# Patient Record
Sex: FEMALE | Race: WHITE | NOT HISPANIC OR LATINO | Employment: FULL TIME | URBAN - METROPOLITAN AREA
[De-identification: names, ages, dates, MRNs, and addresses within clinical notes are randomized per-mention and may not be internally consistent; named-entity substitution may affect disease eponyms.]

---

## 2023-03-21 DIAGNOSIS — F98.8 ATTENTION DEFICIT DISORDER, UNSPECIFIED HYPERACTIVITY PRESENCE: ICD-10-CM

## 2023-03-21 DIAGNOSIS — F98.8 ATTENTION DEFICIT DISORDER, UNSPECIFIED HYPERACTIVITY PRESENCE: Primary | ICD-10-CM

## 2023-03-21 DIAGNOSIS — E55.9 VITAMIN D DEFICIENCY: ICD-10-CM

## 2023-03-21 RX ORDER — IBUPROFEN 800 MG/1
1 TABLET ORAL 2 TIMES DAILY
COMMUNITY
Start: 2017-01-12 | End: 2023-03-21 | Stop reason: SDUPTHER

## 2023-03-21 RX ORDER — OMEPRAZOLE 40 MG/1
40 CAPSULE, DELAYED RELEASE ORAL DAILY
COMMUNITY
Start: 2015-12-29 | End: 2023-03-21 | Stop reason: SDUPTHER

## 2023-03-21 RX ORDER — DEXTROAMPHETAMINE SACCHARATE, AMPHETAMINE ASPARTATE MONOHYDRATE, DEXTROAMPHETAMINE SULFATE AND AMPHETAMINE SULFATE 5; 5; 5; 5 MG/1; MG/1; MG/1; MG/1
20 CAPSULE, EXTENDED RELEASE ORAL 2 TIMES DAILY
Qty: 30 CAPSULE | Refills: 0 | Status: SHIPPED | OUTPATIENT
Start: 2023-03-21 | End: 2023-05-01 | Stop reason: SDUPTHER

## 2023-03-21 RX ORDER — DEXTROAMPHETAMINE SACCHARATE, AMPHETAMINE ASPARTATE MONOHYDRATE, DEXTROAMPHETAMINE SULFATE AND AMPHETAMINE SULFATE 5; 5; 5; 5 MG/1; MG/1; MG/1; MG/1
20 CAPSULE, EXTENDED RELEASE ORAL 2 TIMES DAILY
Qty: 90 CAPSULE | Refills: 0 | Status: SHIPPED | OUTPATIENT
Start: 2023-03-21 | End: 2023-03-23

## 2023-03-21 RX ORDER — PREDNISONE 10 MG/1
10 TABLET ORAL DAILY
Qty: 90 TABLET | Refills: 0 | Status: SHIPPED | OUTPATIENT
Start: 2023-03-21 | End: 2023-07-17 | Stop reason: SDUPTHER

## 2023-03-21 RX ORDER — OMEPRAZOLE 40 MG/1
40 CAPSULE, DELAYED RELEASE ORAL DAILY
Qty: 90 CAPSULE | Refills: 1 | Status: SHIPPED | OUTPATIENT
Start: 2023-03-21 | End: 2023-10-02 | Stop reason: SDUPTHER

## 2023-03-21 RX ORDER — DEXTROAMPHETAMINE SACCHARATE, AMPHETAMINE ASPARTATE MONOHYDRATE, DEXTROAMPHETAMINE SULFATE AND AMPHETAMINE SULFATE 5; 5; 5; 5 MG/1; MG/1; MG/1; MG/1
20 CAPSULE, EXTENDED RELEASE ORAL 2 TIMES DAILY
Qty: 90 CAPSULE | Refills: 0 | Status: CANCELLED | OUTPATIENT
Start: 2023-03-21

## 2023-03-21 RX ORDER — DEXTROAMPHETAMINE SACCHARATE, AMPHETAMINE ASPARTATE MONOHYDRATE, DEXTROAMPHETAMINE SULFATE AND AMPHETAMINE SULFATE 5; 5; 5; 5 MG/1; MG/1; MG/1; MG/1
20 CAPSULE, EXTENDED RELEASE ORAL 2 TIMES DAILY
COMMUNITY
End: 2023-03-21 | Stop reason: SDUPTHER

## 2023-03-21 RX ORDER — IBUPROFEN 800 MG/1
800 TABLET ORAL 2 TIMES DAILY
Qty: 180 TABLET | Refills: 1 | Status: SHIPPED | OUTPATIENT
Start: 2023-03-21 | End: 2023-10-02 | Stop reason: SDUPTHER

## 2023-03-21 RX ORDER — PREDNISONE 10 MG/1
1 TABLET ORAL DAILY
COMMUNITY
Start: 2022-01-13 | End: 2023-03-21 | Stop reason: SDUPTHER

## 2023-03-21 RX ORDER — ACETAMINOPHEN 500 MG
1 TABLET ORAL DAILY
COMMUNITY
Start: 2022-07-22 | End: 2023-03-21 | Stop reason: SDUPTHER

## 2023-03-21 RX ORDER — ACETAMINOPHEN 500 MG
5000 TABLET ORAL DAILY
Qty: 90 TABLET | Refills: 1 | Status: SHIPPED | OUTPATIENT
Start: 2023-03-21 | End: 2023-10-09 | Stop reason: SDUPTHER

## 2023-03-23 RX ORDER — DEXTROAMPHETAMINE SACCHARATE, AMPHETAMINE ASPARTATE MONOHYDRATE, DEXTROAMPHETAMINE SULFATE AND AMPHETAMINE SULFATE 5; 5; 5; 5 MG/1; MG/1; MG/1; MG/1
20 CAPSULE, EXTENDED RELEASE ORAL 2 TIMES DAILY
Qty: 60 CAPSULE | Refills: 0 | Status: SHIPPED | OUTPATIENT
Start: 2023-03-23 | End: 2024-03-11 | Stop reason: SDUPTHER

## 2023-04-04 DIAGNOSIS — F98.8 ATTENTION DEFICIT DISORDER, UNSPECIFIED HYPERACTIVITY PRESENCE: ICD-10-CM

## 2023-04-04 RX ORDER — ONDANSETRON 8 MG/1
TABLET, ORALLY DISINTEGRATING ORAL EVERY 8 HOURS
COMMUNITY
Start: 2018-06-02 | End: 2023-05-01 | Stop reason: SDUPTHER

## 2023-04-04 RX ORDER — LORATADINE 10 MG/1
1 TABLET ORAL DAILY
COMMUNITY

## 2023-04-04 RX ORDER — DEXTROAMPHETAMINE SACCHARATE, AMPHETAMINE ASPARTATE MONOHYDRATE, DEXTROAMPHETAMINE SULFATE AND AMPHETAMINE SULFATE 5; 5; 5; 5 MG/1; MG/1; MG/1; MG/1
20 CAPSULE, EXTENDED RELEASE ORAL 2 TIMES DAILY
Qty: 60 CAPSULE | Refills: 0 | Status: CANCELLED | OUTPATIENT
Start: 2023-04-04

## 2023-04-04 RX ORDER — METHYLPREDNISOLONE 4 MG/1
TABLET ORAL
COMMUNITY
Start: 2016-08-16 | End: 2023-07-27 | Stop reason: ALTCHOICE

## 2023-04-04 RX ORDER — ONDANSETRON 4 MG/1
TABLET, ORALLY DISINTEGRATING ORAL
COMMUNITY
Start: 2019-04-01 | End: 2023-05-01 | Stop reason: SDUPTHER

## 2023-04-04 RX ORDER — OXYCODONE HYDROCHLORIDE 5 MG/1
TABLET ORAL EVERY 6 HOURS
COMMUNITY
Start: 2016-07-08 | End: 2023-07-27 | Stop reason: ALTCHOICE

## 2023-04-04 RX ORDER — NITROFURANTOIN 25; 75 MG/1; MG/1
CAPSULE ORAL
COMMUNITY
Start: 2023-02-22 | End: 2023-07-27 | Stop reason: ALTCHOICE

## 2023-04-04 NOTE — TELEPHONE ENCOUNTER
Patient called and since last week she had been trying to find a pharmacy that has Adderall in stock she find this new pharmacy and they will not except the sheet of paper that is the rx they needed it escribed it is attached

## 2023-05-01 ENCOUNTER — TELEPHONE (OUTPATIENT)
Dept: PRIMARY CARE | Facility: CLINIC | Age: 53
End: 2023-05-01

## 2023-05-01 DIAGNOSIS — F98.8 ATTENTION DEFICIT DISORDER, UNSPECIFIED HYPERACTIVITY PRESENCE: ICD-10-CM

## 2023-05-01 RX ORDER — LEVOTHYROXINE SODIUM 100 UG/1
1 TABLET ORAL DAILY
COMMUNITY
Start: 2013-11-18 | End: 2024-01-31 | Stop reason: SDUPTHER

## 2023-05-01 RX ORDER — CLONAZEPAM 0.5 MG/1
TABLET ORAL
COMMUNITY
End: 2023-07-27 | Stop reason: ALTCHOICE

## 2023-05-01 RX ORDER — DEXTROAMPHETAMINE SACCHARATE, AMPHETAMINE ASPARTATE MONOHYDRATE, DEXTROAMPHETAMINE SULFATE AND AMPHETAMINE SULFATE 5; 5; 5; 5 MG/1; MG/1; MG/1; MG/1
20 CAPSULE, EXTENDED RELEASE ORAL 2 TIMES DAILY
Qty: 60 CAPSULE | Refills: 0 | Status: SHIPPED | OUTPATIENT
Start: 2023-05-01 | End: 2023-05-30 | Stop reason: SDUPTHER

## 2023-05-01 RX ORDER — CLINDAMYCIN HYDROCHLORIDE 300 MG/1
CAPSULE ORAL
COMMUNITY
Start: 2018-06-11 | End: 2023-07-27 | Stop reason: ALTCHOICE

## 2023-05-01 RX ORDER — LEVOTHYROXINE SODIUM 88 UG/1
CAPSULE ORAL
COMMUNITY
End: 2023-07-27

## 2023-05-01 RX ORDER — EPINEPHRINE 0.3 MG/.3ML
0.3 INJECTION SUBCUTANEOUS
COMMUNITY
Start: 2020-09-11 | End: 2023-07-27 | Stop reason: SDUPTHER

## 2023-05-01 RX ORDER — DICLOFENAC SODIUM, CAPSAICIN 1.5-0.025%
KIT TOPICAL
COMMUNITY
Start: 2016-08-16 | End: 2023-07-27 | Stop reason: ALTCHOICE

## 2023-05-01 RX ORDER — DOCUSATE SODIUM 100 MG/1
CAPSULE, LIQUID FILLED ORAL 2 TIMES DAILY
COMMUNITY
Start: 2016-07-08 | End: 2023-07-27 | Stop reason: ALTCHOICE

## 2023-05-30 DIAGNOSIS — F98.8 ATTENTION DEFICIT DISORDER, UNSPECIFIED HYPERACTIVITY PRESENCE: ICD-10-CM

## 2023-05-30 RX ORDER — DEXTROAMPHETAMINE SACCHARATE, AMPHETAMINE ASPARTATE MONOHYDRATE, DEXTROAMPHETAMINE SULFATE AND AMPHETAMINE SULFATE 5; 5; 5; 5 MG/1; MG/1; MG/1; MG/1
20 CAPSULE, EXTENDED RELEASE ORAL 2 TIMES DAILY
Qty: 60 CAPSULE | Refills: 0 | Status: SHIPPED | OUTPATIENT
Start: 2023-05-30 | End: 2023-06-26 | Stop reason: SDUPTHER

## 2023-06-26 DIAGNOSIS — F98.8 ATTENTION DEFICIT DISORDER, UNSPECIFIED HYPERACTIVITY PRESENCE: ICD-10-CM

## 2023-06-26 RX ORDER — DEXTROAMPHETAMINE SACCHARATE, AMPHETAMINE ASPARTATE MONOHYDRATE, DEXTROAMPHETAMINE SULFATE AND AMPHETAMINE SULFATE 5; 5; 5; 5 MG/1; MG/1; MG/1; MG/1
20 CAPSULE, EXTENDED RELEASE ORAL 2 TIMES DAILY
Qty: 60 CAPSULE | Refills: 0 | Status: SHIPPED | OUTPATIENT
Start: 2023-06-26 | End: 2023-07-21 | Stop reason: SDUPTHER

## 2023-06-26 NOTE — TELEPHONE ENCOUNTER
Patient called requesting Adderall Refill to [facility] on file   UDS isnt due until 1/12/2024

## 2023-06-27 ENCOUNTER — LAB (OUTPATIENT)
Dept: LAB | Facility: LAB | Age: 53
End: 2023-06-27
Payer: COMMERCIAL

## 2023-06-27 ENCOUNTER — TELEPHONE (OUTPATIENT)
Dept: PRIMARY CARE | Facility: CLINIC | Age: 53
End: 2023-06-27
Payer: COMMERCIAL

## 2023-06-27 DIAGNOSIS — N23 KIDNEY PAIN: ICD-10-CM

## 2023-06-27 DIAGNOSIS — N39.0 URINARY TRACT INFECTION WITHOUT HEMATURIA, SITE UNSPECIFIED: ICD-10-CM

## 2023-06-27 DIAGNOSIS — N39.0 URINARY TRACT INFECTION WITHOUT HEMATURIA, SITE UNSPECIFIED: Primary | ICD-10-CM

## 2023-06-27 LAB
APPEARANCE, URINE: CLEAR
BILIRUBIN, URINE: NEGATIVE
BLOOD, URINE: NEGATIVE
COLOR, URINE: YELLOW
GLUCOSE, URINE: NEGATIVE MG/DL
KETONES, URINE: NEGATIVE MG/DL
LEUKOCYTE ESTERASE, URINE: ABNORMAL
NITRITE, URINE: NEGATIVE
PH, URINE: 6 (ref 5–8)
PROTEIN, URINE: NEGATIVE MG/DL
RBC, URINE: 1 /HPF (ref 0–5)
SPECIFIC GRAVITY, URINE: 1.01 (ref 1–1.03)
SQUAMOUS EPITHELIAL CELLS, URINE: 1 /HPF
UROBILINOGEN, URINE: <2 MG/DL (ref 0–1.9)
WBC, URINE: 5 /HPF (ref 0–5)

## 2023-06-27 PROCEDURE — 87086 URINE CULTURE/COLONY COUNT: CPT

## 2023-06-27 PROCEDURE — 81001 URINALYSIS AUTO W/SCOPE: CPT

## 2023-06-27 NOTE — TELEPHONE ENCOUNTER
Patient called requesting a Urinalysis ASAP because of pain in kidneys states she has to bend over just to walk. She's currently not on antibiotics but would like one prescribed. I put order in and signed for Urinalysis already F/up appointment is scheduled for 7/5/23 at [facility]. Patient also stated she doesn't have any pain when urinating, or frequency hasn't increased

## 2023-06-28 ENCOUNTER — TELEPHONE (OUTPATIENT)
Dept: PRIMARY CARE | Facility: CLINIC | Age: 53
End: 2023-06-28
Payer: COMMERCIAL

## 2023-06-28 LAB — URINE CULTURE: NORMAL

## 2023-06-28 NOTE — TELEPHONE ENCOUNTER
Called in Prescription for Bactrim DS 1 PO bid 8 , as confirmed by provider, VINNY to patient letting her know it was called in to [facility] on file

## 2023-06-28 NOTE — TELEPHONE ENCOUNTER
Patient received an urinalysis yesterday and her results came back she stated her Leukocytes were 2+ and would like antibiotic sent to [facility] on file. She states she in a lot of pain and rated it as 8/10 . Would like a CB once  medication is sent so she can pick it up

## 2023-07-05 ENCOUNTER — APPOINTMENT (OUTPATIENT)
Dept: PRIMARY CARE | Facility: CLINIC | Age: 53
End: 2023-07-05
Payer: COMMERCIAL

## 2023-07-17 DIAGNOSIS — E55.9 VITAMIN D DEFICIENCY: ICD-10-CM

## 2023-07-17 RX ORDER — PREDNISONE 10 MG/1
10 TABLET ORAL DAILY
Qty: 90 TABLET | Refills: 0 | Status: SHIPPED | OUTPATIENT
Start: 2023-07-17 | End: 2023-10-09 | Stop reason: SDUPTHER

## 2023-07-21 DIAGNOSIS — F98.8 ATTENTION DEFICIT DISORDER, UNSPECIFIED HYPERACTIVITY PRESENCE: ICD-10-CM

## 2023-07-21 RX ORDER — DEXTROAMPHETAMINE SACCHARATE, AMPHETAMINE ASPARTATE MONOHYDRATE, DEXTROAMPHETAMINE SULFATE AND AMPHETAMINE SULFATE 5; 5; 5; 5 MG/1; MG/1; MG/1; MG/1
20 CAPSULE, EXTENDED RELEASE ORAL 2 TIMES DAILY
Qty: 60 CAPSULE | Refills: 0 | Status: SHIPPED | OUTPATIENT
Start: 2023-07-21 | End: 2023-08-21 | Stop reason: SDUPTHER

## 2023-07-24 RX ORDER — CYCLOBENZAPRINE HCL 10 MG
10 TABLET ORAL EVERY 8 HOURS PRN
COMMUNITY
Start: 2017-05-10 | End: 2023-07-27 | Stop reason: ALTCHOICE

## 2023-07-24 RX ORDER — HYDROCODONE BITARTRATE AND ACETAMINOPHEN 5; 325 MG/1; MG/1
1 TABLET ORAL EVERY 6 HOURS PRN
COMMUNITY
Start: 2017-08-31 | End: 2023-07-27 | Stop reason: ALTCHOICE

## 2023-07-24 RX ORDER — NAPROXEN SODIUM 220 MG/1
81 TABLET, FILM COATED ORAL
COMMUNITY

## 2023-07-25 PROBLEM — J01.40 ACUTE PANSINUSITIS: Status: ACTIVE | Noted: 2023-07-25

## 2023-07-25 PROBLEM — I82.90 THROMBUS: Status: ACTIVE | Noted: 2023-07-25

## 2023-07-25 PROBLEM — Z98.890 POST-OPERATIVE STATE: Status: ACTIVE | Noted: 2023-07-25

## 2023-07-25 PROBLEM — N39.3 STRESS INCONTINENCE: Status: ACTIVE | Noted: 2023-07-25

## 2023-07-25 PROBLEM — N95.1 HOT FLASHES, MENOPAUSAL: Status: ACTIVE | Noted: 2023-07-25

## 2023-07-25 PROBLEM — F41.9 ANXIETY DISORDER: Status: ACTIVE | Noted: 2023-07-25

## 2023-07-25 PROBLEM — B37.9 YEAST INFECTION: Status: ACTIVE | Noted: 2023-07-25

## 2023-07-25 PROBLEM — M62.838 NECK MUSCLE SPASM: Status: ACTIVE | Noted: 2023-07-25

## 2023-07-25 PROBLEM — R63.5 ABNORMAL WEIGHT GAIN: Status: ACTIVE | Noted: 2023-07-25

## 2023-07-25 PROBLEM — J32.9 SINUS INFECTION: Status: ACTIVE | Noted: 2023-07-25

## 2023-07-25 PROBLEM — R60.0 BILATERAL LEG EDEMA: Status: ACTIVE | Noted: 2023-07-25

## 2023-07-25 PROBLEM — N95.0 POSTMENOPAUSAL BLEEDING: Status: ACTIVE | Noted: 2023-07-25

## 2023-07-25 PROBLEM — E89.0 HYPOTHYROIDISM, POSTSURGICAL: Status: ACTIVE | Noted: 2023-07-25

## 2023-07-25 PROBLEM — E78.00 ELEVATED LDL CHOLESTEROL LEVEL: Status: ACTIVE | Noted: 2023-07-25

## 2023-07-25 PROBLEM — N81.9 VAGINAL VAULT PROLAPSE: Status: ACTIVE | Noted: 2023-07-25

## 2023-07-25 PROBLEM — G44.52 NEW DAILY PERSISTENT HEADACHE: Status: ACTIVE | Noted: 2023-07-25

## 2023-07-25 PROBLEM — E55.9 VITAMIN D DEFICIENCY: Status: ACTIVE | Noted: 2023-07-25

## 2023-07-25 PROBLEM — R41.3 MEMORY PROBLEM: Status: ACTIVE | Noted: 2023-07-25

## 2023-07-25 PROBLEM — R60.0 LOWER EXTREMITY EDEMA: Status: ACTIVE | Noted: 2023-07-25

## 2023-07-25 PROBLEM — L73.9 FOLLICULITIS: Status: ACTIVE | Noted: 2023-07-25

## 2023-07-25 PROBLEM — R21 RASH: Status: ACTIVE | Noted: 2023-07-25

## 2023-07-25 PROBLEM — N39.0 UTI (URINARY TRACT INFECTION): Status: ACTIVE | Noted: 2023-07-25

## 2023-07-25 PROBLEM — M54.50 LOW BACK PAIN: Status: ACTIVE | Noted: 2023-07-25

## 2023-07-25 PROBLEM — F98.8 ADD (ATTENTION DEFICIT DISORDER): Status: ACTIVE | Noted: 2023-07-25

## 2023-07-25 PROBLEM — R45.86 MOOD SWINGS: Status: ACTIVE | Noted: 2023-07-25

## 2023-07-25 PROBLEM — R03.0 BLOOD PRESSURE ELEVATED WITHOUT HISTORY OF HTN: Status: ACTIVE | Noted: 2023-07-25

## 2023-07-25 PROBLEM — R35.0 FREQUENCY OF URINATION: Status: ACTIVE | Noted: 2023-07-25

## 2023-07-25 PROBLEM — R51.9 HEADACHE: Status: ACTIVE | Noted: 2023-07-25

## 2023-07-25 PROBLEM — B36.9 FUNGAL DERMATITIS: Status: ACTIVE | Noted: 2023-07-25
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
## 2023-07-25 PROBLEM — J06.9 ACUTE URI: Status: ACTIVE | Noted: 2023-07-25

## 2023-07-25 PROBLEM — I83.899 VARICOSE VEINS OF LEG WITH COMPLICATIONS: Status: ACTIVE | Noted: 2023-07-25

## 2023-07-25 PROBLEM — R25.2 BILATERAL LEG CRAMPS: Status: ACTIVE | Noted: 2023-07-25

## 2023-07-26 ENCOUNTER — APPOINTMENT (OUTPATIENT)
Dept: PRIMARY CARE | Facility: CLINIC | Age: 53
End: 2023-07-26
Payer: COMMERCIAL

## 2023-07-27 ENCOUNTER — OFFICE VISIT (OUTPATIENT)
Dept: PRIMARY CARE | Facility: CLINIC | Age: 53
End: 2023-07-27
Payer: COMMERCIAL

## 2023-07-27 VITALS
RESPIRATION RATE: 17 BRPM | TEMPERATURE: 97.5 F | BODY MASS INDEX: 50.02 KG/M2 | SYSTOLIC BLOOD PRESSURE: 136 MMHG | HEIGHT: 64 IN | DIASTOLIC BLOOD PRESSURE: 74 MMHG | HEART RATE: 80 BPM | OXYGEN SATURATION: 98 % | WEIGHT: 293 LBS

## 2023-07-27 DIAGNOSIS — Z00.00 WELL ADULT EXAM: Primary | ICD-10-CM

## 2023-07-27 PROCEDURE — 1036F TOBACCO NON-USER: CPT | Performed by: INTERNAL MEDICINE

## 2023-07-27 PROCEDURE — 99213 OFFICE O/P EST LOW 20 MIN: CPT | Performed by: INTERNAL MEDICINE

## 2023-07-27 RX ORDER — EPINEPHRINE 0.3 MG/.3ML
0.3 INJECTION SUBCUTANEOUS AS NEEDED
Qty: 30 ML | Refills: 2 | Status: SHIPPED | OUTPATIENT
Start: 2023-07-27

## 2023-07-27 ASSESSMENT — ANXIETY QUESTIONNAIRES
7. FEELING AFRAID AS IF SOMETHING AWFUL MIGHT HAPPEN: NOT AT ALL
4. TROUBLE RELAXING: SEVERAL DAYS
6. BECOMING EASILY ANNOYED OR IRRITABLE: MORE THAN HALF THE DAYS
IF YOU CHECKED OFF ANY PROBLEMS ON THIS QUESTIONNAIRE, HOW DIFFICULT HAVE THESE PROBLEMS MADE IT FOR YOU TO DO YOUR WORK, TAKE CARE OF THINGS AT HOME, OR GET ALONG WITH OTHER PEOPLE: SOMEWHAT DIFFICULT
5. BEING SO RESTLESS THAT IT IS HARD TO SIT STILL: SEVERAL DAYS
GAD7 TOTAL SCORE: 11
3. WORRYING TOO MUCH ABOUT DIFFERENT THINGS: NEARLY EVERY DAY
1. FEELING NERVOUS, ANXIOUS, OR ON EDGE: MORE THAN HALF THE DAYS
2. NOT BEING ABLE TO STOP OR CONTROL WORRYING: MORE THAN HALF THE DAYS

## 2023-07-27 ASSESSMENT — PATIENT HEALTH QUESTIONNAIRE - PHQ9
9. THOUGHTS THAT YOU WOULD BE BETTER OFF DEAD, OR OF HURTING YOURSELF: NOT AT ALL
4. FEELING TIRED OR HAVING LITTLE ENERGY: MORE THAN HALF THE DAYS
SUM OF ALL RESPONSES TO PHQ QUESTIONS 1-9: 6
8. MOVING OR SPEAKING SO SLOWLY THAT OTHER PEOPLE COULD HAVE NOTICED. OR THE OPPOSITE, BEING SO FIGETY OR RESTLESS THAT YOU HAVE BEEN MOVING AROUND A LOT MORE THAN USUAL: SEVERAL DAYS
SUM OF ALL RESPONSES TO PHQ9 QUESTIONS 1 AND 2: 3
5. POOR APPETITE OR OVEREATING: NOT AT ALL
3. TROUBLE FALLING OR STAYING ASLEEP OR SLEEPING TOO MUCH: NOT AT ALL
1. LITTLE INTEREST OR PLEASURE IN DOING THINGS: SEVERAL DAYS
10. IF YOU CHECKED OFF ANY PROBLEMS, HOW DIFFICULT HAVE THESE PROBLEMS MADE IT FOR YOU TO DO YOUR WORK, TAKE CARE OF THINGS AT HOME, OR GET ALONG WITH OTHER PEOPLE: NOT DIFFICULT AT ALL
6. FEELING BAD ABOUT YOURSELF - OR THAT YOU ARE A FAILURE OR HAVE LET YOURSELF OR YOUR FAMILY DOWN: NOT AT ALL
2. FEELING DOWN, DEPRESSED OR HOPELESS: MORE THAN HALF THE DAYS
7. TROUBLE CONCENTRATING ON THINGS, SUCH AS READING THE NEWSPAPER OR WATCHING TELEVISION: NOT AT ALL

## 2023-07-27 ASSESSMENT — PAIN SCALES - GENERAL: PAINLEVEL: 0-NO PAIN

## 2023-07-27 NOTE — PROGRESS NOTES
"Subjective   Patient ID: Frances Mcdowell is a 52 y.o. female who presents for Med Refill.  In for follow up re Anxiety    Med Refill        Review of Systems   All other systems reviewed and are negative.      Objective   Physical Exam  /74 (BP Location: Left arm)   Pulse 80   Temp 36.4 °C (97.5 °F)   Resp 17   Ht 1.613 m (5' 3.5\")   Wt 142 kg (314 lb)   SpO2 98%   BMI 54.75 kg/m²         Assessment/Plan   Problem List Items Addressed This Visit       Well adult exam - Primary    Relevant Medications    EPINEPHrine 0.3 mg/0.3 mL injection syringe    Other Relevant Orders    Cologuard® colon cancer screening    BI mammo bilateral screening tomosynthesis        ASSESSMENT AND PLAN: Patient on examination is in fair health except for medical conditions discussed above, obtained screening blood tests to screen for high cholesterol, diabetes, thyroid, Ekg if above 50 years old or earlier if with cardiac history. Patient should be taking enough calcium in a balanced diet  Weight control especially if BMI is above ideal range. For Female Patients Only:  Mammogram yearly and PAP test with gynecology unless s/p Total hysterectomy  Bone density test at age 60 and above and every two years after that. Preventive Medicine: colon cancer screening by age 45 if no family history as well PSA @ 50 , balanced diet, and exercise as discussed. Seat belt use for injury prevention, living will. Substance use and /or tobacco use counseled when applicable. Alcohol use discussed, use designated . Immunizations TD age 50 and every 10 years. Pneumovax and shingles vaccine counseled. Yearly flu vaccine unless contraindicated. More than 50% of office visit time spent counseling the patient, questions were answered. If problems arise, patient is to call or come back in. It is understood that the responsibility of healthcare is shared by the patient by following a healthy lifestyle and following the plan above as discussed. " Advised complete physical examination every year. Pending additional results, may need to come for a return office visit for follow-up.

## 2023-07-27 NOTE — PROGRESS NOTES
Patient is here for a medication refill of her Epipen, states she has no concerns to discuss with provider

## 2023-08-21 DIAGNOSIS — F98.8 ATTENTION DEFICIT DISORDER, UNSPECIFIED HYPERACTIVITY PRESENCE: ICD-10-CM

## 2023-08-21 RX ORDER — DEXTROAMPHETAMINE SACCHARATE, AMPHETAMINE ASPARTATE MONOHYDRATE, DEXTROAMPHETAMINE SULFATE AND AMPHETAMINE SULFATE 5; 5; 5; 5 MG/1; MG/1; MG/1; MG/1
20 CAPSULE, EXTENDED RELEASE ORAL 2 TIMES DAILY
Qty: 60 CAPSULE | Refills: 0 | Status: SHIPPED | OUTPATIENT
Start: 2023-08-21 | End: 2023-09-19 | Stop reason: SDUPTHER

## 2023-08-21 NOTE — TELEPHONE ENCOUNTER
Patient called in for Rx refill on Adderall sent to [facility] on file   UDS Due - 1/12/2024

## 2023-09-19 DIAGNOSIS — F98.8 ATTENTION DEFICIT DISORDER, UNSPECIFIED HYPERACTIVITY PRESENCE: ICD-10-CM

## 2023-09-19 RX ORDER — DEXTROAMPHETAMINE SACCHARATE, AMPHETAMINE ASPARTATE MONOHYDRATE, DEXTROAMPHETAMINE SULFATE AND AMPHETAMINE SULFATE 5; 5; 5; 5 MG/1; MG/1; MG/1; MG/1
20 CAPSULE, EXTENDED RELEASE ORAL 2 TIMES DAILY
Qty: 60 CAPSULE | Refills: 0 | Status: SHIPPED | OUTPATIENT
Start: 2023-09-19 | End: 2023-10-17 | Stop reason: SDUPTHER

## 2023-10-02 DIAGNOSIS — E55.9 VITAMIN D DEFICIENCY: ICD-10-CM

## 2023-10-02 RX ORDER — OMEPRAZOLE 40 MG/1
40 CAPSULE, DELAYED RELEASE ORAL DAILY
Qty: 90 CAPSULE | Refills: 1 | Status: SHIPPED | OUTPATIENT
Start: 2023-10-02 | End: 2024-01-02

## 2023-10-03 RX ORDER — IBUPROFEN 800 MG/1
800 TABLET ORAL 2 TIMES DAILY
Qty: 180 TABLET | Refills: 1 | Status: SHIPPED | OUTPATIENT
Start: 2023-10-03 | End: 2024-03-18

## 2023-10-09 DIAGNOSIS — E55.9 VITAMIN D DEFICIENCY: ICD-10-CM

## 2023-10-09 RX ORDER — ACETAMINOPHEN 500 MG
5000 TABLET ORAL DAILY
Qty: 90 TABLET | Refills: 1 | Status: SHIPPED | OUTPATIENT
Start: 2023-10-09 | End: 2024-01-12 | Stop reason: SDUPTHER

## 2023-10-09 RX ORDER — PREDNISONE 10 MG/1
10 TABLET ORAL DAILY
Qty: 90 TABLET | Refills: 0 | Status: SHIPPED | OUTPATIENT
Start: 2023-10-09 | End: 2024-01-12 | Stop reason: SDUPTHER

## 2023-10-17 DIAGNOSIS — F98.8 ATTENTION DEFICIT DISORDER, UNSPECIFIED HYPERACTIVITY PRESENCE: ICD-10-CM

## 2023-10-17 RX ORDER — DEXTROAMPHETAMINE SACCHARATE, AMPHETAMINE ASPARTATE MONOHYDRATE, DEXTROAMPHETAMINE SULFATE AND AMPHETAMINE SULFATE 5; 5; 5; 5 MG/1; MG/1; MG/1; MG/1
20 CAPSULE, EXTENDED RELEASE ORAL 2 TIMES DAILY
Qty: 60 CAPSULE | Refills: 0 | Status: SHIPPED | OUTPATIENT
Start: 2023-10-17 | End: 2023-11-14 | Stop reason: SDUPTHER

## 2023-10-19 ENCOUNTER — TELEPHONE (OUTPATIENT)
Dept: PRIMARY CARE | Facility: CLINIC | Age: 53
End: 2023-10-19
Payer: COMMERCIAL

## 2023-10-19 DIAGNOSIS — R05.1 ACUTE COUGH: Primary | ICD-10-CM

## 2023-10-19 RX ORDER — AZITHROMYCIN 250 MG/1
250 TABLET, FILM COATED ORAL DAILY
COMMUNITY
End: 2023-10-19 | Stop reason: SDUPTHER

## 2023-10-19 RX ORDER — AZITHROMYCIN 250 MG/1
TABLET, FILM COATED ORAL
Qty: 6 TABLET | Refills: 0 | Status: SHIPPED | OUTPATIENT
Start: 2023-10-19

## 2023-11-14 DIAGNOSIS — F98.8 ATTENTION DEFICIT DISORDER, UNSPECIFIED HYPERACTIVITY PRESENCE: ICD-10-CM

## 2023-11-14 DIAGNOSIS — Z00.00 LABORATORY TESTS ORDERED AS PART OF A COMPLETE PHYSICAL EXAM (CPE): ICD-10-CM

## 2023-11-16 RX ORDER — DEXTROAMPHETAMINE SACCHARATE, AMPHETAMINE ASPARTATE MONOHYDRATE, DEXTROAMPHETAMINE SULFATE AND AMPHETAMINE SULFATE 5; 5; 5; 5 MG/1; MG/1; MG/1; MG/1
20 CAPSULE, EXTENDED RELEASE ORAL 2 TIMES DAILY
Qty: 60 CAPSULE | Refills: 0 | Status: SHIPPED | OUTPATIENT
Start: 2023-11-16 | End: 2023-12-14 | Stop reason: SDUPTHER

## 2023-11-28 ENCOUNTER — LAB (OUTPATIENT)
Dept: LAB | Facility: LAB | Age: 53
End: 2023-11-28
Payer: COMMERCIAL

## 2023-11-28 DIAGNOSIS — Z00.00 LABORATORY TESTS ORDERED AS PART OF A COMPLETE PHYSICAL EXAM (CPE): ICD-10-CM

## 2023-11-28 LAB
25(OH)D3 SERPL-MCNC: 79 NG/ML (ref 30–100)
ALT SERPL W P-5'-P-CCNC: 15 U/L (ref 7–45)
ANION GAP SERPL CALC-SCNC: 15 MMOL/L (ref 10–20)
APPEARANCE UR: ABNORMAL
BILIRUB UR STRIP.AUTO-MCNC: NEGATIVE MG/DL
BUN SERPL-MCNC: 27 MG/DL (ref 6–23)
CALCIUM SERPL-MCNC: 9.3 MG/DL (ref 8.6–10.6)
CHLORIDE SERPL-SCNC: 104 MMOL/L (ref 98–107)
CHOLEST SERPL-MCNC: 194 MG/DL (ref 0–199)
CHOLESTEROL/HDL RATIO: 3.2
CO2 SERPL-SCNC: 29 MMOL/L (ref 21–32)
COLOR UR: ABNORMAL
CREAT SERPL-MCNC: 0.76 MG/DL (ref 0.5–1.05)
ERYTHROCYTE [DISTWIDTH] IN BLOOD BY AUTOMATED COUNT: 12.7 % (ref 11.5–14.5)
EST. AVERAGE GLUCOSE BLD GHB EST-MCNC: 105 MG/DL
GFR SERPL CREATININE-BSD FRML MDRD: >90 ML/MIN/1.73M*2
GLUCOSE SERPL-MCNC: 87 MG/DL (ref 74–99)
GLUCOSE UR STRIP.AUTO-MCNC: NEGATIVE MG/DL
HBA1C MFR BLD: 5.3 %
HCT VFR BLD AUTO: 39.3 % (ref 36–46)
HDLC SERPL-MCNC: 60.6 MG/DL
HGB BLD-MCNC: 12.6 G/DL (ref 12–16)
KETONES UR STRIP.AUTO-MCNC: NEGATIVE MG/DL
LDLC SERPL CALC-MCNC: 116 MG/DL
LEUKOCYTE ESTERASE UR QL STRIP.AUTO: ABNORMAL
MCH RBC QN AUTO: 32.4 PG (ref 26–34)
MCHC RBC AUTO-ENTMCNC: 32.1 G/DL (ref 32–36)
MCV RBC AUTO: 101 FL (ref 80–100)
MUCOUS THREADS #/AREA URNS AUTO: ABNORMAL /LPF
NITRITE UR QL STRIP.AUTO: NEGATIVE
NON HDL CHOLESTEROL: 133 MG/DL (ref 0–149)
NRBC BLD-RTO: 0 /100 WBCS (ref 0–0)
PH UR STRIP.AUTO: 5 [PH]
PLATELET # BLD AUTO: 379 X10*3/UL (ref 150–450)
POTASSIUM SERPL-SCNC: 4.6 MMOL/L (ref 3.5–5.3)
PROT UR STRIP.AUTO-MCNC: ABNORMAL MG/DL
RBC # BLD AUTO: 3.89 X10*6/UL (ref 4–5.2)
RBC # UR STRIP.AUTO: ABNORMAL /UL
RBC #/AREA URNS AUTO: ABNORMAL /HPF
SODIUM SERPL-SCNC: 143 MMOL/L (ref 136–145)
SP GR UR STRIP.AUTO: 1.02
SQUAMOUS #/AREA URNS AUTO: ABNORMAL /HPF
TRIGL SERPL-MCNC: 88 MG/DL (ref 0–149)
TSH SERPL-ACNC: 2.21 MIU/L (ref 0.44–3.98)
UROBILINOGEN UR STRIP.AUTO-MCNC: 2 MG/DL
VIT B12 SERPL-MCNC: 323 PG/ML (ref 211–911)
VLDL: 18 MG/DL (ref 0–40)
WBC # BLD AUTO: 8.6 X10*3/UL (ref 4.4–11.3)
WBC #/AREA URNS AUTO: >50 /HPF

## 2023-11-28 PROCEDURE — 82607 VITAMIN B-12: CPT

## 2023-11-28 PROCEDURE — 83036 HEMOGLOBIN GLYCOSYLATED A1C: CPT

## 2023-11-28 PROCEDURE — 82306 VITAMIN D 25 HYDROXY: CPT

## 2023-11-28 PROCEDURE — 80048 BASIC METABOLIC PNL TOTAL CA: CPT

## 2023-11-28 PROCEDURE — 80061 LIPID PANEL: CPT

## 2023-11-28 PROCEDURE — 81001 URINALYSIS AUTO W/SCOPE: CPT

## 2023-11-28 PROCEDURE — 84460 ALANINE AMINO (ALT) (SGPT): CPT

## 2023-11-28 PROCEDURE — 36415 COLL VENOUS BLD VENIPUNCTURE: CPT

## 2023-11-28 PROCEDURE — 84443 ASSAY THYROID STIM HORMONE: CPT

## 2023-11-28 PROCEDURE — 85027 COMPLETE CBC AUTOMATED: CPT

## 2023-11-29 DIAGNOSIS — N39.0 URINARY TRACT INFECTION WITHOUT HEMATURIA, SITE UNSPECIFIED: Primary | ICD-10-CM

## 2023-11-29 NOTE — TELEPHONE ENCOUNTER
Pt saw lab results, which confirmed her symptoms of UTI.  Would like to start Macrobid today.  Rx to [facility] please.

## 2023-11-30 ENCOUNTER — OFFICE VISIT (OUTPATIENT)
Dept: PRIMARY CARE | Facility: CLINIC | Age: 53
End: 2023-11-30
Payer: COMMERCIAL

## 2023-11-30 VITALS
HEIGHT: 64 IN | SYSTOLIC BLOOD PRESSURE: 144 MMHG | TEMPERATURE: 97.5 F | DIASTOLIC BLOOD PRESSURE: 88 MMHG | OXYGEN SATURATION: 94 % | HEART RATE: 77 BPM | BODY MASS INDEX: 50.02 KG/M2 | WEIGHT: 293 LBS | RESPIRATION RATE: 17 BRPM

## 2023-11-30 DIAGNOSIS — N39.0 URINARY TRACT INFECTION WITHOUT HEMATURIA, SITE UNSPECIFIED: Primary | ICD-10-CM

## 2023-11-30 DIAGNOSIS — E78.2 MIXED HYPERLIPIDEMIA: ICD-10-CM

## 2023-11-30 PROCEDURE — 99213 OFFICE O/P EST LOW 20 MIN: CPT | Performed by: INTERNAL MEDICINE

## 2023-11-30 PROCEDURE — 1036F TOBACCO NON-USER: CPT | Performed by: INTERNAL MEDICINE

## 2023-11-30 RX ORDER — NITROFURANTOIN 25; 75 MG/1; MG/1
100 CAPSULE ORAL 2 TIMES DAILY
Qty: 14 CAPSULE | Refills: 0 | Status: SHIPPED | OUTPATIENT
Start: 2023-11-30 | End: 2023-11-30 | Stop reason: ENTERED-IN-ERROR

## 2023-11-30 RX ORDER — ROSUVASTATIN CALCIUM 20 MG/1
20 TABLET, COATED ORAL DAILY
Qty: 30 TABLET | Refills: 5 | Status: SHIPPED | OUTPATIENT
Start: 2023-11-30 | End: 2024-05-28

## 2023-11-30 ASSESSMENT — PAIN SCALES - GENERAL: PAINLEVEL: 6

## 2023-11-30 NOTE — PROGRESS NOTES
Patient is here for annual exam ,   Has body pain , and chills possible sinus infection , 6/10  Also possible UTI requested Macrobid

## 2023-12-12 RX ORDER — NITROFURANTOIN 25; 75 MG/1; MG/1
100 CAPSULE ORAL 2 TIMES DAILY
Qty: 28 CAPSULE | Refills: 0 | Status: SHIPPED | OUTPATIENT
Start: 2023-12-12 | End: 2023-12-26

## 2023-12-14 DIAGNOSIS — F98.8 ATTENTION DEFICIT DISORDER, UNSPECIFIED HYPERACTIVITY PRESENCE: ICD-10-CM

## 2023-12-15 RX ORDER — DEXTROAMPHETAMINE SACCHARATE, AMPHETAMINE ASPARTATE MONOHYDRATE, DEXTROAMPHETAMINE SULFATE AND AMPHETAMINE SULFATE 5; 5; 5; 5 MG/1; MG/1; MG/1; MG/1
20 CAPSULE, EXTENDED RELEASE ORAL 2 TIMES DAILY
Qty: 60 CAPSULE | Refills: 0 | Status: SHIPPED | OUTPATIENT
Start: 2023-12-15 | End: 2024-01-11 | Stop reason: SDUPTHER

## 2023-12-30 DIAGNOSIS — E55.9 VITAMIN D DEFICIENCY: ICD-10-CM

## 2024-01-02 RX ORDER — OMEPRAZOLE 40 MG/1
40 CAPSULE, DELAYED RELEASE ORAL DAILY
Qty: 90 CAPSULE | Refills: 3 | Status: SHIPPED | OUTPATIENT
Start: 2024-01-02

## 2024-01-04 ENCOUNTER — TELEPHONE (OUTPATIENT)
Dept: PRIMARY CARE | Facility: CLINIC | Age: 54
End: 2024-01-04
Payer: COMMERCIAL

## 2024-01-04 DIAGNOSIS — B37.9 YEAST INFECTION: Primary | ICD-10-CM

## 2024-01-04 NOTE — TELEPHONE ENCOUNTER
Req Diflucan for yeast infection:  itching, odor, cottage cheese dischg, for 3 days.   [facility] in [place]

## 2024-01-05 LAB — NONINV COLON CA DNA+OCC BLD SCRN STL QL: POSITIVE

## 2024-01-05 RX ORDER — FLUCONAZOLE 150 MG/1
150 TABLET ORAL ONCE
Qty: 1 TABLET | Refills: 0 | Status: SHIPPED | OUTPATIENT
Start: 2024-01-05 | End: 2024-01-05

## 2024-01-08 DIAGNOSIS — Z12.11 COLON CANCER SCREENING: Primary | ICD-10-CM

## 2024-01-08 DIAGNOSIS — R19.5 POSITIVE COLORECTAL CANCER SCREENING USING COLOGUARD TEST: ICD-10-CM

## 2024-01-08 RX ORDER — POLYETHYLENE GLYCOL 3350, SODIUM SULFATE ANHYDROUS, SODIUM BICARBONATE, SODIUM CHLORIDE, POTASSIUM CHLORIDE 236; 22.74; 6.74; 5.86; 2.97 G/4L; G/4L; G/4L; G/4L; G/4L
4000 POWDER, FOR SOLUTION ORAL ONCE
Qty: 4000 ML | Refills: 0 | Status: SHIPPED | OUTPATIENT
Start: 2024-01-08 | End: 2024-01-08

## 2024-01-09 DIAGNOSIS — Z12.11 SPECIAL SCREENING FOR MALIGNANT NEOPLASMS, COLON: ICD-10-CM

## 2024-01-09 RX ORDER — SOD SULF/POT CHLORIDE/MAG SULF 1.479 G
TABLET ORAL
Qty: 24 TABLET | Refills: 0 | Status: SHIPPED | OUTPATIENT
Start: 2024-01-09

## 2024-01-11 ENCOUNTER — TELEPHONE (OUTPATIENT)
Dept: PRIMARY CARE | Facility: CLINIC | Age: 54
End: 2024-01-11
Payer: COMMERCIAL

## 2024-01-11 DIAGNOSIS — F98.8 ATTENTION DEFICIT DISORDER, UNSPECIFIED HYPERACTIVITY PRESENCE: ICD-10-CM

## 2024-01-11 RX ORDER — DEXTROAMPHETAMINE SACCHARATE, AMPHETAMINE ASPARTATE MONOHYDRATE, DEXTROAMPHETAMINE SULFATE AND AMPHETAMINE SULFATE 5; 5; 5; 5 MG/1; MG/1; MG/1; MG/1
20 CAPSULE, EXTENDED RELEASE ORAL 2 TIMES DAILY
Qty: 60 CAPSULE | Refills: 0 | Status: SHIPPED | OUTPATIENT
Start: 2024-01-11 | End: 2024-02-13 | Stop reason: SDUPTHER

## 2024-01-12 DIAGNOSIS — E55.9 VITAMIN D DEFICIENCY: ICD-10-CM

## 2024-01-12 RX ORDER — ACETAMINOPHEN 500 MG
5000 TABLET ORAL DAILY
Qty: 90 TABLET | Refills: 1 | Status: SHIPPED | OUTPATIENT
Start: 2024-01-12

## 2024-01-12 RX ORDER — PREDNISONE 10 MG/1
10 TABLET ORAL DAILY
Qty: 90 TABLET | Refills: 0 | Status: SHIPPED | OUTPATIENT
Start: 2024-01-12 | End: 2024-04-11 | Stop reason: SDUPTHER

## 2024-01-31 DIAGNOSIS — E03.3 POSTINFECTIOUS HYPOTHYROIDISM: Primary | ICD-10-CM

## 2024-01-31 RX ORDER — LEVOTHYROXINE SODIUM 100 UG/1
100 TABLET ORAL DAILY
Qty: 90 TABLET | Refills: 1 | Status: SHIPPED | OUTPATIENT
Start: 2024-01-31

## 2024-02-06 ENCOUNTER — APPOINTMENT (OUTPATIENT)
Dept: GASTROENTEROLOGY | Facility: EXTERNAL LOCATION | Age: 54
End: 2024-02-06
Payer: COMMERCIAL

## 2024-02-13 DIAGNOSIS — F98.8 ATTENTION DEFICIT DISORDER, UNSPECIFIED HYPERACTIVITY PRESENCE: ICD-10-CM

## 2024-02-14 RX ORDER — DEXTROAMPHETAMINE SACCHARATE, AMPHETAMINE ASPARTATE MONOHYDRATE, DEXTROAMPHETAMINE SULFATE AND AMPHETAMINE SULFATE 5; 5; 5; 5 MG/1; MG/1; MG/1; MG/1
20 CAPSULE, EXTENDED RELEASE ORAL 2 TIMES DAILY
Qty: 60 CAPSULE | Refills: 0 | Status: SHIPPED | OUTPATIENT
Start: 2024-02-14 | End: 2024-04-10 | Stop reason: SDUPTHER

## 2024-02-22 ENCOUNTER — OFFICE VISIT (OUTPATIENT)
Dept: PRIMARY CARE | Facility: CLINIC | Age: 54
End: 2024-02-22
Payer: COMMERCIAL

## 2024-02-22 VITALS
RESPIRATION RATE: 16 BRPM | BODY MASS INDEX: 53.9 KG/M2 | HEIGHT: 64 IN | SYSTOLIC BLOOD PRESSURE: 123 MMHG | DIASTOLIC BLOOD PRESSURE: 84 MMHG | TEMPERATURE: 97.8 F

## 2024-02-22 DIAGNOSIS — F15.20 STIMULANT DEPENDENCE (MULTI): ICD-10-CM

## 2024-02-22 LAB
AMPHETAMINES UR QL SCN: ABNORMAL
BARBITURATES UR QL SCN: ABNORMAL
BZE UR QL SCN: ABNORMAL
CANNABINOIDS UR QL SCN: ABNORMAL
CREAT UR-MCNC: 156.1 MG/DL (ref 20–320)
PCP UR QL SCN: ABNORMAL

## 2024-02-22 PROCEDURE — 1036F TOBACCO NON-USER: CPT | Performed by: INTERNAL MEDICINE

## 2024-02-22 PROCEDURE — 80354 DRUG SCREENING FENTANYL: CPT

## 2024-02-22 PROCEDURE — 80324 DRUG SCREEN AMPHETAMINES 1/2: CPT

## 2024-02-22 PROCEDURE — 99213 OFFICE O/P EST LOW 20 MIN: CPT | Performed by: INTERNAL MEDICINE

## 2024-02-22 PROCEDURE — 82570 ASSAY OF URINE CREATININE: CPT

## 2024-02-22 PROCEDURE — 80368 SEDATIVE HYPNOTICS: CPT

## 2024-02-22 PROCEDURE — 80365 DRUG SCREENING OXYCODONE: CPT

## 2024-02-22 PROCEDURE — 80361 OPIATES 1 OR MORE: CPT

## 2024-02-22 PROCEDURE — 80346 BENZODIAZEPINES1-12: CPT

## 2024-02-22 PROCEDURE — 80358 DRUG SCREENING METHADONE: CPT

## 2024-02-22 PROCEDURE — 80307 DRUG TEST PRSMV CHEM ANLYZR: CPT

## 2024-02-22 PROCEDURE — 80373 DRUG SCREENING TRAMADOL: CPT

## 2024-02-22 NOTE — PROGRESS NOTES
"Subjective   Patient ID: Frances Mcdowell is a 53 y.o. female who presents for Follow-up.  In for follow up for refills on her meds.        Review of Systems   All other systems reviewed and are negative.      Objective   Physical Exam  Constitutional:       Appearance: Normal appearance.   HENT:      Head: Normocephalic.   Eyes:      Extraocular Movements: Extraocular movements intact.      Conjunctiva/sclera: Conjunctivae normal.      Pupils: Pupils are equal, round, and reactive to light.   Cardiovascular:      Rate and Rhythm: Normal rate and regular rhythm.   Pulmonary:      Effort: Pulmonary effort is normal.      Breath sounds: Normal breath sounds.   Abdominal:      General: Abdomen is flat. Bowel sounds are normal.      Palpations: Abdomen is soft.   Musculoskeletal:         General: Normal range of motion.      Cervical back: Normal range of motion.   Skin:     General: Skin is warm and dry.   Neurological:      General: No focal deficit present.      Mental Status: She is alert and oriented to person, place, and time. Mental status is at baseline.   Psychiatric:         Mood and Affect: Mood normal.         Behavior: Behavior normal.       /84   Temp 36.6 °C (97.8 °F)   Resp 16   Ht 1.626 m (5' 4\")   BMI 53.90 kg/m²         Assessment/Plan   Problem List Items Addressed This Visit       Stimulant dependence (CMS/HCC)    Relevant Orders    Opiate/Opioid/Benzo Prescription Compliance    OOB Internal Tracking          "

## 2024-02-26 LAB
AMPHET UR-MCNC: >5000 NG/ML
MDA UR-MCNC: <200 NG/ML
MDEA UR-MCNC: <200 NG/ML
MDMA UR-MCNC: <200 NG/ML
METHAMPHET UR-MCNC: <200 NG/ML
PHENTERMINE UR CFM-MCNC: <200 NG/ML

## 2024-03-07 ENCOUNTER — HOSPITAL ENCOUNTER (OUTPATIENT)
Dept: GASTROENTEROLOGY | Facility: HOSPITAL | Age: 54
Setting detail: OUTPATIENT SURGERY
Discharge: HOME | End: 2024-03-07
Payer: COMMERCIAL

## 2024-03-07 ENCOUNTER — ANESTHESIA EVENT (OUTPATIENT)
Dept: GASTROENTEROLOGY | Facility: HOSPITAL | Age: 54
End: 2024-03-07
Payer: COMMERCIAL

## 2024-03-07 ENCOUNTER — ANESTHESIA (OUTPATIENT)
Dept: GASTROENTEROLOGY | Facility: HOSPITAL | Age: 54
End: 2024-03-07
Payer: COMMERCIAL

## 2024-03-07 VITALS
RESPIRATION RATE: 15 BRPM | HEART RATE: 78 BPM | BODY MASS INDEX: 51.91 KG/M2 | TEMPERATURE: 97.9 F | WEIGHT: 293 LBS | DIASTOLIC BLOOD PRESSURE: 83 MMHG | HEIGHT: 63 IN | OXYGEN SATURATION: 99 % | SYSTOLIC BLOOD PRESSURE: 153 MMHG

## 2024-03-07 DIAGNOSIS — R19.5 POSITIVE COLORECTAL CANCER SCREENING USING COLOGUARD TEST: Primary | ICD-10-CM

## 2024-03-07 PROCEDURE — 7100000010 HC PHASE TWO TIME - EACH INCREMENTAL 1 MINUTE

## 2024-03-07 PROCEDURE — 88305 TISSUE EXAM BY PATHOLOGIST: CPT | Performed by: PATHOLOGY

## 2024-03-07 PROCEDURE — 3700000002 HC GENERAL ANESTHESIA TIME - EACH INCREMENTAL 1 MINUTE

## 2024-03-07 PROCEDURE — 3700000001 HC GENERAL ANESTHESIA TIME - INITIAL BASE CHARGE

## 2024-03-07 PROCEDURE — 7100000009 HC PHASE TWO TIME - INITIAL BASE CHARGE

## 2024-03-07 PROCEDURE — 2500000004 HC RX 250 GENERAL PHARMACY W/ HCPCS (ALT 636 FOR OP/ED): Performed by: INTERNAL MEDICINE

## 2024-03-07 PROCEDURE — 88305 TISSUE EXAM BY PATHOLOGIST: CPT | Mod: TC,AHULAB | Performed by: INTERNAL MEDICINE

## 2024-03-07 PROCEDURE — 2500000005 HC RX 250 GENERAL PHARMACY W/O HCPCS: Performed by: ANESTHESIOLOGIST ASSISTANT

## 2024-03-07 PROCEDURE — 45385 COLONOSCOPY W/LESION REMOVAL: CPT | Performed by: INTERNAL MEDICINE

## 2024-03-07 PROCEDURE — A45385 PR COLONOSCOPY,REMV LESN,SNARE: Performed by: ANESTHESIOLOGIST ASSISTANT

## 2024-03-07 PROCEDURE — 2500000004 HC RX 250 GENERAL PHARMACY W/ HCPCS (ALT 636 FOR OP/ED): Performed by: ANESTHESIOLOGIST ASSISTANT

## 2024-03-07 PROCEDURE — 2500000004 HC RX 250 GENERAL PHARMACY W/ HCPCS (ALT 636 FOR OP/ED): Performed by: ANESTHESIOLOGY

## 2024-03-07 PROCEDURE — A45385 PR COLONOSCOPY,REMV LESN,SNARE: Performed by: ANESTHESIOLOGY

## 2024-03-07 RX ORDER — MIDAZOLAM HYDROCHLORIDE 1 MG/ML
INJECTION INTRAMUSCULAR; INTRAVENOUS AS NEEDED
Status: DISCONTINUED | OUTPATIENT
Start: 2024-03-07 | End: 2024-03-07

## 2024-03-07 RX ORDER — ONDANSETRON HYDROCHLORIDE 2 MG/ML
4 INJECTION, SOLUTION INTRAVENOUS ONCE
Status: COMPLETED | OUTPATIENT
Start: 2024-03-07 | End: 2024-03-07

## 2024-03-07 RX ORDER — PROPOFOL 10 MG/ML
INJECTION, EMULSION INTRAVENOUS CONTINUOUS PRN
Status: DISCONTINUED | OUTPATIENT
Start: 2024-03-07 | End: 2024-03-07

## 2024-03-07 RX ORDER — LIDOCAINE HYDROCHLORIDE 20 MG/ML
INJECTION, SOLUTION INFILTRATION; PERINEURAL AS NEEDED
Status: DISCONTINUED | OUTPATIENT
Start: 2024-03-07 | End: 2024-03-07

## 2024-03-07 RX ORDER — FENTANYL CITRATE 50 UG/ML
INJECTION, SOLUTION INTRAMUSCULAR; INTRAVENOUS AS NEEDED
Status: DISCONTINUED | OUTPATIENT
Start: 2024-03-07 | End: 2024-03-07

## 2024-03-07 RX ORDER — SODIUM CHLORIDE, SODIUM LACTATE, POTASSIUM CHLORIDE, CALCIUM CHLORIDE 600; 310; 30; 20 MG/100ML; MG/100ML; MG/100ML; MG/100ML
20 INJECTION, SOLUTION INTRAVENOUS CONTINUOUS
Status: DISCONTINUED | OUTPATIENT
Start: 2024-03-07 | End: 2024-03-08 | Stop reason: HOSPADM

## 2024-03-07 RX ADMIN — Medication 50 MG: at 14:13

## 2024-03-07 RX ADMIN — FENTANYL CITRATE 50 MCG: 50 INJECTION, SOLUTION INTRAMUSCULAR; INTRAVENOUS at 14:04

## 2024-03-07 RX ADMIN — PROPOFOL 250 MCG/KG/MIN: 10 INJECTION, EMULSION INTRAVENOUS at 13:57

## 2024-03-07 RX ADMIN — MIDAZOLAM HYDROCHLORIDE 4 MG: 1 INJECTION INTRAMUSCULAR; INTRAVENOUS at 13:51

## 2024-03-07 RX ADMIN — LIDOCAINE HYDROCHLORIDE 50 MG: 20 INJECTION, SOLUTION INFILTRATION; PERINEURAL at 13:57

## 2024-03-07 RX ADMIN — SODIUM CHLORIDE, POTASSIUM CHLORIDE, SODIUM LACTATE AND CALCIUM CHLORIDE: 600; 310; 30; 20 INJECTION, SOLUTION INTRAVENOUS at 13:51

## 2024-03-07 RX ADMIN — ONDANSETRON 4 MG: 2 INJECTION INTRAMUSCULAR; INTRAVENOUS at 15:05

## 2024-03-07 RX ADMIN — FENTANYL CITRATE 50 MCG: 50 INJECTION, SOLUTION INTRAMUSCULAR; INTRAVENOUS at 14:06

## 2024-03-07 ASSESSMENT — PAIN SCALES - GENERAL
PAINLEVEL_OUTOF10: 0 - NO PAIN
PAINLEVEL_OUTOF10: 3
PAINLEVEL_OUTOF10: 8
PAINLEVEL_OUTOF10: 0 - NO PAIN
PAINLEVEL_OUTOF10: 8

## 2024-03-07 ASSESSMENT — COLUMBIA-SUICIDE SEVERITY RATING SCALE - C-SSRS
2. HAVE YOU ACTUALLY HAD ANY THOUGHTS OF KILLING YOURSELF?: NO
1. IN THE PAST MONTH, HAVE YOU WISHED YOU WERE DEAD OR WISHED YOU COULD GO TO SLEEP AND NOT WAKE UP?: NO
6. HAVE YOU EVER DONE ANYTHING, STARTED TO DO ANYTHING, OR PREPARED TO DO ANYTHING TO END YOUR LIFE?: NO

## 2024-03-07 ASSESSMENT — PAIN - FUNCTIONAL ASSESSMENT
PAIN_FUNCTIONAL_ASSESSMENT: 0-10

## 2024-03-07 ASSESSMENT — PAIN DESCRIPTION - DESCRIPTORS: DESCRIPTORS: SORE

## 2024-03-07 NOTE — NURSING NOTE
1438- PHASE II - Patient to Endoscopy bay at this time in stable condition. Beside monitors applied to patient upon arrival. Report received from GI team at bedside     1445- Patient on RA at this time    1453- Dr. Moody at bedside speaking with patient at this time     1501- Called Dr. Ken reguarding patient's nausea and vomiting symptoms. Dr. Ken ordered 4 mg IV zofran to be given.     1505- Medicated patient at this time per orders for nausea and vomiting per Dr. Ken. Verified allergies prior to admin     1510- Patient tolerating sips of ginger ale at this time     1519- Patient states nausea has resolved     1520- Patient tolerating bites of jeremi crackers at this time. Denies nausea    1535- Criteria met for patient to discharge from Phase II at this time     1536- IV removed at this time with IV catheter tip intact upon removal    1537- Patient helped to get dressed at this time     1555- [facility] in [facility] condition and with all belongings via wheelchair.

## 2024-03-07 NOTE — DISCHARGE INSTRUCTIONS

## 2024-03-07 NOTE — ANESTHESIA POSTPROCEDURE EVALUATION
Patient: Frances Mcdowell    Procedure Summary       Date: 03/07/24 Room / Location: [facility]    Anesthesia Start: 1351 Anesthesia Stop: 1441    Procedure: COLONOSCOPY Diagnosis:       Positive colorectal cancer screening using Cologuard test      Positive colorectal cancer screening using Cologuard test    Scheduled Providers: Godfrey Moody MD; Evan Ken MD; JENS Pulido Responsible Provider: Evan Ken MD    Anesthesia Type: MAC ASA Status: 3            Anesthesia Type: MAC    Vitals Value Taken Time   /83 03/07/24 1523   Temp 36.6 °C (97.9 °F) 03/07/24 1438   Pulse 78 03/07/24 1523   Resp 15 03/07/24 1523   SpO2 99 % 03/07/24 1523       Anesthesia Post Evaluation    Patient location during evaluation: PACU  Patient participation: complete - patient participated  Level of consciousness: awake and alert  Pain management: adequate  Airway patency: patent  Cardiovascular status: acceptable and hemodynamically stable  Respiratory status: acceptable, spontaneous ventilation and nonlabored ventilation  Hydration status: acceptable  Postoperative Nausea and Vomiting: none        There were no known notable events for this encounter.

## 2024-03-07 NOTE — ANESTHESIA PREPROCEDURE EVALUATION
Patient: Frances Mcdowell    Procedure Information       Date/Time: 24 1300    Scheduled providers: Godfrey Moody MD; Evan Ken MD; JENS Pulido    Procedure: COLONOSCOPY    Location: [facility]            Relevant Problems   Cardiovascular   (+) Chronic migraine      Endocrine   (+) Hypothyroidism, postsurgical      /Renal   (+) UTI (urinary tract infection)      Neuro/Psych   (+) Anxiety disorder   (+) Chronic migraine   (+) New daily persistent headache      Infectious Disease   (+) Acute URI   (+) Fungal dermatitis   (+) UTI (urinary tract infection)   (+) Yeast infection       Clinical information reviewed:   Tobacco  Allergies  Meds   Med Hx  Surg Hx   Fam Hx  Soc Hx         Past Medical History:   Diagnosis Date    Acute embolism and thrombosis of other specified veins 2022    Superficial vein thrombosis    ADD (attention deficit disorder)     Anxiety     Benign neoplasm of unspecified ovary     Serous cystadenoma of ovary    Endometrial intraepithelial neoplasia (EIN)     Complex atypical endometrial hyperplasia    GERD (gastroesophageal reflux disease)     Hypothyroidism     Lymph edema     Personal history of other diseases of the circulatory system     History of varicose veins      Past Surgical History:   Procedure Laterality Date    BLADDER SURGERY       SECTION, LOW TRANSVERSE      FOOT SURGERY      HYSTERECTOMY      TONSILLECTOMY      TOTAL THYROIDECTOMY      VARICOSE VEIN SURGERY      Varicose Vein Ligation     Social History     Tobacco Use    Smoking status: Never    Smokeless tobacco: Never   Substance Use Topics    Alcohol use: Not Currently     Comment: seldom    Drug use: Never      Current Outpatient Medications   Medication Instructions    amphetamine-dextroamphetamine XR (Adderall XR) 20 mg 24 hr capsule 20 mg, oral, 2 times daily    amphetamine-dextroamphetamine XR (Adderall XR) 20 mg 24 hr capsule 20 mg, oral, 2 times daily    aspirin 81 mg,  "oral, Daily RT    azithromycin (Zithromax) 250 mg tablet Take two tablets the first day then 1 tablet by mouth daily until finished.    cholecalciferol (VITAMIN D-3) 5,000 Units, oral, Daily    EPINEPHrine (EPIPEN) 0.3 mg, injection, As needed, As Directed    ibuprofen 800 mg, oral, 2 times daily    levothyroxine (SYNTHROID, LEVOXYL) 100 mcg, oral, Daily    loratadine (Claritin) 10 mg tablet 1 tablet, oral, Daily    omeprazole (PRILOSEC) 40 mg, oral, Daily    predniSONE (DELTASONE) 10 mg, oral, Daily    rosuvastatin (CRESTOR) 20 mg, oral, Daily    sod sulf-pot chloride-mag sulf (Sutab) 1.479-0.188- 0.225 gram tablet Starting at 6pm open one bottle of pills and fill glass provided with water and drink according to prep sheet. Start the 2nd bottle with directions on prep sheet 5 hours before procedure time      Allergies   Allergen Reactions    Erythromycin Anaphylaxis    Erythromycin Base Anaphylaxis    Sulfa (Sulfonamide Antibiotics) Anaphylaxis    Sulfamethoxazole Anaphylaxis    Sulfamethoxazole-Trimethoprim Anaphylaxis    Tetanus Toxoid Other and Swelling    Adhesive Tape-Silicones Other     tegaderm causes bad skin breakdown    Tramadol Itching        Chemistry    Lab Results   Component Value Date/Time     11/28/2023 0638    K 4.6 11/28/2023 0638     11/28/2023 0638    CO2 29 11/28/2023 0638    BUN 27 (H) 11/28/2023 0638    CREATININE 0.76 11/28/2023 0638    Lab Results   Component Value Date/Time    CALCIUM 9.3 11/28/2023 0638    ALT 15 11/28/2023 0638          Lab Results   Component Value Date    HGBA1C 5.3 11/28/2023     Lab Results   Component Value Date/Time    WBC 8.6 11/28/2023 0638    HGB 12.6 11/28/2023 0638    HCT 39.3 11/28/2023 0638     11/28/2023 0638     No results found for: \"PROTIME\", \"PTT\", \"INR\"  No results found for: \"ABORH\"  No results found for this or any previous visit (from the past 4464 hour(s)).  No results found for this or any previous visit from the past 1095 " "days.       Visit Vitals  Pulse 76   Temp 36.7 °C (98.1 °F) (Skin)   Resp 18   Ht 1.6 m (5' 3\")   Wt 139 kg (305 lb 8.9 oz)   SpO2 99%   BMI 54.13 kg/m²   Smoking Status Never   BSA 2.49 m²     NPO/Void Status  Carbohydrate Drink Given Prior to Surgery? : N  Date of Last Liquid: 03/07/24  Time of Last Liquid: 0730  Date of Last Solid: 03/05/24  Time of Last Solid: 1600  Last Intake Type: Clear fluids         Physical Exam    Airway  Mallampati: III  TM distance: >3 FB  Neck ROM: full     Cardiovascular   Rhythm: regular  Rate: normal     Dental - normal exam     Pulmonary   Breath sounds clear to auscultation     Abdominal - normal exam              Anesthesia Plan    History of general anesthesia?: yes  History of complications of general anesthesia?: no    ASA 3     MAC     intravenous induction   Anesthetic plan and risks discussed with patient.    Plan discussed with CRNA and CAA.      "

## 2024-03-07 NOTE — H&P
GASTROENTEROLOGY PRE-PROCEDURE H&P    HPI:  Frances Mcdowell is a 53 y.o. female here for colonoscopy for positive Cologuard.    PAST MEDICAL HISTORY  Past Medical History:   Diagnosis Date    Acute embolism and thrombosis of other specified veins     Superficial vein thrombosis    Anxiety disorder, unspecified     Anxiety    Benign neoplasm of unspecified ovary     Serous cystadenoma of ovary    Endometrial intraepithelial neoplasia (EIN)     Complex atypical endometrial hyperplasia    Lymph edema     Personal history of other diseases of the circulatory system     History of varicose veins       PAST SURGICAL HISTORY  Past Surgical History:   Procedure Laterality Date    BLADDER SURGERY  2016    Bladder Surgery     SECTION, CLASSIC  2014     Section    FOOT SURGERY  2014    Foot Surgery    OTHER SURGICAL HISTORY  2015    Lap Total Hysterect Uterus < 250g With Removal Of Tubes/Ovary    TONSILLECTOMY  2014    Tonsillectomy    TOTAL THYROIDECTOMY  10/17/2014    Thyroid Surgery Total Thyroidectomy    VARICOSE VEIN SURGERY  10/17/2014    Varicose Vein Ligation       FAMILY HISTORY  No family history on file.    SOCIAL HISTORY  Social History     Tobacco Use    Smoking status: Never    Smokeless tobacco: Never   Substance Use Topics    Alcohol use: Yes     Comment: seldom       REVIEW OF SYSTEMS  A 10+ point review of systems was completed and was otherwise negative.    ALLERGIES  Allergies   Allergen Reactions    Erythromycin Anaphylaxis    Erythromycin Base Anaphylaxis    Sulfa (Sulfonamide Antibiotics) Anaphylaxis    Sulfamethoxazole Anaphylaxis    Sulfamethoxazole-Trimethoprim Anaphylaxis    Tetanus Toxoid Other and Swelling    Adhesive Tape-Silicones Other     tegaderm causes bad skin breakdown    Tramadol Itching       MEDICATIONS  Current Outpatient Medications   Medication Instructions    amphetamine-dextroamphetamine XR (Adderall XR) 20 mg 24 hr capsule 20 mg, oral, 2  times daily    amphetamine-dextroamphetamine XR (Adderall XR) 20 mg 24 hr capsule 20 mg, oral, 2 times daily    aspirin 81 mg, oral, Daily RT    azithromycin (Zithromax) 250 mg tablet Take two tablets the first day then 1 tablet by mouth daily until finished.    cholecalciferol (VITAMIN D-3) 5,000 Units, oral, Daily    EPINEPHrine (EPIPEN) 0.3 mg, injection, As needed, As Directed    ibuprofen 800 mg, oral, 2 times daily    levothyroxine (SYNTHROID, LEVOXYL) 100 mcg, oral, Daily    loratadine (Claritin) 10 mg tablet 1 tablet, oral, Daily    omeprazole (PRILOSEC) 40 mg, oral, Daily    predniSONE (DELTASONE) 10 mg, oral, Daily    rosuvastatin (CRESTOR) 20 mg, oral, Daily    sod sulf-pot chloride-mag sulf (Sutab) 1.479-0.188- 0.225 gram tablet Starting at 6pm open one bottle of pills and fill glass provided with water and drink according to prep sheet. Start the 2nd bottle with directions on prep sheet 5 hours before procedure time       VITALS  There were no vitals taken for this visit.     PHYSICAL EXAM  CONSTITUTIONAL: NAD  PULMONARY: CTAB  CARDIOVASCULAR: RRR  ABDOMEN: soft, NTND  NEUROLOGIC: AOx3    ASSESSMENT/PLAN    Proceed with colonoscopy as scheduled.    Signature: Godfrey Moody MD

## 2024-03-11 DIAGNOSIS — F98.8 ATTENTION DEFICIT DISORDER, UNSPECIFIED HYPERACTIVITY PRESENCE: ICD-10-CM

## 2024-03-12 RX ORDER — DEXTROAMPHETAMINE SACCHARATE, AMPHETAMINE ASPARTATE MONOHYDRATE, DEXTROAMPHETAMINE SULFATE AND AMPHETAMINE SULFATE 5; 5; 5; 5 MG/1; MG/1; MG/1; MG/1
20 CAPSULE, EXTENDED RELEASE ORAL 2 TIMES DAILY
Qty: 60 CAPSULE | Refills: 0 | Status: SHIPPED | OUTPATIENT
Start: 2024-03-12 | End: 2024-04-11 | Stop reason: SDUPTHER

## 2024-03-13 LAB
LABORATORY COMMENT REPORT: NORMAL
PATH REPORT.FINAL DX SPEC: NORMAL
PATH REPORT.GROSS SPEC: NORMAL
PATH REPORT.TOTAL CANCER: NORMAL

## 2024-03-16 DIAGNOSIS — E55.9 VITAMIN D DEFICIENCY: ICD-10-CM

## 2024-03-18 RX ORDER — IBUPROFEN 800 MG/1
TABLET ORAL
Qty: 180 TABLET | Refills: 1 | Status: SHIPPED | OUTPATIENT
Start: 2024-03-18

## 2024-04-10 DIAGNOSIS — F98.8 ATTENTION DEFICIT DISORDER, UNSPECIFIED HYPERACTIVITY PRESENCE: ICD-10-CM

## 2024-04-11 DIAGNOSIS — F98.8 ATTENTION DEFICIT DISORDER, UNSPECIFIED HYPERACTIVITY PRESENCE: ICD-10-CM

## 2024-04-11 DIAGNOSIS — E55.9 VITAMIN D DEFICIENCY: ICD-10-CM

## 2024-04-11 RX ORDER — DEXTROAMPHETAMINE SACCHARATE, AMPHETAMINE ASPARTATE MONOHYDRATE, DEXTROAMPHETAMINE SULFATE AND AMPHETAMINE SULFATE 5; 5; 5; 5 MG/1; MG/1; MG/1; MG/1
20 CAPSULE, EXTENDED RELEASE ORAL 2 TIMES DAILY
Qty: 60 CAPSULE | Refills: 0 | Status: SHIPPED | OUTPATIENT
Start: 2024-04-11 | End: 2024-05-06 | Stop reason: SDUPTHER

## 2024-04-11 RX ORDER — PREDNISONE 10 MG/1
10 TABLET ORAL DAILY
Qty: 90 TABLET | Refills: 0 | Status: SHIPPED | OUTPATIENT
Start: 2024-04-11

## 2024-04-11 RX ORDER — DEXTROAMPHETAMINE SACCHARATE, AMPHETAMINE ASPARTATE MONOHYDRATE, DEXTROAMPHETAMINE SULFATE AND AMPHETAMINE SULFATE 5; 5; 5; 5 MG/1; MG/1; MG/1; MG/1
20 CAPSULE, EXTENDED RELEASE ORAL 2 TIMES DAILY
Qty: 60 CAPSULE | Refills: 0 | Status: SHIPPED | OUTPATIENT
Start: 2024-04-11 | End: 2024-06-04 | Stop reason: SDUPTHER

## 2024-04-25 DIAGNOSIS — Z00.00 WELL ADULT EXAM: Primary | ICD-10-CM

## 2024-04-25 RX ORDER — CEPHALEXIN 500 MG/1
500 CAPSULE ORAL 2 TIMES DAILY
COMMUNITY
End: 2024-04-25 | Stop reason: SDUPTHER

## 2024-04-25 NOTE — TELEPHONE ENCOUNTER
Patient called in with possible kidney infection can not stand up, walk, very painful started about 48 hours ago, needing to be seen or something called in, Advise?

## 2024-04-26 RX ORDER — CEPHALEXIN 500 MG/1
500 CAPSULE ORAL 2 TIMES DAILY
Qty: 14 CAPSULE | Refills: 0 | OUTPATIENT
Start: 2024-04-26

## 2024-05-06 ENCOUNTER — OFFICE VISIT (OUTPATIENT)
Dept: PRIMARY CARE | Facility: CLINIC | Age: 54
End: 2024-05-06
Payer: COMMERCIAL

## 2024-05-06 VITALS
TEMPERATURE: 98.2 F | SYSTOLIC BLOOD PRESSURE: 118 MMHG | DIASTOLIC BLOOD PRESSURE: 72 MMHG | WEIGHT: 293 LBS | HEART RATE: 81 BPM | BODY MASS INDEX: 51.91 KG/M2 | HEIGHT: 63 IN | RESPIRATION RATE: 17 BRPM | OXYGEN SATURATION: 98 %

## 2024-05-06 DIAGNOSIS — F98.8 ATTENTION DEFICIT DISORDER, UNSPECIFIED HYPERACTIVITY PRESENCE: ICD-10-CM

## 2024-05-06 PROCEDURE — 99213 OFFICE O/P EST LOW 20 MIN: CPT | Performed by: INTERNAL MEDICINE

## 2024-05-06 RX ORDER — DEXTROAMPHETAMINE SACCHARATE, AMPHETAMINE ASPARTATE MONOHYDRATE, DEXTROAMPHETAMINE SULFATE AND AMPHETAMINE SULFATE 5; 5; 5; 5 MG/1; MG/1; MG/1; MG/1
20 CAPSULE, EXTENDED RELEASE ORAL 2 TIMES DAILY
Qty: 60 CAPSULE | Refills: 0 | Status: SHIPPED | OUTPATIENT
Start: 2024-05-06 | End: 2024-06-05 | Stop reason: SDUPTHER

## 2024-05-06 ASSESSMENT — PAIN SCALES - GENERAL: PAINLEVEL: 2

## 2024-05-06 NOTE — PROGRESS NOTES
Patient is here to get an employment form signed by JTERI , and a RF for Adderall   No unordinary pain Other than her nrml within legs

## 2024-05-06 NOTE — PROGRESS NOTES
"Subjective   Patient ID: Frances Mcdowell is a 53 y.o. female who presents for Forms/questionnaires and Med Refill.  HPI    Review of Systems    Objective   Physical Exam  /72   Pulse 81   Temp 36.8 °C (98.2 °F)   Resp 17   Ht 1.6 m (5' 3\")   Wt 137 kg (302 lb)   SpO2 98%   BMI 53.50 kg/m²         Assessment/Plan   Problem List Items Addressed This Visit       ADD (attention deficit disorder)    Relevant Medications    amphetamine-dextroamphetamine XR (Adderall XR) 20 mg 24 hr capsule          "

## 2024-06-04 DIAGNOSIS — F98.8 ATTENTION DEFICIT DISORDER, UNSPECIFIED HYPERACTIVITY PRESENCE: ICD-10-CM

## 2024-06-05 DIAGNOSIS — F98.8 ATTENTION DEFICIT DISORDER, UNSPECIFIED HYPERACTIVITY PRESENCE: ICD-10-CM

## 2024-06-05 RX ORDER — DEXTROAMPHETAMINE SACCHARATE, AMPHETAMINE ASPARTATE MONOHYDRATE, DEXTROAMPHETAMINE SULFATE AND AMPHETAMINE SULFATE 5; 5; 5; 5 MG/1; MG/1; MG/1; MG/1
20 CAPSULE, EXTENDED RELEASE ORAL 2 TIMES DAILY
Qty: 60 CAPSULE | Refills: 0 | Status: SHIPPED | OUTPATIENT
Start: 2024-06-05

## 2024-06-05 RX ORDER — DEXTROAMPHETAMINE SACCHARATE, AMPHETAMINE ASPARTATE MONOHYDRATE, DEXTROAMPHETAMINE SULFATE AND AMPHETAMINE SULFATE 5; 5; 5; 5 MG/1; MG/1; MG/1; MG/1
20 CAPSULE, EXTENDED RELEASE ORAL 2 TIMES DAILY
Qty: 60 CAPSULE | Refills: 0 | Status: SHIPPED | OUTPATIENT
Start: 2024-06-05 | End: 2024-07-05

## 2024-07-01 DIAGNOSIS — F98.8 ATTENTION DEFICIT DISORDER, UNSPECIFIED HYPERACTIVITY PRESENCE: ICD-10-CM

## 2024-07-02 DIAGNOSIS — E55.9 VITAMIN D DEFICIENCY: ICD-10-CM

## 2024-07-02 RX ORDER — PREDNISONE 10 MG/1
10 TABLET ORAL DAILY
Qty: 90 TABLET | Refills: 0 | Status: SHIPPED | OUTPATIENT
Start: 2024-07-02

## 2024-07-02 RX ORDER — DEXTROAMPHETAMINE SACCHARATE, AMPHETAMINE ASPARTATE MONOHYDRATE, DEXTROAMPHETAMINE SULFATE AND AMPHETAMINE SULFATE 5; 5; 5; 5 MG/1; MG/1; MG/1; MG/1
20 CAPSULE, EXTENDED RELEASE ORAL 2 TIMES DAILY
Qty: 60 CAPSULE | Refills: 0 | Status: SHIPPED | OUTPATIENT
Start: 2024-07-02 | End: 2024-08-01

## 2024-07-16 ENCOUNTER — APPOINTMENT (OUTPATIENT)
Dept: PRIMARY CARE | Facility: CLINIC | Age: 54
End: 2024-07-16
Payer: COMMERCIAL

## 2024-07-16 VITALS
TEMPERATURE: 98.4 F | OXYGEN SATURATION: 99 % | HEIGHT: 63 IN | BODY MASS INDEX: 53.5 KG/M2 | RESPIRATION RATE: 17 BRPM | DIASTOLIC BLOOD PRESSURE: 84 MMHG | SYSTOLIC BLOOD PRESSURE: 152 MMHG | HEART RATE: 82 BPM

## 2024-07-16 DIAGNOSIS — M79.605 PAIN IN BOTH LOWER EXTREMITIES: Primary | ICD-10-CM

## 2024-07-16 DIAGNOSIS — M79.604 PAIN IN BOTH LOWER EXTREMITIES: Primary | ICD-10-CM

## 2024-07-16 PROCEDURE — 99213 OFFICE O/P EST LOW 20 MIN: CPT | Performed by: INTERNAL MEDICINE

## 2024-07-16 PROCEDURE — 1036F TOBACCO NON-USER: CPT | Performed by: INTERNAL MEDICINE

## 2024-07-16 RX ORDER — OXYCODONE AND ACETAMINOPHEN 10; 325 MG/1; MG/1
1 TABLET ORAL EVERY 6 HOURS PRN
Qty: 30 TABLET | Refills: 0 | Status: SHIPPED | OUTPATIENT
Start: 2024-07-16 | End: 2024-07-24

## 2024-07-16 RX ORDER — OXYCODONE AND ACETAMINOPHEN 10; 325 MG/1; MG/1
1 TABLET ORAL EVERY 6 HOURS PRN
Qty: 30 TABLET | Refills: 0 | Status: SHIPPED | OUTPATIENT
Start: 2024-07-16 | End: 2024-07-16 | Stop reason: SDUPTHER

## 2024-07-16 ASSESSMENT — ENCOUNTER SYMPTOMS: LEG PAIN: 1

## 2024-07-16 ASSESSMENT — PAIN SCALES - GENERAL: PAINLEVEL: 6

## 2024-07-16 NOTE — PROGRESS NOTES
Subjective   Patient ID: Frances Mcdowell is a 53 y.o. female who presents for Leg Pain and Rash.    In for open ulcers, and needs something for pain.,    Leg Pain     Rash        Review of Systems   Skin:  Positive for rash.   All other systems reviewed and are negative.      Previous history  Past Medical History:   Diagnosis Date    Acute embolism and thrombosis of other specified veins 2022    Superficial vein thrombosis    ADD (attention deficit disorder)     Anxiety     Benign neoplasm of unspecified ovary     Serous cystadenoma of ovary    Endometrial intraepithelial neoplasia (EIN)     Complex atypical endometrial hyperplasia    GERD (gastroesophageal reflux disease)     Hypothyroidism     Lymph edema     Personal history of other diseases of the circulatory system     History of varicose veins     Past Surgical History:   Procedure Laterality Date    BLADDER SURGERY       SECTION, LOW TRANSVERSE      FOOT SURGERY      HYSTERECTOMY      TONSILLECTOMY      TOTAL THYROIDECTOMY      VARICOSE VEIN SURGERY      Varicose Vein Ligation     Social History     Tobacco Use    Smoking status: Never    Smokeless tobacco: Never   Substance Use Topics    Alcohol use: Not Currently     Comment: seldom    Drug use: Never     No family history on file.  Allergies   Allergen Reactions    Erythromycin Anaphylaxis    Erythromycin Base Anaphylaxis    Sulfa (Sulfonamide Antibiotics) Anaphylaxis    Sulfamethoxazole Anaphylaxis    Sulfamethoxazole-Trimethoprim Anaphylaxis    Tetanus Toxoid Other and Swelling    Adhesive Tape-Silicones Other     tegaderm causes bad skin breakdown    Tramadol Itching     Current Outpatient Medications   Medication Instructions    amphetamine-dextroamphetamine XR (Adderall XR) 20 mg 24 hr capsule 20 mg, oral, 2 times daily    amphetamine-dextroamphetamine XR (Adderall XR) 20 mg 24 hr capsule 20 mg, oral, 2 times daily    aspirin 81 mg, oral, Daily RT    azithromycin (Zithromax) 250 mg tablet  Take two tablets the first day then 1 tablet by mouth daily until finished.    cephalexin (KEFLEX) 500 mg, oral, 2 times daily    cholecalciferol (VITAMIN D-3) 5,000 Units, oral, Daily    EPINEPHrine (EPIPEN) 0.3 mg, injection, As needed, As Directed    ibuprofen 800 mg tablet TAKE 1 TABLET(800 MG) BY MOUTH TWICE DAILY    levothyroxine (SYNTHROID, LEVOXYL) 100 mcg, oral, Daily    loratadine (Claritin) 10 mg tablet 1 tablet, oral, Daily    omeprazole (PRILOSEC) 40 mg, oral, Daily    predniSONE (DELTASONE) 10 mg, oral, Daily    rosuvastatin (CRESTOR) 20 mg, oral, Daily    sod sulf-pot chloride-mag sulf (Sutab) 1.479-0.188- 0.225 gram tablet Starting at 6pm open one bottle of pills and fill glass provided with water and drink according to prep sheet. Start the 2nd bottle with directions on prep sheet 5 hours before procedure time       Objective       Physical Exam      Assessment/Plan   Frances Mcdowell is a 53 y.o. female who presents for the concerns below:    Problem List Items Addressed This Visit    None           Discussed with:   Return in :    Portions of this note were generated using digital voice recognition software, and may contain grammatical errors       Devaughn Moreno MD  07/16/24  10:47 AM

## 2024-07-16 NOTE — PROGRESS NOTES
Patient is here to discuss leg pain , she has 7 ulcers on back of leg , would like pain medication and under a lot of stress   Patient also had body rashes she has taken benadryl and hydrocortisone that hasn't helped . Also stop prednisone   Pain level 6/10  NKA   No Rx RF's needed

## 2024-07-28 DIAGNOSIS — E03.3 POSTINFECTIOUS HYPOTHYROIDISM: ICD-10-CM

## 2024-07-30 RX ORDER — LEVOTHYROXINE SODIUM 100 UG/1
TABLET ORAL
Qty: 90 TABLET | Refills: 1 | Status: SHIPPED | OUTPATIENT
Start: 2024-07-30

## 2024-08-01 DIAGNOSIS — E55.9 VITAMIN D DEFICIENCY: ICD-10-CM

## 2024-08-01 RX ORDER — ACETAMINOPHEN 500 MG
5000 TABLET ORAL DAILY
Qty: 90 TABLET | Refills: 1 | Status: SHIPPED | OUTPATIENT
Start: 2024-08-01

## 2024-08-01 RX ORDER — IBUPROFEN 800 MG/1
800 TABLET ORAL EVERY 6 HOURS PRN
Qty: 180 TABLET | Refills: 1 | Status: SHIPPED | OUTPATIENT
Start: 2024-08-01

## 2024-08-05 ENCOUNTER — HOSPITAL ENCOUNTER (INPATIENT)
Facility: HOSPITAL | Age: 54
LOS: 4 days | Discharge: HOME | End: 2024-08-09
Attending: STUDENT IN AN ORGANIZED HEALTH CARE EDUCATION/TRAINING PROGRAM | Admitting: INTERNAL MEDICINE
Payer: COMMERCIAL

## 2024-08-05 ENCOUNTER — APPOINTMENT (OUTPATIENT)
Dept: CARDIOLOGY | Facility: HOSPITAL | Age: 54
End: 2024-08-05
Payer: COMMERCIAL

## 2024-08-05 ENCOUNTER — APPOINTMENT (OUTPATIENT)
Dept: RADIOLOGY | Facility: HOSPITAL | Age: 54
End: 2024-08-05
Payer: COMMERCIAL

## 2024-08-05 DIAGNOSIS — A41.9 SEVERE SEPSIS (MULTI): ICD-10-CM

## 2024-08-05 DIAGNOSIS — L03.119 CELLULITIS OF LOWER EXTREMITY, UNSPECIFIED LATERALITY: Primary | ICD-10-CM

## 2024-08-05 DIAGNOSIS — F41.9 ANXIETY DISORDER, UNSPECIFIED TYPE: ICD-10-CM

## 2024-08-05 DIAGNOSIS — R06.02 SHORTNESS OF BREATH: ICD-10-CM

## 2024-08-05 DIAGNOSIS — F32.A DEPRESSION, UNSPECIFIED DEPRESSION TYPE: ICD-10-CM

## 2024-08-05 DIAGNOSIS — R06.00 DYSPNEA, UNSPECIFIED: ICD-10-CM

## 2024-08-05 DIAGNOSIS — R65.20 SEVERE SEPSIS (MULTI): ICD-10-CM

## 2024-08-05 DIAGNOSIS — I24.89 DEMAND ISCHEMIA (MULTI): ICD-10-CM

## 2024-08-05 PROBLEM — I89.0 LYMPHEDEMA: Status: ACTIVE | Noted: 2018-12-31

## 2024-08-05 PROBLEM — E66.01 MORBID OBESITY (MULTI): Status: ACTIVE | Noted: 2024-08-05

## 2024-08-05 PROBLEM — E83.42 HYPOMAGNESEMIA: Status: ACTIVE | Noted: 2024-08-05

## 2024-08-05 PROBLEM — Z86.79 HISTORY OF VARICOSE VEINS: Status: ACTIVE | Noted: 2024-08-05

## 2024-08-05 PROBLEM — E03.9 HYPOTHYROIDISM (ACQUIRED): Status: ACTIVE | Noted: 2023-07-25

## 2024-08-05 PROBLEM — R79.89 ELEVATED TROPONIN: Status: ACTIVE | Noted: 2024-08-05

## 2024-08-05 LAB
ALBUMIN SERPL BCP-MCNC: 3.3 G/DL (ref 3.4–5)
ALP SERPL-CCNC: 72 U/L (ref 33–110)
ALT SERPL W P-5'-P-CCNC: 17 U/L (ref 7–45)
ANION GAP SERPL CALC-SCNC: 11 MMOL/L (ref 10–20)
AORTIC VALVE MEAN GRADIENT: 8 MMHG
AORTIC VALVE PEAK VELOCITY: 1.86 M/S
APPEARANCE UR: CLEAR
AST SERPL W P-5'-P-CCNC: 17 U/L (ref 9–39)
AV PEAK GRADIENT: 13.8 MMHG
AVA (PEAK VEL): 2.35 CM2
AVA (VTI): 2.2 CM2
BASOPHILS # BLD AUTO: 0.09 X10*3/UL (ref 0–0.1)
BASOPHILS NFR BLD AUTO: 0.4 %
BILIRUB SERPL-MCNC: 0.6 MG/DL (ref 0–1.2)
BILIRUB UR STRIP.AUTO-MCNC: NEGATIVE MG/DL
BNP SERPL-MCNC: 258 PG/ML (ref 0–99)
BUN SERPL-MCNC: 36 MG/DL (ref 6–23)
CALCIUM SERPL-MCNC: 8.3 MG/DL (ref 8.6–10.3)
CAOX CRY #/AREA UR COMP ASSIST: ABNORMAL /HPF
CARDIAC TROPONIN I PNL SERPL HS: 103 NG/L (ref 0–13)
CARDIAC TROPONIN I PNL SERPL HS: 93 NG/L (ref 0–13)
CHLORIDE SERPL-SCNC: 104 MMOL/L (ref 98–107)
CO2 SERPL-SCNC: 25 MMOL/L (ref 21–32)
COLOR UR: ABNORMAL
CREAT SERPL-MCNC: 0.88 MG/DL (ref 0.5–1.05)
EGFRCR SERPLBLD CKD-EPI 2021: 78 ML/MIN/1.73M*2
EJECTION FRACTION APICAL 4 CHAMBER: 68.4
EJECTION FRACTION: 63 %
EOSINOPHIL # BLD AUTO: 0.09 X10*3/UL (ref 0–0.7)
EOSINOPHIL NFR BLD AUTO: 0.4 %
ERYTHROCYTE [DISTWIDTH] IN BLOOD BY AUTOMATED COUNT: 13.1 % (ref 11.5–14.5)
GLOBAL LONGITUDINAL STRAIN: 17.3 %
GLUCOSE SERPL-MCNC: 87 MG/DL (ref 74–99)
GLUCOSE UR STRIP.AUTO-MCNC: NORMAL MG/DL
HCT VFR BLD AUTO: 36.6 % (ref 36–46)
HGB BLD-MCNC: 12.1 G/DL (ref 12–16)
HOLD SPECIMEN: NORMAL
IMM GRANULOCYTES # BLD AUTO: 0.27 X10*3/UL (ref 0–0.7)
IMM GRANULOCYTES NFR BLD AUTO: 1.2 % (ref 0–0.9)
KETONES UR STRIP.AUTO-MCNC: NEGATIVE MG/DL
LACTATE SERPL-SCNC: 1.5 MMOL/L (ref 0.4–2)
LACTATE SERPL-SCNC: 2.1 MMOL/L (ref 0.4–2)
LEFT ATRIUM VOLUME AREA LENGTH INDEX BSA: 26.2 ML/M2
LEFT VENTRICLE INTERNAL DIMENSION DIASTOLE: 4.2 CM (ref 3.5–6)
LEFT VENTRICULAR OUTFLOW TRACT DIAMETER: 2 CM
LEUKOCYTE ESTERASE UR QL STRIP.AUTO: NEGATIVE
LIPASE SERPL-CCNC: 13 U/L (ref 9–82)
LV EJECTION FRACTION BIPLANE: 64 %
LYMPHOCYTES # BLD AUTO: 0.55 X10*3/UL (ref 1.2–4.8)
LYMPHOCYTES NFR BLD AUTO: 2.4 %
MAGNESIUM SERPL-MCNC: 1.48 MG/DL (ref 1.6–2.4)
MCH RBC QN AUTO: 32.2 PG (ref 26–34)
MCHC RBC AUTO-ENTMCNC: 33.1 G/DL (ref 32–36)
MCV RBC AUTO: 97 FL (ref 80–100)
MITRAL VALVE E/A RATIO: 0.76
MITRAL VALVE E/E' RATIO: 9.31
MONOCYTES # BLD AUTO: 1.18 X10*3/UL (ref 0.1–1)
MONOCYTES NFR BLD AUTO: 5.2 %
MUCOUS THREADS #/AREA URNS AUTO: ABNORMAL /LPF
NEUTROPHILS # BLD AUTO: 20.4 X10*3/UL (ref 1.2–7.7)
NEUTROPHILS NFR BLD AUTO: 90.4 %
NITRITE UR QL STRIP.AUTO: NEGATIVE
NRBC BLD-RTO: 0 /100 WBCS (ref 0–0)
PH UR STRIP.AUTO: 5.5 [PH]
PLATELET # BLD AUTO: 337 X10*3/UL (ref 150–450)
POTASSIUM SERPL-SCNC: 3.7 MMOL/L (ref 3.5–5.3)
PROT SERPL-MCNC: 6.4 G/DL (ref 6.4–8.2)
PROT UR STRIP.AUTO-MCNC: ABNORMAL MG/DL
RBC # BLD AUTO: 3.76 X10*6/UL (ref 4–5.2)
RBC # UR STRIP.AUTO: ABNORMAL /UL
RBC #/AREA URNS AUTO: >20 /HPF
RIGHT VENTRICLE FREE WALL PEAK S': 19.4 CM/S
RIGHT VENTRICLE PEAK SYSTOLIC PRESSURE: 37.3 MMHG
SARS-COV-2 RNA RESP QL NAA+PROBE: NOT DETECTED
SODIUM SERPL-SCNC: 136 MMOL/L (ref 136–145)
SP GR UR STRIP.AUTO: 1.03
SQUAMOUS #/AREA URNS AUTO: ABNORMAL /HPF
TRANS CELLS #/AREA UR COMP ASSIST: ABNORMAL /HPF
TRICUSPID ANNULAR PLANE SYSTOLIC EXCURSION: 2 CM
UROBILINOGEN UR STRIP.AUTO-MCNC: NORMAL MG/DL
WBC # BLD AUTO: 22.6 X10*3/UL (ref 4.4–11.3)
WBC #/AREA URNS AUTO: ABNORMAL /HPF

## 2024-08-05 PROCEDURE — 2500000005 HC RX 250 GENERAL PHARMACY W/O HCPCS: Performed by: STUDENT IN AN ORGANIZED HEALTH CARE EDUCATION/TRAINING PROGRAM

## 2024-08-05 PROCEDURE — 2500000004 HC RX 250 GENERAL PHARMACY W/ HCPCS (ALT 636 FOR OP/ED): Performed by: STUDENT IN AN ORGANIZED HEALTH CARE EDUCATION/TRAINING PROGRAM

## 2024-08-05 PROCEDURE — 84484 ASSAY OF TROPONIN QUANT: CPT | Performed by: STUDENT IN AN ORGANIZED HEALTH CARE EDUCATION/TRAINING PROGRAM

## 2024-08-05 PROCEDURE — 36415 COLL VENOUS BLD VENIPUNCTURE: CPT | Performed by: STUDENT IN AN ORGANIZED HEALTH CARE EDUCATION/TRAINING PROGRAM

## 2024-08-05 PROCEDURE — 99285 EMERGENCY DEPT VISIT HI MDM: CPT | Mod: 25

## 2024-08-05 PROCEDURE — 2500000004 HC RX 250 GENERAL PHARMACY W/ HCPCS (ALT 636 FOR OP/ED): Mod: JZ | Performed by: STUDENT IN AN ORGANIZED HEALTH CARE EDUCATION/TRAINING PROGRAM

## 2024-08-05 PROCEDURE — 96361 HYDRATE IV INFUSION ADD-ON: CPT

## 2024-08-05 PROCEDURE — 83690 ASSAY OF LIPASE: CPT | Performed by: STUDENT IN AN ORGANIZED HEALTH CARE EDUCATION/TRAINING PROGRAM

## 2024-08-05 PROCEDURE — 99222 1ST HOSP IP/OBS MODERATE 55: CPT | Performed by: INTERNAL MEDICINE

## 2024-08-05 PROCEDURE — 96367 TX/PROPH/DG ADDL SEQ IV INF: CPT

## 2024-08-05 PROCEDURE — 96366 THER/PROPH/DIAG IV INF ADDON: CPT

## 2024-08-05 PROCEDURE — 87635 SARS-COV-2 COVID-19 AMP PRB: CPT | Performed by: STUDENT IN AN ORGANIZED HEALTH CARE EDUCATION/TRAINING PROGRAM

## 2024-08-05 PROCEDURE — 93356 MYOCRD STRAIN IMG SPCKL TRCK: CPT | Performed by: INTERNAL MEDICINE

## 2024-08-05 PROCEDURE — 83605 ASSAY OF LACTIC ACID: CPT | Performed by: STUDENT IN AN ORGANIZED HEALTH CARE EDUCATION/TRAINING PROGRAM

## 2024-08-05 PROCEDURE — 80053 COMPREHEN METABOLIC PANEL: CPT | Performed by: STUDENT IN AN ORGANIZED HEALTH CARE EDUCATION/TRAINING PROGRAM

## 2024-08-05 PROCEDURE — 2060000001 HC INTERMEDIATE ICU ROOM DAILY

## 2024-08-05 PROCEDURE — 71046 X-RAY EXAM CHEST 2 VIEWS: CPT | Mod: FOREIGN READ | Performed by: RADIOLOGY

## 2024-08-05 PROCEDURE — 2500000001 HC RX 250 WO HCPCS SELF ADMINISTERED DRUGS (ALT 637 FOR MEDICARE OP): Performed by: STUDENT IN AN ORGANIZED HEALTH CARE EDUCATION/TRAINING PROGRAM

## 2024-08-05 PROCEDURE — 83735 ASSAY OF MAGNESIUM: CPT | Performed by: STUDENT IN AN ORGANIZED HEALTH CARE EDUCATION/TRAINING PROGRAM

## 2024-08-05 PROCEDURE — 85025 COMPLETE CBC W/AUTO DIFF WBC: CPT | Performed by: STUDENT IN AN ORGANIZED HEALTH CARE EDUCATION/TRAINING PROGRAM

## 2024-08-05 PROCEDURE — 99223 1ST HOSP IP/OBS HIGH 75: CPT | Performed by: NURSE PRACTITIONER

## 2024-08-05 PROCEDURE — 96365 THER/PROPH/DIAG IV INF INIT: CPT

## 2024-08-05 PROCEDURE — 71046 X-RAY EXAM CHEST 2 VIEWS: CPT

## 2024-08-05 PROCEDURE — 83880 ASSAY OF NATRIURETIC PEPTIDE: CPT | Performed by: STUDENT IN AN ORGANIZED HEALTH CARE EDUCATION/TRAINING PROGRAM

## 2024-08-05 PROCEDURE — 2500000004 HC RX 250 GENERAL PHARMACY W/ HCPCS (ALT 636 FOR OP/ED)

## 2024-08-05 PROCEDURE — 87040 BLOOD CULTURE FOR BACTERIA: CPT | Mod: PORLAB | Performed by: STUDENT IN AN ORGANIZED HEALTH CARE EDUCATION/TRAINING PROGRAM

## 2024-08-05 PROCEDURE — 2500000004 HC RX 250 GENERAL PHARMACY W/ HCPCS (ALT 636 FOR OP/ED): Performed by: NURSE PRACTITIONER

## 2024-08-05 PROCEDURE — 96375 TX/PRO/DX INJ NEW DRUG ADDON: CPT

## 2024-08-05 PROCEDURE — 93356 MYOCRD STRAIN IMG SPCKL TRCK: CPT

## 2024-08-05 PROCEDURE — 2500000001 HC RX 250 WO HCPCS SELF ADMINISTERED DRUGS (ALT 637 FOR MEDICARE OP): Performed by: NURSE PRACTITIONER

## 2024-08-05 PROCEDURE — 93306 TTE W/DOPPLER COMPLETE: CPT | Performed by: INTERNAL MEDICINE

## 2024-08-05 PROCEDURE — 81001 URINALYSIS AUTO W/SCOPE: CPT | Performed by: STUDENT IN AN ORGANIZED HEALTH CARE EDUCATION/TRAINING PROGRAM

## 2024-08-05 PROCEDURE — 93005 ELECTROCARDIOGRAM TRACING: CPT

## 2024-08-05 PROCEDURE — 93306 TTE W/DOPPLER COMPLETE: CPT

## 2024-08-05 RX ORDER — MAGNESIUM SULFATE HEPTAHYDRATE 40 MG/ML
2 INJECTION, SOLUTION INTRAVENOUS ONCE
Status: COMPLETED | OUTPATIENT
Start: 2024-08-05 | End: 2024-08-05

## 2024-08-05 RX ORDER — OXYCODONE AND ACETAMINOPHEN 5; 325 MG/1; MG/1
1 TABLET ORAL EVERY 4 HOURS PRN
Status: DISCONTINUED | OUTPATIENT
Start: 2024-08-05 | End: 2024-08-05

## 2024-08-05 RX ORDER — PANTOPRAZOLE SODIUM 40 MG/1
40 TABLET, DELAYED RELEASE ORAL
Status: DISCONTINUED | OUTPATIENT
Start: 2024-08-06 | End: 2024-08-09 | Stop reason: HOSPADM

## 2024-08-05 RX ORDER — ONDANSETRON 4 MG/1
4 TABLET, ORALLY DISINTEGRATING ORAL EVERY 8 HOURS PRN
Status: DISCONTINUED | OUTPATIENT
Start: 2024-08-05 | End: 2024-08-09 | Stop reason: HOSPADM

## 2024-08-05 RX ORDER — HYDROMORPHONE HYDROCHLORIDE 0.2 MG/ML
0.2 INJECTION INTRAMUSCULAR; INTRAVENOUS; SUBCUTANEOUS ONCE
Status: COMPLETED | OUTPATIENT
Start: 2024-08-05 | End: 2024-08-05

## 2024-08-05 RX ORDER — ONDANSETRON HYDROCHLORIDE 2 MG/ML
4 INJECTION, SOLUTION INTRAVENOUS EVERY 8 HOURS PRN
Status: DISCONTINUED | OUTPATIENT
Start: 2024-08-05 | End: 2024-08-09 | Stop reason: HOSPADM

## 2024-08-05 RX ORDER — ACETAMINOPHEN 160 MG/5ML
650 SUSPENSION ORAL EVERY 4 HOURS PRN
Status: DISCONTINUED | OUTPATIENT
Start: 2024-08-05 | End: 2024-08-09 | Stop reason: HOSPADM

## 2024-08-05 RX ORDER — DEXTROAMPHETAMINE SACCHARATE, AMPHETAMINE ASPARTATE, DEXTROAMPHETAMINE SULFATE AND AMPHETAMINE SULFATE 5; 5; 5; 5 MG/1; MG/1; MG/1; MG/1
20 TABLET ORAL
Status: DISCONTINUED | OUTPATIENT
Start: 2024-08-05 | End: 2024-08-09 | Stop reason: HOSPADM

## 2024-08-05 RX ORDER — NAPROXEN SODIUM 220 MG/1
324 TABLET, FILM COATED ORAL ONCE
Status: COMPLETED | OUTPATIENT
Start: 2024-08-05 | End: 2024-08-05

## 2024-08-05 RX ORDER — HYDROMORPHONE HYDROCHLORIDE 4 MG/1
4 TABLET ORAL EVERY 4 HOURS PRN
Status: DISCONTINUED | OUTPATIENT
Start: 2024-08-05 | End: 2024-08-05

## 2024-08-05 RX ORDER — VANCOMYCIN HYDROCHLORIDE 1 G/200ML
1000 INJECTION, SOLUTION INTRAVENOUS EVERY 12 HOURS
Status: DISCONTINUED | OUTPATIENT
Start: 2024-08-05 | End: 2024-08-05

## 2024-08-05 RX ORDER — NAPROXEN SODIUM 220 MG/1
81 TABLET, FILM COATED ORAL
Status: DISCONTINUED | OUTPATIENT
Start: 2024-08-05 | End: 2024-08-09 | Stop reason: HOSPADM

## 2024-08-05 RX ORDER — ACETAMINOPHEN 650 MG/1
650 SUPPOSITORY RECTAL EVERY 4 HOURS PRN
Status: DISCONTINUED | OUTPATIENT
Start: 2024-08-05 | End: 2024-08-09 | Stop reason: HOSPADM

## 2024-08-05 RX ORDER — SODIUM CHLORIDE, SODIUM LACTATE, POTASSIUM CHLORIDE, CALCIUM CHLORIDE 600; 310; 30; 20 MG/100ML; MG/100ML; MG/100ML; MG/100ML
75 INJECTION, SOLUTION INTRAVENOUS CONTINUOUS
Status: ACTIVE | OUTPATIENT
Start: 2024-08-05 | End: 2024-08-05

## 2024-08-05 RX ORDER — VANCOMYCIN HYDROCHLORIDE 1 G/20ML
INJECTION, POWDER, LYOPHILIZED, FOR SOLUTION INTRAVENOUS DAILY PRN
Status: DISCONTINUED | OUTPATIENT
Start: 2024-08-05 | End: 2024-08-05

## 2024-08-05 RX ORDER — ACETAMINOPHEN 325 MG/1
650 TABLET ORAL EVERY 4 HOURS PRN
Status: DISCONTINUED | OUTPATIENT
Start: 2024-08-05 | End: 2024-08-09 | Stop reason: HOSPADM

## 2024-08-05 RX ORDER — ENOXAPARIN SODIUM 100 MG/ML
40 INJECTION SUBCUTANEOUS EVERY 12 HOURS SCHEDULED
Status: DISCONTINUED | OUTPATIENT
Start: 2024-08-05 | End: 2024-08-09 | Stop reason: HOSPADM

## 2024-08-05 RX ORDER — OXYCODONE AND ACETAMINOPHEN 5; 325 MG/1; MG/1
2 TABLET ORAL EVERY 6 HOURS PRN
Status: DISCONTINUED | OUTPATIENT
Start: 2024-08-05 | End: 2024-08-09 | Stop reason: HOSPADM

## 2024-08-05 RX ORDER — ACETAMINOPHEN 500 MG
5000 TABLET ORAL DAILY
Status: DISCONTINUED | OUTPATIENT
Start: 2024-08-05 | End: 2024-08-09 | Stop reason: HOSPADM

## 2024-08-05 RX ORDER — POLYETHYLENE GLYCOL 3350 17 G/17G
17 POWDER, FOR SOLUTION ORAL DAILY
Status: DISCONTINUED | OUTPATIENT
Start: 2024-08-05 | End: 2024-08-09 | Stop reason: HOSPADM

## 2024-08-05 RX ORDER — CEFAZOLIN SODIUM 2 G/100ML
2 INJECTION, SOLUTION INTRAVENOUS EVERY 6 HOURS
Status: DISCONTINUED | OUTPATIENT
Start: 2024-08-05 | End: 2024-08-07

## 2024-08-05 RX ORDER — LEVOTHYROXINE SODIUM 100 UG/1
100 TABLET ORAL DAILY
Status: DISCONTINUED | OUTPATIENT
Start: 2024-08-05 | End: 2024-08-09 | Stop reason: HOSPADM

## 2024-08-05 RX ORDER — PREDNISONE 10 MG/1
10 TABLET ORAL DAILY
Status: DISCONTINUED | OUTPATIENT
Start: 2024-08-05 | End: 2024-08-09 | Stop reason: HOSPADM

## 2024-08-05 RX ORDER — ACETAMINOPHEN 325 MG/1
975 TABLET ORAL ONCE
Status: COMPLETED | OUTPATIENT
Start: 2024-08-05 | End: 2024-08-05

## 2024-08-05 SDOH — SOCIAL STABILITY: SOCIAL INSECURITY: WERE YOU ABLE TO COMPLETE ALL THE BEHAVIORAL HEALTH SCREENINGS?: YES

## 2024-08-05 SDOH — SOCIAL STABILITY: SOCIAL INSECURITY: HAVE YOU HAD THOUGHTS OF HARMING ANYONE ELSE?: NO

## 2024-08-05 ASSESSMENT — ENCOUNTER SYMPTOMS
SEIZURES: 0
HALLUCINATIONS: 0
NECK STIFFNESS: 0
COLOR CHANGE: 0
SHORTNESS OF BREATH: 0
ABDOMINAL DISTENTION: 0
FREQUENCY: 0
CONSTIPATION: 0
VOICE CHANGE: 0
WOUND: 1
DYSURIA: 0
DIFFICULTY URINATING: 0
LIGHT-HEADEDNESS: 0
ABDOMINAL PAIN: 0
WEAKNESS: 0
WHEEZING: 0
POLYPHAGIA: 0
CHEST TIGHTNESS: 0
SPEECH DIFFICULTY: 0
ARTHRALGIAS: 0
ADENOPATHY: 0
DIZZINESS: 0
APPETITE CHANGE: 0
PHOTOPHOBIA: 0
HEMATURIA: 0
MYALGIAS: 0
POLYDIPSIA: 0
NUMBNESS: 0
NERVOUS/ANXIOUS: 0
BLOOD IN STOOL: 0
HEADACHES: 1
EYE ITCHING: 0
SORE THROAT: 0
DYSPHORIC MOOD: 0
DIARRHEA: 0
UNEXPECTED WEIGHT CHANGE: 0
BACK PAIN: 0
VOMITING: 0
SINUS PAIN: 0
COUGH: 1
FLANK PAIN: 0
TROUBLE SWALLOWING: 0
NAUSEA: 1
NECK PAIN: 0
SLEEP DISTURBANCE: 0
APNEA: 0
FATIGUE: 0
FEVER: 1
EYE PAIN: 0
PALPITATIONS: 0
EYE DISCHARGE: 0
CHOKING: 0
BRUISES/BLEEDS EASILY: 0
CONFUSION: 0
CHILLS: 0
TREMORS: 0

## 2024-08-05 ASSESSMENT — COGNITIVE AND FUNCTIONAL STATUS - GENERAL
PATIENT BASELINE BEDBOUND: NO
MOBILITY SCORE: 24
DRESSING REGULAR LOWER BODY CLOTHING: A LITTLE
MOBILITY SCORE: 23
DAILY ACTIVITIY SCORE: 23
DAILY ACTIVITIY SCORE: 24
CLIMB 3 TO 5 STEPS WITH RAILING: A LITTLE

## 2024-08-05 ASSESSMENT — PAIN SCALES - GENERAL
PAINLEVEL_OUTOF10: 10 - WORST POSSIBLE PAIN
PAINLEVEL_OUTOF10: 6
PAINLEVEL_OUTOF10: 3
PAINLEVEL_OUTOF10: 0 - NO PAIN
PAINLEVEL_OUTOF10: 10 - WORST POSSIBLE PAIN
PAINLEVEL_OUTOF10: 8

## 2024-08-05 ASSESSMENT — LIFESTYLE VARIABLES
SKIP TO QUESTIONS 9-10: 1
EVER FELT BAD OR GUILTY ABOUT YOUR DRINKING: NO
SUBSTANCE_ABUSE_PAST_12_MONTHS: NO
HAVE YOU EVER FELT YOU SHOULD CUT DOWN ON YOUR DRINKING: NO
EVER HAD A DRINK FIRST THING IN THE MORNING TO STEADY YOUR NERVES TO GET RID OF A HANGOVER: NO
TOTAL SCORE: 0
AUDIT-C TOTAL SCORE: 0
HAVE PEOPLE ANNOYED YOU BY CRITICIZING YOUR DRINKING: NO
HOW OFTEN DO YOU HAVE A DRINK CONTAINING ALCOHOL: NEVER
HOW MANY STANDARD DRINKS CONTAINING ALCOHOL DO YOU HAVE ON A TYPICAL DAY: PATIENT DOES NOT DRINK
AUDIT-C TOTAL SCORE: 0
PRESCIPTION_ABUSE_PAST_12_MONTHS: NO
HOW OFTEN DO YOU HAVE 6 OR MORE DRINKS ON ONE OCCASION: NEVER

## 2024-08-05 ASSESSMENT — PAIN DESCRIPTION - ORIENTATION
ORIENTATION: LEFT;RIGHT
ORIENTATION: RIGHT;LEFT
ORIENTATION: RIGHT;POSTERIOR;LOWER

## 2024-08-05 ASSESSMENT — ACTIVITIES OF DAILY LIVING (ADL)
PATIENT'S MEMORY ADEQUATE TO SAFELY COMPLETE DAILY ACTIVITIES?: YES
ASSISTIVE_DEVICE: CONTACTS
HEARING - RIGHT EAR: FUNCTIONAL
WALKS IN HOME: INDEPENDENT
DRESSING YOURSELF: INDEPENDENT
JUDGMENT_ADEQUATE_SAFELY_COMPLETE_DAILY_ACTIVITIES: YES
HEARING - LEFT EAR: FUNCTIONAL
GROOMING: INDEPENDENT
BATHING: INDEPENDENT
FEEDING YOURSELF: INDEPENDENT
TOILETING: INDEPENDENT
LACK_OF_TRANSPORTATION: NO
ADEQUATE_TO_COMPLETE_ADL: YES

## 2024-08-05 ASSESSMENT — PAIN DESCRIPTION - PROGRESSION: CLINICAL_PROGRESSION: NOT CHANGED

## 2024-08-05 ASSESSMENT — PAIN - FUNCTIONAL ASSESSMENT
PAIN_FUNCTIONAL_ASSESSMENT: 0-10

## 2024-08-05 ASSESSMENT — PAIN SCALES - PAIN ASSESSMENT IN ADVANCED DEMENTIA (PAINAD): TOTALSCORE: MEDICATION (SEE MAR)

## 2024-08-05 ASSESSMENT — PATIENT HEALTH QUESTIONNAIRE - PHQ9
SUM OF ALL RESPONSES TO PHQ9 QUESTIONS 1 & 2: 0
2. FEELING DOWN, DEPRESSED OR HOPELESS: NOT AT ALL
1. LITTLE INTEREST OR PLEASURE IN DOING THINGS: NOT AT ALL

## 2024-08-05 ASSESSMENT — COLUMBIA-SUICIDE SEVERITY RATING SCALE - C-SSRS
1. IN THE PAST MONTH, HAVE YOU WISHED YOU WERE DEAD OR WISHED YOU COULD GO TO SLEEP AND NOT WAKE UP?: NO
6. HAVE YOU EVER DONE ANYTHING, STARTED TO DO ANYTHING, OR PREPARED TO DO ANYTHING TO END YOUR LIFE?: NO
2. HAVE YOU ACTUALLY HAD ANY THOUGHTS OF KILLING YOURSELF?: NO

## 2024-08-05 ASSESSMENT — PAIN DESCRIPTION - LOCATION
LOCATION: LEG
LOCATION: LEG

## 2024-08-05 NOTE — CONSULTS
Inpatient consult to Cardiology  Consult performed by: Marya Huff MD  Consult ordered by: UTE Wolfe-CNP  Reason for consult: Elevated troponin        History Of Present Illness:    Frances Mcdowell is a 53 y.o. female presenting with fever, chills, lower extremity burning pain and swelling.    PMHx s/f hypothyroidism, venous insufficiency, lymph edema presenting with nausea, fever increased drainage from lower extremities head ache leg swelling . Pt has chronic lymph edema progressively has worsened over last several months, lately increased drainage from ulcerations that are also painful, treated with doxycycline PTA.      She is found to have cellulitis of the lower extremity with severe sepsis.  We were consulted for abnormal troponin.  It is unclear why this was checked.  Patient does not have any history of shortness of breath or chest pain.  She states she walks 5 miles daily at work.  She has lost 55 pounds recently with diet and exercise.  She is a registered nurse by profession.     Last Recorded Vitals:  Vitals:    08/05/24 0722 08/05/24 0800 08/05/24 0900 08/05/24 1100   BP: 99/58 97/55 96/56 98/56   Pulse: 99 97 88 91   Resp: 17 19 19 18   Temp:       TempSrc:       SpO2: 96% 99% 99% 96%   Weight:       Height:           Last Labs:  CBC - 8/5/2024:  5:38 AM  22.6 12.1 337    36.6      CMP - 8/5/2024:  5:38 AM  8.3 6.4 17 --- 0.6   _ 3.3 17 72      PTT - No results in last year.  _   _ _     Troponin I, High Sensitivity   Date/Time Value Ref Range Status   08/05/2024 09:47 AM 93 (HH) 0 - 13 ng/L Final     Comment:     Previous result verified on 8/5/2024 0645 on specimen/case [id number] called with component [facility] for procedure Troponin I, High Sensitivity with value 103 ng/L.   08/05/2024 05:38  (HH) 0 - 13 ng/L Final     BNP   Date/Time Value Ref Range Status   08/05/2024 05:38  (H) 0 - 99 pg/mL Final     Hemoglobin A1C   Date/Time Value Ref Range Status   11/28/2023  "06:38 AM 5.3 see below % Final     LDL Calculated   Date/Time Value Ref Range Status   2023 06:38  (H) <=99 mg/dL Final     Comment:                                 Near   Borderline      AGE      Desirable  Optimal    High     High     Very High     0-19 Y     0 - 109     ---    110-129   >/= 130     ----    20-24 Y     0 - 119     ---    120-159   >/= 160     ----      >24 Y     0 -  99   100-129  130-159   160-189     >/=190       VLDL   Date/Time Value Ref Range Status   2023 06:38 AM 18 0 - 40 mg/dL Final   2022 07:10 AM 15 0 - 40 mg/dL Final   2019 10:05 AM 18 0 - 40 mg/dL Final      Last I/O:  No intake/output data recorded.    Past Cardiology Tests (Last 3 Years):  EKG:  No results found for this or any previous visit from the past 1095 days.    Echo:  No results found for this or any previous visit from the past 1095 days.    Ejection Fractions:  No results found for: \"EF\"  Cath:  No results found for this or any previous visit from the past 1095 days.    Stress Test:  No results found for this or any previous visit from the past 1095 days.    Cardiac Imaging:  No results found for this or any previous visit from the past 1095 days.      Past Medical History:  She has a past medical history of Acute embolism and thrombosis of other specified veins (2022), ADD (attention deficit disorder), Anxiety, Benign neoplasm of unspecified ovary, Endometrial intraepithelial neoplasia (EIN), GERD (gastroesophageal reflux disease), Hypothyroidism, Lymph edema, and Personal history of other diseases of the circulatory system.    Past Surgical History:  She has a past surgical history that includes Tonsillectomy; Foot surgery; Varicose vein surgery; Total thyroidectomy; Bladder surgery;  section, low transverse; and Hysterectomy.      Social History:  She reports that she has never smoked. She has never used smokeless tobacco. She reports that she does not currently use alcohol. She " reports that she does not use drugs.    Family History:  No family history on file.     Allergies:  Erythromycin, Erythromycin base, Sulfa (sulfonamide antibiotics), Sulfamethoxazole, Sulfamethoxazole-trimethoprim, Tetanus toxoid, Adhesive tape-silicones, and Tramadol    Inpatient Medications:  Scheduled medications   Medication Dose Route Frequency    amphetamine-dextroamphetamine  20 mg oral BID    aspirin  81 mg oral Daily    ceFAZolin  2 g intravenous q6h    cholecalciferol  5,000 Units oral Daily    enoxaparin  40 mg subcutaneous q12h ELENA    levothyroxine  100 mcg oral Daily    [START ON 8/6/2024] pantoprazole  40 mg oral Daily before breakfast    polyethylene glycol  17 g oral Daily    predniSONE  10 mg oral Daily     PRN medications   Medication    acetaminophen    Or    acetaminophen    Or    acetaminophen    HYDROmorphone    ondansetron ODT    Or    ondansetron    oxyCODONE-acetaminophen    vancomycin     Continuous Medications   Medication Dose Last Rate    lactated Ringer's  75 mL/hr 75 mL/hr (08/05/24 8507)     Outpatient Medications:  Current Outpatient Medications   Medication Instructions    amphetamine-dextroamphetamine XR (Adderall XR) 20 mg 24 hr capsule 20 mg, oral, 2 times daily    amphetamine-dextroamphetamine XR (Adderall XR) 20 mg 24 hr capsule 20 mg, oral, 2 times daily    aspirin 81 mg, oral, Daily RT    azithromycin (Zithromax) 250 mg tablet Take two tablets the first day then 1 tablet by mouth daily until finished.    cephalexin (KEFLEX) 500 mg, oral, 2 times daily    cholecalciferol (VITAMIN D-3) 5,000 Units, oral, Daily    EPINEPHrine (EPIPEN) 0.3 mg, injection, As needed, As Directed    ibuprofen 800 mg, oral, Every 6 hours PRN    levothyroxine (Synthroid, Levoxyl) 100 mcg tablet TAKE 1 TABLET(100 MCG) BY MOUTH EVERY DAY    omeprazole (PRILOSEC) 40 mg, oral, Daily    predniSONE (DELTASONE) 10 mg, oral, Daily    rosuvastatin (CRESTOR) 20 mg, oral, Daily    sod sulf-pot chloride-mag sulf  (Sutab) 1.479-0.188- 0.225 gram tablet Starting at 6pm open one bottle of pills and fill glass provided with water and drink according to prep sheet. Start the 2nd bottle with directions on prep sheet 5 hours before procedure time       Physical Exam:  Physical Exam  Vitals reviewed.   Constitutional:       Appearance: Normal appearance.   Neck:      Vascular: No carotid bruit or JVD.   Cardiovascular:      Rate and Rhythm: Normal rate and regular rhythm.      Pulses: Normal pulses.      Heart sounds: Normal heart sounds, S1 normal and S2 normal.   Pulmonary:      Effort: Pulmonary effort is normal. No respiratory distress.      Breath sounds: Wheezing present. No rales.   Abdominal:      General: Abdomen is flat.      Palpations: Abdomen is soft.   Musculoskeletal:      Right lower le+ Pitting Edema present.      Left lower le+ Pitting Edema present.   Skin:     General: Skin is warm.   Neurological:      Mental Status: She is alert and oriented to person, place, and time. Mental status is at baseline.   Psychiatric:         Mood and Affect: Mood normal.         Behavior: Behavior normal.           Assessment/Plan   1-elevated troponin-patient has no ischemic symptoms.  Unclear why this was checked.  This would be consistent with nonischemic myocardial injury.    2-ankle swelling and elevated BNP-she insists she has lymphedema denies any history of shortness of breath we will follow-up on echocardiogram.  May benefit from diuretics.       Code Status:  Full Code          Marya Huff MD

## 2024-08-05 NOTE — PROGRESS NOTES
Emergency Medicine Transition of Care Note.    I received Frances Mcdowell in signout from Dr. Cantrell.  Please see the previous ED provider note for all HPI, PE and MDM up to the time of signout. This is in addition to the primary record.    In brief Frances Mcdowell is an 53 y.o. female presenting for   Chief Complaint   Patient presents with   • Nausea   • Vomiting     Reports she has been feeling like this for a while, has had 2 negative covid tests   • Diarrhea   • Chills   • Headache   • Leg Swelling     Bilateral leg swelling, reports she has lymphedema. Reports ulcers on both legs.    • Fever     Reports fever at home, has been taking tylenol at home, last dose around 8:30 pm        ED Course as of 08/05/24 0731   Mon Aug 05, 2024   0549 EKG on my read shows sinus tachycardia at a rate of 103 bpm no ST elevation nonischemic EKG, normal intervals [CF]   0601 LEUKOCYTES (10*3/UL) IN BLOOD BY AUTOMATED COUNT, [place]ian(!): 22.6 [CF]   0628 IMPRESSION:  No acute cardiopulmonary disease.   [CF]      ED Course User Index  [CF] Gina Cantrell MD         Diagnoses as of 08/05/24 0731   Cellulitis of lower extremity, unspecified laterality   Demand ischemia (Multi)   Severe sepsis (Multi)       Medical Decision Making  Patient was signed out to me pending admission.  I did review the EKG that was sinus rhythm at 103.  No acute ischemia.  The patient was already given antibiotics and aspirin.  Patient was discussed with the hospitalist Abelino WILLIS for admission to the hospital.        Final diagnoses:   None           Procedure  Procedures    Redd Sanders MD

## 2024-08-05 NOTE — PROGRESS NOTES
Francse Mcdowell is a 53 y.o. female admitted for Cellulitis of lower extremity, unspecified laterality. Pharmacy reviewed the patient's [id number] medications and allergies for accuracy.    The list below reflects the PTA list prior to pharmacy medication history. A summary a changes to the PTA medication list has been listed below. Please review each medication in order reconciliation for additional clarification and justification.    Source of information:  patient    Medications added:    Medications modified:  Adderall XR 20MG 1 CAPS BID--------> 2 CAPS every day  IBUPROFEN 800MG  1 Q6PRN ---->1 QD    Medications to be removed:  ADDERALL XR 20MG (DUPLICATE)  LORATADINE  SUTABS  CEPHALEXIN 500MG     Medications of concern:  ROSUVASTATIN 20MG 1 every day (PT STOPPED TAKING)  Adderall XR 20 mg prescription says to take 1 cap twice daily, but patient reports taking 2 capsules once daily instead.      Prior to Admission Medications   Prescriptions Last Dose Informant Patient Reported? Taking?   EPINEPHrine 0.3 mg/0.3 mL injection syringe   No No   Sig: Inject 0.3 mL (0.3 mg) as directed if needed for anaphylaxis. As Directed   amphetamine-dextroamphetamine XR (Adderall XR) 20 mg 24 hr capsule   No No   Sig: Take 1 capsule (20 mg) by mouth 2 times a day.   amphetamine-dextroamphetamine XR (Adderall XR) 20 mg 24 hr capsule   No No   Sig: Take 1 capsule (20 mg) by mouth 2 times a day.   aspirin 81 mg chewable tablet   Yes No   Sig: Chew 1 tablet (81 mg) once daily.   azithromycin (Zithromax) 250 mg tablet   No No   Sig: Take two tablets the first day then 1 tablet by mouth daily until finished.   Patient not taking: Reported on 3/7/2024   cephalexin (Keflex) 500 mg capsule   No No   Sig: Take 1 capsule (500 mg) by mouth 2 times a day.   cholecalciferol (Vitamin D-3) 5,000 Units tablet   No No   Sig: Take 1 tablet (5,000 Units) by mouth once daily.   ibuprofen 800 mg tablet   No No   Sig: Take 1 tablet (800 mg) by  mouth every 6 hours if needed for moderate pain (4 - 6).   levothyroxine (Synthroid, Levoxyl) 100 mcg tablet   No No   Sig: TAKE 1 TABLET(100 MCG) BY MOUTH EVERY DAY   loratadine (Claritin) 10 mg tablet   Yes No   Sig: Take 1 tablet (10 mg) by mouth once daily.   omeprazole (PriLOSEC) 40 mg DR capsule   No No   Sig: TAKE 1 CAPSULE(40 MG) BY MOUTH EVERY DAY   predniSONE (Deltasone) 10 mg tablet   No No   Sig: TAKE 1 TABLET BY MOUTH EVERY DAY   rosuvastatin (Crestor) 20 mg tablet   No No   Sig: Take 1 tablet (20 mg) by mouth once daily.   sod sulf-pot chloride-mag sulf (Sutab) 1.479-0.188- 0.225 gram tablet   No No   Sig: Starting at 6pm open one bottle of pills and fill glass provided with water and drink according to prep sheet. Start the 2nd bottle with directions on prep sheet 5 hours before procedure time      Facility-Administered Medications: None       Marci Farfan

## 2024-08-05 NOTE — CONSULTS
INPATIENT INFECTIOUS CONSULT NOTE PROGRESS NOTE    Referring Physician: Montez Florence  Reason for Consult: cellulitis    Chief Complaint: cellulitis    HPI: This is a 53 y.o. Fwith past medical history of hx of vte, lymphedema.    The patient was admitted to the hospital on 2024 when they presented with fever and increased drainage from RLE ulcer and increasing redness in LLE with pain/tenderness.    On admission patient was febrile with low grade temp. Lactate 2.1 (now 1.5), ua bland. Wbc 22k. Cr nl. Sx a little better since admit.    PMHx:  Past Medical History:   Diagnosis Date    Acute embolism and thrombosis of other specified veins 2022    Superficial vein thrombosis    ADD (attention deficit disorder)     Anxiety     Benign neoplasm of unspecified ovary     Serous cystadenoma of ovary    Endometrial intraepithelial neoplasia (EIN)     Complex atypical endometrial hyperplasia    GERD (gastroesophageal reflux disease)     Hypothyroidism     Lymph edema     Personal history of other diseases of the circulatory system     History of varicose veins        SurgHx:  Past Surgical History:   Procedure Laterality Date    BLADDER SURGERY       SECTION, LOW TRANSVERSE      FOOT SURGERY      HYSTERECTOMY      TONSILLECTOMY      TOTAL THYROIDECTOMY      VARICOSE VEIN SURGERY      Varicose Vein Ligation        SocHx:  Social History     Socioeconomic History    Marital status:    Tobacco Use    Smoking status: Never    Smokeless tobacco: Never   Substance and Sexual Activity    Alcohol use: Not Currently     Comment: seldom    Drug use: Never    Sexual activity: Yes     Partners: Male     Birth control/protection: None     Social Determinants of Health     Financial Resource Strain: Low Risk  (2024)    Overall Financial Resource Strain (CARDIA)     Difficulty of Paying Living Expenses: Not very hard   Transportation Needs: No Transportation Needs (2024)    PRAPARE - Transportation     Lack of  Transportation (Medical): No     Lack of Transportation (Non-Medical): No   Housing Stability: Low Risk  (8/5/2024)    Housing Stability Vital Sign     Unable to Pay for Housing in the Last Year: No     Number of Times Moved in the Last Year: 0     Homeless in the Last Year: No        FamHx:  No family history on file.     Allergies:  Allergies   Allergen Reactions    Erythromycin Anaphylaxis    Erythromycin Base Anaphylaxis    Sulfa (Sulfonamide Antibiotics) Anaphylaxis    Sulfamethoxazole Anaphylaxis    Sulfamethoxazole-Trimethoprim Anaphylaxis    Tetanus Toxoid Other and Swelling    Adhesive Tape-Silicones Other     tegaderm causes bad skin breakdown    Tramadol Itching        Home Meds:  No current facility-administered medications on file prior to encounter.     Current Outpatient Medications on File Prior to Encounter   Medication Sig Dispense Refill    amphetamine-dextroamphetamine XR (Adderall XR) 20 mg 24 hr capsule Take 1 capsule (20 mg) by mouth 2 times a day. 60 capsule 0    amphetamine-dextroamphetamine XR (Adderall XR) 20 mg 24 hr capsule Take 1 capsule (20 mg) by mouth 2 times a day. 60 capsule 0    aspirin 81 mg chewable tablet Chew 1 tablet (81 mg) once daily.      azithromycin (Zithromax) 250 mg tablet Take two tablets the first day then 1 tablet by mouth daily until finished. (Patient not taking: Reported on 3/7/2024) 6 tablet 0    cephalexin (Keflex) 500 mg capsule Take 1 capsule (500 mg) by mouth 2 times a day. (Patient not taking: Reported on 8/5/2024) 14 capsule 0    cholecalciferol (Vitamin D-3) 5,000 Units tablet Take 1 tablet (5,000 Units) by mouth once daily. 90 tablet 1    EPINEPHrine 0.3 mg/0.3 mL injection syringe Inject 0.3 mL (0.3 mg) as directed if needed for anaphylaxis. As Directed 30 mL 2    ibuprofen 800 mg tablet Take 1 tablet (800 mg) by mouth every 6 hours if needed for moderate pain (4 - 6). 180 tablet 1    levothyroxine (Synthroid, Levoxyl) 100 mcg tablet TAKE 1 TABLET(100  MCG) BY MOUTH EVERY DAY 90 tablet 1    omeprazole (PriLOSEC) 40 mg DR capsule TAKE 1 CAPSULE(40 MG) BY MOUTH EVERY DAY 90 capsule 3    predniSONE (Deltasone) 10 mg tablet TAKE 1 TABLET BY MOUTH EVERY DAY 90 tablet 0    rosuvastatin (Crestor) 20 mg tablet Take 1 tablet (20 mg) by mouth once daily. (Patient not taking: Reported on 8/5/2024) 30 tablet 5    sod sulf-pot chloride-mag sulf (Sutab) 1.479-0.188- 0.225 gram tablet Starting at 6pm open one bottle of pills and fill glass provided with water and drink according to prep sheet. Start the 2nd bottle with directions on prep sheet 5 hours before procedure time (Patient not taking: Reported on 8/5/2024) 24 tablet 0    [DISCONTINUED] cholecalciferol (Vitamin D-3) 5,000 Units tablet Take 1 tablet (5,000 Units) by mouth once daily. 90 tablet 1    [DISCONTINUED] ibuprofen 800 mg tablet TAKE 1 TABLET(800 MG) BY MOUTH TWICE DAILY 180 tablet 1    [DISCONTINUED] levothyroxine (Synthroid, Levoxyl) 100 mcg tablet Take 1 tablet (100 mcg) by mouth once daily. 90 tablet 1    [DISCONTINUED] loratadine (Claritin) 10 mg tablet Take 1 tablet (10 mg) by mouth once daily.         Current Meds:    Current Facility-Administered Medications:     acetaminophen (Tylenol) tablet 650 mg, 650 mg, oral, q4h PRN **OR** acetaminophen (Tylenol) suspension 650 mg, 650 mg, nasogastric tube, q4h PRN **OR** acetaminophen (Tylenol) suppository 650 mg, 650 mg, rectal, q4h PRN, ENEDELIA Wolfe    amphetamine-dextroamphetamine (Adderall) tablet 20 mg, 20 mg, oral, BID, ENEDELIA Wolfe    aspirin chewable tablet 81 mg, 81 mg, oral, Daily, ENEDELIA Wolfe    cholecalciferol (Vitamin D-3) tablet 5,000 Units, 5,000 Units, oral, Daily, ENEDELIA Wolfe, 5,000 Units at 08/05/24 1153    enoxaparin (Lovenox) syringe 40 mg, 40 mg, subcutaneous, q12h ELENA, Abelino P Matchett, APRN-CNP    HYDROmorphone (Dilaudid) injection 0.4 mg, 0.4 mg, intravenous, Once PRN, Abelino BOLDEN  ENEDELIA Sesay    lactated Ringer's infusion, 75 mL/hr, intravenous, Continuous, ENEDELIA Wolfe, Last Rate: 75 mL/hr at 08/05/24 0949, 75 mL/hr at 08/05/24 0949    levothyroxine (Synthroid, Levoxyl) tablet 100 mcg, 100 mcg, oral, Daily, ENEDELIA Wolfe, 100 mcg at 08/05/24 1153    ondansetron ODT (Zofran-ODT) disintegrating tablet 4 mg, 4 mg, oral, q8h PRN **OR** ondansetron (Zofran) injection 4 mg, 4 mg, intravenous, q8h PRN, ENEDELIA Wolfe    oxyCODONE-acetaminophen (Percocet) 5-325 mg per tablet 2 tablet, 2 tablet, oral, q6h PRN, ENEDELIA Wolfe, 2 tablet at 08/05/24 1120    [START ON 8/6/2024] pantoprazole (ProtoNix) EC tablet 40 mg, 40 mg, oral, Daily before breakfast, ENEDELIA Wolfe    perflutren protein A microsphere (Optison) injection 0.5 mL, 0.5 mL, intravenous, Once in imaging, ENEDELIA Wolfe    piperacillin-tazobactam (Zosyn) 3.375 g in dextrose (iso) IV 50 mL, 3.375 g, intravenous, q6h, ENEDELIA Wolfe, Stopped at 08/05/24 1130    polyethylene glycol (Glycolax, Miralax) packet 17 g, 17 g, oral, Daily, ENEDELIA Wolfe    predniSONE (Deltasone) tablet 10 mg, 10 mg, oral, Daily, ENEDELIA Wolfe, 10 mg at 08/05/24 1100    vancomycin (Vancocin) 1,000 mg in dextrose 5%  mL, 1,000 mg, intravenous, q12h, ENEDELIA Wolfe    vancomycin (Vancocin) pharmacy to dose - pharmacy monitoring, , miscellaneous, Daily PRN, ENEDELIA Wolfe    Current Outpatient Medications:     amphetamine-dextroamphetamine XR (Adderall XR) 20 mg 24 hr capsule, Take 1 capsule (20 mg) by mouth 2 times a day., Disp: 60 capsule, Rfl: 0    amphetamine-dextroamphetamine XR (Adderall XR) 20 mg 24 hr capsule, Take 1 capsule (20 mg) by mouth 2 times a day., Disp: 60 capsule, Rfl: 0    aspirin 81 mg chewable tablet, Chew 1 tablet (81 mg) once daily., Disp: , Rfl:     azithromycin (Zithromax) 250 mg tablet,  "Take two tablets the first day then 1 tablet by mouth daily until finished. (Patient not taking: Reported on 3/7/2024), Disp: 6 tablet, Rfl: 0    cephalexin (Keflex) 500 mg capsule, Take 1 capsule (500 mg) by mouth 2 times a day. (Patient not taking: Reported on 8/5/2024), Disp: 14 capsule, Rfl: 0    cholecalciferol (Vitamin D-3) 5,000 Units tablet, Take 1 tablet (5,000 Units) by mouth once daily., Disp: 90 tablet, Rfl: 1    EPINEPHrine 0.3 mg/0.3 mL injection syringe, Inject 0.3 mL (0.3 mg) as directed if needed for anaphylaxis. As Directed, Disp: 30 mL, Rfl: 2    ibuprofen 800 mg tablet, Take 1 tablet (800 mg) by mouth every 6 hours if needed for moderate pain (4 - 6)., Disp: 180 tablet, Rfl: 1    levothyroxine (Synthroid, Levoxyl) 100 mcg tablet, TAKE 1 TABLET(100 MCG) BY MOUTH EVERY DAY, Disp: 90 tablet, Rfl: 1    omeprazole (PriLOSEC) 40 mg DR capsule, TAKE 1 CAPSULE(40 MG) BY MOUTH EVERY DAY, Disp: 90 capsule, Rfl: 3    predniSONE (Deltasone) 10 mg tablet, TAKE 1 TABLET BY MOUTH EVERY DAY, Disp: 90 tablet, Rfl: 0    rosuvastatin (Crestor) 20 mg tablet, Take 1 tablet (20 mg) by mouth once daily. (Patient not taking: Reported on 8/5/2024), Disp: 30 tablet, Rfl: 5    sod sulf-pot chloride-mag sulf (Sutab) 1.479-0.188- 0.225 gram tablet, Starting at 6pm open one bottle of pills and fill glass provided with water and drink according to prep sheet. Start the 2nd bottle with directions on prep sheet 5 hours before procedure time (Patient not taking: Reported on 8/5/2024), Disp: 24 tablet, Rfl: 0    O: Visit Vitals  BP 98/56   Pulse 91   Temp 37 °C (98.6 °F) (Temporal)   Resp 18     Heart Rate:  []   Temperature:  [37 °C (98.6 °F)]   Respirations:  [16-19]   BP: ()/(55-78)   Height:  [160 cm (5' 3\")]   Weight:  [127 kg (280 lb)]   Pulse Ox:  [95 %-99 %]   No acute distress, obese  LLE red and warm with some tenderness  RLE with calf ulcer, not much drainage from wound, some tenderness and surrounding " erythema  B/l le large and swollen    ASSESSMENT & PLAN:    # LLE cellulitis suspect strep  # RLE stasis ulcer with surrounding cellulitis suspect strep. Ulcer slow to heal  # SIRS without sepsis (POA) 2/2 above  # obesity & lymphedema impacting antibiotic dosing infection tx    - aso titer in am  - trend wbc  - f/u on blood cx from 8/5/24, in process with ngtd   - change abx to ancef 2g Q6H (high dose for obesity)    Terell Marie MD  [facility]  Office: [phone]  Fax: [phone]

## 2024-08-05 NOTE — PROGRESS NOTES
Vancomycin Dosing by Pharmacy- INITIAL    Frances Mcdowell is a 53 y.o. year old female who Pharmacy has been consulted for vancomycin dosing for cellulitis, skin and soft tissue. Based on the patient's indication and renal status this patient will be dosed based on a goal AUC of 400-600.     Renal function is currently stable.    Visit Vitals  BP 97/55   Pulse 97   Temp 37 °C (98.6 °F) (Temporal)   Resp 19        Lab Results   Component Value Date    CREATININE 0.88 2024    CREATININE 0.76 2023    CREATININE 0.72 2022    CREATININE 0.63 2019    CREATININE 0.70 2018    CREATININE 0.72 2018        Patient weight is as follows:   Vitals:    24 0415   Weight: 127 kg (280 lb)       Cultures:  No results found for the encounter in last 14 days.        No intake/output data recorded.  I/O during current shift:  No intake/output data recorded.    Temp (24hrs), Av °C (98.6 °F), Min:37 °C (98.6 °F), Max:37 °C (98.6 °F)         Assessment/Plan     Patient has already been given a loading dose of 2000 mg.  Will initiate vancomycin maintenance,  1000 mg every 12 hours.  This dosing regimen is predicted by [employer] to result in the following pharmacokinetic parameters:  Loading dose: N/A  Regimen: 1000 mg IV every 12 hours.  Start time: 19:21 on 2024  Exposure target: AUC24 (range)400-600 mg/L.hr   AUC24,ss: 567 mg/L.hr  Probability of AUC24 > 400: 75 %  Ctrough,ss: 17.1 mg/L  Probability of Ctrough,ss > 20: 42 %  Probability of nephrotoxicity (Lodise DARIELA ): 13 %  Follow-up level will be ordered on  at 0500 unless clinically indicated sooner.  Will continue to monitor renal function daily while on vancomycin and order serum creatinine at least every 48 hours if not already ordered.  Follow for continued vancomycin needs, clinical response, and signs/symptoms of toxicity.       Viri Bloom, PharmD

## 2024-08-05 NOTE — ED PROVIDER NOTES
HPI   Chief Complaint   Patient presents with    Nausea    Vomiting     Reports she has been feeling like this for a while, has had 2 negative covid tests    Diarrhea    Chills    Headache    Leg Swelling     Bilateral leg swelling, reports she has lymphedema. Reports ulcers on both legs.     Fever     Reports fever at home, has been taking tylenol at home, last dose around 8:30 pm       This is a 53-year-old female with past medical history of lymphedema who presents to the emergency department for multiple complaints.  She is complaining of coughing, full body aches, worsening pain in her legs, fevers and chills.  She says she started having symptoms about a week ago and then she started again at midnight.  She cannot sleep because this pain has been having nausea and vomiting and along with diarrhea.  No other issues or concerns of                          [place] Coma Scale Score: 15                  Patient History   Past Medical History:   Diagnosis Date    Acute embolism and thrombosis of other specified veins 2022    Superficial vein thrombosis    ADD (attention deficit disorder)     Anxiety     Benign neoplasm of unspecified ovary     Serous cystadenoma of ovary    Endometrial intraepithelial neoplasia (EIN)     Complex atypical endometrial hyperplasia    GERD (gastroesophageal reflux disease)     Hypothyroidism     Lymph edema     Personal history of other diseases of the circulatory system     History of varicose veins     Past Surgical History:   Procedure Laterality Date    BLADDER SURGERY       SECTION, LOW TRANSVERSE      FOOT SURGERY      HYSTERECTOMY      TONSILLECTOMY      TOTAL THYROIDECTOMY      VARICOSE VEIN SURGERY      Varicose Vein Ligation     No family history on file.  Social History     Tobacco Use    Smoking status: Never    Smokeless tobacco: Never   Substance Use Topics    Alcohol use: Not Currently     Comment: seldom    Drug use: Never       Physical Exam   ED Triage  Vitals [08/05/24 0415]   Temperature Heart Rate Respirations BP   37 °C (98.6 °F) (!) 111 16 111/78      Pulse Ox Temp Source Heart Rate Source Patient Position   97 % Temporal Monitor --      BP Location FiO2 (%)     -- --       Physical Exam  Constitutional:       General: She is not in acute distress.  HENT:      Head: Normocephalic and atraumatic.      Right Ear: Tympanic membrane normal.      Left Ear: Tympanic membrane normal.      Mouth/Throat:      Mouth: Mucous membranes are moist.   Eyes:      Extraocular Movements: Extraocular movements intact.      Conjunctiva/sclera: Conjunctivae normal.      Pupils: Pupils are equal, round, and reactive to light.   Cardiovascular:      Rate and Rhythm: Normal rate and regular rhythm.      Heart sounds: No murmur heard.  Pulmonary:      Effort: Pulmonary effort is normal. No respiratory distress.      Breath sounds: No stridor. Wheezing present. No rales.      Comments: Mild wheeze  Abdominal:      General: Bowel sounds are normal. There is no distension.      Tenderness: There is no abdominal tenderness. There is no guarding or rebound.   Musculoskeletal:         General: Swelling and tenderness present. No deformity. Normal range of motion.      Comments: Swelling and tenderness of bilateral lower legs.  Patient does have wounds on behind right leg with mild erythema.  As pictured below.   Skin:     General: Skin is warm and dry.      Coloration: Skin is not jaundiced.      Findings: No bruising or lesion.   Neurological:      General: No focal deficit present.      Mental Status: She is alert and oriented to person, place, and time. Mental status is at baseline.      Cranial Nerves: No cranial nerve deficit.      Motor: No weakness.   Psychiatric:         Mood and Affect: Mood normal.       Labs Reviewed - No data to display  No orders to display       ED Course & MDM   ED Course as of 08/09/24 0810   Mon Aug 05, 2024   0535 EKG on my read shows sinus tachycardia at  a rate of 103 bpm no ST elevation nonischemic EKG, normal intervals [CF]   0601 LEUKOCYTES (10*3/UL) IN BLOOD BY AUTOMATED COUNT, STACY(!): 22.6 [CF]   0628 IMPRESSION:  No acute cardiopulmonary disease.   [CF]      ED Course User Index  [CF] Gina Cantrell MD         Diagnoses as of 08/09/24 0810   Cellulitis of lower extremity, unspecified laterality   Demand ischemia (Multi)   Severe sepsis (Multi)       Medical Decision Making  This is a 53-year-old female who presents to the emergency department for generalized feeling unwell and pain in both of her legs.  She does have some redness and erythema with some wounds on the back of both of her legs.  Patient lungs are clear on my exam no tenderness to palpation of her abdomen.  Patient's vitals are tachycardic but otherwise stable.  EKG is nonischemic but troponins are elevated patient was given aspirin since she is not having chest pain and low suspicion for a STEMI.  Her urine does not appear infected.  She is have a leukocytosis of 22 she was given broad-spectrum antibiotics chest x-ray showed no signs of pneumonia.  Patient was ultimately signed out pending admission.        Procedure  Procedures     Gina Cantrell MD  08/09/24 0810

## 2024-08-05 NOTE — LETTER
August 8, 2024     Patient: Frances Mcdowell   YOB: 1970   Date of Visit: 8/5/2024       To Whom It May Concern:    Frances Mcdowell was in our care 8/5/24-8/9/24. She may return to work on 8/15/24. Please excuse Frances for her absence from work during this time.     If you have any questions or concerns, please don't hesitate to call.         Sincerely,         Inge Gunderson PA-C

## 2024-08-05 NOTE — H&P
7:00 AM :Pt care assumed from Dr. Ana Jacob , ED provider. Pt complaint(s), current treatment plan, progression and available diagnostic results have been discussed thoroughly. Rounding occurred: yes Intended Disposition: TBD Pending diagnostics reports and/or labs (please list): Crisis eval 
 
10:32 AM Consult: I discussed care with Estrada De La Cruz (Crisis). It was a standard discussion including patient history, chief complaint, available diagnostic results, and predicted treatment course. Will evaluate the pt. 
 
2:50 PM 
Pt is going to room 122, adult unit. Scribe Attestation Bismark Nguyen acting as a scribe for and in the presence of [employer], DO November 16, 2018 at 7:00 AM 
    
Provider Attestation:     
I personally performed the services described in the documentation, reviewed the documentation, as recorded by the scribe in my presence, and it accurately and completely records my words and actions.  November 16, 2018 at 7:00 AM - [employer], DO 
 
 History Obtained From: Patient    History Of Present Illness:  Frances Mcdowell is a 53 y.o. female with PMHx s/f hypothyroidism, venous insufficiency, lymph edema presenting with nausea, fever increased drainage from lower extremities head ache leg swelling . Pt has chronic lymph edema progressively has worsened over last several months, lately increased drainage from ulcerations that are also painful, treated with doxycycline PTA. Pt is wound nurse, has been managing them. She has seen specialist for venous insufficiency but no intervention planned. Pt denies chest pain, shortness of breath, admits to body aches, cough. On Presentation presentation to the emergency department vital signs showed temperature of 98.6 heart rate 111 respiratory rate 16 blood pressure 111/78 SpO2 97% on room air. Chemistry panel shows sodium 136 potassium 3.7 chloride 104 CO2 25 BUN 36 creatinine 0.88 glucose 87 magnesium 1.48 lactate 2.1  troponin 103. CBC shows WBC 22.6 hemoglobin 12.1 hematocrit 36.6 platelets 337. Chest x-ray shows lungs to be clear Ultrasound of lower extremities negative for DVT.In emergency department patient was given 324 mg of aspirin half a liter of lactated Ringer's solution started on IV vancomycin and Zosyn there has been some improvement in the erythema of her lower extremities.        ED Course:  ED Course as of 08/05/24 0808   Mon Aug 05, 2024   0549 EKG on my read shows sinus tachycardia at a rate of 103 bpm no ST elevation nonischemic EKG, normal intervals [CF]   0601 LEUKOCYTES (10*3/UL) IN BLOOD BY AUTOMATED COUNT, Portuguese(!): 22.6 [CF]   0628 IMPRESSION:  No acute cardiopulmonary disease.   [CF]      ED Course User Index  [CF] Gina Cantrell MD         Diagnoses as of 08/05/24 0808   Cellulitis of lower extremity, unspecified laterality   Demand ischemia (Multi)   Severe sepsis (Multi)     Relevant Results  Results for orders placed or performed during the hospital encounter of 08/05/24  (from the past 24 hour(s))   CBC and Auto Differential   Result Value Ref Range    WBC 22.6 (H) 4.4 - 11.3 x10*3/uL    nRBC 0.0 0.0 - 0.0 /100 WBCs    RBC 3.76 (L) 4.00 - 5.20 x10*6/uL    Hemoglobin 12.1 12.0 - 16.0 g/dL    Hematocrit 36.6 36.0 - 46.0 %    MCV 97 80 - 100 fL    MCH 32.2 26.0 - 34.0 pg    MCHC 33.1 32.0 - 36.0 g/dL    RDW 13.1 11.5 - 14.5 %    Platelets 337 150 - 450 x10*3/uL    Neutrophils % 90.4 40.0 - 80.0 %    Immature Granulocytes %, Automated 1.2 (H) 0.0 - 0.9 %    Lymphocytes % 2.4 13.0 - 44.0 %    Monocytes % 5.2 2.0 - 10.0 %    Eosinophils % 0.4 0.0 - 6.0 %    Basophils % 0.4 0.0 - 2.0 %    Neutrophils Absolute 20.40 (H) 1.20 - 7.70 x10*3/uL    Immature Granulocytes Absolute, Automated 0.27 0.00 - 0.70 x10*3/uL    Lymphocytes Absolute 0.55 (L) 1.20 - 4.80 x10*3/uL    Monocytes Absolute 1.18 (H) 0.10 - 1.00 x10*3/uL    Eosinophils Absolute 0.09 0.00 - 0.70 x10*3/uL    Basophils Absolute 0.09 0.00 - 0.10 x10*3/uL   Magnesium   Result Value Ref Range    Magnesium 1.48 (L) 1.60 - 2.40 mg/dL   Comprehensive metabolic panel   Result Value Ref Range    Glucose 87 74 - 99 mg/dL    Sodium 136 136 - 145 mmol/L    Potassium 3.7 3.5 - 5.3 mmol/L    Chloride 104 98 - 107 mmol/L    Bicarbonate 25 21 - 32 mmol/L    Anion Gap 11 10 - 20 mmol/L    Urea Nitrogen 36 (H) 6 - 23 mg/dL    Creatinine 0.88 0.50 - 1.05 mg/dL    eGFR 78 >60 mL/min/1.73m*2    Calcium 8.3 (L) 8.6 - 10.3 mg/dL    Albumin 3.3 (L) 3.4 - 5.0 g/dL    Alkaline Phosphatase 72 33 - 110 U/L    Total Protein 6.4 6.4 - 8.2 g/dL    AST 17 9 - 39 U/L    Bilirubin, Total 0.6 0.0 - 1.2 mg/dL    ALT 17 7 - 45 U/L   Lipase   Result Value Ref Range    Lipase 13 9 - 82 U/L   Lactate   Result Value Ref Range    Lactate 2.1 (H) 0.4 - 2.0 mmol/L   Troponin I, High Sensitivity   Result Value Ref Range    Troponin I, High Sensitivity 103 (HH) 0 - 13 ng/L   B-Type Natriuretic Peptide   Result Value Ref Range     (H) 0 - 99 pg/mL   Sars-CoV-2 PCR    Result Value Ref Range    Coronavirus 2019, PCR Not Detected Not Detected      XR chest 2 views    Result Date: 2024  STUDY: Chest Radiographs;  2024 6:21AM INDICATION: Cough. COMPARISON: None Available ACCESSION NUMBER(S): [id number] ORDERING CLINICIAN: AMIRA KEN TECHNIQUE:  Frontal and lateral chest. FINDINGS: CARDIOMEDIASTINAL SILHOUETTE: Cardiomediastinal silhouette is normal in size and configuration.  LUNGS: Lungs are clear.  ABDOMEN: No remarkable upper abdominal findings.  BONES: No acute osseous changes.    No acute cardiopulmonary disease. Signed by Melvin Rocha     Scheduled medications:  lactated Ringer's, 500 mL, intravenous, Once  vancomycin, 2,000 mg, intravenous, Once      Continuous medications:     PRN medications:  PRN medications: oxyCODONE-acetaminophen      Past Medical History  She has a past medical history of Acute embolism and thrombosis of other specified veins (2022), ADD (attention deficit disorder), Anxiety, Benign neoplasm of unspecified ovary, Endometrial intraepithelial neoplasia (EIN), GERD (gastroesophageal reflux disease), Hypothyroidism, Lymph edema, and Personal history of other diseases of the circulatory system.    Surgical History  She has a past surgical history that includes Tonsillectomy; Foot surgery; Varicose vein surgery; Total thyroidectomy; Bladder surgery;  section, low transverse; and Hysterectomy.     Social History  She reports that she has never smoked. She has never used smokeless tobacco. She reports that she does not currently use alcohol. She reports that she does not use drugs.    Family History  No family history on file.     Allergies  Erythromycin, Erythromycin base, Sulfa (sulfonamide antibiotics), Sulfamethoxazole, Sulfamethoxazole-trimethoprim, Tetanus toxoid, Adhesive tape-silicones, and Tramadol    Code Status  No Order     Review of Systems   Constitutional:  Positive for fever. Negative for appetite change, chills,  fatigue and unexpected weight change.   HENT:  Negative for congestion, ear discharge, ear pain, mouth sores, nosebleeds, sinus pain, sore throat, trouble swallowing and voice change.    Eyes:  Negative for photophobia, pain, discharge, itching and visual disturbance.   Respiratory:  Positive for cough. Negative for apnea, choking, chest tightness, shortness of breath and wheezing.    Cardiovascular:  Positive for leg swelling. Negative for chest pain and palpitations.   Gastrointestinal:  Positive for nausea. Negative for abdominal distention, abdominal pain, blood in stool, constipation, diarrhea and vomiting.   Endocrine: Negative for cold intolerance, heat intolerance, polydipsia, polyphagia and polyuria.   Genitourinary:  Negative for decreased urine volume, difficulty urinating, dysuria, flank pain, frequency, hematuria and urgency.   Musculoskeletal:  Negative for arthralgias, back pain, gait problem, myalgias, neck pain and neck stiffness.   Skin:  Positive for rash and wound. Negative for color change and pallor.   Allergic/Immunologic: Negative for food allergies and immunocompromised state.   Neurological:  Positive for headaches. Negative for dizziness, tremors, seizures, syncope, speech difficulty, weakness, light-headedness and numbness.   Hematological:  Negative for adenopathy. Does not bruise/bleed easily.   Psychiatric/Behavioral:  Negative for confusion, dysphoric mood, hallucinations, sleep disturbance and suicidal ideas. The patient is not nervous/anxious.        Last Recorded Vitals  BP 99/58   Pulse 99   Temp 37 °C (98.6 °F) (Temporal)   Resp 17   Wt 127 kg (280 lb)   SpO2 96%      Physical Exam  Vitals reviewed.   Constitutional:       General: She is not in acute distress.  HENT:      Head: Normocephalic and atraumatic.      Right Ear: External ear normal.      Left Ear: External ear normal.      Nose: Nose normal.      Mouth/Throat:      Mouth: Mucous membranes are moist.      Pharynx:  Oropharynx is clear.   Eyes:      General: No scleral icterus.     Extraocular Movements: Extraocular movements intact.      Conjunctiva/sclera: Conjunctivae normal.      Pupils: Pupils are equal, round, and reactive to light.   Neck:      Vascular: No carotid bruit.   Cardiovascular:      Rate and Rhythm: Normal rate and regular rhythm.      Pulses: Normal pulses.      Heart sounds: Normal heart sounds. No murmur heard.  Pulmonary:      Effort: Pulmonary effort is normal. No respiratory distress.      Breath sounds: Normal breath sounds. No wheezing, rhonchi or rales.   Chest:      Chest wall: No tenderness.   Abdominal:      General: Bowel sounds are normal. There is no distension.      Palpations: Abdomen is soft. There is no mass.      Tenderness: There is no abdominal tenderness. There is no rebound.   Musculoskeletal:         General: Tenderness present. No swelling or deformity. Normal range of motion.      Cervical back: Normal range of motion.      Right lower leg: Edema present.      Left lower leg: Edema present.   Skin:     General: Skin is warm and dry.      Capillary Refill: Capillary refill takes less than 2 seconds.      Findings: Erythema, lesion and rash present.   Neurological:      General: No focal deficit present.      Mental Status: She is alert and oriented to person, place, and time.   Psychiatric:         Mood and Affect: Mood normal.         Behavior: Behavior normal.         Assessment/Plan   Principal Problem:    Cellulitis of lower extremity, unspecified laterality  Cellulitis and infected ulcerations of the right lower extremity w severe sepsis and demand MI  Complicated by hx lymphedema, venous insufficiency  Had taken 1 wk of doxycycline PTA  Continue vancomycyn zosyn pending ID input cx results  Wound care consult  IV hydration, repeat lactate level pending    Elevated troponin, BNP probable demand MI from severe sepsis  Will trend troponin, check echocardiogram consult  cardiology  Pt denies chest pain    Hypothyroidism  Resume home dose through reconciliation    Hypomagnesemia  Replacement order per protocol    Morbid obesity BMP 49.6  Continue home meds        No new Assessment & Plan notes have been filed under this hospital service since the last note was generated.  Service: Internal Medicine          Abelino Sesay, APRN-CNP    Dragon dictation software was used to dictate this note and thus there may be minor errors in translation/transcription including garbled speech or misspellings. Please contact for clarification if needed.

## 2024-08-06 ENCOUNTER — APPOINTMENT (OUTPATIENT)
Dept: RADIOLOGY | Facility: HOSPITAL | Age: 54
End: 2024-08-06
Payer: COMMERCIAL

## 2024-08-06 LAB
ANION GAP SERPL CALC-SCNC: 8 MMOL/L (ref 10–20)
ASO AB SERPL-ACNC: 142 IU/ML (ref 0–250)
BUN SERPL-MCNC: 19 MG/DL (ref 6–23)
CALCIUM SERPL-MCNC: 7.9 MG/DL (ref 8.6–10.3)
CHLORIDE SERPL-SCNC: 105 MMOL/L (ref 98–107)
CO2 SERPL-SCNC: 27 MMOL/L (ref 21–32)
CREAT SERPL-MCNC: 0.77 MG/DL (ref 0.5–1.05)
CRP SERPL-MCNC: 23.21 MG/DL
EGFRCR SERPLBLD CKD-EPI 2021: >90 ML/MIN/1.73M*2
ERYTHROCYTE [DISTWIDTH] IN BLOOD BY AUTOMATED COUNT: 13.2 % (ref 11.5–14.5)
ERYTHROCYTE [SEDIMENTATION RATE] IN BLOOD BY WESTERGREN METHOD: 44 MM/H (ref 0–30)
GLUCOSE SERPL-MCNC: 105 MG/DL (ref 74–99)
HCT VFR BLD AUTO: 31.4 % (ref 36–46)
HGB BLD-MCNC: 10.7 G/DL (ref 12–16)
MAGNESIUM SERPL-MCNC: 1.81 MG/DL (ref 1.6–2.4)
MCH RBC QN AUTO: 33.1 PG (ref 26–34)
MCHC RBC AUTO-ENTMCNC: 34.1 G/DL (ref 32–36)
MCV RBC AUTO: 97 FL (ref 80–100)
NRBC BLD-RTO: 0 /100 WBCS (ref 0–0)
PLATELET # BLD AUTO: 275 X10*3/UL (ref 150–450)
POTASSIUM SERPL-SCNC: 3.5 MMOL/L (ref 3.5–5.3)
RBC # BLD AUTO: 3.23 X10*6/UL (ref 4–5.2)
SODIUM SERPL-SCNC: 136 MMOL/L (ref 136–145)
WBC # BLD AUTO: 16.4 X10*3/UL (ref 4.4–11.3)

## 2024-08-06 PROCEDURE — 80048 BASIC METABOLIC PNL TOTAL CA: CPT | Performed by: NURSE PRACTITIONER

## 2024-08-06 PROCEDURE — 73718 MRI LOWER EXTREMITY W/O DYE: CPT | Mod: LT

## 2024-08-06 PROCEDURE — 2500000004 HC RX 250 GENERAL PHARMACY W/ HCPCS (ALT 636 FOR OP/ED): Performed by: PHYSICIAN ASSISTANT

## 2024-08-06 PROCEDURE — 2060000001 HC INTERMEDIATE ICU ROOM DAILY

## 2024-08-06 PROCEDURE — 86060 ANTISTREPTOLYSIN O TITER: CPT | Mod: PORLAB | Performed by: STUDENT IN AN ORGANIZED HEALTH CARE EDUCATION/TRAINING PROGRAM

## 2024-08-06 PROCEDURE — 76937 US GUIDE VASCULAR ACCESS: CPT

## 2024-08-06 PROCEDURE — 73718 MRI LOWER EXTREMITY W/O DYE: CPT | Mod: LEFT SIDE | Performed by: STUDENT IN AN ORGANIZED HEALTH CARE EDUCATION/TRAINING PROGRAM

## 2024-08-06 PROCEDURE — 83735 ASSAY OF MAGNESIUM: CPT | Performed by: NURSE PRACTITIONER

## 2024-08-06 PROCEDURE — 85652 RBC SED RATE AUTOMATED: CPT | Performed by: PODIATRIST

## 2024-08-06 PROCEDURE — 2500000004 HC RX 250 GENERAL PHARMACY W/ HCPCS (ALT 636 FOR OP/ED): Performed by: NURSE PRACTITIONER

## 2024-08-06 PROCEDURE — 82374 ASSAY BLOOD CARBON DIOXIDE: CPT | Performed by: NURSE PRACTITIONER

## 2024-08-06 PROCEDURE — 2500000001 HC RX 250 WO HCPCS SELF ADMINISTERED DRUGS (ALT 637 FOR MEDICARE OP): Performed by: NURSE PRACTITIONER

## 2024-08-06 PROCEDURE — 2500000001 HC RX 250 WO HCPCS SELF ADMINISTERED DRUGS (ALT 637 FOR MEDICARE OP): Performed by: PHYSICIAN ASSISTANT

## 2024-08-06 PROCEDURE — 99233 SBSQ HOSP IP/OBS HIGH 50: CPT | Performed by: PHYSICIAN ASSISTANT

## 2024-08-06 PROCEDURE — 86140 C-REACTIVE PROTEIN: CPT | Performed by: PODIATRIST

## 2024-08-06 PROCEDURE — 87077 CULTURE AEROBIC IDENTIFY: CPT | Mod: PORLAB | Performed by: PODIATRIST

## 2024-08-06 PROCEDURE — 99232 SBSQ HOSP IP/OBS MODERATE 35: CPT | Performed by: PHYSICIAN ASSISTANT

## 2024-08-06 PROCEDURE — 85027 COMPLETE CBC AUTOMATED: CPT | Performed by: STUDENT IN AN ORGANIZED HEALTH CARE EDUCATION/TRAINING PROGRAM

## 2024-08-06 PROCEDURE — 2500000004 HC RX 250 GENERAL PHARMACY W/ HCPCS (ALT 636 FOR OP/ED): Mod: JZ | Performed by: STUDENT IN AN ORGANIZED HEALTH CARE EDUCATION/TRAINING PROGRAM

## 2024-08-06 PROCEDURE — 36415 COLL VENOUS BLD VENIPUNCTURE: CPT | Performed by: NURSE PRACTITIONER

## 2024-08-06 RX ORDER — LORAZEPAM 2 MG/ML
0.5 INJECTION INTRAMUSCULAR ONCE
Status: COMPLETED | OUTPATIENT
Start: 2024-08-06 | End: 2024-08-06

## 2024-08-06 RX ORDER — HYDROMORPHONE HYDROCHLORIDE 0.2 MG/ML
0.2 INJECTION INTRAMUSCULAR; INTRAVENOUS; SUBCUTANEOUS
Status: DISCONTINUED | OUTPATIENT
Start: 2024-08-06 | End: 2024-08-09 | Stop reason: HOSPADM

## 2024-08-06 RX ORDER — FLUCONAZOLE 100 MG/1
150 TABLET ORAL ONCE
Status: COMPLETED | OUTPATIENT
Start: 2024-08-06 | End: 2024-08-06

## 2024-08-06 ASSESSMENT — ENCOUNTER SYMPTOMS
CONSTIPATION: 0
HEADACHES: 0
DIAPHORESIS: 0
NAUSEA: 0
HEMATURIA: 0
DIARRHEA: 0
SHORTNESS OF BREATH: 1
FREQUENCY: 0
BLOOD IN STOOL: 0
FLANK PAIN: 0
ORTHOPNEA: 0
TROUBLE SWALLOWING: 0
WHEEZING: 0
LIGHT-HEADEDNESS: 0
PALPITATIONS: 0
FEVER: 0
BRUISES/BLEEDS EASILY: 0
SORE THROAT: 0
ABDOMINAL PAIN: 0
BACK PAIN: 0
JOINT SWELLING: 0
DIZZINESS: 0
FACIAL SWELLING: 0
EYE PAIN: 0
NUMBNESS: 0
DYSURIA: 0
WOUND: 1
COUGH: 0
FATIGUE: 0
APPETITE CHANGE: 0
CHEST TIGHTNESS: 0
VOMITING: 0
CHILLS: 0
WEAKNESS: 0
HALLUCINATIONS: 0
SHORTNESS OF BREATH: 0

## 2024-08-06 ASSESSMENT — PAIN - FUNCTIONAL ASSESSMENT
PAIN_FUNCTIONAL_ASSESSMENT: 0-10

## 2024-08-06 ASSESSMENT — PAIN DESCRIPTION - DESCRIPTORS
DESCRIPTORS: BURNING
DESCRIPTORS: BURNING

## 2024-08-06 ASSESSMENT — COGNITIVE AND FUNCTIONAL STATUS - GENERAL
MOBILITY SCORE: 24
DAILY ACTIVITIY SCORE: 24

## 2024-08-06 ASSESSMENT — PAIN SCALES - GENERAL
PAINLEVEL_OUTOF10: 5 - MODERATE PAIN
PAINLEVEL_OUTOF10: 8
PAINLEVEL_OUTOF10: 4
PAINLEVEL_OUTOF10: 8
PAINLEVEL_OUTOF10: 7
PAINLEVEL_OUTOF10: 0 - NO PAIN
PAINLEVEL_OUTOF10: 7
PAINLEVEL_OUTOF10: 6
PAINLEVEL_OUTOF10: 0 - NO PAIN
PAINLEVEL_OUTOF10: 0 - NO PAIN

## 2024-08-06 ASSESSMENT — PAIN DESCRIPTION - ORIENTATION
ORIENTATION: RIGHT;LOWER
ORIENTATION: RIGHT;LEFT;LOWER

## 2024-08-06 ASSESSMENT — PAIN DESCRIPTION - LOCATION
LOCATION: LEG
LOCATION: LEG
LOCATION: HEAD
LOCATION: LEG

## 2024-08-06 NOTE — PROGRESS NOTES
INPATIENT INFECTIOUS DISEASES PROGRESS NOTE    Reason for Follow-up: cellulitis    S: thinks left leg is improving, pain better but still there, no diarrhea    Interval Events:    Results from last 72 hours   Lab Units 08/06/24  0631   WBC AUTO x10*3/uL 16.4*     Results from last 72 hours   Lab Units 08/06/24  0631   CREATININE mg/dL 0.77       O: Visit Vitals  BP 95/52 (BP Location: Right arm, Patient Position: Lying)   Pulse 66   Temp 37.6 °C (99.7 °F) (Temporal)   Resp 18     Heart Rate:  [66-93]   Temp:  [35.8 °C (96.5 °F)-37.6 °C (99.7 °F)]   Resp:  [17-18]   BP: ()/(52-82)   Weight:  [135 kg (297 lb 6.4 oz)]   SpO2:  [93 %-100 %]   No acute distress, obese  LLE red and warm with some tenderness but less warmth, erythema still extending up into posterior thigh  RLE with calf ulcer, not much drainage from wound, some tenderness and surrounding erythema  B/l le large and swollen     ASSESSMENT & PLAN:     # LLE cellulitis suspect strep. Improving slowly with abx  # RLE stasis ulcer with surrounding cellulitis suspect strep. Ulcer slow to heal  # SIRS without sepsis (POA) 2/2 above  # obesity & lymphedema impacting antibiotic dosing infection tx    - MRI ordered by primary, will follow   - wnd cx ordered by primary, will follow  - f/u on aso titer  - trend wbc  - f/u on blood cx from 8/5/24, in process with ngtd   - continue ancef 2g Q6H (high dose for obesity)     Terell Marie MD  [facility]  Office: [phone]  Fax: [phone]

## 2024-08-06 NOTE — CONSULTS
Vascular Access Team  Consult     Visit Date: 8/6/2024      Patient Name: Frances Mcdowell         MRN: [id number]                Reason for Consult: Bedside staff unable to obtain IV access       Assessment: US guided IV placed in right arm, see LDA avatar for details          Karime Barrera RN  8/6/2024  2:57 PM

## 2024-08-06 NOTE — CARE PLAN
The patient's goals for the shift include      The clinical goals for the shift include Pt will remain fever free throughout shift.    Over the shift, the patient did not make progress toward the following goals. Barriers to progression include history of fever. Recommendations to address these barriers include frequent monitoring.

## 2024-08-06 NOTE — CARE PLAN
The patient's goals for the shift include  stay informed    The clinical goals for the shift include patient will remain hemodyamically stable throughout the shift    Over the shift, the patient did make progress toward the following goals. Barriers to progression include understanding. Recommendations to address these barriers include education.

## 2024-08-06 NOTE — NURSING NOTE
0700:  Report received from Juan BANDA    0800:  New orders:  +Podiatry consulted  +Wound culture of RLE  +MRI of right leg  +Creactive lab ad on -23.21  +sedimentation rate lab ad on -44    Changed diet to regular diet    1100:  Discussed plan of care today with patient who became very anxious about having MRI. I informed her she would not need to be all the way in and her head and chest would remain out and still patient is agreeable but uneasy. IMS notified.     New order:  PRN one time 0.5mg ativan ordered for prior to MRI.    1400:  IV went bad, will need US guided IV prior to MRI. Karime RN notified  Dr Rangel in room  Wound nurse also on the way to room    New IV placed 20 R AC, MRI switched to left leg and postponed for one hour     1430:   Patient saying due to antibiotics she may need diflucan and also something stronger for pain when doing wound dressings. IMS notified    New orders:  Diflucan 150mg once  0.2mg dilaudid PRN Q3hrs for breakthrough pain    1611:  Patient back from MRI  Wound dressing complete. Completed Left leg, patient refused right leg wound dressing

## 2024-08-06 NOTE — PROGRESS NOTES
PROGRESS NOTE    HPI:  Frances Mcdowell is a 53 y.o. female presenting with fever, chills, lower extremity burning pain and swelling.     PMHx s/f hypothyroidism, venous insufficiency, lymph edema presenting with nausea, fever increased drainage from lower extremities head ache leg swelling . Pt has chronic lymph edema progressively has worsened over last several months, lately increased drainage from ulcerations that are also painful, treated with doxycycline PTA.       She is found to have cellulitis of the lower extremity with severe sepsis.  We were consulted for abnormal troponin.  It is unclear why this was checked.  Patient does not have any history of shortness of breath or chest pain.  She states she walks 5 miles daily at work.  She has lost 55 pounds recently with diet and exercise.  She is a registered nurse by profession.    Subjective Data:  Patient has no cardiac complaints.  She does have some SOB with activity related to pain/swelling mostly in LLE.  She feels the swelling is much improved RLE but still noted LLE. No CP or palpitations and tele is SR/ST 's.  Echo shows normal LVSF.      Overnight Events:    none     Objective Data:  Last Recorded Vitals:  Vitals:    08/05/24 2107 08/05/24 2330 08/06/24 0333 08/06/24 0753   BP:  124/80 120/75 140/81   BP Location:  Right arm Right arm Left arm   Patient Position:  Lying Lying Lying   Pulse:  83 92 86   Resp:  17 18 17   Temp: 36.9 °C (98.5 °F) 36.4 °C (97.5 °F) 36.5 °C (97.7 °F) 36.2 °C (97.1 °F)   TempSrc:  Temporal Temporal Temporal   SpO2:  96% 98% 97%   Weight:   135 kg (297 lb 6.4 oz)    Height:           Last Labs:  CBC - 8/6/2024:  6:31 AM  16.4 10.7 275    31.4      CMP - 8/6/2024:  6:31 AM  7.9 6.4 17 --- 0.6   _ 3.3 17 72      PTT - No results in last year.  _   _ _     Last I/O:  I/O last 3 completed shifts:  In: 788.8 (5.8 mL/kg) [I.V.:688.8 (5.1 mL/kg); IV Piggyback:100]  Out: - (0 mL/kg)   Weight: 134.9 kg     Past Cardiology Tests  (Last 3 Years):  Echo: CONCLUSIONS:   1. The left ventricular systolic function is normal, with a visually estimated ejection fraction of 60-65%.   2. Moderately increased left ventricular septal thickness.   3. Moderately enlarged right ventricle.   4. RV Strain RVFWSL -25%, RV4CSL -21.3 %.   5. There is normal right ventricular global systolic function.   6. Mildly elevated RVSP.   7. Aortic valve stenosis is not present.   8. Left Ventricular Global Longitudinal Strain - 17.3 %.         Inpatient Medications:  Scheduled medications   Medication Dose Route Frequency    amphetamine-dextroamphetamine  20 mg oral BID    aspirin  81 mg oral Daily    ceFAZolin  2 g intravenous q6h    cholecalciferol  5,000 Units oral Daily    enoxaparin  40 mg subcutaneous q12h ELENA    levothyroxine  100 mcg oral Daily    pantoprazole  40 mg oral Daily before breakfast    polyethylene glycol  17 g oral Daily    predniSONE  10 mg oral Daily       Review of Systems   Constitutional: Negative for chills, fever and malaise/fatigue.   Cardiovascular:  Negative for chest pain, orthopnea and palpitations.   Respiratory:  Positive for shortness of breath. Negative for wheezing.    Skin:  Negative for itching and rash.        Wounds dressed LLE but still open wounds RLE   Gastrointestinal:  Negative for abdominal pain, diarrhea, nausea and vomiting.   Genitourinary:  Negative for dysuria.   Neurological:  Negative for weakness.        Physical Exam  Constitutional:       General: She is not in acute distress.     Appearance: She is obese.   HENT:      Mouth/Throat:      Mouth: Mucous membranes are moist.   Neck:      Comments: No obvious JVD  Cardiovascular:      Rate and Rhythm: Normal rate and regular rhythm.      Heart sounds: Normal heart sounds.   Pulmonary:      Effort: Pulmonary effort is normal.      Breath sounds: Normal breath sounds.   Abdominal:      General: Bowel sounds are normal.      Palpations: Abdomen is soft.      Tenderness:  There is no abdominal tenderness.   Musculoskeletal:      Right lower leg: Edema present.      Left lower leg: Edema present.      Comments: Dressing LLE but open wounds posterior RLE calf area   Skin:     General: Skin is warm and dry.   Neurological:      Mental Status: She is alert and oriented to person, place, and time.   Psychiatric:         Behavior: Behavior is cooperative.          ASSESSMENT/PLAN  1-elevated troponin-patient has no ischemic symptoms.  Unclear why this was checked.  This would be consistent with nonischemic myocardial injury.  8-6-24:  No CP or active cardiac symptoms.  Echo with normal LVSF.  No further cardiac testing at this time.     2-ankle swelling and elevated BNP-she insists she has lymphedema denies any history of shortness of breath we will follow-up on echocardiogram.  May benefit from diuretics.  8-6-24:  States RLE edema is better but still pain and edema mostly LLE.  Leg is wrapped and is on abx.  Diuretics could be considered if treatment of underlying infection/cellulitis in legs doesn't resolve the edema.  Diuretic therapy per IMS.    Recommendations--no cardiac symptoms and no further testing required at this time.  No rhythm issues.  Will be available to resee if needed otherwise cardiology will sign off.         Devaughn Cartwright PA-C  8/6/2024  9:36 AM

## 2024-08-06 NOTE — CONSULTS
Wound Care Consult     Visit Date: 8/6/2024      Patient Name: Frances Mcdowell         MRN: [id number]           YOB: 1970     Pertinent Labs:   Albumin   Date Value Ref Range Status   08/05/2024 3.3 (L) 3.4 - 5.0 g/dL Final     Wound Assessment:  Wound 08/05/24 Other (comment) Tibial Distal;Dorsal;Right (Active)   Wound Image   08/06/24 1430   Site Assessment Red;White;Yellow;Sloughing;Swelling;Edema 08/06/24 1430   Esme-Wound Assessment Red;Painful;Edematous 08/06/24 1430   Non-staged Wound Description Full thickness 08/06/24 1430   Shape irregular cluster 08/06/24 1430   Wound Length (cm) 10 cm 08/06/24 1430   Wound Width (cm) 7 cm 08/06/24 1430   Wound Surface Area (cm^2) 70 cm^2 08/06/24 1430   Margins Poorly defined 08/06/24 1430   Drainage Description None 08/06/24 1430   Drainage Amount None 08/06/24 1430   Dressing Non adherent;Dry dressing 08/06/24 1430   Dressing Changed New 08/06/24 1430   Dressing Status Removed 08/06/24 0641       Wound 08/05/24 Other (comment) Tibial Distal;Dorsal;Left (Active)   Wound Image   08/06/24 1431   Site Assessment Fragile;Maceration;Red;Pink 08/06/24 1431   Esme-Wound Assessment Painful;Pink;Red;Moist  08/06/24 1431   Non-staged Wound Description Full thickness 08/06/24 1431   Shape irregular 08/06/24 1431   Wound Length (cm) 7 cm 08/06/24 1431   Wound Width (cm) 8 cm 08/06/24 1431   Wound Surface Area (cm^2) 56 cm^2 08/06/24 1431   Margins Poorly defined 08/06/24 1431   Drainage Description Yellow 08/06/24 1431   Drainage Amount Small 08/06/24 1431   Dressing Non adherent;Dry dressing 08/06/24 1431   Dressing Changed New 08/06/24 1431   Dressing Status Clean;Dry 08/05/24 2236     Patient seen for BLE wounds (present on admission) complicated by PMH: hypothyroidism, venous insufficiency and lymphedema per chart. Exam conducted with Dr. Rangel at bedside. 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 Dr. Rangel at bedside and assessed wounds. Stasis  ulcerations noted to bilateral lower legs during exam with Dr. Rangel. Right leg appears improved from initial admission photo, not currently draining. Left leg with increased redness and drainage, wound culture obtained. Skin hygiene and dressing care provided with adaptic and foams per Dr. Rangel so patient can get US guided IV placed and go down for MRI. Bedside RN Dar to apply dressings per order after patient returns. See detailed assessment above from flowsheet. Recommendations below, reviewed with Dr. Rangel at bedside.     Wound location: Bilateral lower legs   Treatment protocol recommended:  Cleanse with vashe and pat dry.  Apply adaptic, aquacel, ABD and wrap with kerlix daily/prn.   Continue to off load, turning at least every 2 hours. Offload heels.    Therapeutic surface: Patient on [place] [employer] pressure relieving mattress and demonstrates ability to reposition self. Staff to continue to encourage patient to do so atleast every 2 hours and elevate legs when resting.     Nursing updated, continue pressure injury preventions, wound care to be completed by nursing per orders. and re-consult wound RN if needed.    See above recommendations for treatment. I would recommend follow up in [facility] upon discharge or patient will likely continue to require skilled assistance with skin hygiene and wound care upon discharge.    Please contact me with questions or changes in patient condition.  Jeanine Hwang RN  [facility]   [phone]

## 2024-08-06 NOTE — PROGRESS NOTES
08/06/24 1101   Discharge Planning   Living Arrangements Spouse/significant other   Support Systems Spouse/significant other   Assistance Needed wound care   Type of Residence Private residence   Home or Post Acute Services Community services   Expected Discharge Disposition Home   Does the patient need discharge transport arranged? No

## 2024-08-07 LAB
ANION GAP SERPL CALC-SCNC: 11 MMOL/L (ref 10–20)
BASOPHILS # BLD AUTO: 0.03 X10*3/UL (ref 0–0.1)
BASOPHILS NFR BLD AUTO: 0.2 %
BUN SERPL-MCNC: 19 MG/DL (ref 6–23)
CALCIUM SERPL-MCNC: 8.5 MG/DL (ref 8.6–10.3)
CHLORIDE SERPL-SCNC: 103 MMOL/L (ref 98–107)
CO2 SERPL-SCNC: 27 MMOL/L (ref 21–32)
CREAT SERPL-MCNC: 0.65 MG/DL (ref 0.5–1.05)
EGFRCR SERPLBLD CKD-EPI 2021: >90 ML/MIN/1.73M*2
EOSINOPHIL # BLD AUTO: 0.31 X10*3/UL (ref 0–0.7)
EOSINOPHIL NFR BLD AUTO: 2.3 %
ERYTHROCYTE [DISTWIDTH] IN BLOOD BY AUTOMATED COUNT: 13.3 % (ref 11.5–14.5)
GLUCOSE SERPL-MCNC: 99 MG/DL (ref 74–99)
HCT VFR BLD AUTO: 34.1 % (ref 36–46)
HGB BLD-MCNC: 11.2 G/DL (ref 12–16)
IMM GRANULOCYTES # BLD AUTO: 0.36 X10*3/UL (ref 0–0.7)
IMM GRANULOCYTES NFR BLD AUTO: 2.7 % (ref 0–0.9)
LYMPHOCYTES # BLD AUTO: 2.04 X10*3/UL (ref 1.2–4.8)
LYMPHOCYTES NFR BLD AUTO: 15 %
MAGNESIUM SERPL-MCNC: 1.88 MG/DL (ref 1.6–2.4)
MCH RBC QN AUTO: 32.5 PG (ref 26–34)
MCHC RBC AUTO-ENTMCNC: 32.8 G/DL (ref 32–36)
MCV RBC AUTO: 99 FL (ref 80–100)
MONOCYTES # BLD AUTO: 0.87 X10*3/UL (ref 0.1–1)
MONOCYTES NFR BLD AUTO: 6.4 %
NEUTROPHILS # BLD AUTO: 9.97 X10*3/UL (ref 1.2–7.7)
NEUTROPHILS NFR BLD AUTO: 73.4 %
NRBC BLD-RTO: 0 /100 WBCS (ref 0–0)
PLATELET # BLD AUTO: 317 X10*3/UL (ref 150–450)
POTASSIUM SERPL-SCNC: 3.7 MMOL/L (ref 3.5–5.3)
RBC # BLD AUTO: 3.45 X10*6/UL (ref 4–5.2)
SODIUM SERPL-SCNC: 137 MMOL/L (ref 136–145)
WBC # BLD AUTO: 13.6 X10*3/UL (ref 4.4–11.3)

## 2024-08-07 PROCEDURE — 2500000004 HC RX 250 GENERAL PHARMACY W/ HCPCS (ALT 636 FOR OP/ED): Performed by: PHYSICIAN ASSISTANT

## 2024-08-07 PROCEDURE — 36415 COLL VENOUS BLD VENIPUNCTURE: CPT | Performed by: PHYSICIAN ASSISTANT

## 2024-08-07 PROCEDURE — 2500000004 HC RX 250 GENERAL PHARMACY W/ HCPCS (ALT 636 FOR OP/ED): Mod: JZ | Performed by: STUDENT IN AN ORGANIZED HEALTH CARE EDUCATION/TRAINING PROGRAM

## 2024-08-07 PROCEDURE — 2500000001 HC RX 250 WO HCPCS SELF ADMINISTERED DRUGS (ALT 637 FOR MEDICARE OP): Performed by: PHYSICIAN ASSISTANT

## 2024-08-07 PROCEDURE — 85025 COMPLETE CBC W/AUTO DIFF WBC: CPT | Performed by: PHYSICIAN ASSISTANT

## 2024-08-07 PROCEDURE — 2060000001 HC INTERMEDIATE ICU ROOM DAILY

## 2024-08-07 PROCEDURE — 80048 BASIC METABOLIC PNL TOTAL CA: CPT | Performed by: PHYSICIAN ASSISTANT

## 2024-08-07 PROCEDURE — 2500000004 HC RX 250 GENERAL PHARMACY W/ HCPCS (ALT 636 FOR OP/ED): Performed by: NURSE PRACTITIONER

## 2024-08-07 PROCEDURE — 83735 ASSAY OF MAGNESIUM: CPT | Performed by: PHYSICIAN ASSISTANT

## 2024-08-07 PROCEDURE — 99233 SBSQ HOSP IP/OBS HIGH 50: CPT | Performed by: PHYSICIAN ASSISTANT

## 2024-08-07 PROCEDURE — 2500000001 HC RX 250 WO HCPCS SELF ADMINISTERED DRUGS (ALT 637 FOR MEDICARE OP): Performed by: NURSE PRACTITIONER

## 2024-08-07 RX ORDER — CEFAZOLIN SODIUM 2 G/100ML
2 INJECTION, SOLUTION INTRAVENOUS EVERY 6 HOURS
Status: COMPLETED | OUTPATIENT
Start: 2024-08-07 | End: 2024-08-09

## 2024-08-07 RX ORDER — CEFTRIAXONE 2 G/50ML
2 INJECTION, SOLUTION INTRAVENOUS ONCE
Status: COMPLETED | OUTPATIENT
Start: 2024-08-09 | End: 2024-08-09

## 2024-08-07 RX ORDER — CEPHALEXIN 500 MG/1
1000 CAPSULE ORAL EVERY 6 HOURS SCHEDULED
Status: DISCONTINUED | OUTPATIENT
Start: 2024-08-10 | End: 2024-08-09 | Stop reason: HOSPADM

## 2024-08-07 RX ORDER — CEFEPIME 1 G/50ML
2 INJECTION, SOLUTION INTRAVENOUS EVERY 8 HOURS
Status: DISCONTINUED | OUTPATIENT
Start: 2024-08-07 | End: 2024-08-07

## 2024-08-07 RX ORDER — GABAPENTIN 300 MG/1
300 CAPSULE ORAL NIGHTLY
Status: DISCONTINUED | OUTPATIENT
Start: 2024-08-07 | End: 2024-08-09 | Stop reason: HOSPADM

## 2024-08-07 RX ORDER — LORAZEPAM 0.5 MG/1
0.5 TABLET ORAL EVERY 6 HOURS PRN
Status: DISCONTINUED | OUTPATIENT
Start: 2024-08-07 | End: 2024-08-09 | Stop reason: HOSPADM

## 2024-08-07 ASSESSMENT — PAIN DESCRIPTION - LOCATION
LOCATION: LEG

## 2024-08-07 ASSESSMENT — COGNITIVE AND FUNCTIONAL STATUS - GENERAL
DAILY ACTIVITIY SCORE: 24
MOBILITY SCORE: 23
CLIMB 3 TO 5 STEPS WITH RAILING: A LITTLE

## 2024-08-07 ASSESSMENT — ENCOUNTER SYMPTOMS
DYSURIA: 0
CHILLS: 0
DIARRHEA: 0
BACK PAIN: 0
VOMITING: 0
NUMBNESS: 0
FATIGUE: 0
BLOOD IN STOOL: 0
FACIAL SWELLING: 0
FREQUENCY: 0
CHEST TIGHTNESS: 0
BRUISES/BLEEDS EASILY: 0
WOUND: 1
DIAPHORESIS: 0
CONSTIPATION: 0
LIGHT-HEADEDNESS: 0
SORE THROAT: 0
DIZZINESS: 0
ABDOMINAL PAIN: 0
HEADACHES: 0
COUGH: 0
EYE PAIN: 0
PALPITATIONS: 0
FEVER: 0
HEMATURIA: 0
NAUSEA: 0
TROUBLE SWALLOWING: 0
JOINT SWELLING: 0
SHORTNESS OF BREATH: 0
HALLUCINATIONS: 0
APPETITE CHANGE: 0
WEAKNESS: 0
WHEEZING: 0
FLANK PAIN: 0

## 2024-08-07 ASSESSMENT — PAIN - FUNCTIONAL ASSESSMENT
PAIN_FUNCTIONAL_ASSESSMENT: 0-10

## 2024-08-07 ASSESSMENT — PAIN SCALES - GENERAL
PAINLEVEL_OUTOF10: 5 - MODERATE PAIN
PAINLEVEL_OUTOF10: 8
PAINLEVEL_OUTOF10: 5 - MODERATE PAIN
PAINLEVEL_OUTOF10: 7
PAINLEVEL_OUTOF10: 7
PAINLEVEL_OUTOF10: 8
PAINLEVEL_OUTOF10: 8
PAINLEVEL_OUTOF10: 2

## 2024-08-07 ASSESSMENT — PAIN DESCRIPTION - DESCRIPTORS
DESCRIPTORS: BURNING
DESCRIPTORS: BURNING

## 2024-08-07 ASSESSMENT — PAIN DESCRIPTION - ORIENTATION
ORIENTATION: LEFT;RIGHT;LOWER
ORIENTATION: LEFT;RIGHT;LOWER
ORIENTATION: RIGHT;LEFT

## 2024-08-07 NOTE — CONSULTS
Reason For Consult  Cellulitis of left lower leg. Stasis Ulcers both legs.     History Of Present Illness  Frances Mcdowell is a 53 y.o. female presenting with severe cellulitis of the left lower leg on admission. She has stasis wounds on both legs, most notably the right. She has been tending to the wounds herself at home, without noticing much improvement. 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 She has not been consistently using compression and reports her legs drain and soak her pants during the day     Past Medical History  She has a past medical history of Acute embolism and thrombosis of other specified veins (2022), ADD (attention deficit disorder), Anxiety, Benign neoplasm of unspecified ovary, Endometrial intraepithelial neoplasia (EIN), GERD (gastroesophageal reflux disease), Hypothyroidism, Lymph edema, and Personal history of other diseases of the circulatory system.    Surgical History  She has a past surgical history that includes Tonsillectomy; Foot surgery; Varicose vein surgery; Total thyroidectomy; Bladder surgery;  section, low transverse; and Hysterectomy.     Social History  She reports that she has never smoked. She has never used smokeless tobacco. She reports that she does not currently use alcohol. She reports that she does not use drugs.    Family History  No family history on file.     Allergies  Erythromycin, Erythromycin base, Sulfa (sulfonamide antibiotics), Sulfamethoxazole, Sulfamethoxazole-trimethoprim, Tetanus toxoid, Adhesive tape-silicones, and Tramadol    Review of Systems  Awake. Alert and fully oriented.   Anxious. Otherwise denies fever, chills, nausea currently   Breathing room air.      Physical Exam  Both lower legs are edematous. Chronic induration.   Right posterior lower leg has cluster of stasis ulcers. Initially red and draining, now drying and no longer erythematous.   Left lower leg has cellulitis spreading from the posterior calf ulcers to the proximal inner  "thigh. Cluster of small openings with serous fluid drainage. Culture collected from this site.        Last Recorded Vitals  Blood pressure 142/81, pulse 92, temperature 36.7 °C (98.1 °F), temperature source Temporal, resp. rate 18, height 1.6 m (5' 3\"), weight 136 kg (299 lb 6.2 oz), SpO2 97%.    Relevant Results       Scheduled medications  amphetamine-dextroamphetamine, 20 mg, oral, BID  aspirin, 81 mg, oral, Daily  cefepime, 2 g, intravenous, q8h  cholecalciferol, 5,000 Units, oral, Daily  enoxaparin, 40 mg, subcutaneous, q12h ELENA  gabapentin, 300 mg, oral, Nightly  levothyroxine, 100 mcg, oral, Daily  pantoprazole, 40 mg, oral, Daily before breakfast  polyethylene glycol, 17 g, oral, Daily  predniSONE, 10 mg, oral, Daily      Continuous medications     PRN medications  PRN medications: acetaminophen **OR** acetaminophen **OR** acetaminophen, HYDROmorphone, LORazepam, ondansetron ODT **OR** ondansetron, oxyCODONE-acetaminophen  Results for orders placed or performed during the hospital encounter of 08/05/24 (from the past 24 hour(s))   CBC and Auto Differential   Result Value Ref Range    WBC 13.6 (H) 4.4 - 11.3 x10*3/uL    nRBC 0.0 0.0 - 0.0 /100 WBCs    RBC 3.45 (L) 4.00 - 5.20 x10*6/uL    Hemoglobin 11.2 (L) 12.0 - 16.0 g/dL    Hematocrit 34.1 (L) 36.0 - 46.0 %    MCV 99 80 - 100 fL    MCH 32.5 26.0 - 34.0 pg    MCHC 32.8 32.0 - 36.0 g/dL    RDW 13.3 11.5 - 14.5 %    Platelets 317 150 - 450 x10*3/uL    Neutrophils % 73.4 40.0 - 80.0 %    Immature Granulocytes %, Automated 2.7 (H) 0.0 - 0.9 %    Lymphocytes % 15.0 13.0 - 44.0 %    Monocytes % 6.4 2.0 - 10.0 %    Eosinophils % 2.3 0.0 - 6.0 %    Basophils % 0.2 0.0 - 2.0 %    Neutrophils Absolute 9.97 (H) 1.20 - 7.70 x10*3/uL    Immature Granulocytes Absolute, Automated 0.36 0.00 - 0.70 x10*3/uL    Lymphocytes Absolute 2.04 1.20 - 4.80 x10*3/uL    Monocytes Absolute 0.87 0.10 - 1.00 x10*3/uL    Eosinophils Absolute 0.31 0.00 - 0.70 x10*3/uL    Basophils Absolute " 0.03 0.00 - 0.10 x10*3/uL   Basic Metabolic Panel   Result Value Ref Range    Glucose 99 74 - 99 mg/dL    Sodium 137 136 - 145 mmol/L    Potassium 3.7 3.5 - 5.3 mmol/L    Chloride 103 98 - 107 mmol/L    Bicarbonate 27 21 - 32 mmol/L    Anion Gap 11 10 - 20 mmol/L    Urea Nitrogen 19 6 - 23 mg/dL    Creatinine 0.65 0.50 - 1.05 mg/dL    eGFR >90 >60 mL/min/1.73m*2    Calcium 8.5 (L) 8.6 - 10.3 mg/dL   Magnesium   Result Value Ref Range    Magnesium 1.88 1.60 - 2.40 mg/dL        Assessment/Plan     Cellulitis of left lower leg, originating from venous stasis ulcers  Poorly controlled venous edema both lower legs. Recommended ways to control edema at home  Culture collected. ID following for antibiotic management\  MRI ordered for left leg to rule out abscess given the severity of the cellulitis.     Marin Rangel DPM

## 2024-08-07 NOTE — CARE PLAN
Problem: Fall/Injury  Goal: Not fall by end of shift  Outcome: Progressing  Goal: Be free from injury by end of the shift  Outcome: Progressing  Goal: Verbalize understanding of personal risk factors for fall in the hospital  Outcome: Progressing     Problem: Pain - Adult  Goal: Verbalizes/displays adequate comfort level or baseline comfort level  Outcome: Progressing     Problem: Safety - Adult  Goal: Free from fall injury  Outcome: Progressing

## 2024-08-07 NOTE — PROGRESS NOTES
MRI pending, ID onb board, continue IV ATB while inpatient, will need Outpatient Wound Care center. Plan remains to discharge home when medically ready.     UPDATE: MRI was negative. Anticipate discharge home tomorrow on oral ATB.

## 2024-08-07 NOTE — CARE PLAN
The patient's goals for the shift include stay informed      The clinical goals for the shift include Patient will remain hemodynamically stable throughout the shift.    Over the shift, the patient did not make progress toward the following goals. Barriers to progression include understanding. Recommendations to address these barriers include education.
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
## 2024-08-07 NOTE — PROGRESS NOTES
Social work consult placed for positive medical risk screen. SW reviewed pt's chart and communicated with TCC. No SW needs foreseen at this time. SW signing off; available upon request.    JERRI Wilks, LSW ([id number])   Care Transitions

## 2024-08-07 NOTE — CARE PLAN
Problem: Fall/Injury  Goal: Not fall by end of shift  8/7/2024 0322 by Amber Bautista RN  Outcome: Progressing  8/7/2024 0321 by Amber Bautista RN  Outcome: Progressing  Goal: Be free from injury by end of the shift  8/7/2024 0322 by Amber Bautista RN  Outcome: Progressing  8/7/2024 0321 by Amber Bautista RN  Outcome: Progressing  Goal: Verbalize understanding of personal risk factors for fall in the hospital  8/7/2024 0322 by Amber Bautista RN  Outcome: Progressing  8/7/2024 0321 by Amber Bautista RN  Outcome: Progressing     Problem: Pain - Adult  Goal: Verbalizes/displays adequate comfort level or baseline comfort level  8/7/2024 0322 by Amber Bautista RN  Outcome: Progressing  8/7/2024 0321 by Amber Bautista RN  Outcome: Progressing     Problem: Safety - Adult  Goal: Free from fall injury  8/7/2024 0322 by Amber Bautista RN  Outcome: Progressing  8/7/2024 0321 by Amber Bautista RN  Outcome: Progressing
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
## 2024-08-07 NOTE — PROGRESS NOTES
INPATIENT INFECTIOUS DISEASES PROGRESS NOTE    Reason for Follow-up: cellulitis    S: leg better, , no fevers    Interval Events: wbc better,     Results from last 72 hours   Lab Units 08/07/24  0415   WBC AUTO x10*3/uL 13.6*     Results from last 72 hours   Lab Units 08/07/24  0415   CREATININE mg/dL 0.65       O: Visit Vitals  /76 (BP Location: Left arm, Patient Position: Lying)   Pulse 90   Temp 35.8 °C (96.5 °F) (Temporal)   Resp 18     Heart Rate:  [78-95]   Temp:  [35.2 °C (95.4 °F)-36.7 °C (98.1 °F)]   Resp:  [16-18]   BP: (119-142)/(74-89)   Weight:  [136 kg (299 lb 6.2 oz)]   SpO2:  [95 %-99 %]     ASSESSMENT & PLAN:  No acute distress, obese  LLE red and warm but much better today, erythema no longer extending up into posterior thigh, ruptured blister with shallow tender ulcer on calf  B/l le large and swollen     ASSESSMENT & PLAN:     # LLE cellulitis suspect strep (ASO titer nl). MRI 8/6/24 without abscess. Improving slowly with abx. Wnd cx with pdsa. Suspect colonizer.  # RLE stasis ulcer with surrounding cellulitis suspect strep. Ulcer slow to heal  # SIRS without sepsis (POA) 2/2 above  # obesity & lymphedema impacting antibiotic dosing infection tx     - wnd cx ordered by primary, will follow noted pdsa, I think this is surface colonizer and not cause of cellulitis  - change cefepime to ancef again 2g IV Q6H through fri am and give ctx 2g x1 that morning and follow on sat with cephalexin 1g PO q6H (high dose for obesity) for one more week  - trend wbc  - f/u on blood cx from 8/5/24, in process with ngtd   - personally monitoring for abx induced toxicity     Dr. Bermudez covering tomorrow     Terell Marie MD  [facility]  Office: [phone]  Fax: [phone]

## 2024-08-07 NOTE — PROGRESS NOTES
"Frances Mcdowell is a 53 y.o. female on day 2 of admission presenting with Cellulitis of lower extremity, unspecified laterality.    Subjective   Cellulitis of left leg, venous stasis both lower legs.        Objective     Physical Exam    Last Recorded Vitals  Blood pressure 136/84, pulse 81, temperature 36.2 °C (97.1 °F), temperature source Temporal, resp. rate 17, height 1.6 m (5' 3\"), weight 136 kg (299 lb 6.2 oz), SpO2 97%.  Intake/Output last 3 Shifts:  I/O last 3 completed shifts:  In: 1560.8 (11.5 mL/kg) [P.O.:222; I.V.:738.8 (5.4 mL/kg); IV Piggyback:600]  Out: - (0 mL/kg)   Weight: 135.8 kg     Relevant Results      Scheduled medications  amphetamine-dextroamphetamine, 20 mg, oral, BID  aspirin, 81 mg, oral, Daily  ceFAZolin, 2 g, intravenous, q6h  cholecalciferol, 5,000 Units, oral, Daily  enoxaparin, 40 mg, subcutaneous, q12h ELENA  levothyroxine, 100 mcg, oral, Daily  pantoprazole, 40 mg, oral, Daily before breakfast  polyethylene glycol, 17 g, oral, Daily  predniSONE, 10 mg, oral, Daily      Continuous medications     PRN medications  PRN medications: acetaminophen **OR** acetaminophen **OR** acetaminophen, HYDROmorphone, ondansetron ODT **OR** ondansetron, oxyCODONE-acetaminophen  Results for orders placed or performed during the hospital encounter of 08/05/24 (from the past 24 hour(s))   CBC and Auto Differential   Result Value Ref Range    WBC 13.6 (H) 4.4 - 11.3 x10*3/uL    nRBC 0.0 0.0 - 0.0 /100 WBCs    RBC 3.45 (L) 4.00 - 5.20 x10*6/uL    Hemoglobin 11.2 (L) 12.0 - 16.0 g/dL    Hematocrit 34.1 (L) 36.0 - 46.0 %    MCV 99 80 - 100 fL    MCH 32.5 26.0 - 34.0 pg    MCHC 32.8 32.0 - 36.0 g/dL    RDW 13.3 11.5 - 14.5 %    Platelets 317 150 - 450 x10*3/uL    Neutrophils % 73.4 40.0 - 80.0 %    Immature Granulocytes %, Automated 2.7 (H) 0.0 - 0.9 %    Lymphocytes % 15.0 13.0 - 44.0 %    Monocytes % 6.4 2.0 - 10.0 %    Eosinophils % 2.3 0.0 - 6.0 %    Basophils % 0.2 0.0 - 2.0 %    Neutrophils Absolute 9.97 " (H) 1.20 - 7.70 x10*3/uL    Immature Granulocytes Absolute, Automated 0.36 0.00 - 0.70 x10*3/uL    Lymphocytes Absolute 2.04 1.20 - 4.80 x10*3/uL    Monocytes Absolute 0.87 0.10 - 1.00 x10*3/uL    Eosinophils Absolute 0.31 0.00 - 0.70 x10*3/uL    Basophils Absolute 0.03 0.00 - 0.10 x10*3/uL   Basic Metabolic Panel   Result Value Ref Range    Glucose 99 74 - 99 mg/dL    Sodium 137 136 - 145 mmol/L    Potassium 3.7 3.5 - 5.3 mmol/L    Chloride 103 98 - 107 mmol/L    Bicarbonate 27 21 - 32 mmol/L    Anion Gap 11 10 - 20 mmol/L    Urea Nitrogen 19 6 - 23 mg/dL    Creatinine 0.65 0.50 - 1.05 mg/dL    eGFR >90 >60 mL/min/1.73m*2    Calcium 8.5 (L) 8.6 - 10.3 mg/dL   Magnesium   Result Value Ref Range    Magnesium 1.88 1.60 - 2.40 mg/dL          This patient currently has cardiac telemetry ordered; if you would like to modify or discontinue the telemetry order, click here to go to the orders activity to modify/discontinue the order.                 Assessment/Plan   Principal Problem:    Cellulitis of lower extremity, unspecified laterality  Active Problems:    Hypothyroidism (acquired)    Lymphedema    Elevated brain natriuretic peptide (BNP) level    Hypomagnesemia    Morbid obesity (Multi)     Cellulitis left leg, venous stasis  -I reviewed MRI. NO sign of abscess or osteomyelitis.   -No surgery planned.   -Continue dressings as ordered.   -Awaiting cultures. Improving with current antibiotic   -Will be okay for discharge home once appropriate antibiotic therapy is determined and cellulitis resolves.       I spent 25 minutes in the professional and overall care of this patient.      Marin Rangel DPM

## 2024-08-08 LAB
ANION GAP SERPL CALC-SCNC: 10 MMOL/L (ref 10–20)
BACTERIA SPEC CULT: ABNORMAL
BACTERIA SPEC CULT: ABNORMAL
BASOPHILS # BLD AUTO: 0.04 X10*3/UL (ref 0–0.1)
BASOPHILS NFR BLD AUTO: 0.4 %
BUN SERPL-MCNC: 19 MG/DL (ref 6–23)
CALCIUM SERPL-MCNC: 7.9 MG/DL (ref 8.6–10.3)
CHLORIDE SERPL-SCNC: 105 MMOL/L (ref 98–107)
CO2 SERPL-SCNC: 26 MMOL/L (ref 21–32)
CREAT SERPL-MCNC: 0.65 MG/DL (ref 0.5–1.05)
EGFRCR SERPLBLD CKD-EPI 2021: >90 ML/MIN/1.73M*2
EOSINOPHIL # BLD AUTO: 0.37 X10*3/UL (ref 0–0.7)
EOSINOPHIL NFR BLD AUTO: 3.6 %
ERYTHROCYTE [DISTWIDTH] IN BLOOD BY AUTOMATED COUNT: 13.2 % (ref 11.5–14.5)
GLUCOSE SERPL-MCNC: 108 MG/DL (ref 74–99)
GRAM STN SPEC: ABNORMAL
GRAM STN SPEC: ABNORMAL
HCT VFR BLD AUTO: 30.3 % (ref 36–46)
HGB BLD-MCNC: 10.1 G/DL (ref 12–16)
IMM GRANULOCYTES # BLD AUTO: 0.12 X10*3/UL (ref 0–0.7)
IMM GRANULOCYTES NFR BLD AUTO: 1.2 % (ref 0–0.9)
LYMPHOCYTES # BLD AUTO: 1.59 X10*3/UL (ref 1.2–4.8)
LYMPHOCYTES NFR BLD AUTO: 15.4 %
MAGNESIUM SERPL-MCNC: 1.73 MG/DL (ref 1.6–2.4)
MCH RBC QN AUTO: 32.7 PG (ref 26–34)
MCHC RBC AUTO-ENTMCNC: 33.3 G/DL (ref 32–36)
MCV RBC AUTO: 98 FL (ref 80–100)
MONOCYTES # BLD AUTO: 0.69 X10*3/UL (ref 0.1–1)
MONOCYTES NFR BLD AUTO: 6.7 %
NEUTROPHILS # BLD AUTO: 7.53 X10*3/UL (ref 1.2–7.7)
NEUTROPHILS NFR BLD AUTO: 72.7 %
NRBC BLD-RTO: 0 /100 WBCS (ref 0–0)
PLATELET # BLD AUTO: 311 X10*3/UL (ref 150–450)
POTASSIUM SERPL-SCNC: 3.6 MMOL/L (ref 3.5–5.3)
RBC # BLD AUTO: 3.09 X10*6/UL (ref 4–5.2)
SODIUM SERPL-SCNC: 137 MMOL/L (ref 136–145)
WBC # BLD AUTO: 10.3 X10*3/UL (ref 4.4–11.3)

## 2024-08-08 PROCEDURE — 2500000004 HC RX 250 GENERAL PHARMACY W/ HCPCS (ALT 636 FOR OP/ED): Performed by: PHYSICIAN ASSISTANT

## 2024-08-08 PROCEDURE — 2500000004 HC RX 250 GENERAL PHARMACY W/ HCPCS (ALT 636 FOR OP/ED): Performed by: NURSE PRACTITIONER

## 2024-08-08 PROCEDURE — 2060000001 HC INTERMEDIATE ICU ROOM DAILY

## 2024-08-08 PROCEDURE — 2500000004 HC RX 250 GENERAL PHARMACY W/ HCPCS (ALT 636 FOR OP/ED): Mod: JZ | Performed by: STUDENT IN AN ORGANIZED HEALTH CARE EDUCATION/TRAINING PROGRAM

## 2024-08-08 PROCEDURE — 2500000001 HC RX 250 WO HCPCS SELF ADMINISTERED DRUGS (ALT 637 FOR MEDICARE OP): Performed by: PHYSICIAN ASSISTANT

## 2024-08-08 PROCEDURE — 85025 COMPLETE CBC W/AUTO DIFF WBC: CPT | Performed by: PHYSICIAN ASSISTANT

## 2024-08-08 PROCEDURE — 36415 COLL VENOUS BLD VENIPUNCTURE: CPT | Performed by: PHYSICIAN ASSISTANT

## 2024-08-08 PROCEDURE — 2500000001 HC RX 250 WO HCPCS SELF ADMINISTERED DRUGS (ALT 637 FOR MEDICARE OP): Performed by: NURSE PRACTITIONER

## 2024-08-08 PROCEDURE — 83735 ASSAY OF MAGNESIUM: CPT | Performed by: PHYSICIAN ASSISTANT

## 2024-08-08 PROCEDURE — 76937 US GUIDE VASCULAR ACCESS: CPT

## 2024-08-08 PROCEDURE — 80048 BASIC METABOLIC PNL TOTAL CA: CPT | Performed by: PHYSICIAN ASSISTANT

## 2024-08-08 PROCEDURE — 99233 SBSQ HOSP IP/OBS HIGH 50: CPT | Performed by: PHYSICIAN ASSISTANT

## 2024-08-08 ASSESSMENT — COGNITIVE AND FUNCTIONAL STATUS - GENERAL
MOBILITY SCORE: 24
DAILY ACTIVITIY SCORE: 24

## 2024-08-08 ASSESSMENT — ENCOUNTER SYMPTOMS
ABDOMINAL PAIN: 0
BACK PAIN: 0
VOMITING: 0
CHILLS: 0
LIGHT-HEADEDNESS: 0
HEMATURIA: 0
DIZZINESS: 0
DYSURIA: 0
FLANK PAIN: 0
FEVER: 0
NUMBNESS: 0
HALLUCINATIONS: 0
NAUSEA: 0
WEAKNESS: 0
JOINT SWELLING: 0
FATIGUE: 0
CONSTIPATION: 0
APPETITE CHANGE: 0
DIAPHORESIS: 0
HEADACHES: 0
SHORTNESS OF BREATH: 0
EYE PAIN: 0
BRUISES/BLEEDS EASILY: 0
WHEEZING: 0
FACIAL SWELLING: 0
WOUND: 1
CHEST TIGHTNESS: 0
BLOOD IN STOOL: 0
COUGH: 0
TROUBLE SWALLOWING: 0
FREQUENCY: 0
SORE THROAT: 0
DIARRHEA: 0
PALPITATIONS: 0

## 2024-08-08 ASSESSMENT — PAIN - FUNCTIONAL ASSESSMENT
PAIN_FUNCTIONAL_ASSESSMENT: 0-10

## 2024-08-08 ASSESSMENT — PAIN DESCRIPTION - ORIENTATION
ORIENTATION: RIGHT;LEFT

## 2024-08-08 ASSESSMENT — PAIN DESCRIPTION - LOCATION
LOCATION: LEG

## 2024-08-08 ASSESSMENT — PAIN SCALES - GENERAL
PAINLEVEL_OUTOF10: 5 - MODERATE PAIN
PAINLEVEL_OUTOF10: 7
PAINLEVEL_OUTOF10: 2
PAINLEVEL_OUTOF10: 2
PAINLEVEL_OUTOF10: 7
PAINLEVEL_OUTOF10: 1
PAINLEVEL_OUTOF10: 7
PAINLEVEL_OUTOF10: 2
PAINLEVEL_OUTOF10: 8

## 2024-08-08 NOTE — NURSING NOTE
Action Plan for Staying Healthy at Home After Sepsis given to patient and content reviewed. Patient voice understanding. Asking question about the process at the [facility] after discharge. TCC assigned to case and will message Genet at the [facility] to contact the patient.

## 2024-08-08 NOTE — CARE PLAN
The clinical goals for the shift include Patient will remain hemodynamically stable throughout the shift.      Problem: Fall/Injury  Goal: Not fall by end of shift  Outcome: Progressing  Goal: Be free from injury by end of the shift  Outcome: Progressing  Goal: Verbalize understanding of personal risk factors for fall in the hospital  Outcome: Progressing  Goal: Verbalize understanding of risk factor reduction measures to prevent injury from fall in the home  Outcome: Progressing  Goal: Use assistive devices by end of the shift  Outcome: Progressing  Goal: Pace activities to prevent fatigue by end of the shift  Outcome: Progressing
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
## 2024-08-08 NOTE — CONSULTS
Vascular Access Team  Consult     Visit Date: 8/8/2024      Patient Name: Frances Mcdowell         MRN: [id number]                Reason for Consult: Bedside staff unable to obtain IV access       Assessment: US guided IV placed in left arm without difficulty, see LDA avatar for details         Karime Barrera RN  8/8/2024  11:54 AM

## 2024-08-08 NOTE — PROGRESS NOTES
INPATIENT INFECTIOUS DISEASES PROGRESS NOTE    Reason for Follow-up: cellulitis    S: leg better, , no fevers.     Interval Events: wbc better,     Results from last 72 hours   Lab Units 08/08/24  0357   WBC AUTO x10*3/uL 10.3     Results from last 72 hours   Lab Units 08/08/24  0357   CREATININE mg/dL 0.65       O: Visit Vitals  /83 (BP Location: Left arm, Patient Position: Lying)   Pulse 91   Temp 36.2 °C (97.2 °F) (Temporal)   Resp 17     Heart Rate:  [80-98]   Temp:  [35.8 °C (96.5 °F)-36.6 °C (97.8 °F)]   Resp:  [17-18]   BP: (121-146)/(76-89)   Weight:  [135 kg (297 lb 9.9 oz)]   SpO2:  [95 %-98 %]     ASSESSMENT & PLAN:  No acute distress, obese  LLE red and warm but much better today, erythema no longer extending up into posterior thigh, ruptured blister with shallow tender ulcer on calf  B/l le large and swollen     ASSESSMENT & PLAN:     # LLE cellulitis suspect strep (ASO titer nl). MRI 8/6/24 without abscess. Improving slowly with abx. Wnd cx with psAG. Suspect colonizer.  # RLE stasis ulcer with surrounding cellulitis suspect strep. Ulcer slow to heal  # SIRS without sepsis (POA) 2/2 above  # obesity & lymphedema impacting antibiotic dosing infection tx     - wnd cx ordered by primary, will follow noted pdsa, I think this is surface colonizer and not cause of cellulitis  - change cefepime to ancef again 2g IV Q6H through fri am and give ctx 2g x1 that morning and follow on sat with cephalexin 1g PO q6H (high dose for obesity) for one more week  - trend wbc  - f/u on blood cx from 8/5/24, in process with ngtd   - personally monitoring for abx induced toxicity     Dr. Marie covering tomorrow       Too Bermudez MD  [phone]  8/8/2024  2:43 PM

## 2024-08-08 NOTE — NURSING NOTE
0700:  Report received from Linda BANDA    0855:  Patient reporting 8 out of 10 bilat lower leg pain. PRN dilaudid given   Bilat leg wound dressing change completed  IV needs replaced again. Will get US guided IV for her meds/ ATBs     0900:  hospitalist in room     1145:  20g IV placed left FA  ATB started and re timed  Patient complaining of left lower leg pain requested the ace wrap be removed.   PRN percocet given     1300:  NM, , and patient advocate met with patient    1400:  Patient complaining of room being too hot. Thermostat wasn't adjusting, maintenance request put in. Fan given to patient.     1500:  Maintenance said issue is resolved    1700:  Patient asking if she will go home with wound supplies and what the dressing orders will be. I said everything will be explained in her discharge paperwork. Her first wound clinic appointment is Tuesday.

## 2024-08-09 ENCOUNTER — PHARMACY VISIT (OUTPATIENT)
Dept: PHARMACY | Facility: CLINIC | Age: 54
End: 2024-08-09
Payer: MEDICARE

## 2024-08-09 ENCOUNTER — DOCUMENTATION (OUTPATIENT)
Dept: HOME HEALTH SERVICES | Facility: HOME HEALTH | Age: 54
End: 2024-08-09
Payer: COMMERCIAL

## 2024-08-09 VITALS
SYSTOLIC BLOOD PRESSURE: 148 MMHG | OXYGEN SATURATION: 97 % | HEIGHT: 63 IN | HEART RATE: 89 BPM | BODY MASS INDEX: 51.91 KG/M2 | DIASTOLIC BLOOD PRESSURE: 91 MMHG | WEIGHT: 293 LBS | RESPIRATION RATE: 18 BRPM | TEMPERATURE: 97.4 F

## 2024-08-09 LAB
ANION GAP SERPL CALC-SCNC: 12 MMOL/L (ref 10–20)
ATRIAL RATE: 103 BPM
BACTERIA BLD CULT: NORMAL
BACTERIA BLD CULT: NORMAL
BASOPHILS # BLD AUTO: 0.04 X10*3/UL (ref 0–0.1)
BASOPHILS NFR BLD AUTO: 0.4 %
BUN SERPL-MCNC: 17 MG/DL (ref 6–23)
CALCIUM SERPL-MCNC: 7.9 MG/DL (ref 8.6–10.3)
CHLORIDE SERPL-SCNC: 104 MMOL/L (ref 98–107)
CO2 SERPL-SCNC: 25 MMOL/L (ref 21–32)
CREAT SERPL-MCNC: 0.63 MG/DL (ref 0.5–1.05)
EGFRCR SERPLBLD CKD-EPI 2021: >90 ML/MIN/1.73M*2
EOSINOPHIL # BLD AUTO: 0.34 X10*3/UL (ref 0–0.7)
EOSINOPHIL NFR BLD AUTO: 3.4 %
ERYTHROCYTE [DISTWIDTH] IN BLOOD BY AUTOMATED COUNT: 12.8 % (ref 11.5–14.5)
GLUCOSE SERPL-MCNC: 87 MG/DL (ref 74–99)
HCT VFR BLD AUTO: 32.6 % (ref 36–46)
HGB BLD-MCNC: 10.6 G/DL (ref 12–16)
IMM GRANULOCYTES # BLD AUTO: 0.11 X10*3/UL (ref 0–0.7)
IMM GRANULOCYTES NFR BLD AUTO: 1.1 % (ref 0–0.9)
LYMPHOCYTES # BLD AUTO: 1.69 X10*3/UL (ref 1.2–4.8)
LYMPHOCYTES NFR BLD AUTO: 16.7 %
MAGNESIUM SERPL-MCNC: 1.8 MG/DL (ref 1.6–2.4)
MCH RBC QN AUTO: 31.6 PG (ref 26–34)
MCHC RBC AUTO-ENTMCNC: 32.5 G/DL (ref 32–36)
MCV RBC AUTO: 97 FL (ref 80–100)
MONOCYTES # BLD AUTO: 0.75 X10*3/UL (ref 0.1–1)
MONOCYTES NFR BLD AUTO: 7.4 %
NEUTROPHILS # BLD AUTO: 7.16 X10*3/UL (ref 1.2–7.7)
NEUTROPHILS NFR BLD AUTO: 71 %
NRBC BLD-RTO: 0.2 /100 WBCS (ref 0–0)
P AXIS: 20 DEGREES
PLATELET # BLD AUTO: 349 X10*3/UL (ref 150–450)
POTASSIUM SERPL-SCNC: 3.6 MMOL/L (ref 3.5–5.3)
PR INTERVAL: 126 MS
Q ONSET: 252 MS
QRS COUNT: 16 BEATS
QRS DURATION: 88 MS
QT INTERVAL: 338 MS
QTC CALCULATION(BAZETT): 443 MS
QTC FREDERICIA: 405 MS
R AXIS: 80 DEGREES
RBC # BLD AUTO: 3.35 X10*6/UL (ref 4–5.2)
SODIUM SERPL-SCNC: 137 MMOL/L (ref 136–145)
T AXIS: 87 DEGREES
T OFFSET: 421 MS
VENTRICULAR RATE: 103 BPM
WBC # BLD AUTO: 10.1 X10*3/UL (ref 4.4–11.3)

## 2024-08-09 PROCEDURE — 83735 ASSAY OF MAGNESIUM: CPT | Performed by: PHYSICIAN ASSISTANT

## 2024-08-09 PROCEDURE — 2500000004 HC RX 250 GENERAL PHARMACY W/ HCPCS (ALT 636 FOR OP/ED): Performed by: STUDENT IN AN ORGANIZED HEALTH CARE EDUCATION/TRAINING PROGRAM

## 2024-08-09 PROCEDURE — 85025 COMPLETE CBC W/AUTO DIFF WBC: CPT | Performed by: PHYSICIAN ASSISTANT

## 2024-08-09 PROCEDURE — 2500000004 HC RX 250 GENERAL PHARMACY W/ HCPCS (ALT 636 FOR OP/ED): Performed by: NURSE PRACTITIONER

## 2024-08-09 PROCEDURE — 2500000001 HC RX 250 WO HCPCS SELF ADMINISTERED DRUGS (ALT 637 FOR MEDICARE OP): Performed by: NURSE PRACTITIONER

## 2024-08-09 PROCEDURE — 80048 BASIC METABOLIC PNL TOTAL CA: CPT | Performed by: PHYSICIAN ASSISTANT

## 2024-08-09 PROCEDURE — 99239 HOSP IP/OBS DSCHRG MGMT >30: CPT | Performed by: PHYSICIAN ASSISTANT

## 2024-08-09 PROCEDURE — 36415 COLL VENOUS BLD VENIPUNCTURE: CPT | Performed by: PHYSICIAN ASSISTANT

## 2024-08-09 PROCEDURE — RXMED WILLOW AMBULATORY MEDICATION CHARGE

## 2024-08-09 RX ORDER — GABAPENTIN 100 MG/1
100 CAPSULE ORAL EVERY MORNING
Qty: 14 CAPSULE | Refills: 0 | Status: SHIPPED | OUTPATIENT
Start: 2024-08-09

## 2024-08-09 RX ORDER — GABAPENTIN 300 MG/1
300 CAPSULE ORAL NIGHTLY
Qty: 14 CAPSULE | Refills: 0 | Status: SHIPPED | OUTPATIENT
Start: 2024-08-09

## 2024-08-09 RX ORDER — CEPHALEXIN 500 MG/1
1000 CAPSULE ORAL EVERY 6 HOURS SCHEDULED
Qty: 56 CAPSULE | Refills: 0 | Status: SHIPPED | OUTPATIENT
Start: 2024-08-10 | End: 2024-08-17

## 2024-08-09 RX ORDER — OXYCODONE AND ACETAMINOPHEN 5; 325 MG/1; MG/1
2 TABLET ORAL EVERY 6 HOURS PRN
Qty: 24 TABLET | Refills: 0 | Status: SHIPPED | OUTPATIENT
Start: 2024-08-09 | End: 2024-08-16

## 2024-08-09 RX ORDER — HYDROXYZINE HYDROCHLORIDE 25 MG/1
25 TABLET, FILM COATED ORAL EVERY 8 HOURS PRN
Qty: 90 TABLET | Refills: 0 | Status: SHIPPED | OUTPATIENT
Start: 2024-08-09 | End: 2024-09-08

## 2024-08-09 RX ORDER — L. ACIDOPHILUS/L.BULGARICUS 1MM CELL
1 TABLET ORAL
Qty: 42 TABLET | Refills: 0 | Status: SHIPPED | OUTPATIENT
Start: 2024-08-09 | End: 2024-08-23

## 2024-08-09 RX ORDER — FUROSEMIDE 20 MG/1
10 TABLET ORAL DAILY
Qty: 15 TABLET | Refills: 0 | Status: SHIPPED | OUTPATIENT
Start: 2024-08-09 | End: 2024-09-08

## 2024-08-09 ASSESSMENT — PAIN - FUNCTIONAL ASSESSMENT
PAIN_FUNCTIONAL_ASSESSMENT: 0-10

## 2024-08-09 ASSESSMENT — PAIN SCALES - GENERAL
PAINLEVEL_OUTOF10: 0 - NO PAIN
PAINLEVEL_OUTOF10: 8
PAINLEVEL_OUTOF10: 0 - NO PAIN

## 2024-08-09 ASSESSMENT — COGNITIVE AND FUNCTIONAL STATUS - GENERAL
DAILY ACTIVITIY SCORE: 24
MOBILITY SCORE: 24

## 2024-08-09 ASSESSMENT — PAIN DESCRIPTION - LOCATION: LOCATION: LEG

## 2024-08-09 NOTE — DISCHARGE SUMMARY
Admission Date: 8/5/2024  4:22 AM  Discharge Date: 08/09/24   Condition at discharge: Fair    Discharge Diagnosis  BLE cellulitis with failure of outpatient antibiotics and sepsis. Sepsis POA, endorgan dysfunction: Type II MI  Type II MI  Hypothyroidism  Venous insufficiency   Lymphedema  Hypomagnesemia  Severe obesity BMI 52.68  Anxiety and depression with severe home stressors    Test Results Pending At Discharge  Pending Labs       Order Current Status    Blood Culture Preliminary result    Blood Culture Preliminary result            Hospital Course   Frances Mcdowell is a 53 y.o. female with PMHx s/f hypothyroidism, venous insufficiency, lymph edema presenting with nausea, fever increased drainage from lower extremities head ache leg swelling . Pt has chronic lymph edema progressively has worsened over last several months, lately increased drainage from ulcerations that are also painful, treated with doxycycline PTA. Pt is wound nurse, has been managing them. She has seen specialist for venous insufficiency but no intervention planned. Pt denies chest pain, shortness of breath, admits to body aches, cough. lactate 2.1  troponin 103-93.  CBC shows WBC 22.6 hemoglobin 12.1 hematocrit 36.6 platelets 337. Chest x-ray shows lungs to be clear. Ultrasound of lower extremities negative for DVT. Lactate 2.1-->1.5. Echo: EF 60-65%, moderately increased left ventricular septal thickness, moderately enlarged right ventricle, mildly elevated RVSP. MRI LLE: Evidence of cellulitis, no evidence of organized fluid collection, deep compartment infiltration or acute osteomyelitis. Seen by podiatry, recommends no surgical intervention, continue local wound care and antibiotics. ESR 44, CRP 23.21. A code violet was called to her room as her  had arrived and was being verbally aggressive, per her report he had driven her car here and was intoxicated, she was worried he was going to drive it home. Police arrived and escorted  him away from the scene, patient visibly upset but did calm down some with discussion with myself, her bedside RN, and RN manager. Offered PRN antianxiety medications to which she is grateful.  Cellulitis and edema of bilateral lower extremities continue to improve with IV Ancef, patient is eager for discharge to home today.  She is tearful and asking for a referral to mental health.  Did verify she denies any suicidal or homicidal ideations.  She has wound care follow-up at 9 AM this coming Wednesday.  She feels weak still and is asking for referral to home care, will place for  home care at her request.  Will discharge with as needed hydroxyzine for anxiety as she is already going on gabapentin and Percocet so do not want to add a benzodiazepine to this.  Rx for lactobacillus at her request.  Will do as needed Lasix for swelling of the left lower extremity still with some edema.  She will need close PCP follow-up as well as mental health and wound care center.  She denies any other home-going needs and is grateful for the help she has received here at our hospital.    Consultations: Infectious Disease consulted- treatment options were discussed and plan of care agreed upon. and Podiatry consulted- treatment options were discussed and plan of care agreed upon.    Pertinent Physical Exam At Time of Discharge  Constitutional:       General: She is not in acute distress.     Appearance: Normal appearance. She is obese.   HENT:      Head: Normocephalic and atraumatic.      Right Ear: External ear normal.      Left Ear: External ear normal.      Nose: Nose normal.      Mouth/Throat:      Mouth: Mucous membranes are moist.      Pharynx: Oropharynx is clear.   Eyes:      Extraocular Movements: Extraocular movements intact.      Conjunctiva/sclera: Conjunctivae normal.      Pupils: Pupils are equal, round, and reactive to light.   Cardiovascular:      Rate and Rhythm: Normal rate and regular rhythm.      Pulses: Normal  pulses.      Heart sounds: Normal heart sounds.   Pulmonary:      Effort: Pulmonary effort is normal. No respiratory distress.      Breath sounds: Normal breath sounds. No wheezing, rhonchi or rales.   Abdominal:      General: Bowel sounds are normal.      Palpations: Abdomen is soft.      Tenderness: There is no abdominal tenderness. There is no right CVA tenderness, left CVA tenderness, guarding or rebound.   Musculoskeletal:         General: No swelling. Normal range of motion.      Cervical back: Normal range of motion and neck supple.      Right lower leg: Edema (Nearly resolved) present.      Left lower leg: Edema (Improving with treatment of cellulitis) present.   Skin:     General: Skin is warm and dry.      Capillary Refill: Capillary refill takes less than 2 seconds.      Findings: Erythema (Nearly resolved), lesion and rash present.      Comments: Multiple ulceration on posterior aspect of L calf/ankle  Blisters on L medial calf/ankle with erythema up above L knee that is warm to touch- erythema nearly resolved   Neurological:      General: No focal deficit present.      Mental Status: She is alert and oriented to person, place, and time. Mental status is at baseline.   Psychiatric:         Mood and Affect: Tearful        Behavior: Behavior normal.     Code Status  Full Code     Home Medications     Medication List      START taking these medications     furosemide 20 mg tablet; Commonly known as: Lasix; Take 0.5 tablets (10   mg) by mouth once daily. As needed for swelling   * gabapentin 300 mg capsule; Commonly known as: Neurontin; Take 1   capsule (300 mg) by mouth once daily at bedtime.   * gabapentin 100 mg capsule; Commonly known as: Neurontin; Take 1   capsule (100 mg) by mouth once daily in the morning.   hydrOXYzine HCL 25 mg tablet; Commonly known as: Atarax; Take 1 tablet   (25 mg) by mouth every 8 hours if needed for anxiety.   lactobacillus acidophilus tablet tablet; Take 1 tablet by mouth 3  times   daily (morning, midday, late afternoon) for 14 days.   oxyCODONE-acetaminophen 5-325 mg tablet; Commonly known as: Percocet;   Take 2 tablets by mouth every 6 hours if needed for moderate pain (4 - 6)   or severe pain (7 - 10) for up to 3 days.  * This list has 2 medication(s) that are the same as other medications   prescribed for you. Read the directions carefully, and ask your doctor or   other care provider to review them with you.     CHANGE how you take these medications     cephalexin 500 mg capsule; Commonly known as: Keflex; Take 2 capsules   (1,000 mg) by mouth every 6 hours for 28 doses. Do not fill before August   10, 2024.; Start taking on: August 10, 2024; What changed: how much to   take, when to take this     CONTINUE taking these medications     * amphetamine-dextroamphetamine XR 20 mg 24 hr capsule; Commonly known   as: Adderall XR; Take 1 capsule (20 mg) by mouth 2 times a day.   * amphetamine-dextroamphetamine XR 20 mg 24 hr capsule; Commonly known   as: Adderall XR; Take 1 capsule (20 mg) by mouth 2 times a day.   aspirin 81 mg chewable tablet   cholecalciferol 5,000 Units tablet; Commonly known as: Vitamin D-3; Take   1 tablet (5,000 Units) by mouth once daily.   EPINEPHrine 0.3 mg/0.3 mL injection syringe; Commonly known as: Epipen;   Inject 0.3 mL (0.3 mg) as directed if needed for anaphylaxis. As Directed   ibuprofen 800 mg tablet; Take 1 tablet (800 mg) by mouth every 6 hours   if needed for moderate pain (4 - 6).   levothyroxine 100 mcg tablet; Commonly known as: Synthroid, Levoxyl;   TAKE 1 TABLET(100 MCG) BY MOUTH EVERY DAY   omeprazole 40 mg DR capsule; Commonly known as: PriLOSEC; TAKE 1   CAPSULE(40 MG) BY MOUTH EVERY DAY   predniSONE 10 mg tablet; Commonly known as: Deltasone; TAKE 1 TABLET BY   MOUTH EVERY DAY   rosuvastatin 20 mg tablet; Commonly known as: Crestor; Take 1 tablet (20   mg) by mouth once daily.  * This list has 2 medication(s) that are the same as other  medications   prescribed for you. Read the directions carefully, and ask your doctor or   other care provider to review them with you.     STOP taking these medications     azithromycin 250 mg tablet; Commonly known as: Zithromax   Sutab 1.479-0.188- 0.225 gram tablet; Generic drug: sod sulf-pot   chloride-mag sulf       Outpatient Follow-Up  Future Appointments   Date Time Provider Department Center   8/13/2024  9:00 AM Cori Kilgore, APRN-CNP [id number]         At the time of discharge, patient's pain was controlled with oral analgesia, patient was urinating, having BMs, sleeping, and eating well. Follow up recommendations are in discharge paperwork. Discharge plan was discussed with the patient/family and all of the questions were answered. Medications were ordered to be delivered to bedside prior to discharge.     Discharge planning took greater than 35 minutes    Diagnoses at time of discharge:  BLE cellulitis with failure of outpatient antibiotics and sepsis. Sepsis POA, endorgan dysfunction: Type II MI  Type II MI  Hypothyroidism  Venous insufficiency   Lymphedema  Hypomagnesemia  Severe obesity BMI 52.68  Anxiety and depression with severe home stressors    Anticipated discharge destination: home with [facility]    Please see orders for more complete plan    Inge Gunderson PA-C

## 2024-08-09 NOTE — PROGRESS NOTES
SW following up with pt as pt expects to discharge today. SW inquired if pt still felt safe returning home, pt agreeable and did not identify any SW needs at this time. Pt appreciative of SW support. No other needs identified at this time, SW signing off,  pt and care team aware of SW availability while inpt.    TETO Wilks ([id number])   Care Transitions

## 2024-08-09 NOTE — DOCUMENTATION CLARIFICATION NOTE
"    PATIENT:               KYLE COOK  ACCT #:                  [id number]  MRN:                       [id number]  :                       1970  ADMIT DATE:       2024 4:22 AM  DISCH DATE:        2024 11:16 AM  RESPONDING PROVIDER #:        [id number]          PROVIDER RESPONSE TEXT:    Sepsis w/acute multisystem organ dysfunction of Type 2 MI, Hypotension    CDI QUERY TEXT:    Clarification    Instruction:    Based on your assessment of the patient and the clinical information, please provide the requested documentation by clicking on the appropriate radio button and enter any additional information if prompted.    Question: Based on your medical judgment, can you please clarify which of these conditions is the most clinically supported    When answering this query, please exercise your independent professional judgment. The fact that a question is being asked, does not imply that any particular answer is desired or expected.    The patient's clinical indicators include:  Clinical Information: There is conflicting documentation in the medical record which requires clarification.    -The diagnosis of \"Sepsis POA, endorgan dysfunction: Type II MI\" and \"BLE cellulitis with failure of outpatient antibiotics..\" was documented by IM on  1034 AM.    -The diagnosis of \"LLE cellulitis suspect strep.  Improving slowly with abx\", \"RLE stasis ulcer with surrounding cellulitis suspect strep\", and \"SIRS without sepsis..\" was documented by ID on  0630 PM.    -The diagnosis of \"elevated troponin-patient has no ischemic symptoms\" and \"This would be consistent with nonischemic myocardial injury\" was documented by Cardiology on  0936 AM.    Clinical Indicators:  VS admission :  T 35.8-37.0, HR , RR 16-19 RA w/pox , BP 97//84.  Lowest MAP 75.    Labs admission : WBC 22.6.  Lactate 2.1, 1.5.  UA negative.  COVID negative.  BC ntd.  .  Troponin 103, 93.    EKG  \"Sinus " "tachycardia\".    ECHO 8/5 \"There are no regional wall motion abnormalities.\"    Treatment: UA w/culture.  Blood cultures.  Lactate x2.  COVID screening.  BNP.  Troponin x2.  Telemetry.  ECHO.  CXR.  MRI right/left tibia, fibula.  Wound culture.  Wound care nurse consult.  Aspirin 81 mg daily.  Cefazolin IV Q6hrs from 8/5-8/8.  Cefepime IV Q8hrs on 8/7.  Zosyn IV Q6hrs on 8/5.  Vancomycin IV x1 on 8/5.  LR IVF bolus 500 mL on 8/5.  LR IVF continuous on 8/5.  VS w/MAP.  ID, Cardiology, Podiatry consults.    Risk Factors: Hx of Bilateral LE Venous Stasis Ulcers w/failed outpatient antibiotic treatment, Morbid Obesity.  Options provided:  -- Sepsis w/acute cardiac organ dysfunction of Type 2 MI  -- Sepsis w/acute multisystem organ dysfunction of Type 2 MI, Hypotension  -- Treated for Bilateral LE Cellulitis, Sepsis and Type 2 MI ruled out after study  -- Other - I will add my own diagnosis  -- Refer to Clinical Documentation Reviewer    Query created by: Ludivina Singer on 8/9/2024 6:26 AM      Electronically signed by:  KRISTA THOMPSON PA-C 8/9/2024 3:16 PM          "

## 2024-08-12 ENCOUNTER — TELEPHONE (OUTPATIENT)
Dept: PRIMARY CARE | Facility: CLINIC | Age: 54
End: 2024-08-12
Payer: COMMERCIAL

## 2024-08-12 NOTE — TELEPHONE ENCOUNTER
Needs ED FU.  Pt says she is avail T 8:30 or W after 10:30.  Advise and I will CB to sched.  Thanks

## 2024-08-13 ENCOUNTER — OFFICE VISIT (OUTPATIENT)
Dept: WOUND CARE | Facility: CLINIC | Age: 54
End: 2024-08-13
Payer: COMMERCIAL

## 2024-08-13 PROCEDURE — 11042 DBRDMT SUBQ TIS 1ST 20SQCM/<: CPT

## 2024-08-13 PROCEDURE — 99213 OFFICE O/P EST LOW 20 MIN: CPT

## 2024-08-13 PROCEDURE — 11045 DBRDMT SUBQ TISS EACH ADDL: CPT

## 2024-08-15 RX ORDER — DOXYCYCLINE HYCLATE 100 MG
TABLET ORAL
COMMUNITY
Start: 2024-07-16

## 2024-08-16 ENCOUNTER — OFFICE VISIT (OUTPATIENT)
Dept: PRIMARY CARE | Facility: CLINIC | Age: 54
End: 2024-08-16
Payer: COMMERCIAL

## 2024-08-16 VITALS
HEART RATE: 88 BPM | SYSTOLIC BLOOD PRESSURE: 152 MMHG | OXYGEN SATURATION: 99 % | HEIGHT: 63 IN | BODY MASS INDEX: 51.91 KG/M2 | WEIGHT: 293 LBS | DIASTOLIC BLOOD PRESSURE: 90 MMHG | RESPIRATION RATE: 18 BRPM | TEMPERATURE: 98.2 F

## 2024-08-16 DIAGNOSIS — L03.90 CELLULITIS, UNSPECIFIED CELLULITIS SITE: Primary | ICD-10-CM

## 2024-08-16 DIAGNOSIS — L03.119 CELLULITIS OF LOWER EXTREMITY, UNSPECIFIED LATERALITY: ICD-10-CM

## 2024-08-16 PROCEDURE — 99213 OFFICE O/P EST LOW 20 MIN: CPT | Performed by: INTERNAL MEDICINE

## 2024-08-16 PROCEDURE — 3008F BODY MASS INDEX DOCD: CPT | Performed by: INTERNAL MEDICINE

## 2024-08-16 PROCEDURE — 1036F TOBACCO NON-USER: CPT | Performed by: INTERNAL MEDICINE

## 2024-08-16 RX ORDER — GABAPENTIN 100 MG/1
100 CAPSULE ORAL EVERY MORNING
Qty: 30 CAPSULE | Refills: 0 | OUTPATIENT
Start: 2024-08-16

## 2024-08-16 RX ORDER — GABAPENTIN 100 MG/1
100 CAPSULE ORAL EVERY MORNING
Qty: 14 CAPSULE | Refills: 0 | Status: SHIPPED | OUTPATIENT
Start: 2024-08-16

## 2024-08-16 RX ORDER — GABAPENTIN 300 MG/1
300 CAPSULE ORAL NIGHTLY
Qty: 14 CAPSULE | Refills: 0 | Status: SHIPPED | OUTPATIENT
Start: 2024-08-16

## 2024-08-16 RX ORDER — GABAPENTIN 300 MG/1
300 CAPSULE ORAL NIGHTLY
Qty: 30 CAPSULE | Refills: 0 | OUTPATIENT
Start: 2024-08-16

## 2024-08-16 ASSESSMENT — PATIENT HEALTH QUESTIONNAIRE - PHQ9
SUM OF ALL RESPONSES TO PHQ QUESTIONS 1-9: 11
2. FEELING DOWN, DEPRESSED OR HOPELESS: MORE THAN HALF THE DAYS
6. FEELING BAD ABOUT YOURSELF - OR THAT YOU ARE A FAILURE OR HAVE LET YOURSELF OR YOUR FAMILY DOWN: SEVERAL DAYS
5. POOR APPETITE OR OVEREATING: NEARLY EVERY DAY
7. TROUBLE CONCENTRATING ON THINGS, SUCH AS READING THE NEWSPAPER OR WATCHING TELEVISION: NOT AT ALL
4. FEELING TIRED OR HAVING LITTLE ENERGY: MORE THAN HALF THE DAYS
8. MOVING OR SPEAKING SO SLOWLY THAT OTHER PEOPLE COULD HAVE NOTICED. OR THE OPPOSITE, BEING SO FIGETY OR RESTLESS THAT YOU HAVE BEEN MOVING AROUND A LOT MORE THAN USUAL: NOT AT ALL
SUM OF ALL RESPONSES TO PHQ9 QUESTIONS 1 AND 2: 3
9. THOUGHTS THAT YOU WOULD BE BETTER OFF DEAD, OR OF HURTING YOURSELF: NOT AT ALL
1. LITTLE INTEREST OR PLEASURE IN DOING THINGS: SEVERAL DAYS
10. IF YOU CHECKED OFF ANY PROBLEMS, HOW DIFFICULT HAVE THESE PROBLEMS MADE IT FOR YOU TO DO YOUR WORK, TAKE CARE OF THINGS AT HOME, OR GET ALONG WITH OTHER PEOPLE: SOMEWHAT DIFFICULT
3. TROUBLE FALLING OR STAYING ASLEEP OR SLEEPING TOO MUCH: MORE THAN HALF THE DAYS

## 2024-08-16 ASSESSMENT — ANXIETY QUESTIONNAIRES
4. TROUBLE RELAXING: MORE THAN HALF THE DAYS
2. NOT BEING ABLE TO STOP OR CONTROL WORRYING: NEARLY EVERY DAY
6. BECOMING EASILY ANNOYED OR IRRITABLE: MORE THAN HALF THE DAYS
1. FEELING NERVOUS, ANXIOUS, OR ON EDGE: MORE THAN HALF THE DAYS
GAD7 TOTAL SCORE: 12
3. WORRYING TOO MUCH ABOUT DIFFERENT THINGS: MORE THAN HALF THE DAYS
5. BEING SO RESTLESS THAT IT IS HARD TO SIT STILL: SEVERAL DAYS
7. FEELING AFRAID AS IF SOMETHING AWFUL MIGHT HAPPEN: NOT AT ALL

## 2024-08-16 NOTE — PROGRESS NOTES
Subjective   Patient ID: Frances Mcdowell is a 53 y.o. female who presents for Hospital Follow-up, Blood Infection, Cellulitis, and Leg Swelling.    In for follow up for recent admission for Cellulitits and Sepsis.        Review of Systems   All other systems reviewed and are negative.      Previous history  Past Medical History:   Diagnosis Date    Acute embolism and thrombosis of other specified veins 2022    Superficial vein thrombosis    ADD (attention deficit disorder)     Anxiety     Benign neoplasm of unspecified ovary     Serous cystadenoma of ovary    Endometrial intraepithelial neoplasia (EIN)     Complex atypical endometrial hyperplasia    GERD (gastroesophageal reflux disease)     Hypothyroidism     Lymph edema     Personal history of other diseases of the circulatory system     History of varicose veins     Past Surgical History:   Procedure Laterality Date    BLADDER SURGERY       SECTION, LOW TRANSVERSE      FOOT SURGERY      HYSTERECTOMY      TONSILLECTOMY      TOTAL THYROIDECTOMY      VARICOSE VEIN SURGERY      Varicose Vein Ligation     Social History     Tobacco Use    Smoking status: Never    Smokeless tobacco: Never   Substance Use Topics    Alcohol use: Not Currently     Comment: seldom    Drug use: Never     No family history on file.  Allergies   Allergen Reactions    Erythromycin Anaphylaxis    Erythromycin Base Anaphylaxis    Sulfa (Sulfonamide Antibiotics) Anaphylaxis    Sulfamethoxazole Anaphylaxis    Sulfamethoxazole-Trimethoprim Anaphylaxis    Tetanus Toxoid Other and Swelling    Adhesive Tape-Silicones Other     tegaderm causes bad skin breakdown    Tramadol Itching     Current Outpatient Medications   Medication Instructions    amphetamine-dextroamphetamine XR (Adderall XR) 20 mg 24 hr capsule 20 mg, oral, 2 times daily    amphetamine-dextroamphetamine XR (Adderall XR) 20 mg 24 hr capsule 20 mg, oral, 2 times daily    aspirin 81 mg, oral, Daily RT    cephalexin (KEFLEX) 1,000  mg, oral, Every 6 hours scheduled    cholecalciferol (VITAMIN D-3) 5,000 Units, oral, Daily    doxycycline (Vibra-Tabs) 100 mg tablet     EPINEPHrine (EPIPEN) 0.3 mg, injection, As needed, As Directed    furosemide (LASIX) 10 mg, oral, Daily, As needed for swelling    gabapentin (NEURONTIN) 300 mg, oral, Nightly    gabapentin (NEURONTIN) 100 mg, oral, Every morning    hydrOXYzine HCL (ATARAX) 25 mg, oral, Every 8 hours PRN    ibuprofen 800 mg, oral, Every 6 hours PRN    lactobacillus acidophilus tablet tablet 1 tablet, oral, 3 times daily (morning, midday, late afternoon)    levothyroxine (Synthroid, Levoxyl) 100 mcg tablet TAKE 1 TABLET(100 MCG) BY MOUTH EVERY DAY    omeprazole (PRILOSEC) 40 mg, oral, Daily    oxyCODONE-acetaminophen (Percocet) 5-325 mg tablet 2 tablets, oral, Every 6 hours PRN    predniSONE (DELTASONE) 10 mg, oral, Daily    rosuvastatin (CRESTOR) 20 mg, oral, Daily       Objective       Physical Exam  Constitutional:       Appearance: Normal appearance.   HENT:      Head: Normocephalic.   Eyes:      Extraocular Movements: Extraocular movements intact.      Conjunctiva/sclera: Conjunctivae normal.      Pupils: Pupils are equal, round, and reactive to light.   Cardiovascular:      Rate and Rhythm: Normal rate and regular rhythm.   Pulmonary:      Effort: Pulmonary effort is normal.      Breath sounds: Normal breath sounds.   Abdominal:      General: Abdomen is flat. Bowel sounds are normal.      Palpations: Abdomen is soft.   Musculoskeletal:         General: Normal range of motion.      Cervical back: Normal range of motion.   Skin:     General: Skin is warm and dry.   Neurological:      General: No focal deficit present.      Mental Status: She is alert and oriented to person, place, and time. Mental status is at baseline.   Psychiatric:         Mood and Affect: Mood normal.         Behavior: Behavior normal.           Assessment/Plan   Frances Mcdowell is a 53 y.o. female who presents for the  concerns below:    Problem List Items Addressed This Visit    None           Discussed with:   Return in :    Portions of this note were generated using digital voice recognition software, and may contain grammatical errors       Devaughn Moreno MD  08/16/24  10:00 AM

## 2024-08-19 ENCOUNTER — OFFICE VISIT (OUTPATIENT)
Dept: WOUND CARE | Facility: CLINIC | Age: 54
End: 2024-08-19
Payer: COMMERCIAL

## 2024-08-19 PROCEDURE — 11042 DBRDMT SUBQ TIS 1ST 20SQCM/<: CPT

## 2024-08-19 PROCEDURE — 11045 DBRDMT SUBQ TISS EACH ADDL: CPT

## 2024-08-20 DIAGNOSIS — L03.119 CELLULITIS OF LOWER EXTREMITY, UNSPECIFIED LATERALITY: Primary | ICD-10-CM

## 2024-08-20 DIAGNOSIS — L03.90 CELLULITIS, UNSPECIFIED CELLULITIS SITE: ICD-10-CM

## 2024-08-20 RX ORDER — FLUCONAZOLE 150 MG/1
150 TABLET ORAL ONCE
COMMUNITY
End: 2024-08-20 | Stop reason: SDUPTHER

## 2024-08-20 NOTE — PROGRESS NOTES
"Patient: Frances Mcdowell  : 1970  PCP: Devaughn Moreno MD  MRN: [id number]  Program: No programs to display       Frances Mcdowell is a 53 y.o. female presenting today for follow-up after being discharged from the hospital 11 days ago. The main problem requiring admission was severe sepsis.. The discharge summary and/or Transitional Care Management documentation was reviewed. Medication reconciliation was performed as indicated via the \"Terrence as Reviewed\" timestamp.     Frances Mcdowell was contacted by Transitional Care Management services two days after her discharge. This encounter and supporting documentation was reviewed.    Review of Systems    /90   Pulse 88   Temp 36.8 °C (98.2 °F)   Resp 18   Ht 1.6 m (5' 3\")   Wt 139 kg (306 lb)   SpO2 99%   BMI 54.21 kg/m²     Physical Exam    The complexity of medical decision making for this patient's transitional care is moderate.    Assessment/Plan   Problem List Items Addressed This Visit             ICD-10-CM    Cellulitis - Primary L03.90    Relevant Medications    gabapentin (Neurontin) 300 mg capsule    gabapentin (Neurontin) 100 mg capsule       "

## 2024-08-20 NOTE — ADDENDUM NOTE
Addended by: LATRICE PEREYRA on: 8/20/2024 11:11 AM     Modules accepted: Orders, Level of Service

## 2024-08-21 RX ORDER — GABAPENTIN 100 MG/1
100 CAPSULE ORAL EVERY MORNING
Qty: 30 CAPSULE | Refills: 0 | Status: SHIPPED | OUTPATIENT
Start: 2024-08-21

## 2024-08-21 RX ORDER — GABAPENTIN 300 MG/1
300 CAPSULE ORAL NIGHTLY
Qty: 30 CAPSULE | Refills: 0 | Status: SHIPPED | OUTPATIENT
Start: 2024-08-21

## 2024-08-21 RX ORDER — FLUCONAZOLE 150 MG/1
150 TABLET ORAL ONCE
Qty: 1 TABLET | Refills: 0 | Status: SHIPPED | OUTPATIENT
Start: 2024-08-21 | End: 2024-08-21

## 2024-08-27 ENCOUNTER — APPOINTMENT (OUTPATIENT)
Dept: WOUND CARE | Facility: CLINIC | Age: 54
End: 2024-08-27
Payer: COMMERCIAL

## 2024-08-29 ENCOUNTER — OFFICE VISIT (OUTPATIENT)
Dept: WOUND CARE | Facility: CLINIC | Age: 54
End: 2024-08-29
Payer: COMMERCIAL

## 2024-08-29 DIAGNOSIS — F98.8 ATTENTION DEFICIT DISORDER, UNSPECIFIED HYPERACTIVITY PRESENCE: ICD-10-CM

## 2024-08-29 PROCEDURE — 11045 DBRDMT SUBQ TISS EACH ADDL: CPT

## 2024-08-29 PROCEDURE — 11042 DBRDMT SUBQ TIS 1ST 20SQCM/<: CPT

## 2024-08-29 RX ORDER — DEXTROAMPHETAMINE SACCHARATE, AMPHETAMINE ASPARTATE MONOHYDRATE, DEXTROAMPHETAMINE SULFATE AND AMPHETAMINE SULFATE 5; 5; 5; 5 MG/1; MG/1; MG/1; MG/1
20 CAPSULE, EXTENDED RELEASE ORAL 2 TIMES DAILY
Qty: 60 CAPSULE | Refills: 0 | Status: SHIPPED | OUTPATIENT
Start: 2024-08-29

## 2024-09-05 ENCOUNTER — OFFICE VISIT (OUTPATIENT)
Dept: WOUND CARE | Facility: CLINIC | Age: 54
End: 2024-09-05
Payer: COMMERCIAL

## 2024-09-05 ENCOUNTER — TELEPHONE (OUTPATIENT)
Dept: PRIMARY CARE | Facility: CLINIC | Age: 54
End: 2024-09-05
Payer: COMMERCIAL

## 2024-09-05 PROCEDURE — 11045 DBRDMT SUBQ TISS EACH ADDL: CPT

## 2024-09-05 PROCEDURE — 11042 DBRDMT SUBQ TIS 1ST 20SQCM/<: CPT

## 2024-09-05 NOTE — TELEPHONE ENCOUNTER
Patient just left wound clinic they are scraping her legs, needs an increase of her gabapentin, current dose at 100 mg in AM, and 300 mg at night, asked for an increase and an afternoon dose to deal with pain, Advise?

## 2024-09-10 NOTE — TELEPHONE ENCOUNTER
Pt called again and explained she has tremendous pain from her neuropathy. She request an increase in dose and increase in amount of times taken daily to 3.     Ex: 300MG in AM, 300MG in afternoon and 300MG at night.     Pt will be out of gabapentin both doeses 100MG and 300MG by 9/11/24. Could HERMINIO authorize this?

## 2024-09-11 NOTE — TELEPHONE ENCOUNTER
Patient called in concerned with prescription being written wrong by HERMINIO. She is very frustrated because she is out of medication at the moment and the pharmacy is not filling her prescription. Pharmacy is closed until 2 pm    She requested callback by provider

## 2024-09-11 NOTE — TELEPHONE ENCOUNTER
PT said she needs Rx changed to show 3x/day.  Pharm won't fill because fill date is too soon.  Has been taking 3x/day and previous Rx written only for morning and evening.

## 2024-09-12 ENCOUNTER — OFFICE VISIT (OUTPATIENT)
Dept: WOUND CARE | Facility: CLINIC | Age: 54
End: 2024-09-12
Payer: COMMERCIAL

## 2024-09-12 ENCOUNTER — LAB REQUISITION (OUTPATIENT)
Dept: LAB | Facility: HOSPITAL | Age: 54
End: 2024-09-12
Payer: COMMERCIAL

## 2024-09-12 DIAGNOSIS — L97.922 NON-PRESSURE CHRONIC ULCER OF LEFT LOWER LEG WITH FAT LAYER EXPOSED (MULTI): Primary | ICD-10-CM

## 2024-09-12 DIAGNOSIS — L97.222 NON-PRESSURE CHRONIC ULCER OF LEFT CALF WITH FAT LAYER EXPOSED (MULTI): ICD-10-CM

## 2024-09-12 PROCEDURE — 11045 DBRDMT SUBQ TISS EACH ADDL: CPT

## 2024-09-12 PROCEDURE — 11042 DBRDMT SUBQ TIS 1ST 20SQCM/<: CPT

## 2024-09-12 PROCEDURE — 87077 CULTURE AEROBIC IDENTIFY: CPT | Mod: OUT,PORLAB | Performed by: NURSE PRACTITIONER

## 2024-09-15 LAB
BACTERIA SPEC CULT: ABNORMAL
BACTERIA SPEC CULT: ABNORMAL
GRAM STN SPEC: ABNORMAL
GRAM STN SPEC: ABNORMAL

## 2024-09-16 ENCOUNTER — OFFICE VISIT (OUTPATIENT)
Dept: WOUND CARE | Facility: CLINIC | Age: 54
End: 2024-09-16
Payer: COMMERCIAL

## 2024-09-16 PROCEDURE — 11042 DBRDMT SUBQ TIS 1ST 20SQCM/<: CPT

## 2024-09-16 PROCEDURE — 11045 DBRDMT SUBQ TISS EACH ADDL: CPT

## 2024-09-16 RX ORDER — CIPROFLOXACIN 500 MG/1
500 TABLET ORAL 2 TIMES DAILY
Qty: 20 TABLET | Refills: 0 | Status: SHIPPED | OUTPATIENT
Start: 2024-09-16 | End: 2024-09-26

## 2024-09-16 RX ORDER — CLINDAMYCIN HYDROCHLORIDE 150 MG/1
300 CAPSULE ORAL 2 TIMES DAILY
Qty: 40 CAPSULE | Refills: 0 | Status: SHIPPED | OUTPATIENT
Start: 2024-09-16 | End: 2024-09-26

## 2024-09-17 ENCOUNTER — APPOINTMENT (OUTPATIENT)
Dept: WOUND CARE | Facility: CLINIC | Age: 54
End: 2024-09-17
Payer: COMMERCIAL

## 2024-09-23 ENCOUNTER — APPOINTMENT (OUTPATIENT)
Dept: WOUND CARE | Facility: CLINIC | Age: 54
End: 2024-09-23
Payer: COMMERCIAL

## 2024-09-24 ENCOUNTER — TELEPHONE (OUTPATIENT)
Dept: PRIMARY CARE | Facility: CLINIC | Age: 54
End: 2024-09-24
Payer: COMMERCIAL

## 2024-09-24 NOTE — TELEPHONE ENCOUNTER
Patient called in stated that she needs something stronger her pain is unbearable, than her gabapentin, no longer working needing a new pain mediation, she is not sleeping at night, Advise?

## 2024-09-25 ENCOUNTER — OFFICE VISIT (OUTPATIENT)
Dept: WOUND CARE | Facility: CLINIC | Age: 54
End: 2024-09-25
Payer: COMMERCIAL

## 2024-09-25 PROCEDURE — 11042 DBRDMT SUBQ TIS 1ST 20SQCM/<: CPT

## 2024-09-25 PROCEDURE — 11045 DBRDMT SUBQ TISS EACH ADDL: CPT

## 2024-09-30 DIAGNOSIS — F15.20 STIMULANT DEPENDENCE (MULTI): ICD-10-CM

## 2024-09-30 DIAGNOSIS — F98.8 ATTENTION DEFICIT DISORDER, UNSPECIFIED HYPERACTIVITY PRESENCE: ICD-10-CM

## 2024-10-01 ENCOUNTER — OFFICE VISIT (OUTPATIENT)
Dept: WOUND CARE | Facility: CLINIC | Age: 54
End: 2024-10-01
Payer: COMMERCIAL

## 2024-10-01 PROCEDURE — 11045 DBRDMT SUBQ TISS EACH ADDL: CPT

## 2024-10-01 PROCEDURE — 11042 DBRDMT SUBQ TIS 1ST 20SQCM/<: CPT

## 2024-10-02 ENCOUNTER — OFFICE VISIT (OUTPATIENT)
Dept: PRIMARY CARE | Facility: CLINIC | Age: 54
End: 2024-10-02
Payer: COMMERCIAL

## 2024-10-02 VITALS
TEMPERATURE: 98.2 F | HEART RATE: 57 BPM | RESPIRATION RATE: 18 BRPM | HEIGHT: 63 IN | OXYGEN SATURATION: 98 % | SYSTOLIC BLOOD PRESSURE: 130 MMHG | BODY MASS INDEX: 54.21 KG/M2 | DIASTOLIC BLOOD PRESSURE: 80 MMHG

## 2024-10-02 DIAGNOSIS — F98.8 ATTENTION DEFICIT DISORDER, UNSPECIFIED TYPE: Primary | ICD-10-CM

## 2024-10-02 DIAGNOSIS — G89.18 ACUTE POST-OPERATIVE PAIN: ICD-10-CM

## 2024-10-02 DIAGNOSIS — L03.119 CELLULITIS OF LOWER EXTREMITY, UNSPECIFIED LATERALITY: ICD-10-CM

## 2024-10-02 PROCEDURE — 99214 OFFICE O/P EST MOD 30 MIN: CPT | Performed by: INTERNAL MEDICINE

## 2024-10-02 RX ORDER — OXYCODONE AND ACETAMINOPHEN 10; 325 MG/1; MG/1
1 TABLET ORAL EVERY 6 HOURS PRN
Qty: 28 TABLET | Refills: 0 | Status: SHIPPED | OUTPATIENT
Start: 2024-10-02 | End: 2024-10-09

## 2024-10-02 RX ORDER — DEXTROAMPHETAMINE SACCHARATE, AMPHETAMINE ASPARTATE MONOHYDRATE, DEXTROAMPHETAMINE SULFATE AND AMPHETAMINE SULFATE 5; 5; 5; 5 MG/1; MG/1; MG/1; MG/1
20 CAPSULE, EXTENDED RELEASE ORAL 2 TIMES DAILY
Qty: 60 CAPSULE | Refills: 0 | Status: SHIPPED | OUTPATIENT
Start: 2024-10-02

## 2024-10-02 RX ORDER — GABAPENTIN 300 MG/1
300 CAPSULE ORAL 3 TIMES DAILY
Qty: 90 CAPSULE | Refills: 3 | Status: SHIPPED | OUTPATIENT
Start: 2024-10-02

## 2024-10-02 RX ORDER — DEXTROAMPHETAMINE SACCHARATE, AMPHETAMINE ASPARTATE MONOHYDRATE, DEXTROAMPHETAMINE SULFATE AND AMPHETAMINE SULFATE 5; 5; 5; 5 MG/1; MG/1; MG/1; MG/1
20 CAPSULE, EXTENDED RELEASE ORAL 2 TIMES DAILY
Qty: 60 CAPSULE | Refills: 0 | Status: SHIPPED | OUTPATIENT
Start: 2024-10-02 | End: 2024-10-02 | Stop reason: SDUPTHER

## 2024-10-02 RX ORDER — DEXTROAMPHETAMINE SACCHARATE, AMPHETAMINE ASPARTATE MONOHYDRATE, DEXTROAMPHETAMINE SULFATE AND AMPHETAMINE SULFATE 5; 5; 5; 5 MG/1; MG/1; MG/1; MG/1
20 CAPSULE, EXTENDED RELEASE ORAL 2 TIMES DAILY
Qty: 60 CAPSULE | Refills: 0 | Status: CANCELLED | OUTPATIENT
Start: 2024-10-02

## 2024-10-02 ASSESSMENT — PAIN SCALES - GENERAL: PAINLEVEL: 10-WORST PAIN EVER

## 2024-10-02 ASSESSMENT — ENCOUNTER SYMPTOMS: LEG PAIN: 1

## 2024-10-02 NOTE — PROGRESS NOTES
Subjective   Patient ID: Frances Mcdowell is a 53 y.o. female who presents for Leg Pain.    In for follow up for post surgical     Leg Pain     Also needs refills on her adderall    Review of Systems  General: Denies fever, chills, night sweats,  Eyes: Negative for recent visual changes  Ears, Nose, Throat :  Negative for hearing changes, sinus discomfort  Dermatologic see hpi  Respiratory: Negative for wheezing, shortness of breath, cough  Cardiovascular: Negative for chest pain, palpitations, or leg swelling  Gastrointestinal: Negative for nausea/vomiting, abdominal pain, changes in bowel habits  Genitourinary Negative for Urinary Incontinence  urgency , frequency, discomfort   Musculoskeletal: see hpi  Neurological: Negative for headaches, dizziness    Previous history  Past Medical History:   Diagnosis Date    Acute embolism and thrombosis of other specified veins 2022    Superficial vein thrombosis    ADD (attention deficit disorder)     Anxiety     Benign neoplasm of unspecified ovary     Serous cystadenoma of ovary    Endometrial intraepithelial neoplasia (EIN)     Complex atypical endometrial hyperplasia    GERD (gastroesophageal reflux disease)     Hypothyroidism     Lymph edema     Personal history of other diseases of the circulatory system     History of varicose veins     Past Surgical History:   Procedure Laterality Date    BLADDER SURGERY       SECTION, LOW TRANSVERSE      FOOT SURGERY      HYSTERECTOMY      TONSILLECTOMY      TOTAL THYROIDECTOMY      VARICOSE VEIN SURGERY      Varicose Vein Ligation     Social History     Tobacco Use    Smoking status: Never    Smokeless tobacco: Never   Substance Use Topics    Alcohol use: Not Currently     Comment: seldom    Drug use: Never     No family history on file.  Allergies   Allergen Reactions    Erythromycin Anaphylaxis    Erythromycin Base Anaphylaxis    Sulfa (Sulfonamide Antibiotics) Anaphylaxis    Sulfamethoxazole Anaphylaxis     Sulfamethoxazole-Trimethoprim Anaphylaxis    Tetanus Toxoid Other and Swelling    Adhesive Tape-Silicones Other     tegaderm causes bad skin breakdown    Tramadol Itching     Current Outpatient Medications   Medication Instructions    amphetamine-dextroamphetamine XR (Adderall XR) 20 mg 24 hr capsule 20 mg, oral, 2 times daily    amphetamine-dextroamphetamine XR (Adderall XR) 20 mg 24 hr capsule 20 mg, oral, 2 times daily    aspirin 81 mg, oral, Daily RT    cholecalciferol (VITAMIN D-3) 5,000 Units, oral, Daily    EPINEPHrine (EPIPEN) 0.3 mg, injection, As needed, As Directed    furosemide (LASIX) 10 mg, oral, Daily, As needed for swelling    gabapentin (NEURONTIN) 300 mg, oral, 3 times daily    hydrOXYzine HCL (ATARAX) 25 mg, oral, Every 8 hours PRN    ibuprofen 800 mg, oral, Every 6 hours PRN    levothyroxine (Synthroid, Levoxyl) 100 mcg tablet TAKE 1 TABLET(100 MCG) BY MOUTH EVERY DAY    omeprazole (PRILOSEC) 40 mg, oral, Daily    oxyCODONE-acetaminophen (Percocet)  mg tablet 1 tablet, oral, Every 6 hours PRN    predniSONE (DELTASONE) 10 mg, oral, Daily    rosuvastatin (CRESTOR) 20 mg, oral, Daily       Objective       Physical Exam  Vital Signs: as recorded above  General: Well groomed, well nourished   Orientation:  Alert , oriented to time, place , and person   Mood and Affect:  Cooperative , no apparent distress normal affect  Skin: Good color, good turgor erythema   Eyes: Extra ocular muscle movements intact, anicteric sclerae  Neck: Supple, full range of movement  Chest: Normal breath sounds, normal chest wall exam, symmetric, good air entry, clear to auscultation  Heart: Regular rate and rhythm, without murmur, gallop, or rubs  BACK:  no CTLS spine tenderness, no flank tenderness  Extremities: full range of movement  bilateral UE and bilateral LE,  no lower extremity edema  Neurological: Alert, oriented, cranial nerves II-XII grossly intact except for visual acuity  Sensation:  Intact   Gait: normal  steady      Assessment/Plan   Frances Mcdowell is a 53 y.o. female who presents for the concerns below:    Problem List Items Addressed This Visit       ADD (attention deficit disorder) - Primary    Relevant Medications    amphetamine-dextroamphetamine XR (Adderall XR) 20 mg 24 hr capsule    Cellulitis    Relevant Medications    gabapentin (Neurontin) 300 mg capsule     Other Visit Diagnoses       Acute post-operative pain        Relevant Medications    oxyCODONE-acetaminophen (Percocet)  mg tablet        PAIN PLAN:   Patient has had no change in intake pattern, , with fair-good relief of pain and no adverse effects.   CELLULITIS PLAN: Elevate extremity when able, to decrease any edema  Antibiotic treatment initiated, allergies checked  Patient instructed to monitor margins of cellulitis marked if redness goes beyond marked margins despite antibiotics please proceed to the ER  If with improvement once antimicrobial regimen is finished and still with edema try compression hose but followup with me in 2-3 weeks pending improvement .  Keep skin clean and dry and avoid dryness of skin to avoid entry of new bacteria .  ATTENTION DEFICIT DISORDER PLAN: Effective at current dose . No side effects, , sleep, appetite, and weight stable. Medication will be continued used only for the prescribed medical condition If problems arise patient knows to discontinue meds and come back sooner.   OARRS report reviewed today  OARRS website checked and validated.  All prescriptions have been appropriately filled.  No suspicious activity was identified.   CONTROLLED SUBSTANCE AGREEMENT SIGNED         Discussed with:   Return in :    Portions of this note were generated using digital voice recognition software, and may contain grammatical errors       Devaughn Moreno MD  10/02/24  10:50 AM

## 2024-10-07 ENCOUNTER — OFFICE VISIT (OUTPATIENT)
Dept: WOUND CARE | Facility: CLINIC | Age: 54
End: 2024-10-07
Payer: COMMERCIAL

## 2024-10-07 PROCEDURE — 11045 DBRDMT SUBQ TISS EACH ADDL: CPT

## 2024-10-07 PROCEDURE — 11042 DBRDMT SUBQ TIS 1ST 20SQCM/<: CPT

## 2024-10-10 ENCOUNTER — APPOINTMENT (OUTPATIENT)
Dept: WOUND CARE | Facility: CLINIC | Age: 54
End: 2024-10-10
Payer: COMMERCIAL

## 2024-10-10 ENCOUNTER — HOSPITAL ENCOUNTER (INPATIENT)
Facility: HOSPITAL | Age: 54
LOS: 3 days | Discharge: HOME | End: 2024-10-13
Attending: STUDENT IN AN ORGANIZED HEALTH CARE EDUCATION/TRAINING PROGRAM | Admitting: INTERNAL MEDICINE
Payer: COMMERCIAL

## 2024-10-10 DIAGNOSIS — L03.115 CELLULITIS OF RIGHT LOWER EXTREMITY: Primary | ICD-10-CM

## 2024-10-10 DIAGNOSIS — E55.9 VITAMIN D DEFICIENCY: ICD-10-CM

## 2024-10-10 DIAGNOSIS — A41.9 SEPSIS WITHOUT ACUTE ORGAN DYSFUNCTION, DUE TO UNSPECIFIED ORGANISM (MULTI): ICD-10-CM

## 2024-10-10 LAB
ALBUMIN SERPL BCP-MCNC: 3.8 G/DL (ref 3.4–5)
ALP SERPL-CCNC: 90 U/L (ref 33–110)
ALT SERPL W P-5'-P-CCNC: 16 U/L (ref 7–45)
ANION GAP SERPL CALC-SCNC: 10 MMOL/L (ref 10–20)
AST SERPL W P-5'-P-CCNC: 18 U/L (ref 9–39)
BASOPHILS # BLD AUTO: 0.03 X10*3/UL (ref 0–0.1)
BASOPHILS NFR BLD AUTO: 0.2 %
BILIRUB SERPL-MCNC: 0.7 MG/DL (ref 0–1.2)
BUN SERPL-MCNC: 27 MG/DL (ref 6–23)
CALCIUM SERPL-MCNC: 9 MG/DL (ref 8.6–10.3)
CHLORIDE SERPL-SCNC: 103 MMOL/L (ref 98–107)
CO2 SERPL-SCNC: 26 MMOL/L (ref 21–32)
CREAT SERPL-MCNC: 0.93 MG/DL (ref 0.5–1.05)
EGFRCR SERPLBLD CKD-EPI 2021: 73 ML/MIN/1.73M*2
EOSINOPHIL # BLD AUTO: 0.03 X10*3/UL (ref 0–0.7)
EOSINOPHIL NFR BLD AUTO: 0.2 %
ERYTHROCYTE [DISTWIDTH] IN BLOOD BY AUTOMATED COUNT: 13.6 % (ref 11.5–14.5)
GLUCOSE SERPL-MCNC: 98 MG/DL (ref 74–99)
HCT VFR BLD AUTO: 36.7 % (ref 36–46)
HGB BLD-MCNC: 12.1 G/DL (ref 12–16)
IMM GRANULOCYTES # BLD AUTO: 0.05 X10*3/UL (ref 0–0.7)
IMM GRANULOCYTES NFR BLD AUTO: 0.3 % (ref 0–0.9)
LACTATE SERPL-SCNC: 1 MMOL/L (ref 0.4–2)
LYMPHOCYTES # BLD AUTO: 0.88 X10*3/UL (ref 1.2–4.8)
LYMPHOCYTES NFR BLD AUTO: 5.1 %
MCH RBC QN AUTO: 32 PG (ref 26–34)
MCHC RBC AUTO-ENTMCNC: 33 G/DL (ref 32–36)
MCV RBC AUTO: 97 FL (ref 80–100)
MONOCYTES # BLD AUTO: 0.75 X10*3/UL (ref 0.1–1)
MONOCYTES NFR BLD AUTO: 4.3 %
NEUTROPHILS # BLD AUTO: 15.62 X10*3/UL (ref 1.2–7.7)
NEUTROPHILS NFR BLD AUTO: 89.9 %
NRBC BLD-RTO: 0 /100 WBCS (ref 0–0)
PLATELET # BLD AUTO: 344 X10*3/UL (ref 150–450)
POTASSIUM SERPL-SCNC: 4 MMOL/L (ref 3.5–5.3)
PROT SERPL-MCNC: 7.4 G/DL (ref 6.4–8.2)
RBC # BLD AUTO: 3.78 X10*6/UL (ref 4–5.2)
SODIUM SERPL-SCNC: 135 MMOL/L (ref 136–145)
WBC # BLD AUTO: 17.4 X10*3/UL (ref 4.4–11.3)

## 2024-10-10 PROCEDURE — 80053 COMPREHEN METABOLIC PANEL: CPT | Performed by: PHYSICIAN ASSISTANT

## 2024-10-10 PROCEDURE — 99222 1ST HOSP IP/OBS MODERATE 55: CPT | Performed by: NURSE PRACTITIONER

## 2024-10-10 PROCEDURE — 36415 COLL VENOUS BLD VENIPUNCTURE: CPT | Performed by: PHYSICIAN ASSISTANT

## 2024-10-10 PROCEDURE — G0378 HOSPITAL OBSERVATION PER HR: HCPCS

## 2024-10-10 PROCEDURE — 2500000004 HC RX 250 GENERAL PHARMACY W/ HCPCS (ALT 636 FOR OP/ED): Performed by: STUDENT IN AN ORGANIZED HEALTH CARE EDUCATION/TRAINING PROGRAM

## 2024-10-10 PROCEDURE — 2500000004 HC RX 250 GENERAL PHARMACY W/ HCPCS (ALT 636 FOR OP/ED): Performed by: PHYSICIAN ASSISTANT

## 2024-10-10 PROCEDURE — 2500000001 HC RX 250 WO HCPCS SELF ADMINISTERED DRUGS (ALT 637 FOR MEDICARE OP): Performed by: NURSE PRACTITIONER

## 2024-10-10 PROCEDURE — 2500000004 HC RX 250 GENERAL PHARMACY W/ HCPCS (ALT 636 FOR OP/ED)

## 2024-10-10 PROCEDURE — 96366 THER/PROPH/DIAG IV INF ADDON: CPT

## 2024-10-10 PROCEDURE — 87040 BLOOD CULTURE FOR BACTERIA: CPT | Mod: PORLAB | Performed by: PHYSICIAN ASSISTANT

## 2024-10-10 PROCEDURE — 83605 ASSAY OF LACTIC ACID: CPT | Performed by: PHYSICIAN ASSISTANT

## 2024-10-10 PROCEDURE — 2500000005 HC RX 250 GENERAL PHARMACY W/O HCPCS: Performed by: PHYSICIAN ASSISTANT

## 2024-10-10 PROCEDURE — 99291 CRITICAL CARE FIRST HOUR: CPT | Performed by: PHYSICIAN ASSISTANT

## 2024-10-10 PROCEDURE — 2060000001 HC INTERMEDIATE ICU ROOM DAILY

## 2024-10-10 PROCEDURE — 96367 TX/PROPH/DG ADDL SEQ IV INF: CPT

## 2024-10-10 PROCEDURE — 2500000001 HC RX 250 WO HCPCS SELF ADMINISTERED DRUGS (ALT 637 FOR MEDICARE OP): Performed by: PHYSICIAN ASSISTANT

## 2024-10-10 PROCEDURE — 96365 THER/PROPH/DIAG IV INF INIT: CPT

## 2024-10-10 PROCEDURE — 2500000001 HC RX 250 WO HCPCS SELF ADMINISTERED DRUGS (ALT 637 FOR MEDICARE OP)

## 2024-10-10 PROCEDURE — 96375 TX/PRO/DX INJ NEW DRUG ADDON: CPT

## 2024-10-10 PROCEDURE — 85025 COMPLETE CBC W/AUTO DIFF WBC: CPT | Performed by: PHYSICIAN ASSISTANT

## 2024-10-10 RX ORDER — OXYCODONE AND ACETAMINOPHEN 5; 325 MG/1; MG/1
1 TABLET ORAL EVERY 6 HOURS PRN
Status: DISCONTINUED | OUTPATIENT
Start: 2024-10-10 | End: 2024-10-13 | Stop reason: HOSPADM

## 2024-10-10 RX ORDER — PREDNISONE 10 MG/1
10 TABLET ORAL DAILY
Status: DISCONTINUED | OUTPATIENT
Start: 2024-10-10 | End: 2024-10-13 | Stop reason: HOSPADM

## 2024-10-10 RX ORDER — VANCOMYCIN HYDROCHLORIDE 1 G/20ML
INJECTION, POWDER, LYOPHILIZED, FOR SOLUTION INTRAVENOUS DAILY PRN
Status: DISCONTINUED | OUTPATIENT
Start: 2024-10-10 | End: 2024-10-12

## 2024-10-10 RX ORDER — SENNOSIDES 8.6 MG/1
2 TABLET ORAL 2 TIMES DAILY
Status: DISCONTINUED | OUTPATIENT
Start: 2024-10-10 | End: 2024-10-13 | Stop reason: HOSPADM

## 2024-10-10 RX ORDER — NAPROXEN SODIUM 220 MG/1
81 TABLET, FILM COATED ORAL
Status: DISCONTINUED | OUTPATIENT
Start: 2024-10-10 | End: 2024-10-13 | Stop reason: HOSPADM

## 2024-10-10 RX ORDER — ACETAMINOPHEN 325 MG/1
650 TABLET ORAL EVERY 4 HOURS PRN
Status: DISCONTINUED | OUTPATIENT
Start: 2024-10-10 | End: 2024-10-13 | Stop reason: HOSPADM

## 2024-10-10 RX ORDER — HYDROMORPHONE HYDROCHLORIDE 0.2 MG/ML
0.2 INJECTION INTRAMUSCULAR; INTRAVENOUS; SUBCUTANEOUS
Status: DISCONTINUED | OUTPATIENT
Start: 2024-10-10 | End: 2024-10-13 | Stop reason: HOSPADM

## 2024-10-10 RX ORDER — HYDROMORPHONE HYDROCHLORIDE 1 MG/ML
1 INJECTION, SOLUTION INTRAMUSCULAR; INTRAVENOUS; SUBCUTANEOUS ONCE
Status: COMPLETED | OUTPATIENT
Start: 2024-10-10 | End: 2024-10-10

## 2024-10-10 RX ORDER — LEVOTHYROXINE SODIUM 100 UG/1
100 TABLET ORAL DAILY
Status: DISCONTINUED | OUTPATIENT
Start: 2024-10-10 | End: 2024-10-13 | Stop reason: HOSPADM

## 2024-10-10 RX ORDER — CLINDAMYCIN PHOSPHATE 600 MG/50ML
600 INJECTION, SOLUTION INTRAVENOUS ONCE
Status: COMPLETED | OUTPATIENT
Start: 2024-10-10 | End: 2024-10-10

## 2024-10-10 RX ORDER — GABAPENTIN 300 MG/1
300 CAPSULE ORAL ONCE
Status: COMPLETED | OUTPATIENT
Start: 2024-10-10 | End: 2024-10-10

## 2024-10-10 RX ORDER — PANTOPRAZOLE SODIUM 40 MG/1
40 TABLET, DELAYED RELEASE ORAL
Status: DISCONTINUED | OUTPATIENT
Start: 2024-10-11 | End: 2024-10-13 | Stop reason: HOSPADM

## 2024-10-10 RX ORDER — ENOXAPARIN SODIUM 100 MG/ML
60 INJECTION SUBCUTANEOUS EVERY 12 HOURS SCHEDULED
Status: DISCONTINUED | OUTPATIENT
Start: 2024-10-10 | End: 2024-10-13 | Stop reason: HOSPADM

## 2024-10-10 RX ORDER — FENTANYL CITRATE 50 UG/ML
50 INJECTION, SOLUTION INTRAMUSCULAR; INTRAVENOUS ONCE
Status: COMPLETED | OUTPATIENT
Start: 2024-10-10 | End: 2024-10-10

## 2024-10-10 RX ORDER — GABAPENTIN 300 MG/1
300 CAPSULE ORAL 3 TIMES DAILY
Status: DISCONTINUED | OUTPATIENT
Start: 2024-10-10 | End: 2024-10-13 | Stop reason: HOSPADM

## 2024-10-10 RX ORDER — OXYCODONE AND ACETAMINOPHEN 5; 325 MG/1; MG/1
2 TABLET ORAL EVERY 6 HOURS PRN
Status: DISCONTINUED | OUTPATIENT
Start: 2024-10-10 | End: 2024-10-13 | Stop reason: HOSPADM

## 2024-10-10 RX ADMIN — GABAPENTIN 300 MG: 300 CAPSULE ORAL at 21:04

## 2024-10-10 RX ADMIN — PIPERACILLIN SODIUM AND TAZOBACTAM SODIUM 4.5 G: 4; .5 INJECTION, SOLUTION INTRAVENOUS at 15:46

## 2024-10-10 RX ADMIN — VANCOMYCIN HYDROCHLORIDE 2 G: 10 INJECTION, POWDER, LYOPHILIZED, FOR SOLUTION INTRAVENOUS at 16:26

## 2024-10-10 RX ADMIN — SODIUM CHLORIDE 1000 ML: 9 INJECTION, SOLUTION INTRAVENOUS at 15:47

## 2024-10-10 RX ADMIN — FENTANYL CITRATE 50 MCG: 50 INJECTION INTRAMUSCULAR; INTRAVENOUS at 18:54

## 2024-10-10 RX ADMIN — HYDROMORPHONE HYDROCHLORIDE 0.2 MG: 0.2 INJECTION, SOLUTION INTRAMUSCULAR; INTRAVENOUS; SUBCUTANEOUS at 21:10

## 2024-10-10 RX ADMIN — OXYCODONE HYDROCHLORIDE AND ACETAMINOPHEN 2 TABLET: 5; 325 TABLET ORAL at 22:34

## 2024-10-10 RX ADMIN — GABAPENTIN 300 MG: 300 CAPSULE ORAL at 16:26

## 2024-10-10 RX ADMIN — CLINDAMYCIN PHOSPHATE 600 MG: 600 INJECTION, SOLUTION INTRAVENOUS at 18:57

## 2024-10-10 RX ADMIN — HYDROMORPHONE HYDROCHLORIDE 1 MG: 1 INJECTION, SOLUTION INTRAMUSCULAR; INTRAVENOUS; SUBCUTANEOUS at 16:58

## 2024-10-10 SDOH — SOCIAL STABILITY: SOCIAL INSECURITY: WERE YOU ABLE TO COMPLETE ALL THE BEHAVIORAL HEALTH SCREENINGS?: YES

## 2024-10-10 SDOH — SOCIAL STABILITY: SOCIAL INSECURITY: HAVE YOU HAD THOUGHTS OF HARMING ANYONE ELSE?: NO

## 2024-10-10 ASSESSMENT — PAIN SCALES - GENERAL
PAINLEVEL_OUTOF10: 10 - WORST POSSIBLE PAIN
PAINLEVEL_OUTOF10: 0 - NO PAIN

## 2024-10-10 ASSESSMENT — COGNITIVE AND FUNCTIONAL STATUS - GENERAL
DAILY ACTIVITIY SCORE: 24
PATIENT BASELINE BEDBOUND: NO
MOBILITY SCORE: 24

## 2024-10-10 ASSESSMENT — PAIN - FUNCTIONAL ASSESSMENT
PAIN_FUNCTIONAL_ASSESSMENT: 0-10

## 2024-10-10 ASSESSMENT — LIFESTYLE VARIABLES
PRESCIPTION_ABUSE_PAST_12_MONTHS: NO
HOW OFTEN DO YOU HAVE A DRINK CONTAINING ALCOHOL: NEVER
HOW OFTEN DO YOU HAVE 6 OR MORE DRINKS ON ONE OCCASION: NEVER
HOW MANY STANDARD DRINKS CONTAINING ALCOHOL DO YOU HAVE ON A TYPICAL DAY: PATIENT DOES NOT DRINK
AUDIT-C TOTAL SCORE: 0
SUBSTANCE_ABUSE_PAST_12_MONTHS: NO
SKIP TO QUESTIONS 9-10: 1
AUDIT-C TOTAL SCORE: 0

## 2024-10-10 ASSESSMENT — ACTIVITIES OF DAILY LIVING (ADL)
GROOMING: INDEPENDENT
LACK_OF_TRANSPORTATION: NO
WALKS IN HOME: INDEPENDENT
HEARING - RIGHT EAR: FUNCTIONAL
PATIENT'S MEMORY ADEQUATE TO SAFELY COMPLETE DAILY ACTIVITIES?: YES
TOILETING: INDEPENDENT
FEEDING YOURSELF: INDEPENDENT
DRESSING YOURSELF: INDEPENDENT
ASSISTIVE_DEVICE: CONTACTS
ADEQUATE_TO_COMPLETE_ADL: YES
JUDGMENT_ADEQUATE_SAFELY_COMPLETE_DAILY_ACTIVITIES: YES
HEARING - LEFT EAR: FUNCTIONAL
BATHING: INDEPENDENT

## 2024-10-10 ASSESSMENT — PAIN DESCRIPTION - ORIENTATION
ORIENTATION: LEFT;RIGHT
ORIENTATION: RIGHT

## 2024-10-10 ASSESSMENT — PATIENT HEALTH QUESTIONNAIRE - PHQ9
1. LITTLE INTEREST OR PLEASURE IN DOING THINGS: NOT AT ALL
2. FEELING DOWN, DEPRESSED OR HOPELESS: NOT AT ALL
SUM OF ALL RESPONSES TO PHQ9 QUESTIONS 1 & 2: 0

## 2024-10-10 ASSESSMENT — PAIN DESCRIPTION - DESCRIPTORS
DESCRIPTORS: BURNING
DESCRIPTORS: BURNING

## 2024-10-10 ASSESSMENT — PAIN DESCRIPTION - PAIN TYPE: TYPE: ACUTE PAIN

## 2024-10-10 ASSESSMENT — PAIN DESCRIPTION - LOCATION: LOCATION: LEG

## 2024-10-10 NOTE — H&P
[facility] MEDICINE HISTORY AND PHYSICAL    History Of Present Illness     Frances Mcdowell is a 53 y.o. female with PMHx of lymphedema (she follows at [facility] every Monday) who presented with right lower leg cellulitis, sudden onset. She last changed her dressing this morning. Throughout the day she has noted significant erythema and warmth spreading up the right leg, past the knee and up into the groin. She denies fevers but has had chills. ED workup was notable for WBC 17.4k. Lactate was 1.0. She had no fevers. She was tachycardic to 106 bpm. Blood cultures were drawn and she was given Zosyn and vancomycin. She did not allow the ED staff to remove the dressings. Remainder of ROS reviewed and negative except as indicated in HPI.     Objective     Past Medical History  She has a past medical history of Acute embolism and thrombosis of other specified veins (2022), ADD (attention deficit disorder), Anxiety, Endometrial intraepithelial neoplasia (EIN), GERD (gastroesophageal reflux disease), HLD (hyperlipidemia), Hypothyroidism, and Lymph edema.    Surgical History  She has a past surgical history that includes Tonsillectomy; Foot surgery; Varicose vein surgery; Total thyroidectomy; Bladder surgery;  section, low transverse; and Hysterectomy.    Social History     Tobacco Use    Smoking status: Never    Smokeless tobacco: Never   Substance Use Topics    Alcohol use: Not Currently     Comment: seldom    Drug use: Never       Family History  No family history on file.    Allergies  Erythromycin base, Sulfa (sulfonamide antibiotics), Tetanus toxoid, Tramadol, and Adhesive tape-silicones    Vitals:    10/10/24 1629   BP: 130/86   Pulse: (!) 106   Resp: 18   Temp:    SpO2: 98%       Vitals:    10/10/24 1516   Weight: 132 kg (290 lb)       Scheduled medications  aspirin, 81 mg, oral, Daily  clindamycin, 600 mg, intravenous, Once  enoxaparin, 60 mg, subcutaneous, q12h ELENA  gabapentin, 300  mg, oral, TID  levothyroxine, 100 mcg, oral, Daily  [START ON 10/11/2024] pantoprazole, 40 mg, oral, Daily before breakfast  piperacillin-tazobactam, 3.375 g, intravenous, q6h  predniSONE, 10 mg, oral, Daily  sennosides, 2 tablet, oral, BID  sodium chloride, 1,000 mL, intravenous, Once  vancomycin, 2 g, intravenous, Once      Continuous medications     PRN medications  PRN medications: acetaminophen, vancomycin    Results for orders placed or performed during the hospital encounter of 10/10/24 (from the past 24 hour(s))   CBC and Auto Differential   Result Value Ref Range    WBC 17.4 (H) 4.4 - 11.3 x10*3/uL    nRBC 0.0 0.0 - 0.0 /100 WBCs    RBC 3.78 (L) 4.00 - 5.20 x10*6/uL    Hemoglobin 12.1 12.0 - 16.0 g/dL    Hematocrit 36.7 36.0 - 46.0 %    MCV 97 80 - 100 fL    MCH 32.0 26.0 - 34.0 pg    MCHC 33.0 32.0 - 36.0 g/dL    RDW 13.6 11.5 - 14.5 %    Platelets 344 150 - 450 x10*3/uL    Neutrophils % 89.9 40.0 - 80.0 %    Immature Granulocytes %, Automated 0.3 0.0 - 0.9 %    Lymphocytes % 5.1 13.0 - 44.0 %    Monocytes % 4.3 2.0 - 10.0 %    Eosinophils % 0.2 0.0 - 6.0 %    Basophils % 0.2 0.0 - 2.0 %    Neutrophils Absolute 15.62 (H) 1.20 - 7.70 x10*3/uL    Immature Granulocytes Absolute, Automated 0.05 0.00 - 0.70 x10*3/uL    Lymphocytes Absolute 0.88 (L) 1.20 - 4.80 x10*3/uL    Monocytes Absolute 0.75 0.10 - 1.00 x10*3/uL    Eosinophils Absolute 0.03 0.00 - 0.70 x10*3/uL    Basophils Absolute 0.03 0.00 - 0.10 x10*3/uL   Comprehensive Metabolic Panel   Result Value Ref Range    Glucose 98 74 - 99 mg/dL    Sodium 135 (L) 136 - 145 mmol/L    Potassium 4.0 3.5 - 5.3 mmol/L    Chloride 103 98 - 107 mmol/L    Bicarbonate 26 21 - 32 mmol/L    Anion Gap 10 10 - 20 mmol/L    Urea Nitrogen 27 (H) 6 - 23 mg/dL    Creatinine 0.93 0.50 - 1.05 mg/dL    eGFR 73 >60 mL/min/1.73m*2    Calcium 9.0 8.6 - 10.3 mg/dL    Albumin 3.8 3.4 - 5.0 g/dL    Alkaline Phosphatase 90 33 - 110 U/L    Total Protein 7.4 6.4 - 8.2 g/dL    AST 18 9 - 39  U/L    Bilirubin, Total 0.7 0.0 - 1.2 mg/dL    ALT 16 7 - 45 U/L   Lactate   Result Value Ref Range    Lactate 1.0 0.4 - 2.0 mmol/L       I personally reviewed all pertinent labwork, imaging and vital signs, as well as medications, nursing, therapy, discharge planning and consult notes.     Constitutional: Well developed, awake, alert, calm, oriented x4, no acute distress, cooperative   Eyes: EOMI, clear sclera   ENMT: mucous membranes moist, no lesions seen   Head/Neck: Neck supple, no apparent injury, head atraumatic   Respiratory/Thorax: CTAB, good chest expansion, respirations even and unlabored   Cardiovascular: Regular rate and rhythm, no murmurs/rubs/gallops, normal S1 and S 2   Gastrointestinal: Abdomen nondistended, soft, nontender, +BS, no bruits   Musculoskeletal: ROM intact, no joint swelling, normal  strength   Extremities: no cyanosis, contusions or clubbing, BLE lymphedema with weeping   Neurological: no focal deficit, pt alert and oriented x4   Psychological: Appropriate affect and behavior, pleasant   Skin: Warm and dry, no lesions, no rashes, right leg is erythematous and warm to the groin       Assessment/Plan     Acute RLE cellulitis  Erythema was demarcated on exam, will monitor for improvement on Zosyn and vancomycin  Will obtain culture of right leg wound, blood cx already sent    Hx chronic BLE lymphedema   Pt follows at [facility] every Monday  Will consult wound nurse for dressing changes    HLD  Pt stopped taking her statin months ago as she kept forgetting to take it  Will check lipid profile in the morning, was last done Nov 2023    DVT ppx: Lovenox    Discharge disposition  Home when stable        Demi Tuttle CNP  [facility] Medicine

## 2024-10-10 NOTE — ED PROVIDER NOTES
EMERGENCY MEDICINE EVALUATION NOTE    History of Present Illness     Chief Complaint:   Chief Complaint   Patient presents with    Cellulitis     Septic 1 month ago from same thing. R leg now has redness       HPI: Frances Mcdowell is a 53 y.o. female presents with a chief complaint of right lower leg cellulitis.  Patient reports that it sprung up kind of out of the blue.  She she does have some chronic wounds of the right lower extremity secondary to what she believes lymphedema.  She states she sees the wound clinic for these at least once a week.  She reports she changed her dressings twice a day.  She states her last change her dressing this morning.  She throughout the day though she has noted she has some significant erythema and warmth spreading up the right leg.  She notes that it spread up past the knee up into the groin.  Reports she had something similar to this about a month ago where she was found to be septic from the cellulitis.  She reports that she has not had any fevers but she has noted she has had a low bit of chills.  She did not take anything at home for her symptoms.  She came in to be evaluated when she noticed that the redness were spreading.    Previous History     Past Medical History:   Diagnosis Date    Acute embolism and thrombosis of other specified veins 2022    Superficial vein thrombosis    ADD (attention deficit disorder)     Anxiety     Benign neoplasm of unspecified ovary     Serous cystadenoma of ovary    Endometrial intraepithelial neoplasia (EIN)     Complex atypical endometrial hyperplasia    GERD (gastroesophageal reflux disease)     Hypothyroidism     Lymph edema     Personal history of other diseases of the circulatory system     History of varicose veins     Past Surgical History:   Procedure Laterality Date    BLADDER SURGERY       SECTION, LOW TRANSVERSE      FOOT SURGERY      HYSTERECTOMY      TONSILLECTOMY      TOTAL THYROIDECTOMY      VARICOSE VEIN SURGERY       Varicose Vein Ligation     Social History     Tobacco Use    Smoking status: Never    Smokeless tobacco: Never   Substance Use Topics    Alcohol use: Not Currently     Comment: seldom    Drug use: Never     No family history on file.  Allergies   Allergen Reactions    Erythromycin Base Anaphylaxis    Sulfa (Sulfonamide Antibiotics) Anaphylaxis    Tetanus Toxoid Other and Swelling    Adhesive Tape-Silicones Other     tegaderm causes bad skin breakdown    Tramadol Itching     Current Outpatient Medications   Medication Instructions    amphetamine-dextroamphetamine XR (Adderall XR) 20 mg 24 hr capsule 20 mg, oral, 2 times daily    amphetamine-dextroamphetamine XR (Adderall XR) 20 mg 24 hr capsule 20 mg, oral, 2 times daily    aspirin 81 mg, oral, Daily RT    cholecalciferol (VITAMIN D-3) 5,000 Units, oral, Daily    EPINEPHrine (EPIPEN) 0.3 mg, injection, As needed, As Directed    furosemide (LASIX) 10 mg, oral, Daily, As needed for swelling    gabapentin (NEURONTIN) 300 mg, oral, 3 times daily    hydrOXYzine HCL (ATARAX) 25 mg, oral, Every 8 hours PRN    ibuprofen 800 mg, oral, Every 6 hours PRN    levothyroxine (Synthroid, Levoxyl) 100 mcg tablet TAKE 1 TABLET(100 MCG) BY MOUTH EVERY DAY    omeprazole (PRILOSEC) 40 mg, oral, Daily    predniSONE (DELTASONE) 10 mg, oral, Daily    rosuvastatin (CRESTOR) 20 mg, oral, Daily       Physical Exam     Appearance: Alert, oriented , cooperative,  in no acute distress.      Skin: Intact,  dry skin, no petechiae or purpura.  Significant erythema of the right lower extremity.  Because already showing up into the groin area.  Warm to touch.     Eyes: PERRLA, EOMs intact,  Conjunctiva pink      ENT: Hearing grossly intact. Pharynx clear, uvula midline.      Neck: Supple. Trachea at midline.      Pulmonary: Clear bilaterally. No rales, rhonchi or wheezing. No accessory muscle use or stridor.     Cardiac: Normal rate and rhythm without murmur     Abdomen: Soft, nontender, active  bowel sounds.     Musculoskeletal: Full range of motion.      Neurological:Cranial nerves II through XII are grossly intact, normal sensation, no weakness, no focal findings identified.     Results     Labs Reviewed   CBC WITH AUTO DIFFERENTIAL - Abnormal       Result Value    WBC 17.4 (*)     nRBC 0.0      RBC 3.78 (*)     Hemoglobin 12.1      Hematocrit 36.7      MCV 97      MCH 32.0      MCHC 33.0      RDW 13.6      Platelets 344      Neutrophils % 89.9      Immature Granulocytes %, Automated 0.3      Lymphocytes % 5.1      Monocytes % 4.3      Eosinophils % 0.2      Basophils % 0.2      Neutrophils Absolute 15.62 (*)     Immature Granulocytes Absolute, Automated 0.05      Lymphocytes Absolute 0.88 (*)     Monocytes Absolute 0.75      Eosinophils Absolute 0.03      Basophils Absolute 0.03     COMPREHENSIVE METABOLIC PANEL - Abnormal    Glucose 98      Sodium 135 (*)     Potassium 4.0      Chloride 103      Bicarbonate 26      Anion Gap 10      Urea Nitrogen 27 (*)     Creatinine 0.93      eGFR 73      Calcium 9.0      Albumin 3.8      Alkaline Phosphatase 90      Total Protein 7.4      AST 18      Bilirubin, Total 0.7      ALT 16     LACTATE - Normal    Lactate 1.0      Narrative:     Venipuncture immediately after or during the administration of Metamizole may lead to falsely low results. Testing should be performed immediately prior to Metamizole dosing.   BLOOD CULTURE   BLOOD CULTURE     No orders to display         ED Course & Medical Decision Making     Medications   sodium chloride 0.9 % bolus 1,000 mL (1,000 mL intravenous New Bag 10/10/24 1547)   vancomycin (Vancocin) 2000 mg/500 ml in D5W 500 mL IV piggyback 2 g (2 g intravenous New Bag 10/10/24 1626)   clindamycin (Cleocin) 600 mg in dextrose 5% IV 50 mL (has no administration in time range)   HYDROmorphone (Dilaudid) injection 1 mg (has no administration in time range)   piperacillin-tazobactam (Zosyn) 4.5 g in dextrose (iso)  mL (0 g  "intravenous Stopped 10/10/24 1625)   gabapentin (Neurontin) capsule 300 mg (300 mg oral Given 10/10/24 1626)     Heart Rate:  [101-106]   Temperature:  [36.8 °C (98.2 °F)]   Respirations:  [14-18]   BP: (130-137)/(86-97)   Height:  [160 cm (5' 3\")]   Weight:  [132 kg (290 lb)]   Pulse Ox:  [98 %-99 %]    ED Course as of 10/10/24 1652   Thu Oct 10, 2024   1646 Reevaluated the patient this time.  Updated her that she likely will need to be admitted to the hospital.  Patient does appear to be septic from the cellulitis on her right lower extremity.  Patient adamant she does not want her to remove her dressings until we have the adequate material to dress her wounds.  Informed her we do not have the dressing and she was requesting here, she states she will call a family member to see if they can bring it up.  She is in agreement with being admitted to the hospital.  I was able to review patient's wound clinic notes and images from 3 days ago to evaluate wounds. [CJ]   1650 Spoke to the hospitalist NP Demi who agreed to by the patient under Dr. Smith [CJ]      ED Course User Index  [CJ] Liu Hernandez PA-C         Diagnoses as of 10/10/24 1652   Cellulitis of right lower extremity   Sepsis without acute organ dysfunction, due to unspecified organism (Multi)       Procedures   Critical Care    Performed by: Liu Hernandez PA-C  Authorized by: Elena Alvaardo MD    Critical care provider statement:     Critical care time (minutes):  35    Critical care time was exclusive of:  Separately billable procedures and treating other patients and teaching time    Critical care was necessary to treat or prevent imminent or life-threatening deterioration of the following conditions:  Sepsis    Critical care was time spent personally by me on the following activities:  Blood draw for specimens, development of treatment plan with patient or surrogate, ordering and performing treatments and interventions, ordering and review of " laboratory studies, ordering and review of radiographic studies, evaluation of patient's response to treatment, re-evaluation of patient's condition, review of old charts, pulse oximetry and discussions with consultants    Care discussed with: admitting provider        Diagnosis     1. Cellulitis of right lower extremity    2. Sepsis without acute organ dysfunction, due to unspecified organism (Multi)        Disposition   Admit     ED Prescriptions    None         Disclaimer: This note was dictated by speech recognition. Minor errors in transcription may be present. Please call if questions.       Liu Hernandez PA-C  10/10/24 [id number]

## 2024-10-10 NOTE — PROGRESS NOTES
"Vancomycin Dosing by Pharmacy- INITIAL CONSULT NOTE    Frances Mcdowell is a 53 y.o. year old female who Pharmacy has been consulted for vancomycin dosing for Vancomycin Indications: Skin & Soft Tissue. Based on the patient's indication and renal status this patient will be dosed based on a goal AUC of 400-600.     Renal function is currently stable.    Visit Vitals  /79   Pulse 86   Temp 36.8 °C (98.2 °F)   Resp 18           Lab Results   Component Value Date    CREATININE 0.93 10/10/2024    CREATININE 0.63 08/09/2024    CREATININE 0.65 08/08/2024    CREATININE 0.65 08/07/2024       Patient weight is No results found for: \"PTWEIGHT\"    No results found for: \"CULTURE\"    I/O last 3 completed shifts:  In: 1600 (12.2 mL/kg) [IV Piggyback:1600]  Out: - (0 mL/kg)   Weight: 131.5 kg     No results found for: \"PATIENTTEMP\"       Assessment/Plan     Patient will be given a loading dose of 2000 mg.  Will initiate vancomycin maintenance,  1250 mg every 12 hours.    This dosing regimen is predicted by [employer] to result in the following pharmacokinetic parameters:  Loading dose: N/A  Regimen: 1250 mg IV every 12 hours.  Start time: 04:26 on 10/11/2024  Exposure target: AUC24 (range)400-600 mg/L.hr   [id number]: 476 mg/L.hr  AUC24,ss: 476 mg/L.hr  Probability of AUC24 > 400: 63 %  Ctrough,ss: 12.3 mg/L  Probability of Ctrough,ss > 20: 28 %    Follow-up level will be ordered on 10/11 at 0500 unless clinically indicated sooner.  Will continue to monitor renal function daily while on vancomycin and order serum creatinine at least every 48 hours if not already ordered.  Follow for continued vancomycin needs, clinical response, and signs/symptoms of toxicity.       Kumar LiuD, BCPS      "

## 2024-10-11 ENCOUNTER — APPOINTMENT (OUTPATIENT)
Dept: PRIMARY CARE | Facility: CLINIC | Age: 54
End: 2024-10-11
Payer: COMMERCIAL

## 2024-10-11 LAB
ANION GAP SERPL CALC-SCNC: 9 MMOL/L (ref 10–20)
BUN SERPL-MCNC: 24 MG/DL (ref 6–23)
CALCIUM SERPL-MCNC: 8 MG/DL (ref 8.6–10.3)
CHLORIDE SERPL-SCNC: 104 MMOL/L (ref 98–107)
CHOLEST SERPL-MCNC: 143 MG/DL (ref 0–199)
CHOLESTEROL/HDL RATIO: 2.5
CO2 SERPL-SCNC: 28 MMOL/L (ref 21–32)
CREAT SERPL-MCNC: 0.94 MG/DL (ref 0.5–1.05)
EGFRCR SERPLBLD CKD-EPI 2021: 72 ML/MIN/1.73M*2
ERYTHROCYTE [DISTWIDTH] IN BLOOD BY AUTOMATED COUNT: 13.8 % (ref 11.5–14.5)
GLUCOSE SERPL-MCNC: 99 MG/DL (ref 74–99)
HCT VFR BLD AUTO: 31 % (ref 36–46)
HDLC SERPL-MCNC: 56.5 MG/DL
HGB BLD-MCNC: 10.1 G/DL (ref 12–16)
MCH RBC QN AUTO: 32.3 PG (ref 26–34)
MCHC RBC AUTO-ENTMCNC: 32.6 G/DL (ref 32–36)
MCV RBC AUTO: 99 FL (ref 80–100)
NON-HDL CHOLESTEROL: 87 MG/DL (ref 0–149)
NRBC BLD-RTO: 0 /100 WBCS (ref 0–0)
PLATELET # BLD AUTO: 289 X10*3/UL (ref 150–450)
POTASSIUM SERPL-SCNC: 3.8 MMOL/L (ref 3.5–5.3)
RBC # BLD AUTO: 3.13 X10*6/UL (ref 4–5.2)
SODIUM SERPL-SCNC: 137 MMOL/L (ref 136–145)
VANCOMYCIN SERPL-MCNC: 9.6 UG/ML (ref 5–20)
WBC # BLD AUTO: 9.4 X10*3/UL (ref 4.4–11.3)

## 2024-10-11 PROCEDURE — 2500000004 HC RX 250 GENERAL PHARMACY W/ HCPCS (ALT 636 FOR OP/ED)

## 2024-10-11 PROCEDURE — 2500000001 HC RX 250 WO HCPCS SELF ADMINISTERED DRUGS (ALT 637 FOR MEDICARE OP)

## 2024-10-11 PROCEDURE — 85027 COMPLETE CBC AUTOMATED: CPT | Performed by: NURSE PRACTITIONER

## 2024-10-11 PROCEDURE — 36415 COLL VENOUS BLD VENIPUNCTURE: CPT | Performed by: NURSE PRACTITIONER

## 2024-10-11 PROCEDURE — 2500000001 HC RX 250 WO HCPCS SELF ADMINISTERED DRUGS (ALT 637 FOR MEDICARE OP): Performed by: PHYSICIAN ASSISTANT

## 2024-10-11 PROCEDURE — 99233 SBSQ HOSP IP/OBS HIGH 50: CPT | Performed by: PHYSICIAN ASSISTANT

## 2024-10-11 PROCEDURE — G0378 HOSPITAL OBSERVATION PER HR: HCPCS

## 2024-10-11 PROCEDURE — 2500000004 HC RX 250 GENERAL PHARMACY W/ HCPCS (ALT 636 FOR OP/ED): Performed by: NURSE PRACTITIONER

## 2024-10-11 PROCEDURE — 82374 ASSAY BLOOD CARBON DIOXIDE: CPT | Performed by: NURSE PRACTITIONER

## 2024-10-11 PROCEDURE — 2500000001 HC RX 250 WO HCPCS SELF ADMINISTERED DRUGS (ALT 637 FOR MEDICARE OP): Performed by: NURSE PRACTITIONER

## 2024-10-11 PROCEDURE — 80202 ASSAY OF VANCOMYCIN: CPT

## 2024-10-11 PROCEDURE — 83718 ASSAY OF LIPOPROTEIN: CPT | Performed by: NURSE PRACTITIONER

## 2024-10-11 PROCEDURE — 2060000001 HC INTERMEDIATE ICU ROOM DAILY

## 2024-10-11 RX ORDER — DEXTROAMPHETAMINE SACCHARATE, AMPHETAMINE ASPARTATE, DEXTROAMPHETAMINE SULFATE AND AMPHETAMINE SULFATE 1.25; 1.25; 1.25; 1.25 MG/1; MG/1; MG/1; MG/1
10 TABLET ORAL
Status: DISCONTINUED | OUTPATIENT
Start: 2024-10-11 | End: 2024-10-13 | Stop reason: HOSPADM

## 2024-10-11 RX ORDER — OMEPRAZOLE 40 MG/1
40 CAPSULE, DELAYED RELEASE ORAL DAILY
Qty: 90 CAPSULE | Refills: 3 | Status: SHIPPED | OUTPATIENT
Start: 2024-10-11

## 2024-10-11 RX ADMIN — PANTOPRAZOLE SODIUM 40 MG: 40 TABLET, DELAYED RELEASE ORAL at 09:32

## 2024-10-11 RX ADMIN — OXYCODONE HYDROCHLORIDE AND ACETAMINOPHEN 2 TABLET: 5; 325 TABLET ORAL at 12:16

## 2024-10-11 RX ADMIN — GABAPENTIN 300 MG: 300 CAPSULE ORAL at 15:17

## 2024-10-11 RX ADMIN — VANCOMYCIN HYDROCHLORIDE 1500 MG: 1.5 INJECTION, POWDER, LYOPHILIZED, FOR SOLUTION INTRAVENOUS at 09:35

## 2024-10-11 RX ADMIN — GABAPENTIN 300 MG: 300 CAPSULE ORAL at 09:32

## 2024-10-11 RX ADMIN — OXYCODONE HYDROCHLORIDE AND ACETAMINOPHEN 2 TABLET: 5; 325 TABLET ORAL at 04:48

## 2024-10-11 RX ADMIN — PIPERACILLIN SODIUM AND TAZOBACTAM SODIUM 3.38 G: 3; .375 INJECTION, SOLUTION INTRAVENOUS at 00:11

## 2024-10-11 RX ADMIN — PIPERACILLIN SODIUM AND TAZOBACTAM SODIUM 3.38 G: 3; .375 INJECTION, SOLUTION INTRAVENOUS at 21:00

## 2024-10-11 RX ADMIN — OXYCODONE HYDROCHLORIDE AND ACETAMINOPHEN 2 TABLET: 5; 325 TABLET ORAL at 20:51

## 2024-10-11 RX ADMIN — PREDNISONE 10 MG: 10 TABLET ORAL at 09:32

## 2024-10-11 RX ADMIN — GABAPENTIN 300 MG: 300 CAPSULE ORAL at 20:51

## 2024-10-11 RX ADMIN — DEXTROAMPHETAMINE SACCHARATE, AMPHETAMINE ASPARTATE, DEXTROAMPHETAMINE SULFATE AND AMPHETAMINE SULFATE 10 MG: 1.25; 1.25; 1.25; 1.25 TABLET ORAL at 09:32

## 2024-10-11 RX ADMIN — LEVOTHYROXINE SODIUM 100 MCG: 0.1 TABLET ORAL at 05:03

## 2024-10-11 RX ADMIN — PIPERACILLIN SODIUM AND TAZOBACTAM SODIUM 3.38 G: 3; .375 INJECTION, SOLUTION INTRAVENOUS at 15:17

## 2024-10-11 RX ADMIN — VANCOMYCIN HYDROCHLORIDE 1500 MG: 1.5 INJECTION, POWDER, LYOPHILIZED, FOR SOLUTION INTRAVENOUS at 20:53

## 2024-10-11 RX ADMIN — PIPERACILLIN SODIUM AND TAZOBACTAM SODIUM 3.38 G: 3; .375 INJECTION, SOLUTION INTRAVENOUS at 06:20

## 2024-10-11 ASSESSMENT — COGNITIVE AND FUNCTIONAL STATUS - GENERAL
DAILY ACTIVITIY SCORE: 24
MOBILITY SCORE: 24

## 2024-10-11 ASSESSMENT — ENCOUNTER SYMPTOMS
FREQUENCY: 0
VOMITING: 0
CHEST TIGHTNESS: 0
FLANK PAIN: 0
EYE PAIN: 0
HEMATURIA: 0
BACK PAIN: 0
DIARRHEA: 0
BLOOD IN STOOL: 0
DIZZINESS: 0
FEVER: 0
SHORTNESS OF BREATH: 0
APPETITE CHANGE: 0
FACIAL SWELLING: 0
FATIGUE: 0
WEAKNESS: 0
ABDOMINAL PAIN: 0
WHEEZING: 0
NUMBNESS: 0
PALPITATIONS: 0
TROUBLE SWALLOWING: 0
DIAPHORESIS: 0
DYSURIA: 0
BRUISES/BLEEDS EASILY: 0
CHILLS: 0
SORE THROAT: 0
LIGHT-HEADEDNESS: 0
CONSTIPATION: 0
WOUND: 1
COUGH: 0
NAUSEA: 0
HALLUCINATIONS: 0
HEADACHES: 0
JOINT SWELLING: 0

## 2024-10-11 ASSESSMENT — PAIN SCALES - GENERAL
PAINLEVEL_OUTOF10: 7
PAINLEVEL_OUTOF10: 7
PAINLEVEL_OUTOF10: 6
PAINLEVEL_OUTOF10: 0 - NO PAIN

## 2024-10-11 ASSESSMENT — PAIN - FUNCTIONAL ASSESSMENT
PAIN_FUNCTIONAL_ASSESSMENT: 0-10

## 2024-10-11 ASSESSMENT — ACTIVITIES OF DAILY LIVING (ADL): LACK_OF_TRANSPORTATION: NO

## 2024-10-11 ASSESSMENT — PAIN DESCRIPTION - DESCRIPTORS: DESCRIPTORS: BURNING

## 2024-10-11 NOTE — PROGRESS NOTES
Frances Mcdowell is a 53 y.o. female on day 1 of admission presenting with Cellulitis of right lower extremity.      Subjective   Frances Mcdowell is a 53 y.o. female with PMHx of hypothyroidism, venous insufficiency, lymphedema (she follows at [facility] every Monday) who presented with right lower leg cellulitis, sudden onset. She last changed her dressing this morning. Throughout the day she has noted significant erythema and warmth spreading up the right leg, past the knee and up into the groin. She denies fevers but has had chills. ED workup was notable for WBC 17.4k. Lactate was 1.0. She had no fevers. She was tachycardic to 106 bpm. Blood cultures were drawn and she was given Zosyn and vancomycin. She did not allow the ED staff to remove the dressings.     10/11/2024: No acute events overnight. Vitals stable, afebrile. Leukocytosis resolved. Slight anemia with hgb 10.1  Bl cx x2 drawn and pending  Patient reports significant improvement in both pain, redness, warmth of RLE overnight. She is known to me from prior admission in August, appears in much better spirits.   She states she is to be discharged to home on Sunday so she can get back to work.          Review of Systems   Constitutional:  Negative for appetite change, chills, diaphoresis, fatigue and fever.   HENT:  Negative for congestion, ear pain, facial swelling, hearing loss, nosebleeds, sore throat, tinnitus and trouble swallowing.    Eyes:  Negative for pain.   Respiratory:  Negative for cough, chest tightness, shortness of breath and wheezing.    Cardiovascular:  Negative for chest pain, palpitations and leg swelling.   Gastrointestinal:  Negative for abdominal pain, blood in stool, constipation, diarrhea, nausea and vomiting.   Genitourinary:  Negative for dysuria, flank pain, frequency, hematuria and urgency.   Musculoskeletal:  Negative for back pain and joint swelling.   Skin:  Positive for rash and wound.   Neurological:  Negative for  dizziness, syncope, weakness, light-headedness, numbness and headaches.   Hematological:  Does not bruise/bleed easily.   Psychiatric/Behavioral:  Negative for behavioral problems, hallucinations and suicidal ideas.           Objective     Last Recorded Vitals  BP 95/66 (BP Location: Left arm, Patient Position: Lying)   Pulse 90   Temp 36.7 °C (98 °F) (Temporal)   Resp 18   Wt 135 kg (297 lb)   SpO2 97%     Image Results  No results found.     Lab Results  Results for orders placed or performed during the hospital encounter of 10/10/24 (from the past 24 hour(s))   CBC and Auto Differential   Result Value Ref Range    WBC 17.4 (H) 4.4 - 11.3 x10*3/uL    nRBC 0.0 0.0 - 0.0 /100 WBCs    RBC 3.78 (L) 4.00 - 5.20 x10*6/uL    Hemoglobin 12.1 12.0 - 16.0 g/dL    Hematocrit 36.7 36.0 - 46.0 %    MCV 97 80 - 100 fL    MCH 32.0 26.0 - 34.0 pg    MCHC 33.0 32.0 - 36.0 g/dL    RDW 13.6 11.5 - 14.5 %    Platelets 344 150 - 450 x10*3/uL    Neutrophils % 89.9 40.0 - 80.0 %    Immature Granulocytes %, Automated 0.3 0.0 - 0.9 %    Lymphocytes % 5.1 13.0 - 44.0 %    Monocytes % 4.3 2.0 - 10.0 %    Eosinophils % 0.2 0.0 - 6.0 %    Basophils % 0.2 0.0 - 2.0 %    Neutrophils Absolute 15.62 (H) 1.20 - 7.70 x10*3/uL    Immature Granulocytes Absolute, Automated 0.05 0.00 - 0.70 x10*3/uL    Lymphocytes Absolute 0.88 (L) 1.20 - 4.80 x10*3/uL    Monocytes Absolute 0.75 0.10 - 1.00 x10*3/uL    Eosinophils Absolute 0.03 0.00 - 0.70 x10*3/uL    Basophils Absolute 0.03 0.00 - 0.10 x10*3/uL   Comprehensive Metabolic Panel   Result Value Ref Range    Glucose 98 74 - 99 mg/dL    Sodium 135 (L) 136 - 145 mmol/L    Potassium 4.0 3.5 - 5.3 mmol/L    Chloride 103 98 - 107 mmol/L    Bicarbonate 26 21 - 32 mmol/L    Anion Gap 10 10 - 20 mmol/L    Urea Nitrogen 27 (H) 6 - 23 mg/dL    Creatinine 0.93 0.50 - 1.05 mg/dL    eGFR 73 >60 mL/min/1.73m*2    Calcium 9.0 8.6 - 10.3 mg/dL    Albumin 3.8 3.4 - 5.0 g/dL    Alkaline Phosphatase 90 33 - 110 U/L     Total Protein 7.4 6.4 - 8.2 g/dL    AST 18 9 - 39 U/L    Bilirubin, Total 0.7 0.0 - 1.2 mg/dL    ALT 16 7 - 45 U/L   Lactate   Result Value Ref Range    Lactate 1.0 0.4 - 2.0 mmol/L   Blood Culture    Specimen: Peripheral Venipuncture; Blood culture   Result Value Ref Range    Blood Culture Loaded on Instrument - Culture in progress    Blood Culture    Specimen: Peripheral Venipuncture; Blood culture   Result Value Ref Range    Blood Culture Loaded on Instrument - Culture in progress    CBC   Result Value Ref Range    WBC 9.4 4.4 - 11.3 x10*3/uL    nRBC 0.0 0.0 - 0.0 /100 WBCs    RBC 3.13 (L) 4.00 - 5.20 x10*6/uL    Hemoglobin 10.1 (L) 12.0 - 16.0 g/dL    Hematocrit 31.0 (L) 36.0 - 46.0 %    MCV 99 80 - 100 fL    MCH 32.3 26.0 - 34.0 pg    MCHC 32.6 32.0 - 36.0 g/dL    RDW 13.8 11.5 - 14.5 %    Platelets 289 150 - 450 x10*3/uL   Basic metabolic panel   Result Value Ref Range    Glucose 99 74 - 99 mg/dL    Sodium 137 136 - 145 mmol/L    Potassium 3.8 3.5 - 5.3 mmol/L    Chloride 104 98 - 107 mmol/L    Bicarbonate 28 21 - 32 mmol/L    Anion Gap 9 (L) 10 - 20 mmol/L    Urea Nitrogen 24 (H) 6 - 23 mg/dL    Creatinine 0.94 0.50 - 1.05 mg/dL    eGFR 72 >60 mL/min/1.73m*2    Calcium 8.0 (L) 8.6 - 10.3 mg/dL   Lipid Panel Non-Fasting   Result Value Ref Range    Cholesterol 143 0 - 199 mg/dL    HDL-Cholesterol 56.5 mg/dL    Cholesterol/HDL Ratio 2.5     Non-HDL Cholesterol 87 0 - 149 mg/dL   Vancomycin   Result Value Ref Range    Vancomycin 9.6 5.0 - 20.0 ug/mL        Medications  Scheduled medications:  aspirin, 81 mg, oral, Daily  enoxaparin, 60 mg, subcutaneous, q12h ELENA  gabapentin, 300 mg, oral, TID  levothyroxine, 100 mcg, oral, Daily  pantoprazole, 40 mg, oral, Daily before breakfast  piperacillin-tazobactam, 3.375 g, intravenous, q6h  predniSONE, 10 mg, oral, Daily  sennosides, 2 tablet, oral, BID  vancomycin, 1,500 mg, intravenous, q12h      Continuous medications:     PRN medications:  PRN medications:  acetaminophen, HYDROmorphone, oxyCODONE-acetaminophen, oxyCODONE-acetaminophen, vancomycin     Physical Exam  Constitutional:       General: She is not in acute distress.     Appearance: Normal appearance. She is obese.   HENT:      Head: Normocephalic and atraumatic.      Right Ear: External ear normal.      Left Ear: External ear normal.      Nose: Nose normal.      Mouth/Throat:      Mouth: Mucous membranes are moist.      Pharynx: Oropharynx is clear.   Eyes:      Extraocular Movements: Extraocular movements intact.      Conjunctiva/sclera: Conjunctivae normal.      Pupils: Pupils are equal, round, and reactive to light.   Cardiovascular:      Rate and Rhythm: Normal rate and regular rhythm.      Pulses: Normal pulses.      Heart sounds: Normal heart sounds.   Pulmonary:      Effort: Pulmonary effort is normal. No respiratory distress.      Breath sounds: Normal breath sounds. No wheezing, rhonchi or rales.   Abdominal:      General: Bowel sounds are normal.      Palpations: Abdomen is soft.      Tenderness: There is no abdominal tenderness. There is no right CVA tenderness, left CVA tenderness, guarding or rebound.   Musculoskeletal:         General: No swelling. Normal range of motion.      Cervical back: Normal range of motion and neck supple.   Skin:     General: Skin is warm and dry.      Capillary Refill: Capillary refill takes less than 2 seconds.      Findings: Erythema and lesion present. No rash.      Comments: Deep erythema of RLE throughout medial portion up to groin- has significantly improved since marking were made in emergency department   Warm to touch and tender   BLE with wound dressings- she will not allow removal at this time   Neurological:      General: No focal deficit present.      Mental Status: She is alert and oriented to person, place, and time. Mental status is at baseline.   Psychiatric:         Mood and Affect: Mood normal.         Behavior: Behavior normal.                   Assessment/Plan   This patient currently has cardiac telemetry ordered; if you would like to modify or discontinue the telemetry order, click here to go to the orders activity to modify/discontinue the order.  Acute RLE cellulitis  Erythema was demarcated on exam, will monitor for improvement on Zosyn and vancomycin  Will obtain culture of right leg wound, blood cx already sent  Sepsis not present on admission   Suspect strep infection  Improved significantly in first 12 hours of IV antibiotics      Hx chronic BLE lymphedema   Venous insufficiency   Pt follows at [facility] every Monday  Will consult wound nurse for dressing changes     HLD  Pt stopped taking her statin months ago as she kept forgetting to take it  Check lipid panel    Hypothyroidism  Continue home medications      Severe obesity BMI 52.61  Severe obesity requiring increased utilization of hospital resources as demonstrated by BMI 52.61    Anxiety and depression  Continue home medications    In much better spirits this admission, home stressors have improved       Code Status: Full Code          DVT ppx: Lovenox     Please see orders for more complete plan    Inge Gunderson PA-C

## 2024-10-11 NOTE — NURSING NOTE
Sutures were removed earlier in shift by balaji Hwang RN at 1300. Wound was intact and well approximated. Pt notified this RN at 1850 of bleeding and pain in area. Upon inspection healed wound appeared to have dehisced. IMS notified. Wound consult to be placed. Awaiting for basic wound orders.

## 2024-10-11 NOTE — PROGRESS NOTES
"Vancomycin Dosing by Pharmacy- FOLLOW UP    Frances Mcdowell is a 53 y.o. year old female who Pharmacy has been consulted for vancomycin dosing for Vancomycin Indications: Skin & Soft Tissue. Based on the patient's indication and renal status this patient will be dosed based on a goal AUC of 400-600.     Renal function is currently stable.    Current vancomycin dose:  1250 mg every 12 hours    Most recent random level: 9.6 mcg/mL. This was 12 hours after loading dose of 2,000 mg    Visit Vitals  /71 (BP Location: Left arm, Patient Position: Lying)   Pulse 81   Temp 36.3 °C (97.3 °F) (Temporal)   Resp 17           Lab Results   Component Value Date    CREATININE 0.94 10/11/2024    CREATININE 0.93 10/10/2024    CREATININE 0.63 08/09/2024    CREATININE 0.65 08/08/2024       Patient weight is No results found for: \"PTWEIGHT\"    No results found for: \"CULTURE\"    I/O last 3 completed shifts:  In: 1650 (12.2 mL/kg) [IV Piggyback:1650]  Out: - (0 mL/kg)   Weight: 134.7 kg     No results found for: \"PATIENTTEMP\"       Assessment/Plan     Plan to now administer 1500 mg every 12 hours  This dosing regimen is predicted by [employer] to result in the following pharmacokinetic parameters:  Loading dose: N/A  Regimen: 1500 mg IV every 12 hours.  Start time: 07:02 on 10/11/2024  Exposure target: AUC24 (range)400-600 mg/L.hr   [id number]: 472 mg/L.hr  AUC24,ss: 474 mg/L.hr  Probability of AUC24 > 400: 93 %  Ctrough,ss: 10.8 mg/L  Probability of Ctrough,ss > 20: 7 %    The next level will be obtained on 10/13 at 0500. May be obtained sooner if clinically indicated.   Will continue to monitor renal function daily while on vancomycin and order serum creatinine at least every 48 hours if not already ordered.  Follow for continued vancomycin needs, clinical response, and signs/symptoms of toxicity.     Viri Bloom, PharmD    "

## 2024-10-11 NOTE — PROGRESS NOTES
12 sutures assessed and  intact. 12 sutures removed intact from left upper posterior thigh. Area clean, dry and well approximated upon completion of suture removal.  Patient tolerated well with no complaints.

## 2024-10-11 NOTE — PROGRESS NOTES
10/11/24 0944   Discharge Planning   Living Arrangements Spouse/significant other;Children   Support Systems Spouse/significant other;Children   Assistance Needed none   Type of Residence Private residence   Home or Post Acute Services None   Expected Discharge Disposition Home   Housing Stability   At any time in the past 12 months, were you homeless or living in a shelter (including now)? N   Transportation Needs   In the past 12 months, has lack of transportation kept you from meetings, work, or from getting things needed for daily living? No     I spoke with patient to introduce discharge planning and explain role of TCC. Pt states she is independent at home and works.  She denies use of any DME or falls.  Pt admits to sleeping in a recliner and develops neuropathic pain when legs are elevated.   She confirmed PCP is Dr. Moreno.  She also has weekly appts at [facility] for lymphedema, RLE wound. Pt states R leg erythema is much better then when she got here. Wound care RN consulted. Plan to return home on discharge. Will continue to follow for needs.

## 2024-10-11 NOTE — PROGRESS NOTES
Frances Mcdowell is a 53 y.o. female admitted for Cellulitis of right lower extremity. Pharmacy reviewed the patient's kybit-tz-mgaotsihi medications and allergies for accuracy.    The list below reflects the PTA list prior to pharmacy medication history. A summary a changes to the PTA medication list has been listed below. Please review each medication in order reconciliation for additional clarification and justification.    Source of information: T2P     Medications added:    Medications modified:  IBUPROFEN 800MG 1 Q 6 H PRN PAIN --> 1 BID     Medications to be removed:  ADDERALL XR 20MG - DUPLICATE THERAPY  ASPIRIN 81MG CHEWABLE    Medications of concern:      Prior to Admission Medications   Prescriptions Last Dose Informant Patient Reported? Taking?   EPINEPHrine 0.3 mg/0.3 mL injection syringe   No No   Sig: Inject 0.3 mL (0.3 mg) as directed if needed for anaphylaxis. As Directed   amphetamine-dextroamphetamine XR (Adderall XR) 20 mg 24 hr capsule   No No   Sig: Take 1 capsule (20 mg) by mouth 2 times a day.   amphetamine-dextroamphetamine XR (Adderall XR) 20 mg 24 hr capsule   No No   Sig: Take 1 capsule (20 mg) by mouth 2 times a day.   aspirin 81 mg chewable tablet   Yes No   Sig: Chew 1 tablet (81 mg) once daily.   cholecalciferol (Vitamin D-3) 5,000 Units tablet   No No   Sig: Take 1 tablet (5,000 Units) by mouth once daily.   furosemide (Lasix) 20 mg tablet   No No   Sig: Take 0.5 tablets (10 mg) by mouth once daily. As needed for swelling   gabapentin (Neurontin) 300 mg capsule   No No   Sig: Take 1 capsule (300 mg) by mouth 3 times a day.   hydrOXYzine HCL (Atarax) 25 mg tablet   No No   Sig: Take 1 tablet (25 mg) by mouth every 8 hours if needed for anxiety.   ibuprofen 800 mg tablet   No No   Sig: Take 1 tablet (800 mg) by mouth every 6 hours if needed for moderate pain (4 - 6).   levothyroxine (Synthroid, Levoxyl) 100 mcg tablet   No No   Sig: TAKE 1 TABLET(100 MCG) BY MOUTH EVERY DAY   omeprazole  (PriLOSEC) 40 mg DR capsule   No No   Sig: TAKE 1 CAPSULE(40 MG) BY MOUTH EVERY DAY   oxyCODONE-acetaminophen (Percocet)  mg tablet   No No   Sig: Take 1 tablet by mouth every 6 hours if needed for severe pain (7 - 10) for up to 7 days.   predniSONE (Deltasone) 10 mg tablet   No No   Sig: TAKE 1 TABLET BY MOUTH EVERY DAY   rosuvastatin (Crestor) 20 mg tablet   No No   Sig: Take 1 tablet (20 mg) by mouth once daily.   Patient not taking: Reported on 8/5/2024      Facility-Administered Medications: None       ANA CRISTINA SUAREZ

## 2024-10-11 NOTE — CARE PLAN
The patient's goals for the shift include    Problem: Skin  Goal: Decreased wound size/increased tissue granulation at next dressing change  Outcome: Progressing     Problem: Skin  Goal: Participates in plan/prevention/treatment measures  Outcome: Progressing     Problem: Fall/Injury  Goal: Not fall by end of shift  Outcome: Progressing     Problem: Fall/Injury  Goal: Be free from injury by end of the shift  Outcome: Progressing       The clinical goals for the shift include  monitor vitals and pain management.    Over the shift, the patient did make progress towards goals.  All  needs met, patient safety maintained.

## 2024-10-11 NOTE — NURSING NOTE
Per  dr. Smith verbal order was given to take out pts stitches on her posterior upper thigh area. 12 stitches intact, 12 stitches removed.

## 2024-10-11 NOTE — NURSING NOTE
Pt gave this RN appt card from outpatient center that displays today wass the day the pts sutures were due to be removed. Pic uploaded in chart under wound picture.

## 2024-10-12 LAB
ANION GAP SERPL CALC-SCNC: 9 MMOL/L (ref 10–20)
BUN SERPL-MCNC: 24 MG/DL (ref 6–23)
CALCIUM SERPL-MCNC: 8.1 MG/DL (ref 8.6–10.3)
CHLORIDE SERPL-SCNC: 106 MMOL/L (ref 98–107)
CO2 SERPL-SCNC: 27 MMOL/L (ref 21–32)
CREAT SERPL-MCNC: 0.8 MG/DL (ref 0.5–1.05)
EGFRCR SERPLBLD CKD-EPI 2021: 87 ML/MIN/1.73M*2
ERYTHROCYTE [DISTWIDTH] IN BLOOD BY AUTOMATED COUNT: 13.5 % (ref 11.5–14.5)
GLUCOSE SERPL-MCNC: 94 MG/DL (ref 74–99)
HCT VFR BLD AUTO: 32.5 % (ref 36–46)
HGB BLD-MCNC: 10.2 G/DL (ref 12–16)
MAGNESIUM SERPL-MCNC: 1.88 MG/DL (ref 1.6–2.4)
MCH RBC QN AUTO: 31.9 PG (ref 26–34)
MCHC RBC AUTO-ENTMCNC: 31.4 G/DL (ref 32–36)
MCV RBC AUTO: 102 FL (ref 80–100)
NRBC BLD-RTO: 0 /100 WBCS (ref 0–0)
PLATELET # BLD AUTO: 313 X10*3/UL (ref 150–450)
POTASSIUM SERPL-SCNC: 3.6 MMOL/L (ref 3.5–5.3)
RBC # BLD AUTO: 3.2 X10*6/UL (ref 4–5.2)
SODIUM SERPL-SCNC: 138 MMOL/L (ref 136–145)
WBC # BLD AUTO: 8.2 X10*3/UL (ref 4.4–11.3)

## 2024-10-12 PROCEDURE — 2500000001 HC RX 250 WO HCPCS SELF ADMINISTERED DRUGS (ALT 637 FOR MEDICARE OP)

## 2024-10-12 PROCEDURE — 2500000004 HC RX 250 GENERAL PHARMACY W/ HCPCS (ALT 636 FOR OP/ED)

## 2024-10-12 PROCEDURE — 2500000001 HC RX 250 WO HCPCS SELF ADMINISTERED DRUGS (ALT 637 FOR MEDICARE OP): Performed by: PHYSICIAN ASSISTANT

## 2024-10-12 PROCEDURE — 2500000001 HC RX 250 WO HCPCS SELF ADMINISTERED DRUGS (ALT 637 FOR MEDICARE OP): Performed by: NURSE PRACTITIONER

## 2024-10-12 PROCEDURE — 85027 COMPLETE CBC AUTOMATED: CPT | Performed by: PHYSICIAN ASSISTANT

## 2024-10-12 PROCEDURE — 36415 COLL VENOUS BLD VENIPUNCTURE: CPT | Performed by: PHYSICIAN ASSISTANT

## 2024-10-12 PROCEDURE — 2060000001 HC INTERMEDIATE ICU ROOM DAILY

## 2024-10-12 PROCEDURE — 2500000001 HC RX 250 WO HCPCS SELF ADMINISTERED DRUGS (ALT 637 FOR MEDICARE OP): Performed by: INTERNAL MEDICINE

## 2024-10-12 PROCEDURE — 83735 ASSAY OF MAGNESIUM: CPT | Performed by: PHYSICIAN ASSISTANT

## 2024-10-12 PROCEDURE — 2500000004 HC RX 250 GENERAL PHARMACY W/ HCPCS (ALT 636 FOR OP/ED): Performed by: NURSE PRACTITIONER

## 2024-10-12 PROCEDURE — G0378 HOSPITAL OBSERVATION PER HR: HCPCS

## 2024-10-12 PROCEDURE — 80048 BASIC METABOLIC PNL TOTAL CA: CPT | Performed by: PHYSICIAN ASSISTANT

## 2024-10-12 PROCEDURE — 99233 SBSQ HOSP IP/OBS HIGH 50: CPT | Performed by: INTERNAL MEDICINE

## 2024-10-12 RX ORDER — CLINDAMYCIN HYDROCHLORIDE 150 MG/1
300 CAPSULE ORAL EVERY 8 HOURS SCHEDULED
Status: DISCONTINUED | OUTPATIENT
Start: 2024-10-12 | End: 2024-10-13 | Stop reason: HOSPADM

## 2024-10-12 RX ADMIN — GABAPENTIN 300 MG: 300 CAPSULE ORAL at 08:32

## 2024-10-12 RX ADMIN — OXYCODONE HYDROCHLORIDE AND ACETAMINOPHEN 2 TABLET: 5; 325 TABLET ORAL at 08:32

## 2024-10-12 RX ADMIN — PANTOPRAZOLE SODIUM 40 MG: 40 TABLET, DELAYED RELEASE ORAL at 05:26

## 2024-10-12 RX ADMIN — OXYCODONE HYDROCHLORIDE AND ACETAMINOPHEN 2 TABLET: 5; 325 TABLET ORAL at 14:28

## 2024-10-12 RX ADMIN — DEXTROAMPHETAMINE SACCHARATE, AMPHETAMINE ASPARTATE, DEXTROAMPHETAMINE SULFATE AND AMPHETAMINE SULFATE 10 MG: 1.25; 1.25; 1.25; 1.25 TABLET ORAL at 08:32

## 2024-10-12 RX ADMIN — GABAPENTIN 300 MG: 300 CAPSULE ORAL at 20:51

## 2024-10-12 RX ADMIN — PIPERACILLIN SODIUM AND TAZOBACTAM SODIUM 3.38 G: 3; .375 INJECTION, SOLUTION INTRAVENOUS at 03:24

## 2024-10-12 RX ADMIN — LEVOFLOXACIN 750 MG: 500 TABLET, FILM COATED ORAL at 14:28

## 2024-10-12 RX ADMIN — OXYCODONE HYDROCHLORIDE AND ACETAMINOPHEN 2 TABLET: 5; 325 TABLET ORAL at 20:51

## 2024-10-12 RX ADMIN — VANCOMYCIN HYDROCHLORIDE 1500 MG: 1.5 INJECTION, POWDER, LYOPHILIZED, FOR SOLUTION INTRAVENOUS at 08:31

## 2024-10-12 RX ADMIN — DEXTROAMPHETAMINE SACCHARATE, AMPHETAMINE ASPARTATE, DEXTROAMPHETAMINE SULFATE AND AMPHETAMINE SULFATE 10 MG: 1.25; 1.25; 1.25; 1.25 TABLET ORAL at 13:15

## 2024-10-12 RX ADMIN — GABAPENTIN 300 MG: 300 CAPSULE ORAL at 14:28

## 2024-10-12 RX ADMIN — PREDNISONE 10 MG: 10 TABLET ORAL at 08:32

## 2024-10-12 RX ADMIN — LEVOTHYROXINE SODIUM 100 MCG: 0.1 TABLET ORAL at 05:26

## 2024-10-12 RX ADMIN — CLINDAMYCIN HYDROCHLORIDE 300 MG: 150 CAPSULE ORAL at 14:28

## 2024-10-12 RX ADMIN — CLINDAMYCIN HYDROCHLORIDE 300 MG: 150 CAPSULE ORAL at 21:45

## 2024-10-12 RX ADMIN — PIPERACILLIN SODIUM AND TAZOBACTAM SODIUM 3.38 G: 3; .375 INJECTION, SOLUTION INTRAVENOUS at 08:31

## 2024-10-12 SDOH — HEALTH STABILITY: MENTAL HEALTH: HOW OFTEN DO YOU HAVE SIX OR MORE DRINKS ON ONE OCCASION?: NEVER

## 2024-10-12 SDOH — ECONOMIC STABILITY: HOUSING INSECURITY: IN THE PAST 12 MONTHS, HOW MANY TIMES HAVE YOU MOVED WHERE YOU WERE LIVING?: 0

## 2024-10-12 SDOH — SOCIAL STABILITY: SOCIAL NETWORK: HOW OFTEN DO YOU ATTEND MEETINGS OF THE CLUBS OR ORGANIZATIONS YOU BELONG TO?: NEVER

## 2024-10-12 SDOH — ECONOMIC STABILITY: HOUSING INSECURITY: AT ANY TIME IN THE PAST 12 MONTHS, WERE YOU HOMELESS OR LIVING IN A SHELTER (INCLUDING NOW)?: NO

## 2024-10-12 SDOH — ECONOMIC STABILITY: FOOD INSECURITY: WITHIN THE PAST 12 MONTHS, YOU WORRIED THAT YOUR FOOD WOULD RUN OUT BEFORE YOU GOT MONEY TO BUY MORE.: NEVER TRUE

## 2024-10-12 SDOH — SOCIAL STABILITY: SOCIAL NETWORK: HOW OFTEN DO YOU ATTEND CHURCH OR RELIGIOUS SERVICES?: NEVER

## 2024-10-12 SDOH — SOCIAL STABILITY: SOCIAL INSECURITY: WITHIN THE LAST YEAR, HAVE YOU BEEN HUMILIATED OR EMOTIONALLY ABUSED IN OTHER WAYS BY YOUR PARTNER OR EX-PARTNER?: NO

## 2024-10-12 SDOH — HEALTH STABILITY: MENTAL HEALTH
DO YOU FEEL STRESS - TENSE, RESTLESS, NERVOUS, OR ANXIOUS, OR UNABLE TO SLEEP AT NIGHT BECAUSE YOUR MIND IS TROUBLED ALL THE TIME - THESE DAYS?: TO SOME EXTENT

## 2024-10-12 SDOH — SOCIAL STABILITY: SOCIAL INSECURITY: WITHIN THE LAST YEAR, HAVE YOU BEEN AFRAID OF YOUR PARTNER OR EX-PARTNER?: NO

## 2024-10-12 SDOH — ECONOMIC STABILITY: INCOME INSECURITY: IN THE LAST 12 MONTHS, WAS THERE A TIME WHEN YOU WERE NOT ABLE TO PAY THE MORTGAGE OR RENT ON TIME?: NO

## 2024-10-12 SDOH — HEALTH STABILITY: MENTAL HEALTH
HOW OFTEN DO YOU NEED TO HAVE SOMEONE HELP YOU WHEN YOU READ INSTRUCTIONS, PAMPHLETS, OR OTHER WRITTEN MATERIAL FROM YOUR DOCTOR OR PHARMACY?: NEVER

## 2024-10-12 SDOH — SOCIAL STABILITY: SOCIAL NETWORK: IN A TYPICAL WEEK, HOW MANY TIMES DO YOU TALK ON THE PHONE WITH FAMILY, FRIENDS, OR NEIGHBORS?: ONCE A WEEK

## 2024-10-12 SDOH — HEALTH STABILITY: MENTAL HEALTH
STRESS IS WHEN SOMEONE FEELS TENSE, NERVOUS, ANXIOUS, OR CAN'T SLEEP AT NIGHT BECAUSE THEIR MIND IS TROUBLED. HOW STRESSED ARE YOU?: TO SOME EXTENT

## 2024-10-12 SDOH — SOCIAL STABILITY: SOCIAL INSECURITY
WITHIN THE LAST YEAR, HAVE YOU BEEN KICKED, HIT, SLAPPED, OR OTHERWISE PHYSICALLY HURT BY YOUR PARTNER OR EX-PARTNER?: NO

## 2024-10-12 SDOH — SOCIAL STABILITY: SOCIAL NETWORK
DO YOU BELONG TO ANY CLUBS OR ORGANIZATIONS SUCH AS CHURCH GROUPS UNIONS, FRATERNAL OR ATHLETIC GROUPS, OR SCHOOL GROUPS?: NO

## 2024-10-12 SDOH — ECONOMIC STABILITY: HOUSING INSECURITY: IN THE LAST 12 MONTHS, WAS THERE A TIME WHEN YOU WERE NOT ABLE TO PAY THE MORTGAGE OR RENT ON TIME?: NO

## 2024-10-12 SDOH — SOCIAL STABILITY: SOCIAL NETWORK: HOW OFTEN DO YOU ATTENT MEETINGS OF THE CLUB OR ORGANIZATION YOU BELONG TO?: NEVER

## 2024-10-12 SDOH — ECONOMIC STABILITY: INCOME INSECURITY: IN THE PAST 12 MONTHS, HAS THE ELECTRIC, GAS, OIL, OR WATER COMPANY THREATENED TO SHUT OFF SERVICE IN YOUR HOME?: NO

## 2024-10-12 SDOH — ECONOMIC STABILITY: FOOD INSECURITY: WITHIN THE PAST 12 MONTHS, YOU WORRIED THAT YOUR FOOD WOULD RUN OUT BEFORE YOU GOT THE MONEY TO BUY MORE.: NEVER TRUE

## 2024-10-12 SDOH — HEALTH STABILITY: PHYSICAL HEALTH: ON AVERAGE, HOW MANY DAYS PER WEEK DO YOU ENGAGE IN MODERATE TO STRENUOUS EXERCISE (LIKE A BRISK WALK)?: 0 DAYS

## 2024-10-12 SDOH — SOCIAL STABILITY: SOCIAL NETWORK: HOW OFTEN DO YOU GET TOGETHER WITH FRIENDS OR RELATIVES?: ONCE A WEEK

## 2024-10-12 SDOH — SOCIAL STABILITY: SOCIAL INSECURITY
WITHIN THE LAST YEAR, HAVE YOU BEEN RAPED OR FORCED TO HAVE ANY KIND OF SEXUAL ACTIVITY BY YOUR PARTNER OR EX-PARTNER?: NO

## 2024-10-12 SDOH — ECONOMIC STABILITY: INCOME INSECURITY: IN THE PAST 12 MONTHS HAS THE ELECTRIC, GAS, OIL, OR WATER COMPANY THREATENED TO SHUT OFF SERVICES IN YOUR HOME?: NO

## 2024-10-12 SDOH — HEALTH STABILITY: MENTAL HEALTH: HOW OFTEN DO YOU HAVE A DRINK CONTAINING ALCOHOL?: 2-4 TIMES A MONTH

## 2024-10-12 SDOH — HEALTH STABILITY: PHYSICAL HEALTH: ON AVERAGE, HOW MANY MINUTES DO YOU ENGAGE IN EXERCISE AT THIS LEVEL?: 0 MIN

## 2024-10-12 SDOH — HEALTH STABILITY: MENTAL HEALTH: HOW OFTEN DO YOU HAVE 6 OR MORE DRINKS ON ONE OCCASION?: NEVER

## 2024-10-12 SDOH — HEALTH STABILITY: MENTAL HEALTH: HOW MANY DRINKS CONTAINING ALCOHOL DO YOU HAVE ON A TYPICAL DAY WHEN YOU ARE DRINKING?: 1 OR 2

## 2024-10-12 SDOH — ECONOMIC STABILITY: INCOME INSECURITY: HOW HARD IS IT FOR YOU TO PAY FOR THE VERY BASICS LIKE FOOD, HOUSING, MEDICAL CARE, AND HEATING?: NOT HARD AT ALL

## 2024-10-12 SDOH — ECONOMIC STABILITY: FOOD INSECURITY: WITHIN THE PAST 12 MONTHS, THE FOOD YOU BOUGHT JUST DIDN'T LAST AND YOU DIDN'T HAVE MONEY TO GET MORE.: NEVER TRUE

## 2024-10-12 SDOH — SOCIAL STABILITY: SOCIAL INSECURITY: ARE YOU MARRIED, WIDOWED, DIVORCED, SEPARATED, NEVER MARRIED, OR LIVING WITH A PARTNER?: MARRIED

## 2024-10-12 SDOH — ECONOMIC STABILITY: FOOD INSECURITY: HOW HARD IS IT FOR YOU TO PAY FOR THE VERY BASICS LIKE FOOD, HOUSING, MEDICAL CARE, AND HEATING?: NOT HARD AT ALL

## 2024-10-12 SDOH — SOCIAL STABILITY: SOCIAL NETWORK
DO YOU BELONG TO ANY CLUBS OR ORGANIZATIONS SUCH AS CHURCH GROUPS, UNIONS, FRATERNAL OR ATHLETIC GROUPS, OR SCHOOL GROUPS?: NO

## 2024-10-12 SDOH — SOCIAL STABILITY: SOCIAL INSECURITY
WITHIN THE LAST YEAR, HAVE TO BEEN RAPED OR FORCED TO HAVE ANY KIND OF SEXUAL ACTIVITY BY YOUR PARTNER OR EX-PARTNER?: NO

## 2024-10-12 SDOH — ECONOMIC STABILITY: TRANSPORTATION INSECURITY
IN THE PAST 12 MONTHS, HAS LACK OF TRANSPORTATION KEPT YOU FROM MEETINGS, WORK, OR FROM GETTING THINGS NEEDED FOR DAILY LIVING?: NO

## 2024-10-12 SDOH — ECONOMIC STABILITY: TRANSPORTATION INSECURITY: IN THE PAST 12 MONTHS, HAS LACK OF TRANSPORTATION KEPT YOU FROM MEDICAL APPOINTMENTS OR FROM GETTING MEDICATIONS?: NO

## 2024-10-12 SDOH — HEALTH STABILITY: MENTAL HEALTH: HOW MANY STANDARD DRINKS CONTAINING ALCOHOL DO YOU HAVE ON A TYPICAL DAY?: 1 OR 2

## 2024-10-12 SDOH — SOCIAL STABILITY: SOCIAL NETWORK: ARE YOU MARRIED, WIDOWED, DIVORCED, SEPARATED, NEVER MARRIED, OR LIVING WITH A PARTNER?: MARRIED

## 2024-10-12 SDOH — ECONOMIC STABILITY: TRANSPORTATION INSECURITY
IN THE PAST 12 MONTHS, HAS THE LACK OF TRANSPORTATION KEPT YOU FROM MEDICAL APPOINTMENTS OR FROM GETTING MEDICATIONS?: NO

## 2024-10-12 ASSESSMENT — ENCOUNTER SYMPTOMS
DIZZINESS: 0
NAUSEA: 0
BACK PAIN: 0
DYSURIA: 0
HEADACHES: 0
CHILLS: 0
COUGH: 0
VOMITING: 0
HEMATURIA: 0
FATIGUE: 0
ABDOMINAL PAIN: 0
CONSTIPATION: 0
FEVER: 0
PALPITATIONS: 0
SHORTNESS OF BREATH: 0
WEAKNESS: 0
SORE THROAT: 0
BLOOD IN STOOL: 0
WOUND: 1
EYE PAIN: 0
DIAPHORESIS: 0
WHEEZING: 0
FACIAL SWELLING: 0
FLANK PAIN: 0
APPETITE CHANGE: 0
BRUISES/BLEEDS EASILY: 0
DIARRHEA: 0
NUMBNESS: 0
LIGHT-HEADEDNESS: 0
CHEST TIGHTNESS: 0
TROUBLE SWALLOWING: 0
FREQUENCY: 0
JOINT SWELLING: 0
HALLUCINATIONS: 0

## 2024-10-12 ASSESSMENT — PAIN - FUNCTIONAL ASSESSMENT
PAIN_FUNCTIONAL_ASSESSMENT: 0-10

## 2024-10-12 ASSESSMENT — COGNITIVE AND FUNCTIONAL STATUS - GENERAL
DAILY ACTIVITIY SCORE: 24
MOBILITY SCORE: 24

## 2024-10-12 ASSESSMENT — PAIN SCALES - GENERAL
PAINLEVEL_OUTOF10: 7
PAINLEVEL_OUTOF10: 8
PAINLEVEL_OUTOF10: 7
PAINLEVEL_OUTOF10: 0 - NO PAIN

## 2024-10-12 ASSESSMENT — PAIN DESCRIPTION - ORIENTATION
ORIENTATION: RIGHT;LEFT
ORIENTATION: RIGHT;LEFT
ORIENTATION: LEFT

## 2024-10-12 ASSESSMENT — ACTIVITIES OF DAILY LIVING (ADL): LACK_OF_TRANSPORTATION: NO

## 2024-10-12 ASSESSMENT — LIFESTYLE VARIABLES
AUDIT-C TOTAL SCORE: 2
SKIP TO QUESTIONS 9-10: 1

## 2024-10-12 ASSESSMENT — PAIN DESCRIPTION - LOCATION
LOCATION: LEG
LOCATION: LEG
LOCATION: HIP

## 2024-10-12 NOTE — CARE PLAN
The patient's goals for the shift include      The clinical goals for the shift include pt will have decreasing pain this shift    Over the shift, the patient did not make progress toward the following goals.

## 2024-10-12 NOTE — PROGRESS NOTES
Frances Mcdowell is a 53 y.o. female on day 2 of admission presenting with Cellulitis of right lower extremity.      Subjective   Frances Mcdowell is a 53 y.o. female with PMHx of hypothyroidism, venous insufficiency, lymphedema (she follows at [facility] every Monday) who presented with right lower leg cellulitis, sudden onset. She last changed her dressing this morning. Throughout the day she has noted significant erythema and warmth spreading up the right leg, past the knee and up into the groin. She denies fevers but has had chills. ED workup was notable for WBC 17.4k. Lactate was 1.0. She had no fevers. She was tachycardic to 106 bpm. Blood cultures were drawn and she was given Zosyn and vancomycin. She did not allow the ED staff to remove the dressings.     10/11/2024: No acute events overnight. Vitals stable, afebrile. Leukocytosis resolved. Slight anemia with hgb 10.1  Bl cx x2 drawn and pending  Patient reports significant improvement in both pain, redness, warmth of RLE overnight. She is known to me from prior admission in August, appears in much better spirits.   She states she is to be discharged to home on Sunday so she can get back to work.     10/12: Erythema continues to improve. She lost IV acces and multiple attempts were made to replace. Patient is asking to stop attempts and be without an IV. I agreed for now and switched to PO abx.           Review of Systems   Constitutional:  Negative for appetite change, chills, diaphoresis, fatigue and fever.   HENT:  Negative for congestion, ear pain, facial swelling, hearing loss, nosebleeds, sore throat, tinnitus and trouble swallowing.    Eyes:  Negative for pain.   Respiratory:  Negative for cough, chest tightness, shortness of breath and wheezing.    Cardiovascular:  Negative for chest pain, palpitations and leg swelling.   Gastrointestinal:  Negative for abdominal pain, blood in stool, constipation, diarrhea, nausea and vomiting.    Genitourinary:  Negative for dysuria, flank pain, frequency, hematuria and urgency.   Musculoskeletal:  Negative for back pain and joint swelling.   Skin:  Positive for rash and wound.   Neurological:  Negative for dizziness, syncope, weakness, light-headedness, numbness and headaches.   Hematological:  Does not bruise/bleed easily.   Psychiatric/Behavioral:  Negative for behavioral problems, hallucinations and suicidal ideas.           Objective     Last Recorded Vitals  /82 (BP Location: Right arm, Patient Position: Lying)   Pulse 79   Temp 37.1 °C (98.8 °F) (Temporal)   Resp 18   Wt 148 kg (325 lb 6.4 oz)   SpO2 93%     Image Results  No results found.     Lab Results  Results for orders placed or performed during the hospital encounter of 10/10/24 (from the past 24 hour(s))   Basic Metabolic Panel   Result Value Ref Range    Glucose 94 74 - 99 mg/dL    Sodium 138 136 - 145 mmol/L    Potassium 3.6 3.5 - 5.3 mmol/L    Chloride 106 98 - 107 mmol/L    Bicarbonate 27 21 - 32 mmol/L    Anion Gap 9 (L) 10 - 20 mmol/L    Urea Nitrogen 24 (H) 6 - 23 mg/dL    Creatinine 0.80 0.50 - 1.05 mg/dL    eGFR 87 >60 mL/min/1.73m*2    Calcium 8.1 (L) 8.6 - 10.3 mg/dL   CBC   Result Value Ref Range    WBC 8.2 4.4 - 11.3 x10*3/uL    nRBC 0.0 0.0 - 0.0 /100 WBCs    RBC 3.20 (L) 4.00 - 5.20 x10*6/uL    Hemoglobin 10.2 (L) 12.0 - 16.0 g/dL    Hematocrit 32.5 (L) 36.0 - 46.0 %     (H) 80 - 100 fL    MCH 31.9 26.0 - 34.0 pg    MCHC 31.4 (L) 32.0 - 36.0 g/dL    RDW 13.5 11.5 - 14.5 %    Platelets 313 150 - 450 x10*3/uL   Magnesium   Result Value Ref Range    Magnesium 1.88 1.60 - 2.40 mg/dL        Medications  Scheduled medications:  amphetamine-dextroamphetamine, 10 mg, oral, BID  aspirin, 81 mg, oral, Daily  clindamycin, 300 mg, oral, q8h ELENA  enoxaparin, 60 mg, subcutaneous, q12h ELENA  gabapentin, 300 mg, oral, TID  levoFLOXacin, 750 mg, oral, q24h ELENA  levothyroxine, 100 mcg, oral, Daily  pantoprazole, 40 mg, oral,  Daily before breakfast  predniSONE, 10 mg, oral, Daily  sennosides, 2 tablet, oral, BID      Continuous medications:     PRN medications:  PRN medications: acetaminophen, HYDROmorphone, oxyCODONE-acetaminophen, oxyCODONE-acetaminophen     Physical Exam  Constitutional:       General: She is not in acute distress.     Appearance: Normal appearance. She is obese.   HENT:      Head: Normocephalic and atraumatic.      Right Ear: External ear normal.      Left Ear: External ear normal.      Nose: Nose normal.      Mouth/Throat:      Mouth: Mucous membranes are moist.      Pharynx: Oropharynx is clear.   Eyes:      Extraocular Movements: Extraocular movements intact.      Conjunctiva/sclera: Conjunctivae normal.      Pupils: Pupils are equal, round, and reactive to light.   Cardiovascular:      Rate and Rhythm: Normal rate and regular rhythm.      Pulses: Normal pulses.      Heart sounds: Normal heart sounds.   Pulmonary:      Effort: Pulmonary effort is normal. No respiratory distress.      Breath sounds: Normal breath sounds. No wheezing, rhonchi or rales.   Abdominal:      General: Bowel sounds are normal.      Palpations: Abdomen is soft.      Tenderness: There is no abdominal tenderness. There is no right CVA tenderness, left CVA tenderness, guarding or rebound.   Musculoskeletal:         General: No swelling. Normal range of motion.      Cervical back: Normal range of motion and neck supple.   Skin:     General: Skin is warm and dry.      Capillary Refill: Capillary refill takes less than 2 seconds.      Findings: Erythema and lesion present. No rash.      Comments: Deep erythema of RLE throughout medial portion up to groin- has significantly improved since marking were made in emergency department   Warm to touch and tender   BLE with wound dressings- she will not allow removal at this time   Neurological:      General: No focal deficit present.      Mental Status: She is alert and oriented to person, place, and  time. Mental status is at baseline.   Psychiatric:         Mood and Affect: Mood normal.         Behavior: Behavior normal.                  Assessment/Plan   This patient currently has cardiac telemetry ordered; if you would like to modify or discontinue the telemetry order, click here to go to the orders activity to modify/discontinue the order.  Acute RLE cellulitis  Erythema was demarcated on exam  Will obtain culture of right leg wound, blood cx already sent  Sepsis not present on admission   Improved significantly in first 12 hours of IV antibiotics   10/12: Erythema continues to improve. She lost IV acces and multiple attempts were made to replace. Patient is asking to stop attempts and be without an IV. I agreed for now and switched to PO abx.  Vanc and Zosyn to Clindamycin and Levaquin (Recent Cx with MRSA and Pseudomonas)      Hx chronic BLE lymphedema   Venous insufficiency   Pt follows at [facility] every Monday  Will consult wound nurse for dressing changes     HLD  Pt stopped taking her statin months ago as she kept forgetting to take it    Hypothyroidism  Continue home medications      Severe obesity BMI 52.61  Severe obesity requiring increased utilization of hospital resources as demonstrated by BMI 52.61    Anxiety and depression  Continue home medications      Code Status: Full Code          DVT ppx: Lovenox       Sherwin Carter MD PhD

## 2024-10-12 NOTE — PROGRESS NOTES
Vancomycin Dosing by Pharmacy- Cessation of Therapy    Consult to pharmacy for vancomycin dosing has been discontinued by the prescriber, pharmacy will sign off at this time.    Please call pharmacy if there are further questions or re-enter a consult if vancomycin is resumed.     Viri Bloom, PharmD

## 2024-10-13 ENCOUNTER — PHARMACY VISIT (OUTPATIENT)
Dept: PHARMACY | Facility: CLINIC | Age: 54
End: 2024-10-13
Payer: MEDICARE

## 2024-10-13 VITALS
HEART RATE: 74 BPM | OXYGEN SATURATION: 100 % | WEIGHT: 293 LBS | HEIGHT: 63 IN | SYSTOLIC BLOOD PRESSURE: 146 MMHG | BODY MASS INDEX: 51.91 KG/M2 | RESPIRATION RATE: 18 BRPM | TEMPERATURE: 97 F | DIASTOLIC BLOOD PRESSURE: 89 MMHG

## 2024-10-13 LAB
BACTERIA BLD CULT: NORMAL
BACTERIA BLD CULT: NORMAL

## 2024-10-13 PROCEDURE — 2500000001 HC RX 250 WO HCPCS SELF ADMINISTERED DRUGS (ALT 637 FOR MEDICARE OP): Performed by: PHYSICIAN ASSISTANT

## 2024-10-13 PROCEDURE — 2500000001 HC RX 250 WO HCPCS SELF ADMINISTERED DRUGS (ALT 637 FOR MEDICARE OP)

## 2024-10-13 PROCEDURE — 2500000001 HC RX 250 WO HCPCS SELF ADMINISTERED DRUGS (ALT 637 FOR MEDICARE OP): Performed by: NURSE PRACTITIONER

## 2024-10-13 PROCEDURE — 2500000001 HC RX 250 WO HCPCS SELF ADMINISTERED DRUGS (ALT 637 FOR MEDICARE OP): Performed by: INTERNAL MEDICINE

## 2024-10-13 PROCEDURE — RXMED WILLOW AMBULATORY MEDICATION CHARGE

## 2024-10-13 PROCEDURE — 2500000004 HC RX 250 GENERAL PHARMACY W/ HCPCS (ALT 636 FOR OP/ED): Performed by: NURSE PRACTITIONER

## 2024-10-13 PROCEDURE — 99239 HOSP IP/OBS DSCHRG MGMT >30: CPT | Performed by: INTERNAL MEDICINE

## 2024-10-13 PROCEDURE — G0378 HOSPITAL OBSERVATION PER HR: HCPCS

## 2024-10-13 RX ORDER — CLINDAMYCIN HYDROCHLORIDE 300 MG/1
300 CAPSULE ORAL EVERY 8 HOURS SCHEDULED
Qty: 21 CAPSULE | Refills: 0 | Status: SHIPPED | OUTPATIENT
Start: 2024-10-13 | End: 2024-10-20

## 2024-10-13 RX ORDER — LEVOFLOXACIN 750 MG/1
750 TABLET ORAL
Qty: 7 TABLET | Refills: 0 | Status: SHIPPED | OUTPATIENT
Start: 2024-10-13 | End: 2024-10-20

## 2024-10-13 RX ADMIN — LEVOFLOXACIN 750 MG: 500 TABLET, FILM COATED ORAL at 09:49

## 2024-10-13 RX ADMIN — PREDNISONE 10 MG: 10 TABLET ORAL at 09:37

## 2024-10-13 RX ADMIN — PANTOPRAZOLE SODIUM 40 MG: 40 TABLET, DELAYED RELEASE ORAL at 06:10

## 2024-10-13 RX ADMIN — OXYCODONE HYDROCHLORIDE AND ACETAMINOPHEN 1 TABLET: 5; 325 TABLET ORAL at 02:54

## 2024-10-13 RX ADMIN — DEXTROAMPHETAMINE SACCHARATE, AMPHETAMINE ASPARTATE, DEXTROAMPHETAMINE SULFATE AND AMPHETAMINE SULFATE 10 MG: 1.25; 1.25; 1.25; 1.25 TABLET ORAL at 09:37

## 2024-10-13 RX ADMIN — GABAPENTIN 300 MG: 300 CAPSULE ORAL at 09:37

## 2024-10-13 RX ADMIN — CLINDAMYCIN HYDROCHLORIDE 300 MG: 150 CAPSULE ORAL at 06:11

## 2024-10-13 RX ADMIN — LEVOTHYROXINE SODIUM 100 MCG: 0.1 TABLET ORAL at 06:10

## 2024-10-13 ASSESSMENT — PAIN SCALES - GENERAL
PAINLEVEL_OUTOF10: 6
PAINLEVEL_OUTOF10: 0 - NO PAIN
PAINLEVEL_OUTOF10: 0 - NO PAIN

## 2024-10-13 ASSESSMENT — PAIN DESCRIPTION - LOCATION: LOCATION: LEG

## 2024-10-13 ASSESSMENT — PAIN DESCRIPTION - ORIENTATION: ORIENTATION: RIGHT

## 2024-10-13 ASSESSMENT — PAIN - FUNCTIONAL ASSESSMENT
PAIN_FUNCTIONAL_ASSESSMENT: 0-10
PAIN_FUNCTIONAL_ASSESSMENT: 0-10

## 2024-10-13 NOTE — CARE PLAN
Pt has been discharged from SDU. Outpatient prescriptions provided for pt. Discharged education given. Pt discharged via wheelchair. No acute events within shift.      Problem: Skin  Goal: Decreased wound size/increased tissue granulation at next dressing change  Outcome: Met  Goal: Participates in plan/prevention/treatment measures  Outcome: Met  Goal: Prevent/manage excess moisture  Outcome: Met  Goal: Prevent/minimize sheer/friction injuries  Outcome: Met  Goal: Promote/optimize nutrition  Outcome: Met  Goal: Promote skin healing  Outcome: Met     Problem: Fall/Injury  Goal: Not fall by end of shift  Outcome: Met  Goal: Be free from injury by end of the shift  Outcome: Met  Goal: Verbalize understanding of personal risk factors for fall in the hospital  Outcome: Met  Goal: Verbalize understanding of risk factor reduction measures to prevent injury from fall in the home  Outcome: Met  Goal: Pace activities to prevent fatigue by end of the shift  Outcome: Met  Goal: Use assistive devices by end of the shift  Outcome: Met     Problem: Pain  Goal: Takes deep breaths with improved pain control throughout the shift  Outcome: Met  Goal: Turns in bed with improved pain control throughout the shift  Outcome: Met  Goal: Walks with improved pain control throughout the shift  Outcome: Met  Goal: Performs ADL's with improved pain control throughout shift  Outcome: Met  Goal: Participates in PT with improved pain control throughout the shift  Outcome: Met  Goal: Free from opioid side effects throughout the shift  Outcome: Met  Goal: Free from acute confusion related to pain meds throughout the shift  Outcome: Met     Problem: Pain - Adult  Goal: Verbalizes/displays adequate comfort level or baseline comfort level  Outcome: Met     Problem: Safety - Adult  Goal: Free from fall injury  Outcome: Met     Problem: Discharge Planning  Goal: Discharge to home or other facility with appropriate resources  Outcome: Met     Problem:  Chronic Conditions and Co-morbidities  Goal: Patient's chronic conditions and co-morbidity symptoms are monitored and maintained or improved  Outcome: Met

## 2024-10-13 NOTE — DISCHARGE SUMMARY
DISCHARGE SUMMARY     Discharge Diagnosis  Cellulitis of right lower extremity    This discharge took greater than 35 minutes.    Test Results Pending At Discharge  Pending Labs       Order Current Status    Blood Culture Preliminary result    Blood Culture Preliminary result            Hospital Course   Frances Mcdowell is a 53 y.o. female with PMHx of hypothyroidism, venous insufficiency, lymphedema (she follows at [facility] every Monday) who presented with right lower leg cellulitis, sudden onset. She last changed her dressing this morning. Throughout the day she has noted significant erythema and warmth spreading up the right leg, past the knee and up into the groin. She denies fevers but has had chills. ED workup was notable for WBC 17.4k. Lactate was 1.0. She had no fevers. She was tachycardic to 106 bpm. Blood cultures were drawn and she was given Zosyn and vancomycin. She did not allow the ED staff to remove the dressings.      10/11/2024: No acute events overnight. Vitals stable, afebrile. Leukocytosis resolved. Slight anemia with hgb 10.1  Bl cx x2 drawn and pending  Patient reports significant improvement in both pain, redness, warmth of RLE overnight. She is known to me from prior admission in August, appears in much better spirits.   She states she is to be discharged to home on Sunday so she can get back to work.      10/12: Erythema continues to improve. She lost IV acces and multiple attempts were made to replace. Patient is asking to stop attempts and be without an IV. I agreed for now and switched to PO abx.     Acute RLE cellulitis  Erythema improving daily  Sepsis not present on admission   Transition to Clindamycin and Levaquin (Recent Cx with MRSA and Pseudomonas) to complete the course of treatment as OP     Hx chronic BLE lymphedema   Venous insufficiency   Pt follows at [facility] every Monday  Will consult wound nurse for dressing changes     HLD  Pt stopped taking  her statin months ago as she kept forgetting to take it     Hypothyroidism  Continue home medications       Severe obesity BMI 52.61  Severe obesity requiring increased utilization of hospital resources as demonstrated by BMI 52.61     Anxiety and depression  Continue home medications      Pertinent Physical Exam At Time of Discharge  Constitutional:       General: She is not in acute distress.     Appearance: Normal appearance. She is obese.   HENT:      Head: Normocephalic and atraumatic.      Right Ear: External ear normal.      Left Ear: External ear normal.      Nose: Nose normal.      Mouth/Throat:      Mouth: Mucous membranes are moist.      Pharynx: Oropharynx is clear.   Eyes:      Extraocular Movements: Extraocular movements intact.      Conjunctiva/sclera: Conjunctivae normal.      Pupils: Pupils are equal, round, and reactive to light.   Cardiovascular:      Rate and Rhythm: Normal rate and regular rhythm.      Pulses: Normal pulses.      Heart sounds: Normal heart sounds.   Pulmonary:      Effort: Pulmonary effort is normal. No respiratory distress.      Breath sounds: Normal breath sounds. No wheezing, rhonchi or rales.   Abdominal:      General: Bowel sounds are normal.      Palpations: Abdomen is soft.      Tenderness: There is no abdominal tenderness. There is no right CVA tenderness, left CVA tenderness, guarding or rebound.   Musculoskeletal:         General: No swelling. Normal range of motion.      Cervical back: Normal range of motion and neck supple.   Skin:     General: Skin is warm and dry.      Capillary Refill: Capillary refill takes less than 2 seconds.   Neurological:      General: No focal deficit present.      Mental Status: She is alert and oriented to person, place, and time. Mental status is at baseline.   Psychiatric:         Mood and Affect: Mood normal.         Behavior: Behavior normal.     Home Medications     Medication List      START taking these medications     clindamycin  300 mg capsule; Commonly known as: Cleocin; Take 1 capsule   (300 mg) by mouth every 8 hours for 7 days.   levoFLOXacin 750 mg tablet; Commonly known as: Levaquin; Take 1 tablet   (750 mg) by mouth once every 24 hours for 7 days.     CHANGE how you take these medications     amphetamine-dextroamphetamine XR 20 mg 24 hr capsule; Commonly known as:   Adderall XR; Take 1 capsule (20 mg) by mouth 2 times a day.; What changed:   Another medication with the same name was removed. Continue taking this   medication, and follow the directions you see here.     CONTINUE taking these medications     cholecalciferol 5,000 Units tablet; Commonly known as: Vitamin D-3; Take   1 tablet (5,000 Units) by mouth once daily.   EPINEPHrine 0.3 mg/0.3 mL injection syringe; Commonly known as: Epipen;   Inject 0.3 mL (0.3 mg) as directed if needed for anaphylaxis. As Directed   furosemide 20 mg tablet; Commonly known as: Lasix; Take 0.5 tablets (10   mg) by mouth once daily. As needed for swelling   gabapentin 300 mg capsule; Commonly known as: Neurontin; Take 1 capsule   (300 mg) by mouth 3 times a day.   hydrOXYzine HCL 25 mg tablet; Commonly known as: Atarax; Take 1 tablet   (25 mg) by mouth every 8 hours if needed for anxiety.   ibuprofen 800 mg tablet; Take 1 tablet (800 mg) by mouth every 6 hours   if needed for moderate pain (4 - 6).   levothyroxine 100 mcg tablet; Commonly known as: Synthroid, Levoxyl;   TAKE 1 TABLET(100 MCG) BY MOUTH EVERY DAY   omeprazole 40 mg DR capsule; Commonly known as: PriLOSEC; TAKE 1   CAPSULE(40 MG) BY MOUTH EVERY DAY   predniSONE 10 mg tablet; Commonly known as: Deltasone; TAKE 1 TABLET BY   MOUTH EVERY DAY   rosuvastatin 20 mg tablet; Commonly known as: Crestor; Take 1 tablet (20   mg) by mouth once daily.     STOP taking these medications     oxyCODONE-acetaminophen  mg tablet; Commonly known as: Percocet       Outpatient Follow-Up  No follow-ups on file.     Sherwin Carter MD  PhD  10/13/2024  8:57 AM

## 2024-10-13 NOTE — CARE PLAN
The patient's goals for the shift include stay informed.     The clinical goals for the shift include Patient will remain hemodynamically stable throughout the shift.    Over the shift, the patient did make progress toward the following goals. Barriers to progression include understanding. Recommendations to address these barriers include education.

## 2024-10-14 LAB
BACTERIA BLD CULT: NORMAL
BACTERIA BLD CULT: NORMAL

## 2024-10-16 DIAGNOSIS — E55.9 VITAMIN D DEFICIENCY: ICD-10-CM

## 2024-10-16 RX ORDER — PREDNISONE 10 MG/1
10 TABLET ORAL DAILY
Qty: 90 TABLET | Refills: 0 | Status: SHIPPED | OUTPATIENT
Start: 2024-10-16

## 2024-10-18 NOTE — DOCUMENTATION CLARIFICATION NOTE
"    PATIENT:               KYLE COOK  ACCT #:                  [id number]  MRN:                       [id number]  :                       1970  ADMIT DATE:       10/10/2024 3:18 PM  DISCH DATE:        10/13/2024 10:00 AM  RESPONDING PROVIDER #:        [id number]          PROVIDER RESPONSE TEXT:    Sepsis ruled out after workup    CDI QUERY TEXT:    Clarification    Instruction:    Based on your assessment of the patient and the clinical information, please provide the requested documentation by clicking on the appropriate radio button and enter any additional information if prompted.    Question: Please further clarify the diagnosis of Sepsis as    When answering this query, please exercise your independent professional judgment. The fact that a question is being asked, does not imply that any particular answer is desired or expected.    The patient's clinical indicators include:  Clinical Information:  53F presents for RLE cellulitis    Clinical Indicators:  - Vitals on admission, 10/10: 98.0-088-/97-99%RA  - WBC, 10/10: 17.4  - ED, 10/10, David: \"presents with a chief complaint of right lower leg cellulitis...chronic wounds of the right lower extremity secondary to what she believes lymphedema...Patient does appear to be septic from the cellulitis on her right lower extremity. Dx: Cellulitis of right lower extremity; Sepsis without acute organ dysfunction\"  Med, 10/11, Neptali: \"Sepsis not present on admission. Suspect strep infection\"    Treatment:  Clindamycin IV (10/10), Clindamycin PO (10/12-10/13), Levaquin PO (10/12-10/13), Zosyn IV (10/10-10/12), Vanco IV (10/10-10/12), NS IVF bolus x 1L (10/10), BC, wound care    Risk Factors:  Lymphedema, venous insufficiency  Options provided:  -- Sepsis ruled out after workup  -- Sepsis ruled in for this admission  -- Other - I will add my own diagnosis  -- Refer to Clinical Documentation Reviewer    Query created by: Bettina Peters on 10/17/2024 4:37 " PM      Electronically signed by:  KRISTA THOMPSON PA-C 10/18/2024 12:27 PM

## 2024-10-22 ENCOUNTER — OFFICE VISIT (OUTPATIENT)
Dept: WOUND CARE | Facility: CLINIC | Age: 54
End: 2024-10-22
Payer: COMMERCIAL

## 2024-10-22 PROCEDURE — 11042 DBRDMT SUBQ TIS 1ST 20SQCM/<: CPT

## 2024-10-22 PROCEDURE — 11045 DBRDMT SUBQ TISS EACH ADDL: CPT

## 2024-10-28 DIAGNOSIS — F98.8 ATTENTION DEFICIT DISORDER, UNSPECIFIED TYPE: ICD-10-CM

## 2024-10-29 RX ORDER — DEXTROAMPHETAMINE SACCHARATE, AMPHETAMINE ASPARTATE MONOHYDRATE, DEXTROAMPHETAMINE SULFATE AND AMPHETAMINE SULFATE 5; 5; 5; 5 MG/1; MG/1; MG/1; MG/1
20 CAPSULE, EXTENDED RELEASE ORAL 2 TIMES DAILY
Qty: 60 CAPSULE | Refills: 0 | Status: SHIPPED | OUTPATIENT
Start: 2024-10-29

## 2024-10-30 ENCOUNTER — APPOINTMENT (OUTPATIENT)
Dept: RADIOLOGY | Facility: HOSPITAL | Age: 54
DRG: 872 | End: 2024-10-30
Payer: COMMERCIAL

## 2024-10-30 ENCOUNTER — HOSPITAL ENCOUNTER (INPATIENT)
Facility: HOSPITAL | Age: 54
LOS: 3 days | Discharge: HOME | DRG: 872 | End: 2024-11-03
Attending: EMERGENCY MEDICINE | Admitting: STUDENT IN AN ORGANIZED HEALTH CARE EDUCATION/TRAINING PROGRAM
Payer: COMMERCIAL

## 2024-10-30 DIAGNOSIS — L03.115 CELLULITIS OF RIGHT LOWER EXTREMITY: Primary | ICD-10-CM

## 2024-10-30 DIAGNOSIS — F41.9 ANXIETY DISORDER, UNSPECIFIED TYPE: ICD-10-CM

## 2024-10-30 LAB
ALBUMIN SERPL BCP-MCNC: 3.4 G/DL (ref 3.4–5)
ALP SERPL-CCNC: 76 U/L (ref 33–110)
ALT SERPL W P-5'-P-CCNC: 10 U/L (ref 7–45)
ANION GAP SERPL CALC-SCNC: 11 MMOL/L (ref 10–20)
AST SERPL W P-5'-P-CCNC: 16 U/L (ref 9–39)
BASOPHILS # BLD AUTO: 0.04 X10*3/UL (ref 0–0.1)
BASOPHILS NFR BLD AUTO: 0.2 %
BILIRUB SERPL-MCNC: 0.6 MG/DL (ref 0–1.2)
BUN SERPL-MCNC: 32 MG/DL (ref 6–23)
CALCIUM SERPL-MCNC: 8.3 MG/DL (ref 8.6–10.3)
CHLORIDE SERPL-SCNC: 105 MMOL/L (ref 98–107)
CO2 SERPL-SCNC: 24 MMOL/L (ref 21–32)
CREAT SERPL-MCNC: 0.87 MG/DL (ref 0.5–1.05)
EGFRCR SERPLBLD CKD-EPI 2021: 79 ML/MIN/1.73M*2
EOSINOPHIL # BLD AUTO: 0.11 X10*3/UL (ref 0–0.7)
EOSINOPHIL NFR BLD AUTO: 0.7 %
ERYTHROCYTE [DISTWIDTH] IN BLOOD BY AUTOMATED COUNT: 13.6 % (ref 11.5–14.5)
GLUCOSE SERPL-MCNC: 101 MG/DL (ref 74–99)
HCT VFR BLD AUTO: 32.3 % (ref 36–46)
HGB BLD-MCNC: 10.2 G/DL (ref 12–16)
IMM GRANULOCYTES # BLD AUTO: 0.08 X10*3/UL (ref 0–0.7)
IMM GRANULOCYTES NFR BLD AUTO: 0.5 % (ref 0–0.9)
LACTATE SERPL-SCNC: 0.6 MMOL/L (ref 0.4–2)
LYMPHOCYTES # BLD AUTO: 1.06 X10*3/UL (ref 1.2–4.8)
LYMPHOCYTES NFR BLD AUTO: 6.6 %
MCH RBC QN AUTO: 30.8 PG (ref 26–34)
MCHC RBC AUTO-ENTMCNC: 31.6 G/DL (ref 32–36)
MCV RBC AUTO: 98 FL (ref 80–100)
MONOCYTES # BLD AUTO: 0.54 X10*3/UL (ref 0.1–1)
MONOCYTES NFR BLD AUTO: 3.4 %
NEUTROPHILS # BLD AUTO: 14.18 X10*3/UL (ref 1.2–7.7)
NEUTROPHILS NFR BLD AUTO: 88.6 %
NRBC BLD-RTO: 0 /100 WBCS (ref 0–0)
PLATELET # BLD AUTO: 343 X10*3/UL (ref 150–450)
POTASSIUM SERPL-SCNC: 3.8 MMOL/L (ref 3.5–5.3)
PROT SERPL-MCNC: 6.5 G/DL (ref 6.4–8.2)
RBC # BLD AUTO: 3.31 X10*6/UL (ref 4–5.2)
SODIUM SERPL-SCNC: 136 MMOL/L (ref 136–145)
WBC # BLD AUTO: 16 X10*3/UL (ref 4.4–11.3)

## 2024-10-30 PROCEDURE — 93971 EXTREMITY STUDY: CPT | Performed by: STUDENT IN AN ORGANIZED HEALTH CARE EDUCATION/TRAINING PROGRAM

## 2024-10-30 PROCEDURE — 93971 EXTREMITY STUDY: CPT

## 2024-10-30 PROCEDURE — 36415 COLL VENOUS BLD VENIPUNCTURE: CPT | Performed by: PHYSICIAN ASSISTANT

## 2024-10-30 PROCEDURE — 99285 EMERGENCY DEPT VISIT HI MDM: CPT | Mod: 25

## 2024-10-30 PROCEDURE — 2500000004 HC RX 250 GENERAL PHARMACY W/ HCPCS (ALT 636 FOR OP/ED): Performed by: PHYSICIAN ASSISTANT

## 2024-10-30 PROCEDURE — 83605 ASSAY OF LACTIC ACID: CPT | Performed by: PHYSICIAN ASSISTANT

## 2024-10-30 PROCEDURE — 80053 COMPREHEN METABOLIC PANEL: CPT | Performed by: PHYSICIAN ASSISTANT

## 2024-10-30 PROCEDURE — 96367 TX/PROPH/DG ADDL SEQ IV INF: CPT

## 2024-10-30 PROCEDURE — 2500000005 HC RX 250 GENERAL PHARMACY W/O HCPCS: Performed by: PHYSICIAN ASSISTANT

## 2024-10-30 PROCEDURE — 96365 THER/PROPH/DIAG IV INF INIT: CPT

## 2024-10-30 PROCEDURE — 87040 BLOOD CULTURE FOR BACTERIA: CPT | Mod: PORLAB | Performed by: PHYSICIAN ASSISTANT

## 2024-10-30 PROCEDURE — 2500000001 HC RX 250 WO HCPCS SELF ADMINISTERED DRUGS (ALT 637 FOR MEDICARE OP): Performed by: PHYSICIAN ASSISTANT

## 2024-10-30 PROCEDURE — 85025 COMPLETE CBC W/AUTO DIFF WBC: CPT | Performed by: PHYSICIAN ASSISTANT

## 2024-10-30 RX ORDER — OXYCODONE AND ACETAMINOPHEN 5; 325 MG/1; MG/1
1 TABLET ORAL ONCE
Status: COMPLETED | OUTPATIENT
Start: 2024-10-30 | End: 2024-10-30

## 2024-10-30 RX ORDER — MORPHINE SULFATE 4 MG/ML
4 INJECTION INTRAVENOUS ONCE
Status: DISCONTINUED | OUTPATIENT
Start: 2024-10-30 | End: 2024-11-03 | Stop reason: HOSPADM

## 2024-10-30 ASSESSMENT — PAIN SCALES - GENERAL: PAINLEVEL_OUTOF10: 9

## 2024-10-30 ASSESSMENT — PAIN DESCRIPTION - PAIN TYPE: TYPE: ACUTE PAIN

## 2024-10-30 ASSESSMENT — PAIN DESCRIPTION - ORIENTATION: ORIENTATION: RIGHT;UPPER;LOWER

## 2024-10-30 ASSESSMENT — PAIN DESCRIPTION - FREQUENCY: FREQUENCY: CONSTANT/CONTINUOUS

## 2024-10-30 ASSESSMENT — PAIN - FUNCTIONAL ASSESSMENT: PAIN_FUNCTIONAL_ASSESSMENT: 0-10

## 2024-10-30 ASSESSMENT — PAIN DESCRIPTION - DESCRIPTORS: DESCRIPTORS: BURNING;TIGHTNESS

## 2024-10-30 ASSESSMENT — PAIN DESCRIPTION - LOCATION: LOCATION: LEG

## 2024-10-31 ENCOUNTER — APPOINTMENT (OUTPATIENT)
Dept: WOUND CARE | Facility: CLINIC | Age: 54
End: 2024-10-31
Payer: COMMERCIAL

## 2024-10-31 ENCOUNTER — TELEPHONE (OUTPATIENT)
Dept: PRIMARY CARE | Facility: CLINIC | Age: 54
End: 2024-10-31
Payer: COMMERCIAL

## 2024-10-31 LAB
ALBUMIN SERPL BCP-MCNC: 2.9 G/DL (ref 3.4–5)
ALP SERPL-CCNC: 63 U/L (ref 33–110)
ALT SERPL W P-5'-P-CCNC: 8 U/L (ref 7–45)
ANION GAP SERPL CALC-SCNC: 9 MMOL/L (ref 10–20)
APPEARANCE UR: ABNORMAL
AST SERPL W P-5'-P-CCNC: 12 U/L (ref 9–39)
BACTERIA #/AREA URNS AUTO: ABNORMAL /HPF
BASOPHILS # BLD AUTO: 0.04 X10*3/UL (ref 0–0.1)
BASOPHILS # BLD AUTO: NORMAL 10*3/UL
BASOPHILS NFR BLD AUTO: 0.4 %
BASOPHILS NFR BLD AUTO: NORMAL %
BILIRUB SERPL-MCNC: 0.6 MG/DL (ref 0–1.2)
BILIRUB UR STRIP.AUTO-MCNC: NEGATIVE MG/DL
BUN SERPL-MCNC: 27 MG/DL (ref 6–23)
CALCIUM SERPL-MCNC: 7.6 MG/DL (ref 8.6–10.3)
CAOX CRY #/AREA UR COMP ASSIST: ABNORMAL /HPF
CHLORIDE SERPL-SCNC: 106 MMOL/L (ref 98–107)
CO2 SERPL-SCNC: 27 MMOL/L (ref 21–32)
COLOR UR: YELLOW
CREAT SERPL-MCNC: 0.89 MG/DL (ref 0.5–1.05)
EGFRCR SERPLBLD CKD-EPI 2021: 77 ML/MIN/1.73M*2
EOSINOPHIL # BLD AUTO: 0.24 X10*3/UL (ref 0–0.7)
EOSINOPHIL # BLD AUTO: NORMAL 10*3/UL
EOSINOPHIL NFR BLD AUTO: 2.3 %
EOSINOPHIL NFR BLD AUTO: NORMAL %
ERYTHROCYTE [DISTWIDTH] IN BLOOD BY AUTOMATED COUNT: 13.6 % (ref 11.5–14.5)
ERYTHROCYTE [DISTWIDTH] IN BLOOD BY AUTOMATED COUNT: NORMAL %
GLUCOSE SERPL-MCNC: 103 MG/DL (ref 74–99)
GLUCOSE UR STRIP.AUTO-MCNC: NORMAL MG/DL
HCT VFR BLD AUTO: 30.9 % (ref 36–46)
HCT VFR BLD AUTO: NORMAL %
HGB BLD-MCNC: 9.7 G/DL (ref 12–16)
HGB BLD-MCNC: NORMAL G/DL
HYALINE CASTS #/AREA URNS AUTO: ABNORMAL /LPF
IMM GRANULOCYTES # BLD AUTO: 0.05 X10*3/UL (ref 0–0.7)
IMM GRANULOCYTES # BLD AUTO: NORMAL 10*3/UL
IMM GRANULOCYTES NFR BLD AUTO: 0.5 % (ref 0–0.9)
IMM GRANULOCYTES NFR BLD AUTO: NORMAL %
KETONES UR STRIP.AUTO-MCNC: NEGATIVE MG/DL
LEUKOCYTE ESTERASE UR QL STRIP.AUTO: ABNORMAL
LYMPHOCYTES # BLD AUTO: 1.09 X10*3/UL (ref 1.2–4.8)
LYMPHOCYTES # BLD AUTO: NORMAL 10*3/UL
LYMPHOCYTES NFR BLD AUTO: 10.4 %
LYMPHOCYTES NFR BLD AUTO: NORMAL %
MAGNESIUM SERPL-MCNC: 1.73 MG/DL (ref 1.6–2.4)
MCH RBC QN AUTO: 30.8 PG (ref 26–34)
MCH RBC QN AUTO: NORMAL PG
MCHC RBC AUTO-ENTMCNC: 31.4 G/DL (ref 32–36)
MCHC RBC AUTO-ENTMCNC: NORMAL G/DL
MCV RBC AUTO: 98 FL (ref 80–100)
MCV RBC AUTO: NORMAL FL
MONOCYTES # BLD AUTO: 0.67 X10*3/UL (ref 0.1–1)
MONOCYTES # BLD AUTO: NORMAL 10*3/UL
MONOCYTES NFR BLD AUTO: 6.4 %
MONOCYTES NFR BLD AUTO: 6.8 %
MUCOUS THREADS #/AREA URNS AUTO: ABNORMAL /LPF
NEUTROPHILS # BLD AUTO: 8.36 X10*3/UL (ref 1.2–7.7)
NEUTROPHILS # BLD AUTO: NORMAL 10*3/UL
NEUTROPHILS NFR BLD AUTO: 80 %
NEUTROPHILS NFR BLD AUTO: NORMAL %
NITRITE UR QL STRIP.AUTO: NEGATIVE
NRBC BLD-RTO: 0 /100 WBCS (ref 0–0)
NRBC BLD-RTO: NORMAL /100{WBCS}
PH UR STRIP.AUTO: 6 [PH]
PLATELET # BLD AUTO: 286 X10*3/UL (ref 150–450)
PLATELET # BLD AUTO: NORMAL 10*3/UL
POTASSIUM SERPL-SCNC: 3.4 MMOL/L (ref 3.5–5.3)
PROT SERPL-MCNC: 5.5 G/DL (ref 6.4–8.2)
PROT UR STRIP.AUTO-MCNC: ABNORMAL MG/DL
RBC # BLD AUTO: 3.15 X10*6/UL (ref 4–5.2)
RBC # BLD AUTO: NORMAL 10*6/UL
RBC # UR STRIP.AUTO: ABNORMAL /UL
RBC #/AREA URNS AUTO: >20 /HPF
SODIUM SERPL-SCNC: 139 MMOL/L (ref 136–145)
SP GR UR STRIP.AUTO: 1.03
SQUAMOUS #/AREA URNS AUTO: ABNORMAL /HPF
UROBILINOGEN UR STRIP.AUTO-MCNC: NORMAL MG/DL
WBC # BLD AUTO: 10.5 X10*3/UL (ref 4.4–11.3)
WBC # BLD AUTO: NORMAL 10*3/UL
WBC #/AREA URNS AUTO: ABNORMAL /HPF
WBC CLUMPS #/AREA URNS AUTO: ABNORMAL /HPF

## 2024-10-31 PROCEDURE — 2500000001 HC RX 250 WO HCPCS SELF ADMINISTERED DRUGS (ALT 637 FOR MEDICARE OP): Performed by: PHYSICIAN ASSISTANT

## 2024-10-31 PROCEDURE — 2500000002 HC RX 250 W HCPCS SELF ADMINISTERED DRUGS (ALT 637 FOR MEDICARE OP, ALT 636 FOR OP/ED): Performed by: STUDENT IN AN ORGANIZED HEALTH CARE EDUCATION/TRAINING PROGRAM

## 2024-10-31 PROCEDURE — 1210000001 HC SEMI-PRIVATE ROOM DAILY

## 2024-10-31 PROCEDURE — 80053 COMPREHEN METABOLIC PANEL: CPT | Performed by: STUDENT IN AN ORGANIZED HEALTH CARE EDUCATION/TRAINING PROGRAM

## 2024-10-31 PROCEDURE — 87077 CULTURE AEROBIC IDENTIFY: CPT | Mod: PORLAB | Performed by: PHYSICIAN ASSISTANT

## 2024-10-31 PROCEDURE — 36415 COLL VENOUS BLD VENIPUNCTURE: CPT | Performed by: STUDENT IN AN ORGANIZED HEALTH CARE EDUCATION/TRAINING PROGRAM

## 2024-10-31 PROCEDURE — 99223 1ST HOSP IP/OBS HIGH 75: CPT | Performed by: STUDENT IN AN ORGANIZED HEALTH CARE EDUCATION/TRAINING PROGRAM

## 2024-10-31 PROCEDURE — 83735 ASSAY OF MAGNESIUM: CPT | Performed by: STUDENT IN AN ORGANIZED HEALTH CARE EDUCATION/TRAINING PROGRAM

## 2024-10-31 PROCEDURE — 96366 THER/PROPH/DIAG IV INF ADDON: CPT

## 2024-10-31 PROCEDURE — 85025 COMPLETE CBC W/AUTO DIFF WBC: CPT | Performed by: STUDENT IN AN ORGANIZED HEALTH CARE EDUCATION/TRAINING PROGRAM

## 2024-10-31 PROCEDURE — 2500000004 HC RX 250 GENERAL PHARMACY W/ HCPCS (ALT 636 FOR OP/ED): Performed by: STUDENT IN AN ORGANIZED HEALTH CARE EDUCATION/TRAINING PROGRAM

## 2024-10-31 PROCEDURE — 2500000004 HC RX 250 GENERAL PHARMACY W/ HCPCS (ALT 636 FOR OP/ED): Performed by: PHYSICIAN ASSISTANT

## 2024-10-31 PROCEDURE — 85025 COMPLETE CBC W/AUTO DIFF WBC: CPT | Performed by: EMERGENCY MEDICINE

## 2024-10-31 PROCEDURE — 99233 SBSQ HOSP IP/OBS HIGH 50: CPT | Performed by: PHYSICIAN ASSISTANT

## 2024-10-31 PROCEDURE — 81001 URINALYSIS AUTO W/SCOPE: CPT | Performed by: STUDENT IN AN ORGANIZED HEALTH CARE EDUCATION/TRAINING PROGRAM

## 2024-10-31 PROCEDURE — 96365 THER/PROPH/DIAG IV INF INIT: CPT

## 2024-10-31 PROCEDURE — 2500000002 HC RX 250 W HCPCS SELF ADMINISTERED DRUGS (ALT 637 FOR MEDICARE OP, ALT 636 FOR OP/ED): Performed by: PHYSICIAN ASSISTANT

## 2024-10-31 PROCEDURE — 87077 CULTURE AEROBIC IDENTIFY: CPT | Mod: PORLAB | Performed by: STUDENT IN AN ORGANIZED HEALTH CARE EDUCATION/TRAINING PROGRAM

## 2024-10-31 PROCEDURE — 2500000001 HC RX 250 WO HCPCS SELF ADMINISTERED DRUGS (ALT 637 FOR MEDICARE OP): Performed by: STUDENT IN AN ORGANIZED HEALTH CARE EDUCATION/TRAINING PROGRAM

## 2024-10-31 RX ORDER — VANCOMYCIN HYDROCHLORIDE 1 G/20ML
INJECTION, POWDER, LYOPHILIZED, FOR SOLUTION INTRAVENOUS DAILY PRN
Status: DISCONTINUED | OUTPATIENT
Start: 2024-10-31 | End: 2024-11-03 | Stop reason: HOSPADM

## 2024-10-31 RX ORDER — ONDANSETRON 4 MG/1
4 TABLET, FILM COATED ORAL EVERY 8 HOURS PRN
Status: DISCONTINUED | OUTPATIENT
Start: 2024-10-31 | End: 2024-11-03 | Stop reason: HOSPADM

## 2024-10-31 RX ORDER — ONDANSETRON HYDROCHLORIDE 2 MG/ML
4 INJECTION, SOLUTION INTRAVENOUS EVERY 8 HOURS PRN
Status: DISCONTINUED | OUTPATIENT
Start: 2024-10-31 | End: 2024-11-03 | Stop reason: HOSPADM

## 2024-10-31 RX ORDER — GABAPENTIN 300 MG/1
300 CAPSULE ORAL 3 TIMES DAILY
Status: DISCONTINUED | OUTPATIENT
Start: 2024-10-31 | End: 2024-10-31

## 2024-10-31 RX ORDER — PREDNISONE 10 MG/1
10 TABLET ORAL DAILY
Status: DISCONTINUED | OUTPATIENT
Start: 2024-10-31 | End: 2024-11-01

## 2024-10-31 RX ORDER — CHOLECALCIFEROL (VITAMIN D3) 25 MCG
5000 TABLET ORAL DAILY
Status: DISCONTINUED | OUTPATIENT
Start: 2024-10-31 | End: 2024-11-03 | Stop reason: HOSPADM

## 2024-10-31 RX ORDER — OXYCODONE AND ACETAMINOPHEN 5; 325 MG/1; MG/1
2 TABLET ORAL EVERY 6 HOURS PRN
Status: DISCONTINUED | OUTPATIENT
Start: 2024-10-31 | End: 2024-11-03 | Stop reason: HOSPADM

## 2024-10-31 RX ORDER — PANTOPRAZOLE SODIUM 40 MG/1
40 TABLET, DELAYED RELEASE ORAL DAILY
Status: DISCONTINUED | OUTPATIENT
Start: 2024-10-31 | End: 2024-11-03 | Stop reason: HOSPADM

## 2024-10-31 RX ORDER — BISACODYL 10 MG/1
10 SUPPOSITORY RECTAL DAILY PRN
Status: DISCONTINUED | OUTPATIENT
Start: 2024-10-31 | End: 2024-11-03 | Stop reason: HOSPADM

## 2024-10-31 RX ORDER — BISACODYL 5 MG
10 TABLET, DELAYED RELEASE (ENTERIC COATED) ORAL DAILY PRN
Status: DISCONTINUED | OUTPATIENT
Start: 2024-10-31 | End: 2024-11-03 | Stop reason: HOSPADM

## 2024-10-31 RX ORDER — ACETAMINOPHEN 325 MG/1
650 TABLET ORAL EVERY 4 HOURS PRN
Status: DISCONTINUED | OUTPATIENT
Start: 2024-10-31 | End: 2024-11-03 | Stop reason: HOSPADM

## 2024-10-31 RX ORDER — POTASSIUM CHLORIDE 750 MG/1
20 TABLET, FILM COATED, EXTENDED RELEASE ORAL ONCE
Status: COMPLETED | OUTPATIENT
Start: 2024-10-31 | End: 2024-10-31

## 2024-10-31 RX ORDER — FUROSEMIDE 20 MG/1
10 TABLET ORAL DAILY
Status: DISCONTINUED | OUTPATIENT
Start: 2024-10-31 | End: 2024-11-03 | Stop reason: HOSPADM

## 2024-10-31 RX ORDER — HYDROXYZINE HYDROCHLORIDE 25 MG/1
25 TABLET, FILM COATED ORAL EVERY 8 HOURS PRN
Status: DISCONTINUED | OUTPATIENT
Start: 2024-10-31 | End: 2024-11-01 | Stop reason: DRUGHIGH

## 2024-10-31 RX ORDER — OXYCODONE AND ACETAMINOPHEN 5; 325 MG/1; MG/1
1 TABLET ORAL EVERY 6 HOURS PRN
Status: DISCONTINUED | OUTPATIENT
Start: 2024-10-31 | End: 2024-11-03 | Stop reason: HOSPADM

## 2024-10-31 RX ORDER — LEVOTHYROXINE SODIUM 100 UG/1
100 TABLET ORAL DAILY
Status: DISCONTINUED | OUTPATIENT
Start: 2024-10-31 | End: 2024-11-03 | Stop reason: HOSPADM

## 2024-10-31 RX ORDER — ROSUVASTATIN CALCIUM 20 MG/1
20 TABLET, COATED ORAL NIGHTLY
Status: DISCONTINUED | OUTPATIENT
Start: 2024-10-31 | End: 2024-11-03 | Stop reason: HOSPADM

## 2024-10-31 RX ORDER — HEPARIN SODIUM 5000 [USP'U]/ML
7500 INJECTION, SOLUTION INTRAVENOUS; SUBCUTANEOUS EVERY 8 HOURS SCHEDULED
Status: DISCONTINUED | OUTPATIENT
Start: 2024-10-31 | End: 2024-11-03 | Stop reason: HOSPADM

## 2024-10-31 RX ORDER — PANTOPRAZOLE SODIUM 40 MG/10ML
40 INJECTION, POWDER, LYOPHILIZED, FOR SOLUTION INTRAVENOUS DAILY
Status: DISCONTINUED | OUTPATIENT
Start: 2024-10-31 | End: 2024-11-03 | Stop reason: HOSPADM

## 2024-10-31 RX ORDER — POLYETHYLENE GLYCOL 3350 17 G/17G
17 POWDER, FOR SOLUTION ORAL DAILY
Status: DISCONTINUED | OUTPATIENT
Start: 2024-10-31 | End: 2024-11-03 | Stop reason: HOSPADM

## 2024-10-31 RX ORDER — GABAPENTIN 300 MG/1
300 CAPSULE ORAL
Status: DISCONTINUED | OUTPATIENT
Start: 2024-10-31 | End: 2024-11-03 | Stop reason: HOSPADM

## 2024-10-31 RX ORDER — DEXTROAMPHETAMINE SACCHARATE, AMPHETAMINE ASPARTATE, DEXTROAMPHETAMINE SULFATE AND AMPHETAMINE SULFATE 5; 5; 5; 5 MG/1; MG/1; MG/1; MG/1
20 TABLET ORAL
Status: DISCONTINUED | OUTPATIENT
Start: 2024-11-01 | End: 2024-11-03 | Stop reason: HOSPADM

## 2024-10-31 RX ORDER — DEXTROAMPHETAMINE SACCHARATE, AMPHETAMINE ASPARTATE, DEXTROAMPHETAMINE SULFATE AND AMPHETAMINE SULFATE 1.25; 1.25; 1.25; 1.25 MG/1; MG/1; MG/1; MG/1
10 TABLET ORAL
Status: DISCONTINUED | OUTPATIENT
Start: 2024-10-31 | End: 2024-10-31

## 2024-10-31 RX ORDER — GUAIFENESIN 600 MG/1
600 TABLET, EXTENDED RELEASE ORAL EVERY 12 HOURS PRN
Status: DISCONTINUED | OUTPATIENT
Start: 2024-10-31 | End: 2024-11-03 | Stop reason: HOSPADM

## 2024-10-31 RX ORDER — TALC
3 POWDER (GRAM) TOPICAL NIGHTLY PRN
Status: DISCONTINUED | OUTPATIENT
Start: 2024-10-31 | End: 2024-11-03 | Stop reason: HOSPADM

## 2024-10-31 SDOH — SOCIAL STABILITY: SOCIAL NETWORK: IN A TYPICAL WEEK, HOW MANY TIMES DO YOU TALK ON THE PHONE WITH FAMILY, FRIENDS, OR NEIGHBORS?: ONCE A WEEK

## 2024-10-31 SDOH — ECONOMIC STABILITY: FOOD INSECURITY: HOW HARD IS IT FOR YOU TO PAY FOR THE VERY BASICS LIKE FOOD, HOUSING, MEDICAL CARE, AND HEATING?: NOT VERY HARD

## 2024-10-31 SDOH — ECONOMIC STABILITY: FOOD INSECURITY: WITHIN THE PAST 12 MONTHS, YOU WORRIED THAT YOUR FOOD WOULD RUN OUT BEFORE YOU GOT THE MONEY TO BUY MORE.: NEVER TRUE

## 2024-10-31 SDOH — SOCIAL STABILITY: SOCIAL INSECURITY: WITHIN THE LAST YEAR, HAVE YOU BEEN HUMILIATED OR EMOTIONALLY ABUSED IN OTHER WAYS BY YOUR PARTNER OR EX-PARTNER?: NO

## 2024-10-31 SDOH — SOCIAL STABILITY: SOCIAL INSECURITY: WITHIN THE LAST YEAR, HAVE YOU BEEN AFRAID OF YOUR PARTNER OR EX-PARTNER?: NO

## 2024-10-31 SDOH — ECONOMIC STABILITY: HOUSING INSECURITY: IN THE PAST 12 MONTHS, HOW MANY TIMES HAVE YOU MOVED WHERE YOU WERE LIVING?: 0

## 2024-10-31 SDOH — SOCIAL STABILITY: SOCIAL INSECURITY: WERE YOU ABLE TO COMPLETE ALL THE BEHAVIORAL HEALTH SCREENINGS?: YES

## 2024-10-31 SDOH — HEALTH STABILITY: PHYSICAL HEALTH: ON AVERAGE, HOW MANY MINUTES DO YOU ENGAGE IN EXERCISE AT THIS LEVEL?: 0 MIN

## 2024-10-31 SDOH — SOCIAL STABILITY: SOCIAL INSECURITY: ARE YOU MARRIED, WIDOWED, DIVORCED, SEPARATED, NEVER MARRIED, OR LIVING WITH A PARTNER?: MARRIED

## 2024-10-31 SDOH — ECONOMIC STABILITY: TRANSPORTATION INSECURITY: IN THE PAST 12 MONTHS, HAS LACK OF TRANSPORTATION KEPT YOU FROM MEDICAL APPOINTMENTS OR FROM GETTING MEDICATIONS?: NO

## 2024-10-31 SDOH — ECONOMIC STABILITY: FOOD INSECURITY: WITHIN THE PAST 12 MONTHS, THE FOOD YOU BOUGHT JUST DIDN'T LAST AND YOU DIDN'T HAVE MONEY TO GET MORE.: NEVER TRUE

## 2024-10-31 SDOH — ECONOMIC STABILITY: HOUSING INSECURITY: AT ANY TIME IN THE PAST 12 MONTHS, WERE YOU HOMELESS OR LIVING IN A SHELTER (INCLUDING NOW)?: NO

## 2024-10-31 SDOH — ECONOMIC STABILITY: INCOME INSECURITY: IN THE PAST 12 MONTHS HAS THE ELECTRIC, GAS, OIL, OR WATER COMPANY THREATENED TO SHUT OFF SERVICES IN YOUR HOME?: YES

## 2024-10-31 SDOH — SOCIAL STABILITY: SOCIAL NETWORK: HOW OFTEN DO YOU ATTEND MEETINGS OF THE CLUBS OR ORGANIZATIONS YOU BELONG TO?: NEVER

## 2024-10-31 SDOH — HEALTH STABILITY: MENTAL HEALTH: HOW OFTEN DO YOU HAVE A DRINK CONTAINING ALCOHOL?: MONTHLY OR LESS

## 2024-10-31 SDOH — ECONOMIC STABILITY: HOUSING INSECURITY: IN THE LAST 12 MONTHS, WAS THERE A TIME WHEN YOU WERE NOT ABLE TO PAY THE MORTGAGE OR RENT ON TIME?: NO

## 2024-10-31 SDOH — SOCIAL STABILITY: SOCIAL INSECURITY: HAVE YOU HAD THOUGHTS OF HARMING ANYONE ELSE?: NO

## 2024-10-31 SDOH — HEALTH STABILITY: PHYSICAL HEALTH: ON AVERAGE, HOW MANY DAYS PER WEEK DO YOU ENGAGE IN MODERATE TO STRENUOUS EXERCISE (LIKE A BRISK WALK)?: 0 DAYS

## 2024-10-31 SDOH — SOCIAL STABILITY: SOCIAL NETWORK: HOW OFTEN DO YOU GET TOGETHER WITH FRIENDS OR RELATIVES?: ONCE A WEEK

## 2024-10-31 SDOH — SOCIAL STABILITY: SOCIAL NETWORK: HOW OFTEN DO YOU ATTEND CHURCH OR RELIGIOUS SERVICES?: NEVER

## 2024-10-31 ASSESSMENT — ENCOUNTER SYMPTOMS
ABDOMINAL PAIN: 0
DIAPHORESIS: 0
HALLUCINATIONS: 0
WOUND: 1
LIGHT-HEADEDNESS: 0
BACK PAIN: 1
HEADACHES: 0
TROUBLE SWALLOWING: 0
CONSTIPATION: 0
FREQUENCY: 1
FEVER: 0
HEMATURIA: 0
NAUSEA: 0
DYSURIA: 0
COUGH: 0
BRUISES/BLEEDS EASILY: 0
PHOTOPHOBIA: 0
CHILLS: 1
FLANK PAIN: 0
FLANK PAIN: 1
COLOR CHANGE: 1
BACK PAIN: 0
SLEEP DISTURBANCE: 0
FACIAL SWELLING: 0
EYE PAIN: 0
CHILLS: 0
DIARRHEA: 0
CHEST TIGHTNESS: 0
SORE THROAT: 0
JOINT SWELLING: 0
SHORTNESS OF BREATH: 0
BLOOD IN STOOL: 0
FATIGUE: 1
WEAKNESS: 0
TREMORS: 0
POLYDIPSIA: 0
APPETITE CHANGE: 0
VOMITING: 0
WHEEZING: 0
CONFUSION: 0
DIZZINESS: 0
PALPITATIONS: 0
NUMBNESS: 0
FREQUENCY: 0

## 2024-10-31 ASSESSMENT — ACTIVITIES OF DAILY LIVING (ADL)
FEEDING YOURSELF: INDEPENDENT
JUDGMENT_ADEQUATE_SAFELY_COMPLETE_DAILY_ACTIVITIES: YES
ADEQUATE_TO_COMPLETE_ADL: YES
LACK_OF_TRANSPORTATION: NO
GROOMING: INDEPENDENT
LACK_OF_TRANSPORTATION: NO
BATHING: INDEPENDENT
LACK_OF_TRANSPORTATION: NO
HEARING - LEFT EAR: FUNCTIONAL
WALKS IN HOME: INDEPENDENT
DRESSING YOURSELF: INDEPENDENT
PATIENT'S MEMORY ADEQUATE TO SAFELY COMPLETE DAILY ACTIVITIES?: YES
ASSISTIVE_DEVICE: CONTACTS
HEARING - RIGHT EAR: FUNCTIONAL
TOILETING: INDEPENDENT

## 2024-10-31 ASSESSMENT — PAIN - FUNCTIONAL ASSESSMENT
PAIN_FUNCTIONAL_ASSESSMENT: 0-10
PAIN_FUNCTIONAL_ASSESSMENT: 0-10

## 2024-10-31 ASSESSMENT — COGNITIVE AND FUNCTIONAL STATUS - GENERAL
PATIENT BASELINE BEDBOUND: NO
DAILY ACTIVITIY SCORE: 24
MOBILITY SCORE: 24

## 2024-10-31 ASSESSMENT — LIFESTYLE VARIABLES
SKIP TO QUESTIONS 9-10: 1
PRESCIPTION_ABUSE_PAST_12_MONTHS: NO
HOW MANY STANDARD DRINKS CONTAINING ALCOHOL DO YOU HAVE ON A TYPICAL DAY: 1 OR 2
HOW OFTEN DO YOU HAVE 6 OR MORE DRINKS ON ONE OCCASION: NEVER
HOW OFTEN DO YOU HAVE A DRINK CONTAINING ALCOHOL: 2-4 TIMES A MONTH
AUDIT-C TOTAL SCORE: 2
SUBSTANCE_ABUSE_PAST_12_MONTHS: NO
AUDIT-C TOTAL SCORE: 2

## 2024-10-31 ASSESSMENT — PATIENT HEALTH QUESTIONNAIRE - PHQ9
SUM OF ALL RESPONSES TO PHQ9 QUESTIONS 1 & 2: 0
1. LITTLE INTEREST OR PLEASURE IN DOING THINGS: NOT AT ALL
2. FEELING DOWN, DEPRESSED OR HOPELESS: NOT AT ALL

## 2024-10-31 ASSESSMENT — PAIN SCALES - GENERAL
PAINLEVEL_OUTOF10: 8
PAINLEVEL_OUTOF10: 5 - MODERATE PAIN
PAINLEVEL_OUTOF10: 5 - MODERATE PAIN
PAINLEVEL_OUTOF10: 10 - WORST POSSIBLE PAIN
PAINLEVEL_OUTOF10: 8
PAINLEVEL_OUTOF10: 9

## 2024-10-31 ASSESSMENT — PAIN DESCRIPTION - ORIENTATION
ORIENTATION: RIGHT;LEFT
ORIENTATION: RIGHT;LEFT

## 2024-10-31 ASSESSMENT — PAIN DESCRIPTION - LOCATION
LOCATION: LEG
LOCATION: LEG

## 2024-11-01 LAB
ANION GAP SERPL CALC-SCNC: 11 MMOL/L (ref 10–20)
BASOPHILS # BLD AUTO: 0.03 X10*3/UL (ref 0–0.1)
BASOPHILS NFR BLD AUTO: 0.3 %
BUN SERPL-MCNC: 24 MG/DL (ref 6–23)
CALCIUM SERPL-MCNC: 8.1 MG/DL (ref 8.6–10.3)
CHLORIDE SERPL-SCNC: 104 MMOL/L (ref 98–107)
CO2 SERPL-SCNC: 26 MMOL/L (ref 21–32)
CREAT SERPL-MCNC: 0.86 MG/DL (ref 0.5–1.05)
EGFRCR SERPLBLD CKD-EPI 2021: 80 ML/MIN/1.73M*2
EOSINOPHIL # BLD AUTO: 0.27 X10*3/UL (ref 0–0.7)
EOSINOPHIL NFR BLD AUTO: 3 %
ERYTHROCYTE [DISTWIDTH] IN BLOOD BY AUTOMATED COUNT: 13.5 % (ref 11.5–14.5)
GLUCOSE SERPL-MCNC: 82 MG/DL (ref 74–99)
HCT VFR BLD AUTO: 33.9 % (ref 36–46)
HGB BLD-MCNC: 10.7 G/DL (ref 12–16)
IMM GRANULOCYTES # BLD AUTO: 0.05 X10*3/UL (ref 0–0.7)
IMM GRANULOCYTES NFR BLD AUTO: 0.6 % (ref 0–0.9)
LYMPHOCYTES # BLD AUTO: 1.58 X10*3/UL (ref 1.2–4.8)
LYMPHOCYTES NFR BLD AUTO: 17.6 %
MAGNESIUM SERPL-MCNC: 1.84 MG/DL (ref 1.6–2.4)
MCH RBC QN AUTO: 30.7 PG (ref 26–34)
MCHC RBC AUTO-ENTMCNC: 31.6 G/DL (ref 32–36)
MCV RBC AUTO: 97 FL (ref 80–100)
MONOCYTES # BLD AUTO: 0.88 X10*3/UL (ref 0.1–1)
MONOCYTES NFR BLD AUTO: 9.8 %
NEUTROPHILS # BLD AUTO: 6.18 X10*3/UL (ref 1.2–7.7)
NEUTROPHILS NFR BLD AUTO: 68.7 %
NRBC BLD-RTO: 0 /100 WBCS (ref 0–0)
PLATELET # BLD AUTO: 373 X10*3/UL (ref 150–450)
POTASSIUM SERPL-SCNC: 3.7 MMOL/L (ref 3.5–5.3)
RBC # BLD AUTO: 3.49 X10*6/UL (ref 4–5.2)
SODIUM SERPL-SCNC: 137 MMOL/L (ref 136–145)
VANCOMYCIN SERPL-MCNC: 22.9 UG/ML (ref 5–20)
WBC # BLD AUTO: 9 X10*3/UL (ref 4.4–11.3)

## 2024-11-01 PROCEDURE — 2500000002 HC RX 250 W HCPCS SELF ADMINISTERED DRUGS (ALT 637 FOR MEDICARE OP, ALT 636 FOR OP/ED): Performed by: STUDENT IN AN ORGANIZED HEALTH CARE EDUCATION/TRAINING PROGRAM

## 2024-11-01 PROCEDURE — 1210000001 HC SEMI-PRIVATE ROOM DAILY

## 2024-11-01 PROCEDURE — 2500000004 HC RX 250 GENERAL PHARMACY W/ HCPCS (ALT 636 FOR OP/ED): Performed by: STUDENT IN AN ORGANIZED HEALTH CARE EDUCATION/TRAINING PROGRAM

## 2024-11-01 PROCEDURE — 83735 ASSAY OF MAGNESIUM: CPT | Performed by: PHYSICIAN ASSISTANT

## 2024-11-01 PROCEDURE — 2500000001 HC RX 250 WO HCPCS SELF ADMINISTERED DRUGS (ALT 637 FOR MEDICARE OP): Performed by: INTERNAL MEDICINE

## 2024-11-01 PROCEDURE — 2500000001 HC RX 250 WO HCPCS SELF ADMINISTERED DRUGS (ALT 637 FOR MEDICARE OP): Performed by: STUDENT IN AN ORGANIZED HEALTH CARE EDUCATION/TRAINING PROGRAM

## 2024-11-01 PROCEDURE — 36415 COLL VENOUS BLD VENIPUNCTURE: CPT | Performed by: PHYSICIAN ASSISTANT

## 2024-11-01 PROCEDURE — 2500000001 HC RX 250 WO HCPCS SELF ADMINISTERED DRUGS (ALT 637 FOR MEDICARE OP): Performed by: PHYSICIAN ASSISTANT

## 2024-11-01 PROCEDURE — 80048 BASIC METABOLIC PNL TOTAL CA: CPT | Performed by: PHYSICIAN ASSISTANT

## 2024-11-01 PROCEDURE — 99233 SBSQ HOSP IP/OBS HIGH 50: CPT | Performed by: PHYSICIAN ASSISTANT

## 2024-11-01 PROCEDURE — 80202 ASSAY OF VANCOMYCIN: CPT | Performed by: STUDENT IN AN ORGANIZED HEALTH CARE EDUCATION/TRAINING PROGRAM

## 2024-11-01 PROCEDURE — 2500000004 HC RX 250 GENERAL PHARMACY W/ HCPCS (ALT 636 FOR OP/ED): Performed by: PHYSICIAN ASSISTANT

## 2024-11-01 PROCEDURE — 85025 COMPLETE CBC W/AUTO DIFF WBC: CPT | Performed by: PHYSICIAN ASSISTANT

## 2024-11-01 RX ORDER — HYDROXYZINE HYDROCHLORIDE 25 MG/1
25 TABLET, FILM COATED ORAL EVERY 6 HOURS PRN
Status: DISCONTINUED | OUTPATIENT
Start: 2024-11-01 | End: 2024-11-03 | Stop reason: HOSPADM

## 2024-11-01 RX ORDER — PREDNISONE 5 MG/1
5 TABLET ORAL DAILY
Status: DISCONTINUED | OUTPATIENT
Start: 2024-11-02 | End: 2024-11-03 | Stop reason: HOSPADM

## 2024-11-01 ASSESSMENT — COGNITIVE AND FUNCTIONAL STATUS - GENERAL
DAILY ACTIVITIY SCORE: 24
MOBILITY SCORE: 24

## 2024-11-01 ASSESSMENT — ENCOUNTER SYMPTOMS
HALLUCINATIONS: 0
DIAPHORESIS: 0
SHORTNESS OF BREATH: 0
FREQUENCY: 0
BLOOD IN STOOL: 0
NAUSEA: 0
BACK PAIN: 0
HEMATURIA: 0
DIARRHEA: 0
COUGH: 0
NUMBNESS: 0
FATIGUE: 1
PALPITATIONS: 0
ABDOMINAL PAIN: 0
CONSTIPATION: 0
VOMITING: 0
WEAKNESS: 0
BRUISES/BLEEDS EASILY: 0
CHILLS: 0
HEADACHES: 0
FACIAL SWELLING: 0
DYSURIA: 0
WOUND: 1
CHEST TIGHTNESS: 0
EYE PAIN: 0
FLANK PAIN: 0
LIGHT-HEADEDNESS: 0
JOINT SWELLING: 0
FEVER: 0
DIZZINESS: 0
APPETITE CHANGE: 0
WHEEZING: 0
TROUBLE SWALLOWING: 0
SORE THROAT: 0

## 2024-11-01 ASSESSMENT — PAIN SCALES - GENERAL
PAINLEVEL_OUTOF10: 8
PAINLEVEL_OUTOF10: 8

## 2024-11-02 LAB
ANION GAP SERPL CALC-SCNC: 11 MMOL/L (ref 10–20)
BASOPHILS # BLD AUTO: 0.04 X10*3/UL (ref 0–0.1)
BASOPHILS NFR BLD AUTO: 0.4 %
BUN SERPL-MCNC: 24 MG/DL (ref 6–23)
CALCIUM SERPL-MCNC: 7.9 MG/DL (ref 8.6–10.3)
CHLORIDE SERPL-SCNC: 105 MMOL/L (ref 98–107)
CO2 SERPL-SCNC: 28 MMOL/L (ref 21–32)
CREAT SERPL-MCNC: 0.8 MG/DL (ref 0.5–1.05)
EGFRCR SERPLBLD CKD-EPI 2021: 87 ML/MIN/1.73M*2
EOSINOPHIL # BLD AUTO: 0.41 X10*3/UL (ref 0–0.7)
EOSINOPHIL NFR BLD AUTO: 4.3 %
ERYTHROCYTE [DISTWIDTH] IN BLOOD BY AUTOMATED COUNT: 13.4 % (ref 11.5–14.5)
GLUCOSE SERPL-MCNC: 75 MG/DL (ref 74–99)
HCT VFR BLD AUTO: 34 % (ref 36–46)
HGB BLD-MCNC: 10.6 G/DL (ref 12–16)
IMM GRANULOCYTES # BLD AUTO: 0.05 X10*3/UL (ref 0–0.7)
IMM GRANULOCYTES NFR BLD AUTO: 0.5 % (ref 0–0.9)
LYMPHOCYTES # BLD AUTO: 2.17 X10*3/UL (ref 1.2–4.8)
LYMPHOCYTES NFR BLD AUTO: 22.6 %
MAGNESIUM SERPL-MCNC: 1.82 MG/DL (ref 1.6–2.4)
MCH RBC QN AUTO: 30.5 PG (ref 26–34)
MCHC RBC AUTO-ENTMCNC: 31.2 G/DL (ref 32–36)
MCV RBC AUTO: 98 FL (ref 80–100)
MONOCYTES # BLD AUTO: 0.73 X10*3/UL (ref 0.1–1)
MONOCYTES NFR BLD AUTO: 7.6 %
NEUTROPHILS # BLD AUTO: 6.22 X10*3/UL (ref 1.2–7.7)
NEUTROPHILS NFR BLD AUTO: 64.6 %
NRBC BLD-RTO: 0 /100 WBCS (ref 0–0)
PLATELET # BLD AUTO: 361 X10*3/UL (ref 150–450)
POTASSIUM SERPL-SCNC: 3.9 MMOL/L (ref 3.5–5.3)
RBC # BLD AUTO: 3.48 X10*6/UL (ref 4–5.2)
SODIUM SERPL-SCNC: 140 MMOL/L (ref 136–145)
WBC # BLD AUTO: 9.6 X10*3/UL (ref 4.4–11.3)

## 2024-11-02 PROCEDURE — 36415 COLL VENOUS BLD VENIPUNCTURE: CPT | Performed by: PHYSICIAN ASSISTANT

## 2024-11-02 PROCEDURE — 2500000001 HC RX 250 WO HCPCS SELF ADMINISTERED DRUGS (ALT 637 FOR MEDICARE OP): Performed by: INTERNAL MEDICINE

## 2024-11-02 PROCEDURE — 1210000001 HC SEMI-PRIVATE ROOM DAILY

## 2024-11-02 PROCEDURE — 2500000002 HC RX 250 W HCPCS SELF ADMINISTERED DRUGS (ALT 637 FOR MEDICARE OP, ALT 636 FOR OP/ED): Performed by: STUDENT IN AN ORGANIZED HEALTH CARE EDUCATION/TRAINING PROGRAM

## 2024-11-02 PROCEDURE — 2500000001 HC RX 250 WO HCPCS SELF ADMINISTERED DRUGS (ALT 637 FOR MEDICARE OP): Performed by: STUDENT IN AN ORGANIZED HEALTH CARE EDUCATION/TRAINING PROGRAM

## 2024-11-02 PROCEDURE — 83735 ASSAY OF MAGNESIUM: CPT | Performed by: PHYSICIAN ASSISTANT

## 2024-11-02 PROCEDURE — 99233 SBSQ HOSP IP/OBS HIGH 50: CPT | Performed by: INTERNAL MEDICINE

## 2024-11-02 PROCEDURE — 80048 BASIC METABOLIC PNL TOTAL CA: CPT | Performed by: PHYSICIAN ASSISTANT

## 2024-11-02 PROCEDURE — 2500000004 HC RX 250 GENERAL PHARMACY W/ HCPCS (ALT 636 FOR OP/ED): Performed by: PHYSICIAN ASSISTANT

## 2024-11-02 PROCEDURE — 85025 COMPLETE CBC W/AUTO DIFF WBC: CPT | Performed by: PHYSICIAN ASSISTANT

## 2024-11-02 PROCEDURE — 2500000004 HC RX 250 GENERAL PHARMACY W/ HCPCS (ALT 636 FOR OP/ED): Performed by: STUDENT IN AN ORGANIZED HEALTH CARE EDUCATION/TRAINING PROGRAM

## 2024-11-02 PROCEDURE — 2500000001 HC RX 250 WO HCPCS SELF ADMINISTERED DRUGS (ALT 637 FOR MEDICARE OP): Performed by: PHYSICIAN ASSISTANT

## 2024-11-02 RX ORDER — FLUCONAZOLE 100 MG/1
200 TABLET ORAL ONCE
Status: COMPLETED | OUTPATIENT
Start: 2024-11-02 | End: 2024-11-02

## 2024-11-02 ASSESSMENT — ENCOUNTER SYMPTOMS
PALPITATIONS: 0
BRUISES/BLEEDS EASILY: 0
NUMBNESS: 0
SORE THROAT: 0
VOMITING: 0
WEAKNESS: 0
FACIAL SWELLING: 0
BACK PAIN: 0
FREQUENCY: 0
HEADACHES: 0
SHORTNESS OF BREATH: 0
NAUSEA: 0
DIZZINESS: 0
CHEST TIGHTNESS: 0
WHEEZING: 0
ABDOMINAL PAIN: 0
COUGH: 0
HEMATURIA: 0
FEVER: 0
DIARRHEA: 0
WOUND: 1
DIAPHORESIS: 0
APPETITE CHANGE: 0
EYE PAIN: 0
FLANK PAIN: 0
CHILLS: 0
HALLUCINATIONS: 0
LIGHT-HEADEDNESS: 0
DYSURIA: 0
JOINT SWELLING: 0
BLOOD IN STOOL: 0
CONSTIPATION: 0
TROUBLE SWALLOWING: 0
FATIGUE: 1

## 2024-11-02 ASSESSMENT — COGNITIVE AND FUNCTIONAL STATUS - GENERAL
DAILY ACTIVITIY SCORE: 24
DAILY ACTIVITIY SCORE: 24
MOBILITY SCORE: 24
MOBILITY SCORE: 24

## 2024-11-02 ASSESSMENT — PAIN - FUNCTIONAL ASSESSMENT
PAIN_FUNCTIONAL_ASSESSMENT: 0-10

## 2024-11-02 ASSESSMENT — PAIN SCALES - GENERAL
PAINLEVEL_OUTOF10: 7
PAINLEVEL_OUTOF10: 7
PAINLEVEL_OUTOF10: 5 - MODERATE PAIN
PAINLEVEL_OUTOF10: 4

## 2024-11-02 ASSESSMENT — PAIN DESCRIPTION - ORIENTATION: ORIENTATION: RIGHT

## 2024-11-03 ENCOUNTER — PHARMACY VISIT (OUTPATIENT)
Dept: PHARMACY | Facility: CLINIC | Age: 54
End: 2024-11-03
Payer: MEDICARE

## 2024-11-03 VITALS
SYSTOLIC BLOOD PRESSURE: 135 MMHG | HEIGHT: 63 IN | BODY MASS INDEX: 51.38 KG/M2 | TEMPERATURE: 97.9 F | HEART RATE: 88 BPM | RESPIRATION RATE: 18 BRPM | WEIGHT: 290 LBS | DIASTOLIC BLOOD PRESSURE: 85 MMHG | OXYGEN SATURATION: 97 %

## 2024-11-03 PROBLEM — L03.115 CELLULITIS OF RIGHT LOWER EXTREMITY: Status: RESOLVED | Noted: 2024-10-10 | Resolved: 2024-11-03

## 2024-11-03 LAB
B-LACTAMASE ORGANISM ISLT: POSITIVE
BACTERIA BLD CULT: NORMAL
BACTERIA BLD CULT: NORMAL
BACTERIA SPEC CULT: ABNORMAL
GRAM STN SPEC: ABNORMAL

## 2024-11-03 PROCEDURE — 2500000004 HC RX 250 GENERAL PHARMACY W/ HCPCS (ALT 636 FOR OP/ED): Performed by: PHYSICIAN ASSISTANT

## 2024-11-03 PROCEDURE — RXMED WILLOW AMBULATORY MEDICATION CHARGE

## 2024-11-03 PROCEDURE — 2500000001 HC RX 250 WO HCPCS SELF ADMINISTERED DRUGS (ALT 637 FOR MEDICARE OP): Performed by: PHYSICIAN ASSISTANT

## 2024-11-03 PROCEDURE — 2500000001 HC RX 250 WO HCPCS SELF ADMINISTERED DRUGS (ALT 637 FOR MEDICARE OP): Performed by: INTERNAL MEDICINE

## 2024-11-03 PROCEDURE — 2500000001 HC RX 250 WO HCPCS SELF ADMINISTERED DRUGS (ALT 637 FOR MEDICARE OP): Performed by: STUDENT IN AN ORGANIZED HEALTH CARE EDUCATION/TRAINING PROGRAM

## 2024-11-03 PROCEDURE — 2500000002 HC RX 250 W HCPCS SELF ADMINISTERED DRUGS (ALT 637 FOR MEDICARE OP, ALT 636 FOR OP/ED): Performed by: STUDENT IN AN ORGANIZED HEALTH CARE EDUCATION/TRAINING PROGRAM

## 2024-11-03 PROCEDURE — 99239 HOSP IP/OBS DSCHRG MGMT >30: CPT | Performed by: INTERNAL MEDICINE

## 2024-11-03 RX ORDER — DOXYCYCLINE 100 MG/1
100 CAPSULE ORAL 2 TIMES DAILY
Qty: 20 CAPSULE | Refills: 0 | Status: SHIPPED | OUTPATIENT
Start: 2024-11-03 | End: 2024-11-13

## 2024-11-03 RX ORDER — HYDROXYZINE HYDROCHLORIDE 25 MG/1
25 TABLET, FILM COATED ORAL EVERY 8 HOURS PRN
Qty: 9 TABLET | Refills: 0 | Status: SHIPPED | OUTPATIENT
Start: 2024-11-03 | End: 2024-11-06

## 2024-11-03 RX ORDER — OXYCODONE AND ACETAMINOPHEN 5; 325 MG/1; MG/1
1 TABLET ORAL EVERY 8 HOURS PRN
Qty: 9 TABLET | Refills: 0 | Status: SHIPPED | OUTPATIENT
Start: 2024-11-03 | End: 2024-11-06

## 2024-11-03 RX ORDER — PREDNISONE 5 MG/1
TABLET ORAL
Qty: 5 TABLET | Refills: 0 | Status: SHIPPED | OUTPATIENT
Start: 2024-11-04 | End: 2024-11-11

## 2024-11-03 RX ORDER — CIPROFLOXACIN 500 MG/1
500 TABLET ORAL 2 TIMES DAILY
Qty: 20 TABLET | Refills: 0 | Status: SHIPPED | OUTPATIENT
Start: 2024-11-03 | End: 2024-11-13

## 2024-11-03 ASSESSMENT — COGNITIVE AND FUNCTIONAL STATUS - GENERAL
DAILY ACTIVITIY SCORE: 24
MOBILITY SCORE: 24

## 2024-11-03 ASSESSMENT — PAIN SCALES - GENERAL: PAINLEVEL_OUTOF10: 0 - NO PAIN

## 2024-11-03 ASSESSMENT — PAIN - FUNCTIONAL ASSESSMENT: PAIN_FUNCTIONAL_ASSESSMENT: 0-10

## 2024-11-04 LAB
B-LACTAMASE ORGANISM ISLT: POSITIVE
BACTERIA BLD CULT: NORMAL
BACTERIA BLD CULT: NORMAL
BACTERIA SPEC CULT: ABNORMAL
GRAM STN SPEC: ABNORMAL
GRAM STN SPEC: ABNORMAL

## 2024-11-05 LAB
BACTERIA SPEC CULT: ABNORMAL
GRAM STN SPEC: ABNORMAL
GRAM STN SPEC: ABNORMAL

## 2024-11-07 ENCOUNTER — OFFICE VISIT (OUTPATIENT)
Dept: WOUND CARE | Facility: CLINIC | Age: 54
End: 2024-11-07
Payer: COMMERCIAL

## 2024-11-07 DIAGNOSIS — E03.3 POSTINFECTIOUS HYPOTHYROIDISM: ICD-10-CM

## 2024-11-07 PROCEDURE — 11042 DBRDMT SUBQ TIS 1ST 20SQCM/<: CPT

## 2024-11-07 PROCEDURE — 11045 DBRDMT SUBQ TISS EACH ADDL: CPT

## 2024-11-07 RX ORDER — LEVOTHYROXINE SODIUM 100 UG/1
TABLET ORAL
Qty: 90 TABLET | Refills: 3 | Status: SHIPPED | OUTPATIENT
Start: 2024-11-07

## 2024-11-08 ENCOUNTER — APPOINTMENT (OUTPATIENT)
Dept: WOUND CARE | Facility: CLINIC | Age: 54
End: 2024-11-08
Payer: COMMERCIAL

## 2024-11-12 ENCOUNTER — APPOINTMENT (OUTPATIENT)
Dept: WOUND CARE | Facility: CLINIC | Age: 54
End: 2024-11-12
Payer: COMMERCIAL

## 2024-11-12 ENCOUNTER — EVALUATION (OUTPATIENT)
Dept: OCCUPATIONAL THERAPY | Facility: HOSPITAL | Age: 54
End: 2024-11-12
Payer: COMMERCIAL

## 2024-11-12 DIAGNOSIS — I89.0 LYMPHEDEMA, NOT ELSEWHERE CLASSIFIED: Primary | Chronic | ICD-10-CM

## 2024-11-12 PROCEDURE — 97165 OT EVAL LOW COMPLEX 30 MIN: CPT | Mod: GO | Performed by: OCCUPATIONAL THERAPIST

## 2024-11-12 PROCEDURE — 97535 SELF CARE MNGMENT TRAINING: CPT | Mod: GO | Performed by: OCCUPATIONAL THERAPIST

## 2024-11-12 ASSESSMENT — PATIENT HEALTH QUESTIONNAIRE - PHQ9
2. FEELING DOWN, DEPRESSED OR HOPELESS: MORE THAN HALF THE DAYS
3. TROUBLE FALLING OR STAYING ASLEEP OR SLEEPING TOO MUCH: SEVERAL DAYS
4. FEELING TIRED OR HAVING LITTLE ENERGY: NOT AT ALL
7. TROUBLE CONCENTRATING ON THINGS, SUCH AS READING THE NEWSPAPER OR WATCHING TELEVISION: NOT AT ALL
8. MOVING OR SPEAKING SO SLOWLY THAT OTHER PEOPLE COULD HAVE NOTICED. OR THE OPPOSITE, BEING SO FIGETY OR RESTLESS THAT YOU HAVE BEEN MOVING AROUND A LOT MORE THAN USUAL: SEVERAL DAYS
5. POOR APPETITE OR OVEREATING: NOT AT ALL
SUM OF ALL RESPONSES TO PHQ QUESTIONS 1-9: 5
SUM OF ALL RESPONSES TO PHQ9 QUESTIONS 1 AND 2: 3
6. FEELING BAD ABOUT YOURSELF - OR THAT YOU ARE A FAILURE OR HAVE LET YOURSELF OR YOUR FAMILY DOWN: NOT AT ALL
9. THOUGHTS THAT YOU WOULD BE BETTER OFF DEAD, OR OF HURTING YOURSELF: NOT AT ALL
1. LITTLE INTEREST OR PLEASURE IN DOING THINGS: SEVERAL DAYS

## 2024-11-12 ASSESSMENT — ENCOUNTER SYMPTOMS
OCCASIONAL FEELINGS OF UNSTEADINESS: 1
LOSS OF SENSATION IN FEET: 0

## 2024-11-12 ASSESSMENT — ACTIVITIES OF DAILY LIVING (ADL): HOME_MANAGEMENT_TIME_ENTRY: 20

## 2024-11-12 ASSESSMENT — PAIN - FUNCTIONAL ASSESSMENT: PAIN_FUNCTIONAL_ASSESSMENT: 0-10

## 2024-11-12 ASSESSMENT — PAIN SCALES - GENERAL: PAINLEVEL_OUTOF10: 4

## 2024-11-12 NOTE — PROGRESS NOTES
Occupational Therapy    Evaluation/Treatment    Patient Name: Frances Mcdowell  MRN: [id number]  : 1970  Today's Date: 2024  Name and  confirmed     Time Calculation  Start Time: 0800  Stop Time: 0900  Time Calculation (min): 60 min  OT Evaluation Time Entry  OT Evaluation (Low) Time Entry: 40  OT Therapeutic Procedures Time Entry  Self Care/Home Management (ADLs) Time Entry: 20     Visit# 1  Assessment:  Pt has secondary lymphedema in TO LE, phase 1, stage 2 with moderate swelling and wounds.   Recommend inelastic wraps for reduction.  Future recommendation for compression knee highs for long-term home management, 20-30 mmHg.       Pain after session no change.    Plan:  Treatment Interventions: ADL retraining, Patient/family training, Equipment evaluation/education  Treatment Interventions: ADL retraining, Patient/family training, Equipment evaluation/education  Frequency: 1 x/wk  Duration: 12 weeks  Rehab Potential: Good  Plan of Care Agreement: Patient  Pt was provided with education on the lymphatic system, lymphedema and what treatment will include.  Education today touched on compression, exercises, skin care and MLD.  Pt is to elevate as able and increase activity, especially walking.  Compression was initiated.  Pt was fitted with double size G tg .  Pt was instructed on use, care, application and precautions of tg .     OT treament may include ADLs, therapeutic exercises and activites, aquatic therapy, edema control, lymphedema management, instruction on garments and a pump and HEP.      Subjective   Current Problem:  1. Lymphedema, not elsewhere classified  Referral to Occupational Therapy    Follow Up In Occupational Therapy        General:   Pt stated she started to lose weight in March.  She has lost 60 pounds.  Stated in May was in a hot tub and developed an infection. Started to get seeping in legs and wounds.  She sees the wound center weekly in [place].   States has been in  the hospital three times for cellulitis in her legs. She lives with her .  She is employed as a nurse.  She sleeps in a bed.  States sometimes she sleeps in a chair.   States excessive leakage.   Pt became emotional when explaining her current situation.  States leaking/drainage is excessive and soaks her shoes.     General  Reason for Referral: LE lymphedema  Referred By: Cori Kilgore  States right leg is worse in regards to weeping and wounds.   Precautions:  Wounds, weeping, Standard lymphedema precautions  STEADI Fall Risk Score (The score of 4 or more indicates an increased risk of falling): 2    Pain:  Pain Assessment  Pain Assessment: 0-10  0-10 (Numeric) Pain Score: 4  Pain Location: Leg      Objective   Bandages were removed from both legs.  Supra absorb pads were placed on each leg and wrapped with two layers of Mollelast followed by double tg .  Educated pt on inelastic wraps.  Pt states she bought a pair.   Lymphedema Assessment     Right Lower Extremity:  R Metatarsal (cm): 20.5 cm  R Heel Y Angle: 33.5 cm  R Ankle (cm): 30 cm  R 10 cm Above Ankle (cm): 41.5 cm  R 20 cm Above Ankle (cm): 54 cm  R 30 cm Above Ankle (cm): 57 cm  R 40 cm Above Ankle (cm): 0 cm  R Knee (cm): 0 cm  R 10 cm Above Knee (cm): 0 cm  R 20 cm Above Knee (cm): 0 cm  R 30 cm Above Knee (cm): 0 cm  R 40 cm Above Knee (cm): 0 cm  R 50 cm Above Knee (cm): 0 cm  Right lower extremity total: 236.5  Left Lower Extremity:  L Metatarsal (cm): 20.5 cm  L Heel Y Angle: 31.5 cm  L Ankle (cm): 28.5 cm  L 10 cm Above Ankle (cm): 44 cm  L 20 cm Above Ankle (cm): 60.5 cm  L 30 cm Above Ankle (cm): 58 cm  L 40 cm Above Ankle (cm): 0 cm  L Knee (cm): 0 cm  L 10 cm Above Knee (cm): 0 cm  L 20 cm Above Knee (cm): 0 cm  L 30 cm Above Knee (cm): 0 cm  L 40 cm Above Knee (cm): 0 cm  L 50 cm Above Knee (cm): 0 cm  Left Lower extremity total: 243  LE Skin Appearance/Condition and Girth:  Dryness: yes  Edema - Pitting:  yes  Hyperpigmentation: yes  +Stemmer Sign: yes  Wound: yes     Pain Assessment:  Pain Assessment: 0-10  0-10 (Numeric) Pain Score: 4  Pain Location: Leg  Outcome Measures: LLIS score of 54.     OP EDUCATION:  Education  Individual(s) Educated: Patient  Education Provided: Diagnosis & Precautions, Edema control, Lymphedema, Symptom management, Risk and benefits of OT discussed with patient or other, POC discussed and agreed upon  Home Program: Edema control, Handout issued  Equipment: Tubigrip  Risk and Benefits Discussed with Patient/Caregiver/Other: yes  Patient/Caregiver Demonstrated Understanding: yes  Plan of Care Discussed and Agreed Upon: yes  Patient Response to Education: Patient/Caregiver Verbalized Understanding of Information    Goals:  Active       OT Goals       OT Goal 1       Start:  11/13/24    Expected End:  05/13/25       STG: Pt will be independent with self management, including use of garments, pump if indicated, self MLD, ability to recognize signs of infection/cellulitis and exercise/HEP to minimize risk of progression of symptoms and skin infections/cellulitis.            OT Goal 2       Start:  11/13/24    Expected End:  05/13/25       STG: Decrease girth in bilateral LEs by 25 cm each for improved mobility and better clothing/shoe fit.           OT Goal 3       Start:  11/13/24    Expected End:  05/13/25       LTG: Pt will show improvement on the LLIS impairment scale to no greater than 25% impaired.           OT Goal 4       Start:  11/13/24    Expected End:  05/13/25       LTG:  Improve skin integrity to no open wounds and no pitting.             OT Problem       PATIENT STATED GOAL heal wounds and decrease size of legs.        Start:  11/13/24    Expected End:  05/13/25       PATIENT STATED GOAL heal wounds and decrease size of legs.

## 2024-11-18 ENCOUNTER — APPOINTMENT (OUTPATIENT)
Dept: WOUND CARE | Facility: CLINIC | Age: 54
End: 2024-11-18
Payer: COMMERCIAL

## 2024-11-21 ENCOUNTER — APPOINTMENT (OUTPATIENT)
Dept: PRIMARY CARE | Facility: CLINIC | Age: 54
End: 2024-11-21
Payer: COMMERCIAL

## 2024-11-21 ENCOUNTER — TREATMENT (OUTPATIENT)
Dept: OCCUPATIONAL THERAPY | Facility: HOSPITAL | Age: 54
End: 2024-11-21
Payer: COMMERCIAL

## 2024-11-21 ENCOUNTER — HOSPITAL ENCOUNTER (OUTPATIENT)
Dept: VASCULAR MEDICINE | Facility: HOSPITAL | Age: 54
Discharge: HOME | End: 2024-11-21
Payer: COMMERCIAL

## 2024-11-21 DIAGNOSIS — M79.606 PAIN OF LOWER EXTREMITY, UNSPECIFIED LATERALITY: Primary | ICD-10-CM

## 2024-11-21 DIAGNOSIS — R60.0 LOCALIZED EDEMA: ICD-10-CM

## 2024-11-21 DIAGNOSIS — I73.9 PERIPHERAL VASCULAR DISEASE, UNSPECIFIED (CMS-HCC): ICD-10-CM

## 2024-11-21 DIAGNOSIS — I89.0 LYMPHEDEMA, NOT ELSEWHERE CLASSIFIED: Chronic | ICD-10-CM

## 2024-11-21 PROCEDURE — 93922 UPR/L XTREMITY ART 2 LEVELS: CPT

## 2024-11-21 PROCEDURE — 97535 SELF CARE MNGMENT TRAINING: CPT | Mod: GO,CO

## 2024-11-21 PROCEDURE — 97140 MANUAL THERAPY 1/> REGIONS: CPT | Mod: GO,CO

## 2024-11-21 PROCEDURE — 93970 EXTREMITY STUDY: CPT | Performed by: SURGERY

## 2024-11-21 PROCEDURE — 93970 EXTREMITY STUDY: CPT

## 2024-11-21 PROCEDURE — 93922 UPR/L XTREMITY ART 2 LEVELS: CPT | Performed by: SURGERY

## 2024-11-21 RX ORDER — OXYCODONE AND ACETAMINOPHEN 10; 325 MG/1; MG/1
1 TABLET ORAL EVERY 6 HOURS PRN
Qty: 42 TABLET | Refills: 0 | Status: SHIPPED | OUTPATIENT
Start: 2024-11-21 | End: 2024-12-05

## 2024-11-21 ASSESSMENT — PAIN DESCRIPTION - DESCRIPTORS: DESCRIPTORS: BURNING

## 2024-11-21 ASSESSMENT — PAIN SCALES - GENERAL: PAINLEVEL_OUTOF10: 10 - WORST POSSIBLE PAIN

## 2024-11-21 ASSESSMENT — ACTIVITIES OF DAILY LIVING (ADL): HOME_MANAGEMENT_TIME_ENTRY: 30

## 2024-11-21 ASSESSMENT — PAIN - FUNCTIONAL ASSESSMENT: PAIN_FUNCTIONAL_ASSESSMENT: 0-10

## 2024-11-21 NOTE — PROGRESS NOTES
"Occupational Therapy    Occupational Therapy Treatment    Name: Frances Mcdowell  MRN: [id number]  : 1970  Date: 2024  Time Calculation  Start Time: 1300  Stop Time: 1400  Time Calculation (min): 60 min     OT Therapeutic Procedures Time Entry  Manual Therapy Time Entry: 30  Self Care/Home Management (ADLs) Time Entry: 30                 Visit number: 2    Assessment:  Pt. Sobbing and crying hysterically at times during this tx session, due to the pain from having to move her BLEs for placing of legs in pump sleeves and bandage changes. Vlwisorb used today over her medicated bandages to decrease the amount of weeping. Unable to take measurements this date due to Pts pain level. Pt verbalized that she could get her shoes without struggle after vaso pneumatic pump wear.     Pain after treatment 10 plus  Plan:     Cont skilled OT to decrease edema and pain via compression, elevation exercises and massage. Improve AROM and balance to improve ease of ADLs, IADLs and mobility.    Subjective \" I am in so much pain, I want my wounds to heal so I am out of pain, I want someone to help me.\" Pt sobbing. Pt had a phone call from her Dr regarding getting a pain med while in the clinic.  General: Pt had a arterial study earlier today.  \" They said everything is ok.\"     Name and  confirmed : yes  Objective    Therapy/Activity:   SELF CARE: Pt educated in the lymphatic system and the 4 principles of tx, elevation, compression, MLD and exs. Educated that a gradient of pressure in compression and consistency in wear is what is effective.   MANUAL: Pt had a vaso pneumatic pump trial today via [employer].   Pt trial consisted of 30 min of vaso pneumatic compression for 30 min. Pressure at 40 mmHg. Pt able to tolerate weill without an increase in pain.   THERAPEUTIC EXERCISE:   Lymphedema Assessment       Right Lower Extremity: Unable to measure     Left Lower Extremity: Unable to measure     LE Skin Appearance/Condition and " Girth:  Dryness: yes  Edema - Pitting: yes  Hyperpigmentation: yes  +Stemmer Sign: yes  Wound: yes (Very deep wounds at back of calves with terrible burning per pt report.  Pt has a large amount of drainage from these wounds keeping the area moist and irritated.)    Pain Assessment:  Pain Assessment: 0-10  0-10 (Numeric) Pain Score: 10 - Worst possible pain  Pain Type: Acute pain  Pain Location: Leg  Pain Orientation: Right, Left  Pain Descriptors: Burning  Effect of Pain on Daily Activities: decreased mobiity and activity tolerance  Therapy Precautions:   Medical Precautions: Infection precautions    OP EDUCATION: inst pt in wear and care of compression pumps, schedule of wear and cleaning.       Goals:  Active       OT Goals       OT Goal 1       Start:  11/13/24    Expected End:  05/13/25       STG: Pt will be independent with self management, including use of garments, pump if indicated, self MLD, ability to recognize signs of infection/cellulitis and exercise/HEP to minimize risk of progression of symptoms and skin infections/cellulitis.            OT Goal 2       Start:  11/13/24    Expected End:  05/13/25       STG: Decrease girth in bilateral LEs by 25 cm each for improved mobility and better clothing/shoe fit.           OT Goal 3       Start:  11/13/24    Expected End:  05/13/25       LTG: Pt will show improvement on the LLIS impairment scale to no greater than 25% impaired.           OT Goal 4       Start:  11/13/24    Expected End:  05/13/25       LTG:  Improve skin integrity to no open wounds and no pitting.             OT Problem       PATIENT STATED GOAL heal wounds and decrease size of legs.        Start:  11/13/24    Expected End:  05/13/25       PATIENT STATED GOAL heal wounds and decrease size of legs.

## 2024-11-25 ENCOUNTER — OFFICE VISIT (OUTPATIENT)
Dept: WOUND CARE | Facility: CLINIC | Age: 54
End: 2024-11-25
Payer: COMMERCIAL

## 2024-11-25 DIAGNOSIS — F98.8 ATTENTION DEFICIT DISORDER, UNSPECIFIED TYPE: ICD-10-CM

## 2024-11-25 PROCEDURE — 11042 DBRDMT SUBQ TIS 1ST 20SQCM/<: CPT

## 2024-11-26 RX ORDER — DEXTROAMPHETAMINE SACCHARATE, AMPHETAMINE ASPARTATE MONOHYDRATE, DEXTROAMPHETAMINE SULFATE AND AMPHETAMINE SULFATE 5; 5; 5; 5 MG/1; MG/1; MG/1; MG/1
20 CAPSULE, EXTENDED RELEASE ORAL 2 TIMES DAILY
Qty: 60 CAPSULE | Refills: 0 | Status: SHIPPED | OUTPATIENT
Start: 2024-11-26

## 2024-12-04 ENCOUNTER — APPOINTMENT (OUTPATIENT)
Dept: WOUND CARE | Facility: CLINIC | Age: 54
End: 2024-12-04
Payer: COMMERCIAL

## 2024-12-04 ENCOUNTER — TREATMENT (OUTPATIENT)
Dept: OCCUPATIONAL THERAPY | Facility: HOSPITAL | Age: 54
End: 2024-12-04
Payer: COMMERCIAL

## 2024-12-04 DIAGNOSIS — I89.0 LYMPHEDEMA, NOT ELSEWHERE CLASSIFIED: Chronic | ICD-10-CM

## 2024-12-04 PROCEDURE — 97535 SELF CARE MNGMENT TRAINING: CPT | Mod: GO,CO

## 2024-12-04 PROCEDURE — 97140 MANUAL THERAPY 1/> REGIONS: CPT | Mod: GO,CO

## 2024-12-04 PROCEDURE — 97110 THERAPEUTIC EXERCISES: CPT | Mod: GO,CO

## 2024-12-04 ASSESSMENT — ACTIVITIES OF DAILY LIVING (ADL)
EFFECT OF PAIN ON DAILY ACTIVITIES: DECREASED MOBILITY
HOME_MANAGEMENT_TIME_ENTRY: 15

## 2024-12-04 ASSESSMENT — PAIN - FUNCTIONAL ASSESSMENT: PAIN_FUNCTIONAL_ASSESSMENT: 0-10

## 2024-12-04 ASSESSMENT — PAIN DESCRIPTION - DESCRIPTORS: DESCRIPTORS: BURNING

## 2024-12-04 ASSESSMENT — PAIN SCALES - GENERAL: PAINLEVEL_OUTOF10: 5 - MODERATE PAIN

## 2024-12-04 NOTE — PROGRESS NOTES
Occupational Therapy    Occupational Therapy Treatment    Name: Frances Mcdowell  MRN: [id number]  : 1970  Date: 2024  Time Calculation  Start Time: 0910  Stop Time: 1010  Time Calculation (min): 60 min     OT Therapeutic Procedures Time Entry  Manual Therapy Time Entry: 30  Self Care/Home Management (ADLs) Time Entry: 15  Therapeutic Exercise Time Entry: 15                 Visit number: 3    Assessment: Pt has been in compression, performing his HEP self MLD and elevating his LEs for 4 weeks. lymphedema is a chronic, progressive, regional disease affecting the soft tissue skin lymphatic vessels and nodes from protein rich fluid caused by mechanical failure. Pt would greatly benefit from a pneumatic pump in a home program to further reduce/ maintain his lymphedema'  Pts LLE increased to 4.5 cm and RLE decreased by 3.4 cm. Pt in less pain overall  and has returned to sleeping in her bed with her BLEs elevated. Pt cont to have a large amount of weeping in her wounds. Pt took a picture of the [employer] to see if she could procure herself or via wound center for her bandaging. Pt stated that she has bought a box of Tg  for her personal use for compression.    Pain after treatment 5/10  Plan:  Cont skilled OT to decrease edema and pain via compression, elevation exercises and massage. Improve AROM and balance to improve ease of ADLs, IADLs and mobility       Subjective Pt states that she was able to get percocet which has allowed her to sleep. Her pain has lessened to a 5/10. Pt states that she has been able to return to sleeping in her bed with her feet elevated.  General:  Pt cont to go to the wound center every week.      Name and  confirmed: yes  Objective    Therapy/Activity:   SELF CARE: Educated pt in the anatomy and physiology of the lymph system and rational for MLD and compression and exercises.  MANUAL: pt inst in self MLD. MLD SCF abdominal deep and superficial, B axilla, B inguinal and BLEs. Pt  inst in type of strokes to use to engage the lymph sytem   THERAPEUTIC EXERCISE: Inst Pt in LE exs x 10 reps each, toe curls, ankle pumps, circles both ways and int ext rotation.  Lymphedema Assessment       Right Lower Extremity:  R Metatarsal (cm): 20.7 cm  R Heel Y Angle: 33 cm  R Ankle (cm): 30.5 cm  R 10 cm Above Ankle (cm): 37.7 cm  R 20 cm Above Ankle (cm): 51.8 cm  R 30 cm Above Ankle (cm): 59.4 cm  R 40 cm Above Ankle (cm): 0 cm  R Knee (cm): 0 cm  R 10 cm Above Knee (cm): 0 cm  R 20 cm Above Knee (cm): 0 cm  R 30 cm Above Knee (cm): 0 cm  R 40 cm Above Knee (cm): 0 cm  R 50 cm Above Knee (cm): 0 cm  Right lower extremity total: 233.1  Left Lower Extremity:  L Metatarsal (cm): 21 cm  L Heel Y Angle: 32 cm  L Ankle (cm): 30.5 cm  L 10 cm Above Ankle (cm): 46.7 cm  L 20 cm Above Ankle (cm): 59 cm  L 30 cm Above Ankle (cm): 58.3 cm  L 40 cm Above Ankle (cm): 0 cm  L Knee (cm): 0 cm  L 10 cm Above Knee (cm): 0 cm  L 20 cm Above Knee (cm): 0 cm  L 30 cm Above Knee (cm): 0 cm  L 40 cm Above Knee (cm): 0 cm  L 50 cm Above Knee (cm): 0 cm  Left Lower extremity total: 247.5  LE Skin Appearance/Condition and Girth:  Edema - Pitting: yes  Hyperpigmentation: yes  +Stemmer Sign: yes  Wound: yes (Severe)    Pain Assessment:  Pain Assessment: 0-10  0-10 (Numeric) Pain Score: 5 - Moderate pain  Pain Type: Acute pain  Pain Location: Leg  Pain Orientation: Right, Left  Pain Descriptors: Burning  Effect of Pain on Daily Activities: decreased mobility  Therapy Precautions:   Medical Precautions: Infection precautions, Fall precautions    OP EDUCATION: Educated in the lymph system and MLD tech, type of stroke and sequence.     Goals:  Active       OT Goals       OT Goal 1       Start:  11/13/24    Expected End:  05/13/25       STG: Pt will be independent with self management, including use of garments, pump if indicated, self MLD, ability to recognize signs of infection/cellulitis and exercise/HEP to minimize risk of  progression of symptoms and skin infections/cellulitis.            OT Goal 2       Start:  11/13/24    Expected End:  05/13/25       STG: Decrease girth in bilateral LEs by 25 cm each for improved mobility and better clothing/shoe fit.           OT Goal 3       Start:  11/13/24    Expected End:  05/13/25       LTG: Pt will show improvement on the LLIS impairment scale to no greater than 25% impaired.           OT Goal 4       Start:  11/13/24    Expected End:  05/13/25       LTG:  Improve skin integrity to no open wounds and no pitting.             OT Problem       PATIENT STATED GOAL heal wounds and decrease size of legs.        Start:  11/13/24    Expected End:  05/13/25       PATIENT STATED GOAL heal wounds and decrease size of legs.

## 2024-12-12 ENCOUNTER — LAB REQUISITION (OUTPATIENT)
Dept: LAB | Facility: HOSPITAL | Age: 54
End: 2024-12-12
Payer: COMMERCIAL

## 2024-12-12 ENCOUNTER — OFFICE VISIT (OUTPATIENT)
Dept: WOUND CARE | Facility: CLINIC | Age: 54
End: 2024-12-12
Payer: COMMERCIAL

## 2024-12-12 DIAGNOSIS — E03.3 POSTINFECTIOUS HYPOTHYROIDISM: ICD-10-CM

## 2024-12-12 DIAGNOSIS — E78.2 MIXED HYPERLIPIDEMIA: ICD-10-CM

## 2024-12-12 DIAGNOSIS — L97.212 NON-PRESSURE CHRONIC ULCER OF RIGHT CALF WITH FAT LAYER EXPOSED (MULTI): ICD-10-CM

## 2024-12-12 DIAGNOSIS — E55.9 VITAMIN D DEFICIENCY: ICD-10-CM

## 2024-12-12 DIAGNOSIS — B99.9 INFECTION: Primary | ICD-10-CM

## 2024-12-12 DIAGNOSIS — L03.119 CELLULITIS OF LOWER EXTREMITY, UNSPECIFIED LATERALITY: ICD-10-CM

## 2024-12-12 PROCEDURE — 87077 CULTURE AEROBIC IDENTIFY: CPT | Mod: OUT,PORLAB | Performed by: NURSE PRACTITIONER

## 2024-12-12 PROCEDURE — 11042 DBRDMT SUBQ TIS 1ST 20SQCM/<: CPT

## 2024-12-12 PROCEDURE — 11045 DBRDMT SUBQ TISS EACH ADDL: CPT

## 2024-12-12 NOTE — TELEPHONE ENCOUNTER
Please move to [facility] Drug pharm:  Omeprazole 40mg  Prednisone 10mg  Vitamin D-3  Levothyroxine 100mcg  Gabapentin 300mg  Rosuvastatin 20mg

## 2024-12-13 RX ORDER — GABAPENTIN 300 MG/1
300 CAPSULE ORAL 3 TIMES DAILY
Qty: 90 CAPSULE | Refills: 3 | Status: SHIPPED | OUTPATIENT
Start: 2024-12-13

## 2024-12-13 RX ORDER — LEVOTHYROXINE SODIUM 100 UG/1
100 TABLET ORAL DAILY
Qty: 90 TABLET | Refills: 3 | Status: SHIPPED | OUTPATIENT
Start: 2024-12-13

## 2024-12-13 RX ORDER — OMEPRAZOLE 40 MG/1
40 CAPSULE, DELAYED RELEASE ORAL DAILY
Qty: 90 CAPSULE | Refills: 3 | Status: SHIPPED | OUTPATIENT
Start: 2024-12-13

## 2024-12-13 RX ORDER — ROSUVASTATIN CALCIUM 20 MG/1
20 TABLET, COATED ORAL DAILY
Qty: 30 TABLET | Refills: 11 | Status: SHIPPED | OUTPATIENT
Start: 2024-12-13 | End: 2025-12-08

## 2024-12-13 RX ORDER — ACETAMINOPHEN 500 MG
5000 TABLET ORAL DAILY
Qty: 90 TABLET | Refills: 1 | Status: SHIPPED | OUTPATIENT
Start: 2024-12-13

## 2024-12-13 RX ORDER — PREDNISONE 10 MG/1
10 TABLET ORAL DAILY
Qty: 90 TABLET | Refills: 0 | Status: SHIPPED | OUTPATIENT
Start: 2024-12-13

## 2024-12-15 LAB
BACTERIA SPEC CULT: ABNORMAL
BACTERIA SPEC CULT: ABNORMAL
GRAM STN SPEC: ABNORMAL

## 2024-12-17 ENCOUNTER — APPOINTMENT (OUTPATIENT)
Dept: OCCUPATIONAL THERAPY | Facility: HOSPITAL | Age: 54
End: 2024-12-17
Payer: COMMERCIAL

## 2024-12-17 LAB
BACTERIA SPEC CULT: ABNORMAL
BACTERIA SPEC CULT: ABNORMAL
GRAM STN SPEC: ABNORMAL

## 2024-12-17 RX ORDER — CLINDAMYCIN HYDROCHLORIDE 150 MG/1
300 CAPSULE ORAL 2 TIMES DAILY
Qty: 40 CAPSULE | Refills: 0 | Status: SHIPPED | OUTPATIENT
Start: 2024-12-17 | End: 2024-12-27

## 2024-12-18 ENCOUNTER — APPOINTMENT (OUTPATIENT)
Dept: OCCUPATIONAL THERAPY | Facility: HOSPITAL | Age: 54
End: 2024-12-18
Payer: COMMERCIAL

## 2024-12-19 ENCOUNTER — APPOINTMENT (OUTPATIENT)
Dept: WOUND CARE | Facility: CLINIC | Age: 54
End: 2024-12-19
Payer: COMMERCIAL

## 2024-12-20 DIAGNOSIS — F98.8 ATTENTION DEFICIT DISORDER, UNSPECIFIED TYPE: ICD-10-CM

## 2024-12-23 RX ORDER — DEXTROAMPHETAMINE SACCHARATE, AMPHETAMINE ASPARTATE MONOHYDRATE, DEXTROAMPHETAMINE SULFATE AND AMPHETAMINE SULFATE 5; 5; 5; 5 MG/1; MG/1; MG/1; MG/1
20 CAPSULE, EXTENDED RELEASE ORAL 2 TIMES DAILY
Qty: 60 CAPSULE | Refills: 0 | Status: SHIPPED | OUTPATIENT
Start: 2024-12-23

## 2025-01-07 ENCOUNTER — OFFICE VISIT (OUTPATIENT)
Dept: VASCULAR SURGERY | Facility: HOSPITAL | Age: 55
End: 2025-01-07
Payer: COMMERCIAL

## 2025-01-07 VITALS
DIASTOLIC BLOOD PRESSURE: 108 MMHG | HEART RATE: 100 BPM | WEIGHT: 290 LBS | BODY MASS INDEX: 51.37 KG/M2 | SYSTOLIC BLOOD PRESSURE: 153 MMHG

## 2025-01-07 DIAGNOSIS — R60.0 BILATERAL LEG EDEMA: Primary | ICD-10-CM

## 2025-01-07 DIAGNOSIS — I89.0 LYMPHEDEMA: ICD-10-CM

## 2025-01-07 PROCEDURE — 99204 OFFICE O/P NEW MOD 45 MIN: CPT | Performed by: SURGERY

## 2025-01-07 PROCEDURE — 99214 OFFICE O/P EST MOD 30 MIN: CPT | Performed by: SURGERY

## 2025-01-08 ENCOUNTER — OFFICE VISIT (OUTPATIENT)
Dept: WOUND CARE | Facility: HOSPITAL | Age: 55
End: 2025-01-08
Payer: COMMERCIAL

## 2025-01-08 DIAGNOSIS — E55.9 VITAMIN D DEFICIENCY: ICD-10-CM

## 2025-01-08 DIAGNOSIS — L97.922 NON-PRESSURE CHRONIC ULCER OF LEFT LOWER LEG WITH FAT LAYER EXPOSED (MULTI): Primary | ICD-10-CM

## 2025-01-08 PROCEDURE — 11045 DBRDMT SUBQ TISS EACH ADDL: CPT

## 2025-01-08 PROCEDURE — 99213 OFFICE O/P EST LOW 20 MIN: CPT | Mod: 25

## 2025-01-08 PROCEDURE — 11042 DBRDMT SUBQ TIS 1ST 20SQCM/<: CPT

## 2025-01-08 RX ORDER — PREDNISONE 10 MG/1
10 TABLET ORAL DAILY
Qty: 90 TABLET | Refills: 0 | Status: SHIPPED | OUTPATIENT
Start: 2025-01-08

## 2025-01-08 RX ORDER — IBUPROFEN 800 MG/1
800 TABLET ORAL EVERY 6 HOURS PRN
Qty: 180 TABLET | Refills: 1 | Status: SHIPPED | OUTPATIENT
Start: 2025-01-08

## 2025-01-08 NOTE — TELEPHONE ENCOUNTER
predniSONE (Deltasone) 10 mg   ibuprofen 800 mg tablet   Confirmed pharmacy  [facility]

## 2025-01-09 RX ORDER — GENTAMICIN SULFATE 1 MG/G
OINTMENT TOPICAL DAILY
Qty: 30 G | Refills: 3 | Status: SHIPPED | OUTPATIENT
Start: 2025-01-09 | End: 2025-01-19

## 2025-01-09 ASSESSMENT — ENCOUNTER SYMPTOMS
CONFUSION: 0
SEIZURES: 0
FATIGUE: 0
DIFFICULTY URINATING: 0
ABDOMINAL PAIN: 0
PALPITATIONS: 0
SPEECH DIFFICULTY: 0
CONSTIPATION: 0
WOUND: 1
SORE THROAT: 0
COUGH: 0
LIGHT-HEADEDNESS: 0
JOINT SWELLING: 0
FACIAL ASYMMETRY: 0
SHORTNESS OF BREATH: 0
DIARRHEA: 0
WEAKNESS: 0
COLOR CHANGE: 0
NUMBNESS: 0
HEADACHES: 0
FEVER: 0
DIZZINESS: 0
NECK PAIN: 0
SLEEP DISTURBANCE: 0
NAUSEA: 0
ADENOPATHY: 0
TROUBLE SWALLOWING: 0
ARTHRALGIAS: 0
VOMITING: 0
WHEEZING: 0
CHILLS: 0

## 2025-01-09 NOTE — PROGRESS NOTES
Subjective   Patient ID: Frances Mcdowell is a 54 y.o. female who presents for New Patient Visit (NPV-PVD/Ref from Wound care ).  HPI  54 year old female with past medical history of HTN, HLD, obesity, GERD, hypothyroid, anxiety, ADD, chronic LBP, migraines, lymphedema, presents as a new patient for evaluation of bilateral lower extremity edema with wounds/drainage and recurrent cellulitis. First occurrence of wound/infection May 2024. Small blister on LLE, pseudomonas cellulitis after hot tub exposure. Since that time has had persistent lower extremity edema. Multiple hospitalizations over the past 8 months for recurrent cellulitis. Has been seeing wound care in [place] for local wound care, switching to [facility] wound care tomorrow. Right lower extremity worse than LLE. Significant amount of drainage, going through multiple dressing changes daily. Affecting daily life. Tearful at visit today. Frustrated with persistence of wounds and swelling. Does have lymphedema pumps at home, does help reduce swelling but quickly recurs, Recent VI with deep venous reflux bilaterally, bilateral SFJ reflux and R GSV reflux. Does have history of bilateral vein stripping in the lower extremities in the 1990s. No history of DVT. Ambulatory at baseline without assistive devices. Works as an LPN, on her feet most of the day.     11/21/24 Bilateral VI  CONCLUSIONS:  Right Lower Venous Insufficiency: Reflux is noted in the saphenofemoral junction, mid thigh great saphenous and knee level of great saphenous veins. There is reflux noted in the common femoral vein. Multiple varicosities are noted in and out of the fascia in the proximal and mid thigh beginning near the groin.  The SSV is not visualized due to dressings.  Left Lower Venous Insufficiency: Reflux is noted in the saphenofemoral junction vein. Multiple thrombosed varicosities are noted in and out of the fascia near the groin. The GSV is only visulaized in the proximal thigh.  The  SSV is not visualized due to dressings.  Right Lower Venous: No evidence of acute deep vein thrombus visualized in the right lower extremity. Cannot rule out thrombus in non-visualized posterior tibial and peroneal veins due to dressings.  Left Lower Venous: No evidence of acute deep vein thrombus visualized in the left lower extremity. Cannot rule out thrombus in non-visualized posterior tibial and peroneal veins due to dressings.  Additional Findings:  Limited exam due to body habitus and calf dressings bilaterally.        Imaging & Doppler Findings:     Right          Compress Thrombus  Diam   Depth    Time  SFJ              Yes      None   9.2 mm 29.0 mm 4.50 sec  Prox Thigh GSV   Yes      None   5.7 mm 25.5 mm 0.00 sec  Mid Thigh GSV    Yes      None   7.9 mm 24.1 mm 2.20 sec  Knee GSV         Yes      None   5.9 mm 25.1 mm 3.90 sec  Prox Calf GSV    Yes      None   7.9 mm 29.0 mm 0.00 sec  Common FV                                       2.30 sec        Left           Compress Thrombus  Diam   Depth    Time  SFJ              Yes      None   6.8 mm 32.6 mm 4.50 sec  Prox Thigh GSV   Yes      None  Mid Thigh GSV    Yes      None  Knee GSV         Yes      None  Prox Calf GSV    Yes      None  Mid Calf GSV     Yes      None  Common FV                                       2.20 sec        Right                 Compressible Thrombus        Flow  Distal External Iliac                None  CFV                       Yes        None         Reflux  PFV                       Yes        None  FV Proximal               Yes        None   Spontaneous/Phasic  FV Mid                    Yes        None  FV Distal                 Yes        None  Popliteal                 Yes        None   Spontaneous/Phasic        Left                  Compress Thrombus        Flow  Distal External Iliac            None  CFV                     Yes      None         Reflux  PFV                     Yes      None  FV Proximal             Yes       None   Spontaneous/Phasic  FV Mid                  Yes      None  FV Distal               Yes      None  Popliteal               Yes      None   Spontaneous/Phasic    Past Medical History:   Diagnosis Date    Acute embolism and thrombosis of other specified veins 2022    Superficial vein thrombosis    ADD (attention deficit disorder)     Anxiety     Endometrial intraepithelial neoplasia (EIN)     GERD (gastroesophageal reflux disease)     HLD (hyperlipidemia)     Hypothyroidism     Lymph edema       Past Surgical History:   Procedure Laterality Date    BLADDER SURGERY       SECTION, LOW TRANSVERSE      FOOT SURGERY      HYSTERECTOMY      TONSILLECTOMY      TOTAL THYROIDECTOMY      VARICOSE VEIN SURGERY      Varicose Vein Ligation      Social History     Socioeconomic History    Marital status:      Spouse name: Not on file    Number of children: Not on file    Years of education: Not on file    Highest education level: Not on file   Occupational History    Not on file   Tobacco Use    Smoking status: Never    Smokeless tobacco: Never   Vaping Use    Vaping status: Never Used   Substance and Sexual Activity    Alcohol use: Not Currently     Comment: seldom    Drug use: Never    Sexual activity: Yes     Partners: Male     Birth control/protection: None   Other Topics Concern    Not on file   Social History Narrative    Not on file     Social Drivers of Health     Financial Resource Strain: Low Risk  (10/31/2024)    Overall Financial Resource Strain (CARDIA)     Difficulty of Paying Living Expenses: Not hard at all   Food Insecurity: No Food Insecurity (10/31/2024)    Hunger Vital Sign     Worried About Running Out of Food in the Last Year: Never true     Ran Out of Food in the Last Year: Never true   Transportation Needs: No Transportation Needs (10/31/2024)    PRAPARE - Transportation     Lack of Transportation (Medical): No     Lack of Transportation (Non-Medical): No   Physical Activity: Inactive  (10/31/2024)    Exercise Vital Sign     Days of Exercise per Week: 0 days     Minutes of Exercise per Session: 0 min   Stress: Stress Concern Present (10/31/2024)    [place] [employer] - Occupational Stress Questionnaire     Feeling of Stress : To some extent   Social Connections: Socially Isolated (10/31/2024)    Social Connection and Isolation Panel [NHANES]     Frequency of Communication with Friends and Family: Once a week     Frequency of Social Gatherings with Friends and Family: Once a week     Attends Rastafari Services: Never     Active Member of Clubs or Organizations: No     Attends Club or Organization Meetings: Never     Marital Status:    Intimate Partner Violence: Not At Risk (10/31/2024)    Humiliation, Afraid, Rape, and Kick questionnaire     Fear of Current or Ex-Partner: No     Emotionally Abused: No     Physically Abused: No     Sexually Abused: No   Housing Stability: Low Risk  (10/31/2024)    Housing Stability Vital Sign     Unable to Pay for Housing in the Last Year: No     Number of Times Moved in the Last Year: 0     Homeless in the Last Year: No    No family history on file.   Current Outpatient Medications   Medication Instructions    amphetamine-dextroamphetamine XR (Adderall XR) 20 mg 24 hr capsule 20 mg, oral, 2 times daily    cholecalciferol (VITAMIN D-3) 5,000 Units, oral, Daily    EPINEPHrine (EPIPEN) 0.3 mg, injection, As needed, As Directed    furosemide (LASIX) 10 mg, oral, Daily, As needed for swelling    gabapentin (NEURONTIN) 300 mg, oral, 3 times daily    hydrOXYzine HCL (ATARAX) 25 mg, oral, Every 8 hours PRN    hydrOXYzine HCL (ATARAX) 25 mg, oral, Every 8 hours PRN    ibuprofen 800 mg, oral, Every 6 hours PRN    levothyroxine (SYNTHROID, LEVOXYL) 100 mcg, oral, Daily, Take on an empty stomach at the same time each day, either 30 to 60 minutes prior to breakfast    omeprazole (PRILOSEC) 40 mg, oral, Daily    predniSONE (DELTASONE) 10 mg, oral,  Daily    rosuvastatin (CRESTOR) 20 mg, oral, Daily      Allergies   Allergen Reactions    Erythromycin Base Anaphylaxis    Sulfa (Sulfonamide Antibiotics) Anaphylaxis    Tetanus Toxoid Other and Swelling    Tramadol Itching    Adhesive Tape-Silicones Rash     tegaderm causes bad skin breakdown        Review of Systems   Constitutional:  Negative for chills, fatigue and fever.   HENT:  Negative for congestion, sore throat and trouble swallowing.    Eyes:  Negative for visual disturbance.   Respiratory:  Negative for cough, shortness of breath and wheezing.    Cardiovascular:  Positive for leg swelling. Negative for chest pain and palpitations.   Gastrointestinal:  Negative for abdominal pain, constipation, diarrhea, nausea and vomiting.   Endocrine: Negative for cold intolerance and heat intolerance.   Genitourinary:  Negative for difficulty urinating.   Musculoskeletal:  Negative for arthralgias, joint swelling and neck pain.   Skin:  Positive for wound. Negative for color change.   Neurological:  Negative for dizziness, seizures, syncope, facial asymmetry, speech difficulty, weakness, light-headedness, numbness and headaches.   Hematological:  Negative for adenopathy.   Psychiatric/Behavioral:  Negative for behavioral problems, confusion and sleep disturbance.      Objective   Physical Exam  Constitutional:       Appearance: Normal appearance. She is obese.   HENT:      Head: Normocephalic.      Right Ear: External ear normal.      Left Ear: External ear normal.      Nose: Nose normal.      Mouth/Throat:      Mouth: Mucous membranes are moist.   Eyes:      Extraocular Movements: Extraocular movements intact.   Neck:      Vascular: No carotid bruit.   Cardiovascular:      Rate and Rhythm: Normal rate and regular rhythm.      Pulses: Normal pulses.   Pulmonary:      Effort: Pulmonary effort is normal. No respiratory distress.      Breath sounds: Normal breath sounds.   Abdominal:      Palpations: Abdomen is soft.       Tenderness: There is no abdominal tenderness.   Musculoskeletal:         General: No swelling or tenderness.      Cervical back: Normal range of motion and neck supple.      Right lower leg: Edema present.      Left lower leg: Edema present.      Comments: Lymphedema bilaterally     Skin:     General: Skin is warm and dry.      Capillary Refill: Capillary refill takes less than 2 seconds.      Findings: No lesion.   Neurological:      General: No focal deficit present.      Mental Status: She is alert and oriented to person, place, and time. Mental status is at baseline.   Psychiatric:         Mood and Affect: Mood normal.         Behavior: Behavior normal.         Thought Content: Thought content normal.         Judgment: Judgment normal.         Assessment/Plan   Problem List Items Addressed This Visit             ICD-10-CM    Bilateral leg edema - Primary R60.0    Relevant Orders    CT angio abdomen pelvis w and or wo IV IV contrast    Lymphedema I89.0   54 year old female with past medical history of HTN, HLD, obesity, GERD, hypothyroid, anxiety, ADD, chronic LBP, migraines, lymphedema, presents as a new patient for evaluation of bilateral lower extremity edema with wounds/drainage and recurrent cellulitis.  -reviewed and discussed recent VI, deep venous reflux bilaterally, bilateral SFJ reflux and R GSV reflux.  -Will obtain CT venogram to evaluate for pelvic venous compression r/o pelvic pathology  -discussed venogram IVUS, discussed procedure in detail, post procedure instructions, discussed risks benefits including but not limited to bleeding, infection, DVT, damage to vessels, need for further procedures. Patient and  in agreement, all questions answered.   -continue lymphedema pump therapy  -continue local wound care at wound care clinic  -ambulation as tolerated  -Dr Meyers present and in agreement    -will follow up after CT venogram, likely schedule venogram IVUS at that time.   -call with  questions or concerns.     Nayla Mcgill PA-C 01/09/25 12:47 AM

## 2025-01-15 ENCOUNTER — OFFICE VISIT (OUTPATIENT)
Dept: WOUND CARE | Facility: HOSPITAL | Age: 55
End: 2025-01-15
Payer: COMMERCIAL

## 2025-01-15 DIAGNOSIS — L97.922 NON-PRESSURE CHRONIC ULCER OF LEFT LOWER LEG WITH FAT LAYER EXPOSED (MULTI): Primary | ICD-10-CM

## 2025-01-15 PROCEDURE — 11045 DBRDMT SUBQ TISS EACH ADDL: CPT | Mod: 52

## 2025-01-15 PROCEDURE — 11042 DBRDMT SUBQ TIS 1ST 20SQCM/<: CPT | Mod: 52

## 2025-01-15 RX ORDER — LIDOCAINE 40 MG/G
CREAM TOPICAL 2 TIMES DAILY
Qty: 120 G | Refills: 3 | Status: SHIPPED | OUTPATIENT
Start: 2025-01-15 | End: 2025-02-14

## 2025-01-22 ENCOUNTER — OFFICE VISIT (OUTPATIENT)
Dept: WOUND CARE | Facility: HOSPITAL | Age: 55
End: 2025-01-22
Payer: COMMERCIAL

## 2025-01-22 ENCOUNTER — HOSPITAL ENCOUNTER (OUTPATIENT)
Dept: RADIOLOGY | Facility: HOSPITAL | Age: 55
Discharge: HOME | End: 2025-01-22
Payer: COMMERCIAL

## 2025-01-22 DIAGNOSIS — R60.0 BILATERAL LEG EDEMA: ICD-10-CM

## 2025-01-22 DIAGNOSIS — F98.8 ATTENTION DEFICIT DISORDER, UNSPECIFIED TYPE: ICD-10-CM

## 2025-01-22 PROCEDURE — 74177 CT ABD & PELVIS W/CONTRAST: CPT | Performed by: RADIOLOGY

## 2025-01-22 PROCEDURE — 74177 CT ABD & PELVIS W/CONTRAST: CPT

## 2025-01-22 PROCEDURE — 11042 DBRDMT SUBQ TIS 1ST 20SQCM/<: CPT | Mod: 50

## 2025-01-22 PROCEDURE — 2550000001 HC RX 255 CONTRASTS: Performed by: PHYSICIAN ASSISTANT

## 2025-01-22 RX ORDER — DEXTROAMPHETAMINE SACCHARATE, AMPHETAMINE ASPARTATE MONOHYDRATE, DEXTROAMPHETAMINE SULFATE AND AMPHETAMINE SULFATE 5; 5; 5; 5 MG/1; MG/1; MG/1; MG/1
20 CAPSULE, EXTENDED RELEASE ORAL 2 TIMES DAILY
Qty: 60 CAPSULE | Refills: 0 | Status: SHIPPED | OUTPATIENT
Start: 2025-01-22

## 2025-01-22 RX ADMIN — IOHEXOL 75 ML: 350 INJECTION, SOLUTION INTRAVENOUS at 10:36

## 2025-01-24 ENCOUNTER — TELEPHONE (OUTPATIENT)
Dept: VASCULAR SURGERY | Facility: HOSPITAL | Age: 55
End: 2025-01-24
Payer: COMMERCIAL

## 2025-01-29 ENCOUNTER — OFFICE VISIT (OUTPATIENT)
Dept: WOUND CARE | Facility: HOSPITAL | Age: 55
End: 2025-01-29
Payer: COMMERCIAL

## 2025-01-29 DIAGNOSIS — L03.116 CELLULITIS OF LEFT LOWER EXTREMITY: Primary | ICD-10-CM

## 2025-01-29 PROCEDURE — 11045 DBRDMT SUBQ TISS EACH ADDL: CPT | Mod: 50

## 2025-01-29 PROCEDURE — 97606 NEG PRS WND THER DME>50 SQCM: CPT | Mod: RT

## 2025-01-29 PROCEDURE — 11042 DBRDMT SUBQ TIS 1ST 20SQCM/<: CPT | Mod: 50

## 2025-01-29 RX ORDER — LEVOFLOXACIN 500 MG/1
750 TABLET, FILM COATED ORAL DAILY
Qty: 21 TABLET | Refills: 0 | Status: SHIPPED | OUTPATIENT
Start: 2025-01-29 | End: 2025-02-12

## 2025-01-30 DIAGNOSIS — L03.119 CELLULITIS OF LOWER EXTREMITY, UNSPECIFIED LATERALITY: ICD-10-CM

## 2025-01-30 RX ORDER — GABAPENTIN 300 MG/1
300 CAPSULE ORAL 3 TIMES DAILY
Qty: 90 CAPSULE | Refills: 3 | Status: SHIPPED | OUTPATIENT
Start: 2025-01-30

## 2025-02-02 LAB
BACTERIA SPEC AEROBE CULT: ABNORMAL
BACTERIA SPEC ANAEROBE CULT: ABNORMAL

## 2025-02-03 ENCOUNTER — OFFICE VISIT (OUTPATIENT)
Dept: PAIN MEDICINE | Facility: CLINIC | Age: 55
End: 2025-02-03
Payer: COMMERCIAL

## 2025-02-03 VITALS
SYSTOLIC BLOOD PRESSURE: 146 MMHG | RESPIRATION RATE: 24 BRPM | OXYGEN SATURATION: 99 % | WEIGHT: 290 LBS | HEIGHT: 63 IN | HEART RATE: 92 BPM | DIASTOLIC BLOOD PRESSURE: 88 MMHG | BODY MASS INDEX: 51.38 KG/M2

## 2025-02-03 DIAGNOSIS — L03.115 CELLULITIS OF RIGHT LOWER EXTREMITY: Primary | ICD-10-CM

## 2025-02-03 DIAGNOSIS — I73.9 PERIPHERAL VASCULAR DISEASE (CMS-HCC): Primary | ICD-10-CM

## 2025-02-03 DIAGNOSIS — M79.604 BILATERAL LEG PAIN: ICD-10-CM

## 2025-02-03 DIAGNOSIS — M79.605 BILATERAL LEG PAIN: ICD-10-CM

## 2025-02-03 PROCEDURE — 99401 PREV MED CNSL INDIV APPRX 15: CPT | Performed by: ANESTHESIOLOGY

## 2025-02-03 PROCEDURE — 3008F BODY MASS INDEX DOCD: CPT | Performed by: ANESTHESIOLOGY

## 2025-02-03 PROCEDURE — 99214 OFFICE O/P EST MOD 30 MIN: CPT | Performed by: ANESTHESIOLOGY

## 2025-02-03 PROCEDURE — 99204 OFFICE O/P NEW MOD 45 MIN: CPT | Performed by: ANESTHESIOLOGY

## 2025-02-03 PROCEDURE — 1036F TOBACCO NON-USER: CPT | Performed by: ANESTHESIOLOGY

## 2025-02-03 PROCEDURE — G2211 COMPLEX E/M VISIT ADD ON: HCPCS | Performed by: ANESTHESIOLOGY

## 2025-02-03 RX ORDER — GABAPENTIN 300 MG/1
600 CAPSULE ORAL 3 TIMES DAILY
Qty: 180 CAPSULE | Refills: 2 | Status: SHIPPED | OUTPATIENT
Start: 2025-02-03 | End: 2025-05-04

## 2025-02-03 RX ORDER — TOPIRAMATE 25 MG/1
25 TABLET ORAL 2 TIMES DAILY
Qty: 60 TABLET | Refills: 2 | Status: SHIPPED | OUTPATIENT
Start: 2025-02-03 | End: 2026-02-03

## 2025-02-03 ASSESSMENT — ENCOUNTER SYMPTOMS
SLEEP DISTURBANCE: 1
ACTIVITY CHANGE: 1

## 2025-02-03 ASSESSMENT — PAIN - FUNCTIONAL ASSESSMENT: PAIN_FUNCTIONAL_ASSESSMENT: 0-10

## 2025-02-03 ASSESSMENT — PAIN DESCRIPTION - DESCRIPTORS: DESCRIPTORS: BURNING;SHARP

## 2025-02-03 ASSESSMENT — PAIN SCALES - GENERAL
PAINLEVEL_OUTOF10: 9
PAINLEVEL_OUTOF10: 9

## 2025-02-03 ASSESSMENT — PATIENT HEALTH QUESTIONNAIRE - PHQ9
SUM OF ALL RESPONSES TO PHQ9 QUESTIONS 1 & 2: 2
10. IF YOU CHECKED OFF ANY PROBLEMS, HOW DIFFICULT HAVE THESE PROBLEMS MADE IT FOR YOU TO DO YOUR WORK, TAKE CARE OF THINGS AT HOME, OR GET ALONG WITH OTHER PEOPLE: SOMEWHAT DIFFICULT
1. LITTLE INTEREST OR PLEASURE IN DOING THINGS: SEVERAL DAYS
2. FEELING DOWN, DEPRESSED OR HOPELESS: SEVERAL DAYS

## 2025-02-03 NOTE — H&P (VIEW-ONLY)
The patient is a 54-year-old female with lower leg pain.  The pain started last year with a blister on her leg.  Unfortunately the blister became infected with Pseudomonas.  The patient now has multiple wounds in the bilateral lower extremities from the knees down.  The patient does have a wound VAC on the right lower extremity.  The patient is scheduled for stent placement later this month.  The patient reports that the predominant issue for her is her inability to sleep.  She is currently taking gabapentin 300 mg up to 3 times per day.  At first she noticed some sedation with this medication.  She took topiramate approximately 5 years ago for migraine headaches.  She does not recall having any side effects.    Review of Systems   Constitutional:  Positive for activity change.   Musculoskeletal:  Positive for gait problem.   Psychiatric/Behavioral:  Positive for sleep disturbance.    All other systems reviewed and are negative.    GENERAL: alert and appropriate, in no distress, well-hydrated, well nourished, interactive         SKIN: Wounds in the bilateral lower extremities dressed         HEAD: normocephalic, no abnormality or lesion noted         EYES: no injection and visual acuity is grossly normal         EARS: external ears normal, no mastoid tenderness         NOSE: external nose normal without rhinorrhea         OROPHARYNX: moist mucus membranes, no tonsillar hypertrophy/exudate, uvula midline and pharynx non-erythematous, lips, teeth and gums are without obvious lesion         NECK: adequate ROM, no cervical LNs noted         RESPIRATORY: breathing non-labored and no grunting/flaring/retractions         CHEST: equal chest rise with normal respiratory effort         ABDOMEN: soft and non-tender         BACK: back normal in appearance, lumbar spine with reduced ROM         EXTREMITIES: strength intact, , right lower extremity wound VAC in place         NEUROLOGIC: gait antalgic, sensation grossly  intact    Assessment and Plan    -Chronicity--chronic vasculopathy    -Diagnostics--no new imaging ordered    -Pharmacologic--the patient may benefit from an increased dose of gabapentin.  She is currently taking up to 900 mg/day in divided doses.  The patient was given a titration schedule to take up to 1800 mg/day.  The patient may also benefit from topiramate.  We discussed mechanism of action of this medication as well as the side effect profile.    -Psychologic--no need for psychologic intervention from my standpoint.  There are no mental health issues of which I am aware that are contributing to the patient's pain.  There are no substance abuse or alcohol abuse issues of which I am aware that are contributing to the patient's pain.    -Physical--we discussed the importance of physical therapy and exercise.  We discussed avoidance and modification techniques.    -Intervention--no intervention    I spent time educating the patient on the condition including the treatment and the prognosis.  I invited the patient to call at anytime with any questions.    We discussed the patient's obesity: The BMI is 51. We discussed the potential effect on worsening chronic pain through mechanical stress as well as neuro-inflammatory chemicals. This is in addition to contribution to metabolic syndrome and overal health and perioperative risk. I counseled on weight loss strategy.

## 2025-02-04 ENCOUNTER — TELEPHONE (OUTPATIENT)
Dept: VASCULAR SURGERY | Facility: HOSPITAL | Age: 55
End: 2025-02-04
Payer: COMMERCIAL

## 2025-02-04 LAB
ANION GAP SERPL CALCULATED.4IONS-SCNC: 10 MMOL/L (CALC) (ref 7–17)
BACTERIA SPEC AEROBE CULT: ABNORMAL
BACTERIA SPEC ANAEROBE CULT: ABNORMAL
BASOPHILS # BLD AUTO: 45 CELLS/UL (ref 0–200)
BASOPHILS NFR BLD AUTO: 0.4 %
BUN SERPL-MCNC: 37 MG/DL (ref 7–25)
BUN/CREAT SERPL: 31 (CALC) (ref 6–22)
CALCIUM SERPL-MCNC: 9.1 MG/DL (ref 8.6–10.4)
CHLORIDE SERPL-SCNC: 104 MMOL/L (ref 98–110)
CO2 SERPL-SCNC: 26 MMOL/L (ref 20–32)
CREAT SERPL-MCNC: 1.2 MG/DL (ref 0.5–1.03)
EGFRCR SERPLBLD CKD-EPI 2021: 53 ML/MIN/1.73M2
EOSINOPHIL # BLD AUTO: 68 CELLS/UL (ref 15–500)
EOSINOPHIL NFR BLD AUTO: 0.6 %
ERYTHROCYTE [DISTWIDTH] IN BLOOD BY AUTOMATED COUNT: 14.4 % (ref 11–15)
GLUCOSE SERPL-MCNC: 103 MG/DL (ref 65–139)
HCT VFR BLD AUTO: 37.2 % (ref 35–45)
HGB BLD-MCNC: 12 G/DL (ref 11.7–15.5)
LYMPHOCYTES # BLD AUTO: 1469 CELLS/UL (ref 850–3900)
LYMPHOCYTES NFR BLD AUTO: 13 %
MCH RBC QN AUTO: 30.5 PG (ref 27–33)
MCHC RBC AUTO-ENTMCNC: 32.3 G/DL (ref 32–36)
MCV RBC AUTO: 94.4 FL (ref 80–100)
MONOCYTES # BLD AUTO: 893 CELLS/UL (ref 200–950)
MONOCYTES NFR BLD AUTO: 7.9 %
NEUTROPHILS # BLD AUTO: 8825 CELLS/UL (ref 1500–7800)
NEUTROPHILS NFR BLD AUTO: 78.1 %
PLATELET # BLD AUTO: 426 THOUSAND/UL (ref 140–400)
PMV BLD REES-ECKER: 8.9 FL (ref 7.5–12.5)
POTASSIUM SERPL-SCNC: 4.7 MMOL/L (ref 3.5–5.3)
RBC # BLD AUTO: 3.94 MILLION/UL (ref 3.8–5.1)
SODIUM SERPL-SCNC: 140 MMOL/L (ref 135–146)
WBC # BLD AUTO: 11.3 THOUSAND/UL (ref 3.8–10.8)

## 2025-02-04 NOTE — TELEPHONE ENCOUNTER
Procedure 2/21/25  FUV 3/5/25 11AM with Nayla Mcgill   Pre op sent through [facility]     ----- Message from Edy Meyers sent at 1/29/2025 11:01 AM EST -----  Regarding: RE: Venogram 2/21/25  opened  ----- Message -----  From: Kajal Vergara LPN  Sent: 1/29/2025  10:33 AM EST  To: Edy Meyers, DO; Kajal Vergara LPN  Subject: Venogram 2/21/25                                 Please start case for 2/21/25     No clearance  Send through my chart

## 2025-02-05 ENCOUNTER — OFFICE VISIT (OUTPATIENT)
Dept: WOUND CARE | Facility: HOSPITAL | Age: 55
End: 2025-02-05
Payer: COMMERCIAL

## 2025-02-05 PROCEDURE — 11045 DBRDMT SUBQ TISS EACH ADDL: CPT

## 2025-02-05 PROCEDURE — 11042 DBRDMT SUBQ TIS 1ST 20SQCM/<: CPT

## 2025-02-05 PROCEDURE — 97606 NEG PRS WND THER DME>50 SQCM: CPT

## 2025-02-10 ENCOUNTER — TELEPHONE (OUTPATIENT)
Dept: VASCULAR SURGERY | Facility: HOSPITAL | Age: 55
End: 2025-02-10
Payer: COMMERCIAL

## 2025-02-12 ENCOUNTER — OFFICE VISIT (OUTPATIENT)
Dept: WOUND CARE | Facility: HOSPITAL | Age: 55
End: 2025-02-12
Payer: COMMERCIAL

## 2025-02-12 PROCEDURE — 97606 NEG PRS WND THER DME>50 SQCM: CPT | Mod: RT

## 2025-02-13 DIAGNOSIS — L97.222 NON-PRESSURE CHRONIC ULCER OF LEFT CALF WITH FAT LAYER EXPOSED (MULTI): Primary | ICD-10-CM

## 2025-02-13 RX ORDER — ALBUTEROL SULFATE 0.83 MG/ML
3 SOLUTION RESPIRATORY (INHALATION) AS NEEDED
Status: CANCELLED | OUTPATIENT
Start: 2025-02-14

## 2025-02-13 RX ORDER — FAMOTIDINE 10 MG/ML
20 INJECTION INTRAVENOUS ONCE AS NEEDED
Status: CANCELLED | OUTPATIENT
Start: 2025-02-14

## 2025-02-13 RX ORDER — DIPHENHYDRAMINE HYDROCHLORIDE 50 MG/ML
50 INJECTION INTRAMUSCULAR; INTRAVENOUS AS NEEDED
Status: CANCELLED | OUTPATIENT
Start: 2025-02-14

## 2025-02-13 RX ORDER — EPINEPHRINE 0.3 MG/.3ML
0.3 INJECTION SUBCUTANEOUS EVERY 5 MIN PRN
Status: CANCELLED | OUTPATIENT
Start: 2025-02-14

## 2025-02-14 ENCOUNTER — HOSPITAL ENCOUNTER (OUTPATIENT)
Dept: CARDIOLOGY | Facility: HOSPITAL | Age: 55
Discharge: HOME | End: 2025-02-14
Payer: COMMERCIAL

## 2025-02-14 ENCOUNTER — INFUSION (OUTPATIENT)
Dept: INFUSION THERAPY | Facility: CLINIC | Age: 55
End: 2025-02-14
Payer: COMMERCIAL

## 2025-02-14 ENCOUNTER — ANESTHESIA EVENT (OUTPATIENT)
Dept: OPERATING ROOM | Facility: HOSPITAL | Age: 55
End: 2025-02-14
Payer: COMMERCIAL

## 2025-02-14 VITALS
OXYGEN SATURATION: 100 % | DIASTOLIC BLOOD PRESSURE: 88 MMHG | BODY MASS INDEX: 50.49 KG/M2 | HEART RATE: 80 BPM | RESPIRATION RATE: 18 BRPM | TEMPERATURE: 98 F | WEIGHT: 285 LBS | SYSTOLIC BLOOD PRESSURE: 137 MMHG

## 2025-02-14 DIAGNOSIS — L03.119 CELLULITIS OF LOWER EXTREMITY, UNSPECIFIED LATERALITY: Primary | ICD-10-CM

## 2025-02-14 DIAGNOSIS — L97.222 NON-PRESSURE CHRONIC ULCER OF LEFT CALF WITH FAT LAYER EXPOSED (MULTI): ICD-10-CM

## 2025-02-14 DIAGNOSIS — L97.212 NON-PRESSURE CHRONIC ULCER OF RIGHT CALF WITH FAT LAYER EXPOSED (MULTI): ICD-10-CM

## 2025-02-14 PROCEDURE — 2780000003 HC OR 278 NO HCPCS

## 2025-02-14 PROCEDURE — C1751 CATH, INF, PER/CENT/MIDLINE: HCPCS

## 2025-02-14 RX ORDER — FAMOTIDINE 10 MG/ML
20 INJECTION INTRAVENOUS ONCE AS NEEDED
OUTPATIENT
Start: 2025-02-14

## 2025-02-14 RX ORDER — EPINEPHRINE 0.3 MG/.3ML
0.3 INJECTION SUBCUTANEOUS EVERY 5 MIN PRN
OUTPATIENT
Start: 2025-02-14

## 2025-02-14 RX ORDER — ALBUTEROL SULFATE 0.83 MG/ML
3 SOLUTION RESPIRATORY (INHALATION) AS NEEDED
OUTPATIENT
Start: 2025-02-14

## 2025-02-14 RX ORDER — DIPHENHYDRAMINE HYDROCHLORIDE 50 MG/ML
50 INJECTION INTRAMUSCULAR; INTRAVENOUS AS NEEDED
OUTPATIENT
Start: 2025-02-14

## 2025-02-14 ASSESSMENT — ENCOUNTER SYMPTOMS
LIGHT-HEADEDNESS: 0
EYE PROBLEMS: 0
CHILLS: 0
MYALGIAS: 0
SLEEP DISTURBANCE: 1
ARTHRALGIAS: 0
DIARRHEA: 0
BLOOD IN STOOL: 0
NAUSEA: 0
FATIGUE: 0
HEADACHES: 0
APPETITE CHANGE: 0
PALPITATIONS: 0
DYSURIA: 0
HEMATURIA: 0
DIZZINESS: 0
COUGH: 0
WOUND: 1
FEVER: 0
DEPRESSION: 1
FREQUENCY: 0
WHEEZING: 0
ABDOMINAL PAIN: 0
VOMITING: 0
SHORTNESS OF BREATH: 0
LEG SWELLING: 1

## 2025-02-14 NOTE — PROCEDURES
"Vascular Access Team Procedure Note     Visit Date: 2025      Patient Name: Frances Mcdowell         MRN: [id number]             Procedure: PICC Insertion   0910 Patient arrived ambulatory in [facility]. Pt alert and oriented, able to confirm name,  and reason for visit. Procedure explained and consent obtained. Pt voices concern for being very anxious and was tearful at times and apologetic. Pt reassured and told she has nothing to apologize for. Upon assessment of right arm Cephalic and brachial veins too small for 4F PICC. Able to access right basilic vein, thread guidewire and place introducer. Unable to thread PICC much past 10cm. Resistance met and patient complained of increased \"pressure\".  Pt anxious and tearful, reassured again. Explained that we would attempt PICC placement in left arm. Again upon assessment, cephalic and brachial veins not large enough for 4F PICC. Attempted left basilic, able to access the vein but no able to pass even the guidewire after several attempts and position changes. Procedure aborted. Patient with DSD on bilateral upper arms, instructed to keep intact for 24 hours. Dr Good contacted, will place referral for IR PICC placement. Left AC accu-cath (6cm long PIV) placed for infusions today and over the weekend until PICC placed by IR. Patient understands IR will contact her to make appointment for PICC placement.  1130 Pt taken via wheelchair to infusion center.                     Unsuccessful Insertion  Name of Attempting Clinician: Prabha Gambino RN [facility]  Number of Attempts: 2  Line Type: PICC  Attempted Insertion Site (Vein): Right, Basilic vein  Attempted Location: Right  Attempted Insertion Site (Vein) 2: Left, Basilic vein  Attempted Location 2: Left    Prabha Gambino RN  2025  1:21 PM                            "

## 2025-02-14 NOTE — PROGRESS NOTES
[facility]   Infusion Clinic Note   Date: 2025   Name: Frances Mcdowell  : 1970   MRN: [id number]         Reason for Visit: OP Infusion (Fortaz 2 grams and Vancomycin 1000 mg first dose only)         Today: We administered (vancomycin (Vancocin) 1,000 mg in sodium chloride 0.9% 250 mL IV) and cefTAZidime (Fortaz) 2 g in sodium chloride 0.9% 100 mL IV.       Ordered By: Marcelo Good MD       For a Diagnosis of: Non-pressure chronic ulcer of left calf with fat layer exposed (Multi)       At today's visit patient accompanied by: Self      Vitals:   Vitals:    25 1148 25 1254 25 1512 25 1536   BP: 162/78 135/87 130/76 137/88   Pulse: 82 92  80   Resp: 18 18 18 18   Temp: 36.8 °C (98.2 °F) 36.8 °C (98.2 °F) 36.4 °C (97.6 °F) 36.7 °C (98 °F)   SpO2: 100% 100% 100% 100%   Weight: 129 kg (285 lb)                Infusion Pre-procedure Checklist:      - Previous reaction to current treatment: yes      Assess patient for the concerns below. Document provider notification as appropriate.  - Active or recent infection with/without current antibiotic use: yes  - Recent or planned invasive dental work: n/a  - Recent or planned surgeries: n/a  - Recently received or plans to receive vaccinations: n/a  - Has treatment related toxicities: n/a  - Any chance may be pregnant:  n/a  s/p hysterectomy      Pain: 8   - Is the pain different from normal: yes   - Is prescribing Doctor aware:  yes      Labs: N/A      Fall Risk Screening: Parks Fall Risk  History of Falling, Immediate or Within 3 Months: No  Secondary Diagnosis: Yes  Ambulatory Aid: Crutches/cane/walker  Intravenous Therapy/Heparin Lock: No  Gait/Transferring: Normal/bedrest/immobile  Mental Status: Oriented to own ability  Parks Fall Risk Score: 30       Review Of Systems:  Review of Systems   Constitutional:  Negative for appetite change, chills, fatigue and fever.   HENT:   Negative for hearing loss.     Eyes:  Negative for eye problems.   Respiratory:  Negative for cough, shortness of breath and wheezing.    Cardiovascular:  Positive for leg swelling. Negative for chest pain and palpitations.   Gastrointestinal:  Negative for abdominal pain, blood in stool, diarrhea, nausea and vomiting.   Genitourinary:  Negative for dysuria, frequency and hematuria.    Musculoskeletal:  Positive for gait problem. Negative for arthralgias and myalgias.        Chronic BLE pain- Right > left.  8/10   Skin:  Positive for wound. Negative for itching and rash.        Patient reports BLE wounds- dressings noted.  BLE cellulitis / edema (lymphedema) noted.  Foul odor noted.   Neurological:  Positive for gait problem. Negative for dizziness, headaches and light-headedness.        Moderate difficulty ambulating related to BLE  pain.     Psychiatric/Behavioral:  Positive for depression and sleep disturbance.         Patient appears to be over whelmed with diagnosis and complications which happened prior to her arrival in [facility] today.  Patient crying- emotional support provided.  Patient reporting sleeping poorly. Unable to sleep in bed secondary to severe BLE pain.  Intermittent sleep while in recliner.           Infusion Readiness:  - Assessment Concerns Related to Infusion: No  - Provider notified: no      New Patient Education:    NEW PATIENT MEDICATION EDUCATION PT PROVIDED WITH WRITTEN ([facility] PT EDUCATION SHEET) AND VERBAL EDUCATION REGARDING MEDICATION GIVEN. VERIFIED MEDICATION NAME WITH PATIENT AND DISCUSSED REASON FOR USE. BRIEFLY DISCUSSED HOW MEDICATION WORKS AND EDUCATED ON GOAL OF TREATMENT, FREQUENCY OF TREATMENT, ADVERSE RXN'S AND COMMON SIDE EFFECTS TO MONITOR FOR. INSTRUCTED PT TO ASSURE THAT ALL PROVIDERS INCLUDING DENTISTS ARE AWARE OF MEDICATION RECEIVED. DISCUSSED FLOW OF VISIT AND ORIENTED TO INFUSION CENTER. PT VERBALIZES UNDERSTANDING. CALL LIGHT PROVIDED AND PT AWARE TO ALERT STAFF OF ANY CONCERNS DURING  "TREATMENT.        Treatment Conditions & Drug Specific Questions:  Monitoring Parameters:  Vitals: Start of infusion, end of infusion, end of observation period and as needed.  Observation: Observe for 30 minutes after FIRST infusion.     Nursing Physical Assessment/Monitoring:  - Any signs and symptoms of C-Diff such as severe or bloody diarrhea? No    - Any signs and symptoms of neurotoxicity which may include seizures, confusion, tremors, or extra muscle action? No    - Any new hearing changes, hearing loss or ringing in ears? No    - Any signs of Fuchs-Billy syndrome which may include red, swollen, blistered, or peeling skin; red or irritated eyes; or sores in mouth, throat, nose, or eyes? No    (If YES to any the above notify prescribing provider prior to proceeding with infusion)    Check ordered labs and report abnormalities as appropriate:    Lab Results   Component Value Date    GLUCOSE 103 02/03/2025    CALCIUM 9.1 02/03/2025     02/03/2025    K 4.7 02/03/2025    CO2 26 02/03/2025     02/03/2025    BUN 37 (H) 02/03/2025    CREATININE 1.20 (H) 02/03/2025    No results found for: \"CMPLAS\", \"CMPLASABS\"     Lab Results   Component Value Date    ALT 8 10/31/2024    AST 12 10/31/2024    ALKPHOS 63 10/31/2024    BILITOT 0.6 10/31/2024        Lab Results   Component Value Date    WBC 11.3 (H) 02/03/2025    HGB 12.0 02/03/2025    HCT 37.2 02/03/2025    MCV 94.4 02/03/2025     (H) 02/03/2025        Vanco Peak/Trough:   Lab Results   Component Value Date    VANCORANDOM 22.9 (H) 11/01/2024        Reminders:  Must have negative pregnancy test prior to FIRST infusion for patients of childbearing ability/age.    Monitor for signs/symptoms of hypersensitivity 'red man syndrome' which may include a maculopapular rash on face, neck, trunk and/or upper extremities.     Assess IV for proper placement prior to and during infusion and monitor for extravasation. Irritant.               CefTAZidime " "Monitoring Parameters:  Vitals: Start of infusion, end of infusion, end of observation and as needed.  Observation: Observe for 30 minutes after FIRST infusion.     Nursing Physical Assessment/Monitoring  - Any signs and symptoms of C-Diff such as severe or bloody diarrhea? No    - Any signs and symptoms of neurotoxicity which may include seizures, confusion, tremors, or extra muscle action? No    (If YES to the above notify prescribing provider prior to proceeding with infusion)    Check ordered labs and report abnormalities as appropriate:  No results found for: \"CMPLAS\", \"CMPLASABS\"   Lab Results   Component Value Date    GLUCOSE 103 02/03/2025    CALCIUM 9.1 02/03/2025     02/03/2025    K 4.7 02/03/2025    CO2 26 02/03/2025     02/03/2025    BUN 37 (H) 02/03/2025    CREATININE 1.20 (H) 02/03/2025      Lab Results   Component Value Date    CREATININE 1.20 (H) 02/03/2025    BUN 37 (H) 02/03/2025     02/03/2025    K 4.7 02/03/2025     02/03/2025    CO2 26 02/03/2025      Lab Results   Component Value Date    CREATININE 1.20 (H) 02/03/2025        No results found for: \"INR\", \"PROTIME\"     Lab Results   Component Value Date    WBC 11.3 (H) 02/03/2025    HGB 12.0 02/03/2025    HCT 37.2 02/03/2025    MCV 94.4 02/03/2025     (H) 02/03/2025            Weight Based Drug Calculations:    WEIGHT BASED DRUGS: NOT APPLICABLE / FLAT DOSE      1305  Patient tolerated fortaz infusion well.  Post infusion 30 minute observation complete.  No s/s adverse reaction noted.  Patient reports she has received vancomycin numerous times when hospitalized without any adverse reactions.  Patient will not need to stay for post infusion 30 minute observation.    1540  Patient tolerated infusion well.  VSS.  No s/s adverse reaction noted.  Adriana Whitmore CNP has made arrangements for patient to have line placement @ [facility] on  Monday 2/17/2025 @ 0930- patient informed and is in agreement.  Follow up with Dr." Latisha.      Initiated By: Helga Hammond RN   _________________________________________________________________________    Note authored and patient cared for by: Helga Hammond RN  Note/Encounter reviewed by: Adriana VERDE NP. This provider on site at time of patient infusion. Infusion staff to notify this provider of any questions, concerns, abnormals or issues during infusion.    Final check of medication completed by this MEHREEN / or on-site pharmacist with administering nurse using positive identification prior to administration. Final appearance of product checked for accuracy and conformity to the formula of the prepared product. Assured use of correct ingredients, accurate calculations and precise measurements under appropriate conditions and procedures.    No issues reported during today's encounter. Pt. tolerated infusion without difficulty. Pt. not independently evaluated by this provider during today's encounter.  (Adriana VERDE NP)

## 2025-02-14 NOTE — PATIENT INSTRUCTIONS
Today :We administered (vancomycin (Vancocin) 1,000 mg in sodium chloride 0.9% 250 mL IV) and cefTAZidime (Fortaz) 2 g in sodium chloride 0.9% 100 mL IV.     For:   1. Non-pressure chronic ulcer of left calf with fat layer exposed (Multi)         Your next appointment is due in:  TBD        Please read the  Medication Guide that was given to you and reviewed during todays visit.     (Tell all doctors including dentists that you are taking this medication)     Go to the emergency room or call 911 if:  -You have signs of allergic reaction:   -Rash, hives, itching.   -Swollen, blistered, peeling skin.   -Swelling of face, lips, mouth, tongue or throat.   -Tightness of chest, trouble breathing, swallowing or talking     Call your doctor:  - If IV / injection site gets red, warm, swollen, itchy or leaks fluid or pus.     (Leave dressing on your IV site for at least 2 hours and keep area clean and dry  - If you get sick or have symptoms of infection or are not feeling well for any reason.    (Wash your hands often, stay away from people who are sick)  - If you have side effects from your medication that do not go away or are bothersome.     (Refer to the teaching your nurse gave you for side effects to call your doctor about)    - Common side effects may include:  stuffy nose, headache, feeling tired, muscle aches, upset stomach  - Before receiving any vaccines     - Call the [facility] at   If:  - You get sick, are on antibiotics, have had a recent vaccine, have surgery or dental work and your doctor wants your visit rescheduled.  - You need to cancel and reschedule your visit for any reason. Call at least 2 days before your visit if you need to cancel.   - Your insurance changes before your next visit.    (We will need to get approval from your new insurance. This can take up to two weeks.)     The [facility] is opened Monday thru Friday. We are closed on weekends and holidays.   Voice  mail will take your call if the center is closed. If you leave a message please allow 24 hours for a call back during weekdays. If you leave a message on a weekend/holiday, we will call you back the next business day.    A pharmacist is available Monday - Friday from 8:30AM to 3:30PM to help answer any questions you may have about your prescriptions(s). Please call pharmacy at:    [facility]: [phone]  [facility]: [phone]  [facility]: [phone]

## 2025-02-17 ENCOUNTER — HOSPITAL ENCOUNTER (OUTPATIENT)
Dept: CARDIOLOGY | Facility: HOSPITAL | Age: 55
Discharge: HOME | End: 2025-02-17
Payer: COMMERCIAL

## 2025-02-17 VITALS
DIASTOLIC BLOOD PRESSURE: 98 MMHG | SYSTOLIC BLOOD PRESSURE: 157 MMHG | WEIGHT: 285 LBS | TEMPERATURE: 97 F | HEART RATE: 93 BPM | BODY MASS INDEX: 50.5 KG/M2 | RESPIRATION RATE: 20 BRPM | HEIGHT: 63 IN | OXYGEN SATURATION: 100 %

## 2025-02-17 DIAGNOSIS — L03.119 CELLULITIS OF LOWER EXTREMITY, UNSPECIFIED LATERALITY: ICD-10-CM

## 2025-02-17 PROCEDURE — 77001 FLUOROGUIDE FOR VEIN DEVICE: CPT | Performed by: RADIOLOGY

## 2025-02-17 PROCEDURE — 76937 US GUIDE VASCULAR ACCESS: CPT | Performed by: RADIOLOGY

## 2025-02-17 PROCEDURE — 36558 INSERT TUNNELED CV CATH: CPT | Performed by: RADIOLOGY

## 2025-02-17 PROCEDURE — 2720000007 HC OR 272 NO HCPCS

## 2025-02-17 PROCEDURE — C1894 INTRO/SHEATH, NON-LASER: HCPCS

## 2025-02-17 PROCEDURE — C1751 CATH, INF, PER/CENT/MIDLINE: HCPCS

## 2025-02-17 PROCEDURE — 2780000003 HC OR 278 NO HCPCS

## 2025-02-17 PROCEDURE — 2500000004 HC RX 250 GENERAL PHARMACY W/ HCPCS (ALT 636 FOR OP/ED): Performed by: RADIOLOGY

## 2025-02-17 RX ORDER — LIDOCAINE HYDROCHLORIDE 20 MG/ML
INJECTION, SOLUTION EPIDURAL; INFILTRATION; INTRACAUDAL; PERINEURAL
Status: COMPLETED | OUTPATIENT
Start: 2025-02-17 | End: 2025-02-17

## 2025-02-17 RX ADMIN — LIDOCAINE HYDROCHLORIDE 10 ML: 20 INJECTION, SOLUTION EPIDURAL; INFILTRATION; INTRACAUDAL; PERINEURAL at 10:22

## 2025-02-17 RX ADMIN — LIDOCAINE HYDROCHLORIDE 10 ML: 20 INJECTION, SOLUTION EPIDURAL; INFILTRATION; INTRACAUDAL; PERINEURAL at 10:05

## 2025-02-17 ASSESSMENT — PAIN SCALES - GENERAL
PAINLEVEL_OUTOF10: 0 - NO PAIN
PAINLEVEL_OUTOF10: 0 - NO PAIN
PAINLEVEL_OUTOF10: 8
PAINLEVEL_OUTOF10: 0 - NO PAIN
PAINLEVEL_OUTOF10: 0 - NO PAIN

## 2025-02-17 ASSESSMENT — PAIN - FUNCTIONAL ASSESSMENT: PAIN_FUNCTIONAL_ASSESSMENT: 0-10

## 2025-02-17 NOTE — Clinical Note
The site was marked. Prepped with: ChloraPrep. The site was clipped. The patient was draped. LUBA Arm

## 2025-02-17 NOTE — DISCHARGE INSTRUCTIONS
If you have any questions, please call the Interventional Radiology Nurse Practitioner Eliza Lauren at [phone] and leave a message. She will return your call the same day if calling before 3 PM M-F. If you call on the weekend you can expect a call back on Monday morning. You may also call the Cath Lab at [phone] M-F, 7-3:30 with any questions. Weekends and after hours please call your referring provider's office number to reach a physician on call.

## 2025-02-19 ENCOUNTER — TELEPHONE (OUTPATIENT)
Dept: PRIMARY CARE | Facility: CLINIC | Age: 55
End: 2025-02-19
Payer: COMMERCIAL

## 2025-02-19 ENCOUNTER — APPOINTMENT (OUTPATIENT)
Dept: WOUND CARE | Facility: HOSPITAL | Age: 55
End: 2025-02-19
Payer: COMMERCIAL

## 2025-02-19 ENCOUNTER — APPOINTMENT (OUTPATIENT)
Dept: PAIN MEDICINE | Facility: CLINIC | Age: 55
End: 2025-02-19
Payer: COMMERCIAL

## 2025-02-19 DIAGNOSIS — F98.8 ATTENTION DEFICIT DISORDER, UNSPECIFIED TYPE: ICD-10-CM

## 2025-02-19 NOTE — TELEPHONE ENCOUNTER
[place] patient; had a clot.  Wants to make sure pt is being seen.  Pt is not responding to any correspondence, incl. phone and address.

## 2025-02-20 ENCOUNTER — TELEMEDICINE (OUTPATIENT)
Dept: PRIMARY CARE | Facility: CLINIC | Age: 55
End: 2025-02-20
Payer: COMMERCIAL

## 2025-02-20 ENCOUNTER — INFUSION (OUTPATIENT)
Dept: INFUSION THERAPY | Facility: HOSPITAL | Age: 55
End: 2025-02-20
Payer: COMMERCIAL

## 2025-02-20 VITALS
SYSTOLIC BLOOD PRESSURE: 132 MMHG | OXYGEN SATURATION: 99 % | TEMPERATURE: 98.6 F | HEART RATE: 100 BPM | RESPIRATION RATE: 20 BRPM | DIASTOLIC BLOOD PRESSURE: 89 MMHG

## 2025-02-20 DIAGNOSIS — F98.8 ATTENTION DEFICIT DISORDER, UNSPECIFIED TYPE: ICD-10-CM

## 2025-02-20 DIAGNOSIS — M79.605 PAIN IN BOTH LOWER EXTREMITIES: Primary | ICD-10-CM

## 2025-02-20 DIAGNOSIS — L97.212 NON-PRESSURE CHRONIC ULCER OF RIGHT CALF WITH FAT LAYER EXPOSED (MULTI): ICD-10-CM

## 2025-02-20 DIAGNOSIS — L97.222 NON-PRESSURE CHRONIC ULCER OF LEFT CALF WITH FAT LAYER EXPOSED (MULTI): ICD-10-CM

## 2025-02-20 DIAGNOSIS — M79.604 PAIN IN BOTH LOWER EXTREMITIES: Primary | ICD-10-CM

## 2025-02-20 LAB
ALBUMIN SERPL BCP-MCNC: 3.6 G/DL (ref 3.4–5)
ALP SERPL-CCNC: 116 U/L (ref 33–110)
ALT SERPL W P-5'-P-CCNC: 12 U/L (ref 7–45)
ANION GAP SERPL CALC-SCNC: 11 MMOL/L (ref 10–20)
AST SERPL W P-5'-P-CCNC: 14 U/L (ref 9–39)
BASOPHILS # BLD AUTO: 0.05 X10*3/UL (ref 0–0.1)
BASOPHILS NFR BLD AUTO: 0.4 %
BILIRUB SERPL-MCNC: 0.4 MG/DL (ref 0–1.2)
BUN SERPL-MCNC: 51 MG/DL (ref 6–23)
CALCIUM SERPL-MCNC: 8.6 MG/DL (ref 8.6–10.3)
CHLORIDE SERPL-SCNC: 107 MMOL/L (ref 98–107)
CO2 SERPL-SCNC: 24 MMOL/L (ref 21–32)
CREAT SERPL-MCNC: 1.26 MG/DL (ref 0.5–1.05)
EGFRCR SERPLBLD CKD-EPI 2021: 50 ML/MIN/1.73M*2
EOSINOPHIL # BLD AUTO: 0.15 X10*3/UL (ref 0–0.7)
EOSINOPHIL NFR BLD AUTO: 1.2 %
ERYTHROCYTE [DISTWIDTH] IN BLOOD BY AUTOMATED COUNT: 14.9 % (ref 11.5–14.5)
GLUCOSE SERPL-MCNC: 97 MG/DL (ref 74–99)
HCT VFR BLD AUTO: 35.1 % (ref 36–46)
HGB BLD-MCNC: 11.6 G/DL (ref 12–16)
IMM GRANULOCYTES # BLD AUTO: 0.07 X10*3/UL (ref 0–0.7)
IMM GRANULOCYTES NFR BLD AUTO: 0.6 % (ref 0–0.9)
LYMPHOCYTES # BLD AUTO: 1.36 X10*3/UL (ref 1.2–4.8)
LYMPHOCYTES NFR BLD AUTO: 10.8 %
MCH RBC QN AUTO: 31.2 PG (ref 26–34)
MCHC RBC AUTO-ENTMCNC: 33 G/DL (ref 32–36)
MCV RBC AUTO: 94 FL (ref 80–100)
MONOCYTES # BLD AUTO: 1.05 X10*3/UL (ref 0.1–1)
MONOCYTES NFR BLD AUTO: 8.3 %
NEUTROPHILS # BLD AUTO: 9.9 X10*3/UL (ref 1.2–7.7)
NEUTROPHILS NFR BLD AUTO: 78.7 %
NRBC BLD-RTO: 0 /100 WBCS (ref 0–0)
PLATELET # BLD AUTO: 362 X10*3/UL (ref 150–450)
POTASSIUM SERPL-SCNC: 4 MMOL/L (ref 3.5–5.3)
PROT SERPL-MCNC: 6.7 G/DL (ref 6.4–8.2)
RBC # BLD AUTO: 3.72 X10*6/UL (ref 4–5.2)
SODIUM SERPL-SCNC: 138 MMOL/L (ref 136–145)
VANCOMYCIN TROUGH SERPL-MCNC: 13.7 UG/ML (ref 5–20)
WBC # BLD AUTO: 12.6 X10*3/UL (ref 4.4–11.3)

## 2025-02-20 PROCEDURE — 99214 OFFICE O/P EST MOD 30 MIN: CPT | Performed by: INTERNAL MEDICINE

## 2025-02-20 PROCEDURE — 36591 DRAW BLOOD OFF VENOUS DEVICE: CPT

## 2025-02-20 PROCEDURE — 80202 ASSAY OF VANCOMYCIN: CPT

## 2025-02-20 PROCEDURE — 85025 COMPLETE CBC W/AUTO DIFF WBC: CPT

## 2025-02-20 PROCEDURE — 80053 COMPREHEN METABOLIC PANEL: CPT

## 2025-02-20 RX ORDER — OXYCODONE AND ACETAMINOPHEN 10; 325 MG/1; MG/1
1 TABLET ORAL DAILY
Qty: 30 TABLET | Refills: 0 | Status: SHIPPED | OUTPATIENT
Start: 2025-02-20 | End: 2025-03-22

## 2025-02-20 RX ORDER — DEXTROAMPHETAMINE SACCHARATE, AMPHETAMINE ASPARTATE MONOHYDRATE, DEXTROAMPHETAMINE SULFATE AND AMPHETAMINE SULFATE 5; 5; 5; 5 MG/1; MG/1; MG/1; MG/1
20 CAPSULE, EXTENDED RELEASE ORAL 2 TIMES DAILY
Qty: 60 CAPSULE | Refills: 0 | Status: SHIPPED | OUTPATIENT
Start: 2025-02-20

## 2025-02-20 ASSESSMENT — ENCOUNTER SYMPTOMS
DEPRESSION: 0
LOSS OF SENSATION IN FEET: 0
OCCASIONAL FEELINGS OF UNSTEADINESS: 0

## 2025-02-20 ASSESSMENT — PATIENT HEALTH QUESTIONNAIRE - PHQ9
SUM OF ALL RESPONSES TO PHQ9 QUESTIONS 1 AND 2: 2
1. LITTLE INTEREST OR PLEASURE IN DOING THINGS: SEVERAL DAYS
2. FEELING DOWN, DEPRESSED OR HOPELESS: SEVERAL DAYS
10. IF YOU CHECKED OFF ANY PROBLEMS, HOW DIFFICULT HAVE THESE PROBLEMS MADE IT FOR YOU TO DO YOUR WORK, TAKE CARE OF THINGS AT HOME, OR GET ALONG WITH OTHER PEOPLE: NOT DIFFICULT AT ALL

## 2025-02-20 ASSESSMENT — COLUMBIA-SUICIDE SEVERITY RATING SCALE - C-SSRS
6. HAVE YOU EVER DONE ANYTHING, STARTED TO DO ANYTHING, OR PREPARED TO DO ANYTHING TO END YOUR LIFE?: NO
2. HAVE YOU ACTUALLY HAD ANY THOUGHTS OF KILLING YOURSELF?: NO
1. IN THE PAST MONTH, HAVE YOU WISHED YOU WERE DEAD OR WISHED YOU COULD GO TO SLEEP AND NOT WAKE UP?: NO

## 2025-02-20 ASSESSMENT — PAIN SCALES - GENERAL: PAINLEVEL_OUTOF10: 8

## 2025-02-21 ENCOUNTER — APPOINTMENT (OUTPATIENT)
Dept: RADIOLOGY | Facility: HOSPITAL | Age: 55
End: 2025-02-21
Payer: COMMERCIAL

## 2025-02-21 ENCOUNTER — HOSPITAL ENCOUNTER (OUTPATIENT)
Facility: HOSPITAL | Age: 55
Setting detail: OUTPATIENT SURGERY
Discharge: HOME | End: 2025-02-21
Attending: SURGERY | Admitting: SURGERY
Payer: COMMERCIAL

## 2025-02-21 ENCOUNTER — ANESTHESIA (OUTPATIENT)
Dept: OPERATING ROOM | Facility: HOSPITAL | Age: 55
End: 2025-02-21
Payer: COMMERCIAL

## 2025-02-21 VITALS
HEART RATE: 79 BPM | WEIGHT: 290 LBS | DIASTOLIC BLOOD PRESSURE: 79 MMHG | SYSTOLIC BLOOD PRESSURE: 107 MMHG | RESPIRATION RATE: 13 BRPM | TEMPERATURE: 97.8 F | BODY MASS INDEX: 51.38 KG/M2 | OXYGEN SATURATION: 100 % | HEIGHT: 63 IN

## 2025-02-21 DIAGNOSIS — R60.0 LOWER EXTREMITY EDEMA: ICD-10-CM

## 2025-02-21 PROCEDURE — 76937 US GUIDE VASCULAR ACCESS: CPT | Performed by: SURGERY

## 2025-02-21 PROCEDURE — 2500000004 HC RX 250 GENERAL PHARMACY W/ HCPCS (ALT 636 FOR OP/ED): Performed by: ANESTHESIOLOGY

## 2025-02-21 PROCEDURE — 2720000007 HC OR 272 NO HCPCS: Performed by: SURGERY

## 2025-02-21 PROCEDURE — 2500000004 HC RX 250 GENERAL PHARMACY W/ HCPCS (ALT 636 FOR OP/ED): Performed by: NURSE ANESTHETIST, CERTIFIED REGISTERED

## 2025-02-21 PROCEDURE — 7100000001 HC RECOVERY ROOM TIME - INITIAL BASE CHARGE: Performed by: SURGERY

## 2025-02-21 PROCEDURE — 76000 FLUOROSCOPY <1 HR PHYS/QHP: CPT

## 2025-02-21 PROCEDURE — 3600000004 HC OR TIME - INITIAL BASE CHARGE - PROCEDURE LEVEL FOUR: Performed by: SURGERY

## 2025-02-21 PROCEDURE — 11042 DBRDMT SUBQ TIS 1ST 20SQCM/<: CPT | Performed by: SURGERY

## 2025-02-21 PROCEDURE — 88304 TISSUE EXAM BY PATHOLOGIST: CPT | Mod: TC,PORLAB | Performed by: SURGERY

## 2025-02-21 PROCEDURE — C1769 GUIDE WIRE: HCPCS | Performed by: SURGERY

## 2025-02-21 PROCEDURE — 88304 TISSUE EXAM BY PATHOLOGIST: CPT | Performed by: PATHOLOGY

## 2025-02-21 PROCEDURE — 7100000009 HC PHASE TWO TIME - INITIAL BASE CHARGE: Performed by: SURGERY

## 2025-02-21 PROCEDURE — C1753 CATH, INTRAVAS ULTRASOUND: HCPCS | Performed by: SURGERY

## 2025-02-21 PROCEDURE — 3700000002 HC GENERAL ANESTHESIA TIME - EACH INCREMENTAL 1 MINUTE: Performed by: SURGERY

## 2025-02-21 PROCEDURE — 37252 INTRVASC US NONCORONARY 1ST: CPT | Performed by: SURGERY

## 2025-02-21 PROCEDURE — 3600000009 HC OR TIME - EACH INCREMENTAL 1 MINUTE - PROCEDURE LEVEL FOUR: Performed by: SURGERY

## 2025-02-21 PROCEDURE — 36011 PLACE CATHETER IN VEIN: CPT | Performed by: SURGERY

## 2025-02-21 PROCEDURE — 7100000002 HC RECOVERY ROOM TIME - EACH INCREMENTAL 1 MINUTE: Performed by: SURGERY

## 2025-02-21 PROCEDURE — 2500000004 HC RX 250 GENERAL PHARMACY W/ HCPCS (ALT 636 FOR OP/ED): Performed by: SURGERY

## 2025-02-21 PROCEDURE — 75822 VEIN X-RAY ARMS/LEGS: CPT | Performed by: SURGERY

## 2025-02-21 PROCEDURE — 3700000001 HC GENERAL ANESTHESIA TIME - INITIAL BASE CHARGE: Performed by: SURGERY

## 2025-02-21 PROCEDURE — 7100000010 HC PHASE TWO TIME - EACH INCREMENTAL 1 MINUTE: Performed by: SURGERY

## 2025-02-21 PROCEDURE — 2550000001 HC RX 255 CONTRASTS: Performed by: SURGERY

## 2025-02-21 PROCEDURE — 37253 INTRVASC US NONCORONARY ADDL: CPT | Performed by: SURGERY

## 2025-02-21 RX ORDER — ALBUTEROL SULFATE 0.83 MG/ML
2.5 SOLUTION RESPIRATORY (INHALATION) ONCE AS NEEDED
Status: DISCONTINUED | OUTPATIENT
Start: 2025-02-21 | End: 2025-02-21 | Stop reason: HOSPADM

## 2025-02-21 RX ORDER — DIPHENHYDRAMINE HYDROCHLORIDE 50 MG/ML
12.5 INJECTION INTRAMUSCULAR; INTRAVENOUS ONCE AS NEEDED
Status: DISCONTINUED | OUTPATIENT
Start: 2025-02-21 | End: 2025-02-21 | Stop reason: HOSPADM

## 2025-02-21 RX ORDER — MIDAZOLAM HYDROCHLORIDE 1 MG/ML
INJECTION, SOLUTION INTRAMUSCULAR; INTRAVENOUS AS NEEDED
Status: DISCONTINUED | OUTPATIENT
Start: 2025-02-21 | End: 2025-02-21

## 2025-02-21 RX ORDER — HYDRALAZINE HYDROCHLORIDE 20 MG/ML
5 INJECTION INTRAMUSCULAR; INTRAVENOUS EVERY 30 MIN PRN
Status: DISCONTINUED | OUTPATIENT
Start: 2025-02-21 | End: 2025-02-21 | Stop reason: HOSPADM

## 2025-02-21 RX ORDER — CEFAZOLIN SODIUM 3 G/150ML
3 INJECTION, SOLUTION INTRAVENOUS ONCE
Status: COMPLETED | OUTPATIENT
Start: 2025-02-21 | End: 2025-02-21

## 2025-02-21 RX ORDER — SODIUM CHLORIDE, SODIUM LACTATE, POTASSIUM CHLORIDE, CALCIUM CHLORIDE 600; 310; 30; 20 MG/100ML; MG/100ML; MG/100ML; MG/100ML
100 INJECTION, SOLUTION INTRAVENOUS CONTINUOUS
Status: DISCONTINUED | OUTPATIENT
Start: 2025-02-21 | End: 2025-02-21 | Stop reason: HOSPADM

## 2025-02-21 RX ORDER — LABETALOL HYDROCHLORIDE 5 MG/ML
5 INJECTION, SOLUTION INTRAVENOUS ONCE AS NEEDED
Status: DISCONTINUED | OUTPATIENT
Start: 2025-02-21 | End: 2025-02-21 | Stop reason: HOSPADM

## 2025-02-21 RX ORDER — PROPOFOL 10 MG/ML
INJECTION, EMULSION INTRAVENOUS AS NEEDED
Status: DISCONTINUED | OUTPATIENT
Start: 2025-02-21 | End: 2025-02-21

## 2025-02-21 RX ORDER — FAMOTIDINE 10 MG/ML
20 INJECTION, SOLUTION INTRAVENOUS ONCE
Status: COMPLETED | OUTPATIENT
Start: 2025-02-21 | End: 2025-02-21

## 2025-02-21 RX ORDER — LIDOCAINE HYDROCHLORIDE 10 MG/ML
0.1 INJECTION, SOLUTION EPIDURAL; INFILTRATION; INTRACAUDAL; PERINEURAL ONCE
Status: DISCONTINUED | OUTPATIENT
Start: 2025-02-21 | End: 2025-02-21 | Stop reason: HOSPADM

## 2025-02-21 RX ORDER — ONDANSETRON HYDROCHLORIDE 2 MG/ML
4 INJECTION, SOLUTION INTRAVENOUS ONCE AS NEEDED
Status: DISCONTINUED | OUTPATIENT
Start: 2025-02-21 | End: 2025-02-21 | Stop reason: HOSPADM

## 2025-02-21 RX ORDER — DROPERIDOL 2.5 MG/ML
0.62 INJECTION, SOLUTION INTRAMUSCULAR; INTRAVENOUS ONCE AS NEEDED
Status: DISCONTINUED | OUTPATIENT
Start: 2025-02-21 | End: 2025-02-21 | Stop reason: HOSPADM

## 2025-02-21 RX ORDER — FENTANYL CITRATE 50 UG/ML
INJECTION, SOLUTION INTRAMUSCULAR; INTRAVENOUS AS NEEDED
Status: DISCONTINUED | OUTPATIENT
Start: 2025-02-21 | End: 2025-02-21

## 2025-02-21 RX ORDER — MEPERIDINE HYDROCHLORIDE 25 MG/ML
12.5 INJECTION INTRAMUSCULAR; INTRAVENOUS; SUBCUTANEOUS EVERY 10 MIN PRN
Status: DISCONTINUED | OUTPATIENT
Start: 2025-02-21 | End: 2025-02-21 | Stop reason: HOSPADM

## 2025-02-21 RX ORDER — ONDANSETRON HYDROCHLORIDE 2 MG/ML
INJECTION, SOLUTION INTRAVENOUS AS NEEDED
Status: DISCONTINUED | OUTPATIENT
Start: 2025-02-21 | End: 2025-02-21

## 2025-02-21 RX ORDER — IODIXANOL 270 MG/ML
INJECTION, SOLUTION INTRAVASCULAR AS NEEDED
Status: DISCONTINUED | OUTPATIENT
Start: 2025-02-21 | End: 2025-02-21 | Stop reason: HOSPADM

## 2025-02-21 RX ORDER — MORPHINE SULFATE 2 MG/ML
2 INJECTION, SOLUTION INTRAMUSCULAR; INTRAVENOUS EVERY 5 MIN PRN
Status: DISCONTINUED | OUTPATIENT
Start: 2025-02-21 | End: 2025-02-21 | Stop reason: HOSPADM

## 2025-02-21 RX ADMIN — FAMOTIDINE 20 MG: 10 INJECTION, SOLUTION INTRAVENOUS at 06:47

## 2025-02-21 RX ADMIN — FENTANYL CITRATE 50 MCG: 50 INJECTION INTRAMUSCULAR; INTRAVENOUS at 08:12

## 2025-02-21 RX ADMIN — ONDANSETRON 4 MG: 2 INJECTION INTRAMUSCULAR; INTRAVENOUS at 07:59

## 2025-02-21 RX ADMIN — HYDROMORPHONE HYDROCHLORIDE 0.5 MG: 0.5 INJECTION, SOLUTION INTRAMUSCULAR; INTRAVENOUS; SUBCUTANEOUS at 08:36

## 2025-02-21 RX ADMIN — PROPOFOL 50 MG: 10 INJECTION, EMULSION INTRAVENOUS at 07:35

## 2025-02-21 RX ADMIN — PROPOFOL 50 MG: 10 INJECTION, EMULSION INTRAVENOUS at 08:13

## 2025-02-21 RX ADMIN — SODIUM CHLORIDE: 9 INJECTION, SOLUTION INTRAVENOUS at 07:30

## 2025-02-21 RX ADMIN — FENTANYL CITRATE 25 MCG: 50 INJECTION INTRAMUSCULAR; INTRAVENOUS at 07:32

## 2025-02-21 RX ADMIN — CEFAZOLIN SODIUM 3 G: 3 INJECTION, SOLUTION INTRAVENOUS at 07:30

## 2025-02-21 RX ADMIN — PROPOFOL 120 MCG/KG/MIN: 10 INJECTION, EMULSION INTRAVENOUS at 07:36

## 2025-02-21 RX ADMIN — FENTANYL CITRATE 50 MCG: 50 INJECTION INTRAMUSCULAR; INTRAVENOUS at 07:58

## 2025-02-21 RX ADMIN — PROPOFOL 30 MG: 10 INJECTION, EMULSION INTRAVENOUS at 08:23

## 2025-02-21 RX ADMIN — PROPOFOL 20 MG: 10 INJECTION, EMULSION INTRAVENOUS at 08:18

## 2025-02-21 RX ADMIN — PROPOFOL 20 MG: 10 INJECTION, EMULSION INTRAVENOUS at 08:20

## 2025-02-21 RX ADMIN — PROPOFOL 20 MG: 10 INJECTION, EMULSION INTRAVENOUS at 08:15

## 2025-02-21 RX ADMIN — MIDAZOLAM 2 MG: 1 INJECTION INTRAMUSCULAR; INTRAVENOUS at 07:34

## 2025-02-21 RX ADMIN — MIDAZOLAM 2 MG: 1 INJECTION INTRAMUSCULAR; INTRAVENOUS at 07:30

## 2025-02-21 RX ADMIN — FENTANYL CITRATE 50 MCG: 50 INJECTION INTRAMUSCULAR; INTRAVENOUS at 07:51

## 2025-02-21 RX ADMIN — FENTANYL CITRATE 25 MCG: 50 INJECTION INTRAMUSCULAR; INTRAVENOUS at 07:38

## 2025-02-21 SDOH — HEALTH STABILITY: MENTAL HEALTH: CURRENT SMOKER: 0

## 2025-02-21 ASSESSMENT — COLUMBIA-SUICIDE SEVERITY RATING SCALE - C-SSRS
1. IN THE PAST MONTH, HAVE YOU WISHED YOU WERE DEAD OR WISHED YOU COULD GO TO SLEEP AND NOT WAKE UP?: NO
2. HAVE YOU ACTUALLY HAD ANY THOUGHTS OF KILLING YOURSELF?: NO
6. HAVE YOU EVER DONE ANYTHING, STARTED TO DO ANYTHING, OR PREPARED TO DO ANYTHING TO END YOUR LIFE?: NO

## 2025-02-21 ASSESSMENT — PAIN DESCRIPTION - DESCRIPTORS
DESCRIPTORS: BURNING;SORE
DESCRIPTORS: BURNING
DESCRIPTORS: BURNING

## 2025-02-21 ASSESSMENT — PAIN SCALES - GENERAL
PAINLEVEL_OUTOF10: 3
PAIN_LEVEL: 3
PAINLEVEL_OUTOF10: 9
PAINLEVEL_OUTOF10: 3
PAINLEVEL_OUTOF10: 3
PAINLEVEL_OUTOF10: 9
PAINLEVEL_OUTOF10: 9
PAINLEVEL_OUTOF10: 3

## 2025-02-21 ASSESSMENT — PAIN - FUNCTIONAL ASSESSMENT

## 2025-02-21 NOTE — INTERVAL H&P NOTE
H&P reviewed. The patient was examined and there are no changes to the H&P.    54 year old female with history of bilateral lower extremity swelling and chronic wounds to the lower extremities scheduled for bilateral venogram IVUS and right lower extremity wound debridement with Dr Meyers 2/21/25

## 2025-02-21 NOTE — ANESTHESIA POSTPROCEDURE EVALUATION
Patient: Frances Mcdowell    Procedure Summary       Date: 02/21/25 Room / Location: [facility] OR 08 / Virtual [facility] OR    Anesthesia Start: 0730 Anesthesia Stop: 0830    Procedures:       DIAGNOSTIC BILATERAL LOWER VENOGRAM WITH INTRAVASCULAR ULTRASOUND (C ARM) also bilateral wound incision,drainage and debridement of lower extremities  *CAROLINE WITH MEDTRONIC*  ZACKERY WITH LUCIO (IVUS) (Bilateral)      . (Bilateral) Diagnosis:       Lower extremity edema      (r60.0)    Surgeons: Edy Meyers DO Responsible Provider: ESTER Granados    Anesthesia Type: MAC ASA Status: 3            Anesthesia Type: MAC    Vitals Value Taken Time   /76 02/21/25 1020   Temp 36.6 °C (97.8 °F) 02/21/25 1020   Pulse 72 02/21/25 1020   Resp 14 02/21/25 1020   SpO2 99 % 02/21/25 1020       Anesthesia Post Evaluation    Patient location during evaluation: PACU  Patient participation: complete - patient participated  Level of consciousness: awake  Pain score: 3  Pain management: adequate  Airway patency: patent  Cardiovascular status: acceptable  Respiratory status: acceptable  Hydration status: acceptable  Postoperative Nausea and Vomiting: none    There were no known notable events for this encounter.

## 2025-02-21 NOTE — ANESTHESIA PREPROCEDURE EVALUATION
Patient: Frances Mcdowell    Procedure Information       Date/Time: 02/21/25 0708    Procedures:       BILATERAL LOWER VENOGRAM WITH INTRAVASCULAR ULTRASOUND, POSSIBLE STENT (C ARM) also bilateral wound incision,drainage and debridement of lower extremities  *CAROLINE WITH [employer]*  ZACKERY WITH LUCIO (IVUS) (Bilateral) - 60 MINUTES PROCEDURE TIME      . (Bilateral)    Location: P[facility] OR 08 / Virtual P[facility] OR    Surgeons: Edy Meyers, DO            Relevant Problems   Anesthesia (within normal limits)      Neuro   (+) Anxiety disorder   (+) Chronic migraine   (+) New daily persistent headache      /Renal   (+) UTI (urinary tract infection)      Endocrine   (+) Hypothyroidism (acquired)   (+) Morbid obesity (Multi)      HEENT   (+) Acute pansinusitis   (+) Sinus infection      ID   (+) Acute URI   (+) Fungal dermatitis   (+) UTI (urinary tract infection)   (+) Yeast infection      Skin   (+) Rash       Clinical information reviewed:   Tobacco  Allergies  Meds  Problems  Med Hx  Surg Hx  OB Status    Fam Hx  Soc Hx        NPO Detail:  NPO/Void Status  Carbohydrate Drink Given Prior to Surgery? : N  Date of Last Liquid: 02/21/25  Time of Last Liquid: 0530  Date of Last Solid: 02/20/25  Time of Last Solid: 2200  Last Intake Type: Clear fluids  Time of Last Void: 0620         Physical Exam    Airway  Mallampati: III  TM distance: >3 FB  Neck ROM: full     Cardiovascular - normal exam     Dental - normal exam     Pulmonary - normal exam     Abdominal   (+) obese         Anesthesia Plan    History of general anesthesia?: yes  History of complications of general anesthesia?: no    ASA 3     MAC     The patient is not a current smoker.    intravenous induction   Postoperative administration of opioids is intended.  Anesthetic plan and risks discussed with patient.    Plan discussed with CRNA.

## 2025-02-21 NOTE — DISCHARGE INSTRUCTIONS
-OK to remove dressing 48 hours after surgery  -OK to shower 48 hours postoperatively   -light activity until follow up visit  -Diet as tolerated  -continue to follow with wound care  -Call the office with questions or concerns

## 2025-02-21 NOTE — OP NOTE
DIAGNOSTIC BILATERAL LOWER VENOGRAM WITH INTRAVASCULAR ULTRASOUND (C ARM) also bilateral wound incision,drainage and debridement of lower extremities  *VELAZQUEZ WITH MEDTRONIC*  ZACKERY WITH LUCIO (IVUS) (B), . (B) Operative Note     Date: 2025  OR Location: P[facility] OR    Name: Frances Mcdowell, : 1970, Age: 54 y.o., MRN: [id number], Sex: female    Diagnosis  Pre-op Diagnosis      * Lower extremity edema [R60.0] Post-op Diagnosis     * Lower extremity edema [R60.0]     Procedures  DIAGNOSTIC BILATERAL LOWER VENOGRAM WITH INTRAVASCULAR ULTRASOUND (C ARM) also bilateral wound incision,drainage and debridement of lower extremities  *VELAZQUEZ WITH MEDTRONIC*  ZACKERY WITH LUCIO (IVUS)  65298 - [place] SLCTV CATH PLMT MCKENNA SYS 1ST ORDER BRANCH    DIAGNOSTIC BILATERAL LOWER VENOGRAM WITH INTRAVASCULAR ULTRASOUND (C ARM) also bilateral wound incision,drainage and debridement of lower extremities  *VELAZQUEZ WITH MEDTRONIC*  ZACKERY WITH LUCIO (IVUS)  61725 - [place] INTRAVASCULAR US NONCORONARY RS&I INTIAL VESSEL    DIAGNOSTIC BILATERAL LOWER VENOGRAM WITH INTRAVASCULAR ULTRASOUND (C ARM) also bilateral wound incision,drainage and debridement of lower extremities  *VELAZQUEZ WITH MEDTRONIC*  ZACKERY WITH LUCIO (IVUS)  67305 - CHG VENOGRAPHY EXTREMITY BILATERAL RS&I    DIAGNOSTIC BILATERAL LOWER VENOGRAM WITH INTRAVASCULAR ULTRASOUND (C ARM) also bilateral wound incision,drainage and debridement of lower extremities  *VELAZQUEZ WITH MEDTRONIC*  ZACKERY WITH LUCIO (IVUS)  02680 - [place] INTRAVASCULAR US NONCORONARY RS&I ADDL VESSEL    .  95897 - [place] DEBRIDEMENT SUBCUTANEOUS TISSUE 1ST 20 SQ CM/<      Surgeons      * Edy Meyers - Primary    Resident/Fellow/Other Assistant:  Surgeons and Role:     * JJ Robin-TOÑO - MEHREEN First Assist    Staff:   Circulator: Prema Deleon Person: Geneva  Surgical Assistant: Livan    Anesthesia Staff: CRNA: UTE Granados-CRNA    Procedure Summary  Anesthesia: Monitor Anesthesia Care  ASA: III  Estimated Blood  Loss: 10mL  Intra-op Medications:   Administrations occurring from 0708 to 0838 on 02/21/25:   Medication Name Total Dose   heparin (porcine) 5,000 Units in sodium chloride 0.9% 500 mL irrigation 5,000 Units   iodixanol (VISIPaque) 270 mg iodine/mL injection 20 mL   HYDROmorphone (Dilaudid) injection 0.5 mg 0.5 mg   ceFAZolin (Ancef) 3 g in dextrose (iso)  mL 3 g   dexmedeTOMIDine (Precedex) bolus from bag 20 mcg   fentaNYL (Sublimaze) injection 50 mcg/mL 200 mcg   midazolam (Versed) injection 1 mg/mL 4 mg   ondansetron (Zofran) 2 mg/mL injection 4 mg   propofol (Diprivan) injection 10 mg/mL 650 mg   NaCl 0.9 % bolus Cannot be calculated              Anesthesia Record               Intraprocedure I/O Totals          Intake    Dexmedetomidine 0.00 mL    The total shown is the total volume documented since Anesthesia Start was filed.    NaCl 0.9 % bolus 400.00 mL    ceFAZolin (Ancef) 3 g in dextrose (iso)  mL 150.00 mL    Total Intake 550 mL       Output    Est. Blood Loss 5 mL    Total Output 5 mL       Net    Net Volume 545 mL          Specimen:   ID Type Source Tests Collected by Time   1 : RIGHT LOWER EXTREMITY WOUND BIOPSY Tissue ABSCESS SURGICAL PATHOLOGY EXAM Edy Meyers DO 2/21/2025 0757                 Drains and/or Catheters: * None in log *    Tourniquet Times:         Implants:     Findings: normal venogram IVUS    Indications: Frances Mcdowell is an 54 y.o. female who is having surgery for r60.0. Bilateral lower extremity edema    The patient was seen in the preoperative area. The risks, benefits, complications, treatment options, non-operative alternatives, expected recovery and outcomes were discussed with the patient. The possibilities of reaction to medication, pulmonary aspiration, injury to surrounding structures, bleeding, recurrent infection, the need for additional procedures, failure to diagnose a condition, and creating a complication requiring transfusion or operation were discussed  with the patient. The patient concurred with the proposed plan, giving informed consent.  The site of surgery was properly noted/marked if necessary per policy. The patient has been actively warmed in preoperative area. Preoperative antibiotics have been ordered and given within 1 hours of incision. Venous thrombosis prophylaxis are not indicated.    Procedure Details: Patient was brought to the OR suite placed in the supine position administered monitored anesthesia care both groins are sterilely prepped and draped standard fashion.  Ultrasound guidance was utilized to access the left femoral vein advancing wire to the vena cava with no difficulty sheath was advanced contrast venography was performed demonstrating no evidence of intraluminal filling defect in the vena cava or iliac vein.  IVUS device was subsequently advanced and withdrawn inferior vena cava surface reference was 391 mm², left common iliac vein surface reference was 258 mm²     left external  iliac surface reference was 164 mm²,  left common femoral vein surface reference was 217 mm².  Ultrasound guidance was utilized to access the right femoral vein Bentson wire to the vena cava with no difficulty sheath was advanced contrast venography was performed demonstrated no evidence of intraluminal filling defect in the iliac circuit.  The right common iliac surface reference was 204 mm², right extrailiac vein surface reference was 173 mm², the right common femoral vein surface reference was 205 mm².     Both lower extremities were sterilely prepped and draped.  Right lower extremity was curettaged in the posterior calf region 3 to 4 cm depth skin tissue was biopsied and sent for pathology examination.  Left lower extremity also curettage down to subcutaneous tissue without event.  Aquacel dressing as well as compressive wrap was placed on the left 10 x 10 area of wound VAC was reapplied on the right without event.  At the termination procedure all  catheter wires were removed manual compression was applied excellent hemostasis maintained patient will consent the PACU in stable condition.       Complications:  None; patient tolerated the procedure well.    Disposition: PACU - hemodynamically stable.  Condition: stable             Additional Details: No qualified vascular fellow was available for assistance in the above-noted procedure.  JJ Rangel was available for the entirety of the procedure.  She assisted in all components of the procedure from start to completion.     Attending Attestation: I was present and scrubbed for the entire procedure.    Edy Meyers  Phone Number: [phone]

## 2025-02-24 ENCOUNTER — APPOINTMENT (OUTPATIENT)
Dept: PAIN MEDICINE | Facility: CLINIC | Age: 55
End: 2025-02-24
Payer: COMMERCIAL

## 2025-02-24 ENCOUNTER — INFUSION (OUTPATIENT)
Dept: INFUSION THERAPY | Facility: HOSPITAL | Age: 55
End: 2025-02-24
Payer: COMMERCIAL

## 2025-02-24 VITALS
HEART RATE: 94 BPM | SYSTOLIC BLOOD PRESSURE: 153 MMHG | OXYGEN SATURATION: 100 % | RESPIRATION RATE: 18 BRPM | DIASTOLIC BLOOD PRESSURE: 85 MMHG

## 2025-02-24 DIAGNOSIS — L97.222 NON-PRESSURE CHRONIC ULCER OF LEFT CALF WITH FAT LAYER EXPOSED (MULTI): ICD-10-CM

## 2025-02-24 LAB
ANION GAP SERPL CALC-SCNC: 13 MMOL/L (ref 10–20)
BUN SERPL-MCNC: 36 MG/DL (ref 6–23)
CALCIUM SERPL-MCNC: 8.4 MG/DL (ref 8.6–10.3)
CHLORIDE SERPL-SCNC: 107 MMOL/L (ref 98–107)
CO2 SERPL-SCNC: 26 MMOL/L (ref 21–32)
CREAT SERPL-MCNC: 1 MG/DL (ref 0.5–1.05)
EGFRCR SERPLBLD CKD-EPI 2021: 66 ML/MIN/1.73M*2
GLUCOSE SERPL-MCNC: 97 MG/DL (ref 74–99)
POTASSIUM SERPL-SCNC: 4.3 MMOL/L (ref 3.5–5.3)
SODIUM SERPL-SCNC: 142 MMOL/L (ref 136–145)

## 2025-02-24 PROCEDURE — 80048 BASIC METABOLIC PNL TOTAL CA: CPT

## 2025-02-24 ASSESSMENT — ENCOUNTER SYMPTOMS
LOSS OF SENSATION IN FEET: 0
DEPRESSION: 0
OCCASIONAL FEELINGS OF UNSTEADINESS: 1

## 2025-02-24 ASSESSMENT — PAIN SCALES - GENERAL: PAINLEVEL_OUTOF10: 0 - NO PAIN

## 2025-02-25 ENCOUNTER — TELEPHONE (OUTPATIENT)
Dept: VASCULAR SURGERY | Facility: HOSPITAL | Age: 55
End: 2025-02-25
Payer: COMMERCIAL

## 2025-02-28 LAB
LABORATORY COMMENT REPORT: NORMAL
PATH REPORT.FINAL DX SPEC: NORMAL
PATH REPORT.GROSS SPEC: NORMAL
PATH REPORT.RELEVANT HX SPEC: NORMAL
PATH REPORT.TOTAL CANCER: NORMAL

## 2025-03-03 ENCOUNTER — APPOINTMENT (OUTPATIENT)
Dept: INFUSION THERAPY | Facility: HOSPITAL | Age: 55
End: 2025-03-03
Payer: COMMERCIAL

## 2025-03-03 DIAGNOSIS — L97.222 NON-PRESSURE CHRONIC ULCER OF LEFT CALF WITH FAT LAYER EXPOSED (MULTI): ICD-10-CM

## 2025-03-03 NOTE — PROGRESS NOTES
Subjective   Patient ID: Frances Mcdowell is a 54 y.o. female who presents for Virtual Visit, Results, and Med Refill.    HPI  I performed this visit using real-time telehealth tools, including an audio/video connection between Frances Mcdowell of [address] [place] [id number] and myself ,Dr Moreno (active [place] License)   in office [facility] [place], [place]  Patient on with me on a virtual visit    Review of Systems  General: Denies fever, chills, night sweats,  Eyes: Negative for recent visual changes,  Dermatologic: Negative for new skin conditions, rash  Respiratory: Negative for wheezing, shortness of breath, cough  Cardiovascular: Negative for chest pain, palpitations, or leg swelling  Gastrointestinal: Negative for nausea/vomiting, abdominal pain, changes in bowel habits  Genitourinary: Negative for dysuria, urgency, incontinence, frequency    Previous history  Past Medical History:   Diagnosis Date    Acute embolism and thrombosis of other specified veins 2022    Superficial vein thrombosis    ADD (attention deficit disorder)     Anxiety     Endometrial intraepithelial neoplasia (EIN)     GERD (gastroesophageal reflux disease)     HLD (hyperlipidemia)     Hypothyroidism     Lymph edema      Past Surgical History:   Procedure Laterality Date    BLADDER SURGERY       SECTION, LOW TRANSVERSE      FOOT SURGERY      HYSTERECTOMY      IR CVC PICC  2025    IR CVC PICC 2025 TRI CR NONV1    TONSILLECTOMY      TOTAL THYROIDECTOMY      VARICOSE VEIN SURGERY      Varicose Vein Ligation     Social History     Tobacco Use    Smoking status: Never    Smokeless tobacco: Never   Vaping Use    Vaping status: Never Used   Substance Use Topics    Alcohol use: Not Currently     Comment: seldom    Drug use: Never     No family history on file.  Allergies   Allergen Reactions    Erythromycin Base Anaphylaxis    Sulfa (Sulfonamide Antibiotics) Anaphylaxis    Tetanus Toxoid Other and  Swelling    Tramadol Itching    Adhesive Tape-Silicones Rash     tegaderm causes bad skin breakdown     Current Outpatient Medications   Medication Instructions    amphetamine-dextroamphetamine XR (Adderall XR) 20 mg 24 hr capsule 20 mg, oral, 2 times daily    cholecalciferol (VITAMIN D-3) 5,000 Units, oral, Daily    EPINEPHrine (EPIPEN) 0.3 mg, injection, As needed, As Directed    gabapentin (NEURONTIN) 300 mg, oral, 3 times daily    gabapentin (NEURONTIN) 600 mg, oral, 3 times daily    hydrOXYzine HCL (ATARAX) 25 mg, oral, Every 8 hours PRN    hydrOXYzine HCL (ATARAX) 25 mg, oral, Every 8 hours PRN    ibuprofen 800 mg, oral, Every 6 hours PRN    levothyroxine (SYNTHROID, LEVOXYL) 100 mcg, oral, Daily, Take on an empty stomach at the same time each day, either 30 to 60 minutes prior to breakfast    omeprazole (PRILOSEC) 40 mg, oral, Daily    oxyCODONE-acetaminophen (Percocet)  mg tablet 1 tablet, oral, Daily    predniSONE (DELTASONE) 10 mg, oral, Daily    rosuvastatin (CRESTOR) 20 mg, oral, Daily       Objective       Physical Exam  via [facility]  General: Well groomed, well nourished , speaks full sentences  Alert Cooperative , no apparent distress   Skin: Good color does not appear dehydrated   Eyes: Extra ocular muscle movements intact, anicteric sclerae  Neck: Supple, with good range of motion looking behind her and moving head sideways to cough   Neurological: Alert, oriented          Assessment/Plan   CHRONIC PAIN PLAN:   Patient has had no change in intake pattern, , with fair-good relief of pain and no adverse effects. Patient aware of [facility] [place] Law regarding treatment of pain with controlled analgesics.    Reiterated no alcohol or other medications or supplements that may increase adverse effects of this medication.  Follow-up in one month with changes in dosage or in three months with stable use.  OARRS checked with no discrepancies.  UDS up to date and signed agreement on file  ATTENTION  DEFICIT DISORDER PLAN: Effective at current dose . No side effects, , sleep, appetite, and weight stable. Medication will be continued used only for the prescribed medical condition If problems arise patient knows to discontinue meds and come back sooner.   OARRS report reviewed today  OARRS website checked and validated.  All prescriptions have been appropriately filled.  No suspicious activity was identified.   CONTROLLED SUBSTANCE AGREEMENT SIGNED    Time Spent  with patient:  7  minutes of which greater than 50 percent was spent counselling and coordinating care.    Discussed with:   Return in :    Portions of this note were generated using digital voice recognition software, and may contain grammatical errors       Devaughn Moreno MD  03/02/25  8:29 PM

## 2025-03-05 ENCOUNTER — HOSPITAL ENCOUNTER (OUTPATIENT)
Dept: CARDIOLOGY | Facility: HOSPITAL | Age: 55
Discharge: HOME | End: 2025-03-05
Payer: COMMERCIAL

## 2025-03-05 ENCOUNTER — OFFICE VISIT (OUTPATIENT)
Dept: WOUND CARE | Facility: HOSPITAL | Age: 55
End: 2025-03-05
Payer: COMMERCIAL

## 2025-03-05 ENCOUNTER — APPOINTMENT (OUTPATIENT)
Dept: VASCULAR SURGERY | Facility: HOSPITAL | Age: 55
End: 2025-03-05
Payer: COMMERCIAL

## 2025-03-05 VITALS
DIASTOLIC BLOOD PRESSURE: 89 MMHG | TEMPERATURE: 97.6 F | RESPIRATION RATE: 15 BRPM | SYSTOLIC BLOOD PRESSURE: 143 MMHG | BODY MASS INDEX: 48.65 KG/M2 | OXYGEN SATURATION: 100 % | HEIGHT: 64 IN | HEART RATE: 98 BPM | WEIGHT: 285 LBS

## 2025-03-05 DIAGNOSIS — L97.212 NON-PRESSURE CHRONIC ULCER OF RIGHT CALF WITH FAT LAYER EXPOSED (MULTI): ICD-10-CM

## 2025-03-05 DIAGNOSIS — L97.922 NON-PRESSURE CHRONIC ULCER OF LEFT LOWER LEG WITH FAT LAYER EXPOSED (MULTI): Primary | ICD-10-CM

## 2025-03-05 PROCEDURE — 11045 DBRDMT SUBQ TISS EACH ADDL: CPT | Mod: LT,RT

## 2025-03-05 PROCEDURE — 2500000004 HC RX 250 GENERAL PHARMACY W/ HCPCS (ALT 636 FOR OP/ED): Performed by: NURSE PRACTITIONER

## 2025-03-05 PROCEDURE — 11042 DBRDMT SUBQ TIS 1ST 20SQCM/<: CPT | Mod: LT,RT

## 2025-03-05 RX ORDER — HYDROPHILIC CREAM
1 PASTE (GRAM) TOPICAL 2 TIMES DAILY
Qty: 170 G | Refills: 3 | Status: SHIPPED | OUTPATIENT
Start: 2025-03-05

## 2025-03-05 RX ORDER — LIDOCAINE HYDROCHLORIDE 20 MG/ML
INJECTION, SOLUTION EPIDURAL; INFILTRATION; INTRACAUDAL; PERINEURAL
Status: COMPLETED | OUTPATIENT
Start: 2025-03-05 | End: 2025-03-05

## 2025-03-05 RX ADMIN — LIDOCAINE HYDROCHLORIDE 5 ML: 20 INJECTION, SOLUTION EPIDURAL; INFILTRATION; INTRACAUDAL; PERINEURAL at 12:25

## 2025-03-05 ASSESSMENT — PAIN SCALES - GENERAL
PAINLEVEL_OUTOF10: 7
PAINLEVEL_OUTOF10: 7

## 2025-03-05 ASSESSMENT — PAIN - FUNCTIONAL ASSESSMENT
PAIN_FUNCTIONAL_ASSESSMENT: 0-10
PAIN_FUNCTIONAL_ASSESSMENT: 0-10

## 2025-03-05 NOTE — POST-PROCEDURE NOTE
IR post note     Procedure Date: 3/5/2025  Attending: Dr. Helder Figueroa   Performing Provider: Lottie Lauren CNP   Resident/Fellow/Other Assistant: N/A     Indications: long term antibiotics     Post-procedure diagnosis:   Long term IV antibiotics     Procedure(s):   Removal of PICC line -right chest     Procedure Findings:   Removal of PICC line -right chest     Description of the Procedure:   See dictation     Complications:   None     Stents/Implants:   Implants       No implant documentation for this case.            Anticoagulation/Antiplatelet Plan:   NA     Estimated Blood Loss:   * No values recorded between 3/5/2025 12:18 PM and 3/5/2025 12:44 PM *    Anesthesia: none  Anesthesia Staff: NA    Any Specimen(s) Removed:   None     Disposition:   Recovery and home       Electronically signed by: ENEDELIA Sands, 3/5/2025 2:16 PM

## 2025-03-05 NOTE — PRE-SEDATION DOCUMENTATION
"Interventional Radiology Preprocedure Note    Frances Mcdowell   Indication for procedure: The encounter diagnosis was Non-pressure chronic ulcer of right calf with fat layer exposed (Multi). Patient here today for PICC removal.     Relevant review of systems: NA      /89   Pulse 98   Temp 36.4 °C (97.6 °F)   Resp 15   Ht 1.626 m (5' 4\")   Wt 129 kg (285 lb)   SpO2 100%   BMI 48.92 kg/m²    Relevant Labs:   Lab Results   Component Value Date    CREATININE 1.00 02/24/2025    EGFR 66 02/24/2025       Planned Sedation/Anesthesia: possible local only     Airway assessment: normal    Directed physical examination:        Mallampati: NA    ASA Score: ASA 3 - Patient with moderate systemic disease with functional limitations    Benefits, risks and alternatives of procedure and planned sedation have been discussed with the patient and/or their representative. All questions answered and they agree to proceed.     Lottie Lauren, APRN-CNP   "

## 2025-03-05 NOTE — DISCHARGE INSTRUCTIONS
You are all finished with your loop recorder     Please leave your dressing on for three days. Do not get it wet.     After three days from your procedure you may remove your dressing, gently clean the area with soap and water, rinse and dry. Leave uncovered.     Watch out for signs and symptoms of infection and Notify Interventional Radiology NP Eliza Lauren at [phone] or IR cath lab prep and recovery at [phone] if Eliza does not get back with you in a few hours which could include any of the following:   Redness, drainage, fever, chills or increased pain at the site.     If you have any pain you can use over the counter medications and/or ice as needed     Please call if you have any questions, ALBA Lauren [phone]

## 2025-03-06 ENCOUNTER — APPOINTMENT (OUTPATIENT)
Dept: VASCULAR SURGERY | Facility: HOSPITAL | Age: 55
End: 2025-03-06
Payer: COMMERCIAL

## 2025-03-12 ENCOUNTER — PREP FOR PROCEDURE (OUTPATIENT)
Dept: WOUND CARE | Facility: HOSPITAL | Age: 55
End: 2025-03-12

## 2025-03-12 ENCOUNTER — OFFICE VISIT (OUTPATIENT)
Dept: WOUND CARE | Facility: HOSPITAL | Age: 55
End: 2025-03-12
Payer: COMMERCIAL

## 2025-03-12 DIAGNOSIS — L97.212 NON-PRESSURE CHRONIC ULCER OF RIGHT CALF WITH FAT LAYER EXPOSED (MULTI): ICD-10-CM

## 2025-03-12 DIAGNOSIS — L97.222 NON-PRESSURE CHRONIC ULCER OF LEFT CALF WITH FAT LAYER EXPOSED (MULTI): Primary | ICD-10-CM

## 2025-03-12 PROCEDURE — 11042 DBRDMT SUBQ TIS 1ST 20SQCM/<: CPT | Mod: LT

## 2025-03-12 PROCEDURE — 11045 DBRDMT SUBQ TISS EACH ADDL: CPT | Mod: RT

## 2025-03-12 RX ORDER — GENTAMICIN SULFATE 1 MG/G
OINTMENT TOPICAL 3 TIMES DAILY
Qty: 30 G | Refills: 2 | Status: SHIPPED | OUTPATIENT
Start: 2025-03-12 | End: 2025-03-22

## 2025-03-17 DIAGNOSIS — F98.8 ATTENTION DEFICIT DISORDER, UNSPECIFIED TYPE: ICD-10-CM

## 2025-03-18 ENCOUNTER — OFFICE VISIT (OUTPATIENT)
Dept: PRIMARY CARE | Facility: CLINIC | Age: 55
End: 2025-03-18
Payer: COMMERCIAL

## 2025-03-18 VITALS
DIASTOLIC BLOOD PRESSURE: 80 MMHG | RESPIRATION RATE: 16 BRPM | OXYGEN SATURATION: 97 % | HEIGHT: 64 IN | TEMPERATURE: 98.6 F | HEART RATE: 68 BPM | SYSTOLIC BLOOD PRESSURE: 132 MMHG | BODY MASS INDEX: 48.92 KG/M2

## 2025-03-18 DIAGNOSIS — M79.605 PAIN IN BOTH LOWER EXTREMITIES: ICD-10-CM

## 2025-03-18 DIAGNOSIS — M54.6 THORACIC BACK PAIN, UNSPECIFIED BACK PAIN LATERALITY, UNSPECIFIED CHRONICITY: Primary | ICD-10-CM

## 2025-03-18 DIAGNOSIS — E03.3 POSTINFECTIOUS HYPOTHYROIDISM: ICD-10-CM

## 2025-03-18 DIAGNOSIS — M79.604 PAIN IN BOTH LOWER EXTREMITIES: ICD-10-CM

## 2025-03-18 PROCEDURE — 1036F TOBACCO NON-USER: CPT | Performed by: INTERNAL MEDICINE

## 2025-03-18 PROCEDURE — 99213 OFFICE O/P EST LOW 20 MIN: CPT | Performed by: INTERNAL MEDICINE

## 2025-03-18 RX ORDER — OXYCODONE AND ACETAMINOPHEN 10; 325 MG/1; MG/1
1 TABLET ORAL DAILY
Qty: 30 TABLET | Refills: 0 | Status: SHIPPED | OUTPATIENT
Start: 2025-03-18 | End: 2025-04-17

## 2025-03-18 RX ORDER — DEXTROAMPHETAMINE SACCHARATE, AMPHETAMINE ASPARTATE MONOHYDRATE, DEXTROAMPHETAMINE SULFATE AND AMPHETAMINE SULFATE 5; 5; 5; 5 MG/1; MG/1; MG/1; MG/1
20 CAPSULE, EXTENDED RELEASE ORAL 2 TIMES DAILY
Qty: 60 CAPSULE | Refills: 0 | Status: SHIPPED | OUTPATIENT
Start: 2025-03-18

## 2025-03-18 RX ORDER — CYCLOBENZAPRINE HCL 10 MG
10 TABLET ORAL NIGHTLY PRN
Qty: 30 TABLET | Refills: 0 | Status: SHIPPED | OUTPATIENT
Start: 2025-03-18 | End: 2025-05-17

## 2025-03-18 RX ORDER — NALOXONE HYDROCHLORIDE 4 MG/.1ML
1 SPRAY NASAL AS NEEDED
Qty: 2 EACH | Refills: 0 | Status: SHIPPED | OUTPATIENT
Start: 2025-03-18

## 2025-03-18 RX ORDER — LEVOTHYROXINE SODIUM 100 UG/1
100 TABLET ORAL DAILY
Qty: 90 TABLET | Refills: 3 | Status: SHIPPED | OUTPATIENT
Start: 2025-03-18

## 2025-03-18 ASSESSMENT — ANXIETY QUESTIONNAIRES
4. TROUBLE RELAXING: NOT AT ALL
IF YOU CHECKED OFF ANY PROBLEMS ON THIS QUESTIONNAIRE, HOW DIFFICULT HAVE THESE PROBLEMS MADE IT FOR YOU TO DO YOUR WORK, TAKE CARE OF THINGS AT HOME, OR GET ALONG WITH OTHER PEOPLE: NOT DIFFICULT AT ALL
GAD7 TOTAL SCORE: 9
7. FEELING AFRAID AS IF SOMETHING AWFUL MIGHT HAPPEN: NOT AT ALL
2. NOT BEING ABLE TO STOP OR CONTROL WORRYING: NEARLY EVERY DAY
3. WORRYING TOO MUCH ABOUT DIFFERENT THINGS: NEARLY EVERY DAY
5. BEING SO RESTLESS THAT IT IS HARD TO SIT STILL: NOT AT ALL
1. FEELING NERVOUS, ANXIOUS, OR ON EDGE: NEARLY EVERY DAY
6. BECOMING EASILY ANNOYED OR IRRITABLE: NOT AT ALL

## 2025-03-18 ASSESSMENT — PATIENT HEALTH QUESTIONNAIRE - PHQ9
SUM OF ALL RESPONSES TO PHQ9 QUESTIONS 1 AND 2: 2
10. IF YOU CHECKED OFF ANY PROBLEMS, HOW DIFFICULT HAVE THESE PROBLEMS MADE IT FOR YOU TO DO YOUR WORK, TAKE CARE OF THINGS AT HOME, OR GET ALONG WITH OTHER PEOPLE: NOT DIFFICULT AT ALL
3. TROUBLE FALLING OR STAYING ASLEEP OR SLEEPING TOO MUCH: MORE THAN HALF THE DAYS
2. FEELING DOWN, DEPRESSED OR HOPELESS: MORE THAN HALF THE DAYS
5. POOR APPETITE OR OVEREATING: SEVERAL DAYS
1. LITTLE INTEREST OR PLEASURE IN DOING THINGS: NOT AT ALL
4. FEELING TIRED OR HAVING LITTLE ENERGY: MORE THAN HALF THE DAYS
SUM OF ALL RESPONSES TO PHQ QUESTIONS 1-9: 7
7. TROUBLE CONCENTRATING ON THINGS, SUCH AS READING THE NEWSPAPER OR WATCHING TELEVISION: NOT AT ALL
9. THOUGHTS THAT YOU WOULD BE BETTER OFF DEAD, OR OF HURTING YOURSELF: NOT AT ALL
6. FEELING BAD ABOUT YOURSELF - OR THAT YOU ARE A FAILURE OR HAVE LET YOURSELF OR YOUR FAMILY DOWN: NOT AT ALL
8. MOVING OR SPEAKING SO SLOWLY THAT OTHER PEOPLE COULD HAVE NOTICED. OR THE OPPOSITE, BEING SO FIGETY OR RESTLESS THAT YOU HAVE BEEN MOVING AROUND A LOT MORE THAN USUAL: NOT AT ALL

## 2025-03-18 NOTE — PROGRESS NOTES
Patient ID: Frances Mcdowell is a 54 y.o. female.  Subjective   Patient ID: Frances Mcdowell is a 54 y.o. female who presents for Cellulitis.    HPI  In for follow up for left sided. Chest wall pain.     Review of Systems    Previous history  Past Medical History:   Diagnosis Date    Acute embolism and thrombosis of other specified veins 2022    Superficial vein thrombosis    ADD (attention deficit disorder)     Anxiety     Endometrial intraepithelial neoplasia (EIN)     GERD (gastroesophageal reflux disease)     HLD (hyperlipidemia)     Hypothyroidism     Lymph edema      Past Surgical History:   Procedure Laterality Date    BLADDER SURGERY       SECTION, LOW TRANSVERSE      FOOT SURGERY      HYSTERECTOMY      IR CVC PICC  2025    IR CVC PICC 2025 TRI CR NONV1    TONSILLECTOMY      TOTAL THYROIDECTOMY      VARICOSE VEIN SURGERY      Varicose Vein Ligation     Social History     Tobacco Use    Smoking status: Never    Smokeless tobacco: Never   Vaping Use    Vaping status: Never Used   Substance Use Topics    Alcohol use: Not Currently     Comment: seldom    Drug use: Never     No family history on file.  Allergies   Allergen Reactions    Erythromycin Base Anaphylaxis    Sulfa (Sulfonamide Antibiotics) Anaphylaxis    Tetanus Toxoid Other and Swelling    Tramadol Itching    Adhesive Tape-Silicones Rash     tegaderm causes bad skin breakdown     Current Outpatient Medications   Medication Instructions    amphetamine-dextroamphetamine XR (Adderall XR) 20 mg 24 hr capsule 20 mg, oral, 2 times daily    cholecalciferol (VITAMIN D-3) 5,000 Units, oral, Daily    cyclobenzaprine (FLEXERIL) 10 mg, oral, Nightly PRN    EPINEPHrine (EPIPEN) 0.3 mg, injection, As needed, As Directed    gabapentin (NEURONTIN) 600 mg, oral, 3 times daily    gentamicin (Garamycin) 0.1 % ointment Topical, 3 times daily, Apply to wounds    ibuprofen 800 mg, oral, Every 6 hours PRN    levothyroxine (SYNTHROID, LEVOXYL) 100 mcg, oral,  Daily, Take on an empty stomach at the same time each day, either 30 to 60 minutes prior to breakfast    naloxone (NARCAN) 4 mg, nasal, As needed, May repeat every 2-3 minutes if needed, alternating nostrils, until medical assistance becomes available.    omeprazole (PRILOSEC) 40 mg, oral, Daily    oxyCODONE-acetaminophen (Percocet)  mg tablet 1 tablet, oral, Daily    predniSONE (DELTASONE) 10 mg, oral, Daily    rosuvastatin (CRESTOR) 20 mg, oral, Daily    wound dressing (Triad Wound Dressing) paste 1 Application, Topical, 2 times daily       Objective       Physical Exam      Assessment/Plan   Frances Mcdowell is a 54 y.o. female who presents for the concerns below:    Assessment & Plan  Thoracic back pain, unspecified back pain laterality, unspecified chronicity    Orders:    cyclobenzaprine (Flexeril) 10 mg tablet; Take 1 tablet (10 mg) by mouth as needed at bedtime for muscle spasms.    naloxone (Narcan) 4 mg/0.1 mL nasal spray; Administer 1 spray (4 mg) into affected nostril(s) if needed for opioid reversal. May repeat every 2-3 minutes if needed, alternating nostrils, until medical assistance becomes available.    Postinfectious hypothyroidism                   Discussed with:   Return in :    Portions of this note were generated using digital voice recognition software, and may contain grammatical errors       Devaughn Moreno MD  03/18/25  11:38 AM  Procedures

## 2025-03-24 ENCOUNTER — APPOINTMENT (OUTPATIENT)
Dept: VASCULAR MEDICINE | Facility: HOSPITAL | Age: 55
DRG: 176 | End: 2025-03-24
Payer: COMMERCIAL

## 2025-03-24 ENCOUNTER — APPOINTMENT (OUTPATIENT)
Dept: RADIOLOGY | Facility: HOSPITAL | Age: 55
End: 2025-03-24
Payer: COMMERCIAL

## 2025-03-24 ENCOUNTER — HOSPITAL ENCOUNTER (INPATIENT)
Facility: HOSPITAL | Age: 55
LOS: 4 days | Discharge: HOME | End: 2025-03-28
Attending: INTERNAL MEDICINE | Admitting: INTERNAL MEDICINE
Payer: COMMERCIAL

## 2025-03-24 ENCOUNTER — APPOINTMENT (OUTPATIENT)
Dept: CARDIOLOGY | Facility: HOSPITAL | Age: 55
DRG: 176 | End: 2025-03-24
Payer: COMMERCIAL

## 2025-03-24 ENCOUNTER — HOSPITAL ENCOUNTER (EMERGENCY)
Facility: HOSPITAL | Age: 55
Discharge: OTHER INSTITUTION | End: 2025-03-24
Attending: EMERGENCY MEDICINE
Payer: COMMERCIAL

## 2025-03-24 ENCOUNTER — APPOINTMENT (OUTPATIENT)
Dept: CARDIOLOGY | Facility: HOSPITAL | Age: 55
End: 2025-03-24
Payer: COMMERCIAL

## 2025-03-24 VITALS
TEMPERATURE: 96.8 F | HEART RATE: 121 BPM | WEIGHT: 280 LBS | OXYGEN SATURATION: 100 % | HEIGHT: 63 IN | SYSTOLIC BLOOD PRESSURE: 137 MMHG | DIASTOLIC BLOOD PRESSURE: 104 MMHG | BODY MASS INDEX: 49.61 KG/M2 | RESPIRATION RATE: 20 BRPM

## 2025-03-24 DIAGNOSIS — L03.90 CELLULITIS: ICD-10-CM

## 2025-03-24 DIAGNOSIS — I26.99 PULMONARY EMBOLISM: Primary | ICD-10-CM

## 2025-03-24 DIAGNOSIS — I26.02 ACUTE SADDLE PULMONARY EMBOLISM WITH ACUTE COR PULMONALE (MULTI): Primary | ICD-10-CM

## 2025-03-24 LAB
ABO GROUP (TYPE) IN BLOOD: NORMAL
ALBUMIN SERPL BCP-MCNC: 3.2 G/DL (ref 3.4–5)
ANION GAP BLDV CALCULATED.4IONS-SCNC: 5 MMOL/L (ref 10–25)
ANION GAP SERPL CALC-SCNC: 13 MMOL/L (ref 10–20)
ANION GAP SERPL CALC-SCNC: 13 MMOL/L (ref 10–20)
ANTIBODY SCREEN: NORMAL
AORTIC VALVE MEAN GRADIENT: 5 MMHG
AORTIC VALVE PEAK VELOCITY: 1.35 M/S
APPEARANCE UR: CLEAR
APTT PPP: 194 SECONDS (ref 26–36)
APTT PPP: 26 SECONDS (ref 26–36)
ATRIAL RATE: 122 BPM
AV PEAK GRADIENT: 7 MMHG
AVA (PEAK VEL): 2.42 CM2
AVA (VTI): 1.96 CM2
B2 GLYCOPROT1 IGA SER-ACNC: 1 U/ML
B2 GLYCOPROT1 IGG SER-ACNC: 1.4 U/ML
B2 GLYCOPROT1 IGM SER-ACNC: 1 U/ML
BACTERIA #/AREA URNS AUTO: ABNORMAL /HPF
BASE EXCESS BLDV CALC-SCNC: 3 MMOL/L (ref -2–3)
BASOPHILS # BLD AUTO: 0.07 X10*3/UL (ref 0–0.1)
BASOPHILS # BLD AUTO: 0.08 X10*3/UL (ref 0–0.1)
BASOPHILS NFR BLD AUTO: 0.4 %
BASOPHILS NFR BLD AUTO: 0.5 %
BILIRUB UR STRIP.AUTO-MCNC: NEGATIVE MG/DL
BNP SERPL-MCNC: 158 PG/ML (ref 0–99)
BODY TEMPERATURE: ABNORMAL
BUN SERPL-MCNC: 22 MG/DL (ref 6–23)
BUN SERPL-MCNC: 25 MG/DL (ref 6–23)
CA-I BLDV-SCNC: 1.22 MMOL/L (ref 1.1–1.33)
CALCIUM SERPL-MCNC: 8.4 MG/DL (ref 8.6–10.6)
CALCIUM SERPL-MCNC: 8.6 MG/DL (ref 8.6–10.3)
CARDIAC TROPONIN I PNL SERPL HS: 170 NG/L (ref 0–13)
CARDIAC TROPONIN I PNL SERPL HS: 256 NG/L (ref 0–13)
CARDIAC TROPONIN I PNL SERPL HS: 606 NG/L (ref 0–34)
CARDIAC TROPONIN I PNL SERPL HS: 702 NG/L (ref 0–34)
CARDIOLIPIN IGA SERPL-ACNC: 2.6 APL U/ML
CARDIOLIPIN IGG SER IA-ACNC: <1.6 GPL U/ML
CARDIOLIPIN IGM SER IA-ACNC: 2.5 MPL U/ML
CHLORIDE BLDV-SCNC: 106 MMOL/L (ref 98–107)
CHLORIDE SERPL-SCNC: 102 MMOL/L (ref 98–107)
CHLORIDE SERPL-SCNC: 104 MMOL/L (ref 98–107)
CO2 SERPL-SCNC: 26 MMOL/L (ref 21–32)
CO2 SERPL-SCNC: 27 MMOL/L (ref 21–32)
COLOR UR: ABNORMAL
CREAT SERPL-MCNC: 0.95 MG/DL (ref 0.5–1.05)
CREAT SERPL-MCNC: 1.08 MG/DL (ref 0.5–1.05)
EGFRCR SERPLBLD CKD-EPI 2021: 60 ML/MIN/1.73M*2
EGFRCR SERPLBLD CKD-EPI 2021: 70 ML/MIN/1.73M*2
EJECTION FRACTION APICAL 4 CHAMBER: 59.8
EJECTION FRACTION: 65 %
EOSINOPHIL # BLD AUTO: 0.04 X10*3/UL (ref 0–0.7)
EOSINOPHIL # BLD AUTO: 0.08 X10*3/UL (ref 0–0.7)
EOSINOPHIL NFR BLD AUTO: 0.2 %
EOSINOPHIL NFR BLD AUTO: 0.5 %
ERYTHROCYTE [DISTWIDTH] IN BLOOD BY AUTOMATED COUNT: 13.5 % (ref 11.5–14.5)
ERYTHROCYTE [DISTWIDTH] IN BLOOD BY AUTOMATED COUNT: 13.7 % (ref 11.5–14.5)
FLUAV RNA RESP QL NAA+PROBE: NOT DETECTED
FLUBV RNA RESP QL NAA+PROBE: NOT DETECTED
GLUCOSE BLD MANUAL STRIP-MCNC: 130 MG/DL (ref 74–99)
GLUCOSE BLDV-MCNC: 130 MG/DL (ref 74–99)
GLUCOSE SERPL-MCNC: 124 MG/DL (ref 74–99)
GLUCOSE SERPL-MCNC: 127 MG/DL (ref 74–99)
GLUCOSE UR STRIP.AUTO-MCNC: ABNORMAL MG/DL
HCO3 BLDV-SCNC: 28.5 MMOL/L (ref 22–26)
HCT VFR BLD AUTO: 34.3 % (ref 36–46)
HCT VFR BLD AUTO: 39.8 % (ref 36–46)
HCT VFR BLD EST: 41 % (ref 36–46)
HGB BLD-MCNC: 11.7 G/DL (ref 12–16)
HGB BLD-MCNC: 12.8 G/DL (ref 12–16)
HGB BLDV-MCNC: 13.7 G/DL (ref 12–16)
HOLD SPECIMEN: NORMAL
IMM GRANULOCYTES # BLD AUTO: 0.18 X10*3/UL (ref 0–0.7)
IMM GRANULOCYTES # BLD AUTO: 0.23 X10*3/UL (ref 0–0.7)
IMM GRANULOCYTES NFR BLD AUTO: 1 % (ref 0–0.9)
IMM GRANULOCYTES NFR BLD AUTO: 1.4 % (ref 0–0.9)
INHALED O2 CONCENTRATION: 28 %
INR PPP: 1 (ref 0.9–1.1)
INR PPP: 1.1 (ref 0.9–1.1)
KETONES UR STRIP.AUTO-MCNC: NEGATIVE MG/DL
LACTATE BLDV-SCNC: 1.9 MMOL/L (ref 0.4–2)
LACTATE SERPL-SCNC: 1.1 MMOL/L (ref 0.4–2)
LACTATE SERPL-SCNC: 1.7 MMOL/L (ref 0.4–2)
LEFT ATRIUM VOLUME AREA LENGTH INDEX BSA: 21.4 ML/M2
LEFT VENTRICLE INTERNAL DIMENSION DIASTOLE: 3.9 CM (ref 3.5–6)
LEFT VENTRICULAR OUTFLOW TRACT DIAMETER: 2 CM
LEUKOCYTE ESTERASE UR QL STRIP.AUTO: ABNORMAL
LYMPHOCYTES # BLD AUTO: 1.55 X10*3/UL (ref 1.2–4.8)
LYMPHOCYTES # BLD AUTO: 2.22 X10*3/UL (ref 1.2–4.8)
LYMPHOCYTES NFR BLD AUTO: 12.1 %
LYMPHOCYTES NFR BLD AUTO: 9.7 %
MAGNESIUM SERPL-MCNC: 1.99 MG/DL (ref 1.6–2.4)
MAGNESIUM SERPL-MCNC: 2.21 MG/DL (ref 1.6–2.4)
MCH RBC QN AUTO: 31.1 PG (ref 26–34)
MCH RBC QN AUTO: 31.4 PG (ref 26–34)
MCHC RBC AUTO-ENTMCNC: 32.2 G/DL (ref 32–36)
MCHC RBC AUTO-ENTMCNC: 34.1 G/DL (ref 32–36)
MCV RBC AUTO: 92 FL (ref 80–100)
MCV RBC AUTO: 97 FL (ref 80–100)
MITRAL VALVE E/A RATIO: 0.4
MONOCYTES # BLD AUTO: 0.77 X10*3/UL (ref 0.1–1)
MONOCYTES # BLD AUTO: 1.17 X10*3/UL (ref 0.1–1)
MONOCYTES NFR BLD AUTO: 4.8 %
MONOCYTES NFR BLD AUTO: 6.4 %
MUCOUS THREADS #/AREA URNS AUTO: ABNORMAL /LPF
NEUTROPHILS # BLD AUTO: 13.29 X10*3/UL (ref 1.2–7.7)
NEUTROPHILS # BLD AUTO: 14.64 X10*3/UL (ref 1.2–7.7)
NEUTROPHILS NFR BLD AUTO: 79.9 %
NEUTROPHILS NFR BLD AUTO: 83.1 %
NITRITE UR QL STRIP.AUTO: NEGATIVE
NRBC BLD-RTO: 0 /100 WBCS (ref 0–0)
NRBC BLD-RTO: 0 /100 WBCS (ref 0–0)
OXYHGB MFR BLDV: 64.4 % (ref 45–75)
P AXIS: 48 DEGREES
P OFFSET: 195 MS
P ONSET: 151 MS
PCO2 BLDV: 46 MM HG (ref 41–51)
PH BLDV: 7.4 PH (ref 7.33–7.43)
PH UR STRIP.AUTO: 6 [PH]
PHOSPHATE SERPL-MCNC: 2.6 MG/DL (ref 2.5–4.9)
PHOSPHATE SERPL-MCNC: 2.6 MG/DL (ref 2.5–4.9)
PLATELET # BLD AUTO: 320 X10*3/UL (ref 150–450)
PLATELET # BLD AUTO: 334 X10*3/UL (ref 150–450)
PO2 BLDV: 38 MM HG (ref 35–45)
POTASSIUM BLDV-SCNC: 3.7 MMOL/L (ref 3.5–5.3)
POTASSIUM SERPL-SCNC: 3.7 MMOL/L (ref 3.5–5.3)
POTASSIUM SERPL-SCNC: 4 MMOL/L (ref 3.5–5.3)
PR INTERVAL: 140 MS
PROT UR STRIP.AUTO-MCNC: NEGATIVE MG/DL
PROTHROMBIN TIME: 11.1 SECONDS (ref 9.8–12.4)
PROTHROMBIN TIME: 12.6 SECONDS (ref 9.8–12.4)
Q ONSET: 221 MS
QRS COUNT: 20 BEATS
QRS DURATION: 92 MS
QT INTERVAL: 326 MS
QTC CALCULATION(BAZETT): 464 MS
QTC FREDERICIA: 412 MS
R AXIS: 42 DEGREES
RBC # BLD AUTO: 3.73 X10*6/UL (ref 4–5.2)
RBC # BLD AUTO: 4.12 X10*6/UL (ref 4–5.2)
RBC # UR STRIP.AUTO: ABNORMAL MG/DL
RBC #/AREA URNS AUTO: >20 /HPF
RH FACTOR (ANTIGEN D): NORMAL
RIGHT VENTRICLE FREE WALL PEAK S': 7.51 CM/S
RIGHT VENTRICLE PEAK SYSTOLIC PRESSURE: 62.2 MMHG
RSV RNA RESP QL NAA+PROBE: NOT DETECTED
SAO2 % BLDV: 66 % (ref 45–75)
SARS-COV-2 RNA RESP QL NAA+PROBE: NOT DETECTED
SODIUM BLDV-SCNC: 136 MMOL/L (ref 136–145)
SODIUM SERPL-SCNC: 138 MMOL/L (ref 136–145)
SODIUM SERPL-SCNC: 139 MMOL/L (ref 136–145)
SP GR UR STRIP.AUTO: 1.03
SQUAMOUS #/AREA URNS AUTO: ABNORMAL /HPF
T AXIS: 28 DEGREES
T OFFSET: 384 MS
TRICUSPID ANNULAR PLANE SYSTOLIC EXCURSION: 1.5 CM
UFH PPP CHRO-ACNC: 0.1 IU/ML
UFH PPP CHRO-ACNC: 1.1 IU/ML
UFH PPP CHRO-ACNC: 1.6 IU/ML
UROBILINOGEN UR STRIP.AUTO-MCNC: NORMAL MG/DL
VENTRICULAR RATE: 122 BPM
WBC # BLD AUTO: 16 X10*3/UL (ref 4.4–11.3)
WBC # BLD AUTO: 18.3 X10*3/UL (ref 4.4–11.3)
WBC #/AREA URNS AUTO: >50 /HPF

## 2025-03-24 PROCEDURE — 99291 CRITICAL CARE FIRST HOUR: CPT | Mod: 25 | Performed by: EMERGENCY MEDICINE

## 2025-03-24 PROCEDURE — C8929 TTE W OR WO FOL WCON,DOPPLER: HCPCS

## 2025-03-24 PROCEDURE — 87637 SARSCOV2&INF A&B&RSV AMP PRB: CPT

## 2025-03-24 PROCEDURE — 99222 1ST HOSP IP/OBS MODERATE 55: CPT | Performed by: INTERNAL MEDICINE

## 2025-03-24 PROCEDURE — 82947 ASSAY GLUCOSE BLOOD QUANT: CPT

## 2025-03-24 PROCEDURE — 2500000004 HC RX 250 GENERAL PHARMACY W/ HCPCS (ALT 636 FOR OP/ED): Performed by: EMERGENCY MEDICINE

## 2025-03-24 PROCEDURE — 2500000002 HC RX 250 W HCPCS SELF ADMINISTERED DRUGS (ALT 637 FOR MEDICARE OP, ALT 636 FOR OP/ED)

## 2025-03-24 PROCEDURE — 84132 ASSAY OF SERUM POTASSIUM: CPT

## 2025-03-24 PROCEDURE — 71275 CT ANGIOGRAPHY CHEST: CPT | Performed by: RADIOLOGY

## 2025-03-24 PROCEDURE — 80048 BASIC METABOLIC PNL TOTAL CA: CPT | Performed by: EMERGENCY MEDICINE

## 2025-03-24 PROCEDURE — 71045 X-RAY EXAM CHEST 1 VIEW: CPT | Performed by: RADIOLOGY

## 2025-03-24 PROCEDURE — 85610 PROTHROMBIN TIME: CPT | Performed by: EMERGENCY MEDICINE

## 2025-03-24 PROCEDURE — 85610 PROTHROMBIN TIME: CPT

## 2025-03-24 PROCEDURE — 96375 TX/PRO/DX INJ NEW DRUG ADDON: CPT | Mod: 59

## 2025-03-24 PROCEDURE — 85520 HEPARIN ASSAY: CPT

## 2025-03-24 PROCEDURE — 85025 COMPLETE CBC W/AUTO DIFF WBC: CPT

## 2025-03-24 PROCEDURE — 96365 THER/PROPH/DIAG IV INF INIT: CPT | Mod: 59

## 2025-03-24 PROCEDURE — 2500000001 HC RX 250 WO HCPCS SELF ADMINISTERED DRUGS (ALT 637 FOR MEDICARE OP)

## 2025-03-24 PROCEDURE — 83880 ASSAY OF NATRIURETIC PEPTIDE: CPT | Performed by: EMERGENCY MEDICINE

## 2025-03-24 PROCEDURE — 85018 HEMOGLOBIN: CPT | Mod: 59 | Performed by: EMERGENCY MEDICINE

## 2025-03-24 PROCEDURE — 87086 URINE CULTURE/COLONY COUNT: CPT | Mod: GEALAB

## 2025-03-24 PROCEDURE — 71045 X-RAY EXAM CHEST 1 VIEW: CPT

## 2025-03-24 PROCEDURE — 84100 ASSAY OF PHOSPHORUS: CPT | Performed by: EMERGENCY MEDICINE

## 2025-03-24 PROCEDURE — 36415 COLL VENOUS BLD VENIPUNCTURE: CPT

## 2025-03-24 PROCEDURE — 2500000004 HC RX 250 GENERAL PHARMACY W/ HCPCS (ALT 636 FOR OP/ED)

## 2025-03-24 PROCEDURE — 86146 BETA-2 GLYCOPROTEIN ANTIBODY: CPT

## 2025-03-24 PROCEDURE — 87040 BLOOD CULTURE FOR BACTERIA: CPT | Mod: GEALAB

## 2025-03-24 PROCEDURE — 84484 ASSAY OF TROPONIN QUANT: CPT

## 2025-03-24 PROCEDURE — 36415 COLL VENOUS BLD VENIPUNCTURE: CPT | Performed by: EMERGENCY MEDICINE

## 2025-03-24 PROCEDURE — 83735 ASSAY OF MAGNESIUM: CPT

## 2025-03-24 PROCEDURE — 2500000005 HC RX 250 GENERAL PHARMACY W/O HCPCS

## 2025-03-24 PROCEDURE — 93005 ELECTROCARDIOGRAM TRACING: CPT

## 2025-03-24 PROCEDURE — 83605 ASSAY OF LACTIC ACID: CPT

## 2025-03-24 PROCEDURE — 86147 CARDIOLIPIN ANTIBODY EA IG: CPT

## 2025-03-24 PROCEDURE — 2500000004 HC RX 250 GENERAL PHARMACY W/ HCPCS (ALT 636 FOR OP/ED): Mod: TB

## 2025-03-24 PROCEDURE — 96361 HYDRATE IV INFUSION ADD-ON: CPT

## 2025-03-24 PROCEDURE — 99291 CRITICAL CARE FIRST HOUR: CPT

## 2025-03-24 PROCEDURE — 93010 ELECTROCARDIOGRAM REPORT: CPT | Performed by: INTERNAL MEDICINE

## 2025-03-24 PROCEDURE — 1100000001 HC PRIVATE ROOM DAILY

## 2025-03-24 PROCEDURE — 85730 THROMBOPLASTIN TIME PARTIAL: CPT | Performed by: EMERGENCY MEDICINE

## 2025-03-24 PROCEDURE — 84484 ASSAY OF TROPONIN QUANT: CPT | Performed by: EMERGENCY MEDICINE

## 2025-03-24 PROCEDURE — 81001 URINALYSIS AUTO W/SCOPE: CPT

## 2025-03-24 PROCEDURE — 71275 CT ANGIOGRAPHY CHEST: CPT

## 2025-03-24 PROCEDURE — 87075 CULTR BACTERIA EXCEPT BLOOD: CPT | Mod: 59,GEALAB

## 2025-03-24 PROCEDURE — 86900 BLOOD TYPING SEROLOGIC ABO: CPT

## 2025-03-24 PROCEDURE — 93970 EXTREMITY STUDY: CPT

## 2025-03-24 PROCEDURE — 83605 ASSAY OF LACTIC ACID: CPT | Performed by: EMERGENCY MEDICINE

## 2025-03-24 PROCEDURE — 93306 TTE W/DOPPLER COMPLETE: CPT | Performed by: INTERNAL MEDICINE

## 2025-03-24 PROCEDURE — 85025 COMPLETE CBC W/AUTO DIFF WBC: CPT | Performed by: EMERGENCY MEDICINE

## 2025-03-24 PROCEDURE — 96374 THER/PROPH/DIAG INJ IV PUSH: CPT | Mod: 59

## 2025-03-24 PROCEDURE — 2550000001 HC RX 255 CONTRASTS: Performed by: EMERGENCY MEDICINE

## 2025-03-24 PROCEDURE — 83735 ASSAY OF MAGNESIUM: CPT | Performed by: EMERGENCY MEDICINE

## 2025-03-24 PROCEDURE — 93970 EXTREMITY STUDY: CPT | Performed by: STUDENT IN AN ORGANIZED HEALTH CARE EDUCATION/TRAINING PROGRAM

## 2025-03-24 RX ORDER — CHOLECALCIFEROL (VITAMIN D3) 25 MCG
5000 TABLET ORAL DAILY
Status: DISCONTINUED | OUTPATIENT
Start: 2025-03-25 | End: 2025-03-28 | Stop reason: HOSPADM

## 2025-03-24 RX ORDER — GABAPENTIN 300 MG/1
600 CAPSULE ORAL 3 TIMES DAILY
Status: CANCELLED | OUTPATIENT
Start: 2025-03-24

## 2025-03-24 RX ORDER — HEPARIN SODIUM 10000 [USP'U]/100ML
0-4500 INJECTION, SOLUTION INTRAVENOUS CONTINUOUS
Status: DISPENSED | OUTPATIENT
Start: 2025-03-24 | End: 2025-03-27

## 2025-03-24 RX ORDER — OXYCODONE AND ACETAMINOPHEN 5; 325 MG/1; MG/1
1 TABLET ORAL EVERY 6 HOURS PRN
Status: DISCONTINUED | OUTPATIENT
Start: 2025-03-24 | End: 2025-03-28 | Stop reason: HOSPADM

## 2025-03-24 RX ORDER — HEPARIN SODIUM 10000 [USP'U]/100ML
0-4500 INJECTION, SOLUTION INTRAVENOUS CONTINUOUS
Status: DISCONTINUED | OUTPATIENT
Start: 2025-03-24 | End: 2025-03-24 | Stop reason: HOSPADM

## 2025-03-24 RX ORDER — HYDROXYZINE HYDROCHLORIDE 25 MG/1
25 TABLET, FILM COATED ORAL EVERY 6 HOURS PRN
Status: DISCONTINUED | OUTPATIENT
Start: 2025-03-24 | End: 2025-03-28 | Stop reason: HOSPADM

## 2025-03-24 RX ORDER — ACETAMINOPHEN 325 MG/1
975 TABLET ORAL 3 TIMES DAILY PRN
Status: DISCONTINUED | OUTPATIENT
Start: 2025-03-24 | End: 2025-03-24

## 2025-03-24 RX ORDER — NAPROXEN SODIUM 220 MG/1
324 TABLET, FILM COATED ORAL ONCE
Status: COMPLETED | OUTPATIENT
Start: 2025-03-24 | End: 2025-03-24

## 2025-03-24 RX ORDER — PREDNISONE 10 MG/1
10 TABLET ORAL DAILY
Status: DISCONTINUED | OUTPATIENT
Start: 2025-03-25 | End: 2025-03-28 | Stop reason: HOSPADM

## 2025-03-24 RX ORDER — LEVOTHYROXINE SODIUM 100 UG/1
100 TABLET ORAL DAILY
Status: DISCONTINUED | OUTPATIENT
Start: 2025-03-25 | End: 2025-03-28 | Stop reason: HOSPADM

## 2025-03-24 RX ORDER — LORAZEPAM 2 MG/ML
1 INJECTION INTRAMUSCULAR ONCE
Status: COMPLETED | OUTPATIENT
Start: 2025-03-24 | End: 2025-03-24

## 2025-03-24 RX ORDER — NAPROXEN SODIUM 220 MG/1
TABLET, FILM COATED ORAL
Status: DISCONTINUED
Start: 2025-03-24 | End: 2025-03-24 | Stop reason: HOSPADM

## 2025-03-24 RX ORDER — PANTOPRAZOLE SODIUM 40 MG/1
40 TABLET, DELAYED RELEASE ORAL
Status: DISCONTINUED | OUTPATIENT
Start: 2025-03-25 | End: 2025-03-28 | Stop reason: HOSPADM

## 2025-03-24 RX ORDER — HYDROXYZINE HYDROCHLORIDE 10 MG/1
10 TABLET, FILM COATED ORAL EVERY 8 HOURS PRN
Status: DISCONTINUED | OUTPATIENT
Start: 2025-03-24 | End: 2025-03-24

## 2025-03-24 RX ORDER — OXYCODONE AND ACETAMINOPHEN 5; 325 MG/1; MG/1
1 TABLET ORAL ONCE
Status: COMPLETED | OUTPATIENT
Start: 2025-03-24 | End: 2025-03-24

## 2025-03-24 RX ORDER — CEFUROXIME AXETIL 500 MG/1
500 TABLET ORAL 2 TIMES DAILY
Status: COMPLETED | OUTPATIENT
Start: 2025-03-24 | End: 2025-03-25

## 2025-03-24 RX ORDER — ACETAMINOPHEN 325 MG/1
650 TABLET ORAL 3 TIMES DAILY PRN
Status: DISCONTINUED | OUTPATIENT
Start: 2025-03-24 | End: 2025-03-28 | Stop reason: HOSPADM

## 2025-03-24 RX ORDER — DEXTROAMPHETAMINE SACCHARATE, AMPHETAMINE ASPARTATE, DEXTROAMPHETAMINE SULFATE AND AMPHETAMINE SULFATE 2.5; 2.5; 2.5; 2.5 MG/1; MG/1; MG/1; MG/1
10 TABLET ORAL
Status: DISCONTINUED | OUTPATIENT
Start: 2025-03-24 | End: 2025-03-28 | Stop reason: HOSPADM

## 2025-03-24 RX ORDER — ROSUVASTATIN CALCIUM 20 MG/1
20 TABLET, COATED ORAL NIGHTLY
Status: DISCONTINUED | OUTPATIENT
Start: 2025-03-24 | End: 2025-03-28 | Stop reason: HOSPADM

## 2025-03-24 RX ADMIN — VANCOMYCIN HYDROCHLORIDE 2 G: 5 INJECTION, POWDER, LYOPHILIZED, FOR SOLUTION INTRAVENOUS at 08:44

## 2025-03-24 RX ADMIN — HYDROMORPHONE HYDROCHLORIDE 0.4 MG: 1 INJECTION, SOLUTION INTRAMUSCULAR; INTRAVENOUS; SUBCUTANEOUS at 21:40

## 2025-03-24 RX ADMIN — ROSUVASTATIN CALCIUM 20 MG: 20 TABLET, FILM COATED ORAL at 20:00

## 2025-03-24 RX ADMIN — SODIUM CHLORIDE 1000 ML: 9 INJECTION, SOLUTION INTRAVENOUS at 08:23

## 2025-03-24 RX ADMIN — OXYCODONE HYDROCHLORIDE AND ACETAMINOPHEN 1 TABLET: 5; 325 TABLET ORAL at 17:55

## 2025-03-24 RX ADMIN — HEPARIN SODIUM 2000 UNITS/HR: 10000 INJECTION, SOLUTION INTRAVENOUS at 12:58

## 2025-03-24 RX ADMIN — PIPERACILLIN SODIUM AND TAZOBACTAM SODIUM 4.5 G: 4; .5 INJECTION, SOLUTION INTRAVENOUS at 08:24

## 2025-03-24 RX ADMIN — CEFUROXIME AXETIL 500 MG: 500 TABLET ORAL at 20:00

## 2025-03-24 RX ADMIN — IOHEXOL 71 ML: 350 INJECTION, SOLUTION INTRAVENOUS at 09:46

## 2025-03-24 RX ADMIN — OXYCODONE HYDROCHLORIDE AND ACETAMINOPHEN 1 TABLET: 5; 325 TABLET ORAL at 19:57

## 2025-03-24 RX ADMIN — ASPIRIN 324 MG: 81 TABLET, CHEWABLE ORAL at 08:44

## 2025-03-24 RX ADMIN — HEPARIN SODIUM 1700 UNITS/HR: 10000 INJECTION, SOLUTION INTRAVENOUS at 22:05

## 2025-03-24 RX ADMIN — OXYCODONE HYDROCHLORIDE AND ACETAMINOPHEN 1 TABLET: 5; 325 TABLET ORAL at 14:08

## 2025-03-24 RX ADMIN — LORAZEPAM 1 MG: 2 INJECTION INTRAMUSCULAR; INTRAVENOUS at 11:09

## 2025-03-24 RX ADMIN — HEPARIN SODIUM 2000 UNITS/HR: 10000 INJECTION, SOLUTION INTRAVENOUS at 10:14

## 2025-03-24 RX ADMIN — PERFLUTREN 3 ML OF DILUTION: 6.52 INJECTION, SUSPENSION INTRAVENOUS at 15:02

## 2025-03-24 SDOH — ECONOMIC STABILITY: INCOME INSECURITY: IN THE PAST 12 MONTHS HAS THE ELECTRIC, GAS, OIL, OR WATER COMPANY THREATENED TO SHUT OFF SERVICES IN YOUR HOME?: NO

## 2025-03-24 SDOH — SOCIAL STABILITY: SOCIAL INSECURITY: ARE THERE ANY APPARENT SIGNS OF INJURIES/BEHAVIORS THAT COULD BE RELATED TO ABUSE/NEGLECT?: NO

## 2025-03-24 SDOH — SOCIAL STABILITY: SOCIAL INSECURITY: HAS ANYONE EVER THREATENED TO HURT YOUR FAMILY OR YOUR PETS?: NO

## 2025-03-24 SDOH — SOCIAL STABILITY: SOCIAL INSECURITY: ARE YOU OR HAVE YOU BEEN THREATENED OR ABUSED PHYSICALLY, EMOTIONALLY, OR SEXUALLY BY ANYONE?: NO

## 2025-03-24 SDOH — SOCIAL STABILITY: SOCIAL INSECURITY: WITHIN THE LAST YEAR, HAVE YOU BEEN HUMILIATED OR EMOTIONALLY ABUSED IN OTHER WAYS BY YOUR PARTNER OR EX-PARTNER?: NO

## 2025-03-24 SDOH — ECONOMIC STABILITY: FOOD INSECURITY: WITHIN THE PAST 12 MONTHS, THE FOOD YOU BOUGHT JUST DIDN'T LAST AND YOU DIDN'T HAVE MONEY TO GET MORE.: NEVER TRUE

## 2025-03-24 SDOH — ECONOMIC STABILITY: FOOD INSECURITY: HOW HARD IS IT FOR YOU TO PAY FOR THE VERY BASICS LIKE FOOD, HOUSING, MEDICAL CARE, AND HEATING?: NOT VERY HARD

## 2025-03-24 SDOH — SOCIAL STABILITY: SOCIAL INSECURITY: DOES ANYONE TRY TO KEEP YOU FROM HAVING/CONTACTING OTHER FRIENDS OR DOING THINGS OUTSIDE YOUR HOME?: NO

## 2025-03-24 SDOH — ECONOMIC STABILITY: HOUSING INSECURITY: AT ANY TIME IN THE PAST 12 MONTHS, WERE YOU HOMELESS OR LIVING IN A SHELTER (INCLUDING NOW)?: NO

## 2025-03-24 SDOH — SOCIAL STABILITY: SOCIAL INSECURITY: HAVE YOU HAD ANY THOUGHTS OF HARMING ANYONE ELSE?: NO

## 2025-03-24 SDOH — ECONOMIC STABILITY: HOUSING INSECURITY: IN THE LAST 12 MONTHS, WAS THERE A TIME WHEN YOU WERE NOT ABLE TO PAY THE MORTGAGE OR RENT ON TIME?: NO

## 2025-03-24 SDOH — SOCIAL STABILITY: SOCIAL INSECURITY: WITHIN THE LAST YEAR, HAVE YOU BEEN AFRAID OF YOUR PARTNER OR EX-PARTNER?: NO

## 2025-03-24 SDOH — ECONOMIC STABILITY: TRANSPORTATION INSECURITY: IN THE PAST 12 MONTHS, HAS LACK OF TRANSPORTATION KEPT YOU FROM MEDICAL APPOINTMENTS OR FROM GETTING MEDICATIONS?: NO

## 2025-03-24 SDOH — ECONOMIC STABILITY: HOUSING INSECURITY: IN THE PAST 12 MONTHS, HOW MANY TIMES HAVE YOU MOVED WHERE YOU WERE LIVING?: 1

## 2025-03-24 SDOH — ECONOMIC STABILITY: FOOD INSECURITY: WITHIN THE PAST 12 MONTHS, YOU WORRIED THAT YOUR FOOD WOULD RUN OUT BEFORE YOU GOT THE MONEY TO BUY MORE.: NEVER TRUE

## 2025-03-24 SDOH — SOCIAL STABILITY: SOCIAL INSECURITY: ABUSE: ADULT

## 2025-03-24 SDOH — SOCIAL STABILITY: SOCIAL INSECURITY: DO YOU FEEL UNSAFE GOING BACK TO THE PLACE WHERE YOU ARE LIVING?: NO

## 2025-03-24 SDOH — SOCIAL STABILITY: SOCIAL INSECURITY: HAVE YOU HAD THOUGHTS OF HARMING ANYONE ELSE?: NO

## 2025-03-24 SDOH — SOCIAL STABILITY: SOCIAL INSECURITY: WERE YOU ABLE TO COMPLETE ALL THE BEHAVIORAL HEALTH SCREENINGS?: YES

## 2025-03-24 SDOH — SOCIAL STABILITY: SOCIAL INSECURITY: DO YOU FEEL ANYONE HAS EXPLOITED OR TAKEN ADVANTAGE OF YOU FINANCIALLY OR OF YOUR PERSONAL PROPERTY?: NO

## 2025-03-24 ASSESSMENT — COLUMBIA-SUICIDE SEVERITY RATING SCALE - C-SSRS
2. HAVE YOU ACTUALLY HAD ANY THOUGHTS OF KILLING YOURSELF?: NO
6. HAVE YOU EVER DONE ANYTHING, STARTED TO DO ANYTHING, OR PREPARED TO DO ANYTHING TO END YOUR LIFE?: NO
6. HAVE YOU EVER DONE ANYTHING, STARTED TO DO ANYTHING, OR PREPARED TO DO ANYTHING TO END YOUR LIFE?: NO
1. IN THE PAST MONTH, HAVE YOU WISHED YOU WERE DEAD OR WISHED YOU COULD GO TO SLEEP AND NOT WAKE UP?: NO
2. HAVE YOU ACTUALLY HAD ANY THOUGHTS OF KILLING YOURSELF?: NO
1. IN THE PAST MONTH, HAVE YOU WISHED YOU WERE DEAD OR WISHED YOU COULD GO TO SLEEP AND NOT WAKE UP?: NO

## 2025-03-24 ASSESSMENT — COGNITIVE AND FUNCTIONAL STATUS - GENERAL
MOBILITY SCORE: 15
MOVING TO AND FROM BED TO CHAIR: A LITTLE
DRESSING REGULAR UPPER BODY CLOTHING: A LITTLE
HELP NEEDED FOR BATHING: A LITTLE
HELP NEEDED FOR BATHING: A LITTLE
MOVING FROM LYING ON BACK TO SITTING ON SIDE OF FLAT BED WITH BEDRAILS: A LITTLE
PATIENT BASELINE BEDBOUND: NO
TOILETING: A LITTLE
MOVING FROM LYING ON BACK TO SITTING ON SIDE OF FLAT BED WITH BEDRAILS: A LITTLE
WALKING IN HOSPITAL ROOM: A LITTLE
CLIMB 3 TO 5 STEPS WITH RAILING: A LITTLE
PERSONAL GROOMING: A LITTLE
TURNING FROM BACK TO SIDE WHILE IN FLAT BAD: A LITTLE
DAILY ACTIVITIY SCORE: 19
MOBILITY SCORE: 18
CLIMB 3 TO 5 STEPS WITH RAILING: A LOT
PERSONAL GROOMING: A LITTLE
DRESSING REGULAR UPPER BODY CLOTHING: A LITTLE
TURNING FROM BACK TO SIDE WHILE IN FLAT BAD: A LITTLE
STANDING UP FROM CHAIR USING ARMS: A LITTLE
MOVING TO AND FROM BED TO CHAIR: A LITTLE
TOILETING: A LITTLE
DRESSING REGULAR LOWER BODY CLOTHING: A LITTLE
EATING MEALS: A LITTLE
DAILY ACTIVITIY SCORE: 18
STANDING UP FROM CHAIR USING ARMS: A LOT
WALKING IN HOSPITAL ROOM: A LOT
DRESSING REGULAR LOWER BODY CLOTHING: A LITTLE

## 2025-03-24 ASSESSMENT — LIFESTYLE VARIABLES
AUDIT-C TOTAL SCORE: 5
SKIP TO QUESTIONS 9-10: 0
HOW OFTEN DO YOU HAVE A DRINK CONTAINING ALCOHOL: 2-4 TIMES A MONTH
EVER HAD A DRINK FIRST THING IN THE MORNING TO STEADY YOUR NERVES TO GET RID OF A HANGOVER: NO
HAVE PEOPLE ANNOYED YOU BY CRITICIZING YOUR DRINKING: NO
EVER FELT BAD OR GUILTY ABOUT YOUR DRINKING: NO
HAVE YOU EVER FELT YOU SHOULD CUT DOWN ON YOUR DRINKING: NO
AUDIT-C TOTAL SCORE: 5
HOW OFTEN DO YOU HAVE 6 OR MORE DRINKS ON ONE OCCASION: MONTHLY
TOTAL SCORE: 0
HOW MANY STANDARD DRINKS CONTAINING ALCOHOL DO YOU HAVE ON A TYPICAL DAY: 3 OR 4

## 2025-03-24 ASSESSMENT — PAIN DESCRIPTION - LOCATION
LOCATION: LEG

## 2025-03-24 ASSESSMENT — PAIN - FUNCTIONAL ASSESSMENT
PAIN_FUNCTIONAL_ASSESSMENT: 0-10

## 2025-03-24 ASSESSMENT — ACTIVITIES OF DAILY LIVING (ADL)
PATIENT'S MEMORY ADEQUATE TO SAFELY COMPLETE DAILY ACTIVITIES?: YES
WALKS IN HOME: INDEPENDENT
DRESSING YOURSELF: INDEPENDENT
BATHING: INDEPENDENT
GROOMING: INDEPENDENT
HEARING - RIGHT EAR: FUNCTIONAL
TOILETING: INDEPENDENT
FEEDING YOURSELF: INDEPENDENT
LACK_OF_TRANSPORTATION: NO
LACK_OF_TRANSPORTATION: NO
ADEQUATE_TO_COMPLETE_ADL: YES
HEARING - LEFT EAR: FUNCTIONAL
JUDGMENT_ADEQUATE_SAFELY_COMPLETE_DAILY_ACTIVITIES: YES

## 2025-03-24 ASSESSMENT — PAIN SCALES - GENERAL
PAINLEVEL_OUTOF10: 0 - NO PAIN
PAINLEVEL_OUTOF10: 5 - MODERATE PAIN
PAINLEVEL_OUTOF10: 9
PAINLEVEL_OUTOF10: 0 - NO PAIN
PAINLEVEL_OUTOF10: 0 - NO PAIN
PAINLEVEL_OUTOF10: 8

## 2025-03-24 ASSESSMENT — PAIN DESCRIPTION - ORIENTATION
ORIENTATION: RIGHT;LEFT

## 2025-03-24 NOTE — ED PROCEDURE NOTE
Procedure  Critical Care    Performed by: Santiago Corrales MD  Authorized by: Santiago Corrales MD    Critical care provider statement:     Critical care time (minutes):  35    Critical care time was exclusive of:  Separately billable procedures and treating other patients and teaching time    Critical care was necessary to treat or prevent imminent or life-threatening deterioration of the following conditions:  Respiratory failure and cardiac failure (saddle PE with right heart strain)    Critical care was time spent personally by me on the following activities:  Examination of patient, ordering and performing treatments and interventions, ordering and review of laboratory studies, ordering and review of radiographic studies, pulse oximetry and re-evaluation of patient's condition    Care discussed with: accepting provider at another facility                 Santiago Corrales MD  03/24/25 6478

## 2025-03-24 NOTE — H&P
Cardiac Intensive Care - History and Physical   Subjective    Frances Mcdowell is a 54 y.o. year old female patient admitted on 3/24/2025 with following ICU needs: acute saddle PE w/ RHS    HPI:  Frances Mcdowell is a 53 y/o female w/ a PMHx of hypothyroidism, ADHD/Anxiety, BLE lymphedema, recurrent lower extremity cellulitis (prior MRSA and pseudomonas), and GERD who is admitted to the CICU for acute bilateral saddle PE w/ RHS.    She initially presented to [facility] ED after an episode of lightheadedness and noted tachycardia on home pulse ox. Patient states she stood up this AM and felt lightheaded, which resolved spontaneously with rest and did not recur. She put on a home pulse ox and noted her HR in the 130s, after which she presented to the ED. Denied chest pain and palpitations during this time. Initial vitals in the ED were notable for O2 sat 88% on room air (improved to 98% on 2L NC), HR in the 130s-140s, RR 20, and /114. EKG w/ sinus tachycardia.  and trop 170 > 256. She was aspirin loaded, started on a heparin gtt, and given a dose of vanc/zosyn. CT PE performed demonstrating acute saddle PE w/ RHS. PERT team consulted and recommended transfer to [facility] CICU for further management.    Upon evaluation, patient states that she feels well overall, although anxious about her diagnosis. She does endorse shortness of breath with exertion but not at rest. Continues to deny chest pain, palpitations, recurrent lightheadedness, dizziness. States that she has chronic lower extremity edema but has not noted any recent changes. Also states that she is active in her day-to-day life, frequently on her feet. Denies recent surgeries.        Meds    Home medications:  Current Outpatient Medications   Medication Instructions    amphetamine-dextroamphetamine XR (Adderall XR) 20 mg 24 hr capsule 20 mg, oral, 2 times daily    cholecalciferol (VITAMIN D-3) 5,000 Units, oral, Daily    cyclobenzaprine (FLEXERIL) 10 mg, oral,  "Nightly PRN    EPINEPHrine (EPIPEN) 0.3 mg, injection, As needed, As Directed    gabapentin (NEURONTIN) 600 mg, oral, 3 times daily    ibuprofen 800 mg, oral, Every 6 hours PRN    levothyroxine (SYNTHROID, LEVOXYL) 100 mcg, oral, Daily, Take on an empty stomach at the same time each day, either 30 to 60 minutes prior to breakfast    naloxone (NARCAN) 4 mg, nasal, As needed, May repeat every 2-3 minutes if needed, alternating nostrils, until medical assistance becomes available.    omeprazole (PRILOSEC) 40 mg, oral, Daily    oxyCODONE-acetaminophen (Percocet)  mg tablet 1 tablet, oral, Daily    predniSONE (DELTASONE) 10 mg, oral, Daily    rosuvastatin (CRESTOR) 20 mg, oral, Daily    wound dressing (Triad Wound Dressing) paste 1 Application, Topical, 2 times daily        Inpatient medications:  Scheduled medications  amphetamine-dextroamphetamine, 10 mg, oral, BID  cefuroxime, 500 mg, oral, BID  [START ON 3/25/2025] cholecalciferol, 5,000 Units, oral, Daily  heparin, 10,000 Units, intravenous, Once  [START ON 3/25/2025] levothyroxine, 100 mcg, oral, Daily  [START ON 3/25/2025] pantoprazole, 40 mg, oral, Daily before breakfast  [START ON 3/25/2025] predniSONE, 10 mg, oral, Daily  rosuvastatin, 20 mg, oral, Nightly      Continuous medications  heparin, 0-4,500 Units/hr, Last Rate: 2,000 Units/hr (03/24/25 1258)      PRN medications  PRN medications: acetaminophen, oxyCODONE-acetaminophen     Objective    Blood pressure 94/71, pulse (!) 113, temperature 35.9 °C (96.6 °F), temperature source Temporal, resp. rate 22, height 1.6 m (5' 2.99\"), weight 127 kg (279 lb 15.8 oz), SpO2 97%.     Physical Exam   Constitutional: Well-appearing, no acute distress  HEENT: Normocephalic, atraumatic  Cardiovascular: Tachycardic. Regular rhythm, no murmurs/rubs/gallops  Pulmonary: Clear to auscultation bilaterally, no wheezes/crackles/rales. Nonlabored work of breathing. On 1L NC  Abdominal: Soft, nontender, nondistended, no " rebound/guarding  Extremities: Bilateral lower extremity edema. Bilateral lower extremities w/ wound dressings in place. Warm to touch  Neuro: No focal deficits. Aox4  Psych: Appropriate mood and affect. Anxious.      No intake or output data in the 24 hours ending 03/24/25 1359  Labs:   Results from last 72 hours   Lab Units 03/24/25  0752   SODIUM mmol/L 138   POTASSIUM mmol/L 3.7   CHLORIDE mmol/L 102   CO2 mmol/L 27   BUN mg/dL 25*   CREATININE mg/dL 1.08*   GLUCOSE mg/dL 127*   CALCIUM mg/dL 8.6   ANION GAP mmol/L 13   EGFR mL/min/1.73m*2 60*   PHOSPHORUS mg/dL 2.6      Results from last 72 hours   Lab Units 03/24/25  1241 03/24/25  0752   WBC AUTO x10*3/uL 18.3* 16.0*   HEMOGLOBIN g/dL 11.7* 12.8   HEMATOCRIT % 34.3* 39.8   PLATELETS AUTO x10*3/uL 320 334   NEUTROS PCT AUTO % 79.9 83.1   LYMPHS PCT AUTO % 12.1 9.7   MONOS PCT AUTO % 6.4 4.8   EOS PCT AUTO % 0.2 0.5        Micro/ID:     Lab Results   Component Value Date    URINECULTURE MIXED URETHRAL ASHLEY. 06/27/2023    BLOODCULT No growth at 4 days -  FINAL REPORT 10/30/2024    BLOODCULT No growth at 4 days -  FINAL REPORT 10/30/2024       Summary of Key Imaging Results  CT PE on 3/24/25 w/ acute PE w/ RHS    Assessment and Plan     Assessment:  Frances Mcdowell is a 54 y.o. year old female patient w/ a PMHx of hypothyroidism, ADHD/Anxiety, BLE lymphedema, recurrent lower extremity cellulitis (prior MRSA and pseudomonas), and GERD admitted to the CICU on 03/24/25 for management of acute bilateral saddle PE w/ RHS. PERT team consulted from [facility] prior to transfer to [facility]. Patient started on heparin gtt. Interventional cardiology following - no thrombectomy at this time with plan for re-evaluation in 24-48 hours.    Plan:  NEUROLOGY/PSYCH:  #ADHD  - Continue home Adderall BID     CARDIOVASCULAR:  #HLD  - Continue home crestor 20mg daily    PULMONARY:  #Acute bilateral saddle PE  :: , Trop 170 > 256 on admission  :: PESI score 96  :: Hx superficial lower  extremity thrombus (dx 8/8/2022)  - Heparin gtt  - Trend troponin until peak  - TTE, LE DVT US ordered  - Interventional cardiology consulted for evaluation of thrombectomy. Strict bed rest for 24 hours with re-evaluation for thrombectomy in 24-48 hours. Plan for walking pulse ox tomorrow  - Avoid AV nancy blockers  - Vascular medicine consulted  - Cardiolipin ab, lupus anticoagulant, beta-2 glycoprotein ab ordered    RENAL/GENITOURINARY:  NEEMA    GASTROENTEROLOGY:  #GERD  - Continue home omeprazole 40mg daily    ENDOCRINOLOGY:  #Hypothyroidism  - Continue home Synthroid 100mcg daily  - Continue home Vitamin D 5000u daily    HEMATOLOGY:  NEEMA    MUSCULOSKELETAL/ SKIN:  NEEMA    INFECTIOUS DISEASE:  #Recurrent BLE cellulitis  :: S/p Vanc/Zosyn x1 in [facility]  :: Completing course of cefuroxime 500mg BID, last dose tomorrow  - Will complete course of cefuroxime through 3/25  - Continue home prednisone 10mg daily  - Wound care consulted      ICU Check List     FEN  Fluids: PRN  Electrolytes: PRN  Nutrition: Regular diet. NPO at MN in case of thrombectomy tomorrow  Prophylaxis:  DVT ppx: Heparin gtt  GI ppx: PPI  Bowel care: None  Hardware:  Catheter: N/A  Access: PIVs  Drains: N/A  Lines: N/A  Social:  Code: Full Code    HPOA: Kelechi (): [phone]; Lottie (sister): [phone]      Emigdio Palafox MD   Internal Medicine, PGY-1

## 2025-03-24 NOTE — CONSULTS
"Consults  History Of Present Illness:    Frances Mcdowell is a 54 y.o. female presenting with an episode of dizziness and SOB that started today. 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 She presented to ED with HR in 115's and pulse ox in 80's.  She reports recent cellulitis and ongoing wound care due to venous insufficiency and recent infection.    On presentation patient with: WBC 16-->18.3, , Trops 170-->256, , Lactate 1.1-->1.7. CT PE with acute pulmonary emboli with considerable thrombus burden as well as evidence for right ventricular strain. PERT team was activated. Patient currently transferred to CICU on Heparin drip, EVLS evaluated for possible thrombectomy due to submassive PE.           Last Recorded Vitals:  Vitals:    03/24/25 1245 03/24/25 1300   BP: (!) 124/96 94/71   BP Location: Right arm    Patient Position: Sitting    Pulse: (!) 111 (!) 113   Resp: 25 22   Temp: 35.9 °C (96.6 °F)    TempSrc: Temporal    SpO2: 98% 97%   Weight: 127 kg (279 lb 15.8 oz)    Height: 1.6 m (5' 2.99\")        Last Labs:  CBC - 3/24/2025: 12:41 PM  18.3 11.7 320    34.3      CMP - 3/24/2025:  7:52 AM  8.6 6.7 14 --- 0.4   2.6 3.6 12 116      PTT - 3/24/2025: 12:41 PM  1.1   12.6 194     Troponin I, High Sensitivity   Date/Time Value Ref Range Status   03/24/2025 08:49  (HH) 0 - 13 ng/L Final     Comment:     Previous result verified on 3/24/2025 0824 on specimen/case [id number] called with component [facility] for procedure Troponin I, High Sensitivity, Initial with value 170 ng/L.   03/24/2025 07:52  (HH) 0 - 13 ng/L Final   08/05/2024 09:47 AM 93 (HH) 0 - 13 ng/L Final     Comment:     Previous result verified on 8/5/2024 0645 on specimen/case [id number] called with component [facility] for procedure Troponin I, High Sensitivity with value 103 ng/L.     BNP   Date/Time Value Ref Range Status   03/24/2025 07:52  (H) 0 - " 99 pg/mL Final   2024 05:38  (H) 0 - 99 pg/mL Final     Hemoglobin A1C   Date/Time Value Ref Range Status   2023 06:38 AM 5.3 see below % Final     LDL Calculated   Date/Time Value Ref Range Status   2023 06:38  (H) <=99 mg/dL Final     Comment:                                 Near   Borderline      AGE      Desirable  Optimal    High     High     Very High     0-19 Y     0 - 109     ---    110-129   >/= 130     ----    20-24 Y     0 - 119     ---    120-159   >/= 160     ----      >24 Y     0 -  99   100-129  130-159   160-189     >/=190       VLDL   Date/Time Value Ref Range Status   2023 06:38 AM 18 0 - 40 mg/dL Final   2022 07:10 AM 15 0 - 40 mg/dL Final   2019 10:05 AM 18 0 - 40 mg/dL Final      Last I/O:  No intake/output data recorded.    Past Cardiology Tests (Last 3 Years):  CT angio chest for pulmonary embolism 3/24/25  IMPRESSION:  1.  The findings are compatible with the acute pulmonary emboli with  considerable thrombus burden as well as evidence for right  ventricular strain    Past Medical History:  She has a past medical history of Acute embolism and thrombosis of other specified veins (2022), ADD (attention deficit disorder), Anxiety, Endometrial intraepithelial neoplasia (EIN), GERD (gastroesophageal reflux disease), HLD (hyperlipidemia), Hypothyroidism, and Lymph edema.    Past Surgical History:  She has a past surgical history that includes Tonsillectomy; Foot surgery; Varicose vein surgery; Total thyroidectomy; Bladder surgery;  section, low transverse; Hysterectomy; and IR CVC PICC (2025).      Social History:  She reports that she has never smoked. She has never used smokeless tobacco. She reports that she does not currently use alcohol. She reports that she does not use drugs.    Family History:  No family history on file.     Allergies:  Erythromycin base, Sulfa (sulfonamide antibiotics), Tetanus toxoid, Tramadol, and Adhesive  tape-silicones    Inpatient Medications:  Scheduled medications   Medication Dose Route Frequency    amphetamine-dextroamphetamine  10 mg oral BID    cefuroxime  500 mg oral BID    [START ON 3/25/2025] cholecalciferol  5,000 Units oral Daily    heparin  10,000 Units intravenous Once    [START ON 3/25/2025] levothyroxine  100 mcg oral Daily    [START ON 3/25/2025] pantoprazole  40 mg oral Daily before breakfast    [START ON 3/25/2025] predniSONE  10 mg oral Daily    rosuvastatin  20 mg oral Nightly     PRN medications   Medication    acetaminophen    oxyCODONE-acetaminophen     Continuous Medications   Medication Dose Last Rate    heparin  0-4,500 Units/hr 2,000 Units/hr (03/24/25 1258)     Outpatient Medications:  Current Outpatient Medications   Medication Instructions    amphetamine-dextroamphetamine XR (Adderall XR) 20 mg 24 hr capsule 20 mg, oral, 2 times daily    cholecalciferol (VITAMIN D-3) 5,000 Units, oral, Daily    cyclobenzaprine (FLEXERIL) 10 mg, oral, Nightly PRN    EPINEPHrine (EPIPEN) 0.3 mg, injection, As needed, As Directed    gabapentin (NEURONTIN) 600 mg, oral, 3 times daily    ibuprofen 800 mg, oral, Every 6 hours PRN    levothyroxine (SYNTHROID, LEVOXYL) 100 mcg, oral, Daily, Take on an empty stomach at the same time each day, either 30 to 60 minutes prior to breakfast    naloxone (NARCAN) 4 mg, nasal, As needed, May repeat every 2-3 minutes if needed, alternating nostrils, until medical assistance becomes available.    omeprazole (PRILOSEC) 40 mg, oral, Daily    oxyCODONE-acetaminophen (Percocet)  mg tablet 1 tablet, oral, Daily    predniSONE (DELTASONE) 10 mg, oral, Daily    rosuvastatin (CRESTOR) 20 mg, oral, Daily    wound dressing (Triad Wound Dressing) paste 1 Application, Topical, 2 times daily       Physical Exam:  Constitutional: NAD, pleasant, cooperative     Head/Neck: Neck supple, No JVD, no carotid bruits           Respiratory/Thorax: CTAB, thorax symmetric     Cardiovascular:  Regular, rate and rhythm, no murmurs, normal S 1 and S 2, tachycardic  Gastrointestinal: Nondistended, soft, non-tender, +BS       Skin: Warm and dry              Extremities: KEATING, lymphedema, no discoloration, B/L LE wounds dressed with ace wrap, patient requesting wound care for dressing eval-management, legs warm with distally cool to touch feet  Neuro: non-focal, awake/alert/oriented x3,   Psychological: Appropriate mood and behavior                Assessment/Plan     Patient with submassive PE [extensive burden of PE with right heart strain], and her hemodynamics are better upon arrival to the CICU. She had a narrow pulse pressure at outside hospital and initially here in the CICU, but her pulse pressure became normal [ over 90 mmHg] after using the right size of blood pressure cuff and the right location [right arm]. Her O2 requirement is down to 1 from 4 L and satting between 96 and 100%, and she still tachycardic in the 110s but her white count is elevated at 18,000. Patient appears very comfortable in no distress. We discussed the case with vascular medicine staff, Dr. Butler. Plan for conservative management for now with heparin drip, and reevaluate in 24-48 hours. Bedrest for today then walking test tomorrow. Official echocardiogram and lower extremity duplex ultrasound are pending. Workup for possible infection as per primary team. Will continue to follow.          #Submassive PE       - walking pulse ox with recorded HR tomorrow to evaluate the need for mechanical thrombectomy   - ECHO  - LE Duplex      Code Status:  Full Code          Jeimy Hawley, APRN-CNP  Endovascular/Limb Salvage Service   Days: [id number]/Haiku: EVLS [facility] Team  [facility] 7[facility]a [facility] [id number]

## 2025-03-24 NOTE — ED PROVIDER NOTES
Frances Mcdowell  54 y.o.    HPI  55 y/o female w/ PMH of ADD, Anxiety, HLD, Hypothyroidism and GERD presents with shortness of breath and tachycardia. 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 She was apparently feeling like her usual self until she noticed her HR was high. She confided with a fellow nurse who check her HR and advised her to go to the ED. She reports that last summer she had cellulitis after indulging in a hot tub. She was tested positive for pseudomonas cellulitis at the time and was prescribed Cefuroxime. She showed pictures of cellulitis and resolution of the same. She also says that there are wounds on her legs which have been treated. She refused to let anyone examine her legs. Denies, fever, chills, nausea/vomiting, abdominal pain or urinary symptoms.      Patient History   Past Medical History:   Diagnosis Date    Acute embolism and thrombosis of other specified veins 2022    Superficial vein thrombosis    ADD (attention deficit disorder)     Anxiety     Endometrial intraepithelial neoplasia (EIN)     GERD (gastroesophageal reflux disease)     HLD (hyperlipidemia)     Hypothyroidism     Lymph edema      Past Surgical History:   Procedure Laterality Date    BLADDER SURGERY       SECTION, LOW TRANSVERSE      FOOT SURGERY      HYSTERECTOMY      IR CVC PICC  2025    IR CVC PICC 2025 TRI CR NONV1    TONSILLECTOMY      TOTAL THYROIDECTOMY      VARICOSE VEIN SURGERY      Varicose Vein Ligation     No family history on file.  Social History     Tobacco Use    Smoking status: Never    Smokeless tobacco: Never   Vaping Use    Vaping status: Never Used   Substance Use Topics    Alcohol use: Not Currently     Comment: seldom    Drug use: Never       Physical Exam   Vitals:    25 0713   BP: (!) 146/114   BP Location: Right arm   Patient Position: Sitting   Pulse: (!) 128   Resp: 20   Temp: 35.7 °C (96.3 °F)   TempSrc: Temporal   SpO2: (!) 88%   Weight: 127 kg (280 lb)   Height: 1.6 m  "(5' 3\")        Constitutional: NAD, non-toxic  Eyes: PERRL, Conjunctiva normal, No discharge  HEENT: Atraumatic. External ears appear normal, Nose is without drainage, MMM, no intraoral lesions  Neck: Normal ROM, No lymphadenopathy, No stridor  Cardiac: Regular rhythm and rate  Pulmonary/Chest: On 2L O2  Abdomen: BS present, Soft, Non-tender without rebound/guarding  Back: No tenderness, No contusions  Extremities: Normal ROM, refused examination of lower extremities.  Skin: Warm and dry, No rashes, No erythema  Neurologic: Alert and oriented x 3, Speech clear/fluent. Normal motor function, Normal sensation, No focal neurologic deficits  Psychiatric: Normal affect, Normal judgement, Normal mood    EKG interpreted by me: Sinus tachycardia, incomplete RBBB    ED Course & MDM     Patient came in with tachycardia and found to be satting in 80's on RA, currently on 2L O2. She does have a chronic wound on her right leg which she refuses to let us examine. She meets SIRS criteria with WBC 16, 's, Cr. 1.08 (Baseline 0.6-0.8). Her troponin is continuing to rise 170 -> 256. . She is receiving IV Vanc/Zosyn. CT PE shows B/L PE with saddle embolus and right heart strain. Started her on high intensity heparin and called PERT team. She is being transferred to [facility]ICU for thrombectomy.    Impression   Sepsis  PE    Disposition  53 y/o female presenting with SOB and tachycardia found to meet SIRS criteria and evidence of PE w/ right heart strain on CTPE. She will be transferred to [facility] CICU for emergent thrombectomy.     Erik Hearn MD  Resident  03/24/25 1059       Erik Hearn MD  Resident  03/24/25 1101       Erik Hearn MD  Resident  03/24/25 1102    "

## 2025-03-24 NOTE — CONSULTS
CONSULTATION -  VASCULAR MEDICINE    DOS: 3/24/2025    REQUESTING PHYSICIAN:  Deann    REASON FOR CONSULT:      AC for acute PE; PERT team previously consulted       HISTORY OF PRESENT ILLNESS:     55 yo lady transferred to [facility] with  intermediate-high risk PE. VM consulted for AC recs. Per the patient noted acute SOB and dizzyness this morning when getting up to go to work. EMS transported to [facility]. In the ED:  and 88% sat on RA. /114. Troponin 170->606;  lactate 1.7;   available. Noted to have elevated WBC on admission. CT PE -  HEART AND VESSELS:   There is a nonocclusive saddle embolus over the bifurcation of the main pulmonary artery. There are large emboli in both the right and left pulmonary emboli which are partially occlusive in the lower lobes. The findings are positive for acute pulmonary embolus. Main pulmonary artery and its branches are normal in caliber.   +RV strain noted. Minimal hepatic/IVC reflux. Pt Rx UFH gtt and transferred. Currently UFH held for elevate anti-Xa x 2 but these have been line draws per the patient.    She denies preceding travel, trauma or illness. In Feb had a venogram bc of nonhealing leg ulcers - currently managed at the [facility] wound clinic. . No HRT use. Reports UTD on colonoscopy but not UTD on mammogram. No FH of breast cancer. Reports significant weight loss - she relates to stress.        PMH/PSH:    Morbid obesity  HPL  Hypothyroidism  GERD  ADD  Anxiety  LE ulcers as noted  Hysterectomy  C/S  Thyroidectomy  Vein surgery  Foot surgery  H/o SVT  per the chart review    FAMILY HISTORY:     +VTE - paternal uncle ?trauma    SOCIAL HISTORY:     Tobacco never  Employment SNF nurse    REVIEW OF SYSTEMS:     No fevers, chills, weight is decreased  No sores, +ulcers, rashes, +skin lesions  No HA, SZ, syncope,  +dizzyness   No CP, chest pressure  No cough, +SOB  No ABD pain  No BRBPR, melena, hematuria  No bleeding  + edema, no calf pain  No ambulatory  "leg pain  No parasthesias  UTD colonoscopy    PHYSICAL EXAMINATION:   /78   Pulse (!) 113   Temp 35.6 °C (96.1 °F) (Temporal)   Resp 23   Ht 1.6 m (5' 2.99\")   Wt 141 kg (310 lb 10.1 oz)   SpO2 97%   BMI 55.04 kg/m²     Gen: Appears well, NAD laying in bed conversant without SOB  HEENT: WNL  Chest: CTA  CVS: regular without murmur or gallop; tachy  Abd: soft, NT/ND,   Ext: + edema, nontender  Skin: katie calves dressed with kerlix  Neuro: grossly normal, CN intact, FELA x 4  Mood and affect appropriate    ADDITIONAL DATA:     Results from last 7 days   Lab Units 03/24/25  1241 03/24/25  0752   WBC AUTO x10*3/uL 18.3* 16.0*   HEMOGLOBIN g/dL 11.7* 12.8   HEMATOCRIT % 34.3* 39.8   PLATELETS AUTO x10*3/uL 320 334       Results from last 7 days   Lab Units 03/24/25  1241 03/24/25  0752   SODIUM mmol/L 139 138   POTASSIUM mmol/L 4.0 3.7   CHLORIDE mmol/L 104 102   CO2 mmol/L 26 27   BUN mg/dL 22 25*   CREATININE mg/dL 0.95 1.08*   GLUCOSE mg/dL 124* 127*   CALCIUM mg/dL 8.4* 8.6       Results from last 7 days   Lab Units 03/24/25  1241 03/24/25  1004   APTT seconds 194* 26   INR  1.1 1.0       Results from last 7 days   Lab Units 03/24/25  1622 03/24/25  1410   ANTI XA UNFRACTIONATED IU/mL 1.1* 1.6*     PRELIMINARY CONCLUSIONS:  Right Lower Venous: There is acute non-occlusive deep vein thrombosis visualized in the mid femoral, distal femoral and popliteal veins. The remainder of the right leg is negative for deep vein thrombosis. Unable to obtain images and rule out DVT in calf veins due to painful wounds and bandage.  Left Lower Venous: No evidence of acute deep vein thrombus visualized in the left lower extremity. Unable to obtain images and rule out DVT in calf veins due to painful wounds and bandage.     IMPRESSION:  1.  The findings are compatible with the acute pulmonary emboli with  considerable thrombus burden as well as evidence for right ventricular strain    ECHO:  CONCLUSIONS:   1. The left " ventricular systolic function is normal, with a visually estimated ejection fraction of 65%.   2. Left ventricular diastolic filling cannot be determined due to E/A wave fusion.   3. There is moderately increased septal and moderately increased posterior left ventricular wall thickness.   4. There is reduced right ventricular systolic function.   5. Hobbs's sign is present, suggestive of pulmonary embolism.   6. Moderately enlarged right ventricle.   7. Moderate tricuspid regurgitation visualized.   8. Moderately elevated right ventricular systolic pressure.     ECHO 8/24:  CONCLUSIONS:   1. The left ventricular systolic function is normal, with a visually estimated ejection fraction of 60-65%.   2. Moderately increased left ventricular septal thickness.   3. Moderately enlarged right ventricle.   4. RV Strain RVFWSL -25%, RV4CSL -21.3 %.   5. There is normal right ventricular global systolic function.   6. Mildly elevated RVSP.   7. Aortic valve stenosis is not present.   8. Left Ventricular Global Longitudinal Strain - 17.3 %.     IMPRESSION:  Previous hysterectomy.      Left colon and sigmoid diverticulosis.      Nonobstructing mid to lower pole left renal stone.      7 mm indeterminate hypodense nodule in the inferolateral lower pole  left renal cortex. This could be a complex cyst or small solid  nodule. Recommend further evaluation with renal MRI.      Small hiatal hernia. Tiny fat containing umbilical hernia.          ASSESSMENT/PLAN:  53 yo lady transferred to [facility] with intermediate-high risk PE - on UFH gtt but held d/t elevated heparin assay. Recent venogram but otherwise no identified trigger.     REC:  Continue the UFH gtt now  Heparin assay should be drawn peripherally and NOT through a line per protocol  Her weight and BMI - support warfarin for ongoing AC    Please obtain an appropriate BP cuff for peripheral BP monitoring    Tomorrow anticipate OOB to the chair    VM following  Please page [phone]  for any concerns    Diamond Butler MD

## 2025-03-24 NOTE — ED TRIAGE NOTES
Pt drove to work this morning and became short of breath on arrival. States she checked her HR and it was high. Denies cardiac history or COPD/asthma. 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 EMS gave a duoneb and O2 and pt improved. SpO2 84/RA on arrival. Placed on 2L and monitor. Pt able to talk in full sentences. Denies smoking, birth control or recent travel. Denies other flu like symptoms. Pt states she is also being treated for wounds on her legs.

## 2025-03-24 NOTE — LETTER
March 28, 2025     Patient: Frances Mcdowell   YOB: 1970   Date of Visit: 3/24/2025       To Whom It May Concern:    Frances Mcdowell was seen in the hospital on 3/24/2025 and released on Friday aug 28th.  Please excuse Frances for her absence from work on these days and she can return to work on Tuesday April 1st with work restrictions to take breaks as needed.  She will have follow up with vascular medicine for hospital follow up     If you have any questions or concerns, please don't hesitate to call.     Sincerely,   Latrice Jefferson CNP  [email or web address]   [phone] pager [phone]

## 2025-03-24 NOTE — SIGNIFICANT EVENT
"PULMONARY EMBOLISM RESPONSE TEAM (PERT) NOTE    This note represents a summary of a virtual evaluation by the pulmonary embolism response team requested by Dr. Hearn.  Suggestions and impression are summarized from the initial virtual encounter and discussion with the referring medical team. These suggestions supplement but should not be a substitute for bedside evaluation and management by the attending of record.     PERT Members on the Call:  Critical Care Attending: Dr. Erik HENSLEY Interventional Cardiology Attending: Dr. Arthur  Vascular Medicine Attending: Dr. Butler  Critical Care Fellow: Dr. Fuentes    Brief Clinical Summary:  Frances Mcdowell is a 54 y.o. female presenting with PE.  Patient presented with acute onset shortness of breath and palpitation, she had her heart rate checked before presenting and it showed tachycardia.  On presentation she had blood pressure of 146/114, heart rate of 128, and that she was saturating 88% so she was placed on 2 L of oxygen via nasal cannula with improvement of oxygenation up to 99%.  No chest pain or dizziness on presentation.  Troponin was elevated to 170, BNP was also elevated up to 158.  Lactate was 1.1.      Vital Signs  BP (!) 137/104   Pulse (!) 111   Temp 35.7 °C (96.3 °F) (Temporal)   Resp 20   Ht 1.6 m (5' 3\")   Wt 127 kg (280 lb)   SpO2 100%   BMI 49.60 kg/m²      Laboratory  Recent Labs     03/24/25  0849 03/24/25  0752 10/30/24  2152 10/10/24  1545 08/05/24  0947 08/05/24  0910 08/05/24  0538   TROPHS 256* 170*  --   --  93*  --  103*   BNP  --  158*  --   --   --   --  258*   LACTATE  --  1.1 0.6 1.0  --    < > 2.1*    < > = values in this interval not displayed.         PESI Score Jj SANDOVAL Et al. Arch Intern Med. 2010;170:[id number].           PESI Score:  96, consistent with JLPESIRISK: Class III, or Intermediate risk (3.2-7.1% 30-day mortality risk) PE.    CT Scan reviewed:  Clot burden Significant, present in Saddle, and bilateral PA.  RV/LV " ratio 1.5 by CT scan    TTE reviewed (if available):  NA    Venous duplex (if available):  NA    Contraindications to anticoagulation: no  Contraindications to thrombolytics: no    Initial Impressions:  ERS/ESC Pulmonary Embolism Risk Category: JLPECLASS: Intermediate-high risk.  Acute PE with RV strain, evidence of myocardial injury and narrow pulse pressure suggestive of high risk pulmonary embolism.    Initial Suggestions/Plans:  Transfer to CICU unit at [facility] facility under CICU team STAT.  Patient is planned for thrombectomy with interventional cardiology team.  Initial therapy suggested: Heparin gtt.  Additional testing recommended: TTE, and LE doppler  Consults suggested: interventional cardiology, vascular medicine.   Additional suggestions for re-evaluation/reconference or retesting: In case of hemodynamics changes related to PE.    To re conference the PERT team please call the  Transfer Center at [phone].

## 2025-03-25 LAB
ALBUMIN SERPL BCP-MCNC: 2.9 G/DL (ref 3.4–5)
ALBUMIN SERPL BCP-MCNC: 2.9 G/DL (ref 3.4–5)
ANION GAP SERPL CALC-SCNC: 14 MMOL/L (ref 10–20)
ANION GAP SERPL CALC-SCNC: 14 MMOL/L (ref 10–20)
ATRIAL RATE: 115 BPM
BACTERIA UR CULT: NORMAL
BASOPHILS # BLD AUTO: 0.09 X10*3/UL (ref 0–0.1)
BASOPHILS NFR BLD AUTO: 0.6 %
BUN SERPL-MCNC: 23 MG/DL (ref 6–23)
BUN SERPL-MCNC: 24 MG/DL (ref 6–23)
CALCIUM SERPL-MCNC: 8.2 MG/DL (ref 8.6–10.6)
CALCIUM SERPL-MCNC: 8.2 MG/DL (ref 8.6–10.6)
CARDIAC TROPONIN I PNL SERPL HS: 443 NG/L (ref 0–34)
CHLORIDE SERPL-SCNC: 105 MMOL/L (ref 98–107)
CHLORIDE SERPL-SCNC: 106 MMOL/L (ref 98–107)
CO2 SERPL-SCNC: 24 MMOL/L (ref 21–32)
CO2 SERPL-SCNC: 26 MMOL/L (ref 21–32)
CREAT SERPL-MCNC: 0.85 MG/DL (ref 0.5–1.05)
CREAT SERPL-MCNC: 0.91 MG/DL (ref 0.5–1.05)
EGFRCR SERPLBLD CKD-EPI 2021: 74 ML/MIN/1.73M*2
EGFRCR SERPLBLD CKD-EPI 2021: 81 ML/MIN/1.73M*2
EOSINOPHIL # BLD AUTO: 0.19 X10*3/UL (ref 0–0.7)
EOSINOPHIL NFR BLD AUTO: 1.2 %
ERYTHROCYTE [DISTWIDTH] IN BLOOD BY AUTOMATED COUNT: 13.9 % (ref 11.5–14.5)
GLUCOSE SERPL-MCNC: 109 MG/DL (ref 74–99)
GLUCOSE SERPL-MCNC: 132 MG/DL (ref 74–99)
HCT VFR BLD AUTO: 33.7 % (ref 36–46)
HGB BLD-MCNC: 10.3 G/DL (ref 12–16)
IMM GRANULOCYTES # BLD AUTO: 0.22 X10*3/UL (ref 0–0.7)
IMM GRANULOCYTES NFR BLD AUTO: 1.4 % (ref 0–0.9)
LYMPHOCYTES # BLD AUTO: 2.32 X10*3/UL (ref 1.2–4.8)
LYMPHOCYTES NFR BLD AUTO: 14.9 %
MAGNESIUM SERPL-MCNC: 1.97 MG/DL (ref 1.6–2.4)
MCH RBC QN AUTO: 30.7 PG (ref 26–34)
MCHC RBC AUTO-ENTMCNC: 30.6 G/DL (ref 32–36)
MCV RBC AUTO: 101 FL (ref 80–100)
MONOCYTES # BLD AUTO: 1.22 X10*3/UL (ref 0.1–1)
MONOCYTES NFR BLD AUTO: 7.8 %
NEUTROPHILS # BLD AUTO: 11.58 X10*3/UL (ref 1.2–7.7)
NEUTROPHILS NFR BLD AUTO: 74.1 %
NRBC BLD-RTO: 0 /100 WBCS (ref 0–0)
P AXIS: 62 DEGREES
P OFFSET: 202 MS
P ONSET: 151 MS
PHOSPHATE SERPL-MCNC: 3.2 MG/DL (ref 2.5–4.9)
PHOSPHATE SERPL-MCNC: 3.4 MG/DL (ref 2.5–4.9)
PLATELET # BLD AUTO: 340 X10*3/UL (ref 150–450)
POTASSIUM SERPL-SCNC: 3.2 MMOL/L (ref 3.5–5.3)
POTASSIUM SERPL-SCNC: 4.3 MMOL/L (ref 3.5–5.3)
PR INTERVAL: 152 MS
Q ONSET: 227 MS
QRS COUNT: 19 BEATS
QRS DURATION: 86 MS
QT INTERVAL: 326 MS
QTC CALCULATION(BAZETT): 450 MS
QTC FREDERICIA: 404 MS
R AXIS: 58 DEGREES
RBC # BLD AUTO: 3.35 X10*6/UL (ref 4–5.2)
SODIUM SERPL-SCNC: 141 MMOL/L (ref 136–145)
SODIUM SERPL-SCNC: 141 MMOL/L (ref 136–145)
T AXIS: 38 DEGREES
T OFFSET: 390 MS
UFH PPP CHRO-ACNC: 0.4 IU/ML
UFH PPP CHRO-ACNC: 0.5 IU/ML
VENTRICULAR RATE: 115 BPM
WBC # BLD AUTO: 15.6 X10*3/UL (ref 4.4–11.3)

## 2025-03-25 PROCEDURE — 80069 RENAL FUNCTION PANEL: CPT

## 2025-03-25 PROCEDURE — 83735 ASSAY OF MAGNESIUM: CPT

## 2025-03-25 PROCEDURE — 99291 CRITICAL CARE FIRST HOUR: CPT

## 2025-03-25 PROCEDURE — 36415 COLL VENOUS BLD VENIPUNCTURE: CPT

## 2025-03-25 PROCEDURE — 85520 HEPARIN ASSAY: CPT

## 2025-03-25 PROCEDURE — 85613 RUSSELL VIPER VENOM DILUTED: CPT

## 2025-03-25 PROCEDURE — 84100 ASSAY OF PHOSPHORUS: CPT

## 2025-03-25 PROCEDURE — 1100000001 HC PRIVATE ROOM DAILY

## 2025-03-25 PROCEDURE — 2500000002 HC RX 250 W HCPCS SELF ADMINISTERED DRUGS (ALT 637 FOR MEDICARE OP, ALT 636 FOR OP/ED)

## 2025-03-25 PROCEDURE — 2500000004 HC RX 250 GENERAL PHARMACY W/ HCPCS (ALT 636 FOR OP/ED): Mod: JZ,TB

## 2025-03-25 PROCEDURE — 97161 PT EVAL LOW COMPLEX 20 MIN: CPT | Mod: GP

## 2025-03-25 PROCEDURE — 99232 SBSQ HOSP IP/OBS MODERATE 35: CPT | Performed by: NURSE PRACTITIONER

## 2025-03-25 PROCEDURE — 85025 COMPLETE CBC W/AUTO DIFF WBC: CPT

## 2025-03-25 PROCEDURE — 99233 SBSQ HOSP IP/OBS HIGH 50: CPT

## 2025-03-25 PROCEDURE — 2500000004 HC RX 250 GENERAL PHARMACY W/ HCPCS (ALT 636 FOR OP/ED)

## 2025-03-25 PROCEDURE — 2500000001 HC RX 250 WO HCPCS SELF ADMINISTERED DRUGS (ALT 637 FOR MEDICARE OP)

## 2025-03-25 PROCEDURE — 84484 ASSAY OF TROPONIN QUANT: CPT

## 2025-03-25 RX ORDER — HYDROMORPHONE HYDROCHLORIDE 0.2 MG/ML
0.2 INJECTION INTRAMUSCULAR; INTRAVENOUS; SUBCUTANEOUS ONCE
Status: COMPLETED | OUTPATIENT
Start: 2025-03-25 | End: 2025-03-25

## 2025-03-25 RX ORDER — POTASSIUM CHLORIDE 20 MEQ/1
40 TABLET, EXTENDED RELEASE ORAL EVERY 4 HOURS
Status: COMPLETED | OUTPATIENT
Start: 2025-03-25 | End: 2025-03-25

## 2025-03-25 RX ADMIN — CEFUROXIME AXETIL 500 MG: 500 TABLET ORAL at 21:01

## 2025-03-25 RX ADMIN — HYDROMORPHONE HYDROCHLORIDE 0.2 MG: 0.2 INJECTION, SOLUTION INTRAMUSCULAR; INTRAVENOUS; SUBCUTANEOUS at 16:32

## 2025-03-25 RX ADMIN — OXYCODONE HYDROCHLORIDE AND ACETAMINOPHEN 1 TABLET: 5; 325 TABLET ORAL at 01:58

## 2025-03-25 RX ADMIN — PANTOPRAZOLE SODIUM 40 MG: 40 TABLET, DELAYED RELEASE ORAL at 06:04

## 2025-03-25 RX ADMIN — ROSUVASTATIN CALCIUM 20 MG: 20 TABLET, FILM COATED ORAL at 21:01

## 2025-03-25 RX ADMIN — LEVOTHYROXINE SODIUM 100 MCG: 0.1 TABLET ORAL at 06:04

## 2025-03-25 RX ADMIN — OXYCODONE HYDROCHLORIDE AND ACETAMINOPHEN 1 TABLET: 5; 325 TABLET ORAL at 08:52

## 2025-03-25 RX ADMIN — PREDNISONE 10 MG: 10 TABLET ORAL at 08:53

## 2025-03-25 RX ADMIN — CHOLECALCIFEROL TAB 25 MCG (1000 UNIT) 5000 UNITS: 25 TAB at 08:52

## 2025-03-25 RX ADMIN — OXYCODONE HYDROCHLORIDE AND ACETAMINOPHEN 1 TABLET: 5; 325 TABLET ORAL at 14:55

## 2025-03-25 RX ADMIN — OXYCODONE HYDROCHLORIDE AND ACETAMINOPHEN 1 TABLET: 5; 325 TABLET ORAL at 21:03

## 2025-03-25 RX ADMIN — HEPARIN SODIUM 1700 UNITS/HR: 10000 INJECTION, SOLUTION INTRAVENOUS at 12:51

## 2025-03-25 RX ADMIN — POTASSIUM CHLORIDE 40 MEQ: 1500 TABLET, EXTENDED RELEASE ORAL at 06:04

## 2025-03-25 RX ADMIN — HYDROMORPHONE HYDROCHLORIDE 0.4 MG: 1 INJECTION, SOLUTION INTRAMUSCULAR; INTRAVENOUS; SUBCUTANEOUS at 12:48

## 2025-03-25 RX ADMIN — POTASSIUM CHLORIDE 40 MEQ: 1500 TABLET, EXTENDED RELEASE ORAL at 01:47

## 2025-03-25 RX ADMIN — CEFUROXIME AXETIL 500 MG: 500 TABLET ORAL at 08:53

## 2025-03-25 RX ADMIN — HYDROMORPHONE HYDROCHLORIDE 0.2 MG: 0.2 INJECTION, SOLUTION INTRAMUSCULAR; INTRAVENOUS; SUBCUTANEOUS at 15:56

## 2025-03-25 SDOH — ECONOMIC STABILITY: FOOD INSECURITY: WITHIN THE PAST 12 MONTHS, THE FOOD YOU BOUGHT JUST DIDN'T LAST AND YOU DIDN'T HAVE MONEY TO GET MORE.: NEVER TRUE

## 2025-03-25 SDOH — ECONOMIC STABILITY: INCOME INSECURITY: IN THE PAST 12 MONTHS HAS THE ELECTRIC, GAS, OIL, OR WATER COMPANY THREATENED TO SHUT OFF SERVICES IN YOUR HOME?: NO

## 2025-03-25 SDOH — ECONOMIC STABILITY: HOUSING INSECURITY: IN THE LAST 12 MONTHS, WAS THERE A TIME WHEN YOU WERE NOT ABLE TO PAY THE MORTGAGE OR RENT ON TIME?: NO

## 2025-03-25 SDOH — SOCIAL STABILITY: SOCIAL INSECURITY: WITHIN THE LAST YEAR, HAVE YOU BEEN AFRAID OF YOUR PARTNER OR EX-PARTNER?: NO

## 2025-03-25 SDOH — SOCIAL STABILITY: SOCIAL INSECURITY: WITHIN THE LAST YEAR, HAVE YOU BEEN HUMILIATED OR EMOTIONALLY ABUSED IN OTHER WAYS BY YOUR PARTNER OR EX-PARTNER?: NO

## 2025-03-25 SDOH — ECONOMIC STABILITY: FOOD INSECURITY: HOW HARD IS IT FOR YOU TO PAY FOR THE VERY BASICS LIKE FOOD, HOUSING, MEDICAL CARE, AND HEATING?: NOT VERY HARD

## 2025-03-25 SDOH — ECONOMIC STABILITY: HOUSING INSECURITY: AT ANY TIME IN THE PAST 12 MONTHS, WERE YOU HOMELESS OR LIVING IN A SHELTER (INCLUDING NOW)?: NO

## 2025-03-25 SDOH — ECONOMIC STABILITY: FOOD INSECURITY: WITHIN THE PAST 12 MONTHS, YOU WORRIED THAT YOUR FOOD WOULD RUN OUT BEFORE YOU GOT THE MONEY TO BUY MORE.: NEVER TRUE

## 2025-03-25 SDOH — ECONOMIC STABILITY: TRANSPORTATION INSECURITY: IN THE PAST 12 MONTHS, HAS LACK OF TRANSPORTATION KEPT YOU FROM MEDICAL APPOINTMENTS OR FROM GETTING MEDICATIONS?: NO

## 2025-03-25 SDOH — SOCIAL STABILITY: SOCIAL INSECURITY: ARE YOU MARRIED, WIDOWED, DIVORCED, SEPARATED, NEVER MARRIED, OR LIVING WITH A PARTNER?: MARRIED

## 2025-03-25 SDOH — ECONOMIC STABILITY: HOUSING INSECURITY: IN THE PAST 12 MONTHS, HOW MANY TIMES HAVE YOU MOVED WHERE YOU WERE LIVING?: 0

## 2025-03-25 ASSESSMENT — PAIN SCALES - GENERAL
PAINLEVEL_OUTOF10: 5 - MODERATE PAIN
PAINLEVEL_OUTOF10: 5 - MODERATE PAIN
PAINLEVEL_OUTOF10: 0 - NO PAIN
PAINLEVEL_OUTOF10: 9
PAINLEVEL_OUTOF10: 8
PAINLEVEL_OUTOF10: 7
PAINLEVEL_OUTOF10: 0 - NO PAIN
PAINLEVEL_OUTOF10: 8
PAINLEVEL_OUTOF10: 0 - NO PAIN
PAINLEVEL_OUTOF10: 7
PAINLEVEL_OUTOF10: 8
PAINLEVEL_OUTOF10: 8
PAINLEVEL_OUTOF10: 4
PAINLEVEL_OUTOF10: 10 - WORST POSSIBLE PAIN

## 2025-03-25 ASSESSMENT — PAIN - FUNCTIONAL ASSESSMENT

## 2025-03-25 ASSESSMENT — PAIN DESCRIPTION - ORIENTATION
ORIENTATION: RIGHT

## 2025-03-25 ASSESSMENT — ACTIVITIES OF DAILY LIVING (ADL)
LACK_OF_TRANSPORTATION: NO
LACK_OF_TRANSPORTATION: NO

## 2025-03-25 ASSESSMENT — PAIN DESCRIPTION - LOCATION
LOCATION: LEG

## 2025-03-25 ASSESSMENT — COGNITIVE AND FUNCTIONAL STATUS - GENERAL
WALKING IN HOSPITAL ROOM: A LITTLE
STANDING UP FROM CHAIR USING ARMS: A LITTLE
MOVING TO AND FROM BED TO CHAIR: A LITTLE
MOBILITY SCORE: 17
TURNING FROM BACK TO SIDE WHILE IN FLAT BAD: A LITTLE
CLIMB 3 TO 5 STEPS WITH RAILING: TOTAL

## 2025-03-25 ASSESSMENT — PAIN DESCRIPTION - DESCRIPTORS
DESCRIPTORS: ACHING;SORE
DESCRIPTORS: ACHING;BURNING;SORE

## 2025-03-25 NOTE — CARE PLAN
The patient's goals for the shift include  Patient to remain safe and free from injury with pain and comfort managed and maintained throughout the shift    The clinical goals for the shift include Patient to remain hemodynamically stable throughout the shift    Over the shift, the patient did not make progress toward the following goals. Barriers to progression include Pain, Saddle PE, impaired skin integrity, SOB, and weakness. Recommendations to address these barriers include   Problem: Skin  Goal: Decreased wound size/increased tissue granulation at next dressing change  Outcome: Progressing  Flowsheets (Taken 3/25/2025 0033)  Decreased wound size/increased tissue granulation at next dressing change: Protective dressings over bony prominences  Goal: Participates in plan/prevention/treatment measures  Outcome: Progressing  Flowsheets (Taken 3/25/2025 0033)  Participates in plan/prevention/treatment measures: Elevate heels  Goal: Prevent/manage excess moisture  Outcome: Progressing  Flowsheets (Taken 3/25/2025 0033)  Prevent/manage excess moisture:   Moisturize dry skin   Monitor for/manage infection if present   Cleanse incontinence/protect with barrier cream  Goal: Prevent/minimize sheer/friction injuries  Outcome: Progressing  Flowsheets (Taken 3/25/2025 0033)  Prevent/minimize sheer/friction injuries:   Use pull sheet   HOB 30 degrees or less   Turn/reposition every 2 hours/use positioning/transfer devices  Goal: Promote/optimize nutrition  Outcome: Progressing  Flowsheets (Taken 3/25/2025 0033)  Promote/optimize nutrition: Monitor/record intake including meals  Goal: Promote skin healing  Outcome: Progressing  Flowsheets (Taken 3/25/2025 0033)  Promote skin healing:   Assess skin/pad under line(s)/device(s)   Protective dressings over bony prominences   Turn/reposition every 2 hours/use positioning/transfer devices     Problem: Pain  Goal: Takes deep breaths with improved pain control throughout the  shift  Outcome: Progressing  Goal: Turns in bed with improved pain control throughout the shift  Outcome: Progressing  Goal: Walks with improved pain control throughout the shift  Outcome: Progressing  Goal: Performs ADL's with improved pain control throughout shift  Outcome: Progressing  Goal: Participates in PT with improved pain control throughout the shift  Outcome: Progressing  Goal: Free from opioid side effects throughout the shift  Outcome: Progressing  Goal: Free from acute confusion related to pain meds throughout the shift  Outcome: Progressing     Problem: Fall/Injury  Goal: Not fall by end of shift  Outcome: Progressing  Goal: Be free from injury by end of the shift  Outcome: Progressing  Goal: Verbalize understanding of personal risk factors for fall in the hospital  Outcome: Progressing  Goal: Verbalize understanding of risk factor reduction measures to prevent injury from fall in the home  Outcome: Progressing  Goal: Use assistive devices by end of the shift  Outcome: Progressing  Goal: Pace activities to prevent fatigue by end of the shift  Outcome: Progressing     Problem: Pain - Adult  Goal: Verbalizes/displays adequate comfort level or baseline comfort level  Outcome: Progressing     Problem: Safety - Adult  Goal: Free from fall injury  Outcome: Progressing     Problem: Discharge Planning  Goal: Discharge to home or other facility with appropriate resources  Outcome: Progressing     Problem: Chronic Conditions and Co-morbidities  Goal: Patient's chronic conditions and co-morbidity symptoms are monitored and maintained or improved  Outcome: Progressing     Problem: Nutrition  Goal: Nutrient intake appropriate for maintaining nutritional needs  Outcome: Progressing   .

## 2025-03-25 NOTE — PROGRESS NOTES
Frances Mcdowell is a 54 y.o. female on day 1 of admission.   Medical history significant for DHD, anxiety, GERD, hypothyroidism, lymphedema of BLE, obesity with BMI 55, recurrent lower extremity cellulitis with prior MRSA and pseudomonas infections.   Presented to [facility] 3/24/25 with tachycardia and SOB. CTA chest imaging showed nonocclusive saddle embolus over the bifurcation of the main PA, with large emboli in bilateral lobes that are partially occlusive in the bilateral lower lobes.  RV strain noted on CTA chest.   Transferred to [facility] as part of PERT call recommendations.. Underwent BLE US that showed a DVT in the right mid-distal femoral veins as well as right popliteal veins. No evidence of DVT in the left leg.   TTE completed 3/24/25 that showed normal LVSF with EF 65%, reduced RVSF, evidence of Hobbs's sign, moderately enlarged RV.     Vascular Medicine Service following for anticoagulation recommendations.   Continues with Heparin infusion.    Subjective   Patient reports she feels very emotional at this time and doesn't know what to do.  Denies CP, SOB or bleeding.    REPORTS WALKED TO THE DOOR TODAY WAS SOB ON RETURNING TO THE ROOM  CURRENTLY SITTING IN THE BEDSIDE CHAIR WITHOUT COMPLAINTS    Objective     Physical Exam  Resting in bed in mild distress; reports pain in the left upper arm from infiltrated IV; sitting up in chair during afternoon rounds with Dr. Butler.  Skin warm and dry; Kerlix dressing to LLE is dry and intact; ace wrap dressing to RLE is intact and without drainage.  Respirations full and regular with breath sounds CTA all anterior lung fields; Supplemental O2 2 liters via NC  Normal heart rate with RRR; cardiac monitor shows NSR without witnessed ectopy; BP taken at right wrist, as the nursing staff is unable to find an appropriate sized BP cuff to fit the upper arm.  Abdomen soft and nontender to palpation  Extremities are warm to touch with palpable bilateral radial and DP  "pulses; left upper arm with swelling noted above the elbow, with an area of ecchymosis that patient attributes to an infiltrated IV; swelling noted of BLE from tops of feet to mid calf, with report of tenderness noted with gentle palpation.  Patient is awake and alert, participates in conversation; calms down from crying with gentle talking and reassurance.    Last Recorded Vitals  Blood pressure 109/90, pulse 101, temperature 36.4 °C (97.5 °F), resp. rate 25, height 1.6 m (5' 2.99\"), weight 142 kg (313 lb 11.4 oz), SpO2 97%.  Intake/Output last 3 Shifts:  I/O last 3 completed shifts:  In: 558.3 (3.9 mL/kg) [P.O.:340; I.V.:218.3 (1.5 mL/kg)]  Out: 650 (4.6 mL/kg) [Urine:650 (0.1 mL/kg/hr)]  Weight: 142.3 kg     Relevant Results  Scheduled medications  amphetamine-dextroamphetamine, 10 mg, oral, BID  cefuroxime, 500 mg, oral, BID  cholecalciferol, 5,000 Units, oral, Daily  heparin, 10,000 Units, intravenous, Once  levothyroxine, 100 mcg, oral, Daily  pantoprazole, 40 mg, oral, Daily before breakfast  perflutren protein A microsphere, 0.5 mL, intravenous, Once in imaging  predniSONE, 10 mg, oral, Daily  rosuvastatin, 20 mg, oral, Nightly  sulfur hexafluoride microsphr, 2 mL, intravenous, Once in imaging      Continuous medications  heparin, 0-4,500 Units/hr, Last Rate: 1,700 Units/hr (03/25/25 1251)      Results from last 7 days   Lab Units 03/25/25  0008 03/24/25  1241 03/24/25  0752   WBC AUTO x10*3/uL 15.6* 18.3* 16.0*   HEMOGLOBIN g/dL 10.3* 11.7* 12.8   HEMATOCRIT % 33.7* 34.3* 39.8   PLATELETS AUTO x10*3/uL 340 320 334       Results from last 7 days   Lab Units 03/25/25  0905 03/25/25  0009 03/24/25  1241   SODIUM mmol/L 141 141 139   POTASSIUM mmol/L 4.3 3.2* 4.0   CHLORIDE mmol/L 105 106 104   CO2 mmol/L 26 24 26   BUN mg/dL 23 24* 22   CREATININE mg/dL 0.91 0.85 0.95   GLUCOSE mg/dL 109* 132* 124*   CALCIUM mg/dL 8.2* 8.2* 8.4*   Estimated Creatinine Clearance: 96.1 mL/min (by C-G formula based on SCr of 0.91 " mg/dL).      Results from last 7 days   Lab Units 03/24/25  1241 03/24/25  1004   APTT seconds 194* 26   INR  1.1 1.0       Results from last 7 days   Lab Units 03/25/25  0352 03/25/25  0007 03/24/25  1909   ANTI XA UNFRACTIONATED IU/mL 0.5 0.4 0.1        [facility] LOWER EXTREMITY VENOUS DUPLEX BILATERAL   Study Date: 3/24/2025   PRELIMINARY CONCLUSIONS:  Right Lower Venous: There is acute non-occlusive deep vein thrombosis visualized in the mid femoral, distal femoral and popliteal veins. The remainder of the right leg is negative for deep vein thrombosis. Unable to obtain images and rule out DVT in calf veins due to painful wounds and bandage.  Left Lower Venous: No evidence of acute deep vein thrombus visualized in the left lower extremity. Unable to obtain images and rule out DVT in calf veins due to painful wounds and bandage.      CT ANGIO CHEST FOR PULMONARY EMBOLISM; 3/24/2025 9:43 am   IMPRESSION:  1.  The findings are compatible with the acute pulmonary emboli with considerable thrombus burden as well as evidence for right ventricular strain      TRANSTHORACIC ECHOCARDIOGRAM REPORT   Study Date: 3/24/2025   CONCLUSIONS:   1. The left ventricular systolic function is normal, with a visually estimated ejection fraction of 65%.   2. Left ventricular diastolic filling cannot be determined due to E/A wave fusion.   3. There is moderately increased septal and moderately increased posterior left ventricular wall thickness.   4. There is reduced right ventricular systolic function.   5. Hobbs's sign is present, suggestive of pulmonary embolism.   6. Moderately enlarged right ventricle.   7. Moderate tricuspid regurgitation visualized.   8. Moderately elevated right ventricular systolic pressure.               Assessment/Plan   Assessment & Plan  Pulmonary embolism    54 year old female with medical history as described above.  Accepted as direct transfer from [facility] to the [facility] for intermediate-high  "risk submassive PE (with extensive clot burden and right heart strain).  Underwent BLE US that showed acute DVT of right mid-distal femoral and popliteal veins.   Underwent TTE that showed moderately increased septal and posterior left ventricular wall thickness, with reduced RVSF and a moderately enlarged RV.  Endovascular Team continues to follow for evaluation of need for thrombectomy.   Vascular Medicine Service following for anticoagulation recommendations.   Continues with Heparin infusion.   Review of labs today shows decreased hemoglobin from 11.7 grams to 10.3 grams, stable platelets 340 K, and stable serum creatinine 0.85, with albumin 2.9 grams.  Anticardiolipin and Beta-2 Glycoprotein Abs returned as negative; awaiting LAC results.   Discussion with patient: will need to bridge to Warfarin therapy; can have INR monitoring at either [facility] or [facility] depending upon patient choice; patient given booklet \"Your Guide to Taking Warfarin\", and patient also given a Warfarin Med Alert band.  Discussion with RN: Need to obtain XL BP cuff to obtain BP measurements at the upper arm if at all possible.  BP has been taken at the right forearm which is obtaining low systolic readings.       Date    INR     Warfarin Dose     Comments   3/24     1.1      none given         INR baseline    Recommendations:  ~CONTINUE Heparin infusion; follow nomogram to achieve therapeutic assay 0.3-0.7  ~Monitor for bleeding  ~When all invasive tests and procedures are completed, can start bridge to Warfarin with initial dose of 5mg.   ~Will need a minimum of 5 days of overlap therapy with Heparin and Warfarin; goal INR is 2-3; when INR is >2 for 2 days in a row, Heparin can be stopped.    ~Outpatient Coumadin Clinic will need to be arranged prior to hospital discharge with initial appointment scheduled for 1-2 days after discharge; patient will need to choose between [facility] or [facility] for Coumadin " "Clinic location; use the order \"Referral to Anticoagulation Monitoring Service [facility]\" to start the process.   ~I will coordinate outpatient follow up with the Vascular Medicine Service.    Patient seen and examined with Dr. Butler  Plan of care discussed with Dr. Carrie Vizcaino, APRN-CNP  Vascular Medicine Service  Pager [phone]      BP REMAINS SOFT BUT IT IS UNCLEAR IF THIS IS THE CUFF AND A FOREARM READ OR IF THIS  IS HER ACTUAL BP. REMAINS TACHYCARDIC BUT APPEARS FAIRLY COMFORTABLE SITTING IN THE CHAIR  WAS SOB WITH MINIMAL AMBULATION    CONTINUE TO FOLLOW CLINICALLY.  ANTICIPATE A TRIAL OF WALKING AGAIN TOMORROW  NEED TO OBTAIN AN EXTRA LARGE BP CUFF FOR BETTER MEASUREMENTS.    VM following  Please page [phone] for any concerns    Diamond Butler MD      "

## 2025-03-25 NOTE — CONSULTS
Wound Care Consult     Visit Date: 3/25/2025      Patient Name: Frances Mcdowell         MRN: [id number]           YOB: 1970     Reason for Consult: BLE wounds and dressing recommendations        Wound History: 55 y/o female w/ PMH of ADD, Anxiety, HLD, Hypothyroidism and GERD presents with shortness of breath and tachycardia. 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 She was apparently feeling like her usual self until she noticed her HR was high. She confided with a fellow nurse who check her HR and advised her to go to the ED. She reports that last summer she had cellulitis after indulging in a hot tub. She was tested positive for pseudomonas cellulitis at the time and was prescribed Cefuroxime.     Pertinent Labs:   Albumin   Date Value Ref Range Status   03/25/2025 2.9 (L) 3.4 - 5.0 g/dL Final       Wound Assessment:  Wound 08/05/24 Other (comment) Tibial Distal;Dorsal;Left (Active)   Site Assessment Bleeding;Red 03/25/25 1600   Esme-Wound Assessment Unable to assess 03/25/25 1600   Shape unable to assess. Dressing intact and patient refused to have it removed 03/25/25 1600   State of Healing Non-healing 03/25/25 1600   Margins Unable to assess 03/25/25 1600   Drainage Description Tan;Serosanguineous 03/25/25 1600   Drainage Amount Moderate 03/25/25 1600   Dressing Hydrofiber;ABD;Kerlix/rolled gauze 03/25/25 1600   Dressing Changed New 03/25/25 1600   Dressing Status Clean;Dry 03/25/25 1600       Wound 10/31/24 Venous Ulcer Tibial Dorsal;Proximal;Right (Active)   Wound Image   03/24/25 1345   Site Assessment Fibrinous;Sloughing;Yellow;Red 03/25/25 1600   Non-staged Wound Description Full thickness 03/25/25 1600   Shape irregular 03/25/25 1600   State of Healing Slough;Non-healing 03/25/25 1600   Margins Well-defined edges 03/25/25 1600   Treatments Cleansed;Packings;Site care 03/25/25 1600   Drainage Description Tan;Serosanguineous 03/25/25 1600   Drainage Amount Moderate 03/25/25 1600   Dressing Non  adherent;Hydrofiber;ABD;Kerlix/rolled gauze 03/25/25 1600   Dressing Changed New 03/25/25 1600   Dressing Status Clean;Dry 03/25/25 1600         Wound Team Summary Assessment: The wound care team came to bedside to assist in change the patient BLE dressings. The patient is seen at an outpatient clinic in [facility] and is very knowledgeable with her care. The patient has LLE wound current had hydrofera blue ready foam and applied to the wound and the patient didn't want the dressing removed at this time. The dressing was clean and dry and could remain for another day. ABD pads, xtrasorb, kerlix rolled gauze and tubigrip size G was applied to the left limb. The patient's RLE dressing was removed at this time and was very painful for the patient. The wound is red, moist with granulation and a large amount of fibrinous yellow slough. The wound was cleansed thoroughly with vashe wound cleanser and gently pat dry. Mepitel contact layer was applied to the wounds to help with removal with the next dressing change. The wound was then dressed with hydrofera blue ready foam, ABD pads, Xtrasorb, kerlix rolled gauze, Tubigrip size G.      Wound Team Plan:   Recommendations: Daily     Bilateral lower extremities: Cleanse both lower extremities with vashe wound cleanser and gently pat dry   Apply mepitel contact layer to cover wounds first  Apply several sheets of hydrofera blue ready foam to the wounds  Apply 2 ABD pads and 2 large xtrasorb sheets to both legs   Wrap both legs with kerlix rolled gauze from the base of the toes to below the knee  Apply 2 layers of Tubigrip size G to both legs     Marcello Plummer RN-BC, CWON  3/25/2025  4:51 PM

## 2025-03-25 NOTE — PROGRESS NOTES
Subjective Data:  Patient was assessed at the bedside. Reports feeling better today, breathing improved, denies SOB or CP at rest, she is comfortably breathing and has no evidence of conversation dyspnea on 2 L NC 99%, no desaturation. She continues to be on Heparin gtt, awaiting PT to evaluate oxygen requirement and HR with exertion.     Overnight Events:    No events overnight      Objective Data:  Last Recorded Vitals:  Vitals:    03/25/25 1100 03/25/25 1111 03/25/25 1200 03/25/25 1300   BP: 104/76  94/61    BP Location:   Right arm    Patient Position:   Lying    Pulse: 106  103 106   Resp: 24 17 22   Temp:  36.4 °C (97.5 °F) 36.4 °C (97.5 °F)    TempSrc:   Temporal    SpO2:   95% 99%   Weight:       Height:           Last Labs:  CBC - 3/25/2025: 12:08 AM  15.6 10.3 340    33.7      CMP - 3/25/2025:  9:05 AM  8.2 6.7 14 --- 0.4   3.4 2.9 12 116      PTT - 3/24/2025: 12:41 PM  1.1   12.6 194     TROPHS   Date/Time Value Ref Range Status   03/25/2025 12:07  0 - 34 ng/L Final     Comment:     Previous result verified on 3/24/2025 1645 on specimen/case [id number] called with component [facility] for procedure Troponin I, High Sensitivity with value 606 ng/L.   03/24/2025 07:09  0 - 34 ng/L Final     Comment:     Previous result verified on 3/24/2025 1645 on specimen/case [id number] called with component [facility] for procedure Troponin I, High Sensitivity with value 606 ng/L.   03/24/2025 12:41  0 - 34 ng/L Final   03/24/2025 08:49  0 - 13 ng/L Final     Comment:     Previous result verified on 3/24/2025 0824 on specimen/case [id number] called with component [facility] for procedure Troponin I, High Sensitivity, Initial with value 170 ng/L.   03/24/2025 07:52  0 - 13 ng/L Final   08/05/2024 09:47 AM 93 0 - 13 ng/L Final     Comment:     Previous result verified on 8/5/2024 0645 on specimen/case [id number] called with component [facility] for procedure Troponin I, High Sensitivity with  value 103 ng/L.     BNP   Date/Time Value Ref Range Status   03/24/2025 07:52  0 - 99 pg/mL Final   08/05/2024 05:38  0 - 99 pg/mL Final     HGBA1C   Date/Time Value Ref Range Status   11/28/2023 06:38 AM 5.3 see below % Final     LDLCALC   Date/Time Value Ref Range Status   11/28/2023 06:38  <=99 mg/dL Final     Comment:                                 Near   Borderline      AGE      Desirable  Optimal    High     High     Very High     0-19 Y     0 - 109     ---    110-129   >/= 130     ----    20-24 Y     0 - 119     ---    120-159   >/= 160     ----      >24 Y     0 -  99   100-129  130-159   160-189     >/=190       VLDL   Date/Time Value Ref Range Status   11/28/2023 06:38 AM 18 0 - 40 mg/dL Final   07/21/2022 07:10 AM 15 0 - 40 mg/dL Final   02/19/2019 10:05 AM 18 0 - 40 mg/dL Final      Last I/O:  I/O last 3 completed shifts:  In: 558.3 (3.9 mL/kg) [P.O.:340; I.V.:218.3 (1.5 mL/kg)]  Out: 650 (4.6 mL/kg) [Urine:650 (0.1 mL/kg/hr)]  Weight: 142.3 kg     Past Cardiology Tests (Last 3 Years):    CT angio chest for pulmonary embolism 3/24/25  IMPRESSION:  1.  The findings are compatible with the acute pulmonary emboli with  considerable thrombus burden as well as evidence for right    Vascular US Lower Extremity Venous Duplex Bilateral 3/24/25  PRELIMINARY CONCLUSIONS:  Right Lower Venous: There is acute non-occlusive deep vein thrombosis visualized in the mid femoral, distal femoral and popliteal veins. The remainder of the right leg is negative for deep vein thrombosis. Unable to obtain images and rule out DVT in calf veins due to painful wounds and bandage.  Left Lower Venous: No evidence of acute deep vein thrombus visualized in the left lower extremity. Unable to obtain images and rule out DVT in calf veins due to painful wounds and bandage.    Transthoracic Echo (TTE) Complete With Contrast 3/24/25     CONCLUSIONS:   1. The left ventricular systolic function is normal, with a visually  estimated ejection fraction of 65%.   2. Left ventricular diastolic filling cannot be determined due to E/A wave fusion.   3. There is moderately increased septal and moderately increased posterior left ventricular wall thickness.   4. There is reduced right ventricular systolic function.   5. Hobbs's sign is present, suggestive of pulmonary embolism.   6. Moderately enlarged right ventricle.   7. Moderate tricuspid regurgitation visualized.   8. Moderately elevated right ventricular systolic pressure.        Inpatient Medications:  Scheduled medications   Medication Dose Route Frequency    amphetamine-dextroamphetamine  10 mg oral BID    cefuroxime  500 mg oral BID    cholecalciferol  5,000 Units oral Daily    heparin  10,000 Units intravenous Once    levothyroxine  100 mcg oral Daily    pantoprazole  40 mg oral Daily before breakfast    perflutren protein A microsphere  0.5 mL intravenous Once in imaging    predniSONE  10 mg oral Daily    rosuvastatin  20 mg oral Nightly    sulfur hexafluoride microsphr  2 mL intravenous Once in imaging     PRN medications   Medication    acetaminophen    hydrOXYzine HCL    oxyCODONE-acetaminophen     Continuous Medications   Medication Dose Last Rate    heparin  0-4,500 Units/hr 1,700 Units/hr (03/25/25 1251)       Physical Exam:  Constitutional: NAD, pleasant, cooperative     Head/Neck: Neck supple, No JVD, no carotid bruits           Respiratory/Thorax: CTAB, thorax symmetric     Cardiovascular: Regular, rate and rhythm, no murmurs, normal S 1 and S 2, tachycardic  Gastrointestinal: Nondistended, soft, non-tender, +BS       Skin: Warm and dry              Extremities: KEATING, lymphedema, no discoloration, B/L LE wounds dressed with ace wrap, patient requesting wound care for dressing management, legs warm with distally cool to touch   Neuro: non-focal, awake/alert/oriented x3,   Psychological: Appropriate mood and behavior      3/24/25        Assessment/Plan   Frances Mcdowell is  a 54 y.o. female presenting with an episode of dizziness and SOB that started today. 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 She presented to ED with HR in 115's and pulse ox in 80's.  She reports recent cellulitis and ongoing wound care due to venous insufficiency and recent infection.    On presentation patient with: WBC 16-->18.3, , Trops 170-->256, , Lactate 1.1-->1.7. CT PE with acute pulmonary emboli with considerable thrombus burden as well as evidence for right ventricular strain. PERT team was activated. Patient currently transferred to CICU on Heparin drip, EVLS evaluated for possible thrombectomy due to submassive PE.     Patient with submassive PE [extensive burden of PE with right heart strain], and her hemodynamics are better upon arrival to the CICU. She had a narrow pulse pressure at outside hospital and initially here in the CICU, but her pulse pressure became normal [ over 90 mmHg] after using the right size of blood pressure cuff and the right location [right arm]. Her O2 requirement is down to 2 from 4 L and satting between 96 and 100%, and she still tachycardic in the 110s but her white count is elevated 18,000-->15,600. Patient appears very comfortable in no distress. We discussed the case with vascular medicine staff, Dr. Butler. Plan for conservative management for now with heparin drip, and reevaluate in 24-48 hours.    Patient worked with PT today, got out of bed to the room door, HR increased to 122 and pulse ox decreased to 93% on 2L NC with shortness of breath. Otherwise patient remained asymptomatic with stable VS.  LE DVT US (3/24/2025) positive for acute R nonocclusive DVT in mid-femoral, distal femoral, and popliteal veins   TTE (3/24/25) There is reduced right ventricular systolic function. Moderately enlarged right ventricle. Moderately elevated right ventricular systolic pressure        #Acute bilateral saddle PE with right heart strain       Plan:  - continue Heparin drip and re do the walking pulse ox test tomorrow to evaluate for mechanical thrombectomy requirement   - Vascular Medicine for long term AC management  - wean off O2 as tolerated  - all other care per CICU team  - endovascular will continue to follow      Case d/w Dr Arthur         Peripheral IV 03/24/25 20 G Left Antecubital (Active)   Site Assessment Dry;Clean;Intact 03/25/25 0800   Dressing Type Transparent 03/25/25 0800   Line Status Blood return noted;Flushed;Saline locked;Capped 03/25/25 0800   Dressing Status Clean;Dry;Occlusive 03/25/25 0800   Number of days: 1       Peripheral IV 03/25/25 22 G Right Antecubital (Active)   Site Assessment Clean;Dry;Intact 03/25/25 0800   Dressing Type Transparent 03/25/25 0800   Line Status Blood return noted;Flushed;Saline locked;Capped 03/25/25 0800   Dressing Status Clean;Dry;Occlusive 03/25/25 0800   Number of days: 0       Code Status:  Full Code          UTE Medina-CNP  Endovascular/Limb Salvage Service   Days: [id number]/Haiku: EVLS [facility] Team  [id number]

## 2025-03-25 NOTE — PROGRESS NOTES
03/25/25 1139   Discharge Planning   Living Arrangements Spouse/significant other;Children   Support Systems Spouse/significant other;Children;Parent;Family members   Assistance Needed TBD   Type of Residence Private residence   Who is requesting discharge planning? Provider   Home or Post Acute Services   (TBD)   Does the patient need discharge transport arranged? No   Financial Resource Strain   How hard is it for you to pay for the very basics like food, housing, medical care, and heating? Not very   Housing Stability   In the last 12 months, was there a time when you were not able to pay the mortgage or rent on time? N   In the past 12 months, how many times have you moved where you were living? 0   At any time in the past 12 months, were you homeless or living in a shelter (including now)? N   Transportation Needs   In the past 12 months, has lack of transportation kept you from medical appointments or from getting medications? no   In the past 12 months, has lack of transportation kept you from meetings, work, or from getting things needed for daily living? No     - ICU TREATMENT PLAN: [facility] for pulmonary embolism.   - Payer: Juan.  -Support System: Spouse, mother  - Planned Disposition: Pending medical outcome and rehab recommendations.  - Additional Information: SDOH and Social Work Discharge Planning assessments were completed with the patient. There were no SDOH issues identified.  -ADLs: Patient was independent with ADLs prior to admission.  -Home Care: n/a  -DME: n/a  -HD: n/a  - Barriers to discharge: None at this time. SW will continue to follow.

## 2025-03-25 NOTE — PROGRESS NOTES
Physical Therapy    Physical Therapy Evaluation    Patient Name: Frances Mcdowell  MRN: [id number]  Department: [facility]  Room: 03/03-A  Today's Date: 3/25/2025   Time Calculation  Start Time: 1158  Stop Time: 1238  Time Calculation (min): 40 min    Assessment/Plan   PT Assessment  PT Assessment Results: Decreased strength, Decreased range of motion, Decreased endurance, Decreased mobility, Decreased skin integrity, Pain, Impaired balance  Rehab Prognosis: Good  End of Session Communication: Bedside nurse  End of Session Patient Position: Up in chair, Alarm off, not on at start of session  IP OR SWING BED PT PLAN  Inpatient or Swing Bed: Inpatient  PT Plan  Treatment/Interventions: Transfer training, Gait training, Strengthening, Endurance training, Range of motion, Therapeutic exercise, Therapeutic activity, Home exercise program, Positioning, Bed mobility, Balance training  PT Plan: Ongoing PT  PT Frequency: 3 times per week  PT Discharge Recommendations: Low intensity level of continued care  PT Recommended Transfer Status: Stand by assist  PT - OK to Discharge: Yes    Subjective   General Visit Information:  General  Reason for Referral: Presented at OSH after episode of lightheadedness and noted tachycardia. Pt transferred 3/24 for management of bilateral saddle PE with right heart strain. positive for acute R nonocclusive DVT in mid-femoral, distal femoral, and popliteal veins.  Past Medical History Relevant to Rehab: hypothyroidism, ADHD/Anxiety, BLE lymphedema, recurrent lower extremity cellulitis (prior MRSA and pseudomonas), and GERD  Family/Caregiver Present: No  Prior to Session Communication: Bedside nurse  Patient Position Received: Bed, 3 rail up, Alarm off, not on at start of session  Preferred Learning Style: auditory, verbal, visual  General Comment: Pt was agreeable to therapy this date. Pt relayed message per physician to trial sitting in chair and only walking to door of room.  Home Living:  Home  Living  Type of Home: House  Lives With: Spouse, Adult children (Adult child is autisitc)  Home Adaptive Equipment: None  Home Layout: Two level  Home Access: Ramped entrance  Bathroom Shower/Tub: Walk-in shower  Bathroom Equipment: Grab bars in shower  Home Living Comments: Pt reports that bed, full bath, and laundry are on the first floor. No needs on second floor or basement, so she does not use the stairs.  Prior Level of Function:  Prior Function Per Pt/Caregiver Report  Level of [place]: Independent with ADLs and functional transfers, Independent with homemaking with ambulation  Ambulatory Assistance: Independent (community ambulator no AD. Pt reports no falls.)  Vocational: Full time employment (Nurse)  Leisure: Being outside  Precautions:  Precautions  Medical Precautions: Cardiac precautions, Oxygen therapy device and L/min, Fall precautions, Infection precautions     Lines/Tubes:   NC 2L    Telemetry   External catheter   Heparin gtt        Vital Signs     Vitals Session Pre PT During PT Post PT   Heart Rate 101 During Walk: 123bpm max    Resp      SpO2 92%  99%   /90 Sitting EOB: 123/103, Chair: 129/93 (prior to walk)    Chair (after walking): 120/99 94/61   MAP (mmHg)            Objective   Pain:  Pain Assessment  Pain Assessment: 0-10  0-10 (Numeric) Pain Score: 7  Pain Location: Leg (Right- pt reports due to wound)  Cognition:  Cognition  Overall Cognitive Status: Within Functional Limits  Arousal/Alertness: Appropriate responses to stimuli  Orientation Level: Oriented X4  Following Commands: Follows one step commands without difficulty  Cognition Comments: Pt extremely anxious  Impulsive: Mildly    Activity Tolerance  Endurance: Tolerates less than 10 min exercise with changes in vital signs  Early Mobility/Exercise Safety Screen: Proceed with mobilization - No exclusion criteria met  Activity Tolerance Comments: Pt demonstrated tachy HR during ambulation (~120s) and reports SOB at  end of ambulation.    Sensation  Sensation Comment: Unable to perform full assesssment 2/2 leg wounds/dressing. Intact B feet.    Strength  Strength Comments: BLE: grossly >3+/5  Coordination  Movements are Fluid and Coordinated: Yes    Postural Control  Postural Control: Within Functional Limits    Static Sitting Balance  Static Sitting-Balance Support: Bilateral upper extremity supported, Feet supported  Static Sitting-Level of Assistance: Close supervision  Static Sitting-Comment/Number of Minutes: ~6 minutes  Dynamic Sitting Balance  Dynamic Sitting-Balance Support: No upper extremity supported, Feet supported  Dynamic Sitting-Level of Assistance: Close supervision    Static Standing Balance  Static Standing-Balance Support: No upper extremity supported  Static Standing-Level of Assistance: Contact guard  Dynamic Standing Balance  Dynamic Standing-Balance Support: No upper extremity supported  Dynamic Standing-Level of Assistance: Contact guard    Functional Assessments:  Bed Mobility  Bed Mobility: Yes  Bed Mobility 1  Bed Mobility 1: Supine to sitting  Level of Assistance 1: Close supervision    Transfers  Transfer: Yes  Transfer 1  Technique 1: Sit to stand, Stand to sit  Transfer Level of Assistance 1: Contact guard (x1)  Trials/Comments 1: x2 trials. Pt utilized UE push off for initial ascent in standing  Transfers 2  Transfer From 2: Bed to  Transfer to 2: Chair with arms  Technique 2: Stand pivot  Transfer Level of Assistance 2: Contact guard  Trials/Comments 2: 4 steps to chair    Ambulation/Gait Training  Ambulation/Gait Training Performed: Yes  Ambulation/Gait Training 2  Surface 2: Level tile  Device 2: No device  Assistance 2: Contact guard  Quality of Gait 2: Decreased step length, Wide base of support  Comments/Distance (ft) 2: Pt ambulated ~15 feet. Demonstrated tachycardia (123 bpm max).  Extremity/Trunk Assessments:  RLE   RLE : Within Functional Limits  LLE   LLE : Within Functional  Limits    Outcome Measures:  [facility] Basic Mobility  Turning from your back to your side while in a flat bed without using bedrails: None  Moving from lying on your back to sitting on the side of a flat bed without using bedrails: A little  Moving to and from bed to chair (including a wheelchair): A little  Standing up from a chair using your arms (e.g. wheelchair or bedside chair): A little  To walk in hospital room: A little  Climbing 3-5 steps with railing: Total  Basic Mobility - Total Score: 17    Confusion Assessment Method-ICU (CAM-ICU)  Feature 1: Acute Onset or Fluctuating Course: Negative  Overall CAM-ICU: Negative    FSS-ICU  Ambulation: Walks <50 feet with any assistance x1 or walks any distance with assistance x2 people  Rolling: Supervision or set-up only  Sitting: Supervision or set-up only  Transfer Sit-to-Stand: Minimal assistance (performs 75% or more of task)  Transfer Supine-to-Sit: Supervision or set-up only  Total Score: 20    Early Mobility/Exercise Safety Screen: Proceed with mobilization - No exclusion criteria met  ICU Mobility Scale: Walking with assistance of 1 person [8]    Encounter Problems       Encounter Problems (Active)       Balance       Pt will demonstrate a Tinetti score of 24/28 for decreased risk for falls.       Start:  03/25/25    Expected End:  04/08/25               Mobility       Patient will ambulate >250ft with supervision with stable vitals.        Start:  03/25/25    Expected End:  04/08/25            Pt will ambulate >800 ft with supervision in a 6 minute walking test for improved endurance.        Start:  03/25/25    Expected End:  04/08/25               PT Transfers       Transfer from bed to chair with independent.       Start:  03/25/25    Expected End:  04/08/25            Patient will transfer sit to and from stand with independent.        Start:  03/25/25    Expected End:  04/08/25               Pain - Adult              Education Documentation  Mobility  Training, taught by LATONIA Jauregui at 3/25/2025  5:51 PM.  Learner: Patient  Readiness: Acceptance  Method: Explanation  Response: Needs Reinforcement, Verbalizes Understanding  Comment: Mobility precautions    Education Comments  No comments found.

## 2025-03-25 NOTE — CARE PLAN
The patient's goals for the shift include    Problem: Skin  Goal: Decreased wound size/increased tissue granulation at next dressing change  Outcome: Progressing  Flowsheets (Taken 3/25/2025 1930)  Decreased wound size/increased tissue granulation at next dressing change:   Protective dressings over bony prominences   Promote sleep for wound healing  Goal: Participates in plan/prevention/treatment measures  Outcome: Progressing  Flowsheets (Taken 3/25/2025 1930)  Participates in plan/prevention/treatment measures: Elevate heels  Goal: Prevent/manage excess moisture  Outcome: Progressing  Flowsheets (Taken 3/25/2025 1930)  Prevent/manage excess moisture:   Moisturize dry skin   Follow provider orders for dressing changes   Monitor for/manage infection if present  Goal: Prevent/minimize sheer/friction injuries  Outcome: Progressing  Flowsheets (Taken 3/25/2025 1930)  Prevent/minimize sheer/friction injuries:   Complete micro-shifts as needed if patient unable. Adjust patient position to relieve pressure points, not a full turn   HOB 30 degrees or less   Turn/reposition every 2 hours/use positioning/transfer devices   Use pull sheet  Goal: Promote/optimize nutrition  Outcome: Progressing  Flowsheets (Taken 3/25/2025 1930)  Promote/optimize nutrition: Monitor/record intake including meals  Goal: Promote skin healing  Outcome: Progressing  Flowsheets (Taken 3/25/2025 1930)  Promote skin healing:   Assess skin/pad under line(s)/device(s)   Protective dressings over bony prominences   Turn/reposition every 2 hours/use positioning/transfer devices     Problem: Skin  Goal: Participates in plan/prevention/treatment measures  Outcome: Progressing  Flowsheets (Taken 3/25/2025 1930)  Participates in plan/prevention/treatment measures: Elevate heels     Problem: Skin  Goal: Prevent/manage excess moisture  Outcome: Progressing  Flowsheets (Taken 3/25/2025 1930)  Prevent/manage excess moisture:   Moisturize dry skin   Follow  provider orders for dressing changes   Monitor for/manage infection if present     Problem: Pain  Goal: Takes deep breaths with improved pain control throughout the shift  Outcome: Progressing     Problem: Fall/Injury  Goal: Not fall by end of shift  Outcome: Progressing     Problem: Pain - Adult  Goal: Verbalizes/displays adequate comfort level or baseline comfort level  Outcome: Progressing     Problem: Safety - Adult  Goal: Free from fall injury  Outcome: Progressing       The clinical goals for the shift include pt remain HDS throughout shift    Over the shift, the patient did make progress toward the following goals.

## 2025-03-25 NOTE — PROGRESS NOTES
Pharmacy Medication History Review    Frances Mcdowell is a 54 y.o. female admitted for Pulmonary embolism. Pharmacy reviewed the patient's [id number] medications and allergies for accuracy.    Medications ADDED:  None  Medications CHANGED:  Adderall XR capsule, Ibuprofen  Medications REMOVED:   Gabapentin      The list below reflects the updated PTA list.   Prior to Admission Medications   Prescriptions Informant   EPINEPHrine 0.3 mg/0.3 mL injection syringe Self   Sig: Inject 0.3 mL (0.3 mg) as directed if needed for anaphylaxis. As Directed   amphetamine-dextroamphetamine XR (Adderall XR) 20 mg 24 hr capsule Self   Sig: Take 1 capsule (20 mg) by mouth 2 times a day.   Patient taking differently: Take 2 capsules (40 mg) by mouth once daily in the morning.   cholecalciferol (Vitamin D-3) 5,000 Units tablet Self   Sig: Take 1 tablet (5,000 Units) by mouth once daily.   cyclobenzaprine (Flexeril) 10 mg tablet Self   Sig: Take 1 tablet (10 mg) by mouth as needed at bedtime for muscle spasms.   gentamicin (Garamycin) 0.1 % ointment Self   Sig: Apply topically 3 times a day for 10 days. Apply to wounds   ibuprofen 800 mg tablet Self   Sig: Take 1 tablet (800 mg) by mouth every 6 hours if needed for moderate pain (4 - 6).   Patient taking differently: Take 1 tablet (800 mg) by mouth 2 times a day.   levothyroxine (Synthroid, Levoxyl) 100 mcg tablet Self   Sig: Take 1 tablet (100 mcg) by mouth early in the morning.. Take on an empty stomach at the same time each day, either 30 to 60 minutes prior to breakfast   naloxone (Narcan) 4 mg/0.1 mL nasal spray Self   Sig: Administer 1 spray (4 mg) into affected nostril(s) if needed for opioid reversal. May repeat every 2-3 minutes if needed, alternating nostrils, until medical assistance becomes available.   omeprazole (PriLOSEC) 40 mg DR capsule Self   Sig: Take 1 capsule (40 mg) by mouth once daily.   oxyCODONE-acetaminophen (Percocet)  mg tablet Self   Sig: Take 1  "tablet by mouth once daily.   predniSONE (Deltasone) 10 mg tablet Self   Sig: Take 1 tablet (10 mg) by mouth once daily.   rosuvastatin (Crestor) 20 mg tablet Self   Sig: Take 1 tablet (20 mg) by mouth once daily.   wound dressing (Triad Wound Dressing) paste Self   Sig: Apply 1 Application topically 2 times a day.      Facility-Administered Medications: None        The list below reflects the updated allergy list. Please review each documented allergy for additional clarification and justification.  Allergies  Reviewed by Andrei Leon PharmD on 3/25/2025        Severity Reactions Comments    Erythromycin Base High Anaphylaxis     Sulfa (sulfonamide Antibiotics) High Anaphylaxis     Tetanus Toxoid High Other, Swelling     Tramadol Not Specified Itching     Adhesive Tape-silicones Low Rash tegaderm causes bad skin breakdown            Patient accepts M2B at discharge.     Sources:   [facility]  Pharmacy dispense history  Patient Interview Moderate historian, able to verify PTA med list with prompting  Chart Review  Care Everywhere    Additional Comments:  None      Andrei Leon PharmD  Transitions of Care Pharmacist  03/25/25     Secure Chat preferred   If no response call [phone] or gdgt \"Med Rec\"    "

## 2025-03-25 NOTE — PROGRESS NOTES
"Cardiac Intensive Care - Daily Progress Note   Subjective    Frances Mcdowell is a 54 y.o. year old female patient admitted on 3/24/2025 with following ICU needs: acute saddle PE w/ RHS     Interval History:  No acute events overnight. Tele reviewed, patient in NSR throughout the night. Patient states that she continues to be chest pain free. Also denies lightheadedness, headache, palpitations, shortness of breath. No complaints at this time.    Meds    Scheduled medications  amphetamine-dextroamphetamine, 10 mg, oral, BID  cefuroxime, 500 mg, oral, BID  cholecalciferol, 5,000 Units, oral, Daily  heparin, 10,000 Units, intravenous, Once  levothyroxine, 100 mcg, oral, Daily  pantoprazole, 40 mg, oral, Daily before breakfast  perflutren protein A microsphere, 0.5 mL, intravenous, Once in imaging  predniSONE, 10 mg, oral, Daily  rosuvastatin, 20 mg, oral, Nightly  sulfur hexafluoride microsphr, 2 mL, intravenous, Once in imaging      Continuous medications  heparin, 0-4,500 Units/hr, Last Rate: 1,700 Units/hr (03/25/25 0700)      PRN medications  PRN medications: acetaminophen, hydrOXYzine HCL, oxyCODONE-acetaminophen     Objective    Blood pressure 103/86, pulse 99, temperature 36.4 °C (97.5 °F), resp. rate 25, height 1.6 m (5' 2.99\"), weight 142 kg (313 lb 11.4 oz), SpO2 100%.     Physical Exam   Constitutional: Well-appearing, no acute distress  HEENT: Normocephalic, atraumatic  Cardiovascular: Regular rate and rhythm, no murmurs/rubs/gallops  Pulmonary: Clear to auscultation bilaterally, no wheezes/crackles/rales. Nonlabored work of breathing. On 5L NC  Abdominal: Soft, nontender, nondistended, no rebound/guarding  Extremities: Bilateral lower extremity edema. Bilateral lower extremities w/ wound dressings in place. Warm to touch  Neuro: No focal deficits. Aox4  Psych: Appropriate mood and affect. Anxious.    Intake/Output Summary (Last 24 hours) at 3/25/2025 0756  Last data filed at 3/25/2025 0700  Gross per 24 hour "   Intake 575.32 ml   Output 650 ml   Net -74.68 ml     Labs:   Results from last 72 hours   Lab Units 03/25/25  0009 03/24/25  1241 03/24/25  0752   SODIUM mmol/L 141 139 138   POTASSIUM mmol/L 3.2* 4.0 3.7   CHLORIDE mmol/L 106 104 102   CO2 mmol/L 24 26 27   BUN mg/dL 24* 22 25*   CREATININE mg/dL 0.85 0.95 1.08*   GLUCOSE mg/dL 132* 124* 127*   CALCIUM mg/dL 8.2* 8.4* 8.6   ANION GAP mmol/L 14 13 13   EGFR mL/min/1.73m*2 81 70 60*   PHOSPHORUS mg/dL 3.2 2.6 2.6      Results from last 72 hours   Lab Units 03/25/25  0008 03/24/25  1241 03/24/25  0752   WBC AUTO x10*3/uL 15.6* 18.3* 16.0*   HEMOGLOBIN g/dL 10.3* 11.7* 12.8   HEMATOCRIT % 33.7* 34.3* 39.8   PLATELETS AUTO x10*3/uL 340 320 334   NEUTROS PCT AUTO % 74.1 79.9 83.1   LYMPHS PCT AUTO % 14.9 12.1 9.7   MONOS PCT AUTO % 7.8 6.4 4.8   EOS PCT AUTO % 1.2 0.2 0.5        Micro/ID:     Lab Results   Component Value Date    URINECULTURE MIXED URETHRAL ASHLEY. 06/27/2023    BLOODCULT Loaded on Instrument - Culture in progress 03/24/2025    BLOODCULT Loaded on Instrument - Culture in progress 03/24/2025       Summary of key imaging results from the last 24 hours  TTE w/ Hobbs's sign and RV systolic dysfunction. LE DVT US positive for acute R nonocclusive DVT in mid-femoral, distal femoral, and popliteal veins.    Assessment and Plan     Assessment: Frances Mcdowell is a 54 y.o. year old female patient w/ a PMHx of hypothyroidism, ADHD/Anxiety, BLE lymphedema, recurrent lower extremity cellulitis (prior MRSA and pseudomonas), and GERD admitted on 3/24/2025 for management of acute bilateral saddle PE w/ RHS. PERT team consulted from [facility] prior to transfer to [facility]. Patient started on heparin gtt. Vascular medicine, interventional cardiology following; planning for re-evaluation for thrombectomy after walking pulse ox this afternoon.     Summary for 03/25/25  :  TTE yesterday w/ Hobbs's sign and RV systolic dysfunction. LE DVT US yesterday positive for acute R  nonocclusive DVT in mid-femoral, distal femoral, and popliteal veins.  Troponin peaked at 702, downtrended overnight  Plan for walking pulse ox this afternoon after 24 hours of bedrest beginning on admission    Plan:  NEUROLOGY/PSYCH:  #ADHD  - Continue home Adderall BID      CARDIOVASCULAR:  #HLD  - Continue home crestor 20mg daily     PULMONARY:  #Acute bilateral saddle PE w/ right heart strain  :: , Trop 170 > 256 on admission  :: PESI score 96  :: Hx superficial lower extremity thrombus (dx 8/8/2022)  :: TTE (3/24/2025) w/ Hobbs's sign and RV systolic dysfunction  :: LE DVT US (3/24/2025) positive for acute R nonocclusive DVT in mid-femoral, distal femoral, and popliteal veins  - Heparin gtt  - Trend troponin until peak  - TTE, LE DVT US ordered  - Interventional cardiology consulted for evaluation of thrombectomy. Strict bed rest for 24 hours beginning on admission with re-evaluation for thrombectomy in 24-48 hours after walking pulse ox. Plan for walking pulse ox this afternoon  - Avoid AV nancy blockers  - Vascular medicine following  - Cardiolipin ab (negative), lupus anticoagulant (pending), beta-2 glycoprotein ab (negative) ordered     RENAL/GENITOURINARY:  NEEMA     GASTROENTEROLOGY:  #GERD  - Continue home omeprazole 40mg daily     ENDOCRINOLOGY:  #Hypothyroidism  - Continue home Synthroid 100mcg daily  - Continue home Vitamin D 5000u daily     HEMATOLOGY:  NEEMA     MUSCULOSKELETAL/ SKIN:  NEEMA     INFECTIOUS DISEASE:  #Recurrent BLE cellulitis  :: S/p Vanc/Zosyn x1 in [facility] ED  :: Completing course of cefuroxime 500mg BID, last dose tomorrow  - Will complete course of cefuroxime through 3/25  - Continue home prednisone 10mg daily  - Wound care consulted    ICU Check List     FEN  Fluids: PRN  Electrolytes: PRN  Nutrition: NPO since MN in case of thrombectomy  Prophylaxis:  DVT ppx: Heparin gtt  GI ppx: PPI  Bowel care: None  Hardware:  Catheter: N/A  Access: PIVs  Drains: N/A  Lines:  N/A  Social:  Code: Full Code    HPOA: Kelechi (): [phone]; Lottie (sister): [phone]    Emigdoi Palafox MD   Internal Medicine, PGY-1

## 2025-03-25 NOTE — NURSING NOTE
Patient did walk-in with PT. Starting vitals were HR in low 100s and SPO2 97% on 2L NC. PT got her to chair and then to the doorway and back to chair. Patient was visibly short of breath and stated that she felt it too. HR went up to 122bpm and SPO2 dropped to 93% with good waveform. The waveform wasn't great during this test. Physical therapy saw it drop to 89% but waveform was not adequate to be accurate. CICU team notified. See orders for details.

## 2025-03-25 NOTE — CONSULTS
"Nutrition Initial Assessment:   Nutrition Assessment    Reason for Assessment: Admission nursing screening    Patient is a 54 y.o. female initially presented to [facility] ED after an episode of lightheadedness and noted tachycardia on home pulse ox. She was found with acute saddle PE w/ RHS. PERT team consulted and recommended transfer to [facility] CICU for further management. Also noted with recurrent BLE cellulitis. Completes course of cefuroxime today.     PMH: hypothyroidism, ADHD/Anxiety, BLE lymphedema, recurrent lower extremity cellulitis (prior MRSA and pseudomonas), and GERD     Nutrition History:  Food and Nutrient History: Pt scored a 2 on MST after reporting recent unintentional weight loss of 2-13lbs and poor intake r/t decreased appetite. Met with her and some family members this afternoon. She says for the past week she has eaten \"nil to none.\" She says she has not had taste for about a week, however it has now improved since she has been here. She mentioned wanting Doug for her wounds. She is not interested in any protein shakes. Pt denied any weight loss on RD interview.  Vitamin/Herbal Supplement Use: Vitamin D  Food Allergy: Peanut       Anthropometrics:  Height: 160 cm (5' 2.99\")   Weight: 142 kg (313 lb 11.4 oz)   BMI (Calculated): 55.59  IBW/kg (Dietitian Calculated): 52.3 kg  Percent of IBW: 272 %       Weight History:     Wt Readings from Last 20 Encounters:   03/25/25 142 kg (313 lb 11.4 oz)   03/24/25 127 kg (280 lb)   03/05/25 129 kg (285 lb)   02/21/25 132 kg (290 lb)   02/17/25 129 kg (285 lb)   02/14/25 129 kg (285 lb)   02/03/25 132 kg (290 lb)   01/07/25 132 kg (290 lb)   10/30/24 132 kg (290 lb)   10/13/24 142 kg (313 lb 0.9 oz)   08/16/24 139 kg (306 lb)   08/09/24 135 kg (297 lb 9.9 oz)   05/06/24 137 kg (302 lb)   03/07/24 139 kg (305 lb 8.9 oz)   11/30/23 142 kg (314 lb)   07/27/23 142 kg (314 lb)   07/21/22 (!) 144 kg (317 lb)   01/13/22 (!) 138 kg (305 lb)   01/05/22 (!) 141 kg (310 " lb)   07/08/21 (!) 141 kg (310 lb)         Weight Change %:  Weight History / % Weight Change: At the beginning of 2024, it appeared weight had started to trend slightly downwards. Over the past year, pt had lost ~ 12kg which equates to ~ 8.6% of her body weight.  Significant Weight Loss: No    Nutrition Focused Physical Exam Findings:    Subcutaneous Fat Loss:      Muscle Wasting:     Edema:  Edema: +1 trace  Edema Location: generalized  Physical Findings:  Skin:  (wounds to L and R tibia)    Nutrition Significant Labs:  CBC Trend:   Results from last 7 days   Lab Units 03/25/25  0008 03/24/25  1241 03/24/25  0752   WBC AUTO x10*3/uL 15.6* 18.3* 16.0*   RBC AUTO x10*6/uL 3.35* 3.73* 4.12   HEMOGLOBIN g/dL 10.3* 11.7* 12.8   HEMATOCRIT % 33.7* 34.3* 39.8   MCV fL 101* 92 97   PLATELETS AUTO x10*3/uL 340 320 334    , BMP Trend:   Results from last 7 days   Lab Units 03/25/25  0905 03/25/25  0009 03/24/25  1241 03/24/25  0752   GLUCOSE mg/dL 109* 132* 124* 127*   CALCIUM mg/dL 8.2* 8.2* 8.4* 8.6   SODIUM mmol/L 141 141 139 138   POTASSIUM mmol/L 4.3 3.2* 4.0 3.7   CO2 mmol/L 26 24 26 27   CHLORIDE mmol/L 105 106 104 102   BUN mg/dL 23 24* 22 25*   CREATININE mg/dL 0.91 0.85 0.95 1.08*    , Renal Lab Trend:   Results from last 7 days   Lab Units 03/25/25  0905 03/25/25  0009 03/24/25  1241 03/24/25  0752   POTASSIUM mmol/L 4.3 3.2* 4.0 3.7   PHOSPHORUS mg/dL 3.4 3.2 2.6 2.6   SODIUM mmol/L 141 141 139 138   MAGNESIUM mg/dL  --  1.97 2.21 1.99   EGFR mL/min/1.73m*2 74 81 70 60*   BUN mg/dL 23 24* 22 25*   CREATININE mg/dL 0.91 0.85 0.95 1.08*    , Vit D:   Lab Results   Component Value Date    VITD25 79 11/28/2023    , Vit B12:   Lab Results   Component Value Date    XAQFISAN15 323 11/28/2023        Nutrition Specific Medications:  Scheduled medications  amphetamine-dextroamphetamine, 10 mg, oral, BID  cefuroxime, 500 mg, oral, BID  cholecalciferol, 5,000 Units, oral, Daily  heparin, 10,000 Units, intravenous,  Once  levothyroxine, 100 mcg, oral, Daily  pantoprazole, 40 mg, oral, Daily before breakfast  perflutren protein A microsphere, 0.5 mL, intravenous, Once in imaging  predniSONE, 10 mg, oral, Daily  rosuvastatin, 20 mg, oral, Nightly  sulfur hexafluoride microsphr, 2 mL, intravenous, Once in imaging      Continuous medications  heparin, 0-4,500 Units/hr, Last Rate: 1,700 Units/hr (03/25/25 1251)      PRN medications  PRN medications: acetaminophen, hydrOXYzine HCL, oxyCODONE-acetaminophen      I/O:   Last BM Date: 03/24/25; Stool Appearance: Unable to assess (03/25/25 0800)    Dietary Orders (From admission, onward)       Start     Ordered    03/26/25 0001  NPO Diet Except: Sips with meds; Effective midnight  Diet effective midnight        Question:  Except:  Answer:  Sips with meds    03/25/25 1306    03/25/25 1307  Adult diet Regular  Diet effective now        Question:  Diet type  Answer:  Regular    03/25/25 1306    03/24/25 1251  May Participate in Room Service  ( ROOM SERVICE MAY PARTICIPATE)  Once        Question:  .  Answer:  Yes    03/24/25 1250                     Estimated Needs:   Total Energy Estimated Needs in 24 hours (kCal):  (~2200)  Method for Estimating Needs: MSJ (1832) x 1.2  Total Protein Estimated Needs in 24 Hours (g): 68 g (+)  Method for Estimating 24 Hour Protein Needs: 1.3g/kg IBW +  Total Fluid Estimated Needs in 24 Hours (mL):  (per team)           Nutrition Diagnosis   Malnutrition Diagnosis  Patient has Malnutrition Diagnosis: No    Nutrition Diagnosis  Patient has Nutrition Diagnosis: Yes  Diagnosis Status (1): New  Nutrition Diagnosis 1: Increased nutrient needs  Related to (1): increased metabolic demand  As Evidenced by (1): pulmonary embolism, wound healing       Nutrition Interventions/Recommendations   Nutrition prescription for oral nutrition    Nutrition Recommendations:  Individualized Nutrition Prescription Provided for :     1pkt Doug BID    Daily MVI-M    Check Vitamin  D and supplement if insufficient     Check Zinc levels as pt reported altered taste. Zinc deficiency may also delay wound healing. If deficient would recommend 25-30mg elemental zinc for 10-14 days    Nutrition Interventions/Goals:   Interventions: Medical food supplement  Medical Food Supplement: Commercial food medical food supplement therapy      Education Documentation  No documentation found.            Nutrition Monitoring and Evaluation   Food/Nutrient Related History Monitoring  Monitoring and Evaluation Plan: Intake / amount of food  Intake / Amount of food: Consumes > or equal to 70% of supplement         Biochemical Data, Medical Tests and Procedures  Monitoring and Evaluation Plan: Electrolyte/renal panel, Glucose/endocrine profile  Electrolyte and Renal Panel: Electrolytes within normal limits  Glucose/Endocrine Profile: Glucose within normal limits ( mg/dL)    Physical Exam Findings  Monitoring and Evaluation Plan: Skin  Skin Finding: Impaired wound healing - Improved wound healing    Goal Status: New goal(s) identified    Time Spent (min): 60 minutes

## 2025-03-26 ENCOUNTER — APPOINTMENT (OUTPATIENT)
Dept: WOUND CARE | Facility: HOSPITAL | Age: 55
End: 2025-03-26
Payer: COMMERCIAL

## 2025-03-26 LAB
25(OH)D3 SERPL-MCNC: 60 NG/ML (ref 30–100)
ALBUMIN SERPL BCP-MCNC: 2.9 G/DL (ref 3.4–5)
ANION GAP SERPL CALC-SCNC: 11 MMOL/L (ref 10–20)
BASOPHILS # BLD AUTO: 0.08 X10*3/UL (ref 0–0.1)
BASOPHILS NFR BLD AUTO: 0.6 %
BUN SERPL-MCNC: 24 MG/DL (ref 6–23)
CALCIUM SERPL-MCNC: 8.1 MG/DL (ref 8.6–10.6)
CHLORIDE SERPL-SCNC: 105 MMOL/L (ref 98–107)
CO2 SERPL-SCNC: 27 MMOL/L (ref 21–32)
CREAT SERPL-MCNC: 0.83 MG/DL (ref 0.5–1.05)
EGFRCR SERPLBLD CKD-EPI 2021: 83 ML/MIN/1.73M*2
EOSINOPHIL # BLD AUTO: 0.33 X10*3/UL (ref 0–0.7)
EOSINOPHIL NFR BLD AUTO: 2.4 %
ERYTHROCYTE [DISTWIDTH] IN BLOOD BY AUTOMATED COUNT: 13.9 % (ref 11.5–14.5)
GLUCOSE SERPL-MCNC: 104 MG/DL (ref 74–99)
HCT VFR BLD AUTO: 31.3 % (ref 36–46)
HGB BLD-MCNC: 10.2 G/DL (ref 12–16)
IMM GRANULOCYTES # BLD AUTO: 0.24 X10*3/UL (ref 0–0.7)
IMM GRANULOCYTES NFR BLD AUTO: 1.8 % (ref 0–0.9)
LYMPHOCYTES # BLD AUTO: 3.29 X10*3/UL (ref 1.2–4.8)
LYMPHOCYTES NFR BLD AUTO: 24.3 %
MAGNESIUM SERPL-MCNC: 1.94 MG/DL (ref 1.6–2.4)
MCH RBC QN AUTO: 30.5 PG (ref 26–34)
MCHC RBC AUTO-ENTMCNC: 32.6 G/DL (ref 32–36)
MCV RBC AUTO: 94 FL (ref 80–100)
MONOCYTES # BLD AUTO: 0.99 X10*3/UL (ref 0.1–1)
MONOCYTES NFR BLD AUTO: 7.3 %
NEUTROPHILS # BLD AUTO: 8.62 X10*3/UL (ref 1.2–7.7)
NEUTROPHILS NFR BLD AUTO: 63.6 %
NRBC BLD-RTO: 0 /100 WBCS (ref 0–0)
PHOSPHATE SERPL-MCNC: 3.7 MG/DL (ref 2.5–4.9)
PLATELET # BLD AUTO: 322 X10*3/UL (ref 150–450)
POTASSIUM SERPL-SCNC: 4.3 MMOL/L (ref 3.5–5.3)
RBC # BLD AUTO: 3.34 X10*6/UL (ref 4–5.2)
SODIUM SERPL-SCNC: 139 MMOL/L (ref 136–145)
UFH PPP CHRO-ACNC: 0.7 IU/ML
WBC # BLD AUTO: 13.6 X10*3/UL (ref 4.4–11.3)

## 2025-03-26 PROCEDURE — 80069 RENAL FUNCTION PANEL: CPT

## 2025-03-26 PROCEDURE — 83735 ASSAY OF MAGNESIUM: CPT

## 2025-03-26 PROCEDURE — 97530 THERAPEUTIC ACTIVITIES: CPT | Mod: GO

## 2025-03-26 PROCEDURE — 2500000001 HC RX 250 WO HCPCS SELF ADMINISTERED DRUGS (ALT 637 FOR MEDICARE OP)

## 2025-03-26 PROCEDURE — 82306 VITAMIN D 25 HYDROXY: CPT

## 2025-03-26 PROCEDURE — 97165 OT EVAL LOW COMPLEX 30 MIN: CPT | Mod: GO

## 2025-03-26 PROCEDURE — 99232 SBSQ HOSP IP/OBS MODERATE 35: CPT | Performed by: NURSE PRACTITIONER

## 2025-03-26 PROCEDURE — 2500000002 HC RX 250 W HCPCS SELF ADMINISTERED DRUGS (ALT 637 FOR MEDICARE OP, ALT 636 FOR OP/ED)

## 2025-03-26 PROCEDURE — 1100000001 HC PRIVATE ROOM DAILY

## 2025-03-26 PROCEDURE — 99233 SBSQ HOSP IP/OBS HIGH 50: CPT

## 2025-03-26 PROCEDURE — 83036 HEMOGLOBIN GLYCOSYLATED A1C: CPT | Performed by: PHYSICIAN ASSISTANT

## 2025-03-26 PROCEDURE — 36415 COLL VENOUS BLD VENIPUNCTURE: CPT

## 2025-03-26 PROCEDURE — 84630 ASSAY OF ZINC: CPT

## 2025-03-26 PROCEDURE — 85025 COMPLETE CBC W/AUTO DIFF WBC: CPT

## 2025-03-26 PROCEDURE — 2500000004 HC RX 250 GENERAL PHARMACY W/ HCPCS (ALT 636 FOR OP/ED)

## 2025-03-26 PROCEDURE — 85520 HEPARIN ASSAY: CPT

## 2025-03-26 PROCEDURE — 99291 CRITICAL CARE FIRST HOUR: CPT

## 2025-03-26 RX ORDER — WARFARIN SODIUM 5 MG/1
5 TABLET ORAL ONCE
Status: DISCONTINUED | OUTPATIENT
Start: 2025-03-26 | End: 2025-03-26

## 2025-03-26 RX ORDER — WARFARIN SODIUM 5 MG/1
5 TABLET ORAL DAILY
Status: DISCONTINUED | OUTPATIENT
Start: 2025-03-26 | End: 2025-03-28 | Stop reason: HOSPADM

## 2025-03-26 RX ADMIN — WARFARIN SODIUM 5 MG: 5 TABLET ORAL at 18:28

## 2025-03-26 RX ADMIN — ACETAMINOPHEN 650 MG: 325 TABLET ORAL at 12:30

## 2025-03-26 RX ADMIN — OXYCODONE HYDROCHLORIDE AND ACETAMINOPHEN 1 TABLET: 5; 325 TABLET ORAL at 18:28

## 2025-03-26 RX ADMIN — CHOLECALCIFEROL TAB 25 MCG (1000 UNIT) 5000 UNITS: 25 TAB at 09:19

## 2025-03-26 RX ADMIN — HEPARIN SODIUM 1700 UNITS/HR: 10000 INJECTION, SOLUTION INTRAVENOUS at 22:24

## 2025-03-26 RX ADMIN — HEPARIN SODIUM 1700 UNITS/HR: 10000 INJECTION, SOLUTION INTRAVENOUS at 06:06

## 2025-03-26 RX ADMIN — PREDNISONE 10 MG: 10 TABLET ORAL at 09:19

## 2025-03-26 RX ADMIN — ROSUVASTATIN CALCIUM 20 MG: 20 TABLET, FILM COATED ORAL at 21:07

## 2025-03-26 RX ADMIN — LEVOTHYROXINE SODIUM 100 MCG: 0.1 TABLET ORAL at 06:00

## 2025-03-26 RX ADMIN — PANTOPRAZOLE SODIUM 40 MG: 40 TABLET, DELAYED RELEASE ORAL at 06:00

## 2025-03-26 RX ADMIN — OXYCODONE HYDROCHLORIDE AND ACETAMINOPHEN 1 TABLET: 5; 325 TABLET ORAL at 12:30

## 2025-03-26 RX ADMIN — OXYCODONE HYDROCHLORIDE AND ACETAMINOPHEN 1 TABLET: 5; 325 TABLET ORAL at 06:00

## 2025-03-26 RX ADMIN — ACETAMINOPHEN 650 MG: 325 TABLET ORAL at 18:28

## 2025-03-26 ASSESSMENT — PAIN SCALES - GENERAL
PAINLEVEL_OUTOF10: 0 - NO PAIN
PAINLEVEL_OUTOF10: 3
PAINLEVEL_OUTOF10: 0 - NO PAIN
PAINLEVEL_OUTOF10: 0 - NO PAIN
PAINLEVEL_OUTOF10: 4
PAINLEVEL_OUTOF10: 7
PAINLEVEL_OUTOF10: 0 - NO PAIN
PAINLEVEL_OUTOF10: 7
PAINLEVEL_OUTOF10: 0 - NO PAIN
PAINLEVEL_OUTOF10: 0 - NO PAIN
PAINLEVEL_OUTOF10: 5 - MODERATE PAIN
PAINLEVEL_OUTOF10: 0 - NO PAIN
PAINLEVEL_OUTOF10: 5 - MODERATE PAIN

## 2025-03-26 ASSESSMENT — COGNITIVE AND FUNCTIONAL STATUS - GENERAL
DRESSING REGULAR LOWER BODY CLOTHING: A LITTLE
TOILETING: A LITTLE
DAILY ACTIVITIY SCORE: 21
HELP NEEDED FOR BATHING: A LITTLE

## 2025-03-26 ASSESSMENT — PAIN - FUNCTIONAL ASSESSMENT
PAIN_FUNCTIONAL_ASSESSMENT: 0-10

## 2025-03-26 ASSESSMENT — PAIN DESCRIPTION - LOCATION
LOCATION: GENERALIZED
LOCATION: LEG

## 2025-03-26 ASSESSMENT — ACTIVITIES OF DAILY LIVING (ADL)
ADL_ASSISTANCE: INDEPENDENT
BATHING_ASSISTANCE: STAND BY

## 2025-03-26 ASSESSMENT — PAIN DESCRIPTION - DESCRIPTORS
DESCRIPTORS: ACHING;SORE
DESCRIPTORS: ACHING;SORE

## 2025-03-26 NOTE — PROGRESS NOTES
Frances Mcdowell is a 54 y.o. female on day 2 of admission.   Medical history significant for DHD, anxiety, GERD, hypothyroidism, lymphedema of BLE, obesity with BMI 55, recurrent lower extremity cellulitis with prior MRSA and pseudomonas infections.   Presented to [facility] 3/24/25 with tachycardia and SOB. CTA chest imaging showed nonocclusive saddle embolus over the bifurcation of the main PA, with large emboli in bilateral lobes that are partially occlusive in the bilateral lower lobes.  RV strain noted on CTA chest.   Transferred to [facility] as part of PERT call recommendations.  Underwent BLE US that showed a DVT in the right mid-distal femoral veins as well as right popliteal veins. No evidence of DVT in the left leg.   TTE completed 3/24/25 that showed normal LVSF with EF 65%, reduced RVSF, evidence of Hobbs's sign, moderately enlarged RV. ELEVATED RVSP TO 62 MMHG C/W UNDERLYING RIGHT SIDE HTN     Vascular Medicine Service following for anticoagulation recommendations.   Continues with Heparin infusion.    Subjective   Patient expresses frustration from not knowing what the plan is for thrombectomy.  Reports that she walked half way around the CICU and back to her room with mild SOB when returning to her room.  Denies CP or bleeding.      Objective     Physical Exam  Sitting up in bed in NAD.   Skin warm and dry; dressing to LLE is dry and intact, and Tubi  applied to BLE from ankle to upper calf.   Respirations full and regular with breath sounds CTA all anterior lung fields; Supplemental O2 2 liters via NC  Normal heart rate with RRR; cardiac monitor shows NSR without witnessed ectopy; BP taken at left wrist.   Extremities are warm to touch with palpable bilateral radial and DP pulses; left upper arm with swelling noted above the elbow, with an area of ecchymosis that encircles the upper arm at the level just above the elbow; swelling noted of BLE from tops of feet to mid calf, with report of tenderness  "noted with gentle palpation.  Patient is awake and alert, participates in conversation; mood appropriate for situation.     Last Recorded Vitals  Blood pressure 141/89, pulse 104, temperature 35.7 °C (96.3 °F), temperature source Temporal, resp. rate 21, height 1.6 m (5' 2.99\"), weight 142 kg (313 lb 15 oz), SpO2 99%.  Intake/Output last 3 Shifts:  I/O last 3 completed shifts:  In: 1314.3 (9.2 mL/kg) [P.O.:740; I.V.:574.3 (4 mL/kg)]  Out: 650 (4.6 mL/kg) [Urine:650 (0.1 mL/kg/hr)]  Weight: 142.4 kg     Relevant Results  Scheduled medications  amphetamine-dextroamphetamine, 10 mg, oral, BID  cholecalciferol, 5,000 Units, oral, Daily  heparin, 10,000 Units, intravenous, Once  levothyroxine, 100 mcg, oral, Daily  pantoprazole, 40 mg, oral, Daily before breakfast  perflutren protein A microsphere, 0.5 mL, intravenous, Once in imaging  predniSONE, 10 mg, oral, Daily  rosuvastatin, 20 mg, oral, Nightly  sulfur hexafluoride microsphr, 2 mL, intravenous, Once in imaging      Continuous medications  heparin, 0-4,500 Units/hr, Last Rate: 1,700 Units/hr (03/26/25 1100)      Results from last 7 days   Lab Units 03/26/25  0349 03/25/25  0008 03/24/25  1241   WBC AUTO x10*3/uL 13.6* 15.6* 18.3*   HEMOGLOBIN g/dL 10.2* 10.3* 11.7*   HEMATOCRIT % 31.3* 33.7* 34.3*   PLATELETS AUTO x10*3/uL 322 340 320       Results from last 7 days   Lab Units 03/26/25  0349 03/25/25  0905 03/25/25  0009   SODIUM mmol/L 139 141 141   POTASSIUM mmol/L 4.3 4.3 3.2*   CHLORIDE mmol/L 105 105 106   CO2 mmol/L 27 26 24   BUN mg/dL 24* 23 24*   CREATININE mg/dL 0.83 0.91 0.85   GLUCOSE mg/dL 104* 109* 132*   CALCIUM mg/dL 8.1* 8.2* 8.2*   Estimated Creatinine Clearance: 105.4 mL/min (by C-G formula based on SCr of 0.83 mg/dL).      Results from last 7 days   Lab Units 03/26/25  0549 03/25/25  0352 03/25/25  0007   ANTI XA UNFRACTIONATED IU/mL 0.7 0.5 0.4                            Assessment/Plan   Assessment & Plan  Pulmonary embolism    54 year old " "female with medical history as described above.  Accepted as direct transfer from [facility] to the [facility] CICU for management of intermediate-high risk submassive saddle PE with extensive clot burden and right heart strain.  Underwent BLE US that showed acute DVT of right mid-distal femoral and popliteal veins.   Vascular Medicine Service following for anticoagulation recommendations; unsure if this VTE is provoked from inflammation from the BLE cellulitis, or if it is an idiopathic event.   Continues with Heparin infusion.   Review of labs today shows stable hemoglobin 10.2 grams, stable platelets 322 K, and stable serum creatinine 0.83, with albumin 2.9 grams.  Anticardiolipin and Beta-2 Glycoprotein Abs returned as negative; awaiting LAC results.   Discussion with patient: will need to bridge to Warfarin therapy when Cardiology Team feels ready to start bridging process, ideally this evening;  can have INR monitoring at either [facility] or [facility] Coumadin Clinics depending upon patient choice; reviewed booklet \"Your Guide to Taking Warfarin\" with patient and discussed food sources of vitamin K.   Discussion with CICU resident: no plan for thrombectomy today; will reinstate diet; plan to transfer to floor today       Date    INR     Warfarin Dose     Comments   3/24     1.1      none given         INR baseline  3/26     ---           5mg               Start bridge to Warfarin     Recommendations:  ~CONTINUE Heparin infusion; follow nomogram to achieve therapeutic assay 0.3-0.7  ~Monitor for bleeding  ~START bridge to Warfarin with initial dose of 5mg this evening.   ~Please consult Dietitian for meal planning and discussion of Vitamin K sources in setting of new Warfarin therapy.  ~Will need a minimum of 5 days of overlap therapy with Heparin and Warfarin; goal INR is 2-3; when INR is >2 for 2 days in a row, Heparin can be stopped.    ~Outpatient Coumadin Clinic will need to be arranged prior to hospital " "discharge with initial appointment scheduled for 1-2 days after discharge; patient will need to choose between [facility] or [facility] for Coumadin Clinic location; use the order \"Referral to Anticoagulation Monitoring Service [facility]\" to start the process.   ~Indefinite duration of anticoagulation.   ~I will coordinate outpatient follow up with the Vascular Medicine Service.     Patient seen and examined with Dr. Butler  Plan of care discussed with Dr. Butler  Plan of care discussed with [facility].      UTE Herring-CNP  Vascular Medicine Service  Pager [phone]              "

## 2025-03-26 NOTE — HOSPITAL COURSE
Frances Mcdowell is a 54 y.o. year old female patient w/ a PMHx of hypothyroidism, ADHD/Anxiety, BLE lymphedema, s/p hysterectomy, recurrent lower extremity cellulitis (prior MRSA and pseudomonas), and GERD admitted to the [facility] CICU on 3/24/25 as a transfer from [facility] for management of acute bilateral saddle PE w/ RHS.      [facility] course:  Initially presented to ED after episode of lightheadedness and tachycardia on home pulse ox. Denied chest pain or palpitations. O2 sat 88% on room air (improved to 98% on 2L NC), HR 130s-140s, /114. ECG w/ sinus tachycardia.  and trop 170 > 256. Aspirin loaded, started on a heparin gtt, and given a dose of vanc/zosyn. CT PE performed demonstrating acute saddle PE w/ RHS. PERT team consulted and recommended transfer to [facility] CICU for further management.     [facility] course:  Asymptomatic on 2-5L NC; troponin peaked at 700 then downtrended. TTE with evidence of Hobbs's sign and RV systolic dysfunction. Interventional cardiology following and assessing need for thrombectomy daily via walking pulse ox tests. Vascular medicine also following and recommending bridge to Warfarin due to high BMI after invasive testing/procedures completed or ruled out. Remains hemodynamically stable without acute intervention and thus transferred to HVI floor service.     Bridging to warfarin with IV anticoagulation to therapeutic INR 2 - 3  Bridge with fondaparinux     Appointments made for [facility] coumadin clinic new patient   Follow up with wound care and PCP and vascualar medicine     Seen and discussed with Dr Kulkarni    [FreeTextEntry1] : Doing well. Denies chest pain, shortness of breath or palpitations. Walks regularly for exercise.

## 2025-03-26 NOTE — PROGRESS NOTES
Subjective Data:  - Overall stable, emotional but pleased with current plan of heparin gtt  - Denies shortness of breath/chest pain, walked 1 lap of CICU    Overnight Events:    NAEON  She remains stable in CICU on heparin gtt and varying RA/2L NC  Transfer to nursing floor today per Dr. Arthur     History of Presenting Illness:  Ms. Mcdowell initially presented to [facility] on 3/24/25 for shortness of breath. On arrival to ED tachycardic HR 120s, elevated troponins 170 -> 256, . Leukocytosis WBC 16. She was commenced on IV Vanc/Zosyn, CT PE revealed bilateral PE with saddle embolus and right heart strain. She was started on a high intensity heparin gtt, a PERT was called and she was transferred to [facility] CICU.     On arrival to CICU, her heparin gtt was maintained. Given hemodynamic stability, downtrending oxygen requirements, improved pulse pressure, and in no acute distress, clinical decision made to continue heparin gtt in line with Vascular Medicine recommendations and re-assess thrombectomy indication 24 hours later. Ms. Mcdowell continued to improve and thus continued with medical management, thrombectomy forgone.    She has an additional PMH of ADD (on Adderall), anxiety, HLD, hypothyroid, and GERD. She has cellulitis wounds of her lower extremity secondary to hot tub use with open blister, follows with wound management with prior oral ABX, intermittently reluctant to allow staff to assess due to pain with dressing changes, wound care following.     Objective Data:  Last Recorded Vitals:  Vitals:    03/26/25 0800 03/26/25 0900 03/26/25 1000 03/26/25 1100   BP: 97/83 110/85 134/76    BP Location:       Patient Position:       Pulse: 99 97 97    Resp: 22 20 19    Temp:    35.7 °C (96.3 °F)   TempSrc:    Temporal   SpO2: 97% 99% 98%    Weight:       Height:           Last Labs:  CBC - 3/26/2025:  3:49 AM  13.6 10.2 322    31.3      CMP - 3/26/2025:  3:49 AM  8.1 6.7 14 --- 0.4   3.7 2.9 12 116      PTT -  3/24/2025: 12:41 PM  1.1   12.6 194     TROPHS   Date/Time Value Ref Range Status   03/25/2025 12:07  0 - 34 ng/L Final     Comment:     Previous result verified on 3/24/2025 1645 on specimen/case [id number] called with component TRPHS for procedure Troponin I, High Sensitivity with value 606 ng/L.   03/24/2025 07:09  0 - 34 ng/L Final     Comment:     Previous result verified on 3/24/2025 1645 on specimen/case [id number] called with component TRPHS for procedure Troponin I, High Sensitivity with value 606 ng/L.   03/24/2025 12:41  0 - 34 ng/L Final   03/24/2025 08:49  0 - 13 ng/L Final     Comment:     Previous result verified on 3/24/2025 0824 on specimen/case [id number] called with component TRPHS for procedure Troponin I, High Sensitivity, Initial with value 170 ng/L.   03/24/2025 07:52  0 - 13 ng/L Final   08/05/2024 09:47 AM 93 0 - 13 ng/L Final     Comment:     Previous result verified on 8/5/2024 0645 on specimen/case [id number] called with component TRPHS for procedure Troponin I, High Sensitivity with value 103 ng/L.     BNP   Date/Time Value Ref Range Status   03/24/2025 07:52  0 - 99 pg/mL Final   08/05/2024 05:38  0 - 99 pg/mL Final     HGBA1C   Date/Time Value Ref Range Status   11/28/2023 06:38 AM 5.3 see below % Final     LDLCALC   Date/Time Value Ref Range Status   11/28/2023 06:38  <=99 mg/dL Final     Comment:                                 Near   Borderline      AGE      Desirable  Optimal    High     High     Very High     0-19 Y     0 - 109     ---    110-129   >/= 130     ----    20-24 Y     0 - 119     ---    120-159   >/= 160     ----      >24 Y     0 -  99   100-129  130-159   160-189     >/=190       VLDL   Date/Time Value Ref Range Status   11/28/2023 06:38 AM 18 0 - 40 mg/dL Final   07/21/2022 07:10 AM 15 0 - 40 mg/dL Final   02/19/2019 10:05 AM 18 0 - 40 mg/dL Final      Last I/O:  I/O last 3 completed shifts:  In:  1314.3 (9.2 mL/kg) [P.O.:740; I.V.:574.3 (4 mL/kg)]  Out: 650 (4.6 mL/kg) [Urine:650 (0.1 mL/kg/hr)]  Weight: 142.4 kg     Past Cardiology Tests (Last 3 Years):  EKG:  Electrocardiogram, 12-lead PRN ACS symptoms 03/24/2025      ECG 12 lead 03/24/2025      ECG 12 lead 08/05/2024    Echo:  Transthoracic Echo (TTE) Complete 03/24/2025      Transthoracic Echo (TTE) Complete 08/05/2024    Ejection Fractions:  EF   Date/Time Value Ref Range Status   03/24/2025 03:15 PM 65 %    08/05/2024 02:00 PM 63 %      Cath:  No results found for this or any previous visit from the past 1095 days.    Stress Test:  No results found for this or any previous visit from the past 1095 days.    Cardiac Imaging:  No results found for this or any previous visit from the past 1095 days.      Inpatient Medications:  Scheduled medications   Medication Dose Route Frequency    amphetamine-dextroamphetamine  10 mg oral BID    cholecalciferol  5,000 Units oral Daily    heparin  10,000 Units intravenous Once    levothyroxine  100 mcg oral Daily    pantoprazole  40 mg oral Daily before breakfast    perflutren protein A microsphere  0.5 mL intravenous Once in imaging    predniSONE  10 mg oral Daily    rosuvastatin  20 mg oral Nightly    sulfur hexafluoride microsphr  2 mL intravenous Once in imaging     PRN medications   Medication    acetaminophen    hydrOXYzine HCL    oxyCODONE-acetaminophen     Continuous Medications   Medication Dose Last Rate    heparin  0-4,500 Units/hr 1,700 Units/hr (03/26/25 0606)       Physical Exam:  Physical Exam  Constitutional:       General: She is not in acute distress.     Appearance: Normal appearance. She is obese.   HENT:      Head: Normocephalic and atraumatic.      Mouth/Throat:      Mouth: Mucous membranes are moist.      Pharynx: Oropharynx is clear.   Eyes:      Extraocular Movements: Extraocular movements intact.      Conjunctiva/sclera: Conjunctivae normal.      Pupils: Pupils are equal, round, and reactive  to light.   Cardiovascular:      Rate and Rhythm: Normal rate and regular rhythm.      Heart sounds: Normal heart sounds.   Pulmonary:      Effort: Pulmonary effort is normal. No respiratory distress.      Breath sounds: Normal breath sounds. No wheezing.   Chest:      Chest wall: No tenderness.   Abdominal:      General: Abdomen is flat. Bowel sounds are normal.      Palpations: Abdomen is soft.      Tenderness: There is no abdominal tenderness.   Musculoskeletal:         General: Normal range of motion.      Cervical back: Normal range of motion and neck supple.      Right lower leg: No edema.      Left lower leg: No edema.   Skin:     General: Skin is warm and dry.      Capillary Refill: Capillary refill takes 2 to 3 seconds.      Findings: Bruising present.      Comments: L upper arm ecchymosis/edema secondary to IV infiltration   Neurological:      General: No focal deficit present.      Mental Status: She is alert and oriented to person, place, and time. Mental status is at baseline.   Psychiatric:         Mood and Affect: Mood normal.         Behavior: Behavior normal.         Thought Content: Thought content normal.         Judgment: Judgment normal.     Leg Wounds 3/24               Assessment/Plan   Ms. Frances Mcdowell is a 53 yo F who presents today with bilateral saddle PE with right heart strain. She initially presented to [facility] where her PE was identified she was started on Vanc/Zosyn and promptly transferred to [facility] CICU following a PERT call.     Patient with submassive PE and R heart strain as above. Hemodynamics improved upon arrival to the CICU. She previously had a narrow pulse pressure at outside hospital and initially here in the CICU, but her pulse pressure became normal ( over 90 mmHg) after altering cuff size and location to one more consistent with patient's size. On arrival to CICU she was comfortable and in no acute distress, the case with discussed with Vascular Medicine Staff,  Dr. Butler, plan to manage conservatively with heparin gtt and forgo thrombectomy.     Acute bilateral saddle PE with RHS  - Case discussed with CICU and Dr. Arthur  - Continue conservative management with Heparin gtt and transition to oral anticoagulation per Vascular Medicine recs   Repeat TTE 3/24 revealed moderately enlarged RV with reduced RV systolic function, normal LVEF 65%, + McConnells sign, moderate TR  - May transfer to regular nursing floor  - No indication for thrombectomy at this time but will continually assess indication  - Repeat walking pulse oximetry test 3/27/25  - EVLS to continue to follow, thank you    Peripheral IV 03/24/25 20 G Left Antecubital (Active)   Site Assessment Clean;Dry;Intact 03/26/25 0400   Dressing Type Transparent 03/26/25 0400   Line Status Blood return noted;Flushed;Saline locked 03/26/25 0400   Dressing Status Clean;Dry;Occlusive 03/26/25 0400   Number of days: 2       Peripheral IV 03/25/25 22 G Right Antecubital (Active)   Site Assessment Clean;Dry;Intact 03/26/25 0400   Dressing Type Transparent 03/26/25 0400   Line Status Infusing 03/26/25 0400   Dressing Status Clean;Dry;Occlusive 03/26/25 0400   Number of days: 1       Code Status:  Full Code    I spent 60 minutes in the professional and overall care of this patient      UTE Huertas-CNP  Endovascular & Limb Salvage Service   Days: [facility] Team or page [phone]  [id number] or [id number]

## 2025-03-26 NOTE — CARE PLAN
Problem: Skin  Goal: Decreased wound size/increased tissue granulation at next dressing change  Outcome: Progressing  Flowsheets (Taken 3/25/2025 1930 by Ander Hernandez, RN)  Decreased wound size/increased tissue granulation at next dressing change:   Protective dressings over bony prominences   Promote sleep for wound healing  Goal: Participates in plan/prevention/treatment measures  Outcome: Progressing  Flowsheets (Taken 3/25/2025 1930 by Ander Hernandez, RN)  Participates in plan/prevention/treatment measures: Elevate heels  Goal: Prevent/manage excess moisture  Outcome: Progressing  Flowsheets (Taken 3/25/2025 1930 by Ander Hernandez, RN)  Prevent/manage excess moisture:   Moisturize dry skin   Follow provider orders for dressing changes   Monitor for/manage infection if present  Goal: Prevent/minimize sheer/friction injuries  Outcome: Progressing  Flowsheets (Taken 3/25/2025 2200)  Prevent/minimize sheer/friction injuries: Use pull sheet  Goal: Promote/optimize nutrition  Outcome: Progressing  Flowsheets (Taken 3/25/2025 1930 by Ander Hernandez, RN)  Promote/optimize nutrition: Monitor/record intake including meals  Goal: Promote skin healing  Outcome: Progressing  Flowsheets (Taken 3/25/2025 1930 by Ander Hernandez, RN)  Promote skin healing:   Assess skin/pad under line(s)/device(s)   Protective dressings over bony prominences   Turn/reposition every 2 hours/use positioning/transfer devices     Problem: Pain  Goal: Takes deep breaths with improved pain control throughout the shift  Outcome: Progressing  Goal: Turns in bed with improved pain control throughout the shift  Outcome: Progressing  Goal: Walks with improved pain control throughout the shift  Outcome: Progressing  Goal: Performs ADL's with improved pain control throughout shift  Outcome: Progressing  Goal: Participates in PT with improved pain control throughout the shift  Outcome: Progressing  Goal: Free from opioid side effects throughout the shift  Outcome:  Progressing  Goal: Free from acute confusion related to pain meds throughout the shift  Outcome: Progressing     Problem: Fall/Injury  Goal: Not fall by end of shift  Outcome: Progressing  Goal: Be free from injury by end of the shift  Outcome: Progressing  Goal: Verbalize understanding of personal risk factors for fall in the hospital  Outcome: Progressing  Goal: Verbalize understanding of risk factor reduction measures to prevent injury from fall in the home  Outcome: Progressing  Goal: Use assistive devices by end of the shift  Outcome: Progressing  Goal: Pace activities to prevent fatigue by end of the shift  Outcome: Progressing     Problem: Pain - Adult  Goal: Verbalizes/displays adequate comfort level or baseline comfort level  Outcome: Progressing     Problem: Safety - Adult  Goal: Free from fall injury  Outcome: Progressing     Problem: Discharge Planning  Goal: Discharge to home or other facility with appropriate resources  Outcome: Progressing     Problem: Chronic Conditions and Co-morbidities  Goal: Patient's chronic conditions and co-morbidity symptoms are monitored and maintained or improved  Outcome: Progressing     Problem: Nutrition  Goal: Nutrient intake appropriate for maintaining nutritional needs  Outcome: Progressing       The clinical goals for the shift include pt will remain HDS throughout the shift and free from falls

## 2025-03-26 NOTE — PROGRESS NOTES
ICU to Sin Transfer Summary     I:  ICU Admission Reason & Brief ICU Course:    Frances Mcdowell is a 54 y.o. year old female patient w/ a PMHx of hypothyroidism, ADHD/Anxiety, BLE lymphedema, recurrent lower extremity cellulitis (prior MRSA and pseudomonas), and GERD admitted to the [facility] CICU on 3/24/2025 as a transfer from [facility] for management of acute bilateral saddle PE w/ RHS.     She initially presented to [facility] ED after an episode of lightheadedness and noted tachycardia on home pulse ox. Denied chest pain and palpitations during this time. Initial vitals in the ED were notable for O2 sat 88% on room air (improved to 98% on 2L NC), HR in the 130s-140s, RR 20, and /114. ECG w/ sinus tachycardia.  and trop 170 > 256. She was aspirin loaded, started on a heparin gtt, and given a dose of vanc/zosyn. CT PE performed demonstrating acute saddle PE w/ RHS. PERT team consulted and recommended transfer to [facility] CICU for further management.    At [facility], patient remained asymptomatic on 2-5L NC; troponin peaks at 700 then downtrended. TTE with evidence of Hobbs's sign and RV systolic dysfunction. LE DVT US notable for RLE DVT. Interventional cardiology following and assessing need for thrombectomy daily via walking pulse ox tests. Vascular medicine also following and recommending bridge to Warfarin after invasive testing/procedures completed or ruled out. Patient remaining asymptomatic and stable, showing daily improvement in walking pulse ox. She is otherwise stable to be transferred to the floor w/ daily pulse ox and interventional cardiology following.      C: Code Status/DPOA Info/Goals of Care/ACP Note    Full Code  DPOA/Contact Number: Kelechi (): [phone]; Lottie (sister): [phone]     U: Unprescribing & Pertinent High-Risk Medications    Changes to home meds: None     Anticoagulation: Heparin gtt    Antibiotics:   [x] N/A - no current planned antimicrobials    P: Pending  Tests at the Time of Transfer   Daily re-evaluation of need for thrombectomy with walking pulse ox (interventional cardiology following)      A: Active consultants, including Rehab:   []  Subspecialty Consultants: interventional cardiology, vascular medicine  [x]  PT  [x]  OT  []  SLP  [x]  Wound Care    U: Uncertainty Measure/Diagnostic Pause:    Working diagnosis at the time of transfer submassive acute bilateral saddle PE w/ right heart strain     Diagnosis Degree of Certainty: 1. High degree of certainty about the clinical diagnosis.     S: Summary of Major Problems and To-Dos:   NEUROLOGY/PSYCH:  #ADHD  - Continue home Adderall BID      CARDIOVASCULAR:  #HLD  - Continue home crestor 20mg daily     PULMONARY:  #Acute bilateral saddle PE w/ right heart strain  :: , Trop 170 > 256 on admission  :: PESI score 96  :: Hx superficial lower extremity thrombus (dx 8/8/2022)  :: TTE (3/24/2025) w/ Hobbs's sign and RV systolic dysfunction  :: LE DVT US (3/24/2025) positive for acute R nonocclusive DVT in mid-femoral, distal femoral, and popliteal veins  :: Troponin peaked to ~300  - Heparin gtt  - TTE w/ evidence of RV systolic dysfunction, LE DVT US w/ RLE DVT  - Interventional cardiology consulted for evaluation of thrombectomy. Daily walking pulse ox for evaluation.  - Avoid AV nancy blockers  - Vascular medicine following. Plan for bridge to Warfarin  - Cardiolipin ab (negative), lupus anticoagulant (pending), beta-2 glycoprotein ab (negative) ordered     RENAL/GENITOURINARY:  NEEMA     GASTROENTEROLOGY:  #GERD  - Continue home omeprazole 40mg daily     ENDOCRINOLOGY:  #Hypothyroidism  - Continue home Synthroid 100mcg daily  - Continue home Vitamin D 5000u daily     HEMATOLOGY:  NEEMA     MUSCULOSKELETAL/ SKIN:  NEEMA     INFECTIOUS DISEASE:  #Recurrent BLE cellulitis  :: S/p Vanc/Zosyn x1 in [facility] ED  :: Completing course of cefuroxime 500mg BID, last dose tomorrow  - Completed course of cefuroxime through 3/25  -  Continue home prednisone 10mg daily  - Wound care consulted    To-do list prior to transfer:  [] Daily walking O2 tests  [] Bridge to warfarin once intervention completed/no longer indicated, per vascular medicine     E: Exam, including Lines/Drains/Airways & Data Review:   Physical Exam  Constitutional: Well-appearing, no acute distress  HEENT: Normocephalic, atraumatic  Cardiovascular: Tachycardic, regular rhythm, no murmurs/rubs/gallops  Pulmonary: Clear to auscultation bilaterally, no wheezes/crackles/rales. Nonlabored work of breathing. On 5L NC  Abdominal: Soft, nontender, nondistended, no rebound/guarding  Extremities: Bilateral lower extremity edema. Bilateral lower extremities w/ wound dressings in place. Warm to touch  Neuro: No focal deficits. Aox4  Psych: Appropriate mood and affect. Anxious.    Within 30 minutes of the patient physically leaving the floor, a Floor Readiness Note needs to be placed with updated vitals.

## 2025-03-26 NOTE — PROGRESS NOTES
Occupational Therapy    Evaluation and Treatment    Patient Name: Frances Mcdowell  MRN: [id number]  Today's Date: 3/26/2025  Room: 03/03  Time Calculation  Start Time: 0908  Stop Time: 0939  Time Calculation (min): 31 min    Assessment  IP OT Assessment  OT Assessment: Pt is a  53yo  female presenting with deficits in ADLs, IADLs, functional activity tolerance, transfers, bed mobility and functional mobility. Pt would benefit from skilled OT intervention to return to PLOF. Pt demonstrates ability to perform transfers with CGA, but is limited this date by functional activity tolerance. Pt is appropriate for LOW intensity OT intervention at this time to return to highest PLOF safely.     Prognosis: Good  Barriers to Discharge Home: No anticipated barriers  Evaluation/Treatment Tolerance: Patient tolerated treatment well  Medical Staff Made Aware: Yes  End of Session Communication: Bedside nurse  End of Session Patient Position: Up in chair, Alarm off, not on at start of session  Plan:  Inpatient Plan  Treatment Interventions: ADL retraining, Functional transfer training, UE strengthening/ROM, Endurance training, Compensatory technique education, Equipment evaluation/education  OT Frequency: 2 times per week  OT Discharge Recommendations: Low intensity level of continued care  Equipment Recommended upon Discharge:  (none)  OT Recommended Transfer Status: Stand by assist  OT - OK to Discharge: Yes  OT Assessment  OT Assessment Results: Decreased ADL status, Decreased endurance, Decreased IADLs, Decreased functional mobility  Prognosis: Good  Evaluation/Treatment Tolerance: Patient tolerated treatment well  Medical Staff Made Aware: Yes  Strengths: Ability to acquire knowledge, Attitude of self, Capable of completing ADLs semi/independent, Housing layout, Living arrangement secure, Insight into problems, Support and attitude of living partners  Barriers to Participation: Comorbidities    Subjective   Current Problem:  1.  Pulmonary embolism  Lower extremity venous duplex bilateral    Lower extremity venous duplex bilateral    Transthoracic Echo (TTE) Complete    Transthoracic Echo (TTE) Complete    CANCELED: Case Request Cath Lab: Pulmonary Angiogram    CANCELED: Case Request Cath Lab: Pulmonary Angiogram    CANCELED: Invasive vascular procedure    CANCELED: Invasive vascular procedure    CANCELED: Transthoracic Echo (TTE) Complete    CANCELED: Transthoracic Echo (TTE) Complete        General:  Reason for Referral: Presented at OSH after episode of lightheadedness and noted tachycardia. Pt transferred 3/24 for management of bilateral saddle PE with right heart strain. positive for acute R nonocclusive DVT in mid-femoral, distal femoral, and popliteal veins.  Past Medical History Relevant to Rehab: hypothyroidism, ADHD/Anxiety, BLE lymphedema, recurrent lower extremity cellulitis (prior MRSA and pseudomonas), and GERD  Prior to Session Communication: Bedside nurse  Patient Position Received: Bed, 3 rail up, Alarm off, not on at start of session  Family/Caregiver Present: No  General Comment: Pt supine in bed upon entry to room, appearing frustrated with questions regarding home set up and symptoms, participatory in session. On NC   Precautions:  Medical Precautions: Cardiac precautions, Oxygen therapy device and L/min, Fall precautions, Infection precautions  Vital Signs:   Date/Time Vitals Session Patient Position Pulse Resp SpO2 BP MAP (mmHg)    03/26/25 1300 --  --  108  20  96 %  112/82  92     03/26/25 1400 --  --  --  --  92 %  --  --           Pain:  Pain Assessment  Pain Assessment: 0-10  0-10 (Numeric) Pain Score: 0 - No pain (no pain at rest, endorses 9/10 leg pain following activity, repositioned, RN giving meds)  Lines/Tubes/Drains:  External Urinary Catheter Female (Active)   Number of days: 1         Objective   Cognition:  Overall Cognitive Status: Within Functional Limits  Arousal/Alertness: Appropriate responses to  stimuli  Orientation Level: Oriented X4  Following Commands: Follows one step commands with increased time  Cognition Comments: Variable emotions throughout  Insight: Mild  Impulsive: Mildly  Processing Speed: Delayed           Home Living:  Type of Home: House  Lives With: Spouse, Adult children (Adult child is autisitc)  Home Adaptive Equipment: None  Home Layout: Two level  Home Access: Ramped entrance  Bathroom Shower/Tub: Walk-in shower  Bathroom Equipment: Grab bars in shower   Prior Function:  Level of [place]: Independent with ADLs and functional transfers, Independent with homemaking with ambulation  Receives Help From:  (spouse)  ADL Assistance: Independent  Homemaking Assistance: Independent  Ambulatory Assistance: Independent (community ambulator no AD. Pt reports no falls.)  Vocational: Full time employment (Nurse)  Leisure: Being outside  Hand Dominance: Right  Prior Function Comments: no falls, drives,  IADL History:     ADL:  Eating Assistance: Independent (anticipated)  Grooming Assistance: Independent (anticipated)  Bathing Assistance: Stand by (anticipated)  UE Dressing Assistance: Stand by (anticipated)  LE Dressing Assistance: Stand by (anticipated)  Toileting Assistance with Device: Stand by (anticipated)  Activity Tolerance:  Endurance: Tolerates 10 - 20 min exercise with multiple rests  Early Mobility/Exercise Safety Screen: Proceed with mobilization - No exclusion criteria met  Balance:     Bed Mobility/Transfers: Bed Mobility/Transfers: Bed Mobility  Bed Mobility: Yes  Bed Mobility 1  Bed Mobility 1: Supine to sitting  Level of Assistance 1: Close supervision   and Transfers  Transfer: Yes  Transfer 1  Transfer From 1: Bed to  Transfer to 1: Stand  Technique 1: Sit to stand  Transfer Level of Assistance 1: Contact guard (x1)  Trials/Comments 1: cues for hand placement  Transfers 2  Transfer From 2: Stand to  Transfer to 2: Chair with arms  Technique 2: Stand to sit  Transfer Level of  Assistance 2: Contact guard  IADL's:      Vision: Vision - Basic Assessment  Current Vision: No visual deficits   and    Sensation:  Light Touch:  (BUE WFL)  Sensation Comment: Unable to perform full assesssment 2/2 leg wounds/dressing. Intact B feet.  Strength:  Strength Comments: BUE WFL  Perception:     Coordination:  Movements are Fluid and Coordinated: Yes   Hand Function:  Hand Function  Gross Grasp: Functional  Extremities:   RUE   RUE : Within Functional Limits, LUE   LUE: Within Functional Limits, RLE   RLE : Within Functional Limits, and LLE   LLE : Within Functional Limits    Treatment Completed on Evaluation      Therapy/Activity:     Therapeutic Activity  Therapeutic Activity Performed: Yes  Therapeutic Activity 1: Tolerates sitting EOB x4 minutes with supervision prior to progressing to OOB  Therapeutic Activity 2: Functional mobility in sims x6 minutes with close monitoring of sats and HR. HR increased to 130s max with walking. Breathing became more audible but pt reports 0/10 exertion and denies feeling like she was breathing heavy       Outcome Measures: [facility] Daily Activity  Putting on and taking off regular lower body clothing: A little  Bathing (including washing, rinsing, drying): A little  Putting on and taking off regular upper body clothing: None  Toileting, which includes using toilet, bedpan or urinal: A little  Taking care of personal grooming such as brushing teeth: None  Eating Meals: None  Daily Activity - Total Score: 21    Confusion Assessment Method-ICU (CAM-ICU)  Feature 1: Acute Onset or Fluctuating Course: Negative  Feature 2: Inattention: Negative  Feature 4: Disorganized Thinking: Negative  Overall CAM-ICU: Negative   ICU Mobility Screen  Early Mobility/Exercise Safety Screen: Proceed with mobilization - No exclusion criteria met  ICU Mobility Scale: Walking with assistance of 1 person,          Education Documentation  Body Mechanics, taught by Prema Kapoor OT at 3/26/2025   2:18 PM.  Learner: Patient  Readiness: Acceptance  Method: Explanation  Response: Needs Reinforcement  Comment: OT POC, activity pacing    Precautions, taught by Prema Kapoor OT at 3/26/2025  2:18 PM.  Learner: Patient  Readiness: Acceptance  Method: Explanation  Response: Needs Reinforcement  Comment: OT POC, activity pacing    ADL Training, taught by Prema Kapoor OT at 3/26/2025  2:18 PM.  Learner: Patient  Readiness: Acceptance  Method: Explanation  Response: Needs Reinforcement  Comment: OT POC, activity pacing    Education Comments  No comments found.        Goals:   Encounter Problems       Encounter Problems (Active)       ADLs       Patient with complete lower body dressing with independent level of assistance donning and doffing all LE clothes  with PRN adaptive equipment while edge of bed  (Progressing)       Start:  03/26/25    Expected End:  04/16/25            Patient will complete toileting including hygiene clothing management/hygiene with independent level of assistance. (Progressing)       Start:  03/26/25    Expected End:  04/16/25               EXERCISE/STRENGTHENING       Pt will demonstrate I with energy conservation techniques to increase functional activity tolerance to x15+ min without rest breaks/while VSS (Progressing)       Start:  03/26/25    Expected End:  04/16/25               MOBILITY       Patient will perform Functional mobility  Household distances/Community Distances with independent level of assistance and least restrictive device in order to improve safety and functional mobility. (Progressing)       Start:  03/26/25    Expected End:  04/16/25 03/26/25 at 2:20 PM   Prema Kapoor OT   Rehab Office: [phone]

## 2025-03-26 NOTE — NURSING NOTE
Walking pulse ox  0912: BP before starting 120/86(97)   Pulse ox 98%     Pt ambulated out of room and up the hallway. During walk pt appearing short of breath and breathing fast. Pt self reported no shortness of breath. Highest HR recorded during walk 126. During end of walk pulse ox reading 85%, but pulse ox waveform not reliable.  0938: BP after 146/97(114)   Pulse ox 98%      Pt continuing to appear short of breath and breathing fast, but self reporting no shortness of breath.

## 2025-03-27 LAB
ALBUMIN SERPL BCP-MCNC: 3 G/DL (ref 3.4–5)
ALBUMIN SERPL BCP-MCNC: 3.5 G/DL (ref 3.4–5)
ANION GAP SERPL CALC-SCNC: 12 MMOL/L (ref 10–20)
ANION GAP SERPL CALC-SCNC: 14 MMOL/L (ref 10–20)
BASOPHILS # BLD AUTO: 0.09 X10*3/UL (ref 0–0.1)
BASOPHILS NFR BLD AUTO: 0.7 %
BUN SERPL-MCNC: 24 MG/DL (ref 6–23)
BUN SERPL-MCNC: 25 MG/DL (ref 6–23)
CALCIUM SERPL-MCNC: 8.9 MG/DL (ref 8.6–10.6)
CALCIUM SERPL-MCNC: 9.1 MG/DL (ref 8.6–10.6)
CHLORIDE SERPL-SCNC: 104 MMOL/L (ref 98–107)
CHLORIDE SERPL-SCNC: 106 MMOL/L (ref 98–107)
CO2 SERPL-SCNC: 25 MMOL/L (ref 21–32)
CO2 SERPL-SCNC: 25 MMOL/L (ref 21–32)
CREAT SERPL-MCNC: 0.75 MG/DL (ref 0.5–1.05)
CREAT SERPL-MCNC: 0.89 MG/DL (ref 0.5–1.05)
DRVVT SCREEN TO CONFIRM RATIO: 1.08 RATIO
DRVVT/DRVVT CFM NRMLZD PPP-RTO: 1.01 RATIO
DRVVT/DRVVT CFM P DOAC NEUT NORM PPP-RTO: 1.07 RATIO
EGFRCR SERPLBLD CKD-EPI 2021: 76 ML/MIN/1.73M*2
EGFRCR SERPLBLD CKD-EPI 2021: >90 ML/MIN/1.73M*2
EOSINOPHIL # BLD AUTO: 0.38 X10*3/UL (ref 0–0.7)
EOSINOPHIL NFR BLD AUTO: 2.8 %
ERYTHROCYTE [DISTWIDTH] IN BLOOD BY AUTOMATED COUNT: 13.9 % (ref 11.5–14.5)
ERYTHROCYTE [DISTWIDTH] IN BLOOD BY AUTOMATED COUNT: 14.1 % (ref 11.5–14.5)
EST. AVERAGE GLUCOSE BLD GHB EST-MCNC: 108 MG/DL
GLUCOSE SERPL-MCNC: 120 MG/DL (ref 74–99)
GLUCOSE SERPL-MCNC: 79 MG/DL (ref 74–99)
HBA1C MFR BLD: 5.4 %
HCT VFR BLD AUTO: 33.9 % (ref 36–46)
HCT VFR BLD AUTO: 39.8 % (ref 36–46)
HGB BLD-MCNC: 10.6 G/DL (ref 12–16)
HGB BLD-MCNC: 12 G/DL (ref 12–16)
IMM GRANULOCYTES # BLD AUTO: 0.3 X10*3/UL (ref 0–0.7)
IMM GRANULOCYTES NFR BLD AUTO: 2.2 % (ref 0–0.9)
INR PPP: 1 (ref 0.9–1.1)
LA 2 SCREEN W REFLEX-IMP: NORMAL
LYMPHOCYTES # BLD AUTO: 3.63 X10*3/UL (ref 1.2–4.8)
LYMPHOCYTES NFR BLD AUTO: 26.6 %
MAGNESIUM SERPL-MCNC: 2.01 MG/DL (ref 1.6–2.4)
MCH RBC QN AUTO: 29.9 PG (ref 26–34)
MCH RBC QN AUTO: 31.4 PG (ref 26–34)
MCHC RBC AUTO-ENTMCNC: 30.2 G/DL (ref 32–36)
MCHC RBC AUTO-ENTMCNC: 31.3 G/DL (ref 32–36)
MCV RBC AUTO: 100 FL (ref 80–100)
MCV RBC AUTO: 99 FL (ref 80–100)
MONOCYTES # BLD AUTO: 0.57 X10*3/UL (ref 0.1–1)
MONOCYTES NFR BLD AUTO: 4.2 %
NEUTROPHILS # BLD AUTO: 8.66 X10*3/UL (ref 1.2–7.7)
NEUTROPHILS NFR BLD AUTO: 63.5 %
NORMALIZED SCT PPP-RTO: 0.79 RATIO
NRBC BLD-RTO: 0 /100 WBCS (ref 0–0)
NRBC BLD-RTO: 0 /100 WBCS (ref 0–0)
PHOSPHATE SERPL-MCNC: 3.5 MG/DL (ref 2.5–4.9)
PHOSPHATE SERPL-MCNC: 3.5 MG/DL (ref 2.5–4.9)
PLATELET # BLD AUTO: 344 X10*3/UL (ref 150–450)
PLATELET # BLD AUTO: 389 X10*3/UL (ref 150–450)
POTASSIUM SERPL-SCNC: 3.6 MMOL/L (ref 3.5–5.3)
POTASSIUM SERPL-SCNC: 4.1 MMOL/L (ref 3.5–5.3)
PROTHROMBIN TIME: 11 SECONDS (ref 9.8–12.4)
RBC # BLD AUTO: 3.38 X10*6/UL (ref 4–5.2)
RBC # BLD AUTO: 4.01 X10*6/UL (ref 4–5.2)
SILICA CLOTTING TIME CONFIRMATION: 0.9 RATIO
SILICA CLOTTING TIME SCREEN: 0.71 RATIO
SODIUM SERPL-SCNC: 139 MMOL/L (ref 136–145)
SODIUM SERPL-SCNC: 139 MMOL/L (ref 136–145)
UFH PPP CHRO-ACNC: 0.6 IU/ML
WBC # BLD AUTO: 13.6 X10*3/UL (ref 4.4–11.3)
WBC # BLD AUTO: 14.5 X10*3/UL (ref 4.4–11.3)
ZINC SERPL-MCNC: 55.7 UG/DL (ref 60–120)

## 2025-03-27 PROCEDURE — 99232 SBSQ HOSP IP/OBS MODERATE 35: CPT

## 2025-03-27 PROCEDURE — 85025 COMPLETE CBC W/AUTO DIFF WBC: CPT | Performed by: STUDENT IN AN ORGANIZED HEALTH CARE EDUCATION/TRAINING PROGRAM

## 2025-03-27 PROCEDURE — 2500000004 HC RX 250 GENERAL PHARMACY W/ HCPCS (ALT 636 FOR OP/ED): Mod: JZ,TB

## 2025-03-27 PROCEDURE — 85610 PROTHROMBIN TIME: CPT | Performed by: STUDENT IN AN ORGANIZED HEALTH CARE EDUCATION/TRAINING PROGRAM

## 2025-03-27 PROCEDURE — 80069 RENAL FUNCTION PANEL: CPT | Performed by: PHYSICIAN ASSISTANT

## 2025-03-27 PROCEDURE — 99232 SBSQ HOSP IP/OBS MODERATE 35: CPT | Performed by: INTERNAL MEDICINE

## 2025-03-27 PROCEDURE — 1100000001 HC PRIVATE ROOM DAILY

## 2025-03-27 PROCEDURE — 36415 COLL VENOUS BLD VENIPUNCTURE: CPT | Performed by: PHYSICIAN ASSISTANT

## 2025-03-27 PROCEDURE — 2500000004 HC RX 250 GENERAL PHARMACY W/ HCPCS (ALT 636 FOR OP/ED): Performed by: PHYSICIAN ASSISTANT

## 2025-03-27 PROCEDURE — 83735 ASSAY OF MAGNESIUM: CPT | Performed by: STUDENT IN AN ORGANIZED HEALTH CARE EDUCATION/TRAINING PROGRAM

## 2025-03-27 PROCEDURE — RXMED WILLOW AMBULATORY MEDICATION CHARGE

## 2025-03-27 PROCEDURE — 85520 HEPARIN ASSAY: CPT | Performed by: STUDENT IN AN ORGANIZED HEALTH CARE EDUCATION/TRAINING PROGRAM

## 2025-03-27 PROCEDURE — 80069 RENAL FUNCTION PANEL: CPT | Performed by: STUDENT IN AN ORGANIZED HEALTH CARE EDUCATION/TRAINING PROGRAM

## 2025-03-27 PROCEDURE — 2500000004 HC RX 250 GENERAL PHARMACY W/ HCPCS (ALT 636 FOR OP/ED): Performed by: STUDENT IN AN ORGANIZED HEALTH CARE EDUCATION/TRAINING PROGRAM

## 2025-03-27 PROCEDURE — 2500000002 HC RX 250 W HCPCS SELF ADMINISTERED DRUGS (ALT 637 FOR MEDICARE OP, ALT 636 FOR OP/ED): Performed by: STUDENT IN AN ORGANIZED HEALTH CARE EDUCATION/TRAINING PROGRAM

## 2025-03-27 PROCEDURE — 2500000001 HC RX 250 WO HCPCS SELF ADMINISTERED DRUGS (ALT 637 FOR MEDICARE OP): Performed by: STUDENT IN AN ORGANIZED HEALTH CARE EDUCATION/TRAINING PROGRAM

## 2025-03-27 PROCEDURE — 36415 COLL VENOUS BLD VENIPUNCTURE: CPT | Performed by: STUDENT IN AN ORGANIZED HEALTH CARE EDUCATION/TRAINING PROGRAM

## 2025-03-27 PROCEDURE — 99233 SBSQ HOSP IP/OBS HIGH 50: CPT | Performed by: STUDENT IN AN ORGANIZED HEALTH CARE EDUCATION/TRAINING PROGRAM

## 2025-03-27 PROCEDURE — 85027 COMPLETE CBC AUTOMATED: CPT | Performed by: PHYSICIAN ASSISTANT

## 2025-03-27 RX ORDER — HYDROMORPHONE HYDROCHLORIDE 0.2 MG/ML
INJECTION INTRAMUSCULAR; INTRAVENOUS; SUBCUTANEOUS
Status: COMPLETED
Start: 2025-03-27 | End: 2025-03-27

## 2025-03-27 RX ORDER — FONDAPARINUX SODIUM 10 MG/.8ML
10 INJECTION SUBCUTANEOUS EVERY 24 HOURS
Status: DISCONTINUED | OUTPATIENT
Start: 2025-03-27 | End: 2025-03-28 | Stop reason: HOSPADM

## 2025-03-27 RX ORDER — FONDAPARINUX SODIUM 10 MG/.8ML
10 INJECTION SUBCUTANEOUS DAILY
Qty: 24 ML | Refills: 0 | Status: SHIPPED | OUTPATIENT
Start: 2025-03-27 | End: 2025-04-27

## 2025-03-27 RX ORDER — HYDROMORPHONE HYDROCHLORIDE 0.2 MG/ML
0.2 INJECTION INTRAMUSCULAR; INTRAVENOUS; SUBCUTANEOUS ONCE
Status: COMPLETED | OUTPATIENT
Start: 2025-03-27 | End: 2025-03-27

## 2025-03-27 RX ORDER — HYDROMORPHONE HYDROCHLORIDE 0.2 MG/ML
0.2 INJECTION INTRAMUSCULAR; INTRAVENOUS; SUBCUTANEOUS ONCE
Status: CANCELLED | OUTPATIENT
Start: 2025-03-27

## 2025-03-27 RX ADMIN — ACETAMINOPHEN 650 MG: 325 TABLET ORAL at 20:26

## 2025-03-27 RX ADMIN — OXYCODONE HYDROCHLORIDE AND ACETAMINOPHEN 1 TABLET: 5; 325 TABLET ORAL at 02:24

## 2025-03-27 RX ADMIN — FONDAPARINUX SODIUM 10 MG: 10 INJECTION, SOLUTION SUBCUTANEOUS at 17:04

## 2025-03-27 RX ADMIN — LEVOTHYROXINE SODIUM 100 MCG: 0.1 TABLET ORAL at 07:00

## 2025-03-27 RX ADMIN — PREDNISONE 10 MG: 10 TABLET ORAL at 08:46

## 2025-03-27 RX ADMIN — PANTOPRAZOLE SODIUM 40 MG: 40 TABLET, DELAYED RELEASE ORAL at 07:00

## 2025-03-27 RX ADMIN — ROSUVASTATIN CALCIUM 20 MG: 20 TABLET, FILM COATED ORAL at 20:26

## 2025-03-27 RX ADMIN — OXYCODONE HYDROCHLORIDE AND ACETAMINOPHEN 1 TABLET: 5; 325 TABLET ORAL at 08:46

## 2025-03-27 RX ADMIN — OXYCODONE HYDROCHLORIDE AND ACETAMINOPHEN 1 TABLET: 5; 325 TABLET ORAL at 14:15

## 2025-03-27 RX ADMIN — ACETAMINOPHEN 650 MG: 325 TABLET ORAL at 02:24

## 2025-03-27 RX ADMIN — HEPARIN SODIUM 1700 UNITS/HR: 10000 INJECTION, SOLUTION INTRAVENOUS at 13:45

## 2025-03-27 RX ADMIN — HYDROMORPHONE HYDROCHLORIDE 0.2 MG: 0.2 INJECTION, SOLUTION INTRAMUSCULAR; INTRAVENOUS; SUBCUTANEOUS at 16:07

## 2025-03-27 RX ADMIN — HYDROMORPHONE HYDROCHLORIDE 0.2 MG: 0.2 INJECTION INTRAMUSCULAR; INTRAVENOUS; SUBCUTANEOUS at 16:07

## 2025-03-27 RX ADMIN — WARFARIN SODIUM 5 MG: 5 TABLET ORAL at 18:14

## 2025-03-27 RX ADMIN — OXYCODONE HYDROCHLORIDE AND ACETAMINOPHEN 1 TABLET: 5; 325 TABLET ORAL at 20:26

## 2025-03-27 RX ADMIN — ACETAMINOPHEN 650 MG: 325 TABLET ORAL at 14:17

## 2025-03-27 RX ADMIN — CHOLECALCIFEROL TAB 25 MCG (1000 UNIT) 5000 UNITS: 25 TAB at 08:45

## 2025-03-27 ASSESSMENT — COGNITIVE AND FUNCTIONAL STATUS - GENERAL
DAILY ACTIVITIY SCORE: 24
MOBILITY SCORE: 24

## 2025-03-27 ASSESSMENT — PAIN DESCRIPTION - ORIENTATION: ORIENTATION: RIGHT;LEFT

## 2025-03-27 ASSESSMENT — PAIN SCALES - GENERAL
PAINLEVEL_OUTOF10: 7
PAINLEVEL_OUTOF10: 10 - WORST POSSIBLE PAIN
PAINLEVEL_OUTOF10: 8
PAINLEVEL_OUTOF10: 8
PAINLEVEL_OUTOF10: 5 - MODERATE PAIN

## 2025-03-27 ASSESSMENT — PAIN DESCRIPTION - DESCRIPTORS: DESCRIPTORS: ACHING;DISCOMFORT

## 2025-03-27 ASSESSMENT — PAIN DESCRIPTION - LOCATION: LOCATION: LEG

## 2025-03-27 NOTE — PROGRESS NOTES
Interval events:    Transferred out of CICU    Subjective:  Feels overwhelmed with new medical problems. Works as an RN with [employer]    Today in brief:  - Stable oxygenation as discussed with endovascular and vascular medicine  - Bridging IV AC to warfarin to therapeutic INR   - Start fondaparinux (discontinue heparin gtt 1 hr post initiation). 0% copay   - Need INR stability with bridging for another 1-3 days as inpatient  - Referral placed to [facility] location (may need updating pending DC plans. Earliest they can do intake is 4.1). Ordered to go through PCP Devaughn Moreno MD for monitoring  - Consult dietician for vitamin K diet education  - Hold home  prednisone since it is likely not assisting with wound healing and is  contributing to swelling  - Appreciate wound team recommendations    Objective:  Vitals:    03/27/25 0734   BP: 118/85   Pulse: 94   Resp: 20   Temp: 36.7 °C (98.1 °F)   SpO2: 97%     Weight         3/24/2025  1245 3/24/2025  1600 3/25/2025  0029 3/26/2025  0000    Weight: 127 kg (279 lb 15.8 oz) 141 kg (310 lb 10.1 oz) 142 kg (313 lb 11.4 oz) 142 kg (313 lb 15 oz)            Intake/Output Summary (Last 24 hours) at 3/27/2025 0812  Last data filed at 3/27/2025 0447  Gross per 24 hour   Intake 593.32 ml   Output 300 ml   Net 293.32 ml     No results found for this or any previous visit (from the past 24 hours).  Inpatient Medications:  Scheduled medications   Medication Dose Route Frequency    amphetamine-dextroamphetamine  10 mg oral BID    cholecalciferol  5,000 Units oral Daily    heparin  10,000 Units intravenous Once    levothyroxine  100 mcg oral Daily    pantoprazole  40 mg oral Daily before breakfast    perflutren protein A microsphere  0.5 mL intravenous Once in imaging    predniSONE  10 mg oral Daily    rosuvastatin  20 mg oral Nightly    sulfur hexafluoride microsphr  2 mL intravenous Once in imaging    warfarin  5 mg oral Daily     PRN medications   Medication     acetaminophen    hydrOXYzine HCL    oxyCODONE-acetaminophen     Continuous Medications   Medication Dose Last Rate    heparin  0-4,500 Units/hr 1,700 Units/hr (03/27/25 6457)       Telemetry 3/27/2025 (personally reviewed): SR 70-90s    Physical exam:  General: NAD, obese  Head/ neck: atraumatic  Cardiac: RRR, regular S1 S2 , no murmur, no rub, no gallop  Pulm: CTA bilaterally, no wheezes, rales or rhonchi.    Vascular: Radial 2+ bilaterally  GI: Non distended  Extremities: Severe bilateral lower extremity edema  Neuro: no focal neuro deficits   Psych: appropriate mood and behavior   Skin: warm and dry, tattoos on upper arms    Assessment/Plan   Frances Mcdowell is a 54 y.o. year old female patient w/ a PMHx of hypothyroidism, ADHD/Anxiety, BLE lymphedema, s/p hysterectomy, recurrent lower extremity cellulitis (prior MRSA and pseudomonas), and GERD admitted to the [facility] CICU on 3/24/25 as a transfer from [facility] for management of acute bilateral saddle PE w/ RHS.     [facility] course:  Initially presented to ED after episode of lightheadedness and tachycardia on home pulse ox. Denied chest pain or palpitations. O2 sat 88% on room air (improved to 98% on 2L NC), HR 130s-140s, /114. ECG w/ sinus tachycardia.  and trop 170 > 256. Aspirin loaded, started on a heparin gtt, and given a dose of vanc/zosyn. CT PE performed demonstrating acute saddle PE w/ RHS. PERT team consulted and recommended transfer to [facility] CICU for further management.    [facility] course:  Asymptomatic on 2-5L NC; troponin peaked at 700 then downtrended. TTE with evidence of Hobbs's sign and RV systolic dysfunction. Interventional cardiology following and assessing need for thrombectomy daily via walking pulse ox tests. Vascular medicine also following and recommending bridge to Warfarin due to high BMI after invasive testing/procedures completed or ruled out. Remains hemodynamically stable without acute intervention and thus  transferred to I floor service.     Acute bilateral saddle PE w/ right heart strain  Acute DVT of R lower extremity (femoral, popliteal)  - Hx superficial lower extremity thrombus (dx 8/8/2022)  - Unsure if this VTE is provoked from inflammation from the BLE cellulitis, or if it is an idiopathic event.   - TTE (3/24/2025) w/ Hobbs's sign and RV systolic dysfunction  - TTE w/ evidence of RV systolic dysfunction, LE DVT US w/ RLE DVT  - Interventional cardiology consulted for evaluation of thrombectomy.  No acute intervention pursued given hemodynamic stability. Daily walking pulse ox for evaluation.  - Vascular medicine following.   - Cardiolipin ab, lupus anticoagulant,, beta-2 glycoprotein ab negative  - Bridging IV AC to warfarin to therapeutic INR (No DOAC given BMI per vascular medicine). Will need a minimum of 5 days of overlap therapy with fondaparinux/heparin and Warfarin; goal INR is 2-3; when INR is >2 for 2 days in a row, Heparin/fondaparinuxcan be stopped.    - start fondaparinux (discontinue heparin gtt 1 hr post initiation). 0% copay (less IV beeping/interruptions and earlier DC)  - Referral placed to [facility] location (may need updating pending DC plans. Earliest they can do intake is 4.1). Ordered to go through PCP Devaughn Moreno MD for monitoring  - 3/26 Started Warfarin 5mg x1 > INR 1.0  - Continue warfarin 5mg tonight  - Need INR stability with bridging for another 1-3 days as inpatient  - Consult dietician for vitamin K diet education    Obesity  Severe bilateral lower extremity Lymphaedema  Bilateral lower extremity cellulitis 2/2 pseudomonas   Chronic lower extremity wounds  - S/p Vanc/Zosyn x1 in OSH ED  - Completed course of cefuroxime through 3/25  - Blood cultures 3/26: NG@2D  - Hold home prednisone (given for cellulitis inflammation) since it is likely not assisting with wound healing and is  contributing to swelling  - Wound care following. Instructions updated  3/27  - Sees outpt wound clinic weekly  - Hgb A1c WNL  - Appreciate wound team recommendations    ADHD  - Continue home Adderall BID      HLD  - Continue home crestor 20mg daily     GERD  - Continue home omeprazole 40mg daily     Hypothyroidism  - Continue home Synthroid 100mcg daily  - Continue home Vitamin D 5000u daily     DVT ppx: heparin gtt  DISPO: Pending anticoagulation bridging/ INR. Possibly this weekend. Earliest INR clinic evaluation can be 4/1  Code status: Full Code    Patient seen and discussed with Dr. Ean Kulkarni.  Leonarda Landrum PA-C

## 2025-03-27 NOTE — PROGRESS NOTES
Subjective Data:  - Overall stable, emotional and tearful. Reporting she feels scared related to current situation, additional time spent with Ms. Mcdowell, reassured, and consulted Spiritual Care  - Denies shortness of breath/chest pain, sitting in chair at time of this assessment, walking pulse ox details below    Overnight Events:    Transferred to LT7 from CICU  Supplemental oxygen no longer required    History of Presenting Illness:  Ms. Mcdowell initially presented to [facility] on 3/24/25 for shortness of breath. On arrival to ED tachycardic HR 120s, elevated troponins 170 -> 256, . Leukocytosis WBC 16. She was commenced on IV Vanc/Zosyn, CT PE revealed bilateral PE with saddle embolus and right heart strain. She was started on a high intensity heparin gtt, a PERT was called and she was transferred to [facility] CICU.     On arrival to CICU, her heparin gtt was maintained. Given hemodynamic stability, downtrending oxygen requirements, improved pulse pressure, and in no acute distress, clinical decision made to continue heparin gtt in line with Vascular Medicine recommendations and re-assess thrombectomy indication 24 hours later. Ms. Mcdowell continued to improve and thus continued with medical management, thrombectomy forgone. She was transferred from CICU to LT7 on 3/27 where she remains stable and tolerating heparin gtt with plan, per Dr. Butler, to transition to Coumadin. BLE Duplex revealed acute non-occlusive DVT in RLE in mid femoral, distal femoral, and popliteal veins (already on heparin therapy).    She has an additional PMH of ADD (on Adderall), anxiety, HLD, hypothyroid, and GERD. She has cellulitis wounds of her lower extremity secondary to hot tub use with open blister, follows with wound management with prior oral ABX, intermittently reluctant to allow staff to assess due to pain with dressing changes, wound care following.     Objective Data:  Last Recorded Vitals:  Vitals:    03/27/25 0041 03/27/25  0457 03/27/25 0734 03/27/25 1030   BP: 111/75 110/68 118/85 (!) 121/95   Pulse: 97 93 94 94   Resp: 18 18 20 20   Temp: 36.3 °C (97.3 °F) 36.2 °C (97.2 °F) 36.7 °C (98.1 °F) 36.6 °C (97.9 °F)   TempSrc:       SpO2: 93% 93% 97% 97%   Weight:       Height:           Last Labs:  CBC - 3/27/2025:  8:55 AM  13.6 12.0 389    39.8      CMP - 3/27/2025:  8:55 AM  9.1 6.7 14 --- 0.4   3.5 3.5 12 116      PTT - 3/24/2025: 12:41 PM  1.0   11.0 194     TROPHS   Date/Time Value Ref Range Status   03/25/2025 12:07  0 - 34 ng/L Final     Comment:     Previous result verified on 3/24/2025 1645 on specimen/case [id number] called with component TRPHS for procedure Troponin I, High Sensitivity with value 606 ng/L.   03/24/2025 07:09  0 - 34 ng/L Final     Comment:     Previous result verified on 3/24/2025 1645 on specimen/case [id number] called with component TRPHS for procedure Troponin I, High Sensitivity with value 606 ng/L.   03/24/2025 12:41  0 - 34 ng/L Final   03/24/2025 08:49  0 - 13 ng/L Final     Comment:     Previous result verified on 3/24/2025 0824 on specimen/case [id number] called with component TRPHS for procedure Troponin I, High Sensitivity, Initial with value 170 ng/L.   03/24/2025 07:52  0 - 13 ng/L Final   08/05/2024 09:47 AM 93 0 - 13 ng/L Final     Comment:     Previous result verified on 8/5/2024 0645 on specimen/case [id number] called with component TRPHS for procedure Troponin I, High Sensitivity with value 103 ng/L.     BNP   Date/Time Value Ref Range Status   03/24/2025 07:52  0 - 99 pg/mL Final   08/05/2024 05:38  0 - 99 pg/mL Final     HGBA1C   Date/Time Value Ref Range Status   03/26/2025 03:49 AM 5.4 See comment % Final   11/28/2023 06:38 AM 5.3 see below % Final     LDLCALC   Date/Time Value Ref Range Status   11/28/2023 06:38  <=99 mg/dL Final     Comment:                                 Near   Borderline      AGE      Desirable   Optimal    High     High     Very High     0-19 Y     0 - 109     ---    110-129   >/= 130     ----    20-24 Y     0 - 119     ---    120-159   >/= 160     ----      >24 Y     0 -  99   100-129  130-159   160-189     >/=190       VLDL   Date/Time Value Ref Range Status   11/28/2023 06:38 AM 18 0 - 40 mg/dL Final   07/21/2022 07:10 AM 15 0 - 40 mg/dL Final   02/19/2019 10:05 AM 18 0 - 40 mg/dL Final      Last I/O:  I/O last 3 completed shifts:  In: 951.3 (6.7 mL/kg) [P.O.:360; I.V.:591.3 (4.2 mL/kg)]  Out: 475 (3.3 mL/kg) [Urine:475 (0.1 mL/kg/hr)]  Weight: 142.4 kg     Past Cardiology Tests (Last 3 Years):  EKG:  Electrocardiogram, 12-lead PRN ACS symptoms 03/24/2025      ECG 12 lead 03/24/2025      ECG 12 lead 08/05/2024    Echo:  Transthoracic Echo (TTE) Complete 03/24/2025      Transthoracic Echo (TTE) Complete 08/05/2024    Ejection Fractions:  EF   Date/Time Value Ref Range Status   03/24/2025 03:15 PM 65 %    08/05/2024 02:00 PM 63 %      Cath:  No results found for this or any previous visit from the past 1095 days.    Stress Test:  No results found for this or any previous visit from the past 1095 days.    Cardiac Imaging:  No results found for this or any previous visit from the past 1095 days.      Inpatient Medications:  Scheduled medications   Medication Dose Route Frequency    amphetamine-dextroamphetamine  10 mg oral BID    cholecalciferol  5,000 Units oral Daily    heparin  10,000 Units intravenous Once    levothyroxine  100 mcg oral Daily    pantoprazole  40 mg oral Daily before breakfast    perflutren protein A microsphere  0.5 mL intravenous Once in imaging    predniSONE  10 mg oral Daily    rosuvastatin  20 mg oral Nightly    sulfur hexafluoride microsphr  2 mL intravenous Once in imaging    warfarin  5 mg oral Daily     PRN medications   Medication    acetaminophen    hydrOXYzine HCL    oxyCODONE-acetaminophen     Continuous Medications   Medication Dose Last Rate    heparin  0-4,500 Units/hr  1,700 Units/hr (03/27/25 8410)       Physical Exam:  Physical Exam  Constitutional:       General: She is not in acute distress.     Appearance: Normal appearance. She is obese.   HENT:      Head: Normocephalic and atraumatic.      Mouth/Throat:      Mouth: Mucous membranes are moist.      Pharynx: Oropharynx is clear.   Eyes:      Extraocular Movements: Extraocular movements intact.      Conjunctiva/sclera: Conjunctivae normal.      Pupils: Pupils are equal, round, and reactive to light.   Cardiovascular:      Rate and Rhythm: Normal rate and regular rhythm.      Heart sounds: Normal heart sounds.   Pulmonary:      Effort: Pulmonary effort is normal. No respiratory distress.      Breath sounds: Normal breath sounds. No wheezing.   Chest:      Chest wall: No tenderness.   Abdominal:      General: Abdomen is flat. Bowel sounds are normal.      Palpations: Abdomen is soft.      Tenderness: There is no abdominal tenderness.   Musculoskeletal:         General: Normal range of motion.      Cervical back: Normal range of motion and neck supple.      Right lower leg: No edema.      Left lower leg: No edema.   Skin:     General: Skin is warm and dry.      Capillary Refill: Capillary refill takes 2 to 3 seconds.      Findings: Bruising present.      Comments: L upper arm ecchymosis/edema secondary to IV infiltration   Neurological:      General: No focal deficit present.      Mental Status: She is alert and oriented to person, place, and time. Mental status is at baseline.   Psychiatric:         Mood and Affect: Mood normal.         Behavior: Behavior normal.         Thought Content: Thought content normal.         Judgment: Judgment normal.     Leg Wounds 3/24               Assessment/Plan   Ms. Frances Mcdowell is a 55 yo F who presents today with bilateral saddle PE with right heart strain. She initially presented to [facility] where her PE was identified she was started on Vanc/Zosyn and [facility].     Patient with submassive PE and R heart strain as above. Hemodynamics improved upon arrival to the CICU. She previously had a narrow pulse pressure at outside hospital and initially here in the CICU, but her pulse pressure became normal ( over 90 mmHg) after altering cuff size and location to one more consistent with patient's size. On arrival to CICU she was comfortable and in no acute distress, the case with discussed with Vascular Medicine Staff, Dr. Butler, plan to manage conservatively with heparin gtt and forgo thrombectomy.     Acute bilateral saddle PE with RHS  - Case discussed with [facility] and Dr. Arthur  - Continue conservative management with Heparin gtt and transition to oral anticoagulation per Vascular Medicine recs   Repeat TTE 3/24 revealed moderately enlarged RV with reduced RV systolic function, normal LVEF 65%, + McConnells sign, moderate TR  - Repeat walking pulse oximetry test 3/27/25 completed, tolerated well sats > 94% throughout, reviewed with Dr. Arthur  - Consult spiritual care placed for adjustment reaction  - Okay for discharge from EVLS perspective per Dr. Arthur    Peripheral IV 03/24/25 20 G Left Antecubital (Active)   Site Assessment Clean;Dry;Intact 03/26/25 0400   Dressing Type Transparent 03/26/25 0400   Line Status Blood return noted;Flushed;Saline locked 03/26/25 0400   Dressing Status Clean;Dry;Occlusive 03/26/25 0400   Number of days: 2       Peripheral IV 03/25/25 22 G Right Antecubital (Active)   Site Assessment Clean;Dry;Intact 03/26/25 0400   Dressing Type Transparent 03/26/25 0400   Line Status Infusing 03/26/25 0400   Dressing Status Clean;Dry;Occlusive 03/26/25 0400   Number of days: 1       Code Status:  Full Code    I spent 60 minutes in the professional and overall care of this patient      Ruth Amaya, APRN-CNP  Endovascular & Limb Salvage Service   Days: [facility] Team or page [phone]  Nights/Weekends: [facility] [id number] or  [phone]

## 2025-03-27 NOTE — PROGRESS NOTES
NADEGE FITZGERALD Attending Note     Frances Mcdowell is a 54 y.o. year old female patient w/ a PMHx of hypothyroidism, ADHD/Anxiety, BLE lymphedema, recurrent lower extremity cellulitis (prior MRSA and pseudomonas), and GERD admitted to the [facility] CICU on 3/24/2025 as a transfer from [facility] for management of acute bilateral saddle PE w/ RHS.     At [facility], patient remained asymptomatic on 2-5L NC; troponin peaks at 700 then downtrended. TTE with evidence of Hobbs's sign and RV systolic dysfunction. LE DVT US notable for RLE DVT. Interventional cardiology following and assessing need for thrombectomy daily via walking pulse ox tests. Vascular medicine also following and recommending bridge to Warfarin after invasive testing/procedures completed or ruled out. Patient remaining asymptomatic and stable, showing daily improvement in walking pulse ox. She is otherwise stable to be transferred to the floor w/ daily pulse ox and interventional cardiology following.     #Acute bilateral saddle PE w/ right heart strain: on heparin gtt bridge to warfarin.   #LE cellulitis/wounds - related to lymphedema - Completing course of cefuroxime 500mg BID     Physical Exam  Constitutional: Well-appearing, no acute distress  HEENT: Normocephalic, atraumatic  Cardiovascular: Tachycardic, regular rhythm, no murmurs/rubs/gallops  Pulmonary: Clear to auscultation bilaterally, no wheezes/crackles/rales. Nonlabored work of breathing. On 5L NC  Abdominal: Soft, nontender, nondistended, no rebound/guarding  Extremities: Bilateral lower extremity edema. Bilateral lower extremities w/ wound dressings in place. Warm to touch  Neuro: No focal deficits. Aox4  Psych: Appropriate mood and affect. Anxious.

## 2025-03-27 NOTE — PROGRESS NOTES
Spiritual Care Visit  Spiritual Care Request    Reason for Visit:  Routine Visit: Introduction     Request Received From:  Referral From: Patient    Focus of Care:  Visited With: Patient       Refer to :  Referral To:        Outcome:  Outcome of Spiritual Care Visit: Affirmation, Dealing with ambiguity, Emotional release, Identifying spiritual/emotional issues, Support system identified, Peace/gratitude     Spiritual Care Annotation    Annotation:   met with patient for spiritual care consult.      explored patient's thoughts, feelings, and concerns. Patient shared her health story and process of being hospitalized. Patient discussed concerns about having blood clots and why her health is presently as it is. Patient discussed how God and her haydee have been helpful to her, but doesn't understand why everything is happening to her. Patient also talked about her family life, kids, , and support that she has. During visit another discipline from the IDT came that patient needed to see. Patient requested  return at another time.      actively listened, provided a calm, supportive presence, and affirmed patient's thoughts and feelings.  dismissed himself so patient could receive care/treatment. Patient expressed gratitude for support.     Signed: Edy Shearer, Clinical Care

## 2025-03-27 NOTE — PROGRESS NOTES
Frances Mcdowell is a 54 y.o. female on day 3 of admission.   Medical history significant for DHD, anxiety, GERD, hypothyroidism, lymphedema of BLE, obesity with BMI 55, recurrent lower extremity cellulitis with prior MRSA and pseudomonas infections.   Presented to [facility] 3/24/25 with tachycardia and SOB. CTA chest imaging showed nonocclusive saddle embolus over the bifurcation of the main PA, with large emboli in bilateral lobes that are partially occlusive in the bilateral lower lobes.  RV strain noted on CTA chest.   Transferred to [facility] as part of PERT call recommendations.  Underwent BLE US that showed a DVT in the right mid-distal femoral veins as well as right popliteal veins. No evidence of DVT in the left leg.   TTE completed 3/24/25 that showed normal LVSF with EF 65%, reduced RVSF, evidence of Hobbs's sign, moderately enlarged RV. ELEVATED RVSP TO 62 MMHG C/W UNDERLYING RIGHT SIDE HTN    Subjective   Tearful today as she feels overwhelmed with all of her medical care. After discussion with her she feels more confident and knows she can handle all of this.   REMAINS ON UDH GT NO BLEEDING ON THE AC   REPORTS FEELING WELL  WALKED X 2 AROUND THE FLOOR TODAY    Objective     Physical Exam:  Gen: Sitting up in bed in NAD.  AGREE  Derm: Skin warm and dry; dressing to LLE is dry and intact, and Tubi  applied to BLE from ankle to upper calf.  AGREE  Resp: normal resp effort on NC AGREE  CV: Normal heart rate with RRR AGREE  Extr: warm to touch with palpable bilateral radial and DP pulses; left upper arm with swelling noted above the elbow, with an area of ecchymosis that encircles the upper arm at the level just above the elbow; swelling noted of BLE from tops of feet to mid calf, with report of tenderness noted with gentle palpation.  Neuro: Patient is awake and alert, participates in conversation; mood appropriate for situation.     Last Recorded Vitals  Blood pressure 116/78, pulse 101, temperature 36.7  "°C (98.1 °F), resp. rate 18, height 1.6 m (5' 2.99\"), weight 142 kg (313 lb 15 oz), SpO2 94%.  Intake/Output last 3 Shifts:  I/O last 3 completed shifts:  In: 951.3 (6.7 mL/kg) [P.O.:360; I.V.:591.3 (4.2 mL/kg)]  Out: 475 (3.3 mL/kg) [Urine:475 (0.1 mL/kg/hr)]  Weight: 142.4 kg     Relevant Results  Scheduled medications  amphetamine-dextroamphetamine, 10 mg, oral, BID  cholecalciferol, 5,000 Units, oral, Daily  fondaparinux, 10 mg, subcutaneous, q24h  heparin, 10,000 Units, intravenous, Once  levothyroxine, 100 mcg, oral, Daily  pantoprazole, 40 mg, oral, Daily before breakfast  perflutren protein A microsphere, 0.5 mL, intravenous, Once in imaging  [Held by provider] predniSONE, 10 mg, oral, Daily  rosuvastatin, 20 mg, oral, Nightly  sulfur hexafluoride microsphr, 2 mL, intravenous, Once in imaging  warfarin, 5 mg, oral, Daily      Continuous medications       Results from last 7 days   Lab Units 03/27/25  0855 03/26/25  0349 03/25/25  0008   WBC AUTO x10*3/uL 13.6* 13.6* 15.6*   HEMOGLOBIN g/dL 12.0 10.2* 10.3*   HEMATOCRIT % 39.8 31.3* 33.7*   PLATELETS AUTO x10*3/uL 389 322 340       Results from last 7 days   Lab Units 03/27/25  0855 03/26/25  0349 03/25/25  0905   SODIUM mmol/L 139 139 141   POTASSIUM mmol/L 3.6 4.3 4.3   CHLORIDE mmol/L 104 105 105   CO2 mmol/L 25 27 26   BUN mg/dL 25* 24* 23   CREATININE mg/dL 0.89 0.83 0.91   GLUCOSE mg/dL 79 104* 109*   CALCIUM mg/dL 9.1 8.1* 8.2*   Estimated Creatinine Clearance: 98.3 mL/min (by C-G formula based on SCr of 0.89 mg/dL).      Results from last 7 days   Lab Units 03/27/25  0855 03/26/25  0549 03/25/25  0352   ANTI XA UNFRACTIONATED IU/mL 0.6 0.7 0.5         Assessment/Plan   Assessment & Plan  Pulmonary embolism    54 year old female with medical history as described above.  Accepted as direct transfer from [facility] to the [facility] CICU for management of intermediate-high risk submassive saddle PE with extensive clot burden and right heart strain.  Underwent " "BLE US that showed acute DVT of right mid-distal femoral and popliteal veins.   Vascular Medicine Service following for anticoagulation recommendations; unsure if this VTE is provoked from inflammation from the BLE cellulitis, or if it is an idiopathic event.   Continues with Heparin infusion, Hgb stable on AC.  AGREE  Anticardiolipin and Beta-2 Glycoprotein Abs returned as negative; awaiting LAC results.   Bridge to warfarin started. AGREE  Can have INR monitoring at either [facility] or [facility] Coumadin Clinics depending upon patient choice AGREE     Date    INR     Warfarin Dose     Comments   3/24     1.1      none given         INR baseline  3/26     ---           5mg               Start bridge to Warfarin  3/27     1.0           5mg     Recommendations:  - Can switch to fondaparinux for bridge, with 10 mg -- turn off heparin drip 1 hour after vic is administered AGREE  - Monitor for bleeding  - Continue bridge to Warfarin with 5mg this evening AGREE  - Daily INR AGREE  - Will need a minimum of 5 days of overlap therapy with bridge and Warfarin; goal INR is 2-3; when INR is >2 for 2 days in a row, ivc can be stopped AGREE  - Outpatient Coumadin Clinic will need to be arranged prior to hospital discharge with initial appointment scheduled for 1-2 days after discharge; patient will need to choose between [facility] or [facility] for Coumadin Clinic location; use the order \"Referral to Anticoagulation Monitoring Service [facility]\" to start the process.  AGREE  - Indefinite duration of anticoagulation.  AGREE  - Will coordinate outpatient follow up with the Vascular Medicine Service. AGREE     Darrell Luo MD -- Fellow  Vascular Medicine Service    I saw and evaluated the patient. I personally obtained the key and critical portions of the history and physical exam or was physically present for key and critical portions performed by the resident/fellow. I reviewed the resident/fellow's documentation and " "discussed the patient with the resident/fellow. I agree with the resident/fellow's medical decision making as documented in the note with the exception/addition of the following:  MY ADDITIONS ARE NOTED IN \"CAPS\".    Diamond Butler MD      AGREE WITH FONDAPARINUX NOW. 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    TARGET INR 2-3 WITH APPROPRIATE OVERLAP AS NOTED ABOVE.    VM WILL S/O PLEASE RECONSULT FOR ANY ADDITIONAL CONCERNS    Diamond Butler MD          "

## 2025-03-27 NOTE — NURSING NOTE
Walked with Pt for two laps around unit. Pt tolerated walk fairly well. Noticeably had SOB and also c/o of SOB. Also became tachycardic and had to take a couple breaks throughout the laps. Overall pt thought she did well.    Marcy Cervantes RN

## 2025-03-27 NOTE — CONSULTS
Wound Care Consult     Visit Date: 3/27/2025      Patient Name: Frances Mcdowell         MRN: [id number]           YOB: 1970     Reason for Consult: BLE dressing changes        Wound History: 54 y.o. female on day 3 of admission.   Medical history significant for DHD, anxiety, GERD, hypothyroidism, lymphedema of BLE, obesity with BMI 55, recurrent lower extremity cellulitis with prior MRSA and pseudomonas infections.      Pertinent Labs:   Albumin   Date Value Ref Range Status   03/27/2025 3.5 3.4 - 5.0 g/dL Final       Wound Assessment:  Wound 08/05/24 Other (comment) Tibial Distal;Dorsal;Right (Active)   Site Assessment Bleeding;Red;Sloughing;Yellow 03/27/25 1512   Esme-Wound Assessment Maceration;Moist  03/27/25 1512   Non-staged Wound Description Full thickness 03/27/25 1512   Shape irregular 03/27/25 1512   State of Healing Non-healing;Slough 03/27/25 1512   Margins Well-defined edges 03/27/25 1512   Drainage Description Serosanguineous;Yellow;Tan 03/27/25 1512   Drainage Amount Large 03/27/25 1512   Dressing Non adherent;Hydrofiber;ABD;Kerlix/rolled gauze 03/27/25 1512   Dressing Changed Changed 03/27/25 1512   Dressing Status Clean;Dry 03/27/25 1512       Wound 08/05/24 Other (comment) Tibial Distal;Dorsal;Left (Active)   Wound Image   03/27/25 1512   Site Assessment Bleeding;Granulation;Red;Sloughing;Yellow 03/27/25 1512   Esme-Wound Assessment Maceration;Moist  03/27/25 1512   Non-staged Wound Description Full thickness 03/27/25 1512   Shape Irregular  03/27/25 1512   Wound Length (cm) 8 cm 03/27/25 1512   Wound Width (cm) 12 cm 03/27/25 1512   Wound Surface Area (cm^2) 96 cm^2 03/27/25 1512   Wound Depth (cm) 1.5 cm 03/27/25 1512   Wound Volume (cm^3) 144 cm^3 03/27/25 1512   State of Healing Non-healing;Slough;Early/partial granulation 03/27/25 1512   Margins Well-defined edges 03/27/25 1512   Drainage Description Yellow;Serosanguineous;Tan 03/27/25 1512   Drainage Amount Moderate 03/27/25 1512    Dressing Non adherent;Hydrofiber;ABD;Kerlix/rolled gauze 03/27/25 1512   Dressing Changed Reinforced 03/25/25 2000   Dressing Status Clean;Dry;Occlusive 03/26/25 0800       Wound 10/31/24 Venous Ulcer Tibial Dorsal;Proximal;Right (Active)   Wound Image   03/24/25 1345   Site Assessment Unable to assess 03/26/25 2000   Non-staged Wound Description Full thickness 03/25/25 1600   Shape irregular 03/25/25 1600   State of Healing Slough;Non-healing 03/25/25 1600   Margins Well-defined edges 03/25/25 1600   Treatments Cleansed;Packings;Site care 03/25/25 1600   Drainage Description Serosanguineous 03/26/25 0400   Drainage Amount Moderate 03/26/25 0400   Dressing Hydrofiber;ABD;Kerlix/rolled gauze 03/26/25 0800   Dressing Changed Reinforced 03/25/25 2000   Dressing Status Clean;Dry;Occlusive 03/26/25 0800       Wound 10/31/24 Incision Leg Dorsal;Left;Proximal;Upper (Active)       Wound Team Summary Assessment: The wound care team came to bedside to assess the patient BLE venous statis ulcers. Both dressing were taken down and the wounds were cleansed thoroughly with vashe wound cleanser and gently pat dry. Triad cream was applied to the periwound skin of both lower extremity wounds due to maceration. Mepitel nonadherent contact layer was applied to the wounds and then covered with hydrofera blue ready foam. Xtrasorb and 8x10 ABD pads were applied over top. The legs were then wrapped with kerlix rolled gauze and 1 layer of size G tubigrip was applied to the limbs.       Wound Team Plan: Recommendations: Daily   Bilateral lower extremities: Cleanse both lower extremities with vashe wound cleanser and gently pat dry   Apply mepitel contact layer to cover wounds first  Apply 1.5 sheets of hydrofera blue ready foam to the RLE and 1 sheet to the LLE  Apply 2 ABD pads and 2 large xtrasorb sheets to both legs   Wrap both legs with kerlix rolled gauze from the base of the toes to below the knee  Apply 1 layer of Tubigrip size G  to both legs      Marcello Plummer RN-BC, CWON  3/27/2025  5:43 PM

## 2025-03-27 NOTE — CARE PLAN
The patient's goals for the shift include  free from injury    The clinical goals for the shift include pt will have adequately managed pain      Problem: Skin  Goal: Decreased wound size/increased tissue granulation at next dressing change  Outcome: Progressing  Goal: Participates in plan/prevention/treatment measures  Outcome: Progressing  Goal: Prevent/manage excess moisture  Outcome: Progressing  Goal: Prevent/minimize sheer/friction injuries  Outcome: Progressing  Goal: Promote/optimize nutrition  Outcome: Progressing  Goal: Promote skin healing  Outcome: Progressing     Problem: Pain  Goal: Takes deep breaths with improved pain control throughout the shift  Outcome: Progressing  Goal: Turns in bed with improved pain control throughout the shift  Outcome: Progressing  Goal: Walks with improved pain control throughout the shift  Outcome: Progressing  Goal: Performs ADL's with improved pain control throughout shift  Outcome: Progressing  Goal: Participates in PT with improved pain control throughout the shift  Outcome: Progressing  Goal: Free from opioid side effects throughout the shift  Outcome: Progressing  Goal: Free from acute confusion related to pain meds throughout the shift  Outcome: Progressing     Problem: Fall/Injury  Goal: Not fall by end of shift  Outcome: Progressing  Goal: Be free from injury by end of the shift  Outcome: Progressing  Goal: Verbalize understanding of personal risk factors for fall in the hospital  Outcome: Progressing  Goal: Verbalize understanding of risk factor reduction measures to prevent injury from fall in the home  Outcome: Progressing  Goal: Use assistive devices by end of the shift  Outcome: Progressing  Goal: Pace activities to prevent fatigue by end of the shift  Outcome: Progressing     Problem: Pain - Adult  Goal: Verbalizes/displays adequate comfort level or baseline comfort level  Outcome: Progressing     Problem: Safety - Adult  Goal: Free from fall  injury  Outcome: Progressing     Problem: Discharge Planning  Goal: Discharge to home or other facility with appropriate resources  Outcome: Progressing     Problem: Chronic Conditions and Co-morbidities  Goal: Patient's chronic conditions and co-morbidity symptoms are monitored and maintained or improved  Outcome: Progressing     Problem: Nutrition  Goal: Nutrient intake appropriate for maintaining nutritional needs  Outcome: Progressing

## 2025-03-27 NOTE — PROGRESS NOTES
Physical Therapy                 Therapy Communication Note    Patient Name: Frances Mcdowell  MRN: [id number]  Department: [place] [address]  Room: 70/7062-A  Today's Date: 3/27/2025     Discipline: Physical Therapy    PT Missed Visit: Yes     Missed Visit Reason: Missed Visit Reason: Patient refused (despite encouragement; patient reports just receiving pain meds and wanting to rest.)    Missed Time: Attempt    Comment:

## 2025-03-27 NOTE — CARE PLAN
The patient's goals for the shift include  to obtain adequate rest overnight    The clinical goals for the shift include pt will have adequately managed pain

## 2025-03-28 ENCOUNTER — PHARMACY VISIT (OUTPATIENT)
Dept: PHARMACY | Facility: CLINIC | Age: 55
End: 2025-03-28
Payer: MEDICARE

## 2025-03-28 VITALS
DIASTOLIC BLOOD PRESSURE: 83 MMHG | OXYGEN SATURATION: 95 % | RESPIRATION RATE: 18 BRPM | TEMPERATURE: 97.7 F | SYSTOLIC BLOOD PRESSURE: 118 MMHG | BODY MASS INDEX: 51.91 KG/M2 | WEIGHT: 293 LBS | HEIGHT: 63 IN | HEART RATE: 107 BPM

## 2025-03-28 LAB
ALBUMIN SERPL BCP-MCNC: 3.1 G/DL (ref 3.4–5)
ANION GAP SERPL CALC-SCNC: 12 MMOL/L (ref 10–20)
BACTERIA BLD CULT: NORMAL
BACTERIA BLD CULT: NORMAL
BASOPHILS # BLD AUTO: 0.07 X10*3/UL (ref 0–0.1)
BASOPHILS NFR BLD AUTO: 0.6 %
BUN SERPL-MCNC: 23 MG/DL (ref 6–23)
CALCIUM SERPL-MCNC: 9 MG/DL (ref 8.6–10.6)
CHLORIDE SERPL-SCNC: 105 MMOL/L (ref 98–107)
CO2 SERPL-SCNC: 29 MMOL/L (ref 21–32)
CREAT SERPL-MCNC: 0.86 MG/DL (ref 0.5–1.05)
EGFRCR SERPLBLD CKD-EPI 2021: 79 ML/MIN/1.73M*2
EOSINOPHIL # BLD AUTO: 0.34 X10*3/UL (ref 0–0.7)
EOSINOPHIL NFR BLD AUTO: 2.8 %
ERYTHROCYTE [DISTWIDTH] IN BLOOD BY AUTOMATED COUNT: 14.2 % (ref 11.5–14.5)
GLUCOSE SERPL-MCNC: 103 MG/DL (ref 74–99)
HCT VFR BLD AUTO: 36.9 % (ref 36–46)
HGB BLD-MCNC: 11.4 G/DL (ref 12–16)
IMM GRANULOCYTES # BLD AUTO: 0.18 X10*3/UL (ref 0–0.7)
IMM GRANULOCYTES NFR BLD AUTO: 1.5 % (ref 0–0.9)
INR PPP: 1.2 (ref 0.9–1.1)
LYMPHOCYTES # BLD AUTO: 2.23 X10*3/UL (ref 1.2–4.8)
LYMPHOCYTES NFR BLD AUTO: 18.3 %
MAGNESIUM SERPL-MCNC: 1.84 MG/DL (ref 1.6–2.4)
MCH RBC QN AUTO: 31.3 PG (ref 26–34)
MCHC RBC AUTO-ENTMCNC: 30.9 G/DL (ref 32–36)
MCV RBC AUTO: 101 FL (ref 80–100)
MONOCYTES # BLD AUTO: 0.64 X10*3/UL (ref 0.1–1)
MONOCYTES NFR BLD AUTO: 5.2 %
NEUTROPHILS # BLD AUTO: 8.74 X10*3/UL (ref 1.2–7.7)
NEUTROPHILS NFR BLD AUTO: 71.6 %
NRBC BLD-RTO: 0 /100 WBCS (ref 0–0)
PHOSPHATE SERPL-MCNC: 3.5 MG/DL (ref 2.5–4.9)
PLATELET # BLD AUTO: 352 X10*3/UL (ref 150–450)
POTASSIUM SERPL-SCNC: 4.3 MMOL/L (ref 3.5–5.3)
PROTHROMBIN TIME: 12.9 SECONDS (ref 9.8–12.4)
RBC # BLD AUTO: 3.64 X10*6/UL (ref 4–5.2)
SODIUM SERPL-SCNC: 142 MMOL/L (ref 136–145)
WBC # BLD AUTO: 12.2 X10*3/UL (ref 4.4–11.3)

## 2025-03-28 PROCEDURE — 84100 ASSAY OF PHOSPHORUS: CPT | Performed by: STUDENT IN AN ORGANIZED HEALTH CARE EDUCATION/TRAINING PROGRAM

## 2025-03-28 PROCEDURE — 99232 SBSQ HOSP IP/OBS MODERATE 35: CPT

## 2025-03-28 PROCEDURE — 2500000002 HC RX 250 W HCPCS SELF ADMINISTERED DRUGS (ALT 637 FOR MEDICARE OP, ALT 636 FOR OP/ED): Performed by: STUDENT IN AN ORGANIZED HEALTH CARE EDUCATION/TRAINING PROGRAM

## 2025-03-28 PROCEDURE — 36415 COLL VENOUS BLD VENIPUNCTURE: CPT | Performed by: STUDENT IN AN ORGANIZED HEALTH CARE EDUCATION/TRAINING PROGRAM

## 2025-03-28 PROCEDURE — RXMED WILLOW AMBULATORY MEDICATION CHARGE

## 2025-03-28 PROCEDURE — 85025 COMPLETE CBC W/AUTO DIFF WBC: CPT | Performed by: STUDENT IN AN ORGANIZED HEALTH CARE EDUCATION/TRAINING PROGRAM

## 2025-03-28 PROCEDURE — 83735 ASSAY OF MAGNESIUM: CPT | Performed by: STUDENT IN AN ORGANIZED HEALTH CARE EDUCATION/TRAINING PROGRAM

## 2025-03-28 PROCEDURE — 85610 PROTHROMBIN TIME: CPT | Performed by: STUDENT IN AN ORGANIZED HEALTH CARE EDUCATION/TRAINING PROGRAM

## 2025-03-28 PROCEDURE — 99239 HOSP IP/OBS DSCHRG MGMT >30: CPT | Performed by: STUDENT IN AN ORGANIZED HEALTH CARE EDUCATION/TRAINING PROGRAM

## 2025-03-28 PROCEDURE — 97116 GAIT TRAINING THERAPY: CPT | Mod: GP

## 2025-03-28 PROCEDURE — 2500000001 HC RX 250 WO HCPCS SELF ADMINISTERED DRUGS (ALT 637 FOR MEDICARE OP): Performed by: STUDENT IN AN ORGANIZED HEALTH CARE EDUCATION/TRAINING PROGRAM

## 2025-03-28 RX ORDER — ACETAMINOPHEN 325 MG/1
650 TABLET ORAL 3 TIMES DAILY PRN
Start: 2025-03-28

## 2025-03-28 RX ORDER — WARFARIN SODIUM 5 MG/1
TABLET ORAL
Qty: 90 TABLET | Refills: 3 | Status: SHIPPED | OUTPATIENT
Start: 2025-03-28 | End: 2026-03-28

## 2025-03-28 RX ADMIN — ACETAMINOPHEN 650 MG: 325 TABLET ORAL at 06:32

## 2025-03-28 RX ADMIN — CHOLECALCIFEROL TAB 25 MCG (1000 UNIT) 5000 UNITS: 25 TAB at 10:00

## 2025-03-28 RX ADMIN — LEVOTHYROXINE SODIUM 100 MCG: 0.1 TABLET ORAL at 06:34

## 2025-03-28 RX ADMIN — PANTOPRAZOLE SODIUM 40 MG: 40 TABLET, DELAYED RELEASE ORAL at 06:33

## 2025-03-28 RX ADMIN — OXYCODONE HYDROCHLORIDE AND ACETAMINOPHEN 1 TABLET: 5; 325 TABLET ORAL at 06:32

## 2025-03-28 ASSESSMENT — COGNITIVE AND FUNCTIONAL STATUS - GENERAL
CLIMB 3 TO 5 STEPS WITH RAILING: A LITTLE
STANDING UP FROM CHAIR USING ARMS: A LITTLE
MOBILITY SCORE: 20
WALKING IN HOSPITAL ROOM: A LITTLE
MOVING TO AND FROM BED TO CHAIR: A LITTLE

## 2025-03-28 ASSESSMENT — PAIN SCALES - GENERAL: PAINLEVEL_OUTOF10: 0 - NO PAIN

## 2025-03-28 ASSESSMENT — PAIN - FUNCTIONAL ASSESSMENT: PAIN_FUNCTIONAL_ASSESSMENT: 0-10

## 2025-03-28 NOTE — DISCHARGE SUMMARY
Discharge Diagnosis  Pulmonary embolism    Issues Requiring Follow-Up  Follow up with PCP  Follow up wit wound care  Follow up with vascular medicine  Follow up with coumadin clinic     Hospital Course  Frances Mcdowell is a 54 y.o. year old female patient w/ a PMHx of hypothyroidism, ADHD/Anxiety, BLE lymphedema, s/p hysterectomy, recurrent lower extremity cellulitis (prior MRSA and pseudomonas), and GERD admitted to the [facility] CICU on 3/24/25 as a transfer from [facility] for management of acute bilateral saddle PE w/ RHS.      [facility] course:  Initially presented to ED after episode of lightheadedness and tachycardia on home pulse ox. Denied chest pain or palpitations. O2 sat 88% on room air (improved to 98% on 2L NC), HR 130s-140s, /114. ECG w/ sinus tachycardia.  and trop 170 > 256. Aspirin loaded, started on a heparin gtt, and given a dose of vanc/zosyn. CT PE performed demonstrating acute saddle PE w/ RHS. PERT team consulted and recommended transfer to [facility] CICU for further management.     [facility] course:  Asymptomatic on 2-5L NC; troponin peaked at 700 then downtrended. TTE with evidence of Hobbs's sign and RV systolic dysfunction. Interventional cardiology following and assessing need for thrombectomy daily via walking pulse ox tests. Vascular medicine also following and recommending bridge to Warfarin due to high BMI after invasive testing/procedures completed or ruled out. Remains hemodynamically stable without acute intervention and thus transferred to HVI floor service.     Bridging to warfarin with IV anticoagulation to therapeutic INR 2 - 3  Bridge with fondaparinux     Appointments made for [facility] coumadin clinic new patient   Follow up with wound care and PCP and vascualar medicine     Seen and discussed with Dr Kulkarni     Pertinent Physical Exam At Time of Discharge  Physical Exam  Constitutional:       Appearance: Normal appearance.   HENT:      Mouth/Throat:      Mouth:  Mucous membranes are moist.   Cardiovascular:      Rate and Rhythm: Regular rhythm.   Pulmonary:      Effort: Pulmonary effort is normal.      Breath sounds: Normal breath sounds.   Musculoskeletal:      Right lower leg: Edema present.      Left lower leg: Edema present.   Skin:     General: Skin is warm and dry.      Comments: Leg wound noted media photos    Neurological:      General: No focal deficit present.      Mental Status: She is alert and oriented to person, place, and time.         Home Medications     Medication List      START taking these medications     acetaminophen 325 mg tablet; Commonly known as: Tylenol; Take 2 tablets   (650 mg) by mouth 3 times a day as needed for mild pain (1 - 3).   fondaparinux 10 mg/0.8 mL syringe; Commonly known as: Arixtra; Inject   0.8 mL (10 mg) under the skin once daily.   warfarin 5 mg tablet; Commonly known as: Coumadin; Take 1 tablet once   daily. (Take as directed per After Visit Summary.)     CONTINUE taking these medications     amphetamine-dextroamphetamine XR 20 mg 24 hr capsule; Commonly known as:   Adderall XR; Take 1 capsule (20 mg) by mouth 2 times a day.   cholecalciferol 5,000 Units tablet; Commonly known as: Vitamin D-3; Take   1 tablet (5,000 Units) by mouth once daily.   cyclobenzaprine 10 mg tablet; Commonly known as: Flexeril; Take 1 tablet   (10 mg) by mouth as needed at bedtime for muscle spasms.   EPINEPHrine 0.3 mg/0.3 mL injection syringe; Commonly known as: Epipen;   Inject 0.3 mL (0.3 mg) as directed if needed for anaphylaxis. As Directed   gentamicin 0.1 % ointment; Commonly known as: Garamycin; Apply topically   3 times a day for 10 days. Apply to wounds   levothyroxine 100 mcg tablet; Commonly known as: Synthroid, Levoxyl;   Take 1 tablet (100 mcg) by mouth early in the morning.. Take on an empty   stomach at the same time each day, either 30 to 60 minutes prior to   breakfast   naloxone 4 mg/0.1 mL nasal spray; Commonly known as: Narcan;  Administer   1 spray (4 mg) into affected nostril(s) if needed for opioid reversal. May   repeat every 2-3 minutes if needed, alternating nostrils, until medical   assistance becomes available.   omeprazole 40 mg DR capsule; Commonly known as: PriLOSEC; Take 1 capsule   (40 mg) by mouth once daily.   rosuvastatin 20 mg tablet; Commonly known as: Crestor; Take 1 tablet (20   mg) by mouth once daily.   Triad Wound Dressing paste; Generic drug: wound dressing; Apply 1   Application topically 2 times a day.     STOP taking these medications     ibuprofen 800 mg tablet   oxyCODONE-acetaminophen  mg tablet; Commonly known as: Percocet   predniSONE 10 mg tablet; Commonly known as: Deltasone       Outpatient Follow-Up  Future Appointments   Date Time Provider Department Center   3/31/2025  9:00 AM ANITCOAG [id number] CARD1 COAG CLINIC [id number] [facility]   5/5/2025  8:45 AM Parish Olvera MD [facility]       Latrice Jefferson APRN-CNP  Seen and discussed with Dr. Kulkarni

## 2025-03-28 NOTE — PROGRESS NOTES
Spiritual Care Visit  Spiritual Care Request    Reason for Visit:  Routine Visit: Follow-up     Request Received From:  Referral From: Patient    Focus of Care:  Visited With: Patient       Refer to :  Referral To:        Care Provided:  Intended Effects: Aligning care plan with patient's values, Build relationship of care and support, Convey a calming presence, Helping someone feel comforted, Lessen someone's feelings of isolation, Meaning-making  Methods: Assist with finding purpose, Encourage self care, Exploring hope, Offer emotional support, Offer spiritual/Yarsanism support  Interventions: Acknowledge current situation, Active listening, Ask guided questions, Discuss concerns, [place]    Outcome:  Outcome of Spiritual Care Visit: Affirmation, [place], Comfort/healing presence, Dealing with ambiguity, Identifying patient's strengths/source of hope, Emotional release, Support system identified     Spiritual Care Annotation    Annotation:   provided emotional and spiritual support for patient. Patient seemed encouraged and more at peace at the conclusion of visit. Patient expressed gratitude for visit and support.     Signed: Edy Shearer, Clinical Care

## 2025-03-28 NOTE — PROGRESS NOTES
3/28/2025  Care Coordination   54 y.o. year old female patient w/ a PMHx of hypothyroidism, ADHD/Anxiety, BLE lymphedema, recurrent lower extremity cellulitis (prior MRSA and pseudomonas), and GERD admitted to the [facility] CICU on 3/24/2025 as a transfer from [facility] for management of acute bilateral saddle PE w/ RHS.   Met with pt. She denies any needs at discharge.  States he was independent PTA.  PT evals recs no needs.  Sister will transport home

## 2025-03-28 NOTE — PROGRESS NOTES
[facility] A Attending Note     Frances Mcdowell is a 54 y.o. year old female patient w/ a PMHx of hypothyroidism, ADHD/Anxiety, BLE lymphedema, recurrent lower extremity cellulitis (prior MRSA and pseudomonas), and GERD admitted to the [facility] CICU on 3/24/2025 as a transfer from [facility] for management of acute bilateral saddle PE w/ RHS.        #Acute bilateral saddle PE w/ right heart strain: on heparin gtt bridge to warfarin.   #LE cellulitis/wounds - related to lymphedema - Completed course of cefuroxime 500mg BID     Physical Exam  Constitutional: Well-appearing, no acute distress  HEENT: Normocephalic, atraumatic  Cardiovascular: Tachycardic, regular rhythm, no murmurs/rubs/gallops  Pulmonary: Clear to auscultation bilaterally, no wheezes/crackles/rales. Nonlabored work of breathing. On 5L NC  Abdominal: Soft, nontender, nondistended, no rebound/guarding  Extremities: Bilateral lower extremity edema. Bilateral lower extremities w/ wound dressings in place. Warm to touch  Neuro: No focal deficits. Aox4  Psych: Appropriate mood and affect. Anxious.

## 2025-03-28 NOTE — CONSULTS
Nutrition Diet Education:     Provided pt with educational handout from Nutrition Care Manual: Vitamin K and Medications. She says she has already been educated on Vitamin K containing foods and how Vitamin K can interact with Warfarin. Explained importance of consistent intake. She expressed understanding. Encouraged her to reach out with any questions or concerns.

## 2025-03-28 NOTE — CARE PLAN
Problem: Skin  Goal: Decreased wound size/increased tissue granulation at next dressing change  Outcome: Progressing  Flowsheets (Taken 3/27/2025 2316)  Decreased wound size/increased tissue granulation at next dressing change: Promote sleep for wound healing  Goal: Participates in plan/prevention/treatment measures  Outcome: Progressing  Flowsheets (Taken 3/27/2025 2316)  Participates in plan/prevention/treatment measures: Elevate heels  Goal: Prevent/manage excess moisture  Outcome: Progressing  Flowsheets (Taken 3/27/2025 2316)  Prevent/manage excess moisture:   Cleanse incontinence/protect with barrier cream   Monitor for/manage infection if present  Goal: Prevent/minimize sheer/friction injuries  Outcome: Progressing  Flowsheets (Taken 3/27/2025 2316)  Prevent/minimize sheer/friction injuries:   Complete micro-shifts as needed if patient unable. Adjust patient position to relieve pressure points, not a full turn   Use pull sheet  Goal: Promote/optimize nutrition  Outcome: Progressing  Goal: Promote skin healing  Outcome: Progressing  Flowsheets (Taken 3/27/2025 2316)  Promote skin healing: Assess skin/pad under line(s)/device(s)      The clinical goals for the shift include Pt's dressing will remian dry/intact this shift.

## 2025-03-28 NOTE — CARE PLAN
The patient's goals for the shift include      The clinical goals for the shift include Pt's dressing will remian dry/intact this shift.    Problem: Skin  Goal: Decreased wound size/increased tissue granulation at next dressing change  Outcome: Progressing  Goal: Participates in plan/prevention/treatment measures  Outcome: Progressing  Goal: Prevent/manage excess moisture  Outcome: Progressing  Goal: Prevent/minimize sheer/friction injuries  Outcome: Progressing  Goal: Promote/optimize nutrition  Outcome: Progressing  Goal: Promote skin healing  Outcome: Progressing     Problem: Pain  Goal: Takes deep breaths with improved pain control throughout the shift  Outcome: Progressing  Goal: Turns in bed with improved pain control throughout the shift  Outcome: Progressing  Goal: Walks with improved pain control throughout the shift  Outcome: Progressing  Goal: Performs ADL's with improved pain control throughout shift  Outcome: Progressing  Goal: Participates in PT with improved pain control throughout the shift  Outcome: Progressing  Goal: Free from opioid side effects throughout the shift  Outcome: Progressing  Goal: Free from acute confusion related to pain meds throughout the shift  Outcome: Progressing     Problem: Fall/Injury  Goal: Not fall by end of shift  Outcome: Progressing  Goal: Be free from injury by end of the shift  Outcome: Progressing  Goal: Verbalize understanding of personal risk factors for fall in the hospital  Outcome: Progressing  Goal: Verbalize understanding of risk factor reduction measures to prevent injury from fall in the home  Outcome: Progressing  Goal: Use assistive devices by end of the shift  Outcome: Progressing  Goal: Pace activities to prevent fatigue by end of the shift  Outcome: Progressing     Problem: Pain - Adult  Goal: Verbalizes/displays adequate comfort level or baseline comfort level  Outcome: Progressing     Problem: Safety - Adult  Goal: Free from fall injury  Outcome:  Progressing     Problem: Discharge Planning  Goal: Discharge to home or other facility with appropriate resources  Outcome: Progressing     Problem: Chronic Conditions and Co-morbidities  Goal: Patient's chronic conditions and co-morbidity symptoms are monitored and maintained or improved  Outcome: Progressing     Problem: Nutrition  Goal: Nutrient intake appropriate for maintaining nutritional needs  Outcome: Progressing

## 2025-03-28 NOTE — DISCHARGE INSTRUCTIONS
You have an appointment with coumadin clinic on Monday.  You need to bridge until your INR 2 to 3   Coumadin clinic will continue to give you information on your coumadin

## 2025-03-28 NOTE — PROGRESS NOTES
Subjective Data:  - Overall stable, emotional related to health factors but eager to go home     Overnight Events:    NAEON    History of Presenting Illness:  Ms. Mcdowell initially presented to [facility] on 3/24/25 for shortness of breath. On arrival to ED tachycardic HR 120s, elevated troponins 170 -> 256, . Leukocytosis WBC 16. She was commenced on IV Vanc/Zosyn, CT PE revealed bilateral PE with saddle embolus and right heart strain. She was started on a high intensity heparin gtt, a PERT was called and she was transferred to [facility] CICU.     On arrival to CICU, her heparin gtt was maintained. Given hemodynamic stability, downtrending oxygen requirements, improved pulse pressure, and in no acute distress, clinical decision made to continue heparin gtt in line with Vascular Medicine recommendations and re-assess thrombectomy indication 24 hours later. Ms. Mcdowell continued to improve and thus continued with medical management, thrombectomy forgone. She was transferred from CICU to LT7 on 3/27 where she remained stable and tolerated heparin gtt then transitioned to Fondaparinux with plan to discharge 3/28/25. Her walking pulse ox was completed on 3/27/25, able to maintain saturations > 94% while ambulating on RA. Stable for discharge from EVLS standpoint.    She has an additional PMH of ADD (on Adderall), anxiety, HLD, hypothyroid, and GERD. She has cellulitis wounds of her lower extremity secondary to hot tub use with open blister, follows with wound management with prior oral ABX, intermittently reluctant to allow staff to assess due to pain with dressing changes, wound care following.     Objective Data:  Last Recorded Vitals:  Vitals:    03/28/25 0005 03/28/25 0430 03/28/25 0650 03/28/25 0731   BP: 117/77 (!) 141/93  (!) 128/91   Pulse: 89 88  92   Resp: 18 18  20   Temp: 36.4 °C (97.5 °F) 36.2 °C (97.2 °F)  36.5 °C (97.7 °F)   TempSrc:  Temporal     SpO2: 92% 95%  97%   Weight:   142 kg (312 lb 1.6 oz)     Height:           Last Labs:  CBC - 3/28/2025:  8:49 AM  12.2 11.4 352    36.9      CMP - 3/28/2025:  8:49 AM  9.0 6.7 14 --- 0.4   3.5 3.1 12 116      PTT - 3/24/2025: 12:41 PM  1.2   12.9 194     TROPHS   Date/Time Value Ref Range Status   03/25/2025 12:07  0 - 34 ng/L Final     Comment:     Previous result verified on 3/24/2025 1645 on specimen/case [id number] called with component TRPHS for procedure Troponin I, High Sensitivity with value 606 ng/L.   03/24/2025 07:09  0 - 34 ng/L Final     Comment:     Previous result verified on 3/24/2025 1645 on specimen/case [id number] called with component TRPHS for procedure Troponin I, High Sensitivity with value 606 ng/L.   03/24/2025 12:41  0 - 34 ng/L Final   03/24/2025 08:49  0 - 13 ng/L Final     Comment:     Previous result verified on 3/24/2025 0824 on specimen/case [id number] called with component TRPHS for procedure Troponin I, High Sensitivity, Initial with value 170 ng/L.   03/24/2025 07:52  0 - 13 ng/L Final   08/05/2024 09:47 AM 93 0 - 13 ng/L Final     Comment:     Previous result verified on 8/5/2024 0645 on specimen/case [id number] called with component TRPHS for procedure Troponin I, High Sensitivity with value 103 ng/L.     BNP   Date/Time Value Ref Range Status   03/24/2025 07:52  0 - 99 pg/mL Final   08/05/2024 05:38  0 - 99 pg/mL Final     HGBA1C   Date/Time Value Ref Range Status   03/26/2025 03:49 AM 5.4 See comment % Final   11/28/2023 06:38 AM 5.3 see below % Final     LDLCALC   Date/Time Value Ref Range Status   11/28/2023 06:38  <=99 mg/dL Final     Comment:                                 Near   Borderline      AGE      Desirable  Optimal    High     High     Very High     0-19 Y     0 - 109     ---    110-129   >/= 130     ----    20-24 Y     0 - 119     ---    120-159   >/= 160     ----      >24 Y     0 -  99   100-129  130-159   160-189     >/=190       VLDL   Date/Time  Value Ref Range Status   11/28/2023 06:38 AM 18 0 - 40 mg/dL Final   07/21/2022 07:10 AM 15 0 - 40 mg/dL Final   02/19/2019 10:05 AM 18 0 - 40 mg/dL Final      Last I/O:  I/O last 3 completed shifts:  In: 1143.3 (8.1 mL/kg) [P.O.:960; I.V.:183.3 (1.3 mL/kg)]  Out: 1375 (9.7 mL/kg) [Urine:1375 (0.3 mL/kg/hr)]  Weight: 141.6 kg     Past Cardiology Tests (Last 3 Years):  EKG:  Electrocardiogram, 12-lead PRN ACS symptoms 03/24/2025      ECG 12 lead 03/24/2025      ECG 12 lead 08/05/2024    Echo:  Transthoracic Echo (TTE) Complete 03/24/2025      Transthoracic Echo (TTE) Complete 08/05/2024    Ejection Fractions:  EF   Date/Time Value Ref Range Status   03/24/2025 03:15 PM 65 %    08/05/2024 02:00 PM 63 %      Cath:  No results found for this or any previous visit from the past 1095 days.    Stress Test:  No results found for this or any previous visit from the past 1095 days.    Cardiac Imaging:  No results found for this or any previous visit from the past 1095 days.      Inpatient Medications:  Scheduled medications   Medication Dose Route Frequency    amphetamine-dextroamphetamine  10 mg oral BID    cholecalciferol  5,000 Units oral Daily    fondaparinux  10 mg subcutaneous q24h    levothyroxine  100 mcg oral Daily    pantoprazole  40 mg oral Daily before breakfast    perflutren protein A microsphere  0.5 mL intravenous Once in imaging    [Held by provider] predniSONE  10 mg oral Daily    rosuvastatin  20 mg oral Nightly    sulfur hexafluoride microsphr  2 mL intravenous Once in imaging    warfarin  5 mg oral Daily     PRN medications   Medication    acetaminophen    hydrOXYzine HCL    oxyCODONE-acetaminophen     Continuous Medications   Medication Dose Last Rate       Physical Exam:  Physical Exam  Constitutional:       General: She is not in acute distress.     Appearance: Normal appearance. She is obese.   HENT:      Head: Normocephalic and atraumatic.      Mouth/Throat:      Mouth: Mucous membranes are moist.       Pharynx: Oropharynx is clear.   Eyes:      Extraocular Movements: Extraocular movements intact.      Conjunctiva/sclera: Conjunctivae normal.      Pupils: Pupils are equal, round, and reactive to light.   Cardiovascular:      Rate and Rhythm: Normal rate and regular rhythm.      Heart sounds: Normal heart sounds.   Pulmonary:      Effort: Pulmonary effort is normal. No respiratory distress.      Breath sounds: Normal breath sounds. No wheezing.   Chest:      Chest wall: No tenderness.   Abdominal:      General: Abdomen is flat. Bowel sounds are normal.      Palpations: Abdomen is soft.      Tenderness: There is no abdominal tenderness.   Musculoskeletal:         General: Normal range of motion.      Cervical back: Normal range of motion and neck supple.      Right lower leg: No edema.      Left lower leg: No edema.   Skin:     General: Skin is warm and dry.      Capillary Refill: Capillary refill takes 2 to 3 seconds.      Findings: Bruising present.      Comments: L upper arm ecchymosis/edema secondary to IV infiltration   Neurological:      General: No focal deficit present.      Mental Status: She is alert and oriented to person, place, and time. Mental status is at baseline.   Psychiatric:         Mood and Affect: Mood normal.         Behavior: Behavior normal.         Thought Content: Thought content normal.         Judgment: Judgment normal.       Leg Wounds 3/24               Assessment/Plan   Ms. Frances Mcdowell is a 53 yo F who presents today with bilateral saddle PE with right heart strain. She initially presented to [facility] where her PE was identified she was started on Vanc/Zosyn and promptly transferred to [facility] CICU following a PERT call.     Patient with submassive PE and R heart strain as above. Hemodynamics improved upon arrival to the CICU. She previously had a narrow pulse pressure at outside hospital and initially here in the CICU, but her pulse pressure became normal ( over 90 mmHg) after  altering cuff size and location to one more consistent with patient's size. On arrival to CICU she was comfortable and in no acute distress, the case with discussed with Vascular Medicine Staff, Dr. Butler, plan to manage conservatively with heparin gtt and forgo thrombectomy. Transitioned from heparin gtt to Arixtra without issue, ADOD 3/28.     Acute bilateral saddle PE with RHS  - Case discussed with [facility] primary and Dr. Arthur  -  per VM  - Okay for discharge from EVLS standpoint    Peripheral IV 03/24/25 20 G Left Antecubital (Active)   Site Assessment Clean;Dry;Intact 03/26/25 0400   Dressing Type Transparent 03/26/25 0400   Line Status Blood return noted;Flushed;Saline locked 03/26/25 0400   Dressing Status Clean;Dry;Occlusive 03/26/25 0400   Number of days: 2       Peripheral IV 03/25/25 22 G Right Antecubital (Active)   Site Assessment Clean;Dry;Intact 03/26/25 0400   Dressing Type Transparent 03/26/25 0400   Line Status Infusing 03/26/25 0400   Dressing Status Clean;Dry;Occlusive 03/26/25 0400   Number of days: 1       Code Status:  Full Code    I spent 35 minutes in the professional and overall care of this patient      Ruth Amaya, APRN-CNP  Endovascular [facility]   Days: [facility] or page [phone]  [facility] [phone] or [phone]

## 2025-03-28 NOTE — PROGRESS NOTES
Physical Therapy    Physical Therapy Treatment    Patient Name: Frances Mcdowell  MRN: [id number]  Department: [place] [id number]  Room: [facility]  Today's Date: 3/28/2025  Time Calculation  Start Time: 1331  Stop Time: 1354  Time Calculation (min): 23 min       Assessment/Plan   PT Assessment  Rehab Prognosis: Good  Strengths: Ability to acquire knowledge  End of Session Communication: Bedside nurse  Assessment Comment: patient able to ambulate a household distance with SBAx1, reporting only mild SOB. Patient reports DC'ing today, anticipate no further PT needs upon DC. Encouraged therapeutic rest breaks upon DC for activity recovery  End of Session Patient Position: Bed, 3 rail up, Alarm off, not on at start of session     PT Plan  Treatment/Interventions: Transfer training, Gait training, Strengthening, Endurance training, Range of motion, Therapeutic exercise, Therapeutic activity, Home exercise program, Positioning, Bed mobility, Balance training  PT Plan: Ongoing PT  PT Frequency: 3 times per week  PT Discharge Recommendations: No PT needed after discharge  PT Recommended Transfer Status: Stand by assist  PT - OK to Discharge: Yes    General Visit Information:   PT  Visit  PT Received On: 03/28/25  General  Family/Caregiver Present: No  Prior to Session Communication: Bedside nurse  Patient Position Received: Bed, 3 rail up, Alarm off, not on at start of session  General Comment: agreeable to participate; able to ambulate in hallway with x2 sitting rest breaks, able to recover from mild dyspnea with seated rest breaks. 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)    Subjective   Precautions:  Precautions  Medical Precautions: Fall precautions    Vital Signs Comment: -low 130s with ambualtion, recovers with seated rest break     Objective   Pain:  Pain Assessment  Pain Assessment: 0-10  0-10 (Numeric) Pain Score: 0 - No pain  Cognition:  Cognition  Overall Cognitive Status: Within Functional  Limits  Arousal/Alertness: Appropriate responses to stimuli  Orientation Level: Oriented X4  Following Commands: Follows all commands and directions without difficulty  Cognition Comments: emotionally labile at times       Postural Control:  Postural Control  Postural Control: Impaired  Trunk Control: medial-lateral sway during ambulation (morbid obesity)  Static Sitting Balance  Static Sitting-Balance Support: No upper extremity supported, Feet supported  Static Sitting-Level of Assistance: Independent  Dynamic Sitting Balance  Dynamic Sitting-Balance Support: Feet supported, No upper extremity supported  Dynamic Sitting-Level of Assistance: Distant supervision  Static Standing Balance  Static Standing-Balance Support: No upper extremity supported  Static Standing-Level of Assistance: Distant supervision  Dynamic Standing Balance  Dynamic Standing-Balance Support: No upper extremity supported  Dynamic Standing-Level of Assistance: Close supervision    Activity Tolerance:  Activity Tolerance  Endurance:  (mild tachycardia with ambulation, reduced sustained activity tolerance)  Early Mobility/Exercise Safety Screen: Proceed with mobilization - No exclusion criteria met  Activity Tolerance Comments: reported mild SOB and mild dyspnea with ambulation  Treatments:          Balance/Neuromuscular Re-Education  Balance/Neuromuscular Re-Education Activity Performed: Yes  Balance/Neuromuscular Re-Education Activity 1: standing alternating marches B x20, SBAx1, reported mild SOB with activity, required seated rest break    Bed Mobility  Bed Mobility: Yes  Bed Mobility 1  Bed Mobility 1: Supine to sitting, Sitting to supine  Level of Assistance 1: Independent    Ambulation/Gait Training  Ambulation/Gait Training Performed: Yes  Ambulation/Gait Training 1  Surface 1: Level tile  Device 1: No device  Assistance 1: Close supervision  Quality of Gait 1:  (medial-lateral sway, no acute LOB)  Comments/Distance (ft) 1: 1x80 ft,  seated rest break, 1x80 ft, seated rest break  Transfers  Transfer: Yes  Transfer 1  Transfer From 1: Sit to, Stand to  Transfer to 1: Stand, Sit  Technique 1: Sit to stand, Stand to sit  Transfer Level of Assistance 1: Distant supervision  Trials/Comments 1: x1    Outcome Measures:  [facility] Basic Mobility  Turning from your back to your side while in a flat bed without using bedrails: None  Moving from lying on your back to sitting on the side of a flat bed without using bedrails: None  Moving to and from bed to chair (including a wheelchair): A little  Standing up from a chair using your arms (e.g. wheelchair or bedside chair): A little  To walk in hospital room: A little  Climbing 3-5 steps with railing: A little  Basic Mobility - Total Score: 20    Education Documentation  Body Mechanics, taught by Eim Waggoner, PT at 3/28/2025  3:44 PM.  Learner: Patient  Readiness: Acceptance  Method: Explanation  Response: Needs Reinforcement  Comment: activity pacing, pursed lip breathing    Mobility Training, taught by Emi Waggoner PT at 3/28/2025  3:44 PM.  Learner: Patient  Readiness: Acceptance  Method: Explanation  Response: Needs Reinforcement  Comment: activity pacing, pursed lip breathing    Education Comments  No comments found.             Encounter Problems       Encounter Problems (Active)       Balance       Pt will demonstrate a Tinetti score of 24/28 for decreased risk for falls. (Progressing)       Start:  03/25/25    Expected End:  04/08/25               Mobility       Patient will ambulate >250ft with supervision with stable vitals.  (Progressing)       Start:  03/25/25    Expected End:  04/08/25            Pt will ambulate >800 ft with supervision in a 6 minute walking test for improved endurance.  (Progressing)       Start:  03/25/25    Expected End:  04/08/25               PT Transfers       Transfer from bed to chair with independent. (Progressing)       Start:  03/25/25    Expected End:  04/08/25             Patient will transfer sit to and from stand with independent.  (Progressing)       Start:  03/25/25    Expected End:  04/08/25               Pain - Adult            Emi Waggoner, PT, DPT

## 2025-03-31 ENCOUNTER — ANTICOAGULATION - WARFARIN VISIT (OUTPATIENT)
Dept: CARDIOLOGY | Facility: CLINIC | Age: 55
End: 2025-03-31
Payer: COMMERCIAL

## 2025-03-31 ENCOUNTER — TELEPHONE (OUTPATIENT)
Dept: PRIMARY CARE | Facility: CLINIC | Age: 55
End: 2025-03-31

## 2025-03-31 DIAGNOSIS — I26.99 PULMONARY EMBOLISM, UNSPECIFIED CHRONICITY, UNSPECIFIED PULMONARY EMBOLISM TYPE, UNSPECIFIED WHETHER ACUTE COR PULMONALE PRESENT (MULTI): ICD-10-CM

## 2025-03-31 LAB
POC INR: 1.9
POC PROTHROMBIN TIME: NORMAL

## 2025-03-31 PROCEDURE — 99211 OFF/OP EST MAY X REQ PHY/QHP: CPT

## 2025-03-31 NOTE — PROGRESS NOTES
Patient identification verified with 2 identifiers.    Location: [facility] - [address] [place], [place] [address] [phone]     Referring Physician: Leonarda GARDNER  Enrollment/ Re-enrollment date: 26   INR Goal: 2.0-3.0  INR monitoring is per [facility] protocol.  Anticoagulation Medication: warfarin  Indication: Pulmonary Embolism (PE)    Subjective   Bleeding signs/symptoms: No    Bruising: No   Major bleeding event: No  Thrombosis signs/symptoms: No  Thromboembolic event: No  Missed doses: No  Extra doses: No  Medication changes: No  Dietary changes: No  Change in health: No  Change in activity: No  Alcohol: No  Other concerns: NoPt is here for a new pt POCT for Coumadin clinic. Pt was referred by  Leonarda GARDNER  with Diagnosis of PE and DVT.   See EPIC for history, allergies and medications. Pt identification verified by name and . Anticoagulation information given to Pt takes Tylenol for pain currently but was taking Motrin BID daily before hospitalization. Pt was in the hospital for a saddle PE on  and was Dc'd on . She started taking 5mg of warfarin daily on .   patient.  Pt instructed to call with any changes in Diet, Medications, social habits, or general health. Pt instructed to call with any concerns or questions at [phone].     Upcoming Procedures:  Does the Patient Have any upcoming procedures that require interruption in anticoagulation therapy? no  Does the patient require bridging? yes  Pt is taking Arixtra injections once per day    Anticoagulation Summary  As of 3/31/2025      INR goal:  2.0-3.0   TTR:  --   INR used for dosin.90 (3/31/2025)   Weekly warfarin total:  35 mg               Assessment/Plan   Subtherapeutic     1. New dose: no change    2. Next INR:  2 days      Education provided to patient during the visit:  Patient instructed to call in interim with questions, concerns and changes.   Patient  educated on interactions between medications and warfarin.   Patient educated on dietary consistency in vitamin k consumption.   Patient educated on affects of alcohol consumption while taking warfarin.   Patient educated on signs of bleeding/clotting.   Patient educated on compliance with dosing, follow up appointments, and prescribed plan of care.

## 2025-04-02 ENCOUNTER — OFFICE VISIT (OUTPATIENT)
Dept: WOUND CARE | Facility: HOSPITAL | Age: 55
End: 2025-04-02
Payer: COMMERCIAL

## 2025-04-02 ENCOUNTER — ANTICOAGULATION - WARFARIN VISIT (OUTPATIENT)
Dept: CARDIOLOGY | Facility: CLINIC | Age: 55
End: 2025-04-02
Payer: COMMERCIAL

## 2025-04-02 DIAGNOSIS — I26.99 PULMONARY EMBOLISM, UNSPECIFIED CHRONICITY, UNSPECIFIED PULMONARY EMBOLISM TYPE, UNSPECIFIED WHETHER ACUTE COR PULMONALE PRESENT (MULTI): ICD-10-CM

## 2025-04-02 LAB
POC INR: 3.5
POC PROTHROMBIN TIME: NORMAL

## 2025-04-02 PROCEDURE — 99211 OFF/OP EST MAY X REQ PHY/QHP: CPT

## 2025-04-02 PROCEDURE — 11042 DBRDMT SUBQ TIS 1ST 20SQCM/<: CPT | Mod: LT,RT

## 2025-04-02 PROCEDURE — 85610 PROTHROMBIN TIME: CPT | Mod: QW

## 2025-04-02 PROCEDURE — 11045 DBRDMT SUBQ TISS EACH ADDL: CPT | Mod: LT,RT

## 2025-04-02 NOTE — PROGRESS NOTES
"Patient identification verified with 2 identifiers.    Location: [facility] - [address] [place], [place] [address] [phone]     Referring Physician: Dr Moreno  Enrollment/ Re-enrollment date: 03/26/25   INR Goal: 2.0-3.0INR monitoring is per [facility] protocol.  Anticoagulation Medication: warfarin  Indication: Pulmonary Embolism (PE)    Subjective   Bleeding signs/symptoms: No    Bruising: No   Major bleeding event: No  Thrombosis signs/symptoms: No  Thromboembolic event: No  Missed doses: No  Extra doses: No  Medication changes: No  Dietary changes: No  Change in health: No  Change in activity: No  Alcohol: No  Other concerns: Pt with complaints of H/A and nausea. Pt was taking Motrin for leg pain. She has not been able to take this because of the warfarin. She states Tylonol is not helping. Pt instructed to go to ER and pt stated \"I am not doing that\". Have a call in for Dr Moreno to call for instructions    PT WILL WAIT TO TAKE HER LOVENOX TOMORROW UNTIL HER INR IS DONE AT 7:45  Upcoming Procedures:  Does the Patient Have any upcoming procedures that require interruption in anticoagulation therapy? no  Does the patient require bridging? yesPT WILL WAIT TO TAKE HER LOVENOX TOMORROW UNTIL HER INR IS DONE AT 7:45      Anticoagulation Summary  As of 4/2/2025      INR goal:  2.0-3.0   TTR:  --   INR used for dosing:  3.50 (4/2/2025)   Weekly warfarin total:  35 mg               Assessment/Plan   Supratherapeutic     1. New dose:  will have pt hold today and repeat level in 2 days     2. Next INR:  2 days      Education provided to patient during the visit:  Patient instructed to call in interim with questions, concerns and changes.   Patient educated on interactions between medications and warfarin.   Patient educated on dietary consistency in vitamin k consumption.   Patient educated on affects of alcohol consumption while taking warfarin.   Patient educated on signs of " bleeding/clotting.   Patient educated on compliance with dosing, follow up appointments, and prescribed plan of care.

## 2025-04-03 ENCOUNTER — ANTICOAGULATION - WARFARIN VISIT (OUTPATIENT)
Dept: CARDIOLOGY | Facility: CLINIC | Age: 55
End: 2025-04-03
Payer: COMMERCIAL

## 2025-04-03 ENCOUNTER — TELEPHONE (OUTPATIENT)
Dept: PRIMARY CARE | Facility: CLINIC | Age: 55
End: 2025-04-03

## 2025-04-03 DIAGNOSIS — I26.92 ACUTE SADDLE PULMONARY EMBOLISM, UNSPECIFIED WHETHER ACUTE COR PULMONALE PRESENT (MULTI): Primary | ICD-10-CM

## 2025-04-03 DIAGNOSIS — E66.01 OBESITIES, MORBID (MULTI): ICD-10-CM

## 2025-04-03 DIAGNOSIS — I26.99 PULMONARY EMBOLISM, UNSPECIFIED CHRONICITY, UNSPECIFIED PULMONARY EMBOLISM TYPE, UNSPECIFIED WHETHER ACUTE COR PULMONALE PRESENT (MULTI): Primary | ICD-10-CM

## 2025-04-03 LAB
POC INR: 3
POC PROTHROMBIN TIME: NORMAL

## 2025-04-03 PROCEDURE — 99211 OFF/OP EST MAY X REQ PHY/QHP: CPT

## 2025-04-03 PROCEDURE — 85610 PROTHROMBIN TIME: CPT | Mod: QW

## 2025-04-03 RX ORDER — WARFARIN SODIUM 5 MG/1
TABLET ORAL
Qty: 90 TABLET | Refills: 1 | Status: SHIPPED | OUTPATIENT
Start: 2025-04-03 | End: 2026-04-03

## 2025-04-03 NOTE — PROGRESS NOTES
Patient identification verified with 2 identifiers.    Location: [facility] - [address] [place], [place] [address] [phone]     Referring Physician: DR. FORTNUE  Enrollment/ Re-enrollment date: 03/27/25   INR Goal: 2.0-3.0  INR monitoring is per [facility] protocol.  Anticoagulation Medication: warfarin  Indication: Pulmonary Embolism (PE)    Subjective   Bleeding signs/symptoms: No    Bruising: No   Major bleeding event: No  Thrombosis signs/symptoms: No  Thromboembolic event: No  Missed doses: No  Extra doses: No  Medication changes: Yes  PT WILL START A MVI WITH OMEGA 3 DAILY. TODAY  Dietary changes: Yes  PT WILL TRY TO EAT GREEN VEGETABLES TWICE PER WEEK.  SHE HAD GREEN BEANS ONCE SINCE STARTING WARFARIN.  Change in health: No  Change in activity: No  Alcohol: No  Other concerns: I DISCUSSED WITH DR. MILLER, WHO IS COVERING FOR DR. FORTUNE ABOUT PT'S CONCERN THAT WARFARIN IS GIVING HER A HEADACHE, NAUSEA AND DECREASED APPETITE.  PT WANTS TO GO BACK ON ELIQUIS LIKE SHE WAS ON A YR AGO WHEN IT WAS THOUGHT THAT SHE HAD A DVT.  SHE STATES THAT SHE WAS TOLD THAT SHE CAN'T BE ON ELIQUIS DUE TO HER HIGH BMI.      Upcoming Procedures:  Does the Patient Have any upcoming procedures that require interruption in anticoagulation therapy? no  Does the patient require bridging? PT HAS BEEN THERAPEUTIC YESTERDAY AND TODAY, SO MAY STOP ARIXTRA.  SHE DID NOT TAKE A DOSE THIS MORNING.       Anticoagulation Summary  As of 4/3/2025      INR goal:  2.0-3.0   TTR:  --   INR used for dosing:  3.00 (4/3/2025)   Weekly warfarin total:  32.5 mg               Assessment/Plan   Therapeutic     1. New dose:  PT WILL  DECREASE WEEKLY DOSE.  STOP ARIXTRA    2. Next INR:  4 DAYS      Education provided to patient during the visit:  Patient instructed to call in interim with questions, concerns and changes.   Patient educated on interactions between medications and warfarin.   Patient educated on dietary consistency  in vitamin k consumption.   Patient educated on signs of bleeding/clotting.

## 2025-04-07 ENCOUNTER — ANTICOAGULATION - WARFARIN VISIT (OUTPATIENT)
Dept: CARDIOLOGY | Facility: CLINIC | Age: 55
End: 2025-04-07
Payer: COMMERCIAL

## 2025-04-07 DIAGNOSIS — I26.99 PULMONARY EMBOLISM, UNSPECIFIED CHRONICITY, UNSPECIFIED PULMONARY EMBOLISM TYPE, UNSPECIFIED WHETHER ACUTE COR PULMONALE PRESENT (MULTI): ICD-10-CM

## 2025-04-07 LAB
POC INR: 3.4
POC PROTHROMBIN TIME: NORMAL

## 2025-04-07 PROCEDURE — 85610 PROTHROMBIN TIME: CPT | Mod: QW

## 2025-04-07 PROCEDURE — 99211 OFF/OP EST MAY X REQ PHY/QHP: CPT

## 2025-04-07 NOTE — PROGRESS NOTES
Patient identification verified with 2 identifiers.    Location: [facility] - [address] [place], [place] [address] [phone]     Referring Physician: Dr Moreno  Enrollment/ Re-enrollment date: 03/27/26   INR Goal: 2.0-3.0  INR monitoring is per [facility] protocol.  Anticoagulation Medication: warfarin  Indication: Pulmonary Embolism (PE)    Subjective   Bleeding signs/symptoms: No    Bruising: No   Major bleeding event: No  Thrombosis signs/symptoms: No  Thromboembolic event: No  Missed doses: No  Extra doses: No  Medication changes: Yes  pt started taking her MVI on April 3rd  Dietary changes: No  Change in health: No  Change in activity: No  Alcohol: No  Other concerns: No    Upcoming Procedures:  Does the Patient Have any upcoming procedures that require interruption in anticoagulation therapy? no  Does the patient require bridging? no      Anticoagulation Summary  As of 4/7/2025      INR goal:  2.0-3.0   TTR:  0.0% (1 d)   INR used for dosing:  3.40 (4/7/2025)   Weekly warfarin total:  27.5 mg               Assessment/Plan   Supratherapeutic     1. New dose:  decrease weekly dose     2. Next INR:  4 days      Education provided to patient during the visit:  Patient instructed to call in interim with questions, concerns and changes.   Patient educated on interactions between medications and warfarin.   Patient educated on dietary consistency in vitamin k consumption.   Patient educated on affects of alcohol consumption while taking warfarin.   Patient educated on signs of bleeding/clotting.   Patient educated on compliance with dosing, follow up appointments, and prescribed plan of care.

## 2025-04-09 ENCOUNTER — OFFICE VISIT (OUTPATIENT)
Dept: WOUND CARE | Facility: HOSPITAL | Age: 55
End: 2025-04-09
Payer: COMMERCIAL

## 2025-04-09 PROCEDURE — 11045 DBRDMT SUBQ TISS EACH ADDL: CPT | Mod: RT,LT

## 2025-04-09 PROCEDURE — 11042 DBRDMT SUBQ TIS 1ST 20SQCM/<: CPT | Mod: RT,LT

## 2025-04-11 ENCOUNTER — ANTICOAGULATION - WARFARIN VISIT (OUTPATIENT)
Dept: CARDIOLOGY | Facility: CLINIC | Age: 55
End: 2025-04-11
Payer: COMMERCIAL

## 2025-04-11 ENCOUNTER — OFFICE VISIT (OUTPATIENT)
Dept: PRIMARY CARE | Facility: CLINIC | Age: 55
End: 2025-04-11
Payer: COMMERCIAL

## 2025-04-11 VITALS
TEMPERATURE: 97.8 F | HEART RATE: 90 BPM | OXYGEN SATURATION: 99 % | SYSTOLIC BLOOD PRESSURE: 130 MMHG | DIASTOLIC BLOOD PRESSURE: 84 MMHG

## 2025-04-11 DIAGNOSIS — I26.92 ACUTE SADDLE PULMONARY EMBOLISM, UNSPECIFIED WHETHER ACUTE COR PULMONALE PRESENT (MULTI): Primary | ICD-10-CM

## 2025-04-11 DIAGNOSIS — F98.8 ATTENTION DEFICIT DISORDER, UNSPECIFIED TYPE: ICD-10-CM

## 2025-04-11 DIAGNOSIS — I26.99 PULMONARY EMBOLISM, UNSPECIFIED CHRONICITY, UNSPECIFIED PULMONARY EMBOLISM TYPE, UNSPECIFIED WHETHER ACUTE COR PULMONALE PRESENT (MULTI): Primary | ICD-10-CM

## 2025-04-11 DIAGNOSIS — R45.86 MOOD ALTERED: ICD-10-CM

## 2025-04-11 LAB
POC INR: 4.4
POC PROTHROMBIN TIME: NORMAL

## 2025-04-11 PROCEDURE — 85610 PROTHROMBIN TIME: CPT | Mod: QW

## 2025-04-11 PROCEDURE — 99213 OFFICE O/P EST LOW 20 MIN: CPT | Performed by: INTERNAL MEDICINE

## 2025-04-11 PROCEDURE — 1036F TOBACCO NON-USER: CPT | Performed by: INTERNAL MEDICINE

## 2025-04-11 PROCEDURE — 99211 OFF/OP EST MAY X REQ PHY/QHP: CPT

## 2025-04-11 RX ORDER — DULOXETIN HYDROCHLORIDE 60 MG/1
60 CAPSULE, DELAYED RELEASE ORAL DAILY
Qty: 30 CAPSULE | Refills: 5 | Status: SHIPPED | OUTPATIENT
Start: 2025-04-11 | End: 2025-10-08

## 2025-04-11 RX ORDER — DEXTROAMPHETAMINE SACCHARATE, AMPHETAMINE ASPARTATE MONOHYDRATE, DEXTROAMPHETAMINE SULFATE AND AMPHETAMINE SULFATE 5; 5; 5; 5 MG/1; MG/1; MG/1; MG/1
20 CAPSULE, EXTENDED RELEASE ORAL 2 TIMES DAILY
Qty: 60 CAPSULE | Refills: 0 | Status: SHIPPED | OUTPATIENT
Start: 2025-04-11

## 2025-04-11 NOTE — ASSESSMENT & PLAN NOTE
Orders:    amphetamine-dextroamphetamine XR (Adderall XR) 20 mg 24 hr capsule; Take 1 capsule (20 mg) by mouth 2 times a day.

## 2025-04-11 NOTE — PROGRESS NOTES
Subjective   Patient ID: Frances Mcdowell is a 54 y.o. female who presents for Drug Screen.    In for follow up for Saddle embolus. In for refills and medication management.         Review of Systems   All other systems reviewed and are negative.      Previous history  Past Medical History:   Diagnosis Date    Acute embolism and thrombosis of other specified veins 2022    Superficial vein thrombosis    ADD (attention deficit disorder)     Anxiety     Endometrial intraepithelial neoplasia (EIN)     GERD (gastroesophageal reflux disease)     HLD (hyperlipidemia)     Hypothyroidism     Lymph edema      Past Surgical History:   Procedure Laterality Date    BLADDER SURGERY       SECTION, LOW TRANSVERSE      FOOT SURGERY      HYSTERECTOMY      IR CVC PICC  2025    IR CVC PICC 2025 TRI CR NONV1    TONSILLECTOMY      TOTAL THYROIDECTOMY      VARICOSE VEIN SURGERY      Varicose Vein Ligation     Social History     Tobacco Use    Smoking status: Never    Smokeless tobacco: Never   Vaping Use    Vaping status: Never Used   Substance Use Topics    Alcohol use: Not Currently     Comment: seldom    Drug use: Never     No family history on file.  Allergies   Allergen Reactions    Erythromycin Base Anaphylaxis    Peanut Anaphylaxis    Sulfa (Sulfonamide Antibiotics) Anaphylaxis    Tetanus Toxoid Other and Swelling    Tramadol Itching    Adhesive Tape-Silicones Rash     tegaderm causes bad skin breakdown     Current Outpatient Medications   Medication Instructions    acetaminophen (TYLENOL) 650 mg, oral, 3 times daily PRN    amphetamine-dextroamphetamine XR (Adderall XR) 20 mg 24 hr capsule 20 mg, oral, 2 times daily    cholecalciferol (VITAMIN D-3) 5,000 Units, oral, Daily    cyclobenzaprine (FLEXERIL) 10 mg, oral, Nightly PRN    EPINEPHrine (EPIPEN) 0.3 mg, injection, As needed, As Directed    fondaparinux (ARIXTRA) 10 mg, subcutaneous, Daily    levothyroxine (SYNTHROID, LEVOXYL) 100 mcg, oral, Daily, Take on an  empty stomach at the same time each day, either 30 to 60 minutes prior to breakfast    naloxone (NARCAN) 4 mg, nasal, As needed, May repeat every 2-3 minutes if needed, alternating nostrils, until medical assistance becomes available.    omeprazole (PRILOSEC) 40 mg, oral, Daily    rosuvastatin (CRESTOR) 20 mg, oral, Daily    warfarin (Coumadin) 5 mg tablet Take 1 tablet once daily. (Take as directed per After Visit Summary.)    warfarin (Jantoven) 5 mg tablet Take by mouth as directed per After Visit Summary.    wound dressing (Triad Wound Dressing) paste 1 Application, Topical, 2 times daily       Objective       Physical Exam      Assessment/Plan   Frances Mcdowell is a 54 y.o. female who presents for the concerns below:    Assessment & Plan  Attention deficit disorder, unspecified type    Orders:    amphetamine-dextroamphetamine XR (Adderall XR) 20 mg 24 hr capsule; Take 1 capsule (20 mg) by mouth 2 times a day.    Acute saddle pulmonary embolism, unspecified whether acute cor pulmonale present (Multi)       Stable at this time. Will stay on anticoagulation lifetime.            Discussed with:   Return in :    Portions of this note were generated using digital voice recognition software, and may contain grammatical errors       Devaughn Moreno MD  04/11/25  9:09 AM

## 2025-04-11 NOTE — PROGRESS NOTES
Patient identification verified with 2 identifiers.    Location: [facility] - [address] [place], [place] [address] [phone]     Referring Physician: Dr Moreno  Enrollment/ Re-enrollment date: 3/27/2026   INR Goal: 2.0-3.0  INR monitoring is per [facility] protocol.  Anticoagulation Medication: warfarin  Indication: Pulmonary Embolism (PE)    Subjective   Bleeding signs/symptoms: No    Bruising: No   Major bleeding event: No  Thrombosis signs/symptoms: No  Thromboembolic event: No  Missed doses: No  Extra doses: No  Medication changes: Yes  Patient will start cymblta tomorrow   Dietary changes: No  Change in health: No  Change in activity: No  Alcohol: No  Other concerns: No    Upcoming Procedures:  Does the Patient Have any upcoming procedures that require interruption in anticoagulation therapy? no  Does the patient require bridging? no      Anticoagulation Summary  As of 2025      INR goal:  2.0-3.0   TTR:  0.0% (5 d)   INR used for dosin.40 (2025)   Weekly warfarin total:  30 mg               Assessment/Plan   Supratherapeutic     1. New dose:  Hold warfarin  and      2. Next INR:  3 days      Education provided to patient during the visit:  Patient instructed to call in interim with questions, concerns and changes.   Patient educated on interactions between medications and warfarin.   Patient educated on dietary consistency in vitamin k consumption.   Patient educated on affects of alcohol consumption while taking warfarin.   Patient educated on signs of bleeding/clotting.   Patient educated on compliance with dosing, follow up appointments, and prescribed plan of care.

## 2025-04-14 ENCOUNTER — TELEPHONE (OUTPATIENT)
Dept: PRIMARY CARE | Facility: CLINIC | Age: 55
End: 2025-04-14

## 2025-04-14 ENCOUNTER — ANTICOAGULATION - WARFARIN VISIT (OUTPATIENT)
Dept: CARDIOLOGY | Facility: CLINIC | Age: 55
End: 2025-04-14
Payer: COMMERCIAL

## 2025-04-14 DIAGNOSIS — I26.99 PULMONARY EMBOLISM, UNSPECIFIED CHRONICITY, UNSPECIFIED PULMONARY EMBOLISM TYPE, UNSPECIFIED WHETHER ACUTE COR PULMONALE PRESENT (MULTI): Primary | ICD-10-CM

## 2025-04-14 LAB
POC INR: 2.6
POC PROTHROMBIN TIME: NORMAL

## 2025-04-14 PROCEDURE — 99211 OFF/OP EST MAY X REQ PHY/QHP: CPT

## 2025-04-14 PROCEDURE — 85610 PROTHROMBIN TIME: CPT | Mod: QW

## 2025-04-14 NOTE — TELEPHONE ENCOUNTER
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 ( rep believes it might be 5/1/24 - 7/1/2024) Is this time frame different? Please confirm? [phone] Claim # [id number]     advise

## 2025-04-14 NOTE — PROGRESS NOTES
Patient identification verified with 2 identifiers.    Location: [facility] - [address] [place], [place] [address] [phone]     Referring Physician: Dr Moreno  Enrollment/ Re-enrollment date: 3/27/2026   INR Goal: 2.0-3.0  INR monitoring is per [facility] protocol.  Anticoagulation Medication: warfarin  Indication: Pulmonary Embolism (PE)    Subjective   Bleeding signs/symptoms: No    Bruising: No   Major bleeding event: No  Thrombosis signs/symptoms: No  Thromboembolic event: No  Missed doses: No  Extra doses: No  Medication changes: No  Dietary changes: No  Change in health: No  Change in activity: No  Alcohol: No  Other concerns: No    Upcoming Procedures:  Does the Patient Have any upcoming procedures that require interruption in anticoagulation therapy? no  Does the patient require bridging? no      Anticoagulation Summary  As of 2025      INR goal:  2.0-3.0   TTR:  7.4% (1.1 wk)   INR used for dosin.60 (2025)   Weekly warfarin total:  30 mg               Assessment/Plan   Therapeutic     1. New dose: no change    2. Next INR: 1 week      Education provided to patient during the visit:  Patient instructed to call in interim with questions, concerns and changes.   Patient educated on interactions between medications and warfarin.   Patient educated on dietary consistency in vitamin k consumption.   Patient educated on affects of alcohol consumption while taking warfarin.   Patient educated on signs of bleeding/clotting.

## 2025-04-14 NOTE — TELEPHONE ENCOUNTER
Called and spoke with Nghia and informed that patient was treated as a patient 05/01/2024-07/01/2024 but was not for this condition, Nghia made note on the patient;s case to her , KALEE

## 2025-04-16 ENCOUNTER — APPOINTMENT (OUTPATIENT)
Dept: WOUND CARE | Facility: HOSPITAL | Age: 55
End: 2025-04-16
Payer: COMMERCIAL

## 2025-04-17 ENCOUNTER — OFFICE VISIT (OUTPATIENT)
Dept: CARDIOLOGY | Facility: CLINIC | Age: 55
End: 2025-04-17
Payer: COMMERCIAL

## 2025-04-17 ENCOUNTER — ANTICOAGULATION - WARFARIN VISIT (OUTPATIENT)
Dept: CARDIOLOGY | Facility: CLINIC | Age: 55
End: 2025-04-17
Payer: COMMERCIAL

## 2025-04-17 VITALS
HEART RATE: 106 BPM | WEIGHT: 293 LBS | HEIGHT: 63 IN | SYSTOLIC BLOOD PRESSURE: 128 MMHG | OXYGEN SATURATION: 98 % | DIASTOLIC BLOOD PRESSURE: 83 MMHG | BODY MASS INDEX: 51.91 KG/M2

## 2025-04-17 DIAGNOSIS — L97.222 NON-PRESSURE CHRONIC ULCER OF LEFT CALF WITH FAT LAYER EXPOSED (MULTI): ICD-10-CM

## 2025-04-17 DIAGNOSIS — I50.33 ACUTE ON CHRONIC DIASTOLIC HEART FAILURE: ICD-10-CM

## 2025-04-17 DIAGNOSIS — I26.99 PULMONARY EMBOLISM, UNSPECIFIED CHRONICITY, UNSPECIFIED PULMONARY EMBOLISM TYPE, UNSPECIFIED WHETHER ACUTE COR PULMONALE PRESENT (MULTI): Primary | ICD-10-CM

## 2025-04-17 DIAGNOSIS — L97.212 NON-PRESSURE CHRONIC ULCER OF RIGHT CALF WITH FAT LAYER EXPOSED (MULTI): ICD-10-CM

## 2025-04-17 DIAGNOSIS — L03.119 CELLULITIS OF LOWER EXTREMITY, UNSPECIFIED LATERALITY: ICD-10-CM

## 2025-04-17 LAB
POC INR: 2.7 (ref 0.9–1.1)
POC PROTHROMBIN TIME: ABNORMAL (ref 9.3–12.5)

## 2025-04-17 PROCEDURE — 1036F TOBACCO NON-USER: CPT | Performed by: STUDENT IN AN ORGANIZED HEALTH CARE EDUCATION/TRAINING PROGRAM

## 2025-04-17 PROCEDURE — G2211 COMPLEX E/M VISIT ADD ON: HCPCS | Performed by: STUDENT IN AN ORGANIZED HEALTH CARE EDUCATION/TRAINING PROGRAM

## 2025-04-17 PROCEDURE — 99215 OFFICE O/P EST HI 40 MIN: CPT | Performed by: STUDENT IN AN ORGANIZED HEALTH CARE EDUCATION/TRAINING PROGRAM

## 2025-04-17 PROCEDURE — 85610 PROTHROMBIN TIME: CPT | Mod: QW

## 2025-04-17 PROCEDURE — 3008F BODY MASS INDEX DOCD: CPT | Performed by: STUDENT IN AN ORGANIZED HEALTH CARE EDUCATION/TRAINING PROGRAM

## 2025-04-17 PROCEDURE — 99211 OFF/OP EST MAY X REQ PHY/QHP: CPT

## 2025-04-17 RX ORDER — FUROSEMIDE 20 MG/1
20 TABLET ORAL DAILY
Qty: 30 TABLET | Refills: 11 | Status: SHIPPED | OUTPATIENT
Start: 2025-04-17 | End: 2026-04-17

## 2025-04-17 NOTE — PROGRESS NOTES
Referred by Dr. Moreno for New Patient Visit for pulmonary embolism with right ventricular strain    HPI:    Frances Mcdowell is a 54 y.o. female with pertinent history of hypothyroidism, GERD, ADHD, anxiety, bilateral lymph recurrent cellulitis, history of acute on chronic diastolic heart failure with BNP level of 258 in 2024, history of bilateral saddle pulmonary embolism with right heart strain diagnosed March 2025, preserved LVEF 65% with moderately increased septal hypertrophy, reduced right ventricular function with Hobbs sign, moderate right ventricular enlargement, moderate tricuspid regurgitation, mildly elevated RVSP of 62.2 mmHg on echocardiogram performed 3/24/2025 presents to cardiology clinic to establish care after recent hospitalization.     Recent hospital course was reviewed and discussed.  She continues to have significant shortness of breath that is limiting her activities.  She also notes an increase in lower extremity edema.  We did discuss the natural history of pulmonary embolism and the likely recovery time.  We also reviewed and discussed her prior echocardiogram she does have follow-up with Dr. Dobbins of vascular medicine.  No exacerbating or relieving factors.  Patient denies chest pain and angina.  Pt denies orthopnea, and paroxysmal nocturnal dyspnea.  Pt denies worsening lower extremity edema.  Pt denies palpitations or syncope.  No recent falls.  No fever or chills.  No cough.  No change in bowel or bladder habits.  No sick contacts.  No recent travel.    12 point review of systems including (Constitutional, Eyes, ENMT, Respiratory, Cardiac, Gastrointestinal, Neurological, Psychiatric, and Hematologic) was performed and is otherwise negative.    Past medical history reviewed:   has a past medical history of Acute embolism and thrombosis of other specified veins (08/2022), ADD (attention deficit disorder), Anxiety, Endometrial intraepithelial neoplasia (EIN), GERD (gastroesophageal  reflux disease), HLD (hyperlipidemia), Hypothyroidism, and Lymph edema.    Past surgical history reviewed:   has a past surgical history that includes Tonsillectomy; Foot surgery; Varicose vein surgery; Total thyroidectomy; Bladder surgery;  section, low transverse; Hysterectomy; and IR CVC PICC (2025).    Social history reviewed:   reports that she has never smoked. She has never used smokeless tobacco. She reports that she does not currently use alcohol. She reports that she does not use drugs.     Family history:  No sudden cardiac death or premature coronary artery disease.      Allergies reviewed: Erythromycin base, Peanut, Sulfa (sulfonamide antibiotics), Tetanus toxoid, Tramadol, and Adhesive tape-silicones     Medications reviewed:   Current Outpatient Medications   Medication Instructions    acetaminophen (TYLENOL) 650 mg, oral, 3 times daily PRN    amphetamine-dextroamphetamine XR (Adderall XR) 20 mg 24 hr capsule 20 mg, oral, 2 times daily    cholecalciferol (VITAMIN D-3) 5,000 Units, oral, Daily    cyclobenzaprine (FLEXERIL) 10 mg, oral, Nightly PRN    DULoxetine (CYMBALTA) 60 mg, oral, Daily, Do not crush or chew.    EPINEPHrine (EPIPEN) 0.3 mg, injection, As needed, As Directed    fondaparinux (ARIXTRA) 10 mg, subcutaneous, Daily    levothyroxine (SYNTHROID, LEVOXYL) 100 mcg, oral, Daily, Take on an empty stomach at the same time each day, either 30 to 60 minutes prior to breakfast    naloxone (NARCAN) 4 mg, nasal, As needed, May repeat every 2-3 minutes if needed, alternating nostrils, until medical assistance becomes available.    omeprazole (PRILOSEC) 40 mg, oral, Daily    rosuvastatin (CRESTOR) 20 mg, oral, Daily    warfarin (Coumadin) 5 mg tablet Take 1 tablet once daily. (Take as directed per After Visit Summary.)    warfarin (Jantoven) 5 mg tablet Take by mouth as directed per After Visit Summary.    wound dressing (Triad Wound Dressing) paste 1 Application, Topical, 2 times daily         Vitals reviewed: Visit Vitals  /83   Pulse 106       Physical Exam:   General:  Patient is awake, alert, and oriented.  Patient is in no acute distress.  Anxious.  HEENT:  Pupils equal and reactive.  Normocephalic.  Moist mucosa.    Neck:  No thyromegaly.  Normal Jugular Venous Pressure.  Cardiovascular:  Regular rate and rhythm.  Distant sounds.  Normal S1 and S2.   Pulmonary:  Clear to auscultation bilaterally.  Abdomen:  Soft. Non-tender.   Non-distended.  Positive bowel sounds.  Obese.  Lower Extremities:  2+ LE edema with changes of lymphedema and chronic venous insufficiency.  Neurologic:  Cranial nerves intact.  No focal deficit.   Skin: Skin warm and dry, normal skin turgor.   Psychiatric: Normal affect.    Last Labs:  CBC -      Lab Results   Component Value Date    WBC 12.2 (H) 03/28/2025    WBC 11.3 (H) 02/03/2025    HGB 11.4 (L) 03/28/2025    HGB 12.0 02/03/2025    HCT 36.9 03/28/2025    HCT 37.2 02/03/2025     03/28/2025     (H) 02/03/2025        CMP-  Lab Results   Component Value Date    GLUCOSE 103 (H) 03/28/2025    GLUCOSE 103 02/03/2025     03/28/2025     02/03/2025    K 4.3 03/28/2025    K 4.7 02/03/2025     03/28/2025     02/03/2025    CO2 29 03/28/2025    CO2 26 02/03/2025    ANIONGAP 12 03/28/2025    ANIONGAP 10 02/03/2025    BUN 23 03/28/2025    BUN 37 (H) 02/03/2025    CREATININE 0.86 03/28/2025    CREATININE 1.20 (H) 02/03/2025    EGFR 79 03/28/2025    EGFR 53 (L) 02/03/2025    CALCIUM 9.0 03/28/2025    CALCIUM 9.1 02/03/2025    PHOS 3.5 03/28/2025    PROT 6.7 02/20/2025    ALBUMIN 3.1 (L) 03/28/2025    AST 14 02/20/2025    ALT 12 02/20/2025    ALKPHOS 116 (H) 02/20/2025    BILITOT 0.4 02/20/2025        LIPIDS-  Lab Results   Component Value Date    CHOL 143 10/11/2024    TRIG 88 11/28/2023    HDL 56.5 10/11/2024    CHHDL 2.5 10/11/2024    VLDL 18 11/28/2023        OTHERS-  Lab Results   Component Value Date    HGBA1C 5.4 03/26/2025    BNP  158 (H) 03/24/2025        I personally reviewed the patient's recent vitals, labs, medications, orders, EKGs, pertinent cardiac imaging/ echocardiography.    Assessment and Plan:    Frances Mcdowell is a 54 y.o. female with pertinent history of hypothyroidism, GERD, ADHD, anxiety, bilateral lymph recurrent cellulitis, history of acute on chronic diastolic heart failure with BNP level of 258 in 2024, history of bilateral saddle pulmonary embolism with right heart strain diagnosed March 2025, preserved LVEF 65% with moderately increased septal hypertrophy, reduced right ventricular function with Hobbs sign, moderate right ventricular enlargement, moderate tricuspid regurgitation, mildly elevated RVSP of 62.2 mmHg on echocardiogram performed 3/24/2025 presents to cardiology clinic to establish care after recent hospitalization.  Recent hospital course was reviewed and discussed.  She continues to have significant shortness of breath that is limiting her activities.  She also notes an increase in lower extremity edema.  We did discuss the natural history of pulmonary embolism and the likely recovery time.  We also reviewed and discussed her prior echocardiogram she does have follow-up with Dr. Dobbins of vascular medicine.     Will start furosemide or Lasix 20 mg once daily.    Please obtain repeat blood work including comprehensive metabolic panel and BNP level in about 1 month.    We will obtain a transthoracic echocardiogram for structural evaluation including ejection fraction, assessment of regional wall motion abnormalities or valvular disease, and further evaluation of hemodynamics prior to your follow-up visit.    Please follow-up with Dr. Dobbins of vascular medicine as scheduled.    Please followup with me in Cardiology clinic within the next 2 to 3 months.  Please return to clinic sooner or seek emergent care if your symptoms reoccur or worsen.    Thank you for allowing me to participate in their care.  Please  feel free to call me with any further questions or concerns.        Devaughn Sousa MD, [facility], [facility]  Division of Cardiovascular Medicine  System Director, Nuclear Cardiology   Medical Director, [facility] [place], [facility]   Clinical , [employer]  [email or web address]   Office:  [phone]

## 2025-04-17 NOTE — PATIENT INSTRUCTIONS
Will start furosemide or Lasix 20 mg once daily.    Please obtain repeat blood work including comprehensive metabolic panel and BNP level in about 1 month.    We will obtain a transthoracic echocardiogram for structural evaluation including ejection fraction, assessment of regional wall motion abnormalities or valvular disease, and further evaluation of hemodynamics prior to your follow-up visit.    Please follow-up with Dr. Dobbins of vascular medicine as scheduled.    Please followup with me in Cardiology clinic within the next 2 to 3 months.  Please return to clinic sooner or seek emergent care if your symptoms reoccur or worsen.

## 2025-04-21 ENCOUNTER — APPOINTMENT (OUTPATIENT)
Dept: CARDIOLOGY | Facility: CLINIC | Age: 55
End: 2025-04-21
Payer: COMMERCIAL

## 2025-04-21 ENCOUNTER — ANTICOAGULATION - WARFARIN VISIT (OUTPATIENT)
Dept: CARDIOLOGY | Facility: CLINIC | Age: 55
End: 2025-04-21
Payer: COMMERCIAL

## 2025-04-21 DIAGNOSIS — I26.99 PULMONARY EMBOLISM, UNSPECIFIED CHRONICITY, UNSPECIFIED PULMONARY EMBOLISM TYPE, UNSPECIFIED WHETHER ACUTE COR PULMONALE PRESENT (MULTI): ICD-10-CM

## 2025-04-21 LAB
POC INR: 3.7 (ref 0.9–1.1)
POC PROTHROMBIN TIME: ABNORMAL (ref 9.3–12.5)

## 2025-04-21 PROCEDURE — 99211 OFF/OP EST MAY X REQ PHY/QHP: CPT

## 2025-04-21 PROCEDURE — 85610 PROTHROMBIN TIME: CPT | Mod: QW

## 2025-04-21 NOTE — PROGRESS NOTES
Patient identification verified with 2 identifiers.    Location: [facility] - [address] [place], [place] [address] [phone]     Referring Physician: Dr Moreno  Enrollment/ Re-enrollment date: 03/27/26   INR Goal: 2.0-3.0  INR monitoring is per [facility] protocol.  Anticoagulation Medication: warfarin  Indication: Pulmonary Embolism (PE)    Subjective   Bleeding signs/symptoms: No    Bruising: No   Major bleeding event: No  Thrombosis signs/symptoms: No  Thromboembolic event: No  Missed doses: No  Extra doses: No  Medication changes: Yes  will start Lasix 20mg per day today or tomorrow  Dietary changes: No  Change in health: No  Change in activity: No  Alcohol: No  Other concerns: No    Upcoming Procedures:  Does the Patient Have any upcoming procedures that require interruption in anticoagulation therapy? no  Does the patient require bridging? no      Anticoagulation Summary  As of 4/21/2025      INR goal:  2.0-3.0   TTR:  31.6% (2.1 wk)   INR used for dosing:  3.70 (4/21/2025)   Weekly warfarin total:  25 mg               Assessment/Plan   Supratherapeutic     1. New dose:  will have pt hold todays dose and decrease weekly dose     2. Next INR: 1 week      Education provided to patient during the visit:  Patient instructed to call in interim with questions, concerns and changes.   Patient educated on interactions between medications and warfarin.   Patient educated on dietary consistency in vitamin k consumption.   Patient educated on affects of alcohol consumption while taking warfarin.   Patient educated on signs of bleeding/clotting.   Patient educated on compliance with dosing, follow up appointments, and prescribed plan of care.

## 2025-04-23 ENCOUNTER — OFFICE VISIT (OUTPATIENT)
Dept: WOUND CARE | Facility: HOSPITAL | Age: 55
End: 2025-04-23
Payer: COMMERCIAL

## 2025-04-23 PROCEDURE — 11045 DBRDMT SUBQ TISS EACH ADDL: CPT | Mod: LT,RT

## 2025-04-23 PROCEDURE — 11042 DBRDMT SUBQ TIS 1ST 20SQCM/<: CPT | Mod: LT,RT

## 2025-04-28 ENCOUNTER — ANTICOAGULATION - WARFARIN VISIT (OUTPATIENT)
Dept: CARDIOLOGY | Facility: CLINIC | Age: 55
End: 2025-04-28
Payer: COMMERCIAL

## 2025-04-28 ENCOUNTER — OFFICE VISIT (OUTPATIENT)
Dept: CARDIOLOGY | Facility: CLINIC | Age: 55
End: 2025-04-28
Payer: COMMERCIAL

## 2025-04-28 VITALS
HEIGHT: 63 IN | BODY MASS INDEX: 51.91 KG/M2 | DIASTOLIC BLOOD PRESSURE: 88 MMHG | HEART RATE: 99 BPM | WEIGHT: 293 LBS | SYSTOLIC BLOOD PRESSURE: 124 MMHG | OXYGEN SATURATION: 98 %

## 2025-04-28 DIAGNOSIS — I26.99 PULMONARY EMBOLISM, UNSPECIFIED CHRONICITY, UNSPECIFIED PULMONARY EMBOLISM TYPE, UNSPECIFIED WHETHER ACUTE COR PULMONALE PRESENT (MULTI): Primary | ICD-10-CM

## 2025-04-28 DIAGNOSIS — I26.99 PULMONARY EMBOLISM, UNSPECIFIED CHRONICITY, UNSPECIFIED PULMONARY EMBOLISM TYPE, UNSPECIFIED WHETHER ACUTE COR PULMONALE PRESENT (MULTI): ICD-10-CM

## 2025-04-28 DIAGNOSIS — R31.9 HEMATURIA, UNSPECIFIED TYPE: ICD-10-CM

## 2025-04-28 DIAGNOSIS — I89.0 LYMPHEDEMA: ICD-10-CM

## 2025-04-28 LAB
POC INR: 4 (ref 0.9–1.1)
POC PROTHROMBIN TIME: ABNORMAL (ref 9.3–12.5)

## 2025-04-28 PROCEDURE — 99214 OFFICE O/P EST MOD 30 MIN: CPT | Performed by: INTERNAL MEDICINE

## 2025-04-28 PROCEDURE — 99211 OFF/OP EST MAY X REQ PHY/QHP: CPT

## 2025-04-28 PROCEDURE — 85610 PROTHROMBIN TIME: CPT | Mod: QW

## 2025-04-28 PROCEDURE — 1036F TOBACCO NON-USER: CPT | Performed by: INTERNAL MEDICINE

## 2025-04-28 PROCEDURE — 3008F BODY MASS INDEX DOCD: CPT | Performed by: INTERNAL MEDICINE

## 2025-04-28 RX ORDER — NYSTATIN 100000 [USP'U]/G
POWDER TOPICAL
COMMUNITY
Start: 2025-02-15

## 2025-04-28 ASSESSMENT — PAIN SCALES - GENERAL: PAINLEVEL_OUTOF10: 7

## 2025-04-28 NOTE — PROGRESS NOTES
"  History of Present Illness:  Frances Mcdowell is a/an 54 y.o. woman with unprovoked pulmonary embolism and right mid-distal femoral deep vein thrombosis on 3/24/2025 here for hospital follow up.    Discharged on warfarin.    Has noticed some blood about once a week when urinating  When she wipes there's no blood on the tissue    Never smoker    History of pseudomonal cellulitis about a year ago  History of varicose veins and vein stripping  Being treated for lymphedema    Works as a nurse; currently on short-term disability      Medical History[1]  Surgical History[2]  Social History[3]  Family History[4]  Current Medications[5]    Physical Examination:  Blood pressure 124/88, pulse 99, height 1.6 m (5' 3\"), weight 142 kg (312 lb), SpO2 98%.  No distress  No JVD or carotid bruits  Lungs clear bilaterally  Heart regular and without murmurs  Abdomen soft and non-tender  Bilateral leg swelling with dorsal foot swelling and squaring of toes  Pulses intact      Pertinent Labs:    Pertinent Imaging:  Echocardiogram 3/24/2025  CONCLUSIONS:   1. The left ventricular systolic function is normal, with a visually estimated ejection fraction of 65%.   2. Left ventricular diastolic filling cannot be determined due to E/A wave fusion.   3. There is moderately increased septal and moderately increased posterior left ventricular wall thickness.   4. There is reduced right ventricular systolic function.   5. Hobbs's sign is present, suggestive of pulmonary embolism.   6. Moderately enlarged right ventricle.   7. Moderate tricuspid regurgitation visualized.   8. Moderately elevated right ventricular systolic pressure.    Venous duplex ultrasound 3/24/2025   **CRITICAL RESULT**  Critical Result: Acute DVT in Right lower extremity.  Notification called to Emigdio Palafox MD on 3/24/2025 at 4:00:18 PM. Acknowledged critical results notification communicated via Epic secure chat by Navin Caban RVT.     CONCLUSIONS:  Right Lower Venous: There " is acute non-occlusive deep vein thrombosis visualized in the mid femoral, distal femoral and popliteal veins. The remainder of the right leg is negative for deep vein thrombosis. Unable to obtain images and rule out DVT in calf veins due to painful wounds and bandage.  Left Lower Venous: No evidence of acute deep vein thrombus visualized in the left lower extremity. Unable to obtain images and rule out DVT in calf veins due to painful wounds and bandage.    CTA chest 3/24/2025  IMPRESSION:  1.  The findings are compatible with the acute pulmonary emboli with  considerable thrombus burden as well as evidence for right  ventricular strain    Diagnoses and all orders for this visit:  Pulmonary embolism, unspecified chronicity, unspecified pulmonary embolism type, unspecified whether acute cor pulmonale present (Multi) (Primary)  -     apixaban (Eliquis) 5 mg tablet; Take 1 tablet (5 mg) by mouth 2 times a day.  Lymphedema  Hematuria, unspecified type  -     Urinalysis with Reflex Culture and Microscopic; Future  Patient very averse to continuing on warfarin; will check price on apixaban   If affordable will transition  Will need indefinite anticoagulation   Follow up in 6 months.    Eleni Dobbins MD, MS           [1]   Past Medical History:  Diagnosis Date    Acute embolism and thrombosis of other specified veins 2022    Superficial vein thrombosis    ADD (attention deficit disorder)     Anxiety     Endometrial intraepithelial neoplasia (EIN)     GERD (gastroesophageal reflux disease)     HLD (hyperlipidemia)     Hypothyroidism     Lymph edema    [2]   Past Surgical History:  Procedure Laterality Date    BLADDER SURGERY       SECTION, LOW TRANSVERSE      FOOT SURGERY      HYSTERECTOMY      IR CVC PICC  2025    IR CVC PICC 2025 TRI CR NONV1    TONSILLECTOMY      TOTAL THYROIDECTOMY      VARICOSE VEIN SURGERY      Varicose Vein Ligation   [3]   Social History  Tobacco Use    Smoking status: Never     Smokeless tobacco: Never   Vaping Use    Vaping status: Never Used   Substance Use Topics    Alcohol use: Not Currently     Comment: seldom    Drug use: Never   [4] No family history on file.  [5]   Current Outpatient Medications   Medication Sig Dispense Refill    acetaminophen (Tylenol) 325 mg tablet Take 2 tablets (650 mg) by mouth 3 times a day as needed for mild pain (1 - 3).      amphetamine-dextroamphetamine XR (Adderall XR) 20 mg 24 hr capsule Take 1 capsule (20 mg) by mouth 2 times a day. (Patient taking differently: Take 2 capsules (40 mg) by mouth once daily.) 60 capsule 0    cholecalciferol (Vitamin D-3) 5,000 Units tablet Take 1 tablet (5,000 Units) by mouth once daily. 90 tablet 1    furosemide (Lasix) 20 mg tablet Take 1 tablet (20 mg) by mouth once daily. 30 tablet 11    levothyroxine (Synthroid, Levoxyl) 100 mcg tablet Take 1 tablet (100 mcg) by mouth early in the morning.. Take on an empty stomach at the same time each day, either 30 to 60 minutes prior to breakfast 90 tablet 3    Nyamyc 100,000 unit/gram powder APPLY TO FOLDS EVERY DAY AS NEEDED      omeprazole (PriLOSEC) 40 mg DR capsule Take 1 capsule (40 mg) by mouth once daily. 90 capsule 3    rosuvastatin (Crestor) 20 mg tablet Take 1 tablet (20 mg) by mouth once daily. 30 tablet 11    warfarin (Jantoven) 5 mg tablet Take by mouth as directed per After Visit Summary. 90 tablet 1    wound dressing (Triad Wound Dressing) paste Apply 1 Application topically 2 times a day. 170 g 3    EPINEPHrine 0.3 mg/0.3 mL injection syringe Inject 0.3 mL (0.3 mg) as directed if needed for anaphylaxis. As Directed 30 mL 2    naloxone (Narcan) 4 mg/0.1 mL nasal spray Administer 1 spray (4 mg) into affected nostril(s) if needed for opioid reversal. May repeat every 2-3 minutes if needed, alternating nostrils, until medical assistance becomes available. 2 each 0    warfarin (Coumadin) 5 mg tablet Take 1 tablet once daily. (Take as directed per After Visit  Summary.) 90 tablet 3     No current facility-administered medications for this visit.

## 2025-04-28 NOTE — PROGRESS NOTES
Patient identification verified with 2 identifiers.    Location: [facility] - [address]. [place], [place] [address] [phone]     Referring Physician: Dr Moreno  Enrollment/ Re-enrollment date: 26   INR Goal: 2.0-3.0  INR monitoring is per [facility] protocol.  Anticoagulation Medication: warfarin  Indication: Pulmonary Embolism (PE)    Subjective   Bleeding signs/symptoms: No    Bruising: No   Major bleeding event: No  Thrombosis signs/symptoms: No  Thromboembolic event: No  Missed doses: No  Extra doses: No  Medication changes: No  Dietary changes: No  Change in health: No  Change in activity: No  Alcohol: No  Other concerns: No    Upcoming Procedures:  Does the Patient Have any upcoming procedures that require interruption in anticoagulation therapy? no  Does the patient require bridging? no      Anticoagulation Summary  As of 2025      INR goal:  2.0-3.0   TTR:  21.6% (3.1 wk)   INR used for dosin.00 (2025)   Weekly warfarin total:  22.5 mg               Assessment/Plan   Supratherapeutic     1. New dose:  will hold coumadin for 2 days and decrease weekly dose     2. Next INR: 1 week      Education provided to patient during the visit:  Patient instructed to call in interim with questions, concerns and changes.   Patient educated on interactions between medications and warfarin.   Patient educated on dietary consistency in vitamin k consumption.   Patient educated on affects of alcohol consumption while taking warfarin.   Patient educated on signs of bleeding/clotting.   Patient educated on compliance with dosing, follow up appointments, and prescribed plan of care.

## 2025-04-29 ENCOUNTER — TELEPHONE (OUTPATIENT)
Dept: CARDIOLOGY | Facility: CLINIC | Age: 55
End: 2025-04-29
Payer: COMMERCIAL

## 2025-04-30 DIAGNOSIS — F98.8 ATTENTION DEFICIT DISORDER, UNSPECIFIED TYPE: ICD-10-CM

## 2025-05-01 RX ORDER — DEXTROAMPHETAMINE SACCHARATE, AMPHETAMINE ASPARTATE MONOHYDRATE, DEXTROAMPHETAMINE SULFATE AND AMPHETAMINE SULFATE 5; 5; 5; 5 MG/1; MG/1; MG/1; MG/1
20 CAPSULE, EXTENDED RELEASE ORAL 2 TIMES DAILY
Qty: 60 CAPSULE | Refills: 0 | Status: SHIPPED | OUTPATIENT
Start: 2025-05-01

## 2025-05-03 ENCOUNTER — HOSPITAL ENCOUNTER (EMERGENCY)
Facility: HOSPITAL | Age: 55
Discharge: HOME | End: 2025-05-03
Payer: COMMERCIAL

## 2025-05-03 VITALS
RESPIRATION RATE: 18 BRPM | WEIGHT: 293 LBS | HEART RATE: 91 BPM | TEMPERATURE: 97.7 F | OXYGEN SATURATION: 96 % | SYSTOLIC BLOOD PRESSURE: 124 MMHG | DIASTOLIC BLOOD PRESSURE: 96 MMHG | BODY MASS INDEX: 51.91 KG/M2 | HEIGHT: 63 IN

## 2025-05-03 DIAGNOSIS — R31.0 GROSS HEMATURIA: Primary | ICD-10-CM

## 2025-05-03 LAB
ALBUMIN SERPL BCP-MCNC: 3.5 G/DL (ref 3.4–5)
ALP SERPL-CCNC: 98 U/L (ref 33–110)
ALT SERPL W P-5'-P-CCNC: 8 U/L (ref 7–45)
ANION GAP SERPL CALC-SCNC: 12 MMOL/L (ref 10–20)
APPEARANCE UR: ABNORMAL
AST SERPL W P-5'-P-CCNC: 14 U/L (ref 9–39)
BASOPHILS # BLD AUTO: 0.05 X10*3/UL (ref 0–0.1)
BASOPHILS NFR BLD AUTO: 0.6 %
BILIRUB SERPL-MCNC: 0.5 MG/DL (ref 0–1.2)
BILIRUB UR STRIP.AUTO-MCNC: NEGATIVE MG/DL
BUN SERPL-MCNC: 21 MG/DL (ref 6–23)
CALCIUM SERPL-MCNC: 8.7 MG/DL (ref 8.6–10.3)
CHLORIDE SERPL-SCNC: 103 MMOL/L (ref 98–107)
CO2 SERPL-SCNC: 26 MMOL/L (ref 21–32)
COLOR UR: ABNORMAL
CREAT SERPL-MCNC: 1.02 MG/DL (ref 0.5–1.05)
EGFRCR SERPLBLD CKD-EPI 2021: 65 ML/MIN/1.73M*2
EOSINOPHIL # BLD AUTO: 0.43 X10*3/UL (ref 0–0.7)
EOSINOPHIL NFR BLD AUTO: 5.4 %
ERYTHROCYTE [DISTWIDTH] IN BLOOD BY AUTOMATED COUNT: 13.5 % (ref 11.5–14.5)
GLUCOSE SERPL-MCNC: 101 MG/DL (ref 74–99)
GLUCOSE UR STRIP.AUTO-MCNC: NEGATIVE MG/DL
HCT VFR BLD AUTO: 37.2 % (ref 36–46)
HGB BLD-MCNC: 11.9 G/DL (ref 12–16)
IMM GRANULOCYTES # BLD AUTO: 0.02 X10*3/UL (ref 0–0.7)
IMM GRANULOCYTES NFR BLD AUTO: 0.3 % (ref 0–0.9)
INR PPP: 2 (ref 0.9–1.1)
KETONES UR STRIP.AUTO-MCNC: ABNORMAL MG/DL
LEUKOCYTE ESTERASE UR QL STRIP.AUTO: ABNORMAL
LYMPHOCYTES # BLD AUTO: 1.53 X10*3/UL (ref 1.2–4.8)
LYMPHOCYTES NFR BLD AUTO: 19.2 %
MAGNESIUM SERPL-MCNC: 1.81 MG/DL (ref 1.6–2.4)
MCH RBC QN AUTO: 30.8 PG (ref 26–34)
MCHC RBC AUTO-ENTMCNC: 32 G/DL (ref 32–36)
MCV RBC AUTO: 96 FL (ref 80–100)
MONOCYTES # BLD AUTO: 0.78 X10*3/UL (ref 0.1–1)
MONOCYTES NFR BLD AUTO: 9.8 %
NEUTROPHILS # BLD AUTO: 5.14 X10*3/UL (ref 1.2–7.7)
NEUTROPHILS NFR BLD AUTO: 64.7 %
NITRITE UR QL STRIP.AUTO: NEGATIVE
NRBC BLD-RTO: 0 /100 WBCS (ref 0–0)
PH UR STRIP.AUTO: 6 [PH]
PLATELET # BLD AUTO: 464 X10*3/UL (ref 150–450)
POTASSIUM SERPL-SCNC: 3.9 MMOL/L (ref 3.5–5.3)
PROT SERPL-MCNC: 7.4 G/DL (ref 6.4–8.2)
PROT UR STRIP.AUTO-MCNC: ABNORMAL MG/DL
PROTHROMBIN TIME: 21.9 SECONDS (ref 9.8–12.4)
RBC # BLD AUTO: 3.86 X10*6/UL (ref 4–5.2)
RBC # UR STRIP.AUTO: ABNORMAL MG/DL
RBC #/AREA URNS AUTO: >20 /HPF
SODIUM SERPL-SCNC: 137 MMOL/L (ref 136–145)
SP GR UR STRIP.AUTO: >1.03
SQUAMOUS #/AREA URNS AUTO: ABNORMAL /HPF
UROBILINOGEN UR STRIP.AUTO-MCNC: ABNORMAL MG/DL
WBC # BLD AUTO: 8 X10*3/UL (ref 4.4–11.3)
WBC #/AREA URNS AUTO: ABNORMAL /HPF

## 2025-05-03 PROCEDURE — 80053 COMPREHEN METABOLIC PANEL: CPT | Performed by: HEALTH CARE PROVIDER

## 2025-05-03 PROCEDURE — 87086 URINE CULTURE/COLONY COUNT: CPT | Mod: GEALAB | Performed by: HEALTH CARE PROVIDER

## 2025-05-03 PROCEDURE — 85025 COMPLETE CBC W/AUTO DIFF WBC: CPT | Performed by: HEALTH CARE PROVIDER

## 2025-05-03 PROCEDURE — 83735 ASSAY OF MAGNESIUM: CPT | Performed by: HEALTH CARE PROVIDER

## 2025-05-03 PROCEDURE — 81001 URINALYSIS AUTO W/SCOPE: CPT | Performed by: HEALTH CARE PROVIDER

## 2025-05-03 PROCEDURE — 36415 COLL VENOUS BLD VENIPUNCTURE: CPT | Performed by: HEALTH CARE PROVIDER

## 2025-05-03 PROCEDURE — 99283 EMERGENCY DEPT VISIT LOW MDM: CPT

## 2025-05-03 PROCEDURE — 81003 URINALYSIS AUTO W/O SCOPE: CPT | Performed by: HEALTH CARE PROVIDER

## 2025-05-03 PROCEDURE — 85610 PROTHROMBIN TIME: CPT | Performed by: HEALTH CARE PROVIDER

## 2025-05-03 ASSESSMENT — LIFESTYLE VARIABLES
HAVE PEOPLE ANNOYED YOU BY CRITICIZING YOUR DRINKING: NO
EVER HAD A DRINK FIRST THING IN THE MORNING TO STEADY YOUR NERVES TO GET RID OF A HANGOVER: NO
TOTAL SCORE: 0
HAVE YOU EVER FELT YOU SHOULD CUT DOWN ON YOUR DRINKING: NO
EVER FELT BAD OR GUILTY ABOUT YOUR DRINKING: NO

## 2025-05-03 ASSESSMENT — PAIN SCALES - GENERAL
PAINLEVEL_OUTOF10: 0 - NO PAIN

## 2025-05-03 ASSESSMENT — PAIN - FUNCTIONAL ASSESSMENT: PAIN_FUNCTIONAL_ASSESSMENT: 0-10

## 2025-05-03 NOTE — ED PROVIDER NOTES
HPI   Chief Complaint   Patient presents with    Vaginal Bleeding       CC: Acute gross hematuria  HPI:   54-year-old female who recently developed saddle PE, with right heart strain, DVT in the lower extremity, she was started on Coumadin however after she saw vascular surgeon 3 days ago she was switched over to Eliquis 5 mg twice daily, patient reported 3 days ago started noticing some intermittent episodes of hematuria, she states now having gross hematuria she has not noticed any blood on her underwear she has not wearing any feminine pads, does not believe she is bleeding vaginally, she denies any dizziness lightheadedness denies any chest pain shortness of breath, she denies any history of anemia.  Patient denies any abdominal pain, denies any prior history of hematuria or abnormal uterine bleeding.    Additional Limitations to History:   External Records Reviewed: I reviewed recent and relevant outside records including   History Obtained From:     Past Medical History: Per HPI  Medications: Reviewed in EMR and with patient  Allergies:  Reviewed in EMR  Past Surgical History:   Social History:     ------------------------------------------------------------------------------------------------------  Physical Exam:  --Vital signs reviewed in nursing triage note, EMR flow sheets, and at patient's bedside  GEN:  A&Ox3, no acute distress, appears comfortable.  Conversational and appropriate.  No confusion or gross mental status changes.  EYES: EOMI, non-injected sclera.  ENT: Moist mucous membranes, no apparent injuries or lesions.   CARDIO: Normal rate and regular rhythm. No murmurs, rubs, or gallops.  2+ equal pulses of the distal extremities.   PULM: Clear to auscultation bilaterally. No rales, rhonchi, or wheezes. Good symmetric chest expansion.  GI: Soft, non-tender, non-distended. No rebound tenderness or guarding.  SKIN: Warm and dry, no rashes or lesions.  MSK: ROM intact the extremities without  contractures.   EXT: No peripheral edema, contusions, or wounds.   NEURO: Cranial nerves II-XII grossly intact. Sensation to light touch intact and equal bilaterally in upper and lower extremities.  Symmetric 5/5 strength in upper and lower extremities.  PSYCH: Appropriate mood and behavior, converses and responds appropriately during exam.  -------------------------------------------------------------------------------------------------------        Differential Diagnoses Considered:   Chronic Medical Conditions Significantly Affecting Care:   Diagnostic testing considered: [PERC, D-Dimer, PECARN, etc.]      - I independently interpreted: [CXR, CT, POCUS, etc. including your interpretation]  - Labs notable for     Escalation of Care: Appropriate for   Social Determinants of Health Significantly Affecting Care: [Homelessness, lacking transportation, uninsured, unable to afford medications]  Prescription Drug Consideration: [Antibiotics, antivirals, pain medications, etc.]  Discussion of Management with Other Providers:  I discussed the patient/results with: [admitting team, consultant, radiologist, social work, EPAT, case management, PT/OT, RT, PCP, etc.]      Richard Bueno PA-C              Patient History   Medical History[1]  Surgical History[2]  Family History[3]  Social History[4]    Physical Exam   ED Triage Vitals   Temperature Heart Rate Respirations BP   05/03/25 1633 05/03/25 1633 05/03/25 1633 05/03/25 1635   36.5 °C (97.7 °F) (!) 103 16 138/90      Pulse Ox Temp Source Heart Rate Source Patient Position   05/03/25 1633 05/03/25 1633 05/03/25 1633 05/03/25 1633   100 % Temporal Monitor Sitting      BP Location FiO2 (%)     05/03/25 1633 --     Right arm        Physical Exam      ED Course & MDM   Diagnoses as of 05/04/25 0011   Gross hematuria                 No data recorded     [place] Coma Scale Score: 15 (05/03/25 1640 : Bisi Ellington RN)                           Medical Decision  Making  54-year-old female with history of PE, DVT, on Eliquis previously on Coumadin who is having gross hematuria, patient is otherwise asymptomatic and has no abdominal pain, no weakness dizziness, her laboratory workup unremarkable, stable H&H, low suspicion for acute urinary tract infection pending urine culture, discussed possible causes of hematuria and offered CT abdomen pelvis imaging however patient is declining imaging at this time, she would like to wait to see if symptoms resolve and she understands instructions to follow-up with urology if she continues to have gross hematuria she also understands instructions return to ED if she does develop any weakness dizziness lightheadedness nausea vomiting        Procedure  Procedures       Richard Bueno PA-C  25 1650       [1]   Past Medical History:  Diagnosis Date    Acute embolism and thrombosis of other specified veins 2022    Superficial vein thrombosis    ADD (attention deficit disorder)     Anxiety     Endometrial intraepithelial neoplasia (EIN)     GERD (gastroesophageal reflux disease)     HLD (hyperlipidemia)     Hypothyroidism     Lymph edema     Pulmonary embolism    [2]   Past Surgical History:  Procedure Laterality Date    BLADDER SURGERY       SECTION, LOW TRANSVERSE      FOOT SURGERY      HYSTERECTOMY      IR CVC PICC  2025    IR CVC PICC 2025 TRI CR NONV1    TONSILLECTOMY      TOTAL THYROIDECTOMY      VARICOSE VEIN SURGERY      Varicose Vein Ligation   [3] No family history on file.  [4]   Social History  Tobacco Use    Smoking status: Never    Smokeless tobacco: Never   Vaping Use    Vaping status: Never Used   Substance Use Topics    Alcohol use: Not Currently     Comment: seldom    Drug use: Never        Richard Bueno PA-C  25 0014

## 2025-05-03 NOTE — ED TRIAGE NOTES
Patient presents with vaginal bleeding that began yesterday since she switched from coumadin to eliquis. Patient recently had saddle PE and was started on blood thinners. No pain or dizziness. Reports SOB is baseline since saddle PE and has not worsened. Bleeding is only when urinating, patient is not soaking any pads.

## 2025-05-04 LAB — HOLD SPECIMEN: NORMAL

## 2025-05-05 ENCOUNTER — APPOINTMENT (OUTPATIENT)
Dept: CARDIOLOGY | Facility: CLINIC | Age: 55
End: 2025-05-05
Payer: COMMERCIAL

## 2025-05-05 ENCOUNTER — TELEPHONE (OUTPATIENT)
Dept: CARDIOLOGY | Facility: HOSPITAL | Age: 55
End: 2025-05-05
Payer: COMMERCIAL

## 2025-05-05 ENCOUNTER — APPOINTMENT (OUTPATIENT)
Dept: PAIN MEDICINE | Facility: CLINIC | Age: 55
End: 2025-05-05
Payer: COMMERCIAL

## 2025-05-05 LAB — BACTERIA UR CULT: NORMAL

## 2025-05-06 NOTE — TELEPHONE ENCOUNTER
Returned patient's phone call regarding emergency department visit for hematuria.    Review of recent labs shows RBCs in the urine on four separate occasions over the last nine months.    Emphasized that she needs to have hematuria worked up.    Reassured her that INR is pretty irrelevant on apixaban as apixaban affects the INR but not in a reliable dose-dependent fashion.    Eleni Dobbins MD, MS

## 2025-05-07 ENCOUNTER — PREP FOR PROCEDURE (OUTPATIENT)
Dept: WOUND CARE | Facility: HOSPITAL | Age: 55
End: 2025-05-07

## 2025-05-07 ENCOUNTER — OFFICE VISIT (OUTPATIENT)
Dept: WOUND CARE | Facility: HOSPITAL | Age: 55
End: 2025-05-07
Payer: COMMERCIAL

## 2025-05-07 PROCEDURE — 11045 DBRDMT SUBQ TISS EACH ADDL: CPT | Mod: LT,RT

## 2025-05-07 PROCEDURE — 11042 DBRDMT SUBQ TIS 1ST 20SQCM/<: CPT | Mod: LT,RT

## 2025-05-08 ENCOUNTER — APPOINTMENT (OUTPATIENT)
Dept: PRIMARY CARE | Facility: CLINIC | Age: 55
End: 2025-05-08
Payer: COMMERCIAL

## 2025-05-08 VITALS — OXYGEN SATURATION: 98 % | HEART RATE: 89 BPM | TEMPERATURE: 97 F

## 2025-05-08 DIAGNOSIS — L03.90 CELLULITIS: ICD-10-CM

## 2025-05-08 DIAGNOSIS — M79.604 PAIN IN BOTH LOWER EXTREMITIES: ICD-10-CM

## 2025-05-08 DIAGNOSIS — M79.605 PAIN IN BOTH LOWER EXTREMITIES: ICD-10-CM

## 2025-05-08 DIAGNOSIS — R31.0 GROSS HEMATURIA: Primary | ICD-10-CM

## 2025-05-08 DIAGNOSIS — Z13.29 SCREENING FOR THYROID DISORDER: ICD-10-CM

## 2025-05-08 DIAGNOSIS — F11.20 UNCOMPLICATED OPIOID DEPENDENCE (MULTI): ICD-10-CM

## 2025-05-08 DIAGNOSIS — F98.8 ATTENTION DEFICIT DISORDER, UNSPECIFIED TYPE: ICD-10-CM

## 2025-05-08 DIAGNOSIS — Z12.39 ENCOUNTER FOR SCREENING FOR MALIGNANT NEOPLASM OF BREAST, UNSPECIFIED SCREENING MODALITY: ICD-10-CM

## 2025-05-08 PROCEDURE — 99213 OFFICE O/P EST LOW 20 MIN: CPT | Performed by: INTERNAL MEDICINE

## 2025-05-08 RX ORDER — NYSTATIN 100000 [USP'U]/G
POWDER TOPICAL DAILY
Qty: 60 G | Refills: 6 | Status: SHIPPED | OUTPATIENT
Start: 2025-05-08

## 2025-05-08 RX ORDER — ACETAMINOPHEN 325 MG/1
650 TABLET ORAL 3 TIMES DAILY PRN
Qty: 30 TABLET | Refills: 11 | Status: CANCELLED | OUTPATIENT
Start: 2025-05-08

## 2025-05-08 RX ORDER — DEXTROAMPHETAMINE SACCHARATE, AMPHETAMINE ASPARTATE MONOHYDRATE, DEXTROAMPHETAMINE SULFATE AND AMPHETAMINE SULFATE 5; 5; 5; 5 MG/1; MG/1; MG/1; MG/1
20 CAPSULE, EXTENDED RELEASE ORAL 2 TIMES DAILY
Qty: 60 CAPSULE | Refills: 0 | Status: SHIPPED | OUTPATIENT
Start: 2025-05-08

## 2025-05-08 RX ORDER — OXYCODONE AND ACETAMINOPHEN 10; 325 MG/1; MG/1
1 TABLET ORAL DAILY
Qty: 5 TABLET | Refills: 0 | Status: CANCELLED | OUTPATIENT
Start: 2025-05-08 | End: 2025-08-06

## 2025-05-08 RX ORDER — ACETAMINOPHEN 325 MG/1
650 TABLET ORAL 3 TIMES DAILY PRN
Qty: 30 TABLET | Refills: 11 | Status: SHIPPED | OUTPATIENT
Start: 2025-05-08

## 2025-05-08 RX ORDER — OXYCODONE AND ACETAMINOPHEN 10; 325 MG/1; MG/1
1 TABLET ORAL DAILY
Qty: 5 TABLET | Refills: 0 | Status: SHIPPED | OUTPATIENT
Start: 2025-05-08 | End: 2025-05-09 | Stop reason: SDUPTHER

## 2025-05-08 ASSESSMENT — ANXIETY QUESTIONNAIRES
3. WORRYING TOO MUCH ABOUT DIFFERENT THINGS: NEARLY EVERY DAY
GAD7 TOTAL SCORE: 9
2. NOT BEING ABLE TO STOP OR CONTROL WORRYING: MORE THAN HALF THE DAYS
7. FEELING AFRAID AS IF SOMETHING AWFUL MIGHT HAPPEN: SEVERAL DAYS
IF YOU CHECKED OFF ANY PROBLEMS ON THIS QUESTIONNAIRE, HOW DIFFICULT HAVE THESE PROBLEMS MADE IT FOR YOU TO DO YOUR WORK, TAKE CARE OF THINGS AT HOME, OR GET ALONG WITH OTHER PEOPLE: SOMEWHAT DIFFICULT
6. BECOMING EASILY ANNOYED OR IRRITABLE: NOT AT ALL
1. FEELING NERVOUS, ANXIOUS, OR ON EDGE: SEVERAL DAYS
4. TROUBLE RELAXING: SEVERAL DAYS
5. BEING SO RESTLESS THAT IT IS HARD TO SIT STILL: SEVERAL DAYS

## 2025-05-08 ASSESSMENT — PATIENT HEALTH QUESTIONNAIRE - PHQ9
SUM OF ALL RESPONSES TO PHQ9 QUESTIONS 1 AND 2: 2
9. THOUGHTS THAT YOU WOULD BE BETTER OFF DEAD, OR OF HURTING YOURSELF: NOT AT ALL
5. POOR APPETITE OR OVEREATING: SEVERAL DAYS
8. MOVING OR SPEAKING SO SLOWLY THAT OTHER PEOPLE COULD HAVE NOTICED. OR THE OPPOSITE, BEING SO FIGETY OR RESTLESS THAT YOU HAVE BEEN MOVING AROUND A LOT MORE THAN USUAL: NOT AT ALL
3. TROUBLE FALLING OR STAYING ASLEEP OR SLEEPING TOO MUCH: MORE THAN HALF THE DAYS
SUM OF ALL RESPONSES TO PHQ QUESTIONS 1-9: 7
6. FEELING BAD ABOUT YOURSELF - OR THAT YOU ARE A FAILURE OR HAVE LET YOURSELF OR YOUR FAMILY DOWN: NOT AT ALL
4. FEELING TIRED OR HAVING LITTLE ENERGY: MORE THAN HALF THE DAYS
1. LITTLE INTEREST OR PLEASURE IN DOING THINGS: NOT AT ALL
2. FEELING DOWN, DEPRESSED OR HOPELESS: MORE THAN HALF THE DAYS
7. TROUBLE CONCENTRATING ON THINGS, SUCH AS READING THE NEWSPAPER OR WATCHING TELEVISION: NOT AT ALL

## 2025-05-08 NOTE — PROGRESS NOTES
Patient is here for  hospital follow  up  patient still having blood in her urine. Patient here for control medication screen.

## 2025-05-08 NOTE — LETTER
May 9, 2025     Patient: Frances Mcdowell   YOB: 1970   Date of Visit: 5/8/2025       To Whom It May Concern:    Frances Mcdowell was seen in my clinic on 5/8/2025 at 11:15 am. Please excuse Frances for her absence from work on this day to make the appointment. She may also return to work light duty on 05/12/2025, if light duty is not available please allow her to rest with frequent breaks.    If you have any questions or concerns, please don't hesitate to call.         Sincerely,         Devaughn Moreno MD        CC: Devaughn Moreno MD

## 2025-05-08 NOTE — LETTER
May 8, 2025     Patient: Frances Mcdowell   YOB: 1970   Date of Visit: 5/8/2025       To Whom It May Concern:    Frances Mcdowell was seen in my clinic on 5/8/2025 at 11:15 am. Please excuse Frances for her absence from work on this day to make the appointment. She returns on 5/12/2025, if possible light duty work or breaks as needed     If you have any questions or concerns, please don't hesitate to call.         Sincerely,         Devaughn Moreno MD

## 2025-05-08 NOTE — ASSESSMENT & PLAN NOTE
Orders:    Opiate/Opioid/Benzo Prescription Compliance; Future    amphetamine-dextroamphetamine XR (Adderall XR) 20 mg 24 hr capsule; Take 1 capsule (20 mg) by mouth 2 times a day.

## 2025-05-08 NOTE — ASSESSMENT & PLAN NOTE
Orders:    acetaminophen (Tylenol) 325 mg tablet; Take 2 tablets (650 mg) by mouth 3 times a day as needed for mild pain (1 - 3).    Nyamyc 100,000 unit/gram powder; Apply topically once daily.

## 2025-05-08 NOTE — PROGRESS NOTES
Subjective   Patient ID: Frances Mcdowell is a 54 y.o. female who presents for Hospital Follow-up, Drug Screen, and Referral - Heart Txp.    In for follow up for Hematuria. Doing much better overall.         Review of Systems   All other systems reviewed and are negative.      Previous history  Medical History[1]  Surgical History[2]  Social History[3]  Family History[4]  Allergies[5]  Current Outpatient Medications   Medication Instructions    acetaminophen (TYLENOL) 650 mg, oral, 3 times daily PRN    amphetamine-dextroamphetamine XR (Adderall XR) 20 mg 24 hr capsule 20 mg, oral, 2 times daily    cholecalciferol (VITAMIN D-3) 5,000 Units, oral, Daily    Eliquis 5 mg, oral, 2 times daily    EPINEPHrine (EPIPEN) 0.3 mg, injection, As needed, As Directed    furosemide (LASIX) 20 mg, oral, Daily    levothyroxine (SYNTHROID, LEVOXYL) 100 mcg, oral, Daily, Take on an empty stomach at the same time each day, either 30 to 60 minutes prior to breakfast    naloxone (NARCAN) 4 mg, nasal, As needed, May repeat every 2-3 minutes if needed, alternating nostrils, until medical assistance becomes available.    Nyamyc 100,000 unit/gram powder Topical, Daily    omeprazole (PRILOSEC) 40 mg, oral, Daily    rosuvastatin (CRESTOR) 20 mg, oral, Daily    wound dressing (Triad Wound Dressing) paste 1 Application, Topical, 2 times daily       Objective       Physical Exam      Assessment/Plan   Frances Mcdowell is a 54 y.o. female who presents for the concerns below:    Assessment & Plan  Attention deficit disorder, unspecified type    Orders:    Opiate/Opioid/Benzo Prescription Compliance; Future    amphetamine-dextroamphetamine XR (Adderall XR) 20 mg 24 hr capsule; Take 1 capsule (20 mg) by mouth 2 times a day.    Uncomplicated opioid dependence (Multi)    Orders:    Opiate/Opioid/Benzo Prescription Compliance; Future    Cellulitis    Orders:    acetaminophen (Tylenol) 325 mg tablet; Take 2 tablets (650 mg) by mouth 3 times a day as needed for  mild pain (1 - 3).    Nyamyc 100,000 unit/gram powder; Apply topically once daily.    Screening for thyroid disorder    Orders:    TSH; Future    Encounter for screening for malignant neoplasm of breast, unspecified screening modality    Orders:    BI mammo bilateral screening tomosynthesis; Future    Gross hematuria       Will refer to Urology,   Gross [place]  Pain in both lower extremities    Orders:    oxyCODONE-acetaminophen (Percocet)  mg tablet; Take 1 tablet by mouth once daily.              Discussed with:   Return in :    Portions of this note were generated using digital voice recognition software, and may contain grammatical errors       Devaughn Moreno MD  25  11:36 AM         [1]   Past Medical History:  Diagnosis Date    Acute embolism and thrombosis of other specified veins 2022    Superficial vein thrombosis    ADD (attention deficit disorder)     Anxiety     Endometrial intraepithelial neoplasia (EIN)     GERD (gastroesophageal reflux disease)     HLD (hyperlipidemia)     Hypothyroidism     Lymph edema     Pulmonary embolism    [2]   Past Surgical History:  Procedure Laterality Date    BLADDER SURGERY       SECTION, LOW TRANSVERSE      FOOT SURGERY      HYSTERECTOMY      IR CVC PICC  2025    IR CVC PICC 2025 TRI CR NONV1    TONSILLECTOMY      TOTAL THYROIDECTOMY      VARICOSE VEIN SURGERY      Varicose Vein Ligation   [3]   Social History  Tobacco Use    Smoking status: Never    Smokeless tobacco: Never   Vaping Use    Vaping status: Never Used   Substance Use Topics    Alcohol use: Not Currently     Comment: seldom    Drug use: Never   [4] No family history on file.  [5]   Allergies  Allergen Reactions    Erythromycin Base Anaphylaxis    Peanut Anaphylaxis    Sulfa (Sulfonamide Antibiotics) Anaphylaxis    Tetanus Toxoid Other and Swelling    Tramadol Itching    Adhesive Tape-Silicones Rash     tegaderm causes bad skin breakdown

## 2025-05-09 RX ORDER — OXYCODONE AND ACETAMINOPHEN 10; 325 MG/1; MG/1
1 TABLET ORAL DAILY
Qty: 90 TABLET | Refills: 0 | Status: SHIPPED | OUTPATIENT
Start: 2025-05-09 | End: 2025-08-07

## 2025-05-09 NOTE — TELEPHONE ENCOUNTER
I called patient to inform her that Dr Moreno called her 90 day supply into her pharmacy today to [facility].

## 2025-05-11 LAB
1OH-MIDAZOLAM UR-MCNC: NEGATIVE NG/ML
7AMINOCLONAZEPAM UR-MCNC: NEGATIVE NG/ML
A-OH ALPRAZ UR-MCNC: NEGATIVE NG/ML
A-OH-TRIAZOLAM UR-MCNC: NEGATIVE NG/ML
AMPHET UR-MCNC: ABNORMAL NG/ML
AMPHETAMINES UR QL: POSITIVE NG/ML
BARBITURATES UR QL: NEGATIVE NG/ML
BZE UR QL: NEGATIVE NG/ML
CODEINE UR-MCNC: NEGATIVE NG/ML
CREAT UR-MCNC: >350 MG/DL
DRUG SCREEN COMMENT UR-IMP: ABNORMAL
EDDP UR-MCNC: NEGATIVE NG/ML
FENTANYL UR-MCNC: NEGATIVE NG/ML
HYDROCODONE UR-MCNC: NEGATIVE NG/ML
HYDROMORPHONE UR-MCNC: NEGATIVE NG/ML
LORAZEPAM UR-MCNC: NEGATIVE NG/ML
METHADONE UR-MCNC: NEGATIVE NG/ML
METHAMPHET UR-MCNC: NEGATIVE NG/ML
MORPHINE UR-MCNC: NEGATIVE NG/ML
NORDIAZEPAM UR-MCNC: NEGATIVE NG/ML
NORFENTANYL UR-MCNC: NEGATIVE NG/ML
NORHYDROCODONE UR CFM-MCNC: NEGATIVE NG/ML
NOROXYCODONE UR CFM-MCNC: 3880 NG/ML
NORTRAMADOL UR-MCNC: NEGATIVE NG/ML
OH-ETHYLFLURAZ UR-MCNC: NEGATIVE NG/ML
OXAZEPAM UR-MCNC: NEGATIVE NG/ML
OXIDANTS UR QL: NEGATIVE MCG/ML
OXYCODONE UR CFM-MCNC: 4010 NG/ML
OXYMORPHONE UR CFM-MCNC: 1940 NG/ML
PCP UR QL: NEGATIVE NG/ML
PH UR: 6.8 [PH] (ref 4.5–9)
QUEST 6 ACETYLMORPHINE: NEGATIVE NG/ML
QUEST NOTES AND COMMENTS: ABNORMAL
QUEST ZOLPIDEM: NEGATIVE NG/ML
TEMAZEPAM UR-MCNC: NEGATIVE NG/ML
THC UR QL: NEGATIVE NG/ML
TRAMADOL UR-MCNC: NEGATIVE NG/ML
TSH SERPL-ACNC: NORMAL M[IU]/L
ZOLPIDEM PHENYL-4-CARB UR CFM-MCNC: NEGATIVE NG/ML

## 2025-05-12 LAB
1OH-MIDAZOLAM UR-MCNC: NEGATIVE NG/ML
7AMINOCLONAZEPAM UR-MCNC: NEGATIVE NG/ML
A-OH ALPRAZ UR-MCNC: NEGATIVE NG/ML
A-OH-TRIAZOLAM UR-MCNC: NEGATIVE NG/ML
AMPHET UR-MCNC: ABNORMAL NG/ML
AMPHETAMINES UR QL: POSITIVE NG/ML
BARBITURATES UR QL: NEGATIVE NG/ML
BZE UR QL: NEGATIVE NG/ML
CODEINE UR-MCNC: NEGATIVE NG/ML
CREAT UR-MCNC: >350 MG/DL
DRUG SCREEN COMMENT UR-IMP: ABNORMAL
EDDP UR-MCNC: NEGATIVE NG/ML
FENTANYL UR-MCNC: NEGATIVE NG/ML
HYDROCODONE UR-MCNC: NEGATIVE NG/ML
HYDROMORPHONE UR-MCNC: NEGATIVE NG/ML
LORAZEPAM UR-MCNC: NEGATIVE NG/ML
METHADONE UR-MCNC: NEGATIVE NG/ML
METHAMPHET UR-MCNC: NEGATIVE NG/ML
MORPHINE UR-MCNC: NEGATIVE NG/ML
NORDIAZEPAM UR-MCNC: NEGATIVE NG/ML
NORFENTANYL UR-MCNC: NEGATIVE NG/ML
NORHYDROCODONE UR CFM-MCNC: NEGATIVE NG/ML
NOROXYCODONE UR CFM-MCNC: 3880 NG/ML
NORTRAMADOL UR-MCNC: NEGATIVE NG/ML
OH-ETHYLFLURAZ UR-MCNC: NEGATIVE NG/ML
OXAZEPAM UR-MCNC: NEGATIVE NG/ML
OXIDANTS UR QL: NEGATIVE MCG/ML
OXYCODONE UR CFM-MCNC: 4010 NG/ML
OXYMORPHONE UR CFM-MCNC: 1940 NG/ML
PCP UR QL: NEGATIVE NG/ML
PH UR: 6.8 [PH] (ref 4.5–9)
QUEST 6 ACETYLMORPHINE: NEGATIVE NG/ML
QUEST NOTES AND COMMENTS: ABNORMAL
QUEST ZOLPIDEM: NEGATIVE NG/ML
TEMAZEPAM UR-MCNC: NEGATIVE NG/ML
THC UR QL: NEGATIVE NG/ML
TRAMADOL UR-MCNC: NEGATIVE NG/ML
TSH SERPL-ACNC: NORMAL MIU/L
ZOLPIDEM PHENYL-4-CARB UR CFM-MCNC: NEGATIVE NG/ML

## 2025-05-14 ENCOUNTER — OFFICE VISIT (OUTPATIENT)
Dept: WOUND CARE | Facility: HOSPITAL | Age: 55
End: 2025-05-14
Payer: COMMERCIAL

## 2025-05-14 ENCOUNTER — APPOINTMENT (OUTPATIENT)
Dept: PRIMARY CARE | Facility: CLINIC | Age: 55
End: 2025-05-14
Payer: COMMERCIAL

## 2025-05-14 DIAGNOSIS — L30.9 DERMATITIS: Primary | ICD-10-CM

## 2025-05-14 PROCEDURE — 11042 DBRDMT SUBQ TIS 1ST 20SQCM/<: CPT | Mod: LT

## 2025-05-14 PROCEDURE — 11045 DBRDMT SUBQ TISS EACH ADDL: CPT | Mod: RT

## 2025-05-14 RX ORDER — TRIAMCINOLONE ACETONIDE 1 MG/G
CREAM TOPICAL
Qty: 453.6 G | Refills: 1 | Status: SHIPPED | OUTPATIENT
Start: 2025-05-14 | End: 2025-07-13

## 2025-05-15 ENCOUNTER — ANTICOAGULATION - WARFARIN VISIT (OUTPATIENT)
Dept: CARDIOLOGY | Facility: CLINIC | Age: 55
End: 2025-05-15
Payer: COMMERCIAL

## 2025-05-15 DIAGNOSIS — I26.99 PULMONARY EMBOLISM, UNSPECIFIED CHRONICITY, UNSPECIFIED PULMONARY EMBOLISM TYPE, UNSPECIFIED WHETHER ACUTE COR PULMONALE PRESENT (MULTI): Primary | ICD-10-CM

## 2025-05-20 ENCOUNTER — APPOINTMENT (OUTPATIENT)
Dept: RADIOLOGY | Facility: CLINIC | Age: 55
End: 2025-05-20
Payer: COMMERCIAL

## 2025-05-21 ENCOUNTER — OFFICE VISIT (OUTPATIENT)
Dept: WOUND CARE | Facility: HOSPITAL | Age: 55
End: 2025-05-21
Payer: COMMERCIAL

## 2025-05-21 ENCOUNTER — PREP FOR PROCEDURE (OUTPATIENT)
Dept: WOUND CARE | Facility: HOSPITAL | Age: 55
End: 2025-05-21

## 2025-05-21 ENCOUNTER — APPOINTMENT (OUTPATIENT)
Dept: UROLOGY | Facility: CLINIC | Age: 55
End: 2025-05-21
Payer: COMMERCIAL

## 2025-05-21 VITALS — TEMPERATURE: 97.4 F

## 2025-05-21 DIAGNOSIS — N20.0 NEPHROLITHIASIS: ICD-10-CM

## 2025-05-21 DIAGNOSIS — R31.0 GROSS HEMATURIA: Primary | ICD-10-CM

## 2025-05-21 LAB
POC APPEARANCE, URINE: CLEAR
POC BILIRUBIN, URINE: ABNORMAL
POC BLOOD, URINE: ABNORMAL
POC COLOR, URINE: YELLOW
POC GLUCOSE, URINE: NEGATIVE MG/DL
POC KETONES, URINE: NEGATIVE MG/DL
POC LEUKOCYTES, URINE: NEGATIVE
POC NITRITE,URINE: NEGATIVE
POC PH, URINE: 6 PH
POC PROTEIN, URINE: ABNORMAL MG/DL
POC SPECIFIC GRAVITY, URINE: >=1.03
POC UROBILINOGEN, URINE: 0.2 EU/DL

## 2025-05-21 PROCEDURE — 88112 CYTOPATH CELL ENHANCE TECH: CPT | Performed by: PATHOLOGY

## 2025-05-21 PROCEDURE — 99204 OFFICE O/P NEW MOD 45 MIN: CPT | Performed by: UROLOGY

## 2025-05-21 PROCEDURE — 1036F TOBACCO NON-USER: CPT | Performed by: UROLOGY

## 2025-05-21 PROCEDURE — 81003 URINALYSIS AUTO W/O SCOPE: CPT | Performed by: UROLOGY

## 2025-05-21 PROCEDURE — 11042 DBRDMT SUBQ TIS 1ST 20SQCM/<: CPT | Mod: XS,LT

## 2025-05-21 PROCEDURE — 11045 DBRDMT SUBQ TISS EACH ADDL: CPT | Mod: XS,RT

## 2025-05-21 PROCEDURE — 88112 CYTOPATH CELL ENHANCE TECH: CPT

## 2025-05-21 ASSESSMENT — PAIN SCALES - GENERAL: PAINLEVEL_OUTOF10: 0-NO PAIN

## 2025-05-21 NOTE — PROGRESS NOTES
Subjective   Frances Mcdowell is a 54 y.o. female presenting as a new patient today due to gross hematuria. Patient presented to the ER on 5/3/2025 with complaints of gross hematuria. Patient was diagnosed with a PE in March and was subsequently started on anticoagulation therapy. CT A&P from 1/23/2025 showed a non obstructing mid to lower pole left renal stone measuring  9 mm and a 7 mm cortical hypodensity in the lower pole of the left kidney.  She has no bothersome urinary symptoms. She has no history of smoking. Denies fevers, chills, urinary retention, intractable flank or abdominal pain, nausea or vomiting.              Medical History[1]  Surgical History[2]  Family History[3]  Current Medications[4]  Allergies[5]  Social History     Socioeconomic History    Marital status:      Spouse name: Not on file    Number of children: Not on file    Years of education: Not on file    Highest education level: Not on file   Occupational History    Not on file   Tobacco Use    Smoking status: Never    Smokeless tobacco: Never   Vaping Use    Vaping status: Never Used   Substance and Sexual Activity    Alcohol use: Not Currently     Comment: seldom    Drug use: Never    Sexual activity: Yes     Partners: Male     Birth control/protection: None   Other Topics Concern    Not on file   Social History Narrative    Not on file     Social Drivers of Health     Financial Resource Strain: Low Risk  (3/25/2025)    Overall Financial Resource Strain (CARDIA)     Difficulty of Paying Living Expenses: Not very hard   Food Insecurity: No Food Insecurity (3/25/2025)    Hunger Vital Sign     Worried About Running Out of Food in the Last Year: Never true     Ran Out of Food in the Last Year: Never true   Transportation Needs: No Transportation Needs (3/25/2025)    PRAPARE - Transportation     Lack of Transportation (Medical): No     Lack of Transportation (Non-Medical): No   Physical Activity: Inactive (10/31/2024)    Exercise Vital  Sign     Days of Exercise per Week: 0 days     Minutes of Exercise per Session: 0 min   Stress: Stress Concern Present (10/31/2024)    [employer] - Occupational Stress Questionnaire     Feeling of Stress : To some extent   Social Connections: Socially Isolated (3/25/2025)    Social Connection and Isolation Panel [NHANES]     Frequency of Communication with Friends and Family: Once a week     Frequency of Social Gatherings with Friends and Family: Once a week     Attends Religion Services: Never     Active Member of Clubs or Organizations: No     Attends Club or Organization Meetings: Never     Marital Status:    Intimate Partner Violence: Not At Risk (3/25/2025)    Humiliation, Afraid, Rape, and Kick questionnaire     Fear of Current or Ex-Partner: No     Emotionally Abused: No     Physically Abused: No     Sexually Abused: No   Housing Stability: Low Risk  (3/25/2025)    Housing Stability Vital Sign     Unable to Pay for Housing in the Last Year: No     Number of Times Moved in the Last Year: 0     Homeless in the Last Year: No       Review of Systems  Pertinent items are noted in HPI.    Objective       Lab Review  Lab Results   Component Value Date    WBC 8.0 05/03/2025    WBC 11.3 (H) 02/03/2025    RBC 3.86 (L) 05/03/2025    RBC 3.94 02/03/2025    HGB 11.9 (L) 05/03/2025    HGB 12.0 02/03/2025    HCT 37.2 05/03/2025    HCT 37.2 02/03/2025     (H) 05/03/2025     (H) 02/03/2025      Lab Results   Component Value Date    BUN 21 05/03/2025    BUN 37 (H) 02/03/2025    CREATININE 1.02 05/03/2025    CREATININE 1.20 (H) 02/03/2025        Urine analysis shows +protein, +blood    Assessment/Plan   Diagnoses and all orders for this visit:  Gross hematuria  -     POCT UA Automated manually resulted  -     Non-gynecologic cytology; Future  Nephrolithiasis    Gross hematuria     We will plan for diagnostic hematuria workup which includes cystoscopy and urine cytology. Will  be performed at time of URS.    Nephrolithiasis     I I personally and independently reviewed images of the CT A&P from 1/22/2025 which showed a non obstructing mid to lower pole left renal stone measuring  9 mm and a 7 mm cortical hypodensity in the lower pole of the left kidney.     Patient is a poor surgical candidate for ambulatory center due to obesity and recent PE. We will refer her to Dr. Blane Heaton to discuss elective ureteroscopy.     Patient will need clearance to hold anticoagulants for surgery.      We discussed ureteroscopy laser lithotripsy with stent placement. The patient has been informed that this procedure has a high success rate for stones located within the ureter that are medium to small size (up to 1.2 cm) but has a lower stone free rate compared to percutaneous removal for large stones located in the upper ureter or kidney. It especially has a low stone free rate for medium or large stones located in the lower pole compared to percutaneous treatment.      Advantages of this procedure include its ability to be performed in an outpatient setting and its minimally invasive nature. Given the particulars of the stone, size, location, and composition, I believe that ureteroscopy and laser lithotripsy with stone extraction is a viable treatment alternative for this patient. I discussed the risks, benefits, alternatives and complications associated with ureteroscopy with laser lithotripsy and stone extraction, including but not limited to ureteral perforation, extravasation, stricture formation, bleeding, sepsis, obstruction, inability to reach the stone, inability to fragment the stone, and inability to retrieve all stone fragments.     The need for ancillary procedures such as stent placement and removal, retrograde urography, and percutaneous nephrostomy were also discussed, and the patient was given the opportunity to ask any questions. All questions were answered to his satisfaction. The  patient understands that all anti-coagulation including platelet inhibitors must be discontinued several days prior to the procedure unless other arrangements are made in conjunction with me and the patient's cardiologist or internist.      The patient also understood that a ureteral stent would likely be placed and removal of the stent is critical. Failure to follow up for stent removal can result in recurrent UTIs, encrustation of the stent, loss of kidney function and need for nephrectomy.        All questions were answered to the patient's satisfaction. Patient agrees with the plan and wishes to proceed. Follow-up will be scheduled appropriately.       Scribed for Dr. Salazar by Aviva Vazquez. I , Dr Salazar, have personally reviewed and agreed with the information entered by the Virtual Scribe.          [1]   Past Medical History:  Diagnosis Date    Acute embolism and thrombosis of other specified veins 2022    Superficial vein thrombosis    ADD (attention deficit disorder)     Anxiety     Endometrial intraepithelial neoplasia (EIN)     GERD (gastroesophageal reflux disease)     HLD (hyperlipidemia)     Hypothyroidism     Lymph edema     Pulmonary embolism    [2]   Past Surgical History:  Procedure Laterality Date    BLADDER SURGERY       SECTION, LOW TRANSVERSE      FOOT SURGERY      HYSTERECTOMY      IR CVC PICC  2025    IR CVC PICC 2025 TRI CR NONV1    TONSILLECTOMY      TOTAL THYROIDECTOMY      VARICOSE VEIN SURGERY      Varicose Vein Ligation   [3] No family history on file.  [4]   Current Outpatient Medications   Medication Sig Dispense Refill    acetaminophen (Tylenol) 325 mg tablet Take 2 tablets (650 mg) by mouth 3 times a day as needed for mild pain (1 - 3). 30 tablet 11    amphetamine-dextroamphetamine XR (Adderall XR) 20 mg 24 hr capsule Take 1 capsule (20 mg) by mouth 2 times a day. 60 capsule 0    apixaban (Eliquis) 5 mg tablet Take 1 tablet (5 mg) by mouth 2 times a day. 60 tablet  11    cholecalciferol (Vitamin D-3) 5,000 Units tablet Take 1 tablet (5,000 Units) by mouth once daily. 90 tablet 1    EPINEPHrine 0.3 mg/0.3 mL injection syringe Inject 0.3 mL (0.3 mg) as directed if needed for anaphylaxis. As Directed 30 mL 2    furosemide (Lasix) 20 mg tablet Take 1 tablet (20 mg) by mouth once daily. (Patient not taking: Reported on 5/21/2025) 30 tablet 11    levothyroxine (Synthroid, Levoxyl) 100 mcg tablet Take 1 tablet (100 mcg) by mouth early in the morning.. Take on an empty stomach at the same time each day, either 30 to 60 minutes prior to breakfast 90 tablet 3    naloxone (Narcan) 4 mg/0.1 mL nasal spray Administer 1 spray (4 mg) into affected nostril(s) if needed for opioid reversal. May repeat every 2-3 minutes if needed, alternating nostrils, until medical assistance becomes available. 2 each 0    Nyamyc 100,000 unit/gram powder Apply topically once daily. 60 g 6    omeprazole (PriLOSEC) 40 mg DR capsule Take 1 capsule (40 mg) by mouth once daily. 90 capsule 3    oxyCODONE-acetaminophen (Percocet)  mg tablet Take 1 tablet by mouth once daily. 90 tablet 0    rosuvastatin (Crestor) 20 mg tablet Take 1 tablet (20 mg) by mouth once daily. 30 tablet 11    triamcinolone (Kenalog) 0.1 % cream Apply to affected area 1-2 times daily as needed. 453.6 g 1    wound dressing (Triad Wound Dressing) paste Apply 1 Application topically 2 times a day. (Patient not taking: Reported on 5/21/2025) 170 g 3     No current facility-administered medications for this visit.   [5]   Allergies  Allergen Reactions    Erythromycin Base Anaphylaxis    Peanut Anaphylaxis    Sulfa (Sulfonamide Antibiotics) Anaphylaxis    Tetanus Toxoid Other and Swelling    Tramadol Itching    Adhesive Tape-Silicones Rash     tegaderm causes bad skin breakdown

## 2025-05-27 LAB
LABORATORY COMMENT REPORT: NORMAL
LABORATORY COMMENT REPORT: NORMAL
PATH REPORT.FINAL DX SPEC: NORMAL
PATH REPORT.GROSS SPEC: NORMAL
PATH REPORT.RELEVANT HX SPEC: NORMAL
PATH REPORT.TOTAL CANCER: NORMAL
RESIDENT REVIEW: NORMAL

## 2025-05-28 ENCOUNTER — OFFICE VISIT (OUTPATIENT)
Dept: WOUND CARE | Facility: HOSPITAL | Age: 55
End: 2025-05-28
Payer: COMMERCIAL

## 2025-05-28 PROCEDURE — 11042 DBRDMT SUBQ TIS 1ST 20SQCM/<: CPT | Mod: LT,RT

## 2025-05-28 PROCEDURE — 11045 DBRDMT SUBQ TISS EACH ADDL: CPT | Mod: LT,RT

## 2025-06-04 ENCOUNTER — APPOINTMENT (OUTPATIENT)
Dept: WOUND CARE | Facility: HOSPITAL | Age: 55
End: 2025-06-04
Payer: COMMERCIAL

## 2025-06-05 ENCOUNTER — APPOINTMENT (OUTPATIENT)
Dept: UROLOGY | Facility: CLINIC | Age: 55
End: 2025-06-05
Payer: COMMERCIAL

## 2025-06-05 ENCOUNTER — HOSPITAL ENCOUNTER (OUTPATIENT)
Facility: HOSPITAL | Age: 55
Setting detail: OUTPATIENT SURGERY
End: 2025-06-05
Payer: COMMERCIAL

## 2025-06-05 VITALS — TEMPERATURE: 98 F

## 2025-06-05 DIAGNOSIS — F15.20 STIMULANT DEPENDENCE (MULTI): ICD-10-CM

## 2025-06-05 DIAGNOSIS — N20.0 NEPHROLITHIASIS: Primary | ICD-10-CM

## 2025-06-05 LAB
POC APPEARANCE, URINE: CLEAR
POC BILIRUBIN, URINE: ABNORMAL
POC BLOOD, URINE: ABNORMAL
POC COLOR, URINE: ABNORMAL
POC GLUCOSE, URINE: NEGATIVE MG/DL
POC KETONES, URINE: NEGATIVE MG/DL
POC LEUKOCYTES, URINE: NEGATIVE
POC NITRITE,URINE: NEGATIVE
POC PH, URINE: 6 PH
POC PROTEIN, URINE: ABNORMAL MG/DL
POC SPECIFIC GRAVITY, URINE: 1.02
POC UROBILINOGEN, URINE: 0.2 EU/DL

## 2025-06-05 PROCEDURE — 99204 OFFICE O/P NEW MOD 45 MIN: CPT

## 2025-06-05 PROCEDURE — 81003 URINALYSIS AUTO W/O SCOPE: CPT

## 2025-06-05 PROCEDURE — 1036F TOBACCO NON-USER: CPT

## 2025-06-05 RX ORDER — CEFAZOLIN SODIUM 2 G/100ML
2 INJECTION, SOLUTION INTRAVENOUS ONCE
OUTPATIENT
Start: 2025-06-05 | End: 2025-06-05

## 2025-06-05 ASSESSMENT — PAIN SCALES - GENERAL: PAINLEVEL_OUTOF10: 4

## 2025-06-05 NOTE — PROGRESS NOTES
Chief complaint:  No chief complaint on file.  Referring physician:  No ref. provider found     SUBJECTIVE:    Medical history:   has a past medical history of Acute embolism and thrombosis of other specified veins (2022), ADD (attention deficit disorder), Anxiety, Endometrial intraepithelial neoplasia (EIN), GERD (gastroesophageal reflux disease), HLD (hyperlipidemia), Hypothyroidism, Lymph edema, and Pulmonary embolism.   Surgical history:   has a past surgical history that includes Tonsillectomy; Foot surgery; Varicose vein surgery; Total thyroidectomy; Bladder surgery;  section, low transverse; Hysterectomy; and IR CVC PICC (2025).  Family history:  family history is not on file.  Social history:   reports that she has never smoked. She has never used smokeless tobacco. She reports that she does not currently use alcohol. She reports that she does not use drugs.    Medications:    Current Outpatient Medications   Medication Instructions    acetaminophen (TYLENOL) 650 mg, oral, 3 times daily PRN    amphetamine-dextroamphetamine XR (Adderall XR) 20 mg 24 hr capsule 20 mg, oral, 2 times daily    cholecalciferol (VITAMIN D-3) 5,000 Units, oral, Daily    Eliquis 5 mg, oral, 2 times daily    EPINEPHrine (EPIPEN) 0.3 mg, injection, As needed, As Directed    furosemide (LASIX) 20 mg, oral, Daily    levothyroxine (SYNTHROID, LEVOXYL) 100 mcg, oral, Daily, Take on an empty stomach at the same time each day, either 30 to 60 minutes prior to breakfast    naloxone (NARCAN) 4 mg, nasal, As needed, May repeat every 2-3 minutes if needed, alternating nostrils, until medical assistance becomes available.    Nyamyc 100,000 unit/gram powder Topical, Daily    omeprazole (PRILOSEC) 40 mg, oral, Daily    oxyCODONE-acetaminophen (Percocet)  mg tablet 1 tablet, oral, Daily    rosuvastatin (CRESTOR) 20 mg, oral, Daily    triamcinolone (Kenalog) 0.1 % cream Apply to affected area 1-2 times daily as needed.    wound  "dressing (Triad Wound Dressing) paste 1 Application, Topical, 2 times daily      Allergies:    RX Allergies[1]     ROS:  14-point review of systems negative except as noted above.      HPI:  Frances Mcdowell is a 54 y.o. female with a history of hematuria who presents for initial evaluation of kidney stone  La pacinete en contexto de hematuria se realiza estudio con imagenes demostarnose kelley litiasis de ledianos tamano en rinon taqueria de 12 x 9 x 7 mm uy 505 HU    OBJECTIVE:  There were no vitals taken for this visit.There is no height or weight on file to calculate BMI.    Physical exam  General:  No acute distress  HEENT:  EOMI  CV:  Regular rate  Pulm:  Nonlabored respirations  Abd:  Soft, non-distended  :  No suprapubic or CVA tenderness  MSK:  No contractures  Neuro:  Motor intact  Psych:  Appropriate affect    Labs:    I have personally reviewed your lab tests  Lab Results   Component Value Date    WBC 8.0 05/03/2025    HGB 11.9 (L) 05/03/2025    HCT 37.2 05/03/2025     (H) 05/03/2025    ALT 8 05/03/2025    AST 14 05/03/2025     05/03/2025    K 3.9 05/03/2025     05/03/2025    CREATININE 1.02 05/03/2025    BUN 21 05/03/2025    CO2 26 05/03/2025    INR 2.0 (H) 05/03/2025    HGBA1C 5.4 03/26/2025     Urine Culture (no units)   Date Value   05/03/2025     Growth indicates contamination with periurethral tylor. Repeat culture if clinically indicated.    No results found for: \"PSA\"    Imaging:    I have personally reviewed images    ASSESSMENT:   Frances Mcdowell is a 54 y.o. female presenting with left kidney stone  The patient presents with a medium-sized stone in the left kidney and is on chronic anticoagulation. Therefore, authorization is required to discontinue anticoagulants approximately 7 days prior to surgery. Once clearance is obtained, we can proceed with the intervention. It was also explained that two procedures may be necessary, in case initial placement of a pigtail  tube is " required. It was explained that interrupting anticoagulation carries certain risks; however, the procedure cannot be performed while the patient is anticoagulated or on antiplatelet therapy. Prior to surgery, the patient requires evaluation by cardiology, vascular surgery, and preadmission testing.  A new urine culture must be obtained 10 days prior to surgery.    Symptoms: hematuria  No previous stent:  NO previous procedure  stone characteristics: 12 x 9  x 8 mm , 505 HU, left middle pole   theurapeutics alternatives: RIRS  Risk: bleeding  PLAN:  Left RIRS  Problem List Items Addressed This Visit    None       Follow-up OR    Blane Heaton MD    Problem List Items Addressed This Visit    None            [1]   Allergies  Allergen Reactions    Erythromycin Base Anaphylaxis    Peanut Anaphylaxis    Sulfa (Sulfonamide Antibiotics) Anaphylaxis    Tetanus Toxoid Other and Swelling    Tramadol Itching    Adhesive Tape-Silicones Rash     tegaderm causes bad skin breakdown

## 2025-06-06 ENCOUNTER — APPOINTMENT (OUTPATIENT)
Dept: HEMATOLOGY/ONCOLOGY | Facility: CLINIC | Age: 55
End: 2025-06-06
Payer: COMMERCIAL

## 2025-06-07 LAB — BACTERIA UR CULT: NORMAL

## 2025-06-08 ENCOUNTER — HOSPITAL ENCOUNTER (EMERGENCY)
Facility: HOSPITAL | Age: 55
Discharge: HOME | End: 2025-06-08
Payer: COMMERCIAL

## 2025-06-08 VITALS
HEIGHT: 63 IN | WEIGHT: 293 LBS | BODY MASS INDEX: 51.91 KG/M2 | SYSTOLIC BLOOD PRESSURE: 112 MMHG | OXYGEN SATURATION: 99 % | RESPIRATION RATE: 16 BRPM | TEMPERATURE: 97.2 F | DIASTOLIC BLOOD PRESSURE: 78 MMHG | HEART RATE: 92 BPM

## 2025-06-08 DIAGNOSIS — L03.115 CELLULITIS OF RIGHT LOWER EXTREMITY: Primary | ICD-10-CM

## 2025-06-08 DIAGNOSIS — N17.9 AKI (ACUTE KIDNEY INJURY): ICD-10-CM

## 2025-06-08 LAB
ALBUMIN SERPL BCP-MCNC: 3.1 G/DL (ref 3.4–5)
ALP SERPL-CCNC: 99 U/L (ref 33–110)
ALT SERPL W P-5'-P-CCNC: 9 U/L (ref 7–45)
ANION GAP SERPL CALC-SCNC: 13 MMOL/L (ref 10–20)
AST SERPL W P-5'-P-CCNC: 18 U/L (ref 9–39)
BASOPHILS # BLD AUTO: 0.04 X10*3/UL (ref 0–0.1)
BASOPHILS NFR BLD AUTO: 0.3 %
BILIRUB SERPL-MCNC: 0.7 MG/DL (ref 0–1.2)
BNP SERPL-MCNC: 213 PG/ML (ref 0–99)
BUN SERPL-MCNC: 29 MG/DL (ref 6–23)
CALCIUM SERPL-MCNC: 8.2 MG/DL (ref 8.6–10.3)
CHLORIDE SERPL-SCNC: 99 MMOL/L (ref 98–107)
CO2 SERPL-SCNC: 27 MMOL/L (ref 21–32)
CREAT SERPL-MCNC: 1.36 MG/DL (ref 0.5–1.05)
EGFRCR SERPLBLD CKD-EPI 2021: 46 ML/MIN/1.73M*2
EOSINOPHIL # BLD AUTO: 0.02 X10*3/UL (ref 0–0.7)
EOSINOPHIL NFR BLD AUTO: 0.1 %
ERYTHROCYTE [DISTWIDTH] IN BLOOD BY AUTOMATED COUNT: 14.5 % (ref 11.5–14.5)
GLUCOSE SERPL-MCNC: 87 MG/DL (ref 74–99)
HCT VFR BLD AUTO: 32.6 % (ref 36–46)
HGB BLD-MCNC: 10.7 G/DL (ref 12–16)
IMM GRANULOCYTES # BLD AUTO: 0.25 X10*3/UL (ref 0–0.7)
IMM GRANULOCYTES NFR BLD AUTO: 1.7 % (ref 0–0.9)
LYMPHOCYTES # BLD AUTO: 0.51 X10*3/UL (ref 1.2–4.8)
LYMPHOCYTES NFR BLD AUTO: 3.5 %
MAGNESIUM SERPL-MCNC: 1.97 MG/DL (ref 1.6–2.4)
MCH RBC QN AUTO: 31 PG (ref 26–34)
MCHC RBC AUTO-ENTMCNC: 32.8 G/DL (ref 32–36)
MCV RBC AUTO: 95 FL (ref 80–100)
MONOCYTES # BLD AUTO: 0.63 X10*3/UL (ref 0.1–1)
MONOCYTES NFR BLD AUTO: 4.4 %
NEUTROPHILS # BLD AUTO: 12.96 X10*3/UL (ref 1.2–7.7)
NEUTROPHILS NFR BLD AUTO: 90 %
NRBC BLD-RTO: 0 /100 WBCS (ref 0–0)
PLATELET # BLD AUTO: 331 X10*3/UL (ref 150–450)
POTASSIUM SERPL-SCNC: 3.7 MMOL/L (ref 3.5–5.3)
PROT SERPL-MCNC: 7 G/DL (ref 6.4–8.2)
RBC # BLD AUTO: 3.45 X10*6/UL (ref 4–5.2)
SODIUM SERPL-SCNC: 135 MMOL/L (ref 136–145)
WBC # BLD AUTO: 14.4 X10*3/UL (ref 4.4–11.3)

## 2025-06-08 PROCEDURE — 99283 EMERGENCY DEPT VISIT LOW MDM: CPT

## 2025-06-08 PROCEDURE — 83880 ASSAY OF NATRIURETIC PEPTIDE: CPT

## 2025-06-08 PROCEDURE — 80053 COMPREHEN METABOLIC PANEL: CPT

## 2025-06-08 PROCEDURE — 85025 COMPLETE CBC W/AUTO DIFF WBC: CPT

## 2025-06-08 PROCEDURE — 2500000001 HC RX 250 WO HCPCS SELF ADMINISTERED DRUGS (ALT 637 FOR MEDICARE OP)

## 2025-06-08 PROCEDURE — 83735 ASSAY OF MAGNESIUM: CPT

## 2025-06-08 PROCEDURE — 36415 COLL VENOUS BLD VENIPUNCTURE: CPT

## 2025-06-08 RX ORDER — CEPHALEXIN 500 MG/1
500 CAPSULE ORAL ONCE
Status: COMPLETED | OUTPATIENT
Start: 2025-06-08 | End: 2025-06-08

## 2025-06-08 RX ORDER — CEPHALEXIN 500 MG/1
500 CAPSULE ORAL 4 TIMES DAILY
Qty: 40 CAPSULE | Refills: 0 | Status: SHIPPED | OUTPATIENT
Start: 2025-06-08 | End: 2025-06-11

## 2025-06-08 RX ORDER — OXYCODONE AND ACETAMINOPHEN 5; 325 MG/1; MG/1
1 TABLET ORAL ONCE
Refills: 0 | Status: COMPLETED | OUTPATIENT
Start: 2025-06-08 | End: 2025-06-08

## 2025-06-08 RX ADMIN — OXYCODONE HYDROCHLORIDE AND ACETAMINOPHEN 1 TABLET: 5; 325 TABLET ORAL at 13:12

## 2025-06-08 RX ADMIN — CEPHALEXIN 500 MG: 500 CAPSULE ORAL at 14:35

## 2025-06-08 ASSESSMENT — PAIN SCALES - GENERAL
PAINLEVEL_OUTOF10: 2
PAINLEVEL_OUTOF10: 4
PAINLEVEL_OUTOF10: 0 - NO PAIN
PAINLEVEL_OUTOF10: 5 - MODERATE PAIN

## 2025-06-08 ASSESSMENT — PAIN DESCRIPTION - DESCRIPTORS: DESCRIPTORS: BURNING

## 2025-06-08 ASSESSMENT — PAIN DESCRIPTION - LOCATION
LOCATION: LEG
LOCATION: KNEE

## 2025-06-08 ASSESSMENT — PAIN - FUNCTIONAL ASSESSMENT
PAIN_FUNCTIONAL_ASSESSMENT: 0-10
PAIN_FUNCTIONAL_ASSESSMENT: 0-10

## 2025-06-08 ASSESSMENT — PAIN DESCRIPTION - PAIN TYPE: TYPE: ACUTE PAIN

## 2025-06-08 ASSESSMENT — PAIN DESCRIPTION - ORIENTATION
ORIENTATION: RIGHT
ORIENTATION: RIGHT

## 2025-06-08 NOTE — ED PROVIDER NOTES
HPI   No chief complaint on file.      Patient is a 54-year-old female with significant PMH of pulmonary embolism on Eliquis, hyperlipidemia presents to the ED for right thigh pain redness x 2 days.  Patient states she has history of cellulitis and this feels similar.  Patient does have a history of blood clots is on Eliquis denies any recent travel or surgery.  Patient denies any injury to the area.  Patient denies any numbness or tingling.  Patient states it is painful to bear weight.  Patient uses a walker for ambulation.  Patient states she follows with wound care for the wounds on her legs states they are almost healed no worsening wounds.  Denies any fever chills chest pain shortness of breath nausea vomiting or other complaints.  Denies any tobacco alcohol or street drug abuse.              Patient History   Medical History[1]  Surgical History[2]  Family History[3]  Social History[4]    Physical Exam   ED Triage Vitals   Temp Pulse Resp BP   -- -- -- --      SpO2 Temp src Heart Rate Source Patient Position   -- -- -- --      BP Location FiO2 (%)     -- --       Physical Exam  Cardiovascular:      Rate and Rhythm: Normal rate and regular rhythm.      Pulses: Normal pulses.   Pulmonary:      Effort: Pulmonary effort is normal.      Breath sounds: No wheezing, rhonchi or rales.   Musculoskeletal:         General: Tenderness present. Normal range of motion.      Comments: Pain on palpation to the fifth medial thigh to below knee, overlying erythema and warmth   Skin:     General: Skin is warm.      Findings: Erythema present.   Neurological:      General: No focal deficit present.      Mental Status: She is oriented to person, place, and time. Mental status is at baseline.           ED Course & MDM   Diagnoses as of 06/11/25 2453   JAKUB (acute kidney injury)   Cellulitis of right lower extremity                 No data recorded                                 Medical Decision Making  Medical Decision  Making:  Patient presented as described in HPI. Patient case including ROS, PE, and treatment and plan discussed with ED attending if attached as cosigner. Due to patients presentation orders completed include as documented.  Patient presents to the ED for right leg erythema and pain.  Patient states this feels similar to when she has cellulitis.  Patient has not been on any recent antibiotics.  Denies any new swelling.  Patient is refusing removal of dressings on her legs so unable to do a full assessment. Some erythema medial thigh spreading to below knee. Pulses normal. B/l peripheral edema. Refusing duplex US. Pending labs.  Patient does have some leukocytosis 14.4 has a slight JAKUB GFR 46 creatinine 1.36 BUN of 29.  Patient educated on fluids.  BNP is slightly elevated from previous results however has had higher levels in the past.  Patient educated his findings.  Patient discharged home with Keflex educated follow-up with wound clinic and primary doctor educated any worsening symptoms to return.  Patient remained stable on discharge.  Patient was advised to follow up with PCP or recommended provider in 2-3 days for another evaluation and exam. I advised patient/guardian to return or go to closest emergency room immediately if symptoms change, get worse, new symptoms develop prior to follow up. If there is no improvement in symptoms in the next 24 hours they are advised to return for further evaluation and exam. I also explained the plan and treatment course. Patient/guardian is in agreement with plan, treatment course, and follow up and states verbally that they will comply.      Patient care discussed with: N/A  Social Determinants affecting care: N/A    Final diagnosis and disposition as below.  See CI    Homegoing. I discussed the differential; results and discharge plan with the patient and/or family/friend/caregiver if present.  I emphasized the importance of follow-up with the physician I referred them  to in the timeframe recommended.  I explained reasons for the patient to return to the Emergency Department. They agreed that if they feel their condition is worsening or if they have any other concern they should call 911 immediately for further assistance. I gave the patient an opportunity to ask all questions they had and answered all of them accordingly. They understand return precautions and discharge instructions. The patient and/or family/friend/caregiver expressed understanding verbally and that they would comply.       Disposition:  Discharge        This note has been transcribed using voice recognition and may contain grammatical errors, misplaced words, incorrect words, incorrect phrases or other errors.        Labs Reviewed   CBC WITH AUTO DIFFERENTIAL - Abnormal       Result Value    WBC 14.4 (*)     nRBC 0.0      RBC 3.45 (*)     Hemoglobin 10.7 (*)     Hematocrit 32.6 (*)     MCV 95      MCH 31.0      MCHC 32.8      RDW 14.5      Platelets 331      Neutrophils % 90.0      Immature Granulocytes %, Automated 1.7 (*)     Lymphocytes % 3.5      Monocytes % 4.4      Eosinophils % 0.1      Basophils % 0.3      Neutrophils Absolute 12.96 (*)     Immature Granulocytes Absolute, Automated 0.25      Lymphocytes Absolute 0.51 (*)     Monocytes Absolute 0.63      Eosinophils Absolute 0.02      Basophils Absolute 0.04     COMPREHENSIVE METABOLIC PANEL - Abnormal    Glucose 87      Sodium 135 (*)     Potassium 3.7      Chloride 99      Bicarbonate 27      Anion Gap 13      Urea Nitrogen 29 (*)     Creatinine 1.36 (*)     eGFR 46 (*)     Calcium 8.2 (*)     Albumin 3.1 (*)     Alkaline Phosphatase 99      Total Protein 7.0      AST 18      Bilirubin, Total 0.7      ALT 9     B-TYPE NATRIURETIC PEPTIDE - Abnormal     (*)     Narrative:        <100 pg/mL - Heart failure unlikely  100-299 pg/mL - Intermediate probability of acute heart                  failure exacerbation. Correlate with clinical                   context and patient history.    >=300 pg/mL - Heart Failure likely. Correlate with clinical                  context and patient history.    BNP testing is performed using different testing methodology at [facility] than at other [facility] hospitals. Direct result comparisons should only be made within the same method.      MAGNESIUM - Normal    Magnesium 1.97        Procedure  Procedures       Farhana Clark PA-C  25 [id number]       Farhana Clark PA-C  25 1450       Farhana Clark PA-C  25       [1]   Past Medical History:  Diagnosis Date    Acute embolism and thrombosis of other specified veins 2022    Superficial vein thrombosis    ADD (attention deficit disorder)     Anxiety     Endometrial intraepithelial neoplasia (EIN)     GERD (gastroesophageal reflux disease)     HLD (hyperlipidemia)     Hypothyroidism     Lymph edema     Pulmonary embolism    [2]   Past Surgical History:  Procedure Laterality Date    BLADDER SURGERY       SECTION, LOW TRANSVERSE      FOOT SURGERY      HYSTERECTOMY      IR CVC PICC  2025    IR CVC PICC 2025 TRI CR NONV1    TONSILLECTOMY      TOTAL THYROIDECTOMY      VARICOSE VEIN SURGERY      Varicose Vein Ligation   [3] No family history on file.  [4]   Social History  Tobacco Use    Smoking status: Never    Smokeless tobacco: Never   Vaping Use    Vaping status: Never Used   Substance Use Topics    Alcohol use: Not Currently     Comment: seldom    Drug use: Never        Farhana Clark PA-C  25

## 2025-06-08 NOTE — Clinical Note
Frances Mcdowell was seen and treated in our emergency department on 6/8/2025.  She may return to work on 06/10/2025.       If you have any questions or concerns, please don't hesitate to call.      Farhana Clark PA-C

## 2025-06-09 ENCOUNTER — TELEPHONE (OUTPATIENT)
Dept: PRIMARY CARE | Facility: CLINIC | Age: 55
End: 2025-06-09
Payer: COMMERCIAL

## 2025-06-09 DIAGNOSIS — M79.604 PAIN IN BOTH LOWER EXTREMITIES: ICD-10-CM

## 2025-06-09 DIAGNOSIS — R25.2 BILATERAL LEG CRAMPS: ICD-10-CM

## 2025-06-09 DIAGNOSIS — R26.81 UNSTABLE GAIT: ICD-10-CM

## 2025-06-09 DIAGNOSIS — I83.899 VARICOSE VEINS OF LEG WITH COMPLICATIONS: ICD-10-CM

## 2025-06-09 DIAGNOSIS — R60.0 BILATERAL LEG EDEMA: ICD-10-CM

## 2025-06-09 DIAGNOSIS — M79.605 PAIN IN BOTH LOWER EXTREMITIES: ICD-10-CM

## 2025-06-09 NOTE — TELEPHONE ENCOUNTER
Pt asked for Rx for Rolator device d/t stability issues following PE.  Last seen JTP 5/8.      Pt also needs recommendation of Rolator provider to fill Rx.

## 2025-06-11 ENCOUNTER — PATIENT OUTREACH (OUTPATIENT)
Dept: CARE COORDINATION | Facility: CLINIC | Age: 55
End: 2025-06-11
Payer: COMMERCIAL

## 2025-06-11 RX ORDER — CEPHALEXIN 500 MG/1
500 CAPSULE ORAL 4 TIMES DAILY
Qty: 40 CAPSULE | Refills: 0 | Status: SHIPPED | OUTPATIENT
Start: 2025-06-11 | End: 2025-06-11 | Stop reason: SDUPTHER

## 2025-06-11 NOTE — PROGRESS NOTES
ED visit outreach call to patient to follow up after hospital discharge. Unable to connect with patient. Left voicemail message with my name and contact information. Will dis enroll patient from transitions of care.     ZAMZAM Marie, RN   [phone]   [facility] Department

## 2025-06-15 ENCOUNTER — APPOINTMENT (OUTPATIENT)
Dept: RADIOLOGY | Facility: HOSPITAL | Age: 55
DRG: 571 | End: 2025-06-15
Payer: COMMERCIAL

## 2025-06-15 ENCOUNTER — HOSPITAL ENCOUNTER (INPATIENT)
Facility: HOSPITAL | Age: 55
LOS: 7 days | Discharge: HOME | DRG: 571 | End: 2025-06-22
Attending: STUDENT IN AN ORGANIZED HEALTH CARE EDUCATION/TRAINING PROGRAM | Admitting: INTERNAL MEDICINE
Payer: COMMERCIAL

## 2025-06-15 DIAGNOSIS — R60.0 BILATERAL LEG EDEMA: ICD-10-CM

## 2025-06-15 DIAGNOSIS — L02.415 CELLULITIS AND ABSCESS OF RIGHT LOWER EXTREMITY: ICD-10-CM

## 2025-06-15 DIAGNOSIS — R26.81 UNSTABLE GAIT: ICD-10-CM

## 2025-06-15 DIAGNOSIS — M79.604 PAIN IN BOTH LOWER EXTREMITIES: ICD-10-CM

## 2025-06-15 DIAGNOSIS — Z78.9 FAILURE OF OUTPATIENT TREATMENT: Primary | ICD-10-CM

## 2025-06-15 DIAGNOSIS — I83.899 VARICOSE VEINS OF LEG WITH COMPLICATIONS: ICD-10-CM

## 2025-06-15 DIAGNOSIS — L97.212 NON-PRESSURE CHRONIC ULCER OF RIGHT CALF WITH FAT LAYER EXPOSED (MULTI): ICD-10-CM

## 2025-06-15 DIAGNOSIS — L97.222 NON-PRESSURE CHRONIC ULCER OF LEFT CALF WITH FAT LAYER EXPOSED (MULTI): ICD-10-CM

## 2025-06-15 DIAGNOSIS — M79.605 PAIN IN BOTH LOWER EXTREMITIES: ICD-10-CM

## 2025-06-15 DIAGNOSIS — L03.115 CELLULITIS OF RIGHT LOWER EXTREMITY: ICD-10-CM

## 2025-06-15 DIAGNOSIS — F41.9 ANXIETY: ICD-10-CM

## 2025-06-15 DIAGNOSIS — R25.2 BILATERAL LEG CRAMPS: ICD-10-CM

## 2025-06-15 DIAGNOSIS — Z79.01 ANTICOAGULATED: ICD-10-CM

## 2025-06-15 DIAGNOSIS — L03.115 CELLULITIS OF RIGHT LEG: ICD-10-CM

## 2025-06-15 DIAGNOSIS — L03.115 CELLULITIS AND ABSCESS OF RIGHT LOWER EXTREMITY: ICD-10-CM

## 2025-06-15 LAB
ALBUMIN SERPL BCP-MCNC: 2.5 G/DL (ref 3.4–5)
ALP SERPL-CCNC: 124 U/L (ref 33–110)
ALT SERPL W P-5'-P-CCNC: 8 U/L (ref 7–45)
ANION GAP SERPL CALC-SCNC: 13 MMOL/L (ref 10–20)
APTT PPP: 30 SECONDS (ref 26–36)
AST SERPL W P-5'-P-CCNC: 12 U/L (ref 9–39)
BASOPHILS # BLD AUTO: 0.06 X10*3/UL (ref 0–0.1)
BASOPHILS NFR BLD AUTO: 0.3 %
BILIRUB SERPL-MCNC: 0.6 MG/DL (ref 0–1.2)
BUN SERPL-MCNC: 11 MG/DL (ref 6–23)
CALCIUM SERPL-MCNC: 7.9 MG/DL (ref 8.6–10.3)
CHLORIDE SERPL-SCNC: 98 MMOL/L (ref 98–107)
CO2 SERPL-SCNC: 29 MMOL/L (ref 21–32)
CREAT SERPL-MCNC: 0.65 MG/DL (ref 0.5–1.05)
CRP SERPL-MCNC: 22.53 MG/DL
EGFRCR SERPLBLD CKD-EPI 2021: >90 ML/MIN/1.73M*2
EOSINOPHIL # BLD AUTO: 0.08 X10*3/UL (ref 0–0.7)
EOSINOPHIL NFR BLD AUTO: 0.4 %
ERYTHROCYTE [DISTWIDTH] IN BLOOD BY AUTOMATED COUNT: 14.6 % (ref 11.5–14.5)
ERYTHROCYTE [SEDIMENTATION RATE] IN BLOOD BY WESTERGREN METHOD: 49 MM/H (ref 0–30)
GLUCOSE SERPL-MCNC: 99 MG/DL (ref 74–99)
HCT VFR BLD AUTO: 31.1 % (ref 36–46)
HGB BLD-MCNC: 9.9 G/DL (ref 12–16)
IMM GRANULOCYTES # BLD AUTO: 0.31 X10*3/UL (ref 0–0.7)
IMM GRANULOCYTES NFR BLD AUTO: 1.5 % (ref 0–0.9)
INR PPP: 1.9 (ref 0.9–1.1)
LACTATE SERPL-SCNC: 0.9 MMOL/L (ref 0.4–2)
LYMPHOCYTES # BLD AUTO: 1.51 X10*3/UL (ref 1.2–4.8)
LYMPHOCYTES NFR BLD AUTO: 7.4 %
MAGNESIUM SERPL-MCNC: 1.73 MG/DL (ref 1.6–2.4)
MCH RBC QN AUTO: 29.4 PG (ref 26–34)
MCHC RBC AUTO-ENTMCNC: 31.8 G/DL (ref 32–36)
MCV RBC AUTO: 92 FL (ref 80–100)
MONOCYTES # BLD AUTO: 1.31 X10*3/UL (ref 0.1–1)
MONOCYTES NFR BLD AUTO: 6.4 %
NEUTROPHILS # BLD AUTO: 17.24 X10*3/UL (ref 1.2–7.7)
NEUTROPHILS NFR BLD AUTO: 84 %
NRBC BLD-RTO: 0 /100 WBCS (ref 0–0)
PLATELET # BLD AUTO: 459 X10*3/UL (ref 150–450)
POTASSIUM SERPL-SCNC: 3.5 MMOL/L (ref 3.5–5.3)
PROT SERPL-MCNC: 6.7 G/DL (ref 6.4–8.2)
PROTHROMBIN TIME: 21.4 SECONDS (ref 9.8–12.4)
RBC # BLD AUTO: 3.37 X10*6/UL (ref 4–5.2)
SODIUM SERPL-SCNC: 136 MMOL/L (ref 136–145)
UFH PPP CHRO-ACNC: 1.4 IU/ML (ref ?–1.1)
WBC # BLD AUTO: 20.5 X10*3/UL (ref 4.4–11.3)

## 2025-06-15 PROCEDURE — 96375 TX/PRO/DX INJ NEW DRUG ADDON: CPT

## 2025-06-15 PROCEDURE — 85025 COMPLETE CBC W/AUTO DIFF WBC: CPT | Performed by: NURSE PRACTITIONER

## 2025-06-15 PROCEDURE — 2500000004 HC RX 250 GENERAL PHARMACY W/ HCPCS (ALT 636 FOR OP/ED)

## 2025-06-15 PROCEDURE — 2500000005 HC RX 250 GENERAL PHARMACY W/O HCPCS: Performed by: NURSE PRACTITIONER

## 2025-06-15 PROCEDURE — 85730 THROMBOPLASTIN TIME PARTIAL: CPT | Performed by: NURSE PRACTITIONER

## 2025-06-15 PROCEDURE — 73706 CT ANGIO LWR EXTR W/O&W/DYE: CPT | Mod: RIGHT SIDE | Performed by: RADIOLOGY

## 2025-06-15 PROCEDURE — 86140 C-REACTIVE PROTEIN: CPT | Performed by: NURSE PRACTITIONER

## 2025-06-15 PROCEDURE — 96367 TX/PROPH/DG ADDL SEQ IV INF: CPT

## 2025-06-15 PROCEDURE — 85610 PROTHROMBIN TIME: CPT | Performed by: NURSE PRACTITIONER

## 2025-06-15 PROCEDURE — 73706 CT ANGIO LWR EXTR W/O&W/DYE: CPT | Mod: RT

## 2025-06-15 PROCEDURE — 83735 ASSAY OF MAGNESIUM: CPT | Performed by: NURSE PRACTITIONER

## 2025-06-15 PROCEDURE — 83605 ASSAY OF LACTIC ACID: CPT | Performed by: NURSE PRACTITIONER

## 2025-06-15 PROCEDURE — 2500000004 HC RX 250 GENERAL PHARMACY W/ HCPCS (ALT 636 FOR OP/ED): Performed by: NURSE PRACTITIONER

## 2025-06-15 PROCEDURE — 36415 COLL VENOUS BLD VENIPUNCTURE: CPT | Performed by: NURSE PRACTITIONER

## 2025-06-15 PROCEDURE — 1200000002 HC GENERAL ROOM WITH TELEMETRY DAILY

## 2025-06-15 PROCEDURE — 99285 EMERGENCY DEPT VISIT HI MDM: CPT | Mod: 25 | Performed by: STUDENT IN AN ORGANIZED HEALTH CARE EDUCATION/TRAINING PROGRAM

## 2025-06-15 PROCEDURE — 2500000001 HC RX 250 WO HCPCS SELF ADMINISTERED DRUGS (ALT 637 FOR MEDICARE OP)

## 2025-06-15 PROCEDURE — 2500000001 HC RX 250 WO HCPCS SELF ADMINISTERED DRUGS (ALT 637 FOR MEDICARE OP): Performed by: NURSE PRACTITIONER

## 2025-06-15 PROCEDURE — 80053 COMPREHEN METABOLIC PANEL: CPT | Performed by: NURSE PRACTITIONER

## 2025-06-15 PROCEDURE — 2550000001 HC RX 255 CONTRASTS: Performed by: NURSE PRACTITIONER

## 2025-06-15 PROCEDURE — 96376 TX/PRO/DX INJ SAME DRUG ADON: CPT

## 2025-06-15 PROCEDURE — 96366 THER/PROPH/DIAG IV INF ADDON: CPT

## 2025-06-15 PROCEDURE — 85520 HEPARIN ASSAY: CPT

## 2025-06-15 PROCEDURE — 87070 CULTURE OTHR SPECIMN AEROBIC: CPT | Mod: GEALAB | Performed by: NURSE PRACTITIONER

## 2025-06-15 PROCEDURE — 85652 RBC SED RATE AUTOMATED: CPT | Performed by: NURSE PRACTITIONER

## 2025-06-15 PROCEDURE — 96365 THER/PROPH/DIAG IV INF INIT: CPT

## 2025-06-15 PROCEDURE — 87040 BLOOD CULTURE FOR BACTERIA: CPT | Mod: GEALAB | Performed by: NURSE PRACTITIONER

## 2025-06-15 RX ORDER — NYSTATIN 100000 [USP'U]/G
POWDER TOPICAL DAILY
Status: DISCONTINUED | OUTPATIENT
Start: 2025-06-16 | End: 2025-06-22 | Stop reason: HOSPADM

## 2025-06-15 RX ORDER — PANTOPRAZOLE SODIUM 40 MG/1
40 TABLET, DELAYED RELEASE ORAL
Status: DISCONTINUED | OUTPATIENT
Start: 2025-06-16 | End: 2025-06-22 | Stop reason: HOSPADM

## 2025-06-15 RX ORDER — TALC
3 POWDER (GRAM) TOPICAL NIGHTLY
Status: DISCONTINUED | OUTPATIENT
Start: 2025-06-15 | End: 2025-06-22 | Stop reason: HOSPADM

## 2025-06-15 RX ORDER — HEPARIN SODIUM 10000 [USP'U]/100ML
0-4500 INJECTION, SOLUTION INTRAVENOUS CONTINUOUS
Status: DISCONTINUED | OUTPATIENT
Start: 2025-06-15 | End: 2025-06-18

## 2025-06-15 RX ORDER — SODIUM CHLORIDE 9 MG/ML
75 INJECTION, SOLUTION INTRAVENOUS CONTINUOUS
Status: ACTIVE | OUTPATIENT
Start: 2025-06-15 | End: 2025-06-16

## 2025-06-15 RX ORDER — ACETAMINOPHEN 325 MG/1
975 TABLET ORAL EVERY 6 HOURS
Status: DISCONTINUED | OUTPATIENT
Start: 2025-06-15 | End: 2025-06-22 | Stop reason: HOSPADM

## 2025-06-15 RX ORDER — OXYCODONE HYDROCHLORIDE 5 MG/1
10 TABLET ORAL ONCE
Refills: 0 | Status: COMPLETED | OUTPATIENT
Start: 2025-06-15 | End: 2025-06-15

## 2025-06-15 RX ORDER — OXYCODONE HYDROCHLORIDE 10 MG/1
10 TABLET ORAL EVERY 6 HOURS PRN
Status: DISCONTINUED | OUTPATIENT
Start: 2025-06-15 | End: 2025-06-17

## 2025-06-15 RX ORDER — VANCOMYCIN HYDROCHLORIDE 1 G/20ML
INJECTION, POWDER, LYOPHILIZED, FOR SOLUTION INTRAVENOUS DAILY PRN
Status: DISCONTINUED | OUTPATIENT
Start: 2025-06-15 | End: 2025-06-15

## 2025-06-15 RX ORDER — ROSUVASTATIN CALCIUM 20 MG/1
20 TABLET, COATED ORAL DAILY
Status: DISCONTINUED | OUTPATIENT
Start: 2025-06-16 | End: 2025-06-22 | Stop reason: HOSPADM

## 2025-06-15 RX ORDER — ONDANSETRON HYDROCHLORIDE 2 MG/ML
4 INJECTION, SOLUTION INTRAVENOUS ONCE
Status: DISCONTINUED | OUTPATIENT
Start: 2025-06-15 | End: 2025-06-15

## 2025-06-15 RX ORDER — LORAZEPAM 0.5 MG/1
0.5 TABLET ORAL ONCE
Status: COMPLETED | OUTPATIENT
Start: 2025-06-15 | End: 2025-06-15

## 2025-06-15 RX ORDER — VANCOMYCIN HYDROCHLORIDE 1 G/20ML
INJECTION, POWDER, LYOPHILIZED, FOR SOLUTION INTRAVENOUS DAILY PRN
Status: DISCONTINUED | OUTPATIENT
Start: 2025-06-15 | End: 2025-06-20 | Stop reason: ALTCHOICE

## 2025-06-15 RX ORDER — HYDROMORPHONE HYDROCHLORIDE 1 MG/ML
1 INJECTION, SOLUTION INTRAMUSCULAR; INTRAVENOUS; SUBCUTANEOUS ONCE
Status: COMPLETED | OUTPATIENT
Start: 2025-06-15 | End: 2025-06-15

## 2025-06-15 RX ORDER — POLYETHYLENE GLYCOL 3350 17 G/17G
17 POWDER, FOR SOLUTION ORAL DAILY
Status: DISCONTINUED | OUTPATIENT
Start: 2025-06-15 | End: 2025-06-22 | Stop reason: HOSPADM

## 2025-06-15 RX ORDER — CHOLECALCIFEROL (VITAMIN D3) 25 MCG
125 TABLET ORAL DAILY
Status: DISCONTINUED | OUTPATIENT
Start: 2025-06-16 | End: 2025-06-22 | Stop reason: HOSPADM

## 2025-06-15 RX ORDER — OXYCODONE HYDROCHLORIDE 5 MG/1
5 TABLET ORAL EVERY 6 HOURS PRN
Status: DISCONTINUED | OUTPATIENT
Start: 2025-06-15 | End: 2025-06-17

## 2025-06-15 RX ORDER — SODIUM CHLORIDE, SODIUM LACTATE, POTASSIUM CHLORIDE, CALCIUM CHLORIDE 600; 310; 30; 20 MG/100ML; MG/100ML; MG/100ML; MG/100ML
75 INJECTION, SOLUTION INTRAVENOUS CONTINUOUS
Status: DISCONTINUED | OUTPATIENT
Start: 2025-06-15 | End: 2025-06-15

## 2025-06-15 RX ORDER — LEVOTHYROXINE SODIUM 100 UG/1
100 TABLET ORAL DAILY
Status: DISCONTINUED | OUTPATIENT
Start: 2025-06-16 | End: 2025-06-22 | Stop reason: HOSPADM

## 2025-06-15 RX ADMIN — HYDROMORPHONE HYDROCHLORIDE 1 MG: 1 INJECTION, SOLUTION INTRAMUSCULAR; INTRAVENOUS; SUBCUTANEOUS at 18:54

## 2025-06-15 RX ADMIN — SODIUM CHLORIDE 1000 ML: 0.9 INJECTION, SOLUTION INTRAVENOUS at 16:44

## 2025-06-15 RX ADMIN — SODIUM CHLORIDE, SODIUM LACTATE, POTASSIUM CHLORIDE, AND CALCIUM CHLORIDE 75 ML/HR: .6; .31; .03; .02 INJECTION, SOLUTION INTRAVENOUS at 21:28

## 2025-06-15 RX ADMIN — LORAZEPAM 0.5 MG: 0.5 TABLET ORAL at 18:53

## 2025-06-15 RX ADMIN — IOHEXOL 90 ML: 350 INJECTION, SOLUTION INTRAVENOUS at 16:44

## 2025-06-15 RX ADMIN — HYDROMORPHONE HYDROCHLORIDE 0.4 MG: 1 INJECTION, SOLUTION INTRAMUSCULAR; INTRAVENOUS; SUBCUTANEOUS at 20:44

## 2025-06-15 RX ADMIN — PIPERACILLIN SODIUM AND TAZOBACTAM SODIUM 3.38 G: 3; .375 INJECTION, SOLUTION INTRAVENOUS at 22:11

## 2025-06-15 RX ADMIN — PIPERACILLIN SODIUM AND TAZOBACTAM SODIUM 4.5 G: 4; .5 INJECTION, SOLUTION INTRAVENOUS at 16:04

## 2025-06-15 RX ADMIN — OXYCODONE HYDROCHLORIDE 10 MG: 10 TABLET ORAL at 22:17

## 2025-06-15 RX ADMIN — ACETAMINOPHEN 975 MG: 325 TABLET, FILM COATED ORAL at 21:45

## 2025-06-15 RX ADMIN — VANCOMYCIN HYDROCHLORIDE 2000 MG: 10 INJECTION, POWDER, LYOPHILIZED, FOR SOLUTION INTRAVENOUS at 17:02

## 2025-06-15 RX ADMIN — SODIUM CHLORIDE 75 ML/HR: 0.9 INJECTION, SOLUTION INTRAVENOUS at 22:11

## 2025-06-15 RX ADMIN — OXYCODONE HYDROCHLORIDE 10 MG: 5 TABLET ORAL at 15:24

## 2025-06-15 RX ADMIN — HYDROMORPHONE HYDROCHLORIDE 1 MG: 1 INJECTION, SOLUTION INTRAMUSCULAR; INTRAVENOUS; SUBCUTANEOUS at 16:54

## 2025-06-15 SDOH — ECONOMIC STABILITY: HOUSING INSECURITY: IN THE PAST 12 MONTHS, HOW MANY TIMES HAVE YOU MOVED WHERE YOU WERE LIVING?: 0

## 2025-06-15 SDOH — SOCIAL STABILITY: SOCIAL INSECURITY: WITHIN THE LAST YEAR, HAVE YOU BEEN HUMILIATED OR EMOTIONALLY ABUSED IN OTHER WAYS BY YOUR PARTNER OR EX-PARTNER?: NO

## 2025-06-15 SDOH — SOCIAL STABILITY: SOCIAL INSECURITY: WERE YOU ABLE TO COMPLETE ALL THE BEHAVIORAL HEALTH SCREENINGS?: YES

## 2025-06-15 SDOH — SOCIAL STABILITY: SOCIAL INSECURITY: WITHIN THE LAST YEAR, HAVE YOU BEEN AFRAID OF YOUR PARTNER OR EX-PARTNER?: NO

## 2025-06-15 SDOH — ECONOMIC STABILITY: FOOD INSECURITY: WITHIN THE PAST 12 MONTHS, THE FOOD YOU BOUGHT JUST DIDN'T LAST AND YOU DIDN'T HAVE MONEY TO GET MORE.: SOMETIMES TRUE

## 2025-06-15 SDOH — SOCIAL STABILITY: SOCIAL INSECURITY: DOES ANYONE TRY TO KEEP YOU FROM HAVING/CONTACTING OTHER FRIENDS OR DOING THINGS OUTSIDE YOUR HOME?: NO

## 2025-06-15 SDOH — ECONOMIC STABILITY: HOUSING INSECURITY: AT ANY TIME IN THE PAST 12 MONTHS, WERE YOU HOMELESS OR LIVING IN A SHELTER (INCLUDING NOW)?: NO

## 2025-06-15 SDOH — SOCIAL STABILITY: SOCIAL INSECURITY: ARE YOU OR HAVE YOU BEEN THREATENED OR ABUSED PHYSICALLY, EMOTIONALLY, OR SEXUALLY BY ANYONE?: NO

## 2025-06-15 SDOH — ECONOMIC STABILITY: FOOD INSECURITY: HOW HARD IS IT FOR YOU TO PAY FOR THE VERY BASICS LIKE FOOD, HOUSING, MEDICAL CARE, AND HEATING?: SOMEWHAT HARD

## 2025-06-15 SDOH — SOCIAL STABILITY: SOCIAL INSECURITY: HAVE YOU HAD ANY THOUGHTS OF HARMING ANYONE ELSE?: NO

## 2025-06-15 SDOH — ECONOMIC STABILITY: FOOD INSECURITY
WITHIN THE PAST 12 MONTHS, YOU WORRIED THAT YOUR FOOD WOULD RUN OUT BEFORE YOU GOT THE MONEY TO BUY MORE.: SOMETIMES TRUE

## 2025-06-15 SDOH — ECONOMIC STABILITY: INCOME INSECURITY: IN THE PAST 12 MONTHS HAS THE ELECTRIC, GAS, OIL, OR WATER COMPANY THREATENED TO SHUT OFF SERVICES IN YOUR HOME?: YES

## 2025-06-15 SDOH — SOCIAL STABILITY: SOCIAL INSECURITY: DO YOU FEEL ANYONE HAS EXPLOITED OR TAKEN ADVANTAGE OF YOU FINANCIALLY OR OF YOUR PERSONAL PROPERTY?: NO

## 2025-06-15 SDOH — ECONOMIC STABILITY: HOUSING INSECURITY: IN THE LAST 12 MONTHS, WAS THERE A TIME WHEN YOU WERE NOT ABLE TO PAY THE MORTGAGE OR RENT ON TIME?: NO

## 2025-06-15 SDOH — SOCIAL STABILITY: SOCIAL INSECURITY: ABUSE: ADULT

## 2025-06-15 SDOH — SOCIAL STABILITY: SOCIAL INSECURITY: ARE THERE ANY APPARENT SIGNS OF INJURIES/BEHAVIORS THAT COULD BE RELATED TO ABUSE/NEGLECT?: NO

## 2025-06-15 SDOH — SOCIAL STABILITY: SOCIAL INSECURITY: HAVE YOU HAD THOUGHTS OF HARMING ANYONE ELSE?: NO

## 2025-06-15 SDOH — SOCIAL STABILITY: SOCIAL INSECURITY: HAS ANYONE EVER THREATENED TO HURT YOUR FAMILY OR YOUR PETS?: NO

## 2025-06-15 SDOH — ECONOMIC STABILITY: TRANSPORTATION INSECURITY: IN THE PAST 12 MONTHS, HAS LACK OF TRANSPORTATION KEPT YOU FROM MEDICAL APPOINTMENTS OR FROM GETTING MEDICATIONS?: NO

## 2025-06-15 SDOH — SOCIAL STABILITY: SOCIAL INSECURITY: DO YOU FEEL UNSAFE GOING BACK TO THE PLACE WHERE YOU ARE LIVING?: NO

## 2025-06-15 ASSESSMENT — ENCOUNTER SYMPTOMS
WOUND: 1
ACTIVITY CHANGE: 1
DIARRHEA: 0
CHEST TIGHTNESS: 0
DIFFICULTY URINATING: 0
CONSTIPATION: 0
APPETITE CHANGE: 0
ABDOMINAL PAIN: 0
VOMITING: 0
SHORTNESS OF BREATH: 0
CHILLS: 1
NAUSEA: 0
COLOR CHANGE: 1
FEVER: 0
DIAPHORESIS: 1

## 2025-06-15 ASSESSMENT — PAIN DESCRIPTION - ORIENTATION
ORIENTATION: RIGHT

## 2025-06-15 ASSESSMENT — COGNITIVE AND FUNCTIONAL STATUS - GENERAL
MOBILITY SCORE: 21
CLIMB 3 TO 5 STEPS WITH RAILING: A LOT
DAILY ACTIVITIY SCORE: 23
PATIENT BASELINE BEDBOUND: NO
DRESSING REGULAR LOWER BODY CLOTHING: A LITTLE
WALKING IN HOSPITAL ROOM: A LITTLE

## 2025-06-15 ASSESSMENT — ACTIVITIES OF DAILY LIVING (ADL)
TOILETING: INDEPENDENT
BATHING: INDEPENDENT
PATIENT'S MEMORY ADEQUATE TO SAFELY COMPLETE DAILY ACTIVITIES?: YES
JUDGMENT_ADEQUATE_SAFELY_COMPLETE_DAILY_ACTIVITIES: YES
DRESSING YOURSELF: INDEPENDENT
HEARING - LEFT EAR: FUNCTIONAL
GROOMING: INDEPENDENT
FEEDING YOURSELF: INDEPENDENT
WALKS IN HOME: INDEPENDENT
HEARING - RIGHT EAR: FUNCTIONAL
LACK_OF_TRANSPORTATION: NO
LACK_OF_TRANSPORTATION: NO
ASSISTIVE_DEVICE: WALKER
ADEQUATE_TO_COMPLETE_ADL: YES

## 2025-06-15 ASSESSMENT — LIFESTYLE VARIABLES
AUDIT-C TOTAL SCORE: 1
PRESCIPTION_ABUSE_PAST_12_MONTHS: NO
HOW MANY STANDARD DRINKS CONTAINING ALCOHOL DO YOU HAVE ON A TYPICAL DAY: PATIENT DOES NOT DRINK
SKIP TO QUESTIONS 9-10: 1
EVER FELT BAD OR GUILTY ABOUT YOUR DRINKING: NO
HOW OFTEN DO YOU HAVE 6 OR MORE DRINKS ON ONE OCCASION: NEVER
TOTAL SCORE: 0
AUDIT-C TOTAL SCORE: 1
HOW OFTEN DO YOU HAVE A DRINK CONTAINING ALCOHOL: MONTHLY OR LESS
SUBSTANCE_ABUSE_PAST_12_MONTHS: NO
HAVE PEOPLE ANNOYED YOU BY CRITICIZING YOUR DRINKING: NO
EVER HAD A DRINK FIRST THING IN THE MORNING TO STEADY YOUR NERVES TO GET RID OF A HANGOVER: NO
HAVE YOU EVER FELT YOU SHOULD CUT DOWN ON YOUR DRINKING: NO

## 2025-06-15 ASSESSMENT — PATIENT HEALTH QUESTIONNAIRE - PHQ9
2. FEELING DOWN, DEPRESSED OR HOPELESS: NOT AT ALL
SUM OF ALL RESPONSES TO PHQ9 QUESTIONS 1 & 2: 0
1. LITTLE INTEREST OR PLEASURE IN DOING THINGS: NOT AT ALL

## 2025-06-15 ASSESSMENT — PAIN SCALES - GENERAL
PAINLEVEL_OUTOF10: 10 - WORST POSSIBLE PAIN
PAINLEVEL_OUTOF10: 0 - NO PAIN
PAINLEVEL_OUTOF10: 10 - WORST POSSIBLE PAIN
PAINLEVEL_OUTOF10: 10 - WORST POSSIBLE PAIN

## 2025-06-15 ASSESSMENT — PAIN DESCRIPTION - LOCATION
LOCATION: LEG

## 2025-06-15 ASSESSMENT — PAIN - FUNCTIONAL ASSESSMENT
PAIN_FUNCTIONAL_ASSESSMENT: 0-10

## 2025-06-15 NOTE — ED TRIAGE NOTES
Patient arrives via home with worsening leg pain in right lower leg. Patient seen here a week ago with similar complaints. Patient sees wound care for leg wound. Last pain medicine was tylenol this morning at 0800 today

## 2025-06-15 NOTE — ED PROVIDER NOTES
[facility]  ED  Encounter Note  Admit Date/RoomTime: 6/15/2025  2:34 PM  ED Room: 112/112-A  NAME: Frances Mcdowell  : 1970  MRN: [id number]     Chief Complaint:  Leg Pain    HISTORY OF PRESENT ILLNESS        Frances Mcdowell is a 54 y.o. female who presents to the ED for evaluation of right leg pain.    Patient stated that she had cellulitis right lower leg x one week. Seen her a week ago and placed on Keflex. Hx of mrsa and pneudomonas in the past.    She stated that she is on anticoagulation and has been adherent to taking her medications. She goes to  wound center.    Patient stated that she is not sure why leg hurts more. She was having pain to walk but now cannot walk due to the pain. Pain is not in hip. She has wound drainage on her right lower leg. Denied DM history.     ROS   Pertinent positives and negatives are stated within HPI, all other systems reviewed and are negative.    Past Medical History:  has a past medical history of Acute embolism and thrombosis of other specified veins (2022), ADD (attention deficit disorder), Anxiety, Endometrial intraepithelial neoplasia (EIN), GERD (gastroesophageal reflux disease), HLD (hyperlipidemia), Hypothyroidism, Lymph edema, and Pulmonary embolism.    Surgical History:  has a past surgical history that includes Tonsillectomy; Foot surgery; Varicose vein surgery; Total thyroidectomy; Bladder surgery;  section, low transverse; Hysterectomy; and IR CVC PICC (2025).    Social History:  reports that she has never smoked. She has never used smokeless tobacco. She reports that she does not currently use alcohol. She reports that she does not use drugs.    Family History: family history is not on file.     Allergies: Erythromycin base, Peanut, Sulfa (sulfonamide antibiotics), Tetanus toxoid, Morphine, Tramadol, and Adhesive tape-silicones    PHYSICAL EXAM   Oxygen Saturation Interpretation: Normal.         Physical  Exam  Constitutional/General: Alert and oriented x3, well appearing, non toxic  HEENT:  NC/NT. PERRLA.  Airway patent.  Neck: Supple, full ROM. No midline vertebral tenderness or crepitus.   Respiratory: Lung sounds clear to auscultation bilaterally. No wheezes, rhonchi or stridor. Not in respiratory distress.  CV:  Regular rate. Regular rhythm. No murmurs or rubs. 2+ distal pulses.  GI:  Abdomen soft, non-tender, non-distended. +BS. No rebound, guarding, or rigidity. No pulsatile masses.  Musculoskeletal: Moves all extremities x 4. Warm and well perfused. Capillary refill <3 seconds  Integument: Skin warm and dry. No rashes.   Neurologic: Alert and oriented with no focal deficits, symmetric strength 5/5 in the upper and lower extremities bilaterally.  Psychiatric: Normal affect.    Lab / Imaging Results   (All laboratory and radiology results have been personally reviewed by myself)  Labs:  Results for orders placed or performed during the hospital encounter of 06/15/25   CBC and Auto Differential    Collection Time: 06/15/25  3:34 PM   Result Value Ref Range    WBC 20.5 (H) 4.4 - 11.3 x10*3/uL    nRBC 0.0 0.0 - 0.0 /100 WBCs    RBC 3.37 (L) 4.00 - 5.20 x10*6/uL    Hemoglobin 9.9 (L) 12.0 - 16.0 g/dL    Hematocrit 31.1 (L) 36.0 - 46.0 %    MCV 92 80 - 100 fL    MCH 29.4 26.0 - 34.0 pg    MCHC 31.8 (L) 32.0 - 36.0 g/dL    RDW 14.6 (H) 11.5 - 14.5 %    Platelets 459 (H) 150 - 450 x10*3/uL    Neutrophils % 84.0 40.0 - 80.0 %    Immature Granulocytes %, Automated 1.5 (H) 0.0 - 0.9 %    Lymphocytes % 7.4 13.0 - 44.0 %    Monocytes % 6.4 2.0 - 10.0 %    Eosinophils % 0.4 0.0 - 6.0 %    Basophils % 0.3 0.0 - 2.0 %    Neutrophils Absolute 17.24 (H) 1.20 - 7.70 x10*3/uL    Immature Granulocytes Absolute, Automated 0.31 0.00 - 0.70 x10*3/uL    Lymphocytes Absolute 1.51 1.20 - 4.80 x10*3/uL    Monocytes Absolute 1.31 (H) 0.10 - 1.00 x10*3/uL    Eosinophils Absolute 0.08 0.00 - 0.70 x10*3/uL    Basophils Absolute 0.06 0.00 -  0.10 x10*3/uL   Magnesium    Collection Time: 06/15/25  3:34 PM   Result Value Ref Range    Magnesium 1.73 1.60 - 2.40 mg/dL   Comprehensive metabolic panel    Collection Time: 06/15/25  3:34 PM   Result Value Ref Range    Glucose 99 74 - 99 mg/dL    Sodium 136 136 - 145 mmol/L    Potassium 3.5 3.5 - 5.3 mmol/L    Chloride 98 98 - 107 mmol/L    Bicarbonate 29 21 - 32 mmol/L    Anion Gap 13 10 - 20 mmol/L    Urea Nitrogen 11 6 - 23 mg/dL    Creatinine 0.65 0.50 - 1.05 mg/dL    eGFR >90 >60 mL/min/1.73m*2    Calcium 7.9 (L) 8.6 - 10.3 mg/dL    Albumin 2.5 (L) 3.4 - 5.0 g/dL    Alkaline Phosphatase 124 (H) 33 - 110 U/L    Total Protein 6.7 6.4 - 8.2 g/dL    AST 12 9 - 39 U/L    Bilirubin, Total 0.6 0.0 - 1.2 mg/dL    ALT 8 7 - 45 U/L   Lactate    Collection Time: 06/15/25  3:34 PM   Result Value Ref Range    Lactate 0.9 0.4 - 2.0 mmol/L   Protime-INR    Collection Time: 06/15/25  3:34 PM   Result Value Ref Range    Protime 21.4 (H) 9.8 - 12.4 seconds    INR 1.9 (H) 0.9 - 1.1   aPTT    Collection Time: 06/15/25  3:34 PM   Result Value Ref Range    aPTT 30 26 - 36 seconds   C-Reactive Protein    Collection Time: 06/15/25  3:34 PM   Result Value Ref Range    C-Reactive Protein 22.53 (H) <1.00 mg/dL   Sedimentation Rate    Collection Time: 06/15/25  3:34 PM   Result Value Ref Range    Sedimentation Rate 49 (H) 0 - 30 mm/h   Heparin Assay    Collection Time: 06/15/25  9:26 PM   Result Value Ref Range    Heparin Unfractionated 1.4 (HH) See Comment Below for Therapeutic Ranges IU/mL     Imaging:  All Radiology results interpreted by Radiologist unless otherwise noted.  CT angio lower extremity right w and or wo IV contrast   Final Result   1. Femoropopliteal arterial patency with patency of 3 arterial vessel   runoff to the level of the right ankle. Left lower extremity   iliofemoral patency as partially imaged to the distal left forearm.   2. Extensive right lower extremity skin thickening and subcutaneous   edema, more  prominent in the lower leg. Limited evaluation of deep   soft tissue structures. No large subcutaneous drainable fluid   collection.        MACRO:   None        Signed by: Andrei Joseph 6/15/2025 5:30 PM   Dictation workstation:   [id number]          ED Course / Medical Decision Making     Medications   polyethylene glycol (Glycolax, Miralax) packet 17 g (17 g oral Not Given 6/15/25 2139)   melatonin tablet 3 mg (3 mg oral Not Given 6/15/25 2139)   acetaminophen (Tylenol) tablet 975 mg (975 mg oral Given 6/15/25 2145)   vancomycin (Vancocin) pharmacy to dose - pharmacy monitoring (has no administration in time range)   piperacillin-tazobactam (Zosyn) 3.375 g in dextrose (iso) IV 50 mL (0 g intravenous Stopped 6/15/25 2241)   heparin bolus from bag 10,000 Units (has no administration in time range)   heparin 25,000 Units in dextrose 5% 250 mL (100 Units/mL) infusion (premix) ( intravenous Not Given 6/15/25 2226)   heparin bolus from bag 5,000-10,000 Units (has no administration in time range)   HYDROmorphone (Dilaudid) injection 0.4 mg (0.4 mg intravenous Given 6/15/25 2044)   oxyCODONE (Roxicodone) immediate release tablet 10 mg (10 mg oral Given 6/15/25 2217)   oxyCODONE (Roxicodone) immediate release tablet 5 mg (has no administration in time range)   apixaban (Eliquis) tablet 5 mg ( oral Dose Auto Held 6/19/25 2100)   cholecalciferol (Vitamin D-3) tablet 125 mcg (has no administration in time range)   levothyroxine (Synthroid, Levoxyl) tablet 100 mcg (has no administration in time range)   nystatin (Mycostatin) 100,000 unit/gram powder (has no administration in time range)   pantoprazole (ProtoNix) EC tablet 40 mg (has no administration in time range)   rosuvastatin (Crestor) tablet 20 mg (has no administration in time range)   vancomycin 1,750 mg in dextrose 5% 500 mL IV (has no administration in time range)   sodium chloride 0.9% infusion (75 mL/hr intravenous New Bag 6/15/25 2211)   oxyCODONE  (Roxicodone) immediate release tablet 10 mg (10 mg oral Given 6/15/25 1524)   piperacillin-tazobactam (Zosyn) 4.5 g in dextrose (iso)  mL (0 g intravenous Stopped 6/15/25 1702)   vancomycin (Vancocin) 2,000 mg in dextrose 5% 500 mL IV (Ordered as: vancomycin) (0 mg intravenous Stopped 6/15/25 1920)   sodium chloride 0.9 % bolus 1,000 mL (0 mL intravenous Stopped 6/15/25 1720)   iohexol (OMNIPaque) 350 mg iodine/mL solution 90 mL (90 mL intravenous Given 6/15/25 1644)   HYDROmorphone (Dilaudid) injection 1 mg (1 mg intravenous Given 6/15/25 1654)   HYDROmorphone (Dilaudid) injection 1 mg (1 mg intravenous Given 6/15/25 1854)   LORazepam (Ativan) tablet 0.5 mg (0.5 mg oral Given 6/15/25 1853)     ED Course as of 06/15/25 2310   Sun Iván 15, 2025   1630 Declined ultrasound of right leg and plain tib fib xray [PK]      ED Course User Index  [PK] Edda Arora, APRN-CNP         Diagnoses as of 06/15/25 2310   Failure of outpatient treatment   Cellulitis of right leg   Anticoagulated     Re-examination:    Patient’s condition stable    Consult(s):  PHARMACY AND PODIATRY       MDM:       Frances Mcdowell is a 54 y.o. female who presents to the ED for evaluation of right leg pain.    Patient stated that she had cellulitis right lower leg x one week. Seen her a week ago and placed on Keflex. Hx of mrsa and pneudomonas in the past.    She stated that she is on anticoagulation and has been adherent to taking her medications. She goes to  wound center.    Patient stated that she is not sure why leg hurts more. She was having pain to walk but now cannot walk due to the pain. Pain is not in hip. She has wound drainage on her right lower leg. Denied DM history.     ED course stable  Pt declined xrays of and us.  Cta scan of leg negative for acute findings such as abscess.  Received zosyn and vanco, pain medications.  Wbc 20. Rest of labs stable sed rate 49  Cultures pending.  Admitted to hospitalist  Consulted podiatry                   Ddx: right lower leg cellulitis failure of outpatient       Plan of Care/Counseling:  I reviewed today's visit with the patient  in addition to providing specific details for the plan of care and counseling regarding the diagnosis and prognosis.  Questions are answered at this time and are agreeable with the plan.    ASSESSMENT     1. Failure of outpatient treatment    2. Cellulitis of right leg    3. Anticoagulated      PLAN   Admitted to hospitalist     New Medications     New Medications Ordered This Visit   Medications    oxyCODONE (Roxicodone) immediate release tablet 10 mg     Refill:  0     If ordered PRN for pain, nurse is permitted to administer this medication for higher pain scores based on patient preference?:   Yes    piperacillin-tazobactam (Zosyn) 4.5 g in dextrose (iso)  mL     Dosing of this medication varies based on severity of illness. Does this patient have sepsis or concern for sepsis (probable or documented infection plus systemic manifestations of infection)?:   Yes     Suspected Indication (Select all that apply):   Cellulitis, Skin and Soft Tissue    vancomycin (Vancocin) 2,000 mg in dextrose 5% 500 mL IV (Ordered as: vancomycin)     Dosing of this medication varies based on severity of illness. Does this patient have sepsis or concern for sepsis (probable or documented infection plus systemic manifestations of infection)?:   Yes     Suspected Indication (Select all that apply):   Cellulitis, Skin and Soft Tissue     Type of Therapy:   Empiric    sodium chloride 0.9 % bolus 1,000 mL    iohexol (OMNIPaque) 350 mg iodine/mL solution 90 mL    HYDROmorphone (Dilaudid) injection 1 mg    HYDROmorphone (Dilaudid) injection 1 mg    LORazepam (Ativan) tablet 0.5 mg    polyethylene glycol (Glycolax, Miralax) packet 17 g    melatonin tablet 3 mg    acetaminophen (Tylenol) tablet 975 mg     If ordered PRN for pain, nurse is permitted to administer this medication for higher pain  scores based on patient preference?:   Yes    vancomycin (Vancocin) pharmacy to dose - pharmacy monitoring    piperacillin-tazobactam (Zosyn) 3.375 g in dextrose (iso) IV 50 mL     Grew pseudomonas previously.     Dosing of this medication varies based on severity of illness. Does this patient have sepsis or concern for sepsis (probable or documented infection plus systemic manifestations of infection)?:   No     Suspected Indication (Select all that apply):   Cellulitis, Skin and Soft Tissue    heparin bolus from bag 10,000 Units    heparin 25,000 Units in dextrose 5% 250 mL (100 Units/mL) infusion (premix)     Initial Dose (units/kg/hr)::   18     Maximum Initial Dose (units/hr)::   2000     Heparin Assay, UFH <0.1::   Enter a 0 into the Heparin Assay, UFH field     Heparin Assay, UFH <0.1 Maintenance Dose Adjustment (units/hr)::   400     Heparin Assay, UFH <0.1 Bolus Dose (units)::   24447     Heparin Assay, UFH <0.1 Additional Instructions::   Administer above listed bolus. INCREASE infusion to suggested maintenance dose.     Heparin Assay, UFH 0.1-0.2 Maintenance Dose Adjustment (units/hr)::   300     Heparin Assay, UFH 0.1-0.2 Bolus Dose (units)::   5000     Heparin Assay, UFH 0.1-0.2 Additional Instructions::   Administer above listed bolus. INCREASE infusion to suggested maintenance dose.     Heparin Assay, UFH 0.3-0.7 Maintenance Dose Adjustment (units/hr)::   0     Heparin Assay, UFH 0.3-0.7 Bolus Dose (units)::   0     Heparin Assay, UFH 0.3-0.7 Additional Instructions::   Continue current infusion rate.     Heparin Assay, UFH 0.8-1 Maintenance Dose Adjustment (units/hr)::   -200     Heparin Assay, UFH 0.8-1 Bolus Dose (units)::   0     Heparin Assay, UFH 0.8-1 Additional Instructions::   REDUCE infusion to suggested maintenance dose.     Heparin Assay, UFH 1.1-1.2 Maintenance Dose Adjustment (units/hr)::   -300     Heparin Assay, UFH 1.1-1.2 Bolus Dose (units)::   0     Heparin Assay, UFH 1.1-1.2  Additional Instructions::   HOLD infusion for 1 hour, then RESTART at the suggested Maintenance Dose (units/hr).     Heparin Assay, UFH >1.2 Maintenance Dose Adjustment (units/hr)::   0     Heparin Assay, UFH >1.2 Bolus Dose (units)::   0     Heparin Assay, UFH >1.2 Additional Instructions::   Hold Heparin infusion for 1 hour, then recheck heparin assay.     Heparin Assay, UFH >1.2 Instructions (cont.)::   If repeat Heparin Assay, UFH is  > 0.7, continue to HOLD INFUSION and contact provider for further orders.     Heparin Assay, UFH >2::   Enter a 2 into the Heparin Assay, UFH field     Repeat Heparin Assay, UFH <=0.7 after hold Maintenance Dose Adjustment (units/hr)::   -300     Repeat Heparin Assay, UFH <=0.7 after hold Bolus Dose (units)::   0     Repeat Heparin Assay, UFH <=0.7 after hold Additional Instructions::   REDUCE previous infusion rate by 300 units/hour and restart infusion     Repeat Heparin Assay, UFH <=0.7 after hold Additional Instructions (cont.)::   Recheck Heparin Assay, UFH in 4 hours after dose reduction, resume nomogram     Repeat Heparin Assay, UFH >0.7 after hold Maintenance Dose Adjustment (units/hr)::   0     Repeat Heparin Assay, UFH >0.7 after hold Bolus Dose (units)::   0     Repeat Heparin Assay, UFH >0.7 after hold Additional Instructions::   Continue to HOLD heparin infusion, CONTACT PROVIDER FOR FURTHER ORDERS     Repeat Heparin Assay, UFH >0.7 after hold Additional Instructions (cont.)::   Confirm last draw was performed correctly (pump was paused for a minimum of 2 minutes, sample NOT drawn off line/lumen where medication was infusing)    heparin bolus from bag 5,000-10,000 Units     Heparin Assay, UFH <0.1::   Enter a 0 into the Heparin Assay, UFH field     Heparin Assay, UFH <0.1 Bolus Dose (units)::   47832     Heparin Assay, UFH 0.1-0.2 Bolus Dose (units)::   5000     Heparin Assay, UFH 0.3-0.7 Bolus Dose (units)::   0     Heparin Assay, UFH 0.8-1 Bolus Dose (units)::   0      Heparin Assay, UFH 1.1-1.2 Bolus Dose (units)::   0     Heparin Assay, UFH >1.2 Bolus Dose (units)::   0     Heparin Assay, UFH >2::   Enter a 2 into the Heparin Assay, UFH field    HYDROmorphone (Dilaudid) injection 0.4 mg    oxyCODONE (Roxicodone) immediate release tablet 10 mg     If ordered PRN for pain, nurse is permitted to administer this medication for higher pain scores based on patient preference?:   Yes    oxyCODONE (Roxicodone) immediate release tablet 5 mg     If ordered PRN for pain, nurse is permitted to administer this medication for higher pain scores based on patient preference?:   Yes    apixaban (Eliquis) tablet 5 mg    cholecalciferol (Vitamin D-3) tablet 125 mcg    levothyroxine (Synthroid, Levoxyl) tablet 100 mcg    nystatin (Mycostatin) 100,000 unit/gram powder    pantoprazole (ProtoNix) EC tablet 40 mg    rosuvastatin (Crestor) tablet 20 mg    vancomycin 1,750 mg in dextrose 5% 500 mL IV     Dosing of this medication varies based on severity of illness. Does this patient have sepsis or concern for sepsis (probable or documented infection plus systemic manifestations of infection)?:   No     Suspected Indication (Select all that apply):   Cellulitis, Skin and Soft Tissue     Type of Therapy:   Empiric    sodium chloride 0.9% infusion     Electronically signed by ENEDELIA Dietrich     **This report was transcribed using voice recognition software. Every effort was made to ensure accuracy; however, inadvertent computerized transcription errors may be present.  END OF ED PROVIDER NOTE         ENEDELIA Dietrich  06/15/25 [id number]

## 2025-06-16 PROBLEM — L97.212 NON-PRESSURE CHRONIC ULCER OF RIGHT CALF WITH FAT LAYER EXPOSED (MULTI): Status: ACTIVE | Noted: 2025-06-15

## 2025-06-16 LAB
ALBUMIN SERPL BCP-MCNC: 2.3 G/DL (ref 3.4–5)
ANION GAP SERPL CALC-SCNC: 10 MMOL/L (ref 10–20)
BUN SERPL-MCNC: 11 MG/DL (ref 6–23)
CALCIUM SERPL-MCNC: 7.2 MG/DL (ref 8.6–10.3)
CHLORIDE SERPL-SCNC: 101 MMOL/L (ref 98–107)
CO2 SERPL-SCNC: 25 MMOL/L (ref 21–32)
CREAT SERPL-MCNC: 0.85 MG/DL (ref 0.5–1.05)
EGFRCR SERPLBLD CKD-EPI 2021: 81 ML/MIN/1.73M*2
ERYTHROCYTE [DISTWIDTH] IN BLOOD BY AUTOMATED COUNT: 14.9 % (ref 11.5–14.5)
ERYTHROCYTE [DISTWIDTH] IN BLOOD BY AUTOMATED COUNT: 15 % (ref 11.5–14.5)
GLUCOSE SERPL-MCNC: 89 MG/DL (ref 74–99)
HCT VFR BLD AUTO: 29.8 % (ref 36–46)
HCT VFR BLD AUTO: 29.8 % (ref 36–46)
HGB BLD-MCNC: 9.4 G/DL (ref 12–16)
HGB BLD-MCNC: 9.6 G/DL (ref 12–16)
MAGNESIUM SERPL-MCNC: 1.62 MG/DL (ref 1.6–2.4)
MCH RBC QN AUTO: 29.6 PG (ref 26–34)
MCH RBC QN AUTO: 30.2 PG (ref 26–34)
MCHC RBC AUTO-ENTMCNC: 31.5 G/DL (ref 32–36)
MCHC RBC AUTO-ENTMCNC: 32.2 G/DL (ref 32–36)
MCV RBC AUTO: 94 FL (ref 80–100)
MCV RBC AUTO: 94 FL (ref 80–100)
NRBC BLD-RTO: 0 /100 WBCS (ref 0–0)
NRBC BLD-RTO: 0 /100 WBCS (ref 0–0)
PHOSPHATE SERPL-MCNC: 3.9 MG/DL (ref 2.5–4.9)
PLATELET # BLD AUTO: 461 X10*3/UL (ref 150–450)
PLATELET # BLD AUTO: 475 X10*3/UL (ref 150–450)
POTASSIUM SERPL-SCNC: 3.3 MMOL/L (ref 3.5–5.3)
RBC # BLD AUTO: 3.18 X10*6/UL (ref 4–5.2)
RBC # BLD AUTO: 3.18 X10*6/UL (ref 4–5.2)
SODIUM SERPL-SCNC: 133 MMOL/L (ref 136–145)
UFH PPP CHRO-ACNC: 0.6 IU/ML (ref ?–1.1)
UFH PPP CHRO-ACNC: 0.7 IU/ML (ref ?–1.1)
UFH PPP CHRO-ACNC: 0.9 IU/ML (ref ?–1.1)
UFH PPP CHRO-ACNC: 1 IU/ML (ref ?–1.1)
WBC # BLD AUTO: 19 X10*3/UL (ref 4.4–11.3)
WBC # BLD AUTO: 20.5 X10*3/UL (ref 4.4–11.3)

## 2025-06-16 PROCEDURE — 1200000002 HC GENERAL ROOM WITH TELEMETRY DAILY

## 2025-06-16 PROCEDURE — 85027 COMPLETE CBC AUTOMATED: CPT

## 2025-06-16 PROCEDURE — 2500000004 HC RX 250 GENERAL PHARMACY W/ HCPCS (ALT 636 FOR OP/ED)

## 2025-06-16 PROCEDURE — 2500000002 HC RX 250 W HCPCS SELF ADMINISTERED DRUGS (ALT 637 FOR MEDICARE OP, ALT 636 FOR OP/ED)

## 2025-06-16 PROCEDURE — 2500000005 HC RX 250 GENERAL PHARMACY W/O HCPCS

## 2025-06-16 PROCEDURE — 85520 HEPARIN ASSAY: CPT

## 2025-06-16 PROCEDURE — 36415 COLL VENOUS BLD VENIPUNCTURE: CPT

## 2025-06-16 PROCEDURE — 99223 1ST HOSP IP/OBS HIGH 75: CPT

## 2025-06-16 PROCEDURE — 2500000001 HC RX 250 WO HCPCS SELF ADMINISTERED DRUGS (ALT 637 FOR MEDICARE OP)

## 2025-06-16 PROCEDURE — 80069 RENAL FUNCTION PANEL: CPT

## 2025-06-16 PROCEDURE — 83735 ASSAY OF MAGNESIUM: CPT

## 2025-06-16 RX ORDER — CALCIUM CARBONATE 160(400)MG
1 TABLET,CHEWABLE ORAL DAILY
Qty: 1 EACH | Refills: 0 | Status: SHIPPED | OUTPATIENT
Start: 2025-06-16 | End: 2025-06-22

## 2025-06-16 RX ORDER — POTASSIUM CHLORIDE 20 MEQ/1
40 TABLET, EXTENDED RELEASE ORAL ONCE
Status: COMPLETED | OUTPATIENT
Start: 2025-06-16 | End: 2025-06-16

## 2025-06-16 RX ADMIN — ACETAMINOPHEN 975 MG: 325 TABLET, FILM COATED ORAL at 10:48

## 2025-06-16 RX ADMIN — ACETAMINOPHEN 975 MG: 325 TABLET, FILM COATED ORAL at 17:40

## 2025-06-16 RX ADMIN — SODIUM CHLORIDE 1000 ML: 0.9 INJECTION, SOLUTION INTRAVENOUS at 00:33

## 2025-06-16 RX ADMIN — OXYCODONE HYDROCHLORIDE 5 MG: 5 TABLET ORAL at 04:27

## 2025-06-16 RX ADMIN — HYDROMORPHONE HYDROCHLORIDE 0.4 MG: 1 INJECTION, SOLUTION INTRAMUSCULAR; INTRAVENOUS; SUBCUTANEOUS at 01:43

## 2025-06-16 RX ADMIN — POTASSIUM CHLORIDE 40 MEQ: 1500 TABLET, EXTENDED RELEASE ORAL at 08:34

## 2025-06-16 RX ADMIN — OXYCODONE HYDROCHLORIDE 10 MG: 10 TABLET ORAL at 15:25

## 2025-06-16 RX ADMIN — PIPERACILLIN SODIUM AND TAZOBACTAM SODIUM 3.38 G: 3; .375 INJECTION, SOLUTION INTRAVENOUS at 04:28

## 2025-06-16 RX ADMIN — PANTOPRAZOLE SODIUM 40 MG: 40 TABLET, DELAYED RELEASE ORAL at 06:10

## 2025-06-16 RX ADMIN — VANCOMYCIN HYDROCHLORIDE 1750 MG: 10 INJECTION, POWDER, LYOPHILIZED, FOR SOLUTION INTRAVENOUS at 22:24

## 2025-06-16 RX ADMIN — VANCOMYCIN HYDROCHLORIDE 1750 MG: 10 INJECTION, POWDER, LYOPHILIZED, FOR SOLUTION INTRAVENOUS at 08:38

## 2025-06-16 RX ADMIN — OXYCODONE HYDROCHLORIDE 10 MG: 10 TABLET ORAL at 21:35

## 2025-06-16 RX ADMIN — Medication 125 MCG: at 08:34

## 2025-06-16 RX ADMIN — PIPERACILLIN SODIUM AND TAZOBACTAM SODIUM 3.38 G: 3; .375 INJECTION, SOLUTION INTRAVENOUS at 10:47

## 2025-06-16 RX ADMIN — ROSUVASTATIN CALCIUM 20 MG: 20 TABLET, FILM COATED ORAL at 08:34

## 2025-06-16 RX ADMIN — HEPARIN SODIUM 2000 UNITS/HR: 10000 INJECTION, SOLUTION INTRAVENOUS at 15:41

## 2025-06-16 RX ADMIN — HYDROMORPHONE HYDROCHLORIDE 0.4 MG: 1 INJECTION, SOLUTION INTRAMUSCULAR; INTRAVENOUS; SUBCUTANEOUS at 08:38

## 2025-06-16 RX ADMIN — LEVOTHYROXINE SODIUM 100 MCG: 100 TABLET ORAL at 06:10

## 2025-06-16 RX ADMIN — ACETAMINOPHEN 975 MG: 325 TABLET, FILM COATED ORAL at 04:27

## 2025-06-16 ASSESSMENT — COGNITIVE AND FUNCTIONAL STATUS - GENERAL
DRESSING REGULAR LOWER BODY CLOTHING: A LITTLE
MOBILITY SCORE: 21
DAILY ACTIVITIY SCORE: 23
CLIMB 3 TO 5 STEPS WITH RAILING: A LOT
WALKING IN HOSPITAL ROOM: A LITTLE
DAILY ACTIVITIY SCORE: 23
CLIMB 3 TO 5 STEPS WITH RAILING: A LOT
WALKING IN HOSPITAL ROOM: A LITTLE
MOBILITY SCORE: 21
DRESSING REGULAR LOWER BODY CLOTHING: A LITTLE

## 2025-06-16 ASSESSMENT — PAIN DESCRIPTION - LOCATION
LOCATION: LEG

## 2025-06-16 ASSESSMENT — PAIN DESCRIPTION - ORIENTATION
ORIENTATION: RIGHT

## 2025-06-16 ASSESSMENT — ACTIVITIES OF DAILY LIVING (ADL): LACK_OF_TRANSPORTATION: NO

## 2025-06-16 ASSESSMENT — PAIN SCALES - GENERAL
PAINLEVEL_OUTOF10: 6
PAINLEVEL_OUTOF10: 0 - NO PAIN
PAINLEVEL_OUTOF10: 0 - NO PAIN
PAINLEVEL_OUTOF10: 3
PAINLEVEL_OUTOF10: 10 - WORST POSSIBLE PAIN
PAINLEVEL_OUTOF10: 10 - WORST POSSIBLE PAIN
PAINLEVEL_OUTOF10: 5 - MODERATE PAIN
PAINLEVEL_OUTOF10: 5 - MODERATE PAIN
PAINLEVEL_OUTOF10: 4
PAINLEVEL_OUTOF10: 10 - WORST POSSIBLE PAIN

## 2025-06-16 ASSESSMENT — PAIN - FUNCTIONAL ASSESSMENT
PAIN_FUNCTIONAL_ASSESSMENT: 0-10

## 2025-06-16 NOTE — PROGRESS NOTES
"Vancomycin Dosing by Pharmacy- INITIAL    Frances Mcdowell  54YF  Ht 5'3\" Wt 306# (139 Kg)     Hospital Day #0/Vancomycin Day #0  Admission dx-- failure outpt PO abx for RLE wound  Based on the patient's indication and renal status this patient will be dosed based on a goal AUC of 400-600.     Renal function is currently stable.  Clcr 125 mL/min    Pt previously treated w/ vancomycin and ceftazidime for MRSA and pseudomonas RLE cellulitis w/ lymphedema  in outpt setting  w/ ID supervision in Fall 2024.  Previous vancomycin dose was 1500 mg q12h w/ pt wt at 290#    Visit Vitals  /86   Pulse 98   Temp 37 °C (98.6 °F) (Temporal)   Resp 20        Lab Results   Component Value Date    CREATININE 0.65 06/15/2025    CREATININE 1.36 (H) 06/08/2025    CREATININE 1.02 05/03/2025    CREATININE 0.86 03/28/2025    CREATININE 1.20 (H) 02/03/2025        Patient weight is as follows:   Vitals:    06/15/25 2046   Weight: 139 kg (306 lb 1.6 oz)       Cultures:  6/15 Cx wound-- pending  6/15 CX x2-- pending      ABX include pip/tazo 3.375g IV q6h          Assessment/Plan     Patient has already been given a loading dose of 2000 mg today at 1702--- est level from dose 15-17  Will initiate vancomycin maintenance, 1750 mg every 12 hours beginning 6/16 0900    This dosing regimen is predicted by [employer] to result in the following pharmacokinetic parameters:  Regimen: 1750 mg IV every 12 hours.  Start time: 0900 on 06/16/2025  Exposure target: AUC24 (range) 400-600 mg/L.hr   [id number]: 450 mg/L.hr  AUC24,ss: 451 mg/L.hr  Probability of AUC24 > 400: 59 %  Ctrough,ss: 9.4 mg/L  Probability of Ctrough,ss > 20: 22 %      Follow-up level will be ordered on 6/17 w/ am labs unless indicated sooner.  Will continue to monitor renal function daily while on vancomycin and order serum creatinine at least every 48 hours if not already ordered.  Follow for continued vancomycin needs, clinical response, and signs/symptoms of toxicity.       Alex FITZGERALD" Lai, PharmD

## 2025-06-16 NOTE — H&P
Internal Medicine - History and Physical Note      Patient: Frances Mcdowell, Age: 54 y.o., SEX: female , MRN:[id number], ROOM:[address] [place], [place] [address],  Code: Full Code   Admitted On: 6/15/2025   Admitting Dx: Anticoagulated [Z79.01]  Cellulitis of right leg [L03.115]  Failure of outpatient treatment [Z78.9]  PCP: Devaughn Moreno MD        Attending: Devaughn Johnson DO        Chief Complaint   Patient: Frances Mcdowell is a 54 y.o. female who presented to the hospital for   Chief Complaint   Patient presents with    Leg Pain       HPI   Frances Mcdowell is a 54 y.o. year old female  patient with PMHx significant for saddle PE (on Eliquis), chronic lower extremity wounds with recurrent cellulitis (hx of pseudomonas and MRSA), lymphedema, ADD, HLD, and hypothyroidism s/p thyroidectomy who presented to [facility] on 6/15 with chief complaint of worsening RLE pain and concern for cellulitis in setting of chronic leg wound.  Per patient, she began having issues with wounds on her lower extremities since July 2024 when she went on a trip, had a blister on her leg, and swam in a hot tub and then developed Pseudomonas.  She was also diagnosed with lymphedema at that time.  She has been following with wound care and infectious disease since.  She has had multiple admissions in the last year for lower extremity cellulitis and has previously been treated with IV antibiotics at home through PICC line per her ID Dr. Lay for MRSA and Pseudomonas.  She continues to follow with the wound care center weekly where she gets weekly debridements.  States the wounds have been healing well.  However, last Friday she began to experience lightheadedness, sweating, and nausea with associated redness around right leg wound.  She presented to [facility] ER last Sunday and was discharged home with Keflex 4 times daily for cellulitis treatment.  However, since leaving the ER patient states pain has progressively worsened and is no longer tolerable.  She also notes  worsening redness around wounds of her right leg.  She did notably miss her wound care appointment this week because the nurse practitioner she sees regularly was on vacation.  She denies chest pain, shortness of breath, abdominal pain, nausea, vomiting. She does note that she has purposefully decreased her PO intake over the past week to limit the number of times she goes to the bathroom as she is having difficulty ambulating.     ROS  Review of Systems   Constitutional:  Positive for activity change, chills and diaphoresis. Negative for appetite change and fever.   Respiratory:  Negative for chest tightness and shortness of breath.    Cardiovascular:  Positive for leg swelling. Negative for chest pain.   Gastrointestinal:  Negative for abdominal pain, constipation, diarrhea, nausea and vomiting.   Genitourinary:  Negative for difficulty urinating.   Skin:  Positive for color change and wound.        12 Point ROS negative unless otherwise specified above.     ED COURSE:   VS: Temperature 37, /83, heart rate 98, respiration 20, O2 sat 100% on room air     Labs  - CBC: WBC 20.5, hemoglobin 9.9, hematocrit 31.1, platelets 459  - BMP: Sodium 136, potassium 3.5, chloride 98, bicarb 29, BUN 11, creatinine 0.65  - M.73  - Ca: 7.9  - LFTs: Albumin 2.5, alk phos 124, ALT 8, AST 12  - Lactate: 0.9  - PT/INR: 21.4/1.9  - ESR: 49  - CRP: 22.53  - Blood cultures: Pending   - Tissue/wound culture: pending     Imaging:  CT angio R lower extremity   1. Femoropopliteal arterial patency with patency of 3 arterial vessel runoff to the level of the right ankle. Left lower extremity  iliofemoral patency as partially imaged to the distal left forearm.  2. Extensive right lower extremity skin thickening and subcutaneous edema, more prominent in the lower leg. Limited evaluation of deep soft tissue structures. No large subcutaneous drainable fluid collection.    Interventions:   -Vancomycin and Zosyn  -Oxycodone 10 mg  -Dilaudid  1 mg x 2  -Ativan 0.5 mg   -1L NaCl    Past Medical History: Per HPI  Past Surgical History: hysterectomy, thyroidectomy, bladder sling  Allergies: Anaphylaxis to erythromycin, peanuts, sulfa drugs, swelling with tetanus, itching with morphine and tramadol  Family History: Non contributory   Home Meds: Reviewed    Social History:  - Coming from home   - Tobacco: Never  - Alcohol: Occasionally socially   - Illicit Drug: Denies     HealthCare Providers:  - PCP: Devaughn Moreno MD     Code Status: Full Code     Emergency contact: Extended Emergency Contact Information  Primary Emergency Contact: KASSANDRASADE  Mobile Phone: [phone]  Relation: Daughter   needed? No  Secondary Emergency Contact: MANJULA COOK III  Address: [address] [place] [id number] [facility]  Mobile Phone: [phone]  Relation: Spouse   needed? No     Past Medical History   Medical History[1]     Surgical History   Surgical History[2]    Family History   Family History[3]    Social History     Social History     Socioeconomic History    Marital status:      Spouse name: Not on file    Number of children: Not on file    Years of education: Not on file    Highest education level: Not on file   Occupational History    Not on file   Tobacco Use    Smoking status: Never    Smokeless tobacco: Never   Vaping Use    Vaping status: Never Used   Substance and Sexual Activity    Alcohol use: Not Currently     Comment: seldom    Drug use: Never    Sexual activity: Yes     Partners: Male     Birth control/protection: None   Other Topics Concern    Not on file   Social History Narrative    Not on file     Social Drivers of Health     Financial Resource Strain: Medium Risk (6/15/2025)    Overall Financial Resource Strain (CARDIA)     Difficulty of Paying Living Expenses: Somewhat hard   Food Insecurity: Food Insecurity Present (6/15/2025)    Hunger Vital Sign     Worried About Running Out  of Food in the Last Year: Sometimes true     Ran Out of Food in the Last Year: Sometimes true   Transportation Needs: No Transportation Needs (6/15/2025)    PRAPARE - Transportation     Lack of Transportation (Medical): No     Lack of Transportation (Non-Medical): No   Physical Activity: Inactive (10/31/2024)    Exercise Vital Sign     Days of Exercise per Week: 0 days     Minutes of Exercise per Session: 0 min   Stress: Stress Concern Present (10/31/2024)    [employer] - Occupational Stress Questionnaire     Feeling of Stress : To some extent   Social Connections: Socially Isolated (3/25/2025)    Social Connection and Isolation Panel [NHANES]     Frequency of Communication with Friends and Family: Once a week     Frequency of Social Gatherings with Friends and Family: Once a week     Attends Druze Services: Never     Active Member of Clubs or Organizations: No     Attends Club or Organization Meetings: Never     Marital Status:    Intimate Partner Violence: Not At Risk (6/15/2025)    Humiliation, Afraid, Rape, and Kick questionnaire     Fear of Current or Ex-Partner: No     Emotionally Abused: No     Physically Abused: No     Sexually Abused: No   Housing Stability: Low Risk  (6/15/2025)    Housing Stability Vital Sign     Unable to Pay for Housing in the Last Year: No     Number of Times Moved in the Last Year: 0     Homeless in the Last Year: No       Tobacco Use: Low Risk  (6/8/2025)    Patient History     Smoking Tobacco Use: Never     Smokeless Tobacco Use: Never     Passive Exposure: Not on file        Social History     Substance and Sexual Activity   Alcohol Use Not Currently    Comment: seldom        Allergies   RX Allergies[4]       Meds    Scheduled medications  Scheduled Medications[5]  Continuous medications  Continuous Medications[6]  PRN medications  PRN Medications[7]     Objective    Physical Exam  Constitutional:       Appearance: She is obese.       "Comments: In pain on examination   HENT:      Head: Normocephalic and atraumatic.   Cardiovascular:      Rate and Rhythm: Normal rate and regular rhythm.   Pulmonary:      Effort: Pulmonary effort is normal. No respiratory distress.      Breath sounds: No stridor. No wheezing, rhonchi or rales.   Chest:      Chest wall: No tenderness.   Abdominal:      General: There is no distension.      Tenderness: There is no abdominal tenderness.   Musculoskeletal:         General: Tenderness present.   Skin:     General: Skin is warm and dry.      Findings: Erythema and lesion present.   Neurological:      General: No focal deficit present.      Mental Status: She is alert and oriented to person, place, and time.   Psychiatric:         Mood and Affect: Mood normal.         Behavior: Behavior normal.          Visit Vitals  /86   Pulse 98   Temp 37 °C (98.6 °F) (Temporal)   Resp 20   Ht 1.6 m (5' 3\")   Wt 139 kg (306 lb 1.6 oz)   SpO2 95%   BMI 54.22 kg/m²   OB Status Hysterectomy   Smoking Status Never   BSA 2.49 m²          Intake/Output Summary (Last 24 hours) at 6/15/2025 2136  Last data filed at 6/15/2025 1720  Gross per 24 hour   Intake 1000 ml   Output --   Net 1000 ml             Labs:   Results from last 72 hours   Lab Units 06/15/25  1534   SODIUM mmol/L 136   POTASSIUM mmol/L 3.5   CHLORIDE mmol/L 98   CO2 mmol/L 29   BUN mg/dL 11   CREATININE mg/dL 0.65   GLUCOSE mg/dL 99   CALCIUM mg/dL 7.9*   ANION GAP mmol/L 13   EGFR mL/min/1.73m*2 >90      Results from last 72 hours   Lab Units 06/15/25  1534   WBC AUTO x10*3/uL 20.5*   HEMOGLOBIN g/dL 9.9*   HEMATOCRIT % 31.1*   PLATELETS AUTO x10*3/uL 459*   NEUTROS PCT AUTO % 84.0   LYMPHS PCT AUTO % 7.4   MONOS PCT AUTO % 6.4   EOS PCT AUTO % 0.4      Lab Results   Component Value Date    CALCIUM 7.9 (L) 06/15/2025    PHOS 3.5 03/28/2025      Lab Results   Component Value Date    CRP 22.53 (H) 06/15/2025       [unfilled]     Micro/ID:   No results found for the " "last 90 days.    Lab Results   Component Value Date    URINECULTURE SEE NOTE 06/05/2025    BLOODCULT No growth at 4 days -  FINAL REPORT 03/24/2025    BLOODCULT No growth at 4 days -  FINAL REPORT 03/24/2025                    No lab exists for component: \"AGALPCRNB\"       Images        Encounter Date: 03/24/25   Electrocardiogram, 12-lead PRN ACS symptoms   Result Value    Ventricular Rate 115    Atrial Rate 115    IN Interval 152    QRS Duration 86    QT Interval 326    QTC Calculation(Bazett) 450    P Axis 62    R Axis 58    T Axis 38    QRS Count 19    Q Onset 227    P Onset 151    P Offset 202    T Offset 390    QTC Fredericia 404    Narrative    Sinus tachycardia  T wave abnormality, consider anterior ischemia  Abnormal ECG  When compared with ECG of 24-MAR-2025 07:51,  No significant change was found  Confirmed by Bong Wood ([id number]) on 3/25/2025 4:39:01 AM      Encounter Date: 03/24/25   Electrocardiogram, 12-lead PRN ACS symptoms   Result Value    Ventricular Rate 115    Atrial Rate 115    IN Interval 152    QRS Duration 86    QT Interval 326    QTC Calculation(Bazett) 450    P Axis 62    R Axis 58    T Axis 38    QRS Count 19    Q Onset 227    P Onset 151    P Offset 202    T Offset 390    QTC Fredericia 404    Narrative    Sinus tachycardia  T wave abnormality, consider anterior ischemia  Abnormal ECG  When compared with ECG of 24-MAR-2025 07:51,  No significant change was found  Confirmed by Bong Wood ([id number]) on 3/25/2025 4:39:01 AM        [unfilled]     CT angio lower extremity right w and or wo IV contrast  Result Date: 6/15/2025  1. Femoropopliteal arterial patency with patency of 3 arterial vessel runoff to the level of the right ankle. Left lower extremity iliofemoral patency as partially imaged to the distal left forearm. 2. Extensive right lower extremity skin thickening and subcutaneous edema, more prominent in the lower leg. Limited evaluation of deep soft tissue structures. No large " subcutaneous drainable fluid collection.   MACRO: None   Signed by: Andrei Joseph 6/15/2025 5:30 PM Dictation workstation:   [id number]        Assessment and Plan    Frances Mcdowell is a 54 y.o. female with PMHx significant for saddle PE (on Eliquis), chronic lower extremity wounds with recurrent cellulitis (hx of pseudomonas and MRSA), lymphedema, ADD, HLD, and hypothyroidism s/p thyroidectomy admitted on 6/15/2025 for R lower extremity cellulitis in the setting of chronic lower extremity wounds and lymphedema.    ACUTE MEDICAL ISSUES:  #Bilateral lymphedema  #Chronic right leg wound with infection / # Cellulitis of right leg  #Leukocytosis  ::Hx pseudomonas and MRSA tissue cultures 10/24 and 12/24 susceptible to vancomycin and zosyn   ::WBC 20.5  ::Inflammatory markers elevated; ESR 49 and CRP 22.53  ::S/p vanc and zosyn and 1L NS in the ED  ::Photos of leg wound in chart under media   PLAN:  -Blood cultures and wound culture pending   -Trend WBC  -Continue vancomycin and zosyn  -Podiatry consulted, appreciate recs - made aware of patient in the ED  -ID consulted - appreciate recs   -Wound care consulted, appreciate recs  -Pain regimen: Tylenol 9765 mg q6 hours scheduled, oxycodone 5 and 10 mg q6 hours PRN for moderate and severe pain, Dilaudid 0.4 mg q3 hours PRN for breakthrough pain  -HOLD Eliquis and start heparin gtt iso of possible podiatry intervention  -NPO at midnight iso of possible podiatry intervention  -Maintenance fluids; LR 75cc/hr x 12 hours     #Thrombocytosis  ::Platelets 459 on admission  ::Suspect may be hemoconcentration 2/2 reported decreased PO intake  PLAN:  -Continue to monitor on daily CBC, expect improvement with IV fluids      CHRONIC MEDICAL ISSUES:  #Hx saddle PE - HOLD home Eliquis for possible podiatry intervention - started heparin gtt   #HLD - continue home rosuvastatin 20 mg daily   #ADD - hold home Adderall, patient does not want while inpatient   #Hypothyroidism - continue  home levothyroxine 100 mcg daily   #Vitamin D deficiency - continue home vitamin D supplementation  #GERD - continue home omeprazole 40 mg daily       Fluids: s/p 1L NS in the ED - maintenance fluids 75cc/hr x 12 hours  Electrolytes: Replete PRN  Nutrition: Regular diet and then NPO at midnight   Antimicrobials: Vancomycin and Zosyn   DVT ppx: Heparin gtt   GI ppx: PPI  Catheter: None  Lines: PIV  Supplemental Oxygen: Room Air   Emergency Contact: Extended Emergency Contact Information  Primary Emergency Contact: SADE CANNON  Mobile Phone: [phone]  Relation: Daughter   needed? No  Secondary Emergency Contact: MANJULA COOK III  Address: [address] [address] [place]  Mobile Phone: [phone]  Relation: Spouse   needed? No   Code: Full Code     Disposition: 54 year old female admitted for cellulitis in setting of chronic lower extremity wounds and lymphedema. ELOS > 2 midnights.    Bernarda Alvarado MD  Internal Medicine, PGY- 1  06/15/25 at 9:36 PM                 [1]   Past Medical History:  Diagnosis Date    Acute embolism and thrombosis of other specified veins 2022    Superficial vein thrombosis    ADD (attention deficit disorder)     Anxiety     Endometrial intraepithelial neoplasia (EIN)     GERD (gastroesophageal reflux disease)     HLD (hyperlipidemia)     Hypothyroidism     Lymph edema     Pulmonary embolism    [2]   Past Surgical History:  Procedure Laterality Date    BLADDER SURGERY       SECTION, LOW TRANSVERSE      FOOT SURGERY      HYSTERECTOMY      IR CVC PICC  2025    IR CVC PICC 2025 TRI CR NONV1    TONSILLECTOMY      TOTAL THYROIDECTOMY      VARICOSE VEIN SURGERY      Varicose Vein Ligation   [3] No family history on file.  [4]   Allergies  Allergen Reactions    Erythromycin Base Anaphylaxis    Peanut Anaphylaxis    Sulfa (Sulfonamide Antibiotics) Anaphylaxis    Tetanus Toxoid Other and Swelling     Morphine Itching    Tramadol Itching    Adhesive Tape-Silicones Rash     tegaderm causes bad skin breakdown   [5] acetaminophen, 975 mg, oral, q6h  [Held by provider] apixaban, 5 mg, oral, BID  [START ON 6/16/2025] cholecalciferol, 125 mcg, oral, Daily  heparin, 10,000 Units, intravenous, Once  [START ON 6/16/2025] levothyroxine, 100 mcg, oral, Daily  melatonin, 3 mg, oral, Nightly  [START ON 6/16/2025] nystatin, , Topical, Daily  [START ON 6/16/2025] pantoprazole, 40 mg, oral, Daily before breakfast  piperacillin-tazobactam, 3.375 g, intravenous, q6h  polyethylene glycol, 17 g, oral, Daily  [START ON 6/16/2025] rosuvastatin, 20 mg, oral, Daily  [START ON 6/16/2025] vancomycin, 1,750 mg, intravenous, q12h     [6] heparin, 0-4,500 Units/hr  lactated Ringer's, 75 mL/hr, Last Rate: 75 mL/hr (06/15/25 2128)     [7] PRN medications: heparin, HYDROmorphone, oxyCODONE, oxyCODONE, vancomycin

## 2025-06-16 NOTE — PROGRESS NOTES
06/16/25 1040   Discharge Planning   Living Arrangements Spouse/significant other;Children   Support Systems Spouse/significant other   Assistance Needed patient is A&Ox3, independent in ADL's, uses a walker for ambulation, drives; PCP Dr. Devaughn Whittaker   Type of Residence Private residence   Number of Stairs to Enter Residence 0   Number of Stairs Within Residence 0   Do you have animals or pets at home? Yes   Type of Animals or Pets cat, dog, and bird   Who is requesting discharge planning? Provider   Home or Post Acute Services None   Expected Discharge Disposition Home  (pending ID/podiatry; verbalizes she would like a rollator- advised this is not a covered DME- denied TCC request to send for pricing at this time)   Does the patient need discharge transport arranged? No   Financial Resource Strain   How hard is it for you to pay for the very basics like food, housing, medical care, and heating? Hard   Housing Stability   In the last 12 months, was there a time when you were not able to pay the mortgage or rent on time? N   In the past 12 months, how many times have you moved where you were living? 0   At any time in the past 12 months, were you homeless or living in a shelter (including now)? N   Transportation Needs   In the past 12 months, has lack of transportation kept you from medical appointments or from getting medications? no   In the past 12 months, has lack of transportation kept you from meetings, work, or from getting things needed for daily living? No   Stroke Family Assessment   Stroke Family Assessment Needed No   Intensity of Service   Intensity of Service 0-30 min

## 2025-06-16 NOTE — PROGRESS NOTES
Department of Hospital Medicine  Daily Progress Note   Hospital Day: 2       Name:Frances cMdowell, AGE: 54 y.o., GENDER: female, MRN: [id number], ROOM: 112/[facility]   CODE STATUS: Full Code  Attending Physician: Devaughn Johnson DO  Resident: Artur Pandya DO        Chief Complaint     Chief Complaint   Patient presents with    Leg Pain        Interval History   Frances Mcdowell is a 54 y.o. year old female patient on Hospital Day: 2      Subjective    Pt is reporting continued pain in her RLE. She states it feels like burning with a sharp stabbing pain that comes and goes. The patient says it hurts from her right knee down. She says she saw podiatry who recommended a debridement. Pt is onboard but would like to do this tomorrow rather than today due to her pain. Patient denies fever, sob, numbness, tingling, chest pain or radiation of her pain.  Meds    Scheduled medications  Scheduled Medications[1]  Continuous medications  Continuous Medications[2]  PRN medications  PRN Medications[3]     Objective    Exam   Physical Exam  Constitutional:       Appearance: Normal appearance. She is obese.   HENT:      Head: Normocephalic.      Nose: Nose normal.   Eyes:      Extraocular Movements: Extraocular movements intact.      Conjunctiva/sclera: Conjunctivae normal.      Pupils: Pupils are equal, round, and reactive to light.   Cardiovascular:      Rate and Rhythm: Normal rate and regular rhythm.      Pulses: Normal pulses.      Heart sounds: Normal heart sounds.   Pulmonary:      Effort: Pulmonary effort is normal.      Breath sounds: Normal breath sounds.   Abdominal:      General: Abdomen is flat. Bowel sounds are normal.      Palpations: Abdomen is soft.   Musculoskeletal:         General: Swelling (RLE) and tenderness (RLE) present.      Comments: See media for picture of wound   Skin:     General: Skin is warm.      Findings: Lesion (RLE) present.      Comments: Dressing CDI RLE   Neurological:      General: No focal deficit  "present.      Mental Status: She is alert and oriented to person, place, and time. Mental status is at baseline.   Psychiatric:         Mood and Affect: Mood normal.         Behavior: Behavior normal.         Thought Content: Thought content normal.         Judgment: Judgment normal.          Vitals         6/15/2025     6:00 PM 6/15/2025     8:00 PM 6/15/2025     8:46 PM 6/16/2025    12:11 AM 6/16/2025    12:18 AM 6/16/2025     4:26 AM 6/16/2025     8:57 AM   Vitals   Systolic 135 119  85 110 113 116   Diastolic 94 86  55 64 71 74   BP Location    Left arm Left arm Left arm Left arm   Heart Rate 94 98  93  86 93   Temp    36.5 °C (97.7 °F)  36.5 °C (97.7 °F) 36.6 °C (97.9 °F)   Resp 20 20  18  18 20   Height   1.6 m (5' 3\")       Weight (lb)   306.1       BMI   54.22 kg/m2       BSA (m2)   2.49 m2            Intake/Output Summary (Last 24 hours) at 6/16/2025 1025  Last data filed at 6/16/2025 0645  Gross per 24 hour   Intake 3318.5 ml   Output 120 ml   Net 3198.5 ml       Labs   Labs:   Results from last 72 hours   Lab Units 06/16/25  0528 06/15/25  1534   SODIUM mmol/L 133* 136   POTASSIUM mmol/L 3.3* 3.5   CHLORIDE mmol/L 101 98   CO2 mmol/L 25 29   BUN mg/dL 11 11   CREATININE mg/dL 0.85 0.65   GLUCOSE mg/dL 89 99   CALCIUM mg/dL 7.2* 7.9*   ANION GAP mmol/L 10 13   EGFR mL/min/1.73m*2 81 >90   PHOSPHORUS mg/dL 3.9  --       Results from last 72 hours   Lab Units 06/16/25  0940 06/16/25  0528 06/15/25  1534   WBC AUTO x10*3/uL 19.0* 20.5* 20.5*   HEMOGLOBIN g/dL 9.4* 9.6* 9.9*   HEMATOCRIT % 29.8* 29.8* 31.1*   PLATELETS AUTO x10*3/uL 461* 475* 459*   NEUTROS PCT AUTO %  --   --  84.0   LYMPHS PCT AUTO %  --   --  7.4   MONOS PCT AUTO %  --   --  6.4   EOS PCT AUTO %  --   --  0.4      Lab Results   Component Value Date    CALCIUM 7.2 (L) 06/16/2025    PHOS 3.9 06/16/2025      Lab Results   Component Value Date    CRP 22.53 (H) 06/15/2025      [unfilled]     Micro/ID:   No results found for the last 90 " "days.                   No lab exists for component: \"AGALPCRNB\"     Lab Results   Component Value Date    URINECULTURE SEE NOTE 06/05/2025    BLOODCULT Loaded on Instrument - Culture in progress 06/15/2025    BLOODCULT Loaded on Instrument - Culture in progress 06/15/2025       Images    Imaging  CT angio lower extremity right w and or wo IV contrast  Result Date: 6/15/2025  1. Femoropopliteal arterial patency with patency of 3 arterial vessel runoff to the level of the right ankle. Left lower extremity iliofemoral patency as partially imaged to the distal left forearm. 2. Extensive right lower extremity skin thickening and subcutaneous edema, more prominent in the lower leg. Limited evaluation of deep soft tissue structures. No large subcutaneous drainable fluid collection.   MACRO: None   Signed by: Andrei Joseph 6/15/2025 5:30 PM Dictation workstation:   [id number]      Cardiology, Vascular, and Other Imaging  No other imaging results found for the past 7 days      Assessment    Assessment and Plan    Frances Mcdowell is a 54 y.o. female with PMHx significant for saddle PE (on Eliquis), chronic lower extremity wounds with recurrent cellulitis (hx of pseudomonas and MRSA), lymphedema, ADD, HLD, and hypothyroidism s/p thyroidectomy admitted on 6/15/2025 for R lower extremity cellulitis in the setting of chronic lower extremity wounds and lymphedema.     ACUTE MEDICAL ISSUES:  #Bilateral lymphedema  #Chronic right leg wound with infection / #Cellulitis of right leg  #Leukocytosis  ::Hx pseudomonas and MRSA tissue cultures 10/24 and 12/24 susceptible to vancomycin and zosyn   ::WBC 20.5  ::Inflammatory markers elevated; ESR 49 and CRP 22.53  ::Photos of leg wound in chart under media   PLAN:  -Blood cultures and wound culture pending   -Trend WBC  -Continue vancomycin, DC zosyn (6/16) per ID  -Podiatry consulted, Debridement planned for tomorrow  -ID following  -Wound care consulted, appreciate recs  -Pain " regimen: Tylenol 9765 mg q6 hours scheduled, oxycodone 5 and 10 mg q6 hours PRN for moderate and severe pain, Dilaudid 0.4 mg q3 hours PRN for breakthrough pain  -HOLD Eliquis for podiatry intervention, continuing home heparin per podiatry  -Regular diet, NPO at midnight.     #Thrombocytosis  ::Platelets 459 on admission, most recent 461  ::Suspect may be hemoconcentration 2/2 reported decreased PO intake  PLAN:  -Continue to monitor on daily CBC     CHRONIC MEDICAL ISSUES:  #Hx saddle PE - HOLD home Eliquis for possible podiatry intervention - started heparin gtt   #HLD - continue home rosuvastatin 20 mg daily   #ADD - hold home Adderall, patient does not want while inpatient   #Hypothyroidism - continue home levothyroxine 100 mcg daily   #Vitamin D deficiency - continue home vitamin D supplementation  #GERD - continue home omeprazole 40 mg daily         Fluids: None  Electrolytes: Replete PRN  Nutrition: Regular diet and then NPO at midnight   Antimicrobials: Vancomycin  DVT ppx: Heparin gtt   GI ppx: PPI  Catheter: None  Lines: PIV  Supplemental Oxygen: Room Air   Emergency Contact: Extended Emergency Contact Information  Primary Emergency Contact: SADE CANNON  Mobile Phone: [phone]  Relation: Daughter   needed? No  Secondary Emergency Contact: MANJULA COOK III  Address: [address] [place] [id number] [facility]  Mobile Phone: [phone]  Relation: Spouse   needed? No   Code: Full Code      Disposition: 54 year old female admitted for cellulitis in setting of chronic lower extremity wounds and lymphedema. Debridement planned tomorrow    Artur Pandya DO  PGY- 1  06/16/25 at 10:25 AM                   [1] acetaminophen, 975 mg, oral, q6h  [Held by provider] apixaban, 5 mg, oral, BID  cholecalciferol, 125 mcg, oral, Daily  heparin, 10,000 Units, intravenous, Once  levothyroxine, 100 mcg, oral, Daily  melatonin, 3 mg, oral, Nightly  nystatin, ,  Topical, Daily  pantoprazole, 40 mg, oral, Daily before breakfast  piperacillin-tazobactam, 3.375 g, intravenous, q6h  polyethylene glycol, 17 g, oral, Daily  rosuvastatin, 20 mg, oral, Daily  vancomycin, 1,750 mg, intravenous, q12h  [2] heparin, 0-4,500 Units/hr  [3] PRN medications: heparin, HYDROmorphone, oxyCODONE, oxyCODONE, vancomycin

## 2025-06-16 NOTE — CONSULTS
Consults  Referred by JULITO Johnson MD: Devaughn Moreno MD    Reason For Consult  cellulitis    History Of Present Illness  Frances Mcdowell is a 54 y.o. 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, she was in the ER 1 day PTA for Rt leg pain and redness, she was treated with Keflex, she came back for progression, no fever, has chills, minimal drainage, no cough or chest pain, WBC are up, CT with skin thickening, no collection     Past Medical History  She has a past medical history of Acute embolism and thrombosis of other specified veins (2022), ADD (attention deficit disorder), Anxiety, Endometrial intraepithelial neoplasia (EIN), GERD (gastroesophageal reflux disease), HLD (hyperlipidemia), Hypothyroidism, Lymph edema, and Pulmonary embolism.    Surgical History  She has a past surgical history that includes Tonsillectomy; Foot surgery; Varicose vein surgery; Total thyroidectomy; Bladder surgery;  section, low transverse; Hysterectomy; and IR CVC PICC (2025).     Social History     Occupational History    Not on file   Tobacco Use    Smoking status: Never    Smokeless tobacco: Never   Vaping Use    Vaping status: Never Used   Substance and Sexual Activity    Alcohol use: Not Currently     Comment: seldom    Drug use: Never    Sexual activity: Yes     Partners: Male     Birth control/protection: None     Travel History   Travel since 23    No documented travel since 23            Family History  Family History[1], no immunodeficiency  Allergies  Erythromycin base, Peanut, Sulfa (sulfonamide antibiotics), Tetanus toxoid, Morphine, Tramadol, and Adhesive tape-silicones     Immunization History   Administered Date(s) Administered    COVID-19, mRNA, LNP-S, PF, 30 mcg/0.3 mL dose 2021, 2021, 10/25/2021    Flu vaccine (IIV4), preservative free *Check age/dose*  "02/16/2018, 11/09/2018    Flu vaccine, quadrivalent, no egg protein, age 6 month or greater (FLUCELVAX) 10/13/2020    Hepatitis B vaccine, 19 yrs and under (RECOMBIVAX, ENGERIX) 03/18/2016, 04/18/2016    Influenza, seasonal, injectable 10/09/2015    Moderna SARS-CoV-2 Vaccination 06/01/2022    PPD Test 10/09/2015, 10/12/2016, 02/16/2018, 06/23/2021    Tdap vaccine, age 7 year and older (BOOSTRIX, ADACEL) 10/09/2015     Medications  Home medications:  Prescriptions Prior to Admission[2]  Current medications:  Scheduled medications  Scheduled Medications[3]  Continuous medications  Continuous Medications[4]  PRN medications  PRN Medications[5]    Review of Systems   All other systems reviewed and are negative.       Objective  Range of Vitals (last 24 hours)  Heart Rate:  [86-98]   Temp:  [36.5 °C (97.7 °F)-37 °C (98.6 °F)]   Resp:  [18-20]   BP: ()/(55-97)   Height:  [160 cm (5' 3\")]   Weight:  [136 kg (300 lb)-139 kg (306 lb 1.6 oz)]   SpO2:  [91 %-100 %]   Daily Weight  06/15/25 : 139 kg (306 lb 1.6 oz)    Body mass index is 54.22 kg/m².     Physical Exam  Constitutional:       Appearance: Normal appearance.   HENT:      Head: Normocephalic and atraumatic.      Mouth/Throat:      Mouth: Mucous membranes are moist.      Pharynx: Oropharynx is clear.   Eyes:      Pupils: Pupils are equal, round, and reactive to light.   Cardiovascular:      Rate and Rhythm: Normal rate and regular rhythm.      Heart sounds: Normal heart sounds.   Pulmonary:      Effort: Pulmonary effort is normal.      Breath sounds: Normal breath sounds.   Abdominal:      General: Abdomen is flat. Bowel sounds are normal.      Palpations: Abdomen is soft.   Musculoskeletal:         General: Swelling present.      Cervical back: Normal range of motion.      Comments: RT leg stasis / wounds   Neurological:      Mental Status: She is alert.          Relevant Results  Outside Hospital Results  reviewed  Labs  Results from last 72 hours   Lab Units " "06/16/25  0528 06/15/25  1534   WBC AUTO x10*3/uL 20.5* 20.5*   HEMOGLOBIN g/dL 9.6* 9.9*   HEMATOCRIT % 29.8* 31.1*   PLATELETS AUTO x10*3/uL 475* 459*   NEUTROS PCT AUTO %  --  84.0   LYMPHS PCT AUTO %  --  7.4   MONOS PCT AUTO %  --  6.4   EOS PCT AUTO %  --  0.4     Results from last 72 hours   Lab Units 06/16/25  0528 06/15/25  1534   SODIUM mmol/L 133* 136   POTASSIUM mmol/L 3.3* 3.5   CHLORIDE mmol/L 101 98   CO2 mmol/L 25 29   BUN mg/dL 11 11   CREATININE mg/dL 0.85 0.65   GLUCOSE mg/dL 89 99   CALCIUM mg/dL 7.2* 7.9*   ANION GAP mmol/L 10 13   EGFR mL/min/1.73m*2 81 >90   PHOSPHORUS mg/dL 3.9  --      Results from last 72 hours   Lab Units 06/16/25 0528 06/15/25  1534   ALK PHOS U/L  --  124*   BILIRUBIN TOTAL mg/dL  --  0.6   PROTEIN TOTAL g/dL  --  6.7   ALT U/L  --  8   AST U/L  --  12   ALBUMIN g/dL 2.3* 2.5*     Estimated Creatinine Clearance: 101.5 mL/min (by C-G formula based on SCr of 0.85 mg/dL).  C-Reactive Protein   Date Value Ref Range Status   06/15/2025 22.53 (H) <1.00 mg/dL Final   08/06/2024 23.21 (H) <1.00 mg/dL Final     Sedimentation Rate   Date Value Ref Range Status   06/15/2025 49 (H) 0 - 30 mm/h Final   08/06/2024 44 (H) 0 - 30 mm/h Final     No results found for: \"HIV1X2\", \"HIVCONF\", \"QJSGLT3ZG\"  No results found for: \"HEPCABINIT\", \"HEPCAB\", \"HCVPCRQUANT\"  Microbiology  Reviewed  Imaging  Reviewed      Assessment/Plan     Legs stasis / wounds / possible Rt leg cellulitis    Recommendations :  Continue Vancomycin  Discontinue Zosyn  Keep the leg elevated  Wound care  Will need a life style modification  Follow the WBC and inflammatory markers    I spent minutes in the professional and overall care of this patient.          Miguel Hu MD       [1] No family history on file.  [2]   Medications Prior to Admission   Medication Sig Dispense Refill Last Dose/Taking    acetaminophen (Tylenol) 325 mg tablet Take 2 tablets (650 mg) by mouth 3 times a day as needed for mild pain (1 - 3). 30 " tablet 11     amphetamine-dextroamphetamine XR (Adderall XR) 20 mg 24 hr capsule Take 1 capsule (20 mg) by mouth 2 times a day. (Patient taking differently: Take 2 capsules (40 mg) by mouth 2 times a day.) 60 capsule 0     apixaban (Eliquis) 5 mg tablet Take 1 tablet (5 mg) by mouth 2 times a day. 60 tablet 11     cholecalciferol (Vitamin D-3) 5,000 Units tablet Take 1 tablet (5,000 Units) by mouth once daily. 90 tablet 1     EPINEPHrine 0.3 mg/0.3 mL injection syringe Inject 0.3 mL (0.3 mg) as directed if needed for anaphylaxis. As Directed 30 mL 2     furosemide (Lasix) 20 mg tablet Take 1 tablet (20 mg) by mouth once daily. (Patient not taking: Reported on 5/21/2025) 30 tablet 11     levothyroxine (Synthroid, Levoxyl) 100 mcg tablet Take 1 tablet (100 mcg) by mouth early in the morning.. Take on an empty stomach at the same time each day, either 30 to 60 minutes prior to breakfast 90 tablet 3     naloxone (Narcan) 4 mg/0.1 mL nasal spray Administer 1 spray (4 mg) into affected nostril(s) if needed for opioid reversal. May repeat every 2-3 minutes if needed, alternating nostrils, until medical assistance becomes available. 2 each 0     Nyamyc 100,000 unit/gram powder Apply topically once daily. 60 g 6     omeprazole (PriLOSEC) 40 mg DR capsule Take 1 capsule (40 mg) by mouth once daily. 90 capsule 3     oxyCODONE-acetaminophen (Percocet)  mg tablet Take 1 tablet by mouth once daily. 90 tablet 0     rosuvastatin (Crestor) 20 mg tablet Take 1 tablet (20 mg) by mouth once daily. 30 tablet 11     triamcinolone (Kenalog) 0.1 % cream Apply to affected area 1-2 times daily as needed. 453.6 g 1     wound dressing (Triad Wound Dressing) paste Apply 1 Application topically 2 times a day. (Patient not taking: Reported on 5/21/2025) 170 g 3    [3] acetaminophen, 975 mg, oral, q6h  [Held by provider] apixaban, 5 mg, oral, BID  cholecalciferol, 125 mcg, oral, Daily  heparin, 10,000 Units, intravenous, Once  levothyroxine,  100 mcg, oral, Daily  melatonin, 3 mg, oral, Nightly  nystatin, , Topical, Daily  pantoprazole, 40 mg, oral, Daily before breakfast  piperacillin-tazobactam, 3.375 g, intravenous, q6h  polyethylene glycol, 17 g, oral, Daily  rosuvastatin, 20 mg, oral, Daily  vancomycin, 1,750 mg, intravenous, q12h    [4] heparin, 0-4,500 Units/hr  sodium chloride 0.9%, 75 mL/hr, Last Rate: 75 mL/hr (06/16/25 0645)    [5] PRN medications: heparin, HYDROmorphone, oxyCODONE, oxyCODONE, vancomycin

## 2025-06-16 NOTE — CONSULTS
Inpatient consult to Podiatry  Consult performed by: Mami Tang DPM  Consult ordered by: Vivek Schmitz MD  Reason for consult: LE wounds          Reason For Consult  LE wounds    History Of Present Illness  Frances Mcdowell is a 54 y.o. female presenting with failed outpatient therapy of Right LE wounds.  Wounds presents for almost one year.  Has been seen by Nida Reyes CNP at [facility] for wound care since January of this year and was previously seeing [facility]. Relates that she came to ED last week because her legs were more painful and was placed on oral antibiotics.  Legs seemed to worsen over last couple of days so she came back to ED.       Past Medical History  She has a past medical history of Acute embolism and thrombosis of other specified veins (2022), ADD (attention deficit disorder), Anxiety, Endometrial intraepithelial neoplasia (EIN), GERD (gastroesophageal reflux disease), HLD (hyperlipidemia), Hypothyroidism, Lymph edema, and Pulmonary embolism.    Surgical History  She has a past surgical history that includes Tonsillectomy; Foot surgery; Varicose vein surgery; Total thyroidectomy; Bladder surgery;  section, low transverse; Hysterectomy; and IR CVC PICC (2025).     Social History  She reports that she has never smoked. She has never used smokeless tobacco. She reports that she does not currently use alcohol. She reports that she does not use drugs.    Family History  Family History[1]     Allergies  Erythromycin base, Peanut, Sulfa (sulfonamide antibiotics), Tetanus toxoid, Morphine, Tramadol, and Adhesive tape-silicones    Review of Systems  +DVT/PE  +B/L LE lymphedema     Physical Exam  Constitutional:       Appearance: She is obese.      Comments: In pain on examination   HENT:      Head: Normocephalic and atraumatic.   Cardiovascular:      Rate and Rhythm: Normal rate and regular rhythm.   Pulmonary:      Effort: Pulmonary effort is normal. No  "respiratory distress.      Breath sounds: No stridor. No wheezing, rhonchi or rales.   Chest:      Chest wall: No tenderness.   Abdominal:      General: There is no distension.      Tenderness: There is no abdominal tenderness.   Musculoskeletal:         General: Tenderness present.   Skin:     General: Skin is warm and dry.      Findings: Right lower leg swelling and erythema;  ulceration to right lower leg with 100% slough;  mild malodor;  palpable pedal pulses.  Neurological:      General: No focal deficit present.      Mental Status: She is alert and oriented to person, place, and time.   Psychiatric:         Mood and Affect: Mood normal.         Behavior: Behavior normal.   Last Recorded Vitals  Blood pressure 116/74, pulse 93, temperature 36.6 °C (97.9 °F), temperature source Temporal, resp. rate 20, height 1.6 m (5' 3\"), weight 139 kg (306 lb 1.6 oz), SpO2 95%.    Relevant Results  Results for orders placed or performed during the hospital encounter of 06/15/25 (from the past 24 hours)   Heparin Assay   Result Value Ref Range    Heparin Unfractionated 1.4 (HH) See Comment Below for Therapeutic Ranges IU/mL   Magnesium   Result Value Ref Range    Magnesium 1.62 1.60 - 2.40 mg/dL   Renal Function Panel   Result Value Ref Range    Glucose 89 74 - 99 mg/dL    Sodium 133 (L) 136 - 145 mmol/L    Potassium 3.3 (L) 3.5 - 5.3 mmol/L    Chloride 101 98 - 107 mmol/L    Bicarbonate 25 21 - 32 mmol/L    Anion Gap 10 10 - 20 mmol/L    Urea Nitrogen 11 6 - 23 mg/dL    Creatinine 0.85 0.50 - 1.05 mg/dL    eGFR 81 >60 mL/min/1.73m*2    Calcium 7.2 (L) 8.6 - 10.3 mg/dL    Phosphorus 3.9 2.5 - 4.9 mg/dL    Albumin 2.3 (L) 3.4 - 5.0 g/dL   CBC   Result Value Ref Range    WBC 20.5 (H) 4.4 - 11.3 x10*3/uL    nRBC 0.0 0.0 - 0.0 /100 WBCs    RBC 3.18 (L) 4.00 - 5.20 x10*6/uL    Hemoglobin 9.6 (L) 12.0 - 16.0 g/dL    Hematocrit 29.8 (L) 36.0 - 46.0 %    MCV 94 80 - 100 fL    MCH 30.2 26.0 - 34.0 pg    MCHC 32.2 32.0 - 36.0 g/dL    " RDW 14.9 (H) 11.5 - 14.5 %    Platelets 475 (H) 150 - 450 x10*3/uL   Heparin Assay, UFH   Result Value Ref Range    Heparin Unfractionated 1.0 See Comment Below for Therapeutic Ranges IU/mL   CBC   Result Value Ref Range    WBC 19.0 (H) 4.4 - 11.3 x10*3/uL    nRBC 0.0 0.0 - 0.0 /100 WBCs    RBC 3.18 (L) 4.00 - 5.20 x10*6/uL    Hemoglobin 9.4 (L) 12.0 - 16.0 g/dL    Hematocrit 29.8 (L) 36.0 - 46.0 %    MCV 94 80 - 100 fL    MCH 29.6 26.0 - 34.0 pg    MCHC 31.5 (L) 32.0 - 36.0 g/dL    RDW 15.0 (H) 11.5 - 14.5 %    Platelets 461 (H) 150 - 450 x10*3/uL   Heparin Assay, UFH   Result Value Ref Range    Heparin Unfractionated 0.9 See Comment Below for Therapeutic Ranges IU/mL   Heparin Assay   Result Value Ref Range    Heparin Unfractionated 0.7 See Comment Below for Therapeutic Ranges IU/mL   Heparin Assay   Result Value Ref Range    Heparin Unfractionated 0.6 See Comment Below for Therapeutic Ranges IU/mL   CT angio lower extremity right w and or wo IV contrast  Result Date: 6/15/2025  Interpreted By:  Andrei Joseph, STUDY: CT ANGIO LOWER EXTREMITY RIGHT W AND OR WO IV CONTRAST; ;  6/15/2025 4:42 pm   CT ANGIOGRAM LOWER EXTREMITIES WITH CONTRAST   INDICATION: Signs/Symptoms:rule out abscess open wound. 54-year-old woman with wound.     COMPARISON: CT abdomen pelvis 01/22/2025   ACCESSION NUMBER(S): [id number]   ORDERING CLINICIAN: CHELSY COOL   TECHNIQUE: Contiguous acquired helical axial images were obtained of the right lower extremity after the uneventful administration of 90 mL Omnipaque 350. Coronal and sagittal multiplanar reformatted maximum intensity projection images were obtained. 3D volumetric surface rendered representation of arterial structures was performed on a separate workstation and submitted for interpretation.   FINDINGS: Angiographic:   The partially the partially imaged left common artery, as well as the left internal and external iliac arteries are patent. The left common femoral and  profunda femoris arteries are patent. The left superficial femoral artery is patent along the entirety of the imaged left thigh.   The right common, internal common external iliac arteries, right common femoral artery profunda femoris artery are patent. There is right superficial femoral and popliteal arterial patency. There is probable 3 arterial vessel runoff to the level of the left ankle.   Contrast timing is suboptimal for the evaluation of venous structures. Large iliac veins of the pelvis are grossly unremarkable.       Nonangiographic:   The partially imaged pelvis shows nondilated loops of bowel. The bladder is decompressed. No free fluid, pneumatosis, or pneumoperitoneum. There is a right inguinal lymph nodes which are likely reactive. There is rather diffuse skin thickening and subcutaneous edema along the length of the entire right lower extremity which is worse from the right popliteal fossa distally. Superficial varicosities along the length of the right lower extremity are present. CT angiography provides limited evaluation lower extremity soft tissue structures. Within these limitations, no drainable subcutaneous fluid collection. No acute osseous abnormality.       1. Femoropopliteal arterial patency with patency of 3 arterial vessel runoff to the level of the right ankle. Left lower extremity iliofemoral patency as partially imaged to the distal left forearm. 2. Extensive right lower extremity skin thickening and subcutaneous edema, more prominent in the lower leg. Limited evaluation of deep soft tissue structures. No large subcutaneous drainable fluid collection.   MACRO: None   Signed by: Andrei Joseph 6/15/2025 5:30 PM Dictation workstation:   [id number]       Assessment/Plan      Right lower extremity ulceration/infection  Plan for OR debridement tomorrow.   IV antibiotics per ID>  ELEVATE RIGHT LOWER LEG  Will follow.  NPO after midnight.    I spent 30 minutes in the professional and  overall care of this patient.               [1] No family history on file.

## 2025-06-16 NOTE — PROGRESS NOTES
Occupational Therapy                 Therapy Communication Note    Patient Name: Frances Mcdowell  MRN: [id number]  Department: [address]  Room: [address] [place], [place] [address]  Today's Date: 6/16/2025     Discipline: Occupational Therapy    Missed Visit: OT Missed Visit: Yes     Missed Visit Reason: Missed Visit Reason: Patient refused (Pt refusing 2/2 10/10 pain in RLE. Pt tearful and politely declining OOB mobility at this time. Will follow up per pt status and as schedule allows.)    Missed Time: Attempt 1038

## 2025-06-17 ENCOUNTER — ANESTHESIA (OUTPATIENT)
Dept: OPERATING ROOM | Facility: HOSPITAL | Age: 55
End: 2025-06-17
Payer: COMMERCIAL

## 2025-06-17 ENCOUNTER — ANESTHESIA EVENT (OUTPATIENT)
Dept: OPERATING ROOM | Facility: HOSPITAL | Age: 55
End: 2025-06-17
Payer: COMMERCIAL

## 2025-06-17 ENCOUNTER — SURGERY (OUTPATIENT)
Age: 55
End: 2025-06-17
Payer: COMMERCIAL

## 2025-06-17 LAB
ALBUMIN SERPL BCP-MCNC: 2.3 G/DL (ref 3.4–5)
ANION GAP SERPL CALC-SCNC: 11 MMOL/L (ref 10–20)
BUN SERPL-MCNC: 13 MG/DL (ref 6–23)
CALCIUM SERPL-MCNC: 7.7 MG/DL (ref 8.6–10.3)
CHLORIDE SERPL-SCNC: 99 MMOL/L (ref 98–107)
CO2 SERPL-SCNC: 27 MMOL/L (ref 21–32)
CREAT SERPL-MCNC: 0.9 MG/DL (ref 0.5–1.05)
EGFRCR SERPLBLD CKD-EPI 2021: 75 ML/MIN/1.73M*2
ERYTHROCYTE [DISTWIDTH] IN BLOOD BY AUTOMATED COUNT: 15.3 % (ref 11.5–14.5)
GLUCOSE SERPL-MCNC: 97 MG/DL (ref 74–99)
HCT VFR BLD AUTO: 29.3 % (ref 36–46)
HGB BLD-MCNC: 9.3 G/DL (ref 12–16)
MAGNESIUM SERPL-MCNC: 1.73 MG/DL (ref 1.6–2.4)
MCH RBC QN AUTO: 29.6 PG (ref 26–34)
MCHC RBC AUTO-ENTMCNC: 31.7 G/DL (ref 32–36)
MCV RBC AUTO: 93 FL (ref 80–100)
NRBC BLD-RTO: 0 /100 WBCS (ref 0–0)
PHOSPHATE SERPL-MCNC: 3.7 MG/DL (ref 2.5–4.9)
PLATELET # BLD AUTO: 518 X10*3/UL (ref 150–450)
POTASSIUM SERPL-SCNC: 3.6 MMOL/L (ref 3.5–5.3)
RBC # BLD AUTO: 3.14 X10*6/UL (ref 4–5.2)
SODIUM SERPL-SCNC: 133 MMOL/L (ref 136–145)
UFH PPP CHRO-ACNC: 0.2 IU/ML (ref ?–1.1)
UFH PPP CHRO-ACNC: 0.5 IU/ML (ref ?–1.1)
UFH PPP CHRO-ACNC: 0.6 IU/ML (ref ?–1.1)
UFH PPP CHRO-ACNC: 0.6 IU/ML (ref ?–1.1)
VANCOMYCIN SERPL-MCNC: 24.7 UG/ML (ref 5–20)
WBC # BLD AUTO: 19.3 X10*3/UL (ref 4.4–11.3)

## 2025-06-17 PROCEDURE — 2500000004 HC RX 250 GENERAL PHARMACY W/ HCPCS (ALT 636 FOR OP/ED)

## 2025-06-17 PROCEDURE — 83735 ASSAY OF MAGNESIUM: CPT

## 2025-06-17 PROCEDURE — 2500000001 HC RX 250 WO HCPCS SELF ADMINISTERED DRUGS (ALT 637 FOR MEDICARE OP): Performed by: PODIATRIST

## 2025-06-17 PROCEDURE — 85027 COMPLETE CBC AUTOMATED: CPT

## 2025-06-17 PROCEDURE — 2720000007 HC OR 272 NO HCPCS: Performed by: PODIATRIST

## 2025-06-17 PROCEDURE — 2500000002 HC RX 250 W HCPCS SELF ADMINISTERED DRUGS (ALT 637 FOR MEDICARE OP, ALT 636 FOR OP/ED)

## 2025-06-17 PROCEDURE — 0JBP0ZZ EXCISION OF LEFT LOWER LEG SUBCUTANEOUS TISSUE AND FASCIA, OPEN APPROACH: ICD-10-PCS | Performed by: PODIATRIST

## 2025-06-17 PROCEDURE — 2500000004 HC RX 250 GENERAL PHARMACY W/ HCPCS (ALT 636 FOR OP/ED): Mod: JZ

## 2025-06-17 PROCEDURE — 2500000001 HC RX 250 WO HCPCS SELF ADMINISTERED DRUGS (ALT 637 FOR MEDICARE OP)

## 2025-06-17 PROCEDURE — 7100000001 HC RECOVERY ROOM TIME - INITIAL BASE CHARGE: Performed by: PODIATRIST

## 2025-06-17 PROCEDURE — 2500000004 HC RX 250 GENERAL PHARMACY W/ HCPCS (ALT 636 FOR OP/ED): Performed by: PODIATRIST

## 2025-06-17 PROCEDURE — 2500000004 HC RX 250 GENERAL PHARMACY W/ HCPCS (ALT 636 FOR OP/ED): Performed by: ANESTHESIOLOGY

## 2025-06-17 PROCEDURE — 87186 SC STD MICRODIL/AGAR DIL: CPT | Mod: GEALAB | Performed by: PODIATRIST

## 2025-06-17 PROCEDURE — 2500000004 HC RX 250 GENERAL PHARMACY W/ HCPCS (ALT 636 FOR OP/ED): Performed by: STUDENT IN AN ORGANIZED HEALTH CARE EDUCATION/TRAINING PROGRAM

## 2025-06-17 PROCEDURE — 80202 ASSAY OF VANCOMYCIN: CPT

## 2025-06-17 PROCEDURE — 85520 HEPARIN ASSAY: CPT | Performed by: STUDENT IN AN ORGANIZED HEALTH CARE EDUCATION/TRAINING PROGRAM

## 2025-06-17 PROCEDURE — A27603 PR DRAIN LOWER LEG DEEP ABSC/HEMATOMA: Performed by: NURSE ANESTHETIST, CERTIFIED REGISTERED

## 2025-06-17 PROCEDURE — 36415 COLL VENOUS BLD VENIPUNCTURE: CPT

## 2025-06-17 PROCEDURE — 3600000007 HC OR TIME - EACH INCREMENTAL 1 MINUTE - PROCEDURE LEVEL TWO: Performed by: PODIATRIST

## 2025-06-17 PROCEDURE — 85520 HEPARIN ASSAY: CPT | Performed by: INTERNAL MEDICINE

## 2025-06-17 PROCEDURE — 3700000002 HC GENERAL ANESTHESIA TIME - EACH INCREMENTAL 1 MINUTE: Performed by: PODIATRIST

## 2025-06-17 PROCEDURE — 80069 RENAL FUNCTION PANEL: CPT

## 2025-06-17 PROCEDURE — 99232 SBSQ HOSP IP/OBS MODERATE 35: CPT

## 2025-06-17 PROCEDURE — 0JBN0ZZ EXCISION OF RIGHT LOWER LEG SUBCUTANEOUS TISSUE AND FASCIA, OPEN APPROACH: ICD-10-PCS | Performed by: PODIATRIST

## 2025-06-17 PROCEDURE — 7100000002 HC RECOVERY ROOM TIME - EACH INCREMENTAL 1 MINUTE: Performed by: PODIATRIST

## 2025-06-17 PROCEDURE — 85520 HEPARIN ASSAY: CPT

## 2025-06-17 PROCEDURE — 36415 COLL VENOUS BLD VENIPUNCTURE: CPT | Performed by: STUDENT IN AN ORGANIZED HEALTH CARE EDUCATION/TRAINING PROGRAM

## 2025-06-17 PROCEDURE — 3600000002 HC OR TIME - INITIAL BASE CHARGE - PROCEDURE LEVEL TWO: Performed by: PODIATRIST

## 2025-06-17 PROCEDURE — A27603 PR DRAIN LOWER LEG DEEP ABSC/HEMATOMA: Performed by: ANESTHESIOLOGY

## 2025-06-17 PROCEDURE — 1200000002 HC GENERAL ROOM WITH TELEMETRY DAILY

## 2025-06-17 PROCEDURE — 3700000001 HC GENERAL ANESTHESIA TIME - INITIAL BASE CHARGE: Performed by: PODIATRIST

## 2025-06-17 RX ORDER — SODIUM CHLORIDE, SODIUM LACTATE, POTASSIUM CHLORIDE, CALCIUM CHLORIDE 600; 310; 30; 20 MG/100ML; MG/100ML; MG/100ML; MG/100ML
100 INJECTION, SOLUTION INTRAVENOUS CONTINUOUS
Status: ACTIVE | OUTPATIENT
Start: 2025-06-17 | End: 2025-06-17

## 2025-06-17 RX ORDER — PROPOFOL 10 MG/ML
INJECTION, EMULSION INTRAVENOUS CONTINUOUS PRN
Status: DISCONTINUED | OUTPATIENT
Start: 2025-06-17 | End: 2025-06-17

## 2025-06-17 RX ORDER — LIDOCAINE HYDROCHLORIDE 10 MG/ML
INJECTION, SOLUTION EPIDURAL; INFILTRATION; INTRACAUDAL; PERINEURAL
Status: COMPLETED
Start: 2025-06-17 | End: 2025-06-17

## 2025-06-17 RX ORDER — MIDAZOLAM HYDROCHLORIDE 1 MG/ML
INJECTION, SOLUTION INTRAMUSCULAR; INTRAVENOUS AS NEEDED
Status: DISCONTINUED | OUTPATIENT
Start: 2025-06-17 | End: 2025-06-17

## 2025-06-17 RX ORDER — LIDOCAINE HYDROCHLORIDE 10 MG/ML
1 INJECTION, SOLUTION EPIDURAL; INFILTRATION; INTRACAUDAL; PERINEURAL ONCE
Status: COMPLETED | OUTPATIENT
Start: 2025-06-17 | End: 2025-06-17

## 2025-06-17 RX ORDER — LIDOCAINE HYDROCHLORIDE 10 MG/ML
INJECTION, SOLUTION EPIDURAL; INFILTRATION; INTRACAUDAL; PERINEURAL AS NEEDED
Status: DISCONTINUED | OUTPATIENT
Start: 2025-06-17 | End: 2025-06-17

## 2025-06-17 RX ORDER — OXYCODONE HYDROCHLORIDE 10 MG/1
10 TABLET ORAL EVERY 4 HOURS PRN
Refills: 0 | Status: DISCONTINUED | OUTPATIENT
Start: 2025-06-17 | End: 2025-06-18

## 2025-06-17 RX ORDER — ALBUTEROL SULFATE 0.83 MG/ML
2.5 SOLUTION RESPIRATORY (INHALATION) ONCE AS NEEDED
Status: DISCONTINUED | OUTPATIENT
Start: 2025-06-17 | End: 2025-06-17 | Stop reason: SDUPTHER

## 2025-06-17 RX ORDER — ONDANSETRON HYDROCHLORIDE 2 MG/ML
INJECTION, SOLUTION INTRAVENOUS AS NEEDED
Status: DISCONTINUED | OUTPATIENT
Start: 2025-06-17 | End: 2025-06-17

## 2025-06-17 RX ORDER — DEXMEDETOMIDINE IN 0.9 % NACL 20 MCG/5ML
SYRINGE (ML) INTRAVENOUS AS NEEDED
Status: DISCONTINUED | OUTPATIENT
Start: 2025-06-17 | End: 2025-06-17

## 2025-06-17 RX ORDER — BUPIVACAINE HYDROCHLORIDE 5 MG/ML
INJECTION, SOLUTION EPIDURAL; INTRACAUDAL; PERINEURAL AS NEEDED
Status: DISCONTINUED | OUTPATIENT
Start: 2025-06-17 | End: 2025-06-17 | Stop reason: HOSPADM

## 2025-06-17 RX ORDER — OXYCODONE HYDROCHLORIDE 5 MG/1
5 TABLET ORAL EVERY 4 HOURS PRN
Refills: 0 | Status: DISCONTINUED | OUTPATIENT
Start: 2025-06-17 | End: 2025-06-18

## 2025-06-17 RX ORDER — FENTANYL CITRATE 50 UG/ML
INJECTION, SOLUTION INTRAMUSCULAR; INTRAVENOUS AS NEEDED
Status: DISCONTINUED | OUTPATIENT
Start: 2025-06-17 | End: 2025-06-17

## 2025-06-17 RX ORDER — ONDANSETRON HYDROCHLORIDE 2 MG/ML
8 INJECTION, SOLUTION INTRAVENOUS ONCE
Status: DISCONTINUED | OUTPATIENT
Start: 2025-06-17 | End: 2025-06-17 | Stop reason: HOSPADM

## 2025-06-17 RX ORDER — ACETAMINOPHEN 325 MG/1
650 TABLET ORAL EVERY 4 HOURS PRN
Status: DISCONTINUED | OUTPATIENT
Start: 2025-06-17 | End: 2025-06-17 | Stop reason: HOSPADM

## 2025-06-17 RX ORDER — ONDANSETRON HYDROCHLORIDE 2 MG/ML
8 INJECTION, SOLUTION INTRAVENOUS ONCE
Status: DISCONTINUED | OUTPATIENT
Start: 2025-06-17 | End: 2025-06-17 | Stop reason: SDUPTHER

## 2025-06-17 RX ORDER — SODIUM CHLORIDE, SODIUM LACTATE, POTASSIUM CHLORIDE, CALCIUM CHLORIDE 600; 310; 30; 20 MG/100ML; MG/100ML; MG/100ML; MG/100ML
100 INJECTION, SOLUTION INTRAVENOUS CONTINUOUS
Status: CANCELLED | OUTPATIENT
Start: 2025-06-17 | End: 2025-06-17

## 2025-06-17 RX ORDER — ACETAMINOPHEN 325 MG/1
650 TABLET ORAL EVERY 4 HOURS PRN
Status: DISCONTINUED | OUTPATIENT
Start: 2025-06-17 | End: 2025-06-17 | Stop reason: SDUPTHER

## 2025-06-17 RX ORDER — ALBUTEROL SULFATE 0.83 MG/ML
2.5 SOLUTION RESPIRATORY (INHALATION) ONCE AS NEEDED
Status: DISCONTINUED | OUTPATIENT
Start: 2025-06-17 | End: 2025-06-17 | Stop reason: HOSPADM

## 2025-06-17 RX ADMIN — FENTANYL CITRATE 25 MCG: 50 INJECTION, SOLUTION INTRAMUSCULAR; INTRAVENOUS at 15:06

## 2025-06-17 RX ADMIN — VANCOMYCIN HYDROCHLORIDE 1250 MG: 1.25 INJECTION, POWDER, LYOPHILIZED, FOR SOLUTION INTRAVENOUS at 23:21

## 2025-06-17 RX ADMIN — PANTOPRAZOLE SODIUM 40 MG: 40 TABLET, DELAYED RELEASE ORAL at 06:29

## 2025-06-17 RX ADMIN — MIDAZOLAM 2 MG: 1 INJECTION INTRAMUSCULAR; INTRAVENOUS at 14:59

## 2025-06-17 RX ADMIN — HYDROMORPHONE HYDROCHLORIDE 0.4 MG: 1 INJECTION, SOLUTION INTRAMUSCULAR; INTRAVENOUS; SUBCUTANEOUS at 00:18

## 2025-06-17 RX ADMIN — ACETAMINOPHEN 975 MG: 325 TABLET, FILM COATED ORAL at 03:11

## 2025-06-17 RX ADMIN — OXYCODONE HYDROCHLORIDE 10 MG: 10 TABLET ORAL at 17:00

## 2025-06-17 RX ADMIN — HYDROMORPHONE HYDROCHLORIDE 0.5 MG: 1 INJECTION, SOLUTION INTRAMUSCULAR; INTRAVENOUS; SUBCUTANEOUS at 18:46

## 2025-06-17 RX ADMIN — OXYCODONE HYDROCHLORIDE 10 MG: 10 TABLET ORAL at 03:37

## 2025-06-17 RX ADMIN — HYDROMORPHONE HYDROCHLORIDE 0.25 MG: 1 INJECTION, SOLUTION INTRAMUSCULAR; INTRAVENOUS; SUBCUTANEOUS at 16:06

## 2025-06-17 RX ADMIN — BUPIVACAINE HYDROCHLORIDE 5 ML: 5 INJECTION, SOLUTION PERINEURAL at 15:31

## 2025-06-17 RX ADMIN — SODIUM CHLORIDE, SODIUM LACTATE, POTASSIUM CHLORIDE, AND CALCIUM CHLORIDE 100 ML/HR: .6; .31; .03; .02 INJECTION, SOLUTION INTRAVENOUS at 14:55

## 2025-06-17 RX ADMIN — ONDANSETRON 4 MG: 2 INJECTION, SOLUTION INTRAMUSCULAR; INTRAVENOUS at 15:18

## 2025-06-17 RX ADMIN — PROPOFOL 20 MG: 10 INJECTION, EMULSION INTRAVENOUS at 15:28

## 2025-06-17 RX ADMIN — LIDOCAINE HYDROCHLORIDE 40 MG: 10 INJECTION, SOLUTION EPIDURAL; INFILTRATION; INTRACAUDAL; PERINEURAL at 15:10

## 2025-06-17 RX ADMIN — FENTANYL CITRATE 25 MCG: 50 INJECTION, SOLUTION INTRAMUSCULAR; INTRAVENOUS at 15:11

## 2025-06-17 RX ADMIN — ACETAMINOPHEN 975 MG: 325 TABLET, FILM COATED ORAL at 22:12

## 2025-06-17 RX ADMIN — Medication 125 MCG: at 09:23

## 2025-06-17 RX ADMIN — PROPOFOL 140 MCG/KG/MIN: 10 INJECTION, EMULSION INTRAVENOUS at 15:11

## 2025-06-17 RX ADMIN — PROPOFOL 30 MG: 10 INJECTION, EMULSION INTRAVENOUS at 15:32

## 2025-06-17 RX ADMIN — HEPARIN SODIUM 2300 UNITS/HR: 10000 INJECTION, SOLUTION INTRAVENOUS at 22:28

## 2025-06-17 RX ADMIN — LIDOCAINE HYDROCHLORIDE 10 MG: 10 INJECTION, SOLUTION EPIDURAL; INFILTRATION; INTRACAUDAL; PERINEURAL at 23:15

## 2025-06-17 RX ADMIN — FENTANYL CITRATE 25 MCG: 50 INJECTION, SOLUTION INTRAMUSCULAR; INTRAVENOUS at 15:27

## 2025-06-17 RX ADMIN — VANCOMYCIN HYDROCHLORIDE 1250 MG: 1.25 INJECTION, POWDER, LYOPHILIZED, FOR SOLUTION INTRAVENOUS at 10:00

## 2025-06-17 RX ADMIN — MIDAZOLAM 2 MG: 1 INJECTION INTRAMUSCULAR; INTRAVENOUS at 15:04

## 2025-06-17 RX ADMIN — Medication 8 MCG: at 15:08

## 2025-06-17 RX ADMIN — HYDROMORPHONE HYDROCHLORIDE 0.4 MG: 1 INJECTION, SOLUTION INTRAMUSCULAR; INTRAVENOUS; SUBCUTANEOUS at 09:33

## 2025-06-17 RX ADMIN — LEVOTHYROXINE SODIUM 100 MCG: 100 TABLET ORAL at 06:29

## 2025-06-17 RX ADMIN — ROSUVASTATIN CALCIUM 20 MG: 20 TABLET, FILM COATED ORAL at 09:23

## 2025-06-17 RX ADMIN — FENTANYL CITRATE 25 MCG: 50 INJECTION, SOLUTION INTRAMUSCULAR; INTRAVENOUS at 15:24

## 2025-06-17 RX ADMIN — BUPIVACAINE HYDROCHLORIDE 10 ML: 5 INJECTION, SOLUTION PERINEURAL at 15:30

## 2025-06-17 RX ADMIN — OXYCODONE HYDROCHLORIDE 10 MG: 10 TABLET ORAL at 23:11

## 2025-06-17 RX ADMIN — HEPARIN SODIUM 2000 UNITS/HR: 10000 INJECTION, SOLUTION INTRAVENOUS at 03:38

## 2025-06-17 RX ADMIN — BUPIVACAINE HYDROCHLORIDE 4 ML: 5 INJECTION, SOLUTION EPIDURAL; INTRACAUDAL; PERINEURAL at 15:36

## 2025-06-17 RX ADMIN — PROPOFOL 30 MG: 10 INJECTION, EMULSION INTRAVENOUS at 15:30

## 2025-06-17 RX ADMIN — ACETAMINOPHEN 975 MG: 325 TABLET, FILM COATED ORAL at 09:23

## 2025-06-17 RX ADMIN — PROPOFOL 40 MCG/KG/MIN: 10 INJECTION, EMULSION INTRAVENOUS at 15:10

## 2025-06-17 RX ADMIN — NYSTATIN: 100000 POWDER TOPICAL at 09:22

## 2025-06-17 RX ADMIN — SODIUM CHLORIDE, POTASSIUM CHLORIDE, SODIUM LACTATE AND CALCIUM CHLORIDE: 600; 310; 30; 20 INJECTION, SOLUTION INTRAVENOUS at 15:05

## 2025-06-17 RX ADMIN — PROPOFOL 30 MG: 10 INJECTION, EMULSION INTRAVENOUS at 15:36

## 2025-06-17 RX ADMIN — Medication 12 MCG: at 15:16

## 2025-06-17 SDOH — HEALTH STABILITY: MENTAL HEALTH: CURRENT SMOKER: 0

## 2025-06-17 ASSESSMENT — PAIN DESCRIPTION - DESCRIPTORS
DESCRIPTORS: BURNING;STABBING
DESCRIPTORS: BURNING;STABBING

## 2025-06-17 ASSESSMENT — PAIN DESCRIPTION - ORIENTATION
ORIENTATION: RIGHT

## 2025-06-17 ASSESSMENT — PAIN SCALES - GENERAL
PAINLEVEL_OUTOF10: 10 - WORST POSSIBLE PAIN
PAINLEVEL_OUTOF10: 8
PAINLEVEL_OUTOF10: 0 - NO PAIN
PAINLEVEL_OUTOF10: 0 - NO PAIN
PAIN_LEVEL: 2
PAINLEVEL_OUTOF10: 7
PAINLEVEL_OUTOF10: 10 - WORST POSSIBLE PAIN
PAINLEVEL_OUTOF10: 5 - MODERATE PAIN
PAINLEVEL_OUTOF10: 10 - WORST POSSIBLE PAIN

## 2025-06-17 ASSESSMENT — PAIN DESCRIPTION - LOCATION
LOCATION: LEG

## 2025-06-17 ASSESSMENT — COGNITIVE AND FUNCTIONAL STATUS - GENERAL
DRESSING REGULAR LOWER BODY CLOTHING: A LITTLE
MOBILITY SCORE: 21
CLIMB 3 TO 5 STEPS WITH RAILING: A LOT
DAILY ACTIVITIY SCORE: 23
WALKING IN HOSPITAL ROOM: A LITTLE

## 2025-06-17 NOTE — ANESTHESIA PREPROCEDURE EVALUATION
Patient: Frances Mcdowell    Procedure Information       Anesthesia Start Date/Time: 25 1505    Procedure: DEBRIDEMENT, WOUND (Bilateral: Leg Lower) - B/L lower leg ulceration debridement  RIGHT POSTERIOR CALF WOUND 12 X 12 X 2    Location: [facility] OR 04 / [facility]    Surgeons: Mami Tang DPM            Relevant Problems   Pulmonary   (+) Pulmonary embolism      Neuro   (+) Anxiety disorder   (+) Chronic migraine   (+) New daily persistent headache      /Renal   (+) Nephrolithiasis   (+) UTI (urinary tract infection)      Endocrine   (+) Hypothyroidism (acquired)   (+) Morbid obesity (Multi)      HEENT   (+) Acute pansinusitis   (+) Sinus infection      ID   (+) Acute URI   (+) Fungal dermatitis   (+) UTI (urinary tract infection)   (+) Yeast infection      Skin   (+) Rash       Clinical information reviewed:   Tobacco  Allergies  Meds  Problems  Med Hx  Surg Hx   Fam Hx  Soc   Hx        NPO Detail:  NPO/Void Status  Date of Last Liquid: 25  Time of Last Liquid: 0900  Date of Last Solid: 25  Time of Last Solid: 1800  Last Intake Type: Clear fluids         Physical Exam    Airway  Mallampati: II  TM distance: >3 FB  Mouth openin finger widths     Cardiovascular - normal exam   Dental    Pulmonary - normal exam   Abdominal (+) obese             Anesthesia Plan    History of general anesthesia?: yes  History of complications of general anesthesia?: no    ASA 3     general     The patient is not a current smoker.    intravenous induction   Anesthetic plan and risks discussed with patient.    Plan discussed with CRNA and attending.

## 2025-06-17 NOTE — PROGRESS NOTES
Frances Mcdowell is a 54 y.o. female on day 2 of admission presenting with Failure of outpatient treatment.    Subjective   Interval History: no fever, no new complaints        Review of Systems    Objective   Range of Vitals (last 24 hours)  Heart Rate:  []   Temp:  [36.1 °C (97 °F)-36.7 °C (98.1 °F)]   Resp:  [15-20]   BP: (101-133)/(65-77)   SpO2:  [94 %-98 %]   Daily Weight  06/15/25 : 139 kg (306 lb 1.6 oz)    Body mass index is 54.22 kg/m².    Physical Exam  Constitutional:       Appearance: Normal appearance.   HENT:      Head: Normocephalic and atraumatic.      Mouth/Throat:      Mouth: Mucous membranes are moist.      Pharynx: Oropharynx is clear.   Eyes:      Pupils: Pupils are equal, round, and reactive to light.   Cardiovascular:      Rate and Rhythm: Normal rate and regular rhythm.      Heart sounds: Normal heart sounds.   Pulmonary:      Effort: Pulmonary effort is normal.      Breath sounds: Normal breath sounds.   Abdominal:      General: Abdomen is flat. Bowel sounds are normal.      Palpations: Abdomen is soft.   Musculoskeletal:         General: Swelling present.      Cervical back: Normal range of motion.      Comments: RT below the knee redness, wound   Neurological:      Mental Status: She is alert.         Antibiotics  vancomycin - 1250 mg/250 mL    Relevant Results  Labs  Results from last 72 hours   Lab Units 06/17/25  0643 06/16/25  0940 06/16/25  0528 06/15/25  1534   WBC AUTO x10*3/uL 19.3* 19.0* 20.5* 20.5*   HEMOGLOBIN g/dL 9.3* 9.4* 9.6* 9.9*   HEMATOCRIT % 29.3* 29.8* 29.8* 31.1*   PLATELETS AUTO x10*3/uL 518* 461* 475* 459*   NEUTROS PCT AUTO %  --   --   --  84.0   LYMPHS PCT AUTO %  --   --   --  7.4   MONOS PCT AUTO %  --   --   --  6.4   EOS PCT AUTO %  --   --   --  0.4     Results from last 72 hours   Lab Units 06/17/25  0643 06/16/25  0528 06/15/25  1534   SODIUM mmol/L 133* 133* 136   POTASSIUM mmol/L 3.6 3.3* 3.5   CHLORIDE mmol/L 99 101 98   CO2 mmol/L 27 25 29   BUN  mg/dL 13 11 11   CREATININE mg/dL 0.90 0.85 0.65   GLUCOSE mg/dL 97 89 99   CALCIUM mg/dL 7.7* 7.2* 7.9*   ANION GAP mmol/L 11 10 13   EGFR mL/min/1.73m*2 75 81 >90   PHOSPHORUS mg/dL 3.7 3.9  --      Results from last 72 hours   Lab Units 06/17/25  0643 06/16/25  0528 06/15/25  1534   ALK PHOS U/L  --   --  124*   BILIRUBIN TOTAL mg/dL  --   --  0.6   PROTEIN TOTAL g/dL  --   --  6.7   ALT U/L  --   --  8   AST U/L  --   --  12   ALBUMIN g/dL 2.3* 2.3* 2.5*     Estimated Creatinine Clearance: 95.9 mL/min (by C-G formula based on SCr of 0.9 mg/dL).  C-Reactive Protein   Date Value Ref Range Status   06/15/2025 22.53 (H) <1.00 mg/dL Final   08/06/2024 23.21 (H) <1.00 mg/dL Final     Microbiology  Reviewed  Imaging  Reviewed          Assessment/Plan     Legs stasis / wounds / possible Rt leg cellulitis, for debridement     Recommendations :  Continue Vancomycin     I spent minutes in the professional and overall care of this patient.          Miguel Hu MD

## 2025-06-17 NOTE — ANESTHESIA POSTPROCEDURE EVALUATION
Patient: Frances Mcdowell    Procedure Summary       Date: 06/17/25 Room / Location: [facility] OR [facility] / [facility]    Anesthesia Start: 1505 Anesthesia Stop: 1553    Procedure: DEBRIDEMENT, WOUND (Bilateral: Leg Lower) Diagnosis:       Non-pressure chronic ulcer of left calf with fat layer exposed (Multi)      Non-pressure chronic ulcer of right calf with fat layer exposed (Multi)      (Non-pressure chronic ulcer of left calf with fat layer exposed (Multi) [L97.222])      (Non-pressure chronic ulcer of right calf with fat layer exposed (Multi) [L97.212])    Surgeons: Mami Tang DPM Responsible Provider: Montez Canchola MD    Anesthesia Type: MAC ASA Status: 3            Anesthesia Type: MAC    Vitals Value Taken Time   BP  06/17/25 16:09   Temp  06/17/25 16:09   Pulse  06/17/25 16:09   Resp  06/17/25 16:09   SpO2  06/17/25 16:09       Anesthesia Post Evaluation    Patient location during evaluation: PACU  Patient participation: complete - patient participated  Level of consciousness: awake  Pain score: 2  Pain management: adequate  Multimodal analgesia pain management approach  Airway patency: patent  Two or more strategies used to mitigate risk of obstructive sleep apnea  Cardiovascular status: acceptable  Respiratory status: acceptable  Hydration status: acceptable  Postoperative Nausea and Vomiting: none        There were no known notable events for this encounter.

## 2025-06-17 NOTE — PROGRESS NOTES
Occupational Therapy                 Therapy Communication Note    Patient Name: Frances Mcdowell  MRN: [id number]  Department: [address]  Room: [address] [place], [place] [address]  Today's Date: 6/17/2025     Discipline: Occupational Therapy    Missed Visit:   yes @ 0910    Missed Visit Reason:  Per EMR, pt anticipates RLE wound debridement with podiatry this afternoon. Pt is in significant pain currently, will defer therapy evaluations until appropriate after this procedure. Discussed with PT and RN    Missed Time: Attempt

## 2025-06-17 NOTE — BRIEF OP NOTE
Date: 6/15/2025 - 2025  OR Location: [facility] OR    Name: Frances Mcdowell, : 1970, Age: 54 y.o., MRN: [id number], Sex: female    Diagnosis  Pre-op Diagnosis      * Non-pressure chronic ulcer of left calf with fat layer exposed (Multi) [L97.222]     * Non-pressure chronic ulcer of right calf with fat layer exposed (Multi) [L97.212] Post-op Diagnosis     * Non-pressure chronic ulcer of left calf with fat layer exposed (Multi) [L97.222]     * Non-pressure chronic ulcer of right calf with fat layer exposed (Multi) [L97.212]     Procedures   B/L lower leg ulceration debridement  Right posterior calf I&D      Surgeons      * Mami Tang - Primary    Resident/Fellow/Other Assistant:  Surgeons and Role:  * No surgeons found with a matching role *    Staff:   Circulator: Manasa  Scrub Person: Cori  Surgical Assistant: Rolando  Surgical Assistant: Robert    Anesthesia Staff: Anesthesiologist: Montez Canchola MD  CRNA: UTE Loera-ALLIE  SRNA: Godfrey Silva    Procedure Summary  Anesthesia: Anesthesia type not filed in the log.  ASA: ASA status not filed in the log.  Estimated Blood Loss: 10 mL  Intra-op Medications:   Administrations occurring from 1450 to 1625 on 25:   Medication Name Total Dose   bupivacaine PF (Marcaine) 0.5 % (5 mg/mL) injection 4 mL   BUPivacaine HCl (Marcaine) 0.5 % (5 mg/mL) 5 mL, lidocaine (Xylocaine) 5 mL syringe 15 mL   acetaminophen (Tylenol) tablet 975 mg Cannot be calculated   dexMEDETOMidine 4 mcg/mL in NS syringe 20 mcg   fentaNYL (Sublimaze) injection 50 mcg/mL 100 mcg   LR bolus Cannot be calculated   lactated Ringer's infusion 98.33 mL   lidocaine PF (Xylocaine-MPF) local injection 1 % 40 mg   midazolam (Versed) injection 1 mg/mL 4 mg   ondansetron (Zofran) 2 mg/mL injection 4 mg   propofol (Diprivan) injection 10 mg/mL 558.32 mg              Anesthesia Record               Intraprocedure I/O Totals          Intake    LR bolus 500.00 mL    Total Intake 500 mL           Specimen:   ID Type Source Tests Collected by Time   A : RIGHT POSTERIOR CALF ABSCESS Swab ABSCESS TISSUE/WOUND CULTURE/SMEAR Mami Tang DPM 6/17/2025 6236                  Findings: as dictated    Complications:  None; patient tolerated the procedure well.     Disposition: PACU - hemodynamically stable.  Condition: stable  Specimens Collected:   ID Type Source Tests Collected by Time   A : RIGHT POSTERIOR CALF ABSCESS Swab ABSCESS TISSUE/WOUND CULTURE/SMEAR Mami Tang DPM 6/17/2025 3473     Attending Attestation: I performed the procedure.    Mami Tang  Phone Number: [phone]

## 2025-06-17 NOTE — CARE PLAN
The patient's goals for the shift include      The clinical goals for the shift include Patient will have pain management, remain safe, and rest comfortably this shift

## 2025-06-17 NOTE — PROGRESS NOTES
Vancomycin Dosing by Pharmacy- FOLLOW UP    Frances Mcdowell is a 54 y.o. year old female who Pharmacy has been consulted for vancomycin dosing for cellulitis, skin and soft tissue. Based on the patient's indication and renal status this patient is being dosed based on a goal AUC of 400-600.     Renal function is currently declining.    Current vancomycin dose: 1250 mg given every 12 hours    Estimated vancomycin AUC on current dose: >600 mg/L.hr     Visit Vitals  /72 (BP Location: Left arm, Patient Position: Lying)   Pulse 101   Temp 36.7 °C (98.1 °F) (Temporal)   Resp 16        Lab Results   Component Value Date    CREATININE 0.90 2025    CREATININE 0.85 2025    CREATININE 0.65 06/15/2025    CREATININE 1.36 (H) 2025    CREATININE 1.20 (H) 2025        Patient weight is as follows:   Vitals:    06/15/25 2046   Weight: 139 kg (306 lb 1.6 oz)       Cultures:  No results found for the encounter in last 14 days.       I/O last 3 completed shifts:  In: 4771.9 (34.4 mL/kg) [P.O.:1323; I.V.:1229.9 (8.9 mL/kg); IV Piggyback:9]  Out: 1390 (10 mL/kg) [Urine:1390 (0.3 mL/kg/hr)]  Weight: 138.8 kg   I/O during current shift:  No intake/output data recorded.    Temp (24hrs), Av.5 °C (97.7 °F), Min:36.3 °C (97.3 °F), Max:36.7 °C (98.1 °F)      Assessment/Plan    Above goal AUC. Orders placed for new vancomcyin regimen of 1250 every 12 hours to begin at 1200.    This dosing regimen is predicted by [employer] to result in the following pharmacokinetic parameters:    Loading dose: N/A  Regimen: 1250 mg IV every 12 hours.  Start time: 10:24 on 2025  Exposure target: AUC24 (range) 400-600 mg/L.hr   [id number]: 584 mg/L.hr  AUC24,ss: 577 mg/L.hr  Probability of AUC24 > 400: 100 %  Ctrough,ss: 15.8 mg/L  Probability of Ctrough,ss > 20: 21 %    The next level will be obtained on  with AM labs. May be obtained sooner if clinically indicated.   Will continue to monitor renal function daily while on  vancomycin and order serum creatinine at least every 48 hours if not already ordered.  Follow for continued vancomycin needs, clinical response, and signs/symptoms of toxicity.       Rolando Boo, PharmD, DRAGAN, BCPS  Clinical Pharmacy Specialist  Internal Medicine

## 2025-06-17 NOTE — PROGRESS NOTES
Department of Hospital Medicine  Daily Progress Note   Hospital Day: 3       Name:Frances Mcdowell, AGE: 54 y.o., GENDER: female, MRN: [id number], ROOM: 112/[facility]   CODE STATUS: Full Code  Attending Physician: Matias Salguero MD  Resident: Artur Pandya DO        Chief Complaint     Chief Complaint   Patient presents with    Leg Pain        Interval History   Frances Mcdowell is a 54 y.o. year old female patient on Hospital Day: 3      Subjective    Patient reports pain in right lower extremity.  Pain is similar to yesterday.  However, patient notes that pain increases after taking her pain medication.  She mentions that she slept off and on last night due to her pain and being in a new setting.  She is ready for a debridement today.  Meds    Scheduled medications  Scheduled Medications[1]  Continuous medications  Continuous Medications[2]  PRN medications  PRN Medications[3]     Objective    Exam   Physical Exam  Constitutional:       Appearance: Normal appearance. She is obese.   HENT:      Head: Normocephalic.      Nose: Nose normal.   Eyes:      Extraocular Movements: Extraocular movements intact.      Conjunctiva/sclera: Conjunctivae normal.      Pupils: Pupils are equal, round, and reactive to light.   Cardiovascular:      Rate and Rhythm: Normal rate and regular rhythm.      Pulses: Normal pulses.      Heart sounds: Normal heart sounds.   Pulmonary:      Effort: Pulmonary effort is normal.      Breath sounds: Normal breath sounds.   Chest:      Chest wall: Tenderness present.   Abdominal:      General: Abdomen is flat. Bowel sounds are normal.      Palpations: Abdomen is soft.   Musculoskeletal:         General: Swelling (RLE) and tenderness (RLE) present.      Comments: See media for picture of wound   Skin:     General: Skin is warm.      Findings: Lesion (RLE) present.      Comments: Dressing CDI RLE   Neurological:      General: No focal deficit present.      Mental Status: She is alert and oriented to person, place,  and time. Mental status is at baseline.   Psychiatric:         Mood and Affect: Mood normal.         Behavior: Behavior normal.         Thought Content: Thought content normal.         Judgment: Judgment normal.          Vitals         6/16/2025     3:19 PM 6/16/2025     4:10 PM 6/16/2025     7:05 PM 6/17/2025    12:17 AM 6/17/2025     3:42 AM 6/17/2025     8:15 AM 6/17/2025    12:00 PM   Vitals   Systolic 104  114 133 103 113 128   Diastolic 66  67 73 72 77 75   BP Location Left arm  Left arm Left arm Left arm Left arm Left arm   Heart Rate 89 90 94 89 101 87 88   Temp 36.3 °C (97.3 °F)  36.5 °C (97.7 °F) 36.6 °C (97.9 °F) 36.7 °C (98.1 °F) 36.1 °C (97 °F) 36.4 °C (97.5 °F)   Resp 15  17 18 16 15 20        Intake/Output Summary (Last 24 hours) at 6/17/2025 1254  Last data filed at 6/17/2025 1143  Gross per 24 hour   Intake 1819.66 ml   Output 2450 ml   Net -630.34 ml       Labs   Labs:   Results from last 72 hours   Lab Units 06/17/25  0643 06/16/25  0528 06/15/25  1534   SODIUM mmol/L 133* 133* 136   POTASSIUM mmol/L 3.6 3.3* 3.5   CHLORIDE mmol/L 99 101 98   CO2 mmol/L 27 25 29   BUN mg/dL 13 11 11   CREATININE mg/dL 0.90 0.85 0.65   GLUCOSE mg/dL 97 89 99   CALCIUM mg/dL 7.7* 7.2* 7.9*   ANION GAP mmol/L 11 10 13   EGFR mL/min/1.73m*2 75 81 >90   PHOSPHORUS mg/dL 3.7 3.9  --       Results from last 72 hours   Lab Units 06/17/25  0643 06/16/25  0940 06/16/25  0528 06/15/25  1534   WBC AUTO x10*3/uL 19.3* 19.0* 20.5* 20.5*   HEMOGLOBIN g/dL 9.3* 9.4* 9.6* 9.9*   HEMATOCRIT % 29.3* 29.8* 29.8* 31.1*   PLATELETS AUTO x10*3/uL 518* 461* 475* 459*   NEUTROS PCT AUTO %  --   --   --  84.0   LYMPHS PCT AUTO %  --   --   --  7.4   MONOS PCT AUTO %  --   --   --  6.4   EOS PCT AUTO %  --   --   --  0.4      Lab Results   Component Value Date    CALCIUM 7.7 (L) 06/17/2025    PHOS 3.7 06/17/2025      Lab Results   Component Value Date    CRP 22.53 (H) 06/15/2025      [unfilled]     Micro/ID:   Susceptibility data from  "last 90 days.  Collected Specimen Info Organism   06/15/25 Tissue/Biopsy from Wound/Tissue Mixed Gram-Positive and Gram-Negative Bacteria                    No lab exists for component: \"AGALPCRNB\"     Lab Results   Component Value Date    URINECULTURE SEE NOTE 06/05/2025    BLOODCULT No growth at 1 day 06/15/2025    BLOODCULT No growth at 1 day 06/15/2025       Images    Imaging  CT angio lower extremity right w and or wo IV contrast  Result Date: 6/15/2025  1. Femoropopliteal arterial patency with patency of 3 arterial vessel runoff to the level of the right ankle. Left lower extremity iliofemoral patency as partially imaged to the distal left forearm. 2. Extensive right lower extremity skin thickening and subcutaneous edema, more prominent in the lower leg. Limited evaluation of deep soft tissue structures. No large subcutaneous drainable fluid collection.   MACRO: None   Signed by: Andrei Joseph 6/15/2025 5:30 PM Dictation workstation:   [id number]      Cardiology, Vascular, and Other Imaging  No other imaging results found for the past 7 days      Assessment    Assessment and Plan    Frances Mcdowell is a 54 y.o. female with PMHx significant for saddle PE (on Eliquis), chronic lower extremity wounds with recurrent cellulitis (hx of pseudomonas and MRSA), lymphedema, ADD, HLD, and hypothyroidism s/p thyroidectomy admitted on 6/15/2025 for R lower extremity cellulitis in the setting of chronic lower extremity wounds and lymphedema.     ACUTE MEDICAL ISSUES:  #Bilateral lymphedema  #Chronic right leg wound with infection / #Cellulitis of right leg  #Leukocytosis  ::Hx pseudomonas and MRSA tissue cultures 10/24 and 12/24 susceptible to vancomycin and zosyn   ::WBC 20.5  ::Inflammatory markers elevated; ESR 49 and CRP 22.53  ::Photos of leg wound in chart under media   :: Wound culture shows few polymorphonuclear leukocytes and abundant mixed gram-positive and negative bacteria  PLAN:  -Blood cultures pending, " awaiting cultures from OR  -Trend WBC  -Continue vancomycin, DC zosyn (6/16) per ID  -Podiatry consulted, Debridement planned for today  -ID following  -Wound care consulted, appreciate recs  -Pain regimen: Tylenol 975 mg q6 hours scheduled, oxycodone 5 and 10 mg q6 hours PRN for moderate and severe pain, Dilaudid 0.4 mg q3 hours PRN for breakthrough pain  -HOLD Eliquis for podiatry intervention, continuing home heparin per podiatry  - NPO, resume regular diet after procedure     #Thrombocytosis  ::Platelets 459 on admission, most recent 518  ::Suspect may be hemoconcentration 2/2 reported decreased PO intake and infection  PLAN:  -Continue to monitor on daily CBC     CHRONIC MEDICAL ISSUES:  #Hx saddle PE - HOLD home Eliquis for possible podiatry intervention - started heparin gtt   #HLD - continue home rosuvastatin 20 mg daily   #ADD - hold home Adderall, patient does not want while inpatient   #Hypothyroidism - continue home levothyroxine 100 mcg daily   #Vitamin D deficiency - continue home vitamin D supplementation  #GERD - continue home omeprazole 40 mg daily         Fluids: None  Electrolytes: Replete PRN  Nutrition: NPO, regular diet after procedure   Antimicrobials: Vancomycin  DVT ppx: Heparin gtt   GI ppx: PPI  Catheter: None  Lines: PIV  Supplemental Oxygen: Room Air   Emergency Contact: Extended Emergency Contact Information  Primary Emergency Contact: SADE CANNON  Mobile Phone: [phone]  Relation: Daughter   needed? No  Secondary Emergency Contact: JOYCEMANJULA VASQUEZ  Address: [address] [place] [address] [place]  Mobile Phone: [phone]  Relation: Spouse   needed? No   Code: Full Code      Disposition: 54 year old female admitted for cellulitis in setting of chronic lower extremity wounds and lymphedema. Debridement later today  Artur Pandya DO  PGY- 1  06/17/25 at 12:54 PM                   [1] acetaminophen, 975 mg, oral,  q6h  [Held by provider] apixaban, 5 mg, oral, BID  cholecalciferol, 125 mcg, oral, Daily  heparin, 10,000 Units, intravenous, Once  levothyroxine, 100 mcg, oral, Daily  melatonin, 3 mg, oral, Nightly  nystatin, , Topical, Daily  pantoprazole, 40 mg, oral, Daily before breakfast  polyethylene glycol, 17 g, oral, Daily  rosuvastatin, 20 mg, oral, Daily  vancomycin, 1,250 mg, intravenous, q12h     [2] heparin, 0-4,500 Units/hr, Last Rate: 2,000 Units/hr (06/17/25 0703)     [3] PRN medications: heparin, HYDROmorphone, oxyCODONE, oxyCODONE, vancomycin

## 2025-06-17 NOTE — CONSULTS
Wound Care Consult     Visit Date: 6/16/2025      Patient Name: Frances Mcdowell         MRN: [id number]             Reason for Consult: wound        Assessment:      Discussed with SOLO Schrader, patient to have debridement in OR tomorrow, patient sleeping, does not want to be bothered.                   Wound Plan: Will follow in periphery. Please message if needed.      Tayler Granados RN, [facility]  6/16/2025  9:23 PM

## 2025-06-17 NOTE — PROGRESS NOTES
Physical Therapy                 Therapy Communication Note    Patient Name: Frances Mcdowell  MRN: [id number]  Department: [address]  Room: [address] [place], [place] [address]  Today's Date: 6/17/2025     Discipline: Physical Therapy    Missed Visit Reason:  Per EMR, pt anticipates RLE wound debridement with podiatry this afternoon. Pt is in significant pain currently, will defer therapy evaluations until appropriate after this procedure.     Missed Time: Attempt

## 2025-06-17 NOTE — CARE PLAN
The patient's goals for the shift include      The clinical goals for the shift include pt will have successful wound debridement    Over the shift, the patient did not make progress toward the following goals. Barriers to progression include acuteness of illness. Recommendations to address these barriers include administration of prescribed orders and communication with physicans.

## 2025-06-18 ENCOUNTER — APPOINTMENT (OUTPATIENT)
Dept: WOUND CARE | Facility: HOSPITAL | Age: 55
End: 2025-06-18
Payer: COMMERCIAL

## 2025-06-18 LAB
ALBUMIN SERPL BCP-MCNC: 2.3 G/DL (ref 3.4–5)
ANION GAP SERPL CALC-SCNC: 10 MMOL/L (ref 10–20)
BUN SERPL-MCNC: 14 MG/DL (ref 6–23)
CALCIUM SERPL-MCNC: 7.6 MG/DL (ref 8.6–10.3)
CHLORIDE SERPL-SCNC: 101 MMOL/L (ref 98–107)
CO2 SERPL-SCNC: 27 MMOL/L (ref 21–32)
CREAT SERPL-MCNC: 0.68 MG/DL (ref 0.5–1.05)
EGFRCR SERPLBLD CKD-EPI 2021: >90 ML/MIN/1.73M*2
ERYTHROCYTE [DISTWIDTH] IN BLOOD BY AUTOMATED COUNT: 15.2 % (ref 11.5–14.5)
GLUCOSE SERPL-MCNC: 100 MG/DL (ref 74–99)
HCT VFR BLD AUTO: 26.3 % (ref 36–46)
HGB BLD-MCNC: 8.3 G/DL (ref 12–16)
MAGNESIUM SERPL-MCNC: 1.76 MG/DL (ref 1.6–2.4)
MCH RBC QN AUTO: 29.4 PG (ref 26–34)
MCHC RBC AUTO-ENTMCNC: 31.6 G/DL (ref 32–36)
MCV RBC AUTO: 93 FL (ref 80–100)
NRBC BLD-RTO: 0 /100 WBCS (ref 0–0)
PHOSPHATE SERPL-MCNC: 3.6 MG/DL (ref 2.5–4.9)
PLATELET # BLD AUTO: 537 X10*3/UL (ref 150–450)
POTASSIUM SERPL-SCNC: 4 MMOL/L (ref 3.5–5.3)
RBC # BLD AUTO: 2.82 X10*6/UL (ref 4–5.2)
SODIUM SERPL-SCNC: 134 MMOL/L (ref 136–145)
UFH PPP CHRO-ACNC: 0.4 IU/ML (ref ?–1.1)
WBC # BLD AUTO: 15.7 X10*3/UL (ref 4.4–11.3)

## 2025-06-18 PROCEDURE — 2500000001 HC RX 250 WO HCPCS SELF ADMINISTERED DRUGS (ALT 637 FOR MEDICARE OP)

## 2025-06-18 PROCEDURE — 2500000002 HC RX 250 W HCPCS SELF ADMINISTERED DRUGS (ALT 637 FOR MEDICARE OP, ALT 636 FOR OP/ED): Performed by: PODIATRIST

## 2025-06-18 PROCEDURE — 36415 COLL VENOUS BLD VENIPUNCTURE: CPT | Performed by: STUDENT IN AN ORGANIZED HEALTH CARE EDUCATION/TRAINING PROGRAM

## 2025-06-18 PROCEDURE — 85027 COMPLETE CBC AUTOMATED: CPT | Performed by: PODIATRIST

## 2025-06-18 PROCEDURE — 2500000004 HC RX 250 GENERAL PHARMACY W/ HCPCS (ALT 636 FOR OP/ED): Performed by: PODIATRIST

## 2025-06-18 PROCEDURE — 80069 RENAL FUNCTION PANEL: CPT | Performed by: PODIATRIST

## 2025-06-18 PROCEDURE — 2500000001 HC RX 250 WO HCPCS SELF ADMINISTERED DRUGS (ALT 637 FOR MEDICARE OP): Performed by: PODIATRIST

## 2025-06-18 PROCEDURE — 2500000004 HC RX 250 GENERAL PHARMACY W/ HCPCS (ALT 636 FOR OP/ED)

## 2025-06-18 PROCEDURE — 85520 HEPARIN ASSAY: CPT | Performed by: STUDENT IN AN ORGANIZED HEALTH CARE EDUCATION/TRAINING PROGRAM

## 2025-06-18 PROCEDURE — 83735 ASSAY OF MAGNESIUM: CPT | Performed by: PODIATRIST

## 2025-06-18 PROCEDURE — 99232 SBSQ HOSP IP/OBS MODERATE 35: CPT

## 2025-06-18 PROCEDURE — 1200000002 HC GENERAL ROOM WITH TELEMETRY DAILY

## 2025-06-18 RX ORDER — OXYCODONE HYDROCHLORIDE 10 MG/1
10 TABLET ORAL
Refills: 0 | Status: DISCONTINUED | OUTPATIENT
Start: 2025-06-18 | End: 2025-06-22 | Stop reason: HOSPADM

## 2025-06-18 RX ORDER — OXYCODONE HYDROCHLORIDE 5 MG/1
5 TABLET ORAL
Refills: 0 | Status: DISCONTINUED | OUTPATIENT
Start: 2025-06-18 | End: 2025-06-22 | Stop reason: HOSPADM

## 2025-06-18 RX ORDER — LORAZEPAM 2 MG/ML
0.5 INJECTION INTRAMUSCULAR ONCE AS NEEDED
Status: DISCONTINUED | OUTPATIENT
Start: 2025-06-18 | End: 2025-06-20

## 2025-06-18 RX ADMIN — Medication 125 MCG: at 08:45

## 2025-06-18 RX ADMIN — OXYCODONE HYDROCHLORIDE 10 MG: 10 TABLET ORAL at 08:46

## 2025-06-18 RX ADMIN — POLYETHYLENE GLYCOL 3350 17 G: 17 POWDER, FOR SOLUTION ORAL at 08:44

## 2025-06-18 RX ADMIN — NYSTATIN: 100000 POWDER TOPICAL at 08:46

## 2025-06-18 RX ADMIN — ACETAMINOPHEN 975 MG: 325 TABLET, FILM COATED ORAL at 15:34

## 2025-06-18 RX ADMIN — SODIUM CHLORIDE, SODIUM LACTATE, POTASSIUM CHLORIDE, AND CALCIUM CHLORIDE 1000 ML: .6; .31; .03; .02 INJECTION, SOLUTION INTRAVENOUS at 12:03

## 2025-06-18 RX ADMIN — PANTOPRAZOLE SODIUM 40 MG: 40 TABLET, DELAYED RELEASE ORAL at 06:14

## 2025-06-18 RX ADMIN — OXYCODONE HYDROCHLORIDE 10 MG: 10 TABLET ORAL at 13:14

## 2025-06-18 RX ADMIN — ROSUVASTATIN CALCIUM 20 MG: 20 TABLET, FILM COATED ORAL at 08:45

## 2025-06-18 RX ADMIN — OXYCODONE HYDROCHLORIDE 10 MG: 10 TABLET ORAL at 18:12

## 2025-06-18 RX ADMIN — APIXABAN 5 MG: 5 TABLET, FILM COATED ORAL at 08:46

## 2025-06-18 RX ADMIN — ACETAMINOPHEN 975 MG: 325 TABLET, FILM COATED ORAL at 03:16

## 2025-06-18 RX ADMIN — OXYCODONE HYDROCHLORIDE 10 MG: 10 TABLET ORAL at 21:23

## 2025-06-18 RX ADMIN — LEVOTHYROXINE SODIUM 100 MCG: 100 TABLET ORAL at 06:14

## 2025-06-18 RX ADMIN — VANCOMYCIN HYDROCHLORIDE 1250 MG: 1.25 INJECTION, POWDER, LYOPHILIZED, FOR SOLUTION INTRAVENOUS at 10:00

## 2025-06-18 RX ADMIN — OXYCODONE HYDROCHLORIDE 10 MG: 10 TABLET ORAL at 03:16

## 2025-06-18 RX ADMIN — APIXABAN 5 MG: 5 TABLET, FILM COATED ORAL at 21:23

## 2025-06-18 RX ADMIN — SODIUM CHLORIDE, SODIUM LACTATE, POTASSIUM CHLORIDE, AND CALCIUM CHLORIDE 1000 ML: .6; .31; .03; .02 INJECTION, SOLUTION INTRAVENOUS at 00:54

## 2025-06-18 RX ADMIN — VANCOMYCIN HYDROCHLORIDE 1250 MG: 1.25 INJECTION, POWDER, LYOPHILIZED, FOR SOLUTION INTRAVENOUS at 21:24

## 2025-06-18 RX ADMIN — ACETAMINOPHEN 975 MG: 325 TABLET, FILM COATED ORAL at 08:45

## 2025-06-18 SDOH — SOCIAL STABILITY: SOCIAL NETWORK: COMMUNITY RESOURCES: OTHER (COMMENT)

## 2025-06-18 ASSESSMENT — PAIN DESCRIPTION - LOCATION
LOCATION: LEG
LOCATION: LEG

## 2025-06-18 ASSESSMENT — COGNITIVE AND FUNCTIONAL STATUS - GENERAL
WALKING IN HOSPITAL ROOM: A LOT
DAILY ACTIVITIY SCORE: 23
WALKING IN HOSPITAL ROOM: A LOT
DAILY ACTIVITIY SCORE: 23
CLIMB 3 TO 5 STEPS WITH RAILING: A LOT
CLIMB 3 TO 5 STEPS WITH RAILING: A LOT
MOBILITY SCORE: 20
DRESSING REGULAR LOWER BODY CLOTHING: A LITTLE
DRESSING REGULAR LOWER BODY CLOTHING: A LITTLE
MOBILITY SCORE: 20

## 2025-06-18 ASSESSMENT — PAIN - FUNCTIONAL ASSESSMENT
PAIN_FUNCTIONAL_ASSESSMENT: 0-10

## 2025-06-18 ASSESSMENT — PAIN SCALES - GENERAL
PAINLEVEL_OUTOF10: 6
PAINLEVEL_OUTOF10: 5 - MODERATE PAIN
PAINLEVEL_OUTOF10: 8
PAINLEVEL_OUTOF10: 5 - MODERATE PAIN
PAINLEVEL_OUTOF10: 8
PAINLEVEL_OUTOF10: 7
PAINLEVEL_OUTOF10: 8
PAINLEVEL_OUTOF10: 6
PAINLEVEL_OUTOF10: 0 - NO PAIN
PAINLEVEL_OUTOF10: 8

## 2025-06-18 ASSESSMENT — PAIN DESCRIPTION - ORIENTATION
ORIENTATION: RIGHT
ORIENTATION: RIGHT

## 2025-06-18 NOTE — PROGRESS NOTES
"Frances Mcdowell is a 54 y.o. female on day 3 of admission presenting with Failure of outpatient treatment.    Subjective   POD# 1 s/p right posterior calf I&D and left lower leg wound debridement.  Still having significant pain, but able to move legs better today.  Refusing dressing change and PT today.    Meds:  Scheduled medications  acetaminophen, 975 mg, oral, q6h  apixaban, 5 mg, oral, BID  cholecalciferol, 125 mcg, oral, Daily  lactated Ringer's, 1,000 mL, intravenous, Once  levothyroxine, 100 mcg, oral, Daily  melatonin, 3 mg, oral, Nightly  nystatin, , Topical, Daily  pantoprazole, 40 mg, oral, Daily before breakfast  polyethylene glycol, 17 g, oral, Daily  rosuvastatin, 20 mg, oral, Daily  vancomycin, 1,250 mg, intravenous, q12h      Continuous medications  Continuous Medications[1]  PRN medications  PRN Medications[2]       Physical Exam   Constitutional:       Appearance: She is obese.      HENT:      Head: Normocephalic and atraumatic.   Cardiovascular:      Rate and Rhythm: Normal rate and regular rhythm.   Pulmonary:      Effort: Pulmonary effort is normal. No respiratory distress.      Breath sounds: No stridor. No wheezing, rhonchi or rales.   Chest:      Chest wall: No tenderness.   Abdominal:      General: There is no distension.      Tenderness: There is no abdominal tenderness.   Musculoskeletal:         General: Tenderness present.   Skin:     General: Skin is warm and dry.      Findings: Dressings intact to B/L lower legs without strikethrough;  Able to lift legs off bed;    Neurological:      General: No focal deficit present.      Mental Status: She is alert and oriented to person, place, and time.   Psychiatric:         Mood and Affect: Mood normal.         Behavior: Behavior normal.     Last Recorded Vitals  Blood pressure 88/50, pulse 90, temperature 36.6 °C (97.9 °F), temperature source Temporal, resp. rate 17, height 1.6 m (5' 3\"), weight 139 kg (306 lb 1.6 oz), SpO2 95%.    Intake/Output " last 3 Shifts:  I/O last 3 completed shifts:  In: 5145.2 (37.1 mL/kg) [P.O.:1832; I.V.:779.2 (5.6 mL/kg); IV Piggyback:1544]  Out: 2950 (21.2 mL/kg) [Urine:2950 (0.6 mL/kg/hr)]  Weight: 138.8 kg     Relevant Results   Results for orders placed or performed during the hospital encounter of 06/15/25 (from the past 24 hours)   Tissue/Wound Culture/Smear    Specimen: ABSCESS; Swab   Result Value Ref Range    Gram Stain No polymorphonuclear leukocytes seen     Gram Stain No organisms seen    Heparin Assay   Result Value Ref Range    Heparin Unfractionated 0.2 See Comment Below for Therapeutic Ranges IU/mL   Heparin Assay   Result Value Ref Range    Heparin Unfractionated 0.5 See Comment Below for Therapeutic Ranges IU/mL   Magnesium   Result Value Ref Range    Magnesium 1.76 1.60 - 2.40 mg/dL   Renal Function Panel   Result Value Ref Range    Glucose 100 (H) 74 - 99 mg/dL    Sodium 134 (L) 136 - 145 mmol/L    Potassium 4.0 3.5 - 5.3 mmol/L    Chloride 101 98 - 107 mmol/L    Bicarbonate 27 21 - 32 mmol/L    Anion Gap 10 10 - 20 mmol/L    Urea Nitrogen 14 6 - 23 mg/dL    Creatinine 0.68 0.50 - 1.05 mg/dL    eGFR >90 >60 mL/min/1.73m*2    Calcium 7.6 (L) 8.6 - 10.3 mg/dL    Phosphorus 3.6 2.5 - 4.9 mg/dL    Albumin 2.3 (L) 3.4 - 5.0 g/dL   CBC   Result Value Ref Range    WBC 15.7 (H) 4.4 - 11.3 x10*3/uL    nRBC 0.0 0.0 - 0.0 /100 WBCs    RBC 2.82 (L) 4.00 - 5.20 x10*6/uL    Hemoglobin 8.3 (L) 12.0 - 16.0 g/dL    Hematocrit 26.3 (L) 36.0 - 46.0 %    MCV 93 80 - 100 fL    MCH 29.4 26.0 - 34.0 pg    MCHC 31.6 (L) 32.0 - 36.0 g/dL    RDW 15.2 (H) 11.5 - 14.5 %    Platelets 537 (H) 150 - 450 x10*3/uL   Heparin Assay   Result Value Ref Range    Heparin Unfractionated 0.4 See Comment Below for Therapeutic Ranges IU/mL   CT angio lower extremity right w and or wo IV contrast  Result Date: 6/15/2025  Interpreted By:  Andrei Joseph, STUDY: CT ANGIO LOWER EXTREMITY RIGHT W AND OR WO IV CONTRAST; ;  6/15/2025 4:42 pm   CT  ANGIOGRAM LOWER EXTREMITIES WITH CONTRAST   INDICATION: Signs/Symptoms:rule out abscess open wound. 54-year-old woman with wound.     COMPARISON: CT abdomen pelvis 01/22/2025   ACCESSION NUMBER(S): [id number]   ORDERING CLINICIAN: CHELSY COOL   TECHNIQUE: Contiguous acquired helical axial images were obtained of the right lower extremity after the uneventful administration of 90 mL Omnipaque 350. Coronal and sagittal multiplanar reformatted maximum intensity projection images were obtained. 3D volumetric surface rendered representation of arterial structures was performed on a separate workstation and submitted for interpretation.   FINDINGS: Angiographic:   The partially the partially imaged left common artery, as well as the left internal and external iliac arteries are patent. The left common femoral and profunda femoris arteries are patent. The left superficial femoral artery is patent along the entirety of the imaged left thigh.   The right common, internal common external iliac arteries, right common femoral artery profunda femoris artery are patent. There is right superficial femoral and popliteal arterial patency. There is probable 3 arterial vessel runoff to the level of the left ankle.   Contrast timing is suboptimal for the evaluation of venous structures. Large iliac veins of the pelvis are grossly unremarkable.       Nonangiographic:   The partially imaged pelvis shows nondilated loops of bowel. The bladder is decompressed. No free fluid, pneumatosis, or pneumoperitoneum. There is a right inguinal lymph nodes which are likely reactive. There is rather diffuse skin thickening and subcutaneous edema along the length of the entire right lower extremity which is worse from the right popliteal fossa distally. Superficial varicosities along the length of the right lower extremity are present. CT angiography provides limited evaluation lower extremity soft tissue structures. Within these limitations, no  drainable subcutaneous fluid collection. No acute osseous abnormality.       1. Femoropopliteal arterial patency with patency of 3 arterial vessel runoff to the level of the right ankle. Left lower extremity iliofemoral patency as partially imaged to the distal left forearm. 2. Extensive right lower extremity skin thickening and subcutaneous edema, more prominent in the lower leg. Limited evaluation of deep soft tissue structures. No large subcutaneous drainable fluid collection.   MACRO: None   Signed by: Andrei Joseph 6/15/2025 5:30 PM Dictation workstation:   [id number]  Susceptibility data from last 90 days.  Collected Specimen Info Organism   06/15/25 Tissue/Biopsy from Wound/Tissue Mixed Gram-Positive and Gram-Negative Bacteria       Assessment & Plan  Failure of outpatient treatment    Non-pressure chronic ulcer of left calf with fat layer exposed (Multi)    Non-pressure chronic ulcer of right calf with fat layer exposed (Multi)    Right lower extremity ulceration/infection - s/p B/L lower leg ulceration debridement with right posterior calf I&D  Dressings left intact.  Will change tomorrow.  IV antibiotics per ID.  Awaiting OR culture.  ELEVATE  LOWER LEGs  Will follow.       Mami Tang DPM           [1]    [2] PRN medications: HYDROmorphone, LORazepam, oxyCODONE, oxyCODONE, vancomycin

## 2025-06-18 NOTE — PROGRESS NOTES
"Occupational Therapy                 Therapy Communication Note    Patient Name: Frances Mcdowell  MRN: [id number]  Department: [address]  Room: [address] [place], [place] [address]  Today's Date: 6/18/2025     Discipline: Occupational Therapy    Missed Visit:   yes @ 11:36    Missed Visit Reason:   Pt resting in bed following I & D to R posterior calf and excisional debridement to B/L LE ulcerations on 6/17. Per podiatry, is cleared for weight bearing and ROM/mobility to her tolerance. No restrictions. After intro and edu on therapy assessment/ POC, pt politely declines at this time due to continuing to experience higher levels of pain. Currently rating R leg pain as \"8.5/10\", nursing staff aware and providing medication as able. Pt encouraged to complete bed level LE ROM to her tolerance and is agreeable to OT returning the following day to complete evaluation. Communicated with nursing staff.     Missed Time: Attempt          "

## 2025-06-18 NOTE — OP NOTE
DEBRIDEMENT, WOUND (B) Operative Note     Date: 2025  OR Location: [facility] OR    Name: Frances Mcdowell, : 1970, Age: 54 y.o., MRN: [id number], Sex: female    Diagnosis  Pre-op Diagnosis      * Non-pressure chronic ulcer of left calf with fat layer exposed (Multi) [L97.222]     * Non-pressure chronic ulcer of right calf with fat layer exposed (Multi) [L97.212] Post-op Diagnosis     * Non-pressure chronic ulcer of left calf with fat layer exposed (Multi) [L97.222]     * Non-pressure chronic ulcer of right calf with fat layer exposed (Multi) [L97.212]     Procedures    Incision and drainage right posterior calf - 74024  Full thickness excisional debridement B/L lower leg ulcerations down to and including subcutaneous tissue - 24633, 39466     Surgeons      * Mami Tang - Primary    Resident/Fellow/Other Assistant:  Surgeons and Role:  * No surgeons found with a matching role *    Staff:   Circulator: Manasa  Scrub Person: Cori  Surgical Assistant: Rolando  Surgical Assistant: Robert    Anesthesia Staff: Anesthesiologist: Montez Canchola MD  CRNA: UTE Loera-CRNA  SRNA: Godfrey Silva    Procedure Summary  Anesthesia: Monitor Anesthesia Care  ASA: ASA status not filed in the log.  Estimated Blood Loss: 10 mL  Intra-op Medications:   Administrations occurring from 1450 to 1625 on 25:   Medication Name Total Dose   bupivacaine PF (Marcaine) 0.5 % (5 mg/mL) injection 4 mL   BUPivacaine HCl (Marcaine) 0.5 % (5 mg/mL) 5 mL, lidocaine (Xylocaine) 5 mL syringe 15 mL   lactated Ringer's infusion 98.33 mL   dexMEDETOMidine 4 mcg/mL in NS syringe 20 mcg   fentaNYL (Sublimaze) injection 50 mcg/mL 100 mcg   HYDROmorphone (Dilaudid) injection 0.25 mg 0.25 mg   LR bolus Cannot be calculated   lidocaine PF (Xylocaine-MPF) local injection 1 % 40 mg   midazolam (Versed) injection 1 mg/mL 4 mg   ondansetron (Zofran) 2 mg/mL injection 4 mg   propofol (Diprivan) injection 10 mg/mL 475.04 mg               Anesthesia Record               Intraprocedure I/O Totals          Intake    LR bolus 500.00 mL    Total Intake 500 mL          Specimen:   ID Type Source Tests Collected by Time   A : RIGHT POSTERIOR CALF ABSCESS Swab ABSCESS TISSUE/WOUND CULTURE/SMEAR Mami Tang DPM 6/17/2025 1545                 Drains and/or Catheters:   External Urinary Catheter Female (Active)   External Catheter Status In place 06/17/25 1552   Output (mL) 550 mL 06/17/25 1830       [REMOVED] External Urinary Catheter Female (Removed)       Tourniquet Times:         Implants:     Findings: as dictated    Indications: Frances Mcdowell is an 54 y.o. female who is having surgery for Non-pressure chronic ulcer of left calf with fat layer exposed (Multi) [L97.222]  Non-pressure chronic ulcer of right calf with fat layer exposed (Multi) [L97.212].      The patient was seen in the preoperative area. The risks, benefits, complications, treatment options, non-operative alternatives, expected recovery and outcomes were discussed with the patient. The possibilities of reaction to medication, pulmonary aspiration, injury to surrounding structures, bleeding, recurrent infection, the need for additional procedures, failure to diagnose a condition, and creating a complication requiring transfusion or operation were discussed with the patient. The patient concurred with the proposed plan, giving informed consent.  The site of surgery was properly noted/marked if necessary per policy. The patient has been actively warmed in preoperative area. Preoperative antibiotics have been ordered and given within 4 hours of incision. Venous thrombosis prophylaxis have been ordered including chemical prophylaxis    Procedure Details:   Patient brought to operating room and transferred to OR bed for surgical procedure.  Huddle and Time out performed.  After induction of IV anesthesia  10 ml's of 1:1 mixture of 1% lidocaine plain and 0.5% marcaine plain injected  around right posterior calf ulcerations and another 10 ml's of above mixture around left posterior calf ulcerations.  B/L feet and lower legs scrubbed, prepped and draped with betasept.    Attention was then directed to left posterior calf where we used a currette to excionally debride ulcerations posterior calf full thickness down to and including subcutaneous tissue.  Nice granular base - no purulence.  Irrigated with saline.   Applied adaptic, aquacel, 4x4's, ABD, kerlix and ace bandage from toes - knee.    Attention was then directed to right posterior calf where we used a currette to excionally debride ulcerations posterior calf full thickness down to and including subcutaneous tissue.  Nice granular base to distal ulceration - no purulence but an area of tunnel to 9 o'clock position measuring 2 cm depth.  Irrigated with saline.      WE then noted continued purulence from proximal right posterior calf ulceration.  WE explored ulceration and it was noted that the ulceration tunneled 12 cm with copious amounts of purulent drainage.  C&S taken.  Irrigated with copious amounts of sterile saline.  Any bleeding controlled with bovie electrocautery.  Wounds measures 12 x 12 x 12 cm.  Packed ulceration with aquacel rope.  Remaining ulcerations dressed with adaptic, aquacel, 4x4's, ABD, kerlix and ace bandage from toes - knee.     Patient tolerated procedure and anesthesia well.  Transferred to PACU with VSS and VSI to all remaining toes B/L feet  After a brief period of post-operative monitoring, the patient will be transferred back to the floor for further medical management.        Evidence of Infection: Yes; Abscess Within the muscle/fascial layers  Complications:  None; patient tolerated the procedure well.    Disposition: PACU - hemodynamically stable.  Condition: stable         Task Performed by RNFA or Surgical Assistant:  Prep and assiste          Additional Details: as dictated    Attending Attestation: I  performed the procedure.    Mami Tang  Phone Number: [phone]

## 2025-06-18 NOTE — PROGRESS NOTES
06/18/25 0958   Discharge Planning   Living Arrangements Spouse/significant other;Children   Support Systems Spouse/significant other   Type of Residence Private residence   Number of Stairs to Enter Residence 0   Number of Stairs Within Residence 0   Do you have animals or pets at home? Yes   Type of Animals or Pets cat, dog, and bird   Who is requesting discharge planning? Provider   Home or Post Acute Services None   Expected Discharge Disposition Home  (pending ID/podiatry, cultures pending, unclear needs at this time)   Stroke Family Assessment   Stroke Family Assessment Needed No   Intensity of Service   Intensity of Service 0-30 min

## 2025-06-18 NOTE — PROGRESS NOTES
Department of Hospital Medicine  Daily Progress Note   Hospital Day: 4       Name:Frances Mcdowell, AGE: 54 y.o., GENDER: female, MRN: [id number], ROOM: 112/[facility]   CODE STATUS: Full Code  Attending Physician: Matias Salguero MD  Resident: Artur Pandya DO        Chief Complaint     Chief Complaint   Patient presents with    Leg Pain        Interval History   Frances Mcdowell is a 54 y.o. year old female patient on Hospital Day: 4      Subjective    Overnight pt lost IV access and anesthisia had to place a new IV. Her vancomycin dose 2 hours late due to this .Her oxy frequency was also increased to q4 hours PRN from q6. The pt also received a 1 L LR bolus for soft pressures 82/60. The patient reports her pain is better than last night still severe. She says she is tolerating a diet well. She requests to be taken off heparin as IV placement has resulted in multiple sticks and she would prefer her home eliquis.    Meds    Scheduled medications  Scheduled Medications[1]  Continuous medications  Continuous Medications[2]  PRN medications  PRN Medications[3]     Objective    Exam   Physical Exam  Constitutional:       Appearance: Normal appearance. She is obese.   HENT:      Head: Normocephalic.      Nose: Nose normal.   Eyes:      Extraocular Movements: Extraocular movements intact.      Conjunctiva/sclera: Conjunctivae normal.      Pupils: Pupils are equal, round, and reactive to light.   Cardiovascular:      Rate and Rhythm: Normal rate and regular rhythm.      Pulses: Normal pulses.      Heart sounds: Normal heart sounds.   Pulmonary:      Effort: Pulmonary effort is normal.      Breath sounds: Normal breath sounds.   Chest:      Chest wall: Tenderness present.   Abdominal:      General: Abdomen is flat. Bowel sounds are normal.      Palpations: Abdomen is soft.   Musculoskeletal:         General: Swelling (RLE) and tenderness (RLE) present.      Comments: See media for picture of wound   Skin:     General: Skin is warm.       Findings: Lesion (RLE) present.      Comments: Dressing CDI RLE   Neurological:      General: No focal deficit present.      Mental Status: She is alert and oriented to person, place, and time. Mental status is at baseline.   Psychiatric:         Mood and Affect: Mood normal.         Behavior: Behavior normal.         Thought Content: Thought content normal.         Judgment: Judgment normal.          Vitals         6/18/2025    12:42 AM 6/18/2025    12:45 AM 6/18/2025     1:59 AM 6/18/2025     4:02 AM 6/18/2025     8:01 AM 6/18/2025     8:47 AM 6/18/2025    11:23 AM   Vitals   Systolic 78 82 94 97 111 110 88   Diastolic 59 60 62 61 76 68 50   BP Location Right arm Right arm Right arm Right arm Right arm Left arm Left arm   Heart Rate 92  89 88 88  86   Temp 36.4 °C (97.5 °F)   36.6 °C (97.9 °F) 37.1 °C (98.8 °F)  36.6 °C (97.9 °F)   Resp 18   16 17  17        Intake/Output Summary (Last 24 hours) at 6/18/2025 1129  Last data filed at 6/18/2025 0857  Gross per 24 hour   Intake 3619.85 ml   Output 1730 ml   Net 1889.85 ml       Labs   Labs:   Results from last 72 hours   Lab Units 06/18/25  0412 06/17/25  0643 06/16/25  0528   SODIUM mmol/L 134* 133* 133*   POTASSIUM mmol/L 4.0 3.6 3.3*   CHLORIDE mmol/L 101 99 101   CO2 mmol/L 27 27 25   BUN mg/dL 14 13 11   CREATININE mg/dL 0.68 0.90 0.85   GLUCOSE mg/dL 100* 97 89   CALCIUM mg/dL 7.6* 7.7* 7.2*   ANION GAP mmol/L 10 11 10   EGFR mL/min/1.73m*2 >90 75 81   PHOSPHORUS mg/dL 3.6 3.7 3.9      Results from last 72 hours   Lab Units 06/18/25  0412 06/17/25  0643 06/16/25  0940 06/16/25  0528 06/15/25  1534   WBC AUTO x10*3/uL 15.7* 19.3* 19.0*   < > 20.5*   HEMOGLOBIN g/dL 8.3* 9.3* 9.4*   < > 9.9*   HEMATOCRIT % 26.3* 29.3* 29.8*   < > 31.1*   PLATELETS AUTO x10*3/uL 537* 518* 461*   < > 459*   NEUTROS PCT AUTO %  --   --   --   --  84.0   LYMPHS PCT AUTO %  --   --   --   --  7.4   MONOS PCT AUTO %  --   --   --   --  6.4   EOS PCT AUTO %  --   --   --   --  0.4    <  "> = values in this interval not displayed.      Lab Results   Component Value Date    CALCIUM 7.6 (L) 06/18/2025    PHOS 3.6 06/18/2025      Lab Results   Component Value Date    CRP 22.53 (H) 06/15/2025      [unfilled]     Micro/ID:   Susceptibility data from last 90 days.  Collected Specimen Info Organism   06/15/25 Tissue/Biopsy from Wound/Tissue Mixed Gram-Positive and Gram-Negative Bacteria                    No lab exists for component: \"AGALPCRNB\"     Lab Results   Component Value Date    URINECULTURE SEE NOTE 06/05/2025    BLOODCULT No growth at 2 days 06/15/2025    BLOODCULT No growth at 2 days 06/15/2025       Images    Imaging  CT angio lower extremity right w and or wo IV contrast  Result Date: 6/15/2025  1. Femoropopliteal arterial patency with patency of 3 arterial vessel runoff to the level of the right ankle. Left lower extremity iliofemoral patency as partially imaged to the distal left forearm. 2. Extensive right lower extremity skin thickening and subcutaneous edema, more prominent in the lower leg. Limited evaluation of deep soft tissue structures. No large subcutaneous drainable fluid collection.   MACRO: None   Signed by: Andrei Joseph 6/15/2025 5:30 PM Dictation workstation:   [id number]      Cardiology, Vascular, and Other Imaging  No other imaging results found for the past 7 days      Assessment    Assessment and Plan    Frances Mcdowell is a 54 y.o. female with PMHx significant for saddle PE (on Eliquis), chronic lower extremity wounds with recurrent cellulitis (hx of pseudomonas and MRSA), lymphedema, ADD, HLD, and hypothyroidism s/p thyroidectomy admitted on 6/15/2025 for R lower extremity cellulitis in the setting of chronic lower extremity wounds and lymphedema.     ACUTE MEDICAL ISSUES:  #Bilateral lymphedema  #Chronic right leg wound with infection / #Cellulitis of right leg  #Leukocytosis  ::Hx pseudomonas and MRSA tissue cultures 10/24 and 12/24 susceptible to vancomycin " and zosyn   ::WBC 20.5  ::Inflammatory markers elevated; ESR 49 and CRP 22.53  ::Photos of leg wound in chart under media   :: Wound culture shows few polymorphonuclear leukocytes and abundant mixed gram-positive and negative bacteria  PLAN:  -Blood cultures pending, awaiting cultures from OR. Preliminary gram stain negative  -Trend WBC  -Continue vancomycin, DC zosyn (6/16) per ID  -Podiatry consulted, debridment 6/18. Ativan 0.5 mg ordered for dressing change  -ID following  -Wound care consulted, appreciate recs  -Pain regimen: Tylenol 975 mg q6 hours scheduled, oxycodone 5 and 10 mg q6 hours PRN for moderate and severe pain, Dilaudid 0.4 mg q3 hours PRN for breakthrough pain  -Resume Eliquis , DC heparin per podiatry  - PT/OT following, awaiting eval    #Thrombocytosis  ::Platelets 459 on admission, most recent 537  ::Suspect may be hemoconcentration 2/2 reported decreased PO intake and infection  PLAN:  -Continue to monitor on daily CBC     CHRONIC MEDICAL ISSUES:  #Hx saddle PE - HOLD home Eliquis for possible podiatry intervention - started heparin gtt   #HLD - continue home rosuvastatin 20 mg daily   #ADD - hold home Adderall, patient does not want while inpatient   #Hypothyroidism - continue home levothyroxine 100 mcg daily   #Vitamin D deficiency - continue home vitamin D supplementation  #GERD - continue home omeprazole 40 mg daily         Fluids: None  Electrolytes: Replete PRN  Nutrition: Regular diet  Antimicrobials: Vancomycin  DVT ppx: Heparin gtt   GI ppx: PPI  Catheter: None  Lines: PIV  Supplemental Oxygen: Room Air   Emergency Contact: Extended Emergency Contact Information  Primary Emergency Contact: SADE CANNON  Mobile Phone: [phone]  Relation: Daughter   needed? No  Secondary Emergency Contact: MANJULA COOK VASQUEZ  Address: [address] [place] [id number] [place]  Mobile Phone: [phone]  Relation: Spouse   needed? No   Code:  Full Code      Disposition: 54 year old female admitted for cellulitis in setting of chronic lower extremity wounds and lymphedema.  Awaiting PT/OT evaluation and cultures   Artur Pandya DO  PGY- 1  06/18/25 at 11:29 AM                   [1] acetaminophen, 975 mg, oral, q6h  apixaban, 5 mg, oral, BID  cholecalciferol, 125 mcg, oral, Daily  levothyroxine, 100 mcg, oral, Daily  melatonin, 3 mg, oral, Nightly  nystatin, , Topical, Daily  pantoprazole, 40 mg, oral, Daily before breakfast  polyethylene glycol, 17 g, oral, Daily  rosuvastatin, 20 mg, oral, Daily  vancomycin, 1,250 mg, intravenous, q12h     [2]    [3] PRN medications: HYDROmorphone, LORazepam, oxyCODONE, oxyCODONE, vancomycin

## 2025-06-18 NOTE — PROGRESS NOTES
"Physical Therapy                 Therapy Communication Note    Patient Name: Frances Mcdowell  MRN: [id number]  Department: [address]  Room: [address] [place], [place] [address]  Today's Date: 6/18/2025     Discipline: Physical Therapy    Missed Visit:   Yes @ 1136     Missed Visit Reason:  Pt resting in bed following I & D to R posterior calf and excisional debridement to B/L LE ulcerations on 6/17. Per podiatry, is cleared for weight bearing and ROM/mobility to her tolerance. No restrictions. After intro and edu on therapy assessment/ POC, pt politely declines at this time due to continuing to experience higher levels of pain. Currently rating R leg pain as \"8.5/10\", nursing staff aware and providing medication as able. Pt encouraged to complete bed level LE ROM to her tolerance and is agreeable to PT returning the following day to complete evaluation. Communicated with nursing staff.     Missed Time: Attempt        "

## 2025-06-18 NOTE — PROGRESS NOTES
06/18/25 1600   Referral To   [employer] Other (Comment)  (SW obtained FMLA paperwork from pt's employer, spoke to podiatry who will complete documents.)

## 2025-06-18 NOTE — PROGRESS NOTES
Frances Mcdowell is a 54 y.o. female on day 3 of admission presenting with Failure of outpatient treatment.    Subjective   Interval History: no fever, no new complaints        Review of Systems    Objective   Range of Vitals (last 24 hours)  Heart Rate:  [82-98]   Temp:  [35.7 °C (96.3 °F)-37.1 °C (98.8 °F)]   Resp:  [16-35]   BP: ()/(59-91)   SpO2:  [92 %-100 %]   Daily Weight  06/15/25 : 139 kg (306 lb 1.6 oz)    Body mass index is 54.22 kg/m².    Physical Exam  Constitutional:       Appearance: Normal appearance.   HENT:      Head: Normocephalic and atraumatic.      Mouth/Throat:      Mouth: Mucous membranes are moist.      Pharynx: Oropharynx is clear.   Eyes:      Pupils: Pupils are equal, round, and reactive to light.   Cardiovascular:      Rate and Rhythm: Normal rate and regular rhythm.      Heart sounds: Normal heart sounds.   Pulmonary:      Effort: Pulmonary effort is normal.      Breath sounds: Normal breath sounds.   Abdominal:      General: Abdomen is flat. Bowel sounds are normal.      Palpations: Abdomen is soft.   Musculoskeletal:         General: Swelling present.      Cervical back: Normal range of motion.      Comments: RT leg dressing   Neurological:      Mental Status: She is alert.         Antibiotics  vancomycin - 1250 mg/250 mL    Relevant Results  Labs  Results from last 72 hours   Lab Units 06/18/25  0412 06/17/25  0643 06/16/25  0940 06/16/25  0528 06/15/25  1534   WBC AUTO x10*3/uL 15.7* 19.3* 19.0*   < > 20.5*   HEMOGLOBIN g/dL 8.3* 9.3* 9.4*   < > 9.9*   HEMATOCRIT % 26.3* 29.3* 29.8*   < > 31.1*   PLATELETS AUTO x10*3/uL 537* 518* 461*   < > 459*   NEUTROS PCT AUTO %  --   --   --   --  84.0   LYMPHS PCT AUTO %  --   --   --   --  7.4   MONOS PCT AUTO %  --   --   --   --  6.4   EOS PCT AUTO %  --   --   --   --  0.4    < > = values in this interval not displayed.     Results from last 72 hours   Lab Units 06/18/25  0412 06/17/25  0643 06/16/25  0528   SODIUM mmol/L 134* 133* 133*    POTASSIUM mmol/L 4.0 3.6 3.3*   CHLORIDE mmol/L 101 99 101   CO2 mmol/L 27 27 25   BUN mg/dL 14 13 11   CREATININE mg/dL 0.68 0.90 0.85   GLUCOSE mg/dL 100* 97 89   CALCIUM mg/dL 7.6* 7.7* 7.2*   ANION GAP mmol/L 10 11 10   EGFR mL/min/1.73m*2 >90 75 81   PHOSPHORUS mg/dL 3.6 3.7 3.9     Results from last 72 hours   Lab Units 06/18/25  0412 06/17/25  0643 06/16/25  0528 06/15/25  1534   ALK PHOS U/L  --   --   --  124*   BILIRUBIN TOTAL mg/dL  --   --   --  0.6   PROTEIN TOTAL g/dL  --   --   --  6.7   ALT U/L  --   --   --  8   AST U/L  --   --   --  12   ALBUMIN g/dL 2.3* 2.3* 2.3* 2.5*     Estimated Creatinine Clearance: 125 mL/min (by C-G formula based on SCr of 0.68 mg/dL).  C-Reactive Protein   Date Value Ref Range Status   06/15/2025 22.53 (H) <1.00 mg/dL Final   08/06/2024 23.21 (H) <1.00 mg/dL Final     Microbiology  Reviewed  Imaging  Reviewed          Assessment/Plan     Legs stasis / wounds / possible Rt leg cellulitis, sp debridement     Recommendations :  Continue Vancomycin  Discussed with the medical team     I spent minutes in the professional and overall care of this patient.          Miguel Hu MD

## 2025-06-19 ENCOUNTER — APPOINTMENT (OUTPATIENT)
Dept: PRIMARY CARE | Facility: CLINIC | Age: 55
End: 2025-06-19
Payer: COMMERCIAL

## 2025-06-19 LAB
ALBUMIN SERPL BCP-MCNC: 2.3 G/DL (ref 3.4–5)
ANION GAP SERPL CALC-SCNC: 10 MMOL/L (ref 10–20)
BUN SERPL-MCNC: 17 MG/DL (ref 6–23)
CALCIUM SERPL-MCNC: 7.8 MG/DL (ref 8.6–10.3)
CHLORIDE SERPL-SCNC: 101 MMOL/L (ref 98–107)
CO2 SERPL-SCNC: 29 MMOL/L (ref 21–32)
CREAT SERPL-MCNC: 0.75 MG/DL (ref 0.5–1.05)
EGFRCR SERPLBLD CKD-EPI 2021: >90 ML/MIN/1.73M*2
ERYTHROCYTE [DISTWIDTH] IN BLOOD BY AUTOMATED COUNT: 15.3 % (ref 11.5–14.5)
GLUCOSE SERPL-MCNC: 93 MG/DL (ref 74–99)
HCT VFR BLD AUTO: 30.4 % (ref 36–46)
HGB BLD-MCNC: 9 G/DL (ref 12–16)
MAGNESIUM SERPL-MCNC: 1.83 MG/DL (ref 1.6–2.4)
MCH RBC QN AUTO: 28.2 PG (ref 26–34)
MCHC RBC AUTO-ENTMCNC: 29.6 G/DL (ref 32–36)
MCV RBC AUTO: 95 FL (ref 80–100)
NRBC BLD-RTO: 0 /100 WBCS (ref 0–0)
PHOSPHATE SERPL-MCNC: 3.3 MG/DL (ref 2.5–4.9)
PLATELET # BLD AUTO: 637 X10*3/UL (ref 150–450)
POTASSIUM SERPL-SCNC: 4 MMOL/L (ref 3.5–5.3)
RBC # BLD AUTO: 3.19 X10*6/UL (ref 4–5.2)
SODIUM SERPL-SCNC: 136 MMOL/L (ref 136–145)
VANCOMYCIN SERPL-MCNC: 24.7 UG/ML (ref 5–20)
WBC # BLD AUTO: 9.7 X10*3/UL (ref 4.4–11.3)

## 2025-06-19 PROCEDURE — 2500000001 HC RX 250 WO HCPCS SELF ADMINISTERED DRUGS (ALT 637 FOR MEDICARE OP)

## 2025-06-19 PROCEDURE — 1100000001 HC PRIVATE ROOM DAILY

## 2025-06-19 PROCEDURE — 97165 OT EVAL LOW COMPLEX 30 MIN: CPT | Mod: GO

## 2025-06-19 PROCEDURE — 2500000002 HC RX 250 W HCPCS SELF ADMINISTERED DRUGS (ALT 637 FOR MEDICARE OP, ALT 636 FOR OP/ED): Performed by: PODIATRIST

## 2025-06-19 PROCEDURE — 36415 COLL VENOUS BLD VENIPUNCTURE: CPT | Performed by: PODIATRIST

## 2025-06-19 PROCEDURE — 99232 SBSQ HOSP IP/OBS MODERATE 35: CPT

## 2025-06-19 PROCEDURE — 80202 ASSAY OF VANCOMYCIN: CPT | Performed by: PODIATRIST

## 2025-06-19 PROCEDURE — 85027 COMPLETE CBC AUTOMATED: CPT | Performed by: PODIATRIST

## 2025-06-19 PROCEDURE — 2500000001 HC RX 250 WO HCPCS SELF ADMINISTERED DRUGS (ALT 637 FOR MEDICARE OP): Performed by: PODIATRIST

## 2025-06-19 PROCEDURE — 97161 PT EVAL LOW COMPLEX 20 MIN: CPT | Mod: GP

## 2025-06-19 PROCEDURE — 97530 THERAPEUTIC ACTIVITIES: CPT | Mod: GO

## 2025-06-19 PROCEDURE — 83735 ASSAY OF MAGNESIUM: CPT | Performed by: PODIATRIST

## 2025-06-19 PROCEDURE — 2500000004 HC RX 250 GENERAL PHARMACY W/ HCPCS (ALT 636 FOR OP/ED)

## 2025-06-19 PROCEDURE — 2500000004 HC RX 250 GENERAL PHARMACY W/ HCPCS (ALT 636 FOR OP/ED): Performed by: PODIATRIST

## 2025-06-19 PROCEDURE — 80069 RENAL FUNCTION PANEL: CPT | Performed by: PODIATRIST

## 2025-06-19 PROCEDURE — 2500000004 HC RX 250 GENERAL PHARMACY W/ HCPCS (ALT 636 FOR OP/ED): Mod: JZ

## 2025-06-19 PROCEDURE — 2500000004 HC RX 250 GENERAL PHARMACY W/ HCPCS (ALT 636 FOR OP/ED): Performed by: STUDENT IN AN ORGANIZED HEALTH CARE EDUCATION/TRAINING PROGRAM

## 2025-06-19 PROCEDURE — 2500000005 HC RX 250 GENERAL PHARMACY W/O HCPCS: Performed by: PODIATRIST

## 2025-06-19 RX ORDER — VANCOMYCIN HYDROCHLORIDE 1 G/200ML
1000 INJECTION, SOLUTION INTRAVENOUS EVERY 12 HOURS
Status: DISCONTINUED | OUTPATIENT
Start: 2025-06-19 | End: 2025-06-20

## 2025-06-19 RX ADMIN — PIPERACILLIN SODIUM AND TAZOBACTAM SODIUM 4.5 G: 4; .5 INJECTION, SOLUTION INTRAVENOUS at 20:04

## 2025-06-19 RX ADMIN — PIPERACILLIN SODIUM AND TAZOBACTAM SODIUM 4.5 G: 4; .5 INJECTION, SOLUTION INTRAVENOUS at 14:05

## 2025-06-19 RX ADMIN — APIXABAN 5 MG: 5 TABLET, FILM COATED ORAL at 20:09

## 2025-06-19 RX ADMIN — OXYCODONE HYDROCHLORIDE 10 MG: 10 TABLET ORAL at 16:46

## 2025-06-19 RX ADMIN — HYDROMORPHONE HYDROCHLORIDE 0.5 MG: 1 INJECTION, SOLUTION INTRAMUSCULAR; INTRAVENOUS; SUBCUTANEOUS at 13:45

## 2025-06-19 RX ADMIN — ROSUVASTATIN CALCIUM 20 MG: 20 TABLET, FILM COATED ORAL at 09:19

## 2025-06-19 RX ADMIN — NYSTATIN: 100000 POWDER TOPICAL at 09:22

## 2025-06-19 RX ADMIN — OXYCODONE HYDROCHLORIDE 10 MG: 10 TABLET ORAL at 09:18

## 2025-06-19 RX ADMIN — OXYCODONE HYDROCHLORIDE 10 MG: 10 TABLET ORAL at 20:09

## 2025-06-19 RX ADMIN — ACETAMINOPHEN 975 MG: 325 TABLET, FILM COATED ORAL at 05:25

## 2025-06-19 RX ADMIN — ACETAMINOPHEN 975 MG: 325 TABLET, FILM COATED ORAL at 23:04

## 2025-06-19 RX ADMIN — PANTOPRAZOLE SODIUM 40 MG: 40 TABLET, DELAYED RELEASE ORAL at 06:25

## 2025-06-19 RX ADMIN — OXYCODONE HYDROCHLORIDE 10 MG: 10 TABLET ORAL at 00:39

## 2025-06-19 RX ADMIN — VANCOMYCIN HYDROCHLORIDE 1250 MG: 1.25 INJECTION, POWDER, LYOPHILIZED, FOR SOLUTION INTRAVENOUS at 10:16

## 2025-06-19 RX ADMIN — APIXABAN 5 MG: 5 TABLET, FILM COATED ORAL at 09:19

## 2025-06-19 RX ADMIN — VANCOMYCIN HYDROCHLORIDE 1000 MG: 1 INJECTION, SOLUTION INTRAVENOUS at 20:39

## 2025-06-19 RX ADMIN — LEVOTHYROXINE SODIUM 100 MCG: 100 TABLET ORAL at 05:25

## 2025-06-19 RX ADMIN — OXYCODONE HYDROCHLORIDE 10 MG: 10 TABLET ORAL at 23:04

## 2025-06-19 RX ADMIN — Medication 125 MCG: at 09:19

## 2025-06-19 RX ADMIN — ACETAMINOPHEN 975 MG: 325 TABLET, FILM COATED ORAL at 16:47

## 2025-06-19 RX ADMIN — OXYCODONE HYDROCHLORIDE 10 MG: 10 TABLET ORAL at 05:25

## 2025-06-19 RX ADMIN — ACETAMINOPHEN 975 MG: 325 TABLET, FILM COATED ORAL at 09:18

## 2025-06-19 RX ADMIN — OXYCODONE HYDROCHLORIDE 10 MG: 10 TABLET ORAL at 12:29

## 2025-06-19 SDOH — SOCIAL STABILITY: SOCIAL NETWORK: COMMUNITY RESOURCES: OTHER (COMMENT)

## 2025-06-19 ASSESSMENT — ACTIVITIES OF DAILY LIVING (ADL)
ADL_ASSISTANCE: INDEPENDENT
BATHING_ASSISTANCE: MAXIMAL
ADL_ASSISTANCE: INDEPENDENT

## 2025-06-19 ASSESSMENT — PAIN - FUNCTIONAL ASSESSMENT
PAIN_FUNCTIONAL_ASSESSMENT: 0-10

## 2025-06-19 ASSESSMENT — COGNITIVE AND FUNCTIONAL STATUS - GENERAL
WALKING IN HOSPITAL ROOM: A LOT
DAILY ACTIVITIY SCORE: 23
TURNING FROM BACK TO SIDE WHILE IN FLAT BAD: A LITTLE
STANDING UP FROM CHAIR USING ARMS: TOTAL
MOVING FROM LYING ON BACK TO SITTING ON SIDE OF FLAT BED WITH BEDRAILS: A LITTLE
PERSONAL GROOMING: A LITTLE
DRESSING REGULAR LOWER BODY CLOTHING: A LITTLE
TOILETING: TOTAL
MOBILITY SCORE: 10
CLIMB 3 TO 5 STEPS WITH RAILING: A LOT
WALKING IN HOSPITAL ROOM: A LOT
MOVING TO AND FROM BED TO CHAIR: TOTAL
CLIMB 3 TO 5 STEPS WITH RAILING: A LOT
DRESSING REGULAR UPPER BODY CLOTHING: A LITTLE
DRESSING REGULAR LOWER BODY CLOTHING: A LITTLE
DAILY ACTIVITIY SCORE: 23
MOBILITY SCORE: 20
CLIMB 3 TO 5 STEPS WITH RAILING: TOTAL
WALKING IN HOSPITAL ROOM: TOTAL
DRESSING REGULAR LOWER BODY CLOTHING: TOTAL
MOBILITY SCORE: 20
DAILY ACTIVITIY SCORE: 14
HELP NEEDED FOR BATHING: A LOT

## 2025-06-19 ASSESSMENT — PAIN SCALES - GENERAL
PAINLEVEL_OUTOF10: 7
PAINLEVEL_OUTOF10: 7
PAINLEVEL_OUTOF10: 5 - MODERATE PAIN
PAINLEVEL_OUTOF10: 0 - NO PAIN
PAINLEVEL_OUTOF10: 8
PAINLEVEL_OUTOF10: 7
PAINLEVEL_OUTOF10: 5 - MODERATE PAIN
PAINLEVEL_OUTOF10: 8
PAINLEVEL_OUTOF10: 8
PAINLEVEL_OUTOF10: 6
PAINLEVEL_OUTOF10: 7

## 2025-06-19 ASSESSMENT — PAIN DESCRIPTION - LOCATION
LOCATION: LEG
LOCATION: LEG

## 2025-06-19 ASSESSMENT — PAIN DESCRIPTION - ORIENTATION
ORIENTATION: RIGHT
ORIENTATION: RIGHT

## 2025-06-19 NOTE — HOSPITAL COURSE
Frances Mcdowell is a 54 y.o. year old female  patient with PMHx significant for saddle PE (on Eliquis), chronic lower extremity wounds with recurrent cellulitis (hx of pseudomonas and MRSA), lymphedema, ADD, HLD, and hypothyroidism s/p thyroidectomy who presented to [facility] on 6/15 with chief complaint of worsening RLE pain and concern for cellulitis in setting of chronic leg wound.  Per patient, she began having issues with wounds on her lower extremities since July 2024 when she went on a trip, had a blister on her leg, and swam in a hot tub and then developed Pseudomonas.  She was also diagnosed with lymphedema at that time.  She has been following with wound care and infectious disease since.  She has had multiple admissions in the last year for lower extremity cellulitis and has previously been treated with IV antibiotics at home through PICC line per her ID Dr. Lay for MRSA and Pseudomonas.  She continues to follow with the wound care center weekly where she gets weekly debridements.  States the wounds have been healing well.  However, last Friday she began to experience lightheadedness, sweating, and nausea with associated redness around right leg wound.  She presented to [facility] ER last Sunday and was discharged home with Keflex 4 times daily for cellulitis treatment.  However, since leaving the ER patient states pain has progressively worsened and is no longer tolerable.  She also notes worsening redness around wounds of her right leg.  She did notably miss her wound care appointment this week because the nurse practitioner she sees regularly was on vacation.  She denies chest pain, shortness of breath, abdominal pain, nausea, vomiting. She does note that she has purposefully decreased her PO intake over the past week to limit the number of times she goes to the bathroom as she is having difficulty ambulating.      ED COURSE:   VS: Temperature 37, /83, heart rate 98, respiration 20, O2 sat 100% on  room air      Labs  - CBC: WBC 20.5, hemoglobin 9.9, hematocrit 31.1, platelets 459  - BMP: Sodium 136, potassium 3.5, chloride 98, bicarb 29, BUN 11, creatinine 0.65  - M.73  - Ca: 7.9  - LFTs: Albumin 2.5, alk phos 124, ALT 8, AST 12  - Lactate: 0.9  - PT/INR: 21.4/1.9  - ESR: 49  - CRP: 22.53  - Blood cultures: Pending   - Tissue/wound culture: pending      Imaging:  CT angio R lower extremity   1. Femoropopliteal arterial patency with patency of 3 arterial vessel runoff to the level of the right ankle. Left lower extremity  iliofemoral patency as partially imaged to the distal left forearm.  2. Extensive right lower extremity skin thickening and subcutaneous edema, more prominent in the lower leg. Limited evaluation of deep soft tissue structures. No large subcutaneous drainable fluid collection.     Interventions:   -Vancomycin and Zosyn  -Oxycodone 10 mg  -Dilaudid 1 mg x 2  -Ativan 0.5 mg   -1L NaCl     Hospital Course  And Zosyn were continued when patient was admitted to floor.  Podiatry and ID consulted.  Patient was given pain regimen.  Eliquis was held and heparin drip was started in case of podiatry intervention.  Blood cultures showed no growth.  Wound culture did not yield anything of value.  Zosyn was discontinued  per ID.   podiatry performed debridement and took cultures in the OR.  OR cultures revealed Pseudomonas and Zosyn was added back on .  After debridement home Eliquis was resumed.  Patient's leukocytosis improved during hospital stay.  PT/OT rec moderate intensity.  Pseudomonas sensitivities came back to fluoroquinolones.  Patient was treated in hospital with IV Zosyn as her Pseudomonas was susceptible.  Atarax was given for anxiety while in the hospital.   ID informed patient that antibiotics were no longer necessary.  Patient was stable for discharge .  Was discharged with oxycodone 5 mg Q3 as needed for 5 days.  Patient was also prescribed hydroxyzine 25 mg every  6 hours as needed for anxiety    Patient will follow with wound clinic for right lower extremity abscess.  Follow-up with podiatry for management of cellulitis/abscess of right lower extremity  Follow-up with PCP posthospital stay to discuss possible need for pain management and psychiatry

## 2025-06-19 NOTE — PROGRESS NOTES
Vancomycin Dosing by Pharmacy- FOLLOW UP    Frances Mcdowell is a 54 y.o. year old female who Pharmacy has been consulted for vancomycin dosing for cellulitis, skin and soft tissue. Based on the patient's indication and renal status this patient is being dosed based on a goal AUC of 400-600.     Renal function is currently stable.    Current vancomycin dose: 1250 mg given every 12 hours    Estimated vancomycin AUC on current dose: >600 mg/L.hr     Visit Vitals  /68 (BP Location: Right arm, Patient Position: Lying)   Pulse 90   Temp 36.2 °C (97.2 °F) (Temporal)   Resp 18        Lab Results   Component Value Date    CREATININE 0.75 2025    CREATININE 0.68 2025    CREATININE 0.90 2025    CREATININE 0.85 2025    CREATININE 1.20 (H) 2025        Patient weight is as follows:   Vitals:    06/15/25 2046   Weight: 139 kg (306 lb 1.6 oz)       Cultures:  Susceptibility data for the encounter in last 14 days.  Collected Specimen Info Organism   25 Swab from ABSCESS Pseudomonas aeruginosa     Mixed Gram-Negative Bacteria    06/15/25 Tissue/Biopsy from Wound/Tissue Pseudomonas aeruginosa     Mixed Gram-Positive and Gram-Negative Bacteria        I/O last 3 completed shifts:  In: 6404.2 (46.1 mL/kg) [P.O.:3422; I.V.:199.9 (1.4 mL/kg); IV Piggyback:2782.3]  Out: 1925 (13.9 mL/kg) [Urine:1925 (0.4 mL/kg/hr)]  Weight: 138.8 kg   I/O during current shift:  I/O this shift:  In: 240 [P.O.:240]  Out: 150 [Urine:150]    Temp (24hrs), Av.4 °C (97.5 °F), Min:36.2 °C (97.2 °F), Max:36.7 °C (98.1 °F)      Assessment/Plan    Above goal AUC. Orders placed for new vancomcyin regimen of 1000 every 12 hours to begin at 2200.    This dosing regimen is predicted by [employer] to result in the following pharmacokinetic parameters:    Loading dose: N/A  Regimen: 1000 mg IV every 12 hours.  Start time: 22:16 on 2025  Exposure target: AUC24 (range) 400-600 mg/L.hr   [id number]: 558 mg/L.hr  AUC24,ss: 530  mg/L.hr  Probability of AUC24 > 400: 100 %  Ctrough,ss: 18.2 mg/L  Probability of Ctrough,ss > 20: 17 %    The next level will be obtained on 6/23 with AM labs. May be obtained sooner if clinically indicated.   Will continue to monitor renal function daily while on vancomycin and order serum creatinine at least every 48 hours if not already ordered.  Follow for continued vancomycin needs, clinical response, and signs/symptoms of toxicity.       Rolando Boo, PharmD, DRAGAN, BCPS  Clinical Pharmacy Specialist  Internal Medicine

## 2025-06-19 NOTE — PROGRESS NOTES
MILD DRY EYE, OU: PRESCRIBED ARTIFICIAL TEARS PRN OU. RECOMMENDS OMEGA-3 FISH OIL WITH PRIMARY CARE PHYSICIANS APPROVAL. RETURN FOR FOLLOW-UP AS SCHEDULED OR SOONER IF SYMPTOMS WORSEN. Physical Therapy    Physical Therapy Evaluation    Patient Name: Frances Mcdowell  MRN: [id number]  Department: [address]  Room: [address] [place] [address]  Today's Date: 6/19/2025   Time Calculation  Start Time: 0925  Stop Time: 0950  Time Calculation (min): 25 min    Assessment/Plan   PT Assessment  PT Assessment Results: Decreased strength, Decreased endurance, Impaired balance, Decreased mobility  Rehab Prognosis: Good  Barriers to Discharge Home: Caregiver assistance, Physical needs  Caregiver Assistance: Caregiver assistance needed per identified barriers - however, level of patient's required assistance exceeds assistance available at home  Physical Needs: Intermittent mobility assistance needed, Intermittent ADL assistance needed, High falls risk due to function or environment  Evaluation/Treatment Tolerance: Patient limited by pain  Medical Staff Made Aware: Yes  Strengths: Ability to acquire knowledge, Housing layout, Insight into problems, Support of Caregivers  Barriers to Participation: Comorbidities  End of Session Communication: Bedside nurse, Care Coordinator  Assessment Comment: Patient is eager to improve however pain levels remain elevated. Currently patients functional mobility is limited d/t pain therefor MOD intensity PT intervention is recommended, anticipate as pain levels decrease will be able to change rec to LOW as patient owns all necessary equipment, supportive caregiver and one story home.  End of Session Patient Position: Bed, 4 rail up, Alarm on (4 rails per patients request, all needs within reach)  IP OR SWING BED PT PLAN  Inpatient or Swing Bed: Inpatient  PT Plan  Treatment/Interventions: Bed mobility, Transfer training, Gait training, Balance training, Strengthening, Endurance training, Therapeutic exercise  PT Plan: Ongoing PT  PT Frequency: 3 times per week  PT Discharge Recommendations: Moderate intensity level of continued care  Equipment Recommended upon Discharge: Wheeled walker (owns)  PT  The left coronary artery was selectively engaged and injected. A Catheter Flipaste Sup Trq 6fr 100cm Mpa2 Crv 2 Sh was used. Multiple views of the injected vessel were taken.  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 Fearful of anticipatory pain levels with mobility.  Home Living:  Home Living  Type of Home: House  Lives With: Spouse, Adult children (son)  Home Adaptive Equipment: Walker rolling or standard (2WW)  Home Layout: One level  Home Access: Ramped entrance  Bathroom Shower/Tub:  (sponge bathes)  Prior Level of Function:  Prior Function Per Pt/Caregiver Report  Level of [place]: Independent with ADLs and functional transfers, Independent with homemaking with ambulation  Receives Help From: Family  ADL Assistance: Independent  Homemaking Assistance: Independent (shared responsibility with  and son)  Ambulatory Assistance: Independent (short distances without walker, longer distances with)  Vocational: Full time employment  Precautions:  Precautions  LE Weight Bearing Status: Weight Bearing as Tolerated  Medical Precautions: Fall precautions (masimo, tele)  Precautions Comment: B LE ace wrapped, R LE adjusted d/t compression pain      Date/Time Vitals Session Patient Position Pulse Resp SpO2 BP MAP (mmHg)    06/19/25 0858 --  --  90  18  91 %  106/68  81                 Objective   Pain:  Pain Assessment  Pain Assessment: 0-10  0-10  (Numeric) Pain Score: 7  Pain Type: Surgical pain  Pain Location: Leg  Pain Orientation: Right  Pain Interventions: Repositioned, Ambulation/increased activity (RN aware and provided medication)  Response to Interventions: No change in pain  Cognition:  Cognition  Overall Cognitive Status: Within Functional Limits  Orientation Level: Oriented X4    General Assessments:     Activity Tolerance  Endurance: Tolerates 30 min exercise with multiple rests    Sensation  Light Touch: No apparent deficits    Strength  Strength Comments: Functionally B LE 4-/5    Coordination  Movements are Fluid and Coordinated: Yes    Postural Control  Postural Control: Within Functional Limits    Static Sitting Balance  Static Sitting-Balance Support: No upper extremity supported, Feet supported  Static Sitting-Level of Assistance: Independent       Functional Assessments:       Bed Mobility  Bed Mobility: Yes  Bed Mobility 1  Bed Mobility 1: Supine to sitting  Level of Assistance 1: Minimum assistance (R LE)  Bed Mobility 2  Bed Mobility  2: Sitting to supine  Level of Assistance 2: Close supervision    Transfers  Transfer: No (Patient unable to tolerate d/t level of pain in sitting)    Outcome Measures:  [facility] Basic Mobility  Turning from your back to your side while in a flat bed without using bedrails: A little  Moving from lying on your back to sitting on the side of a flat bed without using bedrails: A little  Moving to and from bed to chair (including a wheelchair): Total  Standing up from a chair using your arms (e.g. wheelchair or bedside chair): Total  To walk in hospital room: Total  Climbing 3-5 steps with railing: Total  Basic Mobility - Total Score: 10    Encounter Problems       Encounter Problems (Active)       Balance       STG - Maintains dynamic sitting balance without upper extremity support x 5' with dual task independently   (Progressing)       Start:  06/19/25    Expected End:  07/03/25               Mobility        STG - Patient will ambulate with 2WW 15' mod I   (Progressing)       Start:  06/19/25    Expected End:  07/03/25               PT Transfers       STG - Patient will perform bed mobility independently  (Progressing)       Start:  06/19/25    Expected End:  07/03/25            STG - Patient will transfer sit to and from stand mod I  (Progressing)       Start:  06/19/25    Expected End:  07/03/25               Pain - Adult              Education Documentation  Precautions, taught by Liliane Brooks PT at 6/19/2025 10:09 AM.  Learner: Patient  Readiness: Acceptance  Method: Explanation  Response: Verbalizes Understanding  Comment: Importance of progressing functional mobility as tolerated, staff assist for mobility, LE elevation    Mobility Training, taught by Liliane Brooks, PT at 6/19/2025 10:09 AM.  Learner: Patient  Readiness: Acceptance  Method: Explanation  Response: Verbalizes Understanding  Comment: Importance of progressing functional mobility as tolerated, staff assist for mobility, LE elevation    Education Comments  No comments found.

## 2025-06-19 NOTE — CARE PLAN
The patient's goals for the shift include  pain management    The clinical goals for the shift include Pain management      Problem: Pain - Adult  Goal: Verbalizes/displays adequate comfort level or baseline comfort level  Outcome: Progressing     Problem: Safety - Adult  Goal: Free from fall injury  Outcome: Progressing     Problem: Fall/Injury  Goal: Not fall by end of shift  Outcome: Progressing     Problem: Fall/Injury  Goal: Be free from injury by end of the shift  Outcome: Progressing     Problem: Fall/Injury  Goal: Verbalize understanding of personal risk factors for fall in the hospital  Outcome: Progressing     Problem: Pain  Goal: Takes deep breaths with improved pain control throughout the shift  Outcome: Progressing     Problem: Pain  Goal: Turns in bed with improved pain control throughout the shift  Outcome: Progressing

## 2025-06-19 NOTE — PROGRESS NOTES
Occupational Therapy    Evaluation    Patient Name: Frances Mcdowell  MRN: [id number]  Department: [address]  Room: [address] [place] [address]  Today's Date: 6/19/2025  Time Calculation  Start Time: 0926  Stop Time: 0951  Time Calculation (min): 25 min    Assessment  IP OT Assessment  OT Assessment: Pt presents with decreased ADL performance and impaired mobility, largely related to current level of post-op pain. Currently recommend moderate intensity skilled therapy services to increase functional outcomes and resume PLOF  Prognosis: Good  Barriers to Discharge Home: Caregiver assistance, Physical needs  Caregiver Assistance: Caregiver assistance needed per identified barriers - however, level of patient's required assistance exceeds assistance available at home  Physical Needs: 24hr mobility assistance needed, 24hr ADL assistance needed  Evaluation/Treatment Tolerance: Patient limited by pain  Medical Staff Made Aware: Yes  End of Session Communication: Bedside nurse, Care Coordinator  End of Session Patient Position: Bed, 4 rail up, Alarm on (4 rails raised at pt request for independence with in bed mobility)  Plan:  Treatment Interventions: ADL retraining, Functional transfer training, Equipment evaluation/education, Compensatory technique education  OT Frequency: 3 times per week  OT Discharge Recommendations: Moderate intensity level of continued care  Equipment Recommended upon Discharge: Wheeled walker (owns)  OT - OK to Discharge: Yes    Subjective       OT Visit Info:  OT Received On: 06/19/25  General Visit Info:  General  Reason for Referral: I&D R LE, I&D + debridement L LE; impaired ADL  Referred By: Matias Salguero  Past Medical History Relevant to Rehab: 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 Pt expresses anxiety and anticipation of increasing pain with mobility. Receptive to relaxation and non-pharm pain control techniques.  Precautions:  LE Weight Bearing Status: Weight Bearing as Tolerated  Medical Precautions: Fall precautions (masimo, tele, purewick)  Precautions Comment: B LE ace wrapped, RLE compression wrapping adjusted at pt request due to pt reported pain with current amount of pressure. Weeping noted from posterior calf through packing and ace wrap.           Pain:  Pain Assessment  Pain Assessment: 0-10  0-10 (Numeric) Pain Score: 7  Pain Type: Surgical pain  Pain Location: Leg  Pain Orientation: Right  Pain Interventions: Repositioned, Ambulation/increased activity, Elevated, Emotional support  Response to Interventions: No change in pain, Resting quietly (nurse provided medication prior to mobility and edu on breathing techniques to control pain and reduce associated anxiety)    Objective   Cognition:  Overall Cognitive Status: Within Functional Limits  Orientation Level: Oriented X4           Home Living:  Type of Home: House  Lives With: Spouse, Adult children  Home Adaptive Equipment: Walker rolling or standard (2WW)  Home Layout: One level  Home Access: Ramped entrance  Bathroom Toilet: Standard   Prior Function:  Receives Help From: Family  ADL Assistance: Independent (sponge bathes and decreibes modified ADL routine. Does require assistance from one additional person to manage her BLE dressing changes at home.)  Homemaking Assistance: Independent (shares with family)  Ambulatory Assistance: Independent (short distances no device, longer distance uses walker)  Vocational: Full time employment    ADL:  Eating Assistance: Independent  Grooming Assistance: Modified independent (Device)  Grooming Deficit: Setup  "(seated)  Bathing Assistance: Maximal  Bathing Deficit: Perineal area, Buttocks, Right upper leg, Left upper leg, Right lower leg including foot, Left lower leg including foot  UE Dressing Assistance: Modified independent (Device)  UE Dressing Deficit: Setup  LE Dressing Assistance: Total  Toileting Assistance with Device: Total  ADL Comments: ADl performance anticipated based on pt's clinical presentation  Activity Tolerance:  Endurance: Tolerates 30 min exercise with multiple rests  Bed Mobility/Transfers: Bed Mobility 1  Bed Mobility 1: Supine to sitting  Level of Assistance 1: Minimum assistance  Bed Mobility Comments 1: assistance primarily for RLE support. Cues for body mechanics and use of bed rail. Multiple rst breaks requested during positioning with cues for breathing  Bed Mobility 2  Bed Mobility  2: Sitting to supine, Rolling right, Rolling left  Level of Assistance 2: Close supervision  Bed Mobility Comments 2: using bed rails to reposition    Transfers  Transfer: No (Pt unable to tolerate due to reported pain while sitting EOB)      Sitting Balance:  Static Sitting Balance  Static Sitting-Balance Support: Bilateral upper extremity supported, Feet supported  Static Sitting-Level of Assistance: Contact guard  Static Sitting-Comment/Number of Minutes: support at RLE while seated due to pain in dependent \"dangling\" position    Sensation:  Light Touch: No apparent deficits  Strength:  Strength Comments: BUE functionally 4/5  Outcome Measures: [facility] Daily Activity  Putting on and taking off regular lower body clothing: Total  Bathing (including washing, rinsing, drying): A lot  Putting on and taking off regular upper body clothing: A little  Toileting, which includes using toilet, bedpan or urinal: Total  Taking care of personal grooming such as brushing teeth: A little  Eating Meals: None  Daily Activity - Total Score: 14      Education Documentation  Body Mechanics, taught by Noreen Perez OT at " 6/19/2025 10:59 AM.  Learner: Patient  Readiness: Acceptance  Method: Explanation, Demonstration  Response: Needs Reinforcement  Comment: during bed mobility    Goals:   Encounter Problems       Encounter Problems (Active)       ADLs       Patient will perform UB and LB bathing seated/sponge bathe with supervision, stand by assist level of assistance . (Progressing)       Start:  06/19/25    Expected End:  07/03/25            Patient with complete lower body dressing with minimal assist  level of assistance donning and doffing all LE clothes  with PRN adaptive equipment while supine in bed, edge of bed , and standing (Progressing)       Start:  06/19/25    Expected End:  07/03/25            Patient will complete toileting including hygiene clothing management/hygiene with set-up, supervision level of assistance . (Progressing)       Start:  06/19/25    Expected End:  07/03/25               MOBILITY       Pt will demo functional mobility necessary to complete ADL routine with LRD and sup (Progressing)       Start:  06/19/25    Expected End:  07/03/25               TRANSFERS       Patient will perform bed mobility modified independent level of assistance and bed rails in order to improve safety and independence with mobility (Progressing)       Start:  06/19/25    Expected End:  07/03/25            Patient will complete sit to stand transfer with supervision, stand by assist level of assistance and least restrictive device in order to improve safety and prepare for out of bed mobility. (Progressing)       Start:  06/19/25    Expected End:  07/03/25

## 2025-06-19 NOTE — PROGRESS NOTES
Frances Mcdowell is a 54 y.o. female on day 4 of admission presenting with Failure of outpatient treatment.    Subjective   POD#2 s/p B/L lower leg debridement with right leg I&D.  Feeling better.  Still having pain, but is improved.    Meds:  Scheduled medications  acetaminophen, 975 mg, oral, q6h  apixaban, 5 mg, oral, BID  cholecalciferol, 125 mcg, oral, Daily  levothyroxine, 100 mcg, oral, Daily  melatonin, 3 mg, oral, Nightly  nystatin, , Topical, Daily  pantoprazole, 40 mg, oral, Daily before breakfast  piperacillin-tazobactam, 4.5 g, intravenous, q6h  polyethylene glycol, 17 g, oral, Daily  rosuvastatin, 20 mg, oral, Daily  vancomycin, 1,000 mg, intravenous, q12h      Continuous medications  Continuous Medications[1]  PRN medications  PRN Medications[2]       Physical Exam    Constitutional:       Appearance: She is obese.      HENT:      Head: Normocephalic and atraumatic.   Cardiovascular:      Rate and Rhythm: Normal rate and regular rhythm.   Pulmonary:      Effort: Pulmonary effort is normal. No respiratory distress.      Breath sounds: No stridor. No wheezing, rhonchi or rales.   Chest:      Chest wall: No tenderness.   Abdominal:      General: There is no distension.      Tenderness: There is no abdominal tenderness.   Musculoskeletal:         General: Tenderness present.   Skin:     General: Skin is warm and dry.      Findings: Dressings intact to B/L lower legs without strikethrough;  Able to lift legs off bed;  Upon removal.  No active purulence,  much  appearance to open ulcerations with 20% slough, remainder granulation tissue;  much decreased edema and erythema to B/L lower legs.  Neurological:      General: No focal deficit present.      Mental Status: She is alert and oriented to person, place, and time.   Psychiatric:         Mood and Affect: Mood normal.         Behavior: Behavior normal.        Last Recorded Vitals  Blood pressure 112/70, pulse 92, temperature 36.5 °C (97.7 °F),  "temperature source Temporal, resp. rate 18, height 1.6 m (5' 3\"), weight 139 kg (306 lb 1.6 oz), SpO2 95%.    Intake/Output last 3 Shifts:  I/O last 3 completed shifts:  In: 6404.2 (46.1 mL/kg) [P.O.:3422; I.V.:199.9 (1.4 mL/kg); IV Piggyback:2782.3]  Out: 1925 (13.9 mL/kg) [Urine:1925 (0.4 mL/kg/hr)]  Weight: 138.8 kg     Relevant Results   Results for orders placed or performed during the hospital encounter of 06/15/25 (from the past 24 hours)   Magnesium   Result Value Ref Range    Magnesium 1.83 1.60 - 2.40 mg/dL   Renal Function Panel   Result Value Ref Range    Glucose 93 74 - 99 mg/dL    Sodium 136 136 - 145 mmol/L    Potassium 4.0 3.5 - 5.3 mmol/L    Chloride 101 98 - 107 mmol/L    Bicarbonate 29 21 - 32 mmol/L    Anion Gap 10 10 - 20 mmol/L    Urea Nitrogen 17 6 - 23 mg/dL    Creatinine 0.75 0.50 - 1.05 mg/dL    eGFR >90 >60 mL/min/1.73m*2    Calcium 7.8 (L) 8.6 - 10.3 mg/dL    Phosphorus 3.3 2.5 - 4.9 mg/dL    Albumin 2.3 (L) 3.4 - 5.0 g/dL   CBC   Result Value Ref Range    WBC 9.7 4.4 - 11.3 x10*3/uL    nRBC 0.0 0.0 - 0.0 /100 WBCs    RBC 3.19 (L) 4.00 - 5.20 x10*6/uL    Hemoglobin 9.0 (L) 12.0 - 16.0 g/dL    Hematocrit 30.4 (L) 36.0 - 46.0 %    MCV 95 80 - 100 fL    MCH 28.2 26.0 - 34.0 pg    MCHC 29.6 (L) 32.0 - 36.0 g/dL    RDW 15.3 (H) 11.5 - 14.5 %    Platelets 637 (H) 150 - 450 x10*3/uL   Vancomycin   Result Value Ref Range    Vancomycin 24.7 (H) 5.0 - 20.0 ug/mL   CT angio lower extremity right w and or wo IV contrast  Result Date: 6/15/2025  Interpreted By:  Andrei Joseph, STUDY: CT ANGIO LOWER EXTREMITY RIGHT W AND OR WO IV CONTRAST; ;  6/15/2025 4:42 pm   CT ANGIOGRAM LOWER EXTREMITIES WITH CONTRAST   INDICATION: Signs/Symptoms:rule out abscess open wound. 54-year-old woman with wound.     COMPARISON: CT abdomen pelvis 01/22/2025   ACCESSION NUMBER(S): [id number]   ORDERING CLINICIAN: CHELSY COOL   TECHNIQUE: Contiguous acquired helical axial images were obtained of the right lower " extremity after the uneventful administration of 90 mL Omnipaque 350. Coronal and sagittal multiplanar reformatted maximum intensity projection images were obtained. 3D volumetric surface rendered representation of arterial structures was performed on a separate workstation and submitted for interpretation.   FINDINGS: Angiographic:   The partially the partially imaged left common artery, as well as the left internal and external iliac arteries are patent. The left common femoral and profunda femoris arteries are patent. The left superficial femoral artery is patent along the entirety of the imaged left thigh.   The right common, internal common external iliac arteries, right common femoral artery profunda femoris artery are patent. There is right superficial femoral and popliteal arterial patency. There is probable 3 arterial vessel runoff to the level of the left ankle.   Contrast timing is suboptimal for the evaluation of venous structures. Large iliac veins of the pelvis are grossly unremarkable.       Nonangiographic:   The partially imaged pelvis shows nondilated loops of bowel. The bladder is decompressed. No free fluid, pneumatosis, or pneumoperitoneum. There is a right inguinal lymph nodes which are likely reactive. There is rather diffuse skin thickening and subcutaneous edema along the length of the entire right lower extremity which is worse from the right popliteal fossa distally. Superficial varicosities along the length of the right lower extremity are present. CT angiography provides limited evaluation lower extremity soft tissue structures. Within these limitations, no drainable subcutaneous fluid collection. No acute osseous abnormality.       1. Femoropopliteal arterial patency with patency of 3 arterial vessel runoff to the level of the right ankle. Left lower extremity iliofemoral patency as partially imaged to the distal left forearm. 2. Extensive right lower extremity skin thickening and  subcutaneous edema, more prominent in the lower leg. Limited evaluation of deep soft tissue structures. No large subcutaneous drainable fluid collection.   MACRO: None   Signed by: Andrei Joseph 6/15/2025 5:30 PM Dictation workstation:   [id number]  Susceptibility data from last 90 days.  Collected Specimen Info Organism   06/17/25 Swab from ABSCESS Pseudomonas aeruginosa     Mixed Gram-Negative Bacteria    06/15/25 Tissue/Biopsy from Wound/Tissue Pseudomonas aeruginosa     Mixed Anaerobic Bacteria     Mixed Gram-Positive and Gram-Negative Bacteria     Assessment & Plan  Failure of outpatient treatment    Non-pressure chronic ulcer of left calf with fat layer exposed (Multi)    Non-pressure chronic ulcer of right calf with fat layer exposed (Multi)    Right lower extremity ulceration/infection -POD#2  s/p B/L lower leg ulceration debridement with right posterior calf I&D  Dressing changed with adaptic, aquacel (aquacel packing,  ABD, kerlix and ace bandage.  IV antibiotics per ID.  Awaiting OR culture.  ELEVATE  LOWER LEGs  Will follow.          Mami Tang DPM           [1]    [2] PRN medications: HYDROmorphone, LORazepam, oxyCODONE, oxyCODONE, vancomycin

## 2025-06-19 NOTE — PROGRESS NOTES
06/19/25 1500   Referral To   [facility] Other (Comment)  (SW submitted completed [place] paperwork to pt's employer)

## 2025-06-19 NOTE — PROGRESS NOTES
Frances Mcdowell is a 54 y.o. female on day 4 of admission presenting with Failure of outpatient treatment.    Subjective   Interval History: no fever, no new complaints        Review of Systems    Objective   Range of Vitals (last 24 hours)  Heart Rate:  [83-91]   Temp:  [36.2 °C (97.2 °F)-36.7 °C (98.1 °F)]   Resp:  [16-18]   BP: ()/(50-80)   SpO2:  [91 %-98 %]   Daily Weight  06/15/25 : 139 kg (306 lb 1.6 oz)    Body mass index is 54.22 kg/m².    Physical Exam  Constitutional:       Appearance: Normal appearance.   HENT:      Head: Normocephalic and atraumatic.      Mouth/Throat:      Mouth: Mucous membranes are moist.      Pharynx: Oropharynx is clear.   Eyes:      Pupils: Pupils are equal, round, and reactive to light.   Cardiovascular:      Rate and Rhythm: Normal rate and regular rhythm.      Heart sounds: Normal heart sounds.   Pulmonary:      Effort: Pulmonary effort is normal.      Breath sounds: Normal breath sounds.   Abdominal:      General: Abdomen is flat. Bowel sounds are normal.      Palpations: Abdomen is soft.   Musculoskeletal:         General: Swelling present.      Cervical back: Normal range of motion.      Comments: RT leg dressing   Neurological:      Mental Status: She is alert.         Antibiotics  vancomycin - 1250 mg/250 mL    Relevant Results  Labs  Results from last 72 hours   Lab Units 06/19/25  0713 06/18/25  0412 06/17/25  0643   WBC AUTO x10*3/uL 9.7 15.7* 19.3*   HEMOGLOBIN g/dL 9.0* 8.3* 9.3*   HEMATOCRIT % 30.4* 26.3* 29.3*   PLATELETS AUTO x10*3/uL 637* 537* 518*     Results from last 72 hours   Lab Units 06/19/25  0713 06/18/25  0412 06/17/25  0643   SODIUM mmol/L 136 134* 133*   POTASSIUM mmol/L 4.0 4.0 3.6   CHLORIDE mmol/L 101 101 99   CO2 mmol/L 29 27 27   BUN mg/dL 17 14 13   CREATININE mg/dL 0.75 0.68 0.90   GLUCOSE mg/dL 93 100* 97   CALCIUM mg/dL 7.8* 7.6* 7.7*   ANION GAP mmol/L 10 10 11   EGFR mL/min/1.73m*2 >90 >90 75   PHOSPHORUS mg/dL 3.3 3.6 3.7     Results  from last 72 hours   Lab Units 06/19/25  0713 06/18/25  0412 06/17/25  0643   ALBUMIN g/dL 2.3* 2.3* 2.3*     Estimated Creatinine Clearance: 115 mL/min (by C-G formula based on SCr of 0.75 mg/dL).  C-Reactive Protein   Date Value Ref Range Status   06/15/2025 22.53 (H) <1.00 mg/dL Final   08/06/2024 23.21 (H) <1.00 mg/dL Final     Microbiology  Reviewed  Imaging  Reviewed          Assessment/Plan     Legs stasis / wounds / possible Rt leg cellulitis, sp debridement,, the culture is pending      Recommendations :  Continue Vancomycin  Discussed with the medical team     I spent minutes in the professional and overall care of this patient.          Mgiuel Hu MD

## 2025-06-19 NOTE — PROGRESS NOTES
Department of Hospital Medicine  Daily Progress Note   Hospital Day: 5       Name:Frances Mcdowell, AGE: 54 y.o., GENDER: female, MRN: [id number], ROOM: 112/[facility]   CODE STATUS: Full Code  Attending Physician: Matias Salguero MD  Resident: Artur Pandya DO        Chief Complaint     Chief Complaint   Patient presents with    Leg Pain        Interval History   Frances Mcdowell is a 54 y.o. year old female patient on Hospital Day: 5      Subjective    Her pain is much improved from days prior.  She reports no concerns other than pain in her right lower extremity.     Meds    Scheduled medications  Scheduled Medications[1]  Continuous medications  Continuous Medications[2]  PRN medications  PRN Medications[3]     Objective    Exam   Physical Exam  Constitutional:       Appearance: Normal appearance. She is obese.   HENT:      Head: Normocephalic.      Nose: Nose normal.   Eyes:      Extraocular Movements: Extraocular movements intact.      Conjunctiva/sclera: Conjunctivae normal.      Pupils: Pupils are equal, round, and reactive to light.   Cardiovascular:      Rate and Rhythm: Normal rate and regular rhythm.      Pulses: Normal pulses.      Heart sounds: Normal heart sounds.   Pulmonary:      Effort: Pulmonary effort is normal.      Breath sounds: Normal breath sounds.   Abdominal:      General: Abdomen is flat. Bowel sounds are normal.      Palpations: Abdomen is soft.   Musculoskeletal:         General: Swelling (RLE) and tenderness (RLE) present.      Comments: See media for picture of wound   Skin:     General: Skin is warm.      Findings: Lesion (RLE) present.      Comments: Dressing CDI RLE   Neurological:      General: No focal deficit present.      Mental Status: She is alert and oriented to person, place, and time. Mental status is at baseline.   Psychiatric:         Mood and Affect: Mood normal.         Behavior: Behavior normal.         Thought Content: Thought content normal.         Judgment: Judgment normal.       "    Vitals         6/18/2025    12:43 PM 6/18/2025     2:41 PM 6/18/2025     3:34 PM 6/18/2025     5:46 PM 6/18/2025     9:21 PM 6/19/2025    12:30 AM 6/19/2025     5:20 AM   Vitals   Systolic   108  94 122 107   Diastolic   71  68 80 66   BP Location   Left arm  Left arm Left arm Left arm   Heart Rate 90 89 89 89 91 86 83   Temp   36.7 °C (98.1 °F)  36.3 °C (97.3 °F) 36.4 °C (97.5 °F) 36.4 °C (97.5 °F)   Resp   18  18 16 18        Intake/Output Summary (Last 24 hours) at 6/19/2025 0810  Last data filed at 6/19/2025 0625  Gross per 24 hour   Intake 4629.33 ml   Output 1325 ml   Net 3304.33 ml       Labs   Labs:   Results from last 72 hours   Lab Units 06/19/25  0713 06/18/25  0412 06/17/25  0643   SODIUM mmol/L 136 134* 133*   POTASSIUM mmol/L 4.0 4.0 3.6   CHLORIDE mmol/L 101 101 99   CO2 mmol/L 29 27 27   BUN mg/dL 17 14 13   CREATININE mg/dL 0.75 0.68 0.90   GLUCOSE mg/dL 93 100* 97   CALCIUM mg/dL 7.8* 7.6* 7.7*   ANION GAP mmol/L 10 10 11   EGFR mL/min/1.73m*2 >90 >90 75   PHOSPHORUS mg/dL 3.3 3.6 3.7      Results from last 72 hours   Lab Units 06/19/25  0713 06/18/25  0412 06/17/25  0643   WBC AUTO x10*3/uL 9.7 15.7* 19.3*   HEMOGLOBIN g/dL 9.0* 8.3* 9.3*   HEMATOCRIT % 30.4* 26.3* 29.3*   PLATELETS AUTO x10*3/uL 637* 537* 518*      Lab Results   Component Value Date    CALCIUM 7.8 (L) 06/19/2025    PHOS 3.3 06/19/2025      Lab Results   Component Value Date    CRP 22.53 (H) 06/15/2025      [unfilled]     Micro/ID:   Susceptibility data from last 90 days.  Collected Specimen Info Organism   06/15/25 Tissue/Biopsy from Wound/Tissue Mixed Gram-Positive and Gram-Negative Bacteria                    No lab exists for component: \"AGALPCRNB\"     Lab Results   Component Value Date    URINECULTURE SEE NOTE 06/05/2025    BLOODCULT No growth at 3 days 06/15/2025    BLOODCULT No growth at 3 days 06/15/2025       Images    Imaging  CT angio lower extremity right w and or wo IV contrast  Result Date: 6/15/2025  1. " Femoropopliteal arterial patency with patency of 3 arterial vessel runoff to the level of the right ankle. Left lower extremity iliofemoral patency as partially imaged to the distal left forearm. 2. Extensive right lower extremity skin thickening and subcutaneous edema, more prominent in the lower leg. Limited evaluation of deep soft tissue structures. No large subcutaneous drainable fluid collection.   MACRO: None   Signed by: Andrei Joseph 6/15/2025 5:30 PM Dictation workstation:   [id number]      Cardiology, Vascular, and Other Imaging  No other imaging results found for the past 7 days      Assessment    Assessment and Plan    Frances Mcdowell is a 54 y.o. female with PMHx significant for saddle PE (on Eliquis), chronic lower extremity wounds with recurrent cellulitis (hx of pseudomonas and MRSA), lymphedema, ADD, HLD, and hypothyroidism s/p thyroidectomy admitted on 6/15/2025 for R lower extremity cellulitis in the setting of chronic lower extremity wounds and lymphedema.     ACUTE MEDICAL ISSUES:  #Bilateral lymphedema  #Chronic right leg wound with infection / #Cellulitis of right leg  ::Hx pseudomonas and MRSA tissue cultures 10/24 and 12/24 susceptible to vancomycin and zosyn   ::Inflammatory markers elevated; ESR 49 and CRP 22.53  ::Photos of leg wound in chart under media   :: Wound culture shows few polymorphonuclear leukocytes and abundant mixed gram-positive and negative bacteria  PLAN:  -Blood cultures NGTD  - 6/19 OR cultures positive for abundant Pseudomonas, mixed anaerobic bacteria, Gram stain positive for abundant mixed gram-positive and gram-negative bacteria, few polymorphonuclear leukocytes  -Continue vancomycin, added Zosyn back on 6/19  -Podiatry consulted, debridment 6/18. Ativan 0.5 mg ordered for dressing change  -ID following  -Pain regimen: Tylenol 975 mg q6 hours scheduled, oxycodone 5 and 10 mg q3 hours PRN for moderate and severe pain, Dilaudid 0.4 mg q3 hours PRN for breakthrough  pain  -Resume Eliquis , DC heparin per podiatry  - PT/OT following, rec moderate intensity    #Thrombocytosis  ::Platelets 459 on admission, most recent 637  ::Suspect may be due to infection  PLAN:  -Continue to monitor on daily CBC     RESOLVED MEDICAL ISSUES  #Leukocytosis    CHRONIC MEDICAL ISSUES:  #Hx saddle PE - home Eliquis   #HLD - continue home rosuvastatin 20 mg daily   #ADD - hold home Adderall, patient does not want while inpatient   #Hypothyroidism - continue home levothyroxine 100 mcg daily   #Vitamin D deficiency - continue home vitamin D supplementation  #GERD - continue home omeprazole 40 mg daily         Fluids: None  Electrolytes: Replete PRN  Nutrition: Regular diet  Antimicrobials: Vancomycin and Zosyn  DVT ppx: Eliquis  GI ppx: PPI  Catheter: None  Lines: PIV  Supplemental Oxygen: Room Air   Emergency Contact: Extended Emergency Contact Information  Primary Emergency Contact: SADE CANNON  Mobile Phone: [phone]  Relation: Daughter   needed? No  Secondary Emergency Contact: MANJULA COOK III  Address: [address] [place] [id number] [facility]  Mobile Phone: [phone]  Relation: Spouse   needed? No   Code: Full Code      Disposition: Patient will stay on floor to treat Pseudomonas abscess with IV antibiotics   Artur Pandya DO  PGY- 1  06/19/25 at 8:10 AM                   [1] acetaminophen, 975 mg, oral, q6h  apixaban, 5 mg, oral, BID  cholecalciferol, 125 mcg, oral, Daily  levothyroxine, 100 mcg, oral, Daily  melatonin, 3 mg, oral, Nightly  nystatin, , Topical, Daily  pantoprazole, 40 mg, oral, Daily before breakfast  polyethylene glycol, 17 g, oral, Daily  rosuvastatin, 20 mg, oral, Daily  vancomycin, 1,250 mg, intravenous, q12h     [2]    [3] PRN medications: HYDROmorphone, LORazepam, oxyCODONE, oxyCODONE, vancomycin

## 2025-06-20 ENCOUNTER — APPOINTMENT (OUTPATIENT)
Dept: CARDIOLOGY | Facility: HOSPITAL | Age: 55
DRG: 571 | End: 2025-06-20
Payer: COMMERCIAL

## 2025-06-20 LAB
ALBUMIN SERPL BCP-MCNC: 2.2 G/DL (ref 3.4–5)
ANION GAP SERPL CALC-SCNC: 8 MMOL/L (ref 10–20)
BACTERIA BLD CULT: NORMAL
BACTERIA BLD CULT: NORMAL
BACTERIA SPEC CULT: ABNORMAL
BACTERIA SPEC CULT: ABNORMAL
BUN SERPL-MCNC: 16 MG/DL (ref 6–23)
CALCIUM SERPL-MCNC: 8 MG/DL (ref 8.6–10.3)
CHLORIDE SERPL-SCNC: 100 MMOL/L (ref 98–107)
CO2 SERPL-SCNC: 31 MMOL/L (ref 21–32)
CREAT SERPL-MCNC: 0.83 MG/DL (ref 0.5–1.05)
EGFRCR SERPLBLD CKD-EPI 2021: 83 ML/MIN/1.73M*2
ERYTHROCYTE [DISTWIDTH] IN BLOOD BY AUTOMATED COUNT: 15.4 % (ref 11.5–14.5)
GLUCOSE SERPL-MCNC: 92 MG/DL (ref 74–99)
GRAM STN SPEC: ABNORMAL
GRAM STN SPEC: ABNORMAL
HCT VFR BLD AUTO: 27.6 % (ref 36–46)
HGB BLD-MCNC: 8.3 G/DL (ref 12–16)
MAGNESIUM SERPL-MCNC: 1.79 MG/DL (ref 1.6–2.4)
MCH RBC QN AUTO: 28.7 PG (ref 26–34)
MCHC RBC AUTO-ENTMCNC: 30.1 G/DL (ref 32–36)
MCV RBC AUTO: 96 FL (ref 80–100)
NRBC BLD-RTO: 0 /100 WBCS (ref 0–0)
PHOSPHATE SERPL-MCNC: 3.8 MG/DL (ref 2.5–4.9)
PLATELET # BLD AUTO: 596 X10*3/UL (ref 150–450)
POTASSIUM SERPL-SCNC: 4.4 MMOL/L (ref 3.5–5.3)
RBC # BLD AUTO: 2.89 X10*6/UL (ref 4–5.2)
SODIUM SERPL-SCNC: 135 MMOL/L (ref 136–145)
WBC # BLD AUTO: 9.2 X10*3/UL (ref 4.4–11.3)

## 2025-06-20 PROCEDURE — 99232 SBSQ HOSP IP/OBS MODERATE 35: CPT

## 2025-06-20 PROCEDURE — 1100000001 HC PRIVATE ROOM DAILY

## 2025-06-20 PROCEDURE — 2500000004 HC RX 250 GENERAL PHARMACY W/ HCPCS (ALT 636 FOR OP/ED): Performed by: STUDENT IN AN ORGANIZED HEALTH CARE EDUCATION/TRAINING PROGRAM

## 2025-06-20 PROCEDURE — 80069 RENAL FUNCTION PANEL: CPT | Performed by: PODIATRIST

## 2025-06-20 PROCEDURE — 2500000004 HC RX 250 GENERAL PHARMACY W/ HCPCS (ALT 636 FOR OP/ED): Mod: JZ

## 2025-06-20 PROCEDURE — 2500000001 HC RX 250 WO HCPCS SELF ADMINISTERED DRUGS (ALT 637 FOR MEDICARE OP): Performed by: PODIATRIST

## 2025-06-20 PROCEDURE — 2500000001 HC RX 250 WO HCPCS SELF ADMINISTERED DRUGS (ALT 637 FOR MEDICARE OP)

## 2025-06-20 PROCEDURE — 85027 COMPLETE CBC AUTOMATED: CPT | Performed by: PODIATRIST

## 2025-06-20 PROCEDURE — 2500000004 HC RX 250 GENERAL PHARMACY W/ HCPCS (ALT 636 FOR OP/ED)

## 2025-06-20 PROCEDURE — 2500000004 HC RX 250 GENERAL PHARMACY W/ HCPCS (ALT 636 FOR OP/ED): Performed by: PODIATRIST

## 2025-06-20 PROCEDURE — 83735 ASSAY OF MAGNESIUM: CPT | Performed by: PODIATRIST

## 2025-06-20 PROCEDURE — 93005 ELECTROCARDIOGRAM TRACING: CPT

## 2025-06-20 PROCEDURE — 36415 COLL VENOUS BLD VENIPUNCTURE: CPT | Performed by: PODIATRIST

## 2025-06-20 PROCEDURE — 2500000002 HC RX 250 W HCPCS SELF ADMINISTERED DRUGS (ALT 637 FOR MEDICARE OP, ALT 636 FOR OP/ED): Performed by: PODIATRIST

## 2025-06-20 RX ORDER — LORAZEPAM 2 MG/ML
0.5 INJECTION INTRAMUSCULAR EVERY 6 HOURS PRN
Status: DISCONTINUED | OUTPATIENT
Start: 2025-06-20 | End: 2025-06-21

## 2025-06-20 RX ADMIN — PIPERACILLIN SODIUM AND TAZOBACTAM SODIUM 4.5 G: 4; .5 INJECTION, SOLUTION INTRAVENOUS at 14:01

## 2025-06-20 RX ADMIN — HYDROMORPHONE HYDROCHLORIDE 0.5 MG: 1 INJECTION, SOLUTION INTRAMUSCULAR; INTRAVENOUS; SUBCUTANEOUS at 17:54

## 2025-06-20 RX ADMIN — APIXABAN 5 MG: 5 TABLET, FILM COATED ORAL at 08:16

## 2025-06-20 RX ADMIN — OXYCODONE HYDROCHLORIDE 10 MG: 10 TABLET ORAL at 02:46

## 2025-06-20 RX ADMIN — PIPERACILLIN SODIUM AND TAZOBACTAM SODIUM 4.5 G: 4; .5 INJECTION, SOLUTION INTRAVENOUS at 02:41

## 2025-06-20 RX ADMIN — OXYCODONE HYDROCHLORIDE 10 MG: 10 TABLET ORAL at 13:03

## 2025-06-20 RX ADMIN — ROSUVASTATIN CALCIUM 20 MG: 20 TABLET, FILM COATED ORAL at 08:16

## 2025-06-20 RX ADMIN — ACETAMINOPHEN 975 MG: 325 TABLET, FILM COATED ORAL at 23:24

## 2025-06-20 RX ADMIN — ACETAMINOPHEN 975 MG: 325 TABLET, FILM COATED ORAL at 17:00

## 2025-06-20 RX ADMIN — LEVOTHYROXINE SODIUM 100 MCG: 100 TABLET ORAL at 05:43

## 2025-06-20 RX ADMIN — OXYCODONE HYDROCHLORIDE 10 MG: 10 TABLET ORAL at 09:14

## 2025-06-20 RX ADMIN — OXYCODONE HYDROCHLORIDE 10 MG: 10 TABLET ORAL at 23:44

## 2025-06-20 RX ADMIN — OXYCODONE HYDROCHLORIDE 10 MG: 10 TABLET ORAL at 20:45

## 2025-06-20 RX ADMIN — POLYETHYLENE GLYCOL 3350 17 G: 17 POWDER, FOR SOLUTION ORAL at 08:20

## 2025-06-20 RX ADMIN — ACETAMINOPHEN 975 MG: 325 TABLET, FILM COATED ORAL at 11:43

## 2025-06-20 RX ADMIN — OXYCODONE HYDROCHLORIDE 10 MG: 10 TABLET ORAL at 05:43

## 2025-06-20 RX ADMIN — OXYCODONE HYDROCHLORIDE 10 MG: 10 TABLET ORAL at 17:00

## 2025-06-20 RX ADMIN — HYDROMORPHONE HYDROCHLORIDE 0.5 MG: 1 INJECTION, SOLUTION INTRAMUSCULAR; INTRAVENOUS; SUBCUTANEOUS at 11:47

## 2025-06-20 RX ADMIN — PANTOPRAZOLE SODIUM 40 MG: 40 TABLET, DELAYED RELEASE ORAL at 05:43

## 2025-06-20 RX ADMIN — VANCOMYCIN HYDROCHLORIDE 1000 MG: 1 INJECTION, SOLUTION INTRAVENOUS at 09:15

## 2025-06-20 RX ADMIN — ACETAMINOPHEN 975 MG: 325 TABLET, FILM COATED ORAL at 05:43

## 2025-06-20 RX ADMIN — NYSTATIN: 100000 POWDER TOPICAL at 08:19

## 2025-06-20 RX ADMIN — Medication 125 MCG: at 08:16

## 2025-06-20 RX ADMIN — APIXABAN 5 MG: 5 TABLET, FILM COATED ORAL at 20:29

## 2025-06-20 RX ADMIN — PIPERACILLIN SODIUM AND TAZOBACTAM SODIUM 4.5 G: 4; .5 INJECTION, SOLUTION INTRAVENOUS at 20:29

## 2025-06-20 RX ADMIN — PIPERACILLIN SODIUM AND TAZOBACTAM SODIUM 4.5 G: 4; .5 INJECTION, SOLUTION INTRAVENOUS at 08:16

## 2025-06-20 SDOH — SOCIAL STABILITY: SOCIAL NETWORK: COMMUNITY RESOURCES: OTHER (COMMENT)

## 2025-06-20 ASSESSMENT — COGNITIVE AND FUNCTIONAL STATUS - GENERAL
DRESSING REGULAR LOWER BODY CLOTHING: A LITTLE
DAILY ACTIVITIY SCORE: 23
MOBILITY SCORE: 21
CLIMB 3 TO 5 STEPS WITH RAILING: A LOT
WALKING IN HOSPITAL ROOM: A LITTLE

## 2025-06-20 ASSESSMENT — PAIN DESCRIPTION - DESCRIPTORS
DESCRIPTORS: ACHING

## 2025-06-20 ASSESSMENT — PAIN - FUNCTIONAL ASSESSMENT
PAIN_FUNCTIONAL_ASSESSMENT: 0-10

## 2025-06-20 ASSESSMENT — PAIN SCALES - GENERAL
PAINLEVEL_OUTOF10: 5 - MODERATE PAIN
PAINLEVEL_OUTOF10: 4
PAINLEVEL_OUTOF10: 8
PAINLEVEL_OUTOF10: 9
PAINLEVEL_OUTOF10: 7
PAINLEVEL_OUTOF10: 5 - MODERATE PAIN
PAINLEVEL_OUTOF10: 9
PAINLEVEL_OUTOF10: 9

## 2025-06-20 ASSESSMENT — PAIN DESCRIPTION - LOCATION
LOCATION: LEG
LOCATION: LEG

## 2025-06-20 ASSESSMENT — PAIN DESCRIPTION - ORIENTATION
ORIENTATION: RIGHT
ORIENTATION: RIGHT
ORIENTATION: RIGHT;LEFT

## 2025-06-20 NOTE — PROGRESS NOTES
06/20/25 1200   Referral Data   Referral Source Self referral   County Information   County [place]   Referral To   Community Resources Other (Comment)  (SW provided pt with community resources.  No other needs identified.)

## 2025-06-20 NOTE — PROGRESS NOTES
Frances Mcdowell is a 54 y.o. female on day 5 of admission presenting with Failure of outpatient treatment.    Subjective   Interval History: no fever, no new complaints        Review of Systems    Objective   Range of Vitals (last 24 hours)  Heart Rate:  [85-96]   Temp:  [35.9 °C (96.6 °F)-36.6 °C (97.9 °F)]   Resp:  [17-22]   BP: ()/(57-77)   SpO2:  [94 %-97 %]   Daily Weight  06/15/25 : 139 kg (306 lb 1.6 oz)    Body mass index is 54.22 kg/m².    Physical Exam  Constitutional:       Appearance: Normal appearance.   HENT:      Head: Normocephalic and atraumatic.      Mouth/Throat:      Mouth: Mucous membranes are moist.      Pharynx: Oropharynx is clear.   Eyes:      Pupils: Pupils are equal, round, and reactive to light.   Cardiovascular:      Rate and Rhythm: Normal rate and regular rhythm.      Heart sounds: Normal heart sounds.   Pulmonary:      Effort: Pulmonary effort is normal.      Breath sounds: Normal breath sounds.   Abdominal:      General: Abdomen is flat. Bowel sounds are normal.      Palpations: Abdomen is soft.   Musculoskeletal:         General: Swelling present.      Cervical back: Normal range of motion.      Comments: RT leg dressing, the photos were reviewed   Neurological:      Mental Status: She is alert.         Antibiotics  piperacillin-tazobactam - 4.5 gram/100 mL  vancomycin - 1 gram/200 mL    Relevant Results  Labs  Results from last 72 hours   Lab Units 06/20/25  0655 06/19/25  0713 06/18/25  0412   WBC AUTO x10*3/uL 9.2 9.7 15.7*   HEMOGLOBIN g/dL 8.3* 9.0* 8.3*   HEMATOCRIT % 27.6* 30.4* 26.3*   PLATELETS AUTO x10*3/uL 596* 637* 537*     Results from last 72 hours   Lab Units 06/20/25  0655 06/19/25  0713 06/18/25  0412   SODIUM mmol/L 135* 136 134*   POTASSIUM mmol/L 4.4 4.0 4.0   CHLORIDE mmol/L 100 101 101   CO2 mmol/L 31 29 27   BUN mg/dL 16 17 14   CREATININE mg/dL 0.83 0.75 0.68   GLUCOSE mg/dL 92 93 100*   CALCIUM mg/dL 8.0* 7.8* 7.6*   ANION GAP mmol/L 8* 10 10   EGFR  mL/min/1.73m*2 83 >90 >90   PHOSPHORUS mg/dL 3.8 3.3 3.6     Results from last 72 hours   Lab Units 06/20/25  0655 06/19/25  0713 06/18/25  0412   ALBUMIN g/dL 2.2* 2.3* 2.3*     Estimated Creatinine Clearance: 103.9 mL/min (by C-G formula based on SCr of 0.83 mg/dL).  C-Reactive Protein   Date Value Ref Range Status   06/15/2025 22.53 (H) <1.00 mg/dL Final   08/06/2024 23.21 (H) <1.00 mg/dL Final     Microbiology  Reviewed  Imaging  Reviewed          Assessment/Plan     Legs stasis / wounds / possible Rt leg cellulitis, sp debridement,, the culture with Peseudomonas / mixed tylor (s) pending     Recommendations :  Continue Vancomycin  Zosyn was added   Discussed with the surgery team     I spent minutes in the professional and overall care of this patient.          Miguel Hu MD

## 2025-06-20 NOTE — PROGRESS NOTES
Frances Mcdowell is a 54 y.o. female on day 5 of admission presenting with Failure of outpatient treatment.    Subjective   POD#3 s/p B/L lower leg debridement with right leg I&D.  Feeling better overall.  Did get up to bathroom.  Some drainage through right lower leg bandage.    Meds:  Scheduled medications  acetaminophen, 975 mg, oral, q6h  apixaban, 5 mg, oral, BID  cholecalciferol, 125 mcg, oral, Daily  levothyroxine, 100 mcg, oral, Daily  melatonin, 3 mg, oral, Nightly  nystatin, , Topical, Daily  pantoprazole, 40 mg, oral, Daily before breakfast  piperacillin-tazobactam, 4.5 g, intravenous, q6h  polyethylene glycol, 17 g, oral, Daily  rosuvastatin, 20 mg, oral, Daily      Continuous medications  Continuous Medications[1]  PRN medications  PRN Medications[2]       Physical Exam   Constitutional:       Appearance: She is obese.      HENT:      Head: Normocephalic and atraumatic.   Cardiovascular:      Rate and Rhythm: Normal rate and regular rhythm.   Pulmonary:      Effort: Pulmonary effort is normal. No respiratory distress.      Breath sounds: No stridor. No wheezing, rhonchi or rales.   Chest:      Chest wall: No tenderness.   Abdominal:      General: There is no distension.      Tenderness: There is no abdominal tenderness.   Musculoskeletal:         General: Tenderness present.   Skin:     General: Skin is warm and dry.      Findings: Dressings intact to B/L lower legs with  strikethrough;  Able to lift legs off bed;  Upon removal.  No active purulence,  much  appearance to open ulcerations with 20% slough, remainder granulation tissue;  much decreased edema and erythema to B/L lower legs.   Neurological:      General: No focal deficit present.      Mental Status: She is alert and oriented to person, place, and time.   Psychiatric:         Mood and Affect: Mood normal.         Behavior: Behavior normal.        Last Recorded Vitals  Blood pressure 103/77, pulse 88, temperature 36.5 °C (97.7 °F),  "temperature source Temporal, resp. rate 16, height 1.6 m (5' 3\"), weight 139 kg (306 lb 1.6 oz), SpO2 99%.    Intake/Output last 3 Shifts:  I/O last 3 completed shifts:  In: 3546 (25.5 mL/kg) [P.O.:2481; I.V.:15 (0.1 mL/kg); IV Piggyback:1050]  Out: 1725 (12.4 mL/kg) [Urine:1725 (0.3 mL/kg/hr)]  Weight: 138.8 kg     Relevant Results   Results for orders placed or performed during the hospital encounter of 06/15/25 (from the past 24 hours)   Magnesium   Result Value Ref Range    Magnesium 1.79 1.60 - 2.40 mg/dL   Renal Function Panel   Result Value Ref Range    Glucose 92 74 - 99 mg/dL    Sodium 135 (L) 136 - 145 mmol/L    Potassium 4.4 3.5 - 5.3 mmol/L    Chloride 100 98 - 107 mmol/L    Bicarbonate 31 21 - 32 mmol/L    Anion Gap 8 (L) 10 - 20 mmol/L    Urea Nitrogen 16 6 - 23 mg/dL    Creatinine 0.83 0.50 - 1.05 mg/dL    eGFR 83 >60 mL/min/1.73m*2    Calcium 8.0 (L) 8.6 - 10.3 mg/dL    Phosphorus 3.8 2.5 - 4.9 mg/dL    Albumin 2.2 (L) 3.4 - 5.0 g/dL   CBC   Result Value Ref Range    WBC 9.2 4.4 - 11.3 x10*3/uL    nRBC 0.0 0.0 - 0.0 /100 WBCs    RBC 2.89 (L) 4.00 - 5.20 x10*6/uL    Hemoglobin 8.3 (L) 12.0 - 16.0 g/dL    Hematocrit 27.6 (L) 36.0 - 46.0 %    MCV 96 80 - 100 fL    MCH 28.7 26.0 - 34.0 pg    MCHC 30.1 (L) 32.0 - 36.0 g/dL    RDW 15.4 (H) 11.5 - 14.5 %    Platelets 596 (H) 150 - 450 x10*3/uL   CT angio lower extremity right w and or wo IV contrast  Result Date: 6/15/2025  Interpreted By:  Andrei Joseph, STUDY: CT ANGIO LOWER EXTREMITY RIGHT W AND OR WO IV CONTRAST; ;  6/15/2025 4:42 pm   CT ANGIOGRAM LOWER EXTREMITIES WITH CONTRAST   INDICATION: Signs/Symptoms:rule out abscess open wound. 54-year-old woman with wound.     COMPARISON: CT abdomen pelvis 01/22/2025   ACCESSION NUMBER(S): [id number]   ORDERING CLINICIAN: CHELSY COOL   TECHNIQUE: Contiguous acquired helical axial images were obtained of the right lower extremity after the uneventful administration of 90 mL Omnipaque 350. Coronal " and sagittal multiplanar reformatted maximum intensity projection images were obtained. 3D volumetric surface rendered representation of arterial structures was performed on a separate workstation and submitted for interpretation.   FINDINGS: Angiographic:   The partially the partially imaged left common artery, as well as the left internal and external iliac arteries are patent. The left common femoral and profunda femoris arteries are patent. The left superficial femoral artery is patent along the entirety of the imaged left thigh.   The right common, internal common external iliac arteries, right common femoral artery profunda femoris artery are patent. There is right superficial femoral and popliteal arterial patency. There is probable 3 arterial vessel runoff to the level of the left ankle.   Contrast timing is suboptimal for the evaluation of venous structures. Large iliac veins of the pelvis are grossly unremarkable.       Nonangiographic:   The partially imaged pelvis shows nondilated loops of bowel. The bladder is decompressed. No free fluid, pneumatosis, or pneumoperitoneum. There is a right inguinal lymph nodes which are likely reactive. There is rather diffuse skin thickening and subcutaneous edema along the length of the entire right lower extremity which is worse from the right popliteal fossa distally. Superficial varicosities along the length of the right lower extremity are present. CT angiography provides limited evaluation lower extremity soft tissue structures. Within these limitations, no drainable subcutaneous fluid collection. No acute osseous abnormality.       1. Femoropopliteal arterial patency with patency of 3 arterial vessel runoff to the level of the right ankle. Left lower extremity iliofemoral patency as partially imaged to the distal left forearm. 2. Extensive right lower extremity skin thickening and subcutaneous edema, more prominent in the lower leg. Limited evaluation of deep  soft tissue structures. No large subcutaneous drainable fluid collection.   MACRO: None   Signed by: Andrei Joseph 6/15/2025 5:30 PM Dictation workstation:   [id number]  Susceptibility data from last 90 days.  Collected Specimen Info Organism Aztreonam Cefepime Ceftazidime Ciprofloxacin Levofloxacin Piperacillin/Tazobactam Tobramycin   06/17/25 Swab from ABSCESS Pseudomonas aeruginosa  S  S  S  R  R  S  S     Mixed Gram-Negative Bacteria           06/15/25 Tissue/Biopsy from Wound/Tissue Pseudomonas aeruginosa            Mixed Anaerobic Bacteria            Mixed Gram-Positive and Gram-Negative Bacteria                 Assessment & Plan  Failure of outpatient treatment    Non-pressure chronic ulcer of left calf with fat layer exposed (Multi)    Non-pressure chronic ulcer of right calf with fat layer exposed (Multi)    Right lower extremity ulceration/infection -POD#3 s/p B/L lower leg ulceration debridement with right posterior calf I&D  Dressing orders placed  IV antibiotics per ID.  OR cultures showing Pseudomonus - resistent to all oral antibiotic options.  Currently refusing PICC line/IV antibiotics.  ELEVATE  LOWER LEGs  Will follow.       Mami Tang DPM           [1]    [2] PRN medications: HYDROmorphone, LORazepam, oxyCODONE, oxyCODONE

## 2025-06-20 NOTE — PROGRESS NOTES
Vancomycin Dosing by Pharmacy- Cessation of Therapy    Consult to pharmacy for vancomycin dosing has been discontinued by the prescriber, pharmacy will sign off at this time.    Please call pharmacy if there are further questions or re-enter a consult if vancomycin is resumed.     Alex Hoyt, PharmD

## 2025-06-20 NOTE — PROGRESS NOTES
Department of Hospital Medicine  Daily Progress Note   Hospital Day: 6       Name:Frances Mcdowell, AGE: 54 y.o., GENDER: female, MRN: [id number], ROOM: 112/[facility]   CODE STATUS: Full Code  Attending Physician: Matias Salguero MD  Resident: Artur Pandya DO        Chief Complaint     Chief Complaint   Patient presents with    Leg Pain        Interval History   Frances Mcdowell is a 54 y.o. year old female patient on Hospital Day: 6      Subjective    Pt reports continued pain in RLE that is improving. No other complaints. She was told about her moderate intensity recommendation but was not agreeable for SNF and would like to return home.    Meds    Scheduled medications  Scheduled Medications[1]  Continuous medications  Continuous Medications[2]  PRN medications  PRN Medications[3]     Objective    Exam   Physical Exam  Constitutional:       Appearance: Normal appearance. She is obese.   HENT:      Head: Normocephalic.      Nose: Nose normal.   Eyes:      Extraocular Movements: Extraocular movements intact.      Conjunctiva/sclera: Conjunctivae normal.      Pupils: Pupils are equal, round, and reactive to light.   Cardiovascular:      Rate and Rhythm: Normal rate and regular rhythm.      Pulses: Normal pulses.      Heart sounds: Normal heart sounds.   Pulmonary:      Effort: Pulmonary effort is normal.      Breath sounds: Normal breath sounds.   Abdominal:      General: Abdomen is flat. Bowel sounds are normal.      Palpations: Abdomen is soft.   Musculoskeletal:         General: Swelling (RLE) and tenderness (RLE) present.      Comments: See media for picture of wound   Skin:     General: Skin is warm.      Findings: Lesion (RLE) present.      Comments: Dressing CDI RLE   Neurological:      General: No focal deficit present.      Mental Status: She is alert and oriented to person, place, and time. Mental status is at baseline.   Psychiatric:         Mood and Affect: Mood normal.         Behavior: Behavior normal.         Thought  "Content: Thought content normal.         Judgment: Judgment normal.        Vitals         6/19/2025    12:22 PM 6/19/2025     3:27 PM 6/19/2025     4:38 PM 6/19/2025     7:55 PM 6/20/2025    12:40 AM 6/20/2025     5:25 AM 6/20/2025     8:27 AM   Vitals   Systolic 112  123 105 93 111 125   Diastolic 70  75 75 57 71 77   BP Location Left arm   Left arm Right arm Right arm Left arm   Heart Rate 93 92 96 92 91 85 91   Temp 36.5 °C (97.7 °F)  35.9 °C (96.6 °F) 36.2 °C (97.2 °F) 36.5 °C (97.7 °F) 36.2 °C (97.2 °F) 36.6 °C (97.9 °F)   Resp 18  18 22 22 18 17        Intake/Output Summary (Last 24 hours) at 6/20/2025 1129  Last data filed at 6/20/2025 0838  Gross per 24 hour   Intake 1510 ml   Output 1075 ml   Net 435 ml       Labs   Labs:   Results from last 72 hours   Lab Units 06/20/25  0655 06/19/25  0713 06/18/25  0412   SODIUM mmol/L 135* 136 134*   POTASSIUM mmol/L 4.4 4.0 4.0   CHLORIDE mmol/L 100 101 101   CO2 mmol/L 31 29 27   BUN mg/dL 16 17 14   CREATININE mg/dL 0.83 0.75 0.68   GLUCOSE mg/dL 92 93 100*   CALCIUM mg/dL 8.0* 7.8* 7.6*   ANION GAP mmol/L 8* 10 10   EGFR mL/min/1.73m*2 83 >90 >90   PHOSPHORUS mg/dL 3.8 3.3 3.6      Results from last 72 hours   Lab Units 06/20/25  0655 06/19/25  0713 06/18/25  0412   WBC AUTO x10*3/uL 9.2 9.7 15.7*   HEMOGLOBIN g/dL 8.3* 9.0* 8.3*   HEMATOCRIT % 27.6* 30.4* 26.3*   PLATELETS AUTO x10*3/uL 596* 637* 537*      Lab Results   Component Value Date    CALCIUM 8.0 (L) 06/20/2025    PHOS 3.8 06/20/2025      Lab Results   Component Value Date    CRP 22.53 (H) 06/15/2025      [unfilled]     Micro/ID:   Susceptibility data from last 90 days.  Collected Specimen Info Organism   06/17/25 Swab from ABSCESS Pseudomonas aeruginosa     Mixed Gram-Negative Bacteria    06/15/25 Tissue/Biopsy from Wound/Tissue Pseudomonas aeruginosa     Mixed Anaerobic Bacteria     Mixed Gram-Positive and Gram-Negative Bacteria                    No lab exists for component: \"AGALPCRNB\"     Lab " Results   Component Value Date    URINECULTURE SEE NOTE 06/05/2025    BLOODCULT No growth at 4 days -  FINAL REPORT 06/15/2025    BLOODCULT No growth at 4 days -  FINAL REPORT 06/15/2025       Images    Imaging  CT angio lower extremity right w and or wo IV contrast  Result Date: 6/15/2025  1. Femoropopliteal arterial patency with patency of 3 arterial vessel runoff to the level of the right ankle. Left lower extremity iliofemoral patency as partially imaged to the distal left forearm. 2. Extensive right lower extremity skin thickening and subcutaneous edema, more prominent in the lower leg. Limited evaluation of deep soft tissue structures. No large subcutaneous drainable fluid collection.   MACRO: None   Signed by: Andrei Joseph 6/15/2025 5:30 PM Dictation workstation:   [id number]      Cardiology, Vascular, and Other Imaging  No other imaging results found for the past 7 days      Assessment    Assessment and Plan    Frances Mcdowell is a 54 y.o. female with PMHx significant for saddle PE (on Eliquis), chronic lower extremity wounds with recurrent cellulitis (hx of pseudomonas and MRSA), lymphedema, ADD, HLD, and hypothyroidism s/p thyroidectomy admitted on 6/15/2025 for R lower extremity cellulitis in the setting of chronic lower extremity wounds and lymphedema.     ACUTE MEDICAL ISSUES:  #Bilateral lymphedema  #Chronic right leg wound with infection / #Cellulitis of right leg  ::Hx pseudomonas and MRSA tissue cultures 10/24 and 12/24 susceptible to vancomycin and zosyn   ::Inflammatory markers elevated; ESR 49 and CRP 22.53  ::Photos of leg wound in chart under media   :: Wound culture shows few polymorphonuclear leukocytes and abundant mixed gram-positive and negative bacteria  :: Pt has a history of failures with midline, PICC, and central line.  PLAN:  -Blood cultures NGTD  - 6/19 OR cultures positive for Pseudomonas, MRSA not grown  - Resistant to Cipro and levo, will continue zosyn as it is susceptible.  Length of course tbd per ID  - DC Vanc 6/20 , added Zosyn back on 6/19  -Podiatry consulted, debridment 6/18.   -ID following  -Pain regimen: Tylenol 975 mg q6 hours scheduled, oxycodone 5 and 10 mg q3 hours PRN for moderate and severe pain, Dilaudid 0.4 mg q3 hours PRN for breakthrough pain  - PT/OT following, rec moderate intensity    #Thrombocytosis  ::Platelets 459 on admission, most recent 596  ::Suspect may be due to infection  PLAN:  -Continue to monitor on daily CBC     RESOLVED MEDICAL ISSUES  #Leukocytosis    CHRONIC MEDICAL ISSUES:  #Hx saddle PE - home Eliquis   #HLD - continue home rosuvastatin 20 mg daily   #ADD - hold home Adderall, patient does not want while inpatient   #Hypothyroidism - continue home levothyroxine 100 mcg daily   #Vitamin D deficiency - continue home vitamin D supplementation  #GERD - continue home omeprazole 40 mg daily         Fluids: None  Electrolytes: Replete PRN  Nutrition: Regular diet  Antimicrobials:  Zosyn  DVT ppx: Eliquis  GI ppx: PPI  Catheter: None  Lines: PIV  Supplemental Oxygen: Room Air   Emergency Contact: Extended Emergency Contact Information  Primary Emergency Contact: SADE CANNON  Mobile Phone: [phone]  Relation: Daughter   needed? No  Secondary Emergency Contact: MANJULA COOK III  Address: [address] [place] [id number] [facility]  Mobile Phone: [phone]  Relation: Spouse   needed? No   Code: Full Code      Disposition: Patient will stay on floor to treat Pseudomonas abscess with IV antibiotics.     Artur Pandya DO  PGY- 1  06/20/25 at 11:29 AM               [1] acetaminophen, 975 mg, oral, q6h  apixaban, 5 mg, oral, BID  cholecalciferol, 125 mcg, oral, Daily  levothyroxine, 100 mcg, oral, Daily  melatonin, 3 mg, oral, Nightly  nystatin, , Topical, Daily  pantoprazole, 40 mg, oral, Daily before breakfast  piperacillin-tazobactam, 4.5 g, intravenous, q6h  polyethylene glycol, 17 g, oral,  Daily  rosuvastatin, 20 mg, oral, Daily  vancomycin, 1,000 mg, intravenous, q12h     [2]    [3] PRN medications: HYDROmorphone, LORazepam, oxyCODONE, oxyCODONE, vancomycin

## 2025-06-20 NOTE — PROGRESS NOTES
06/20/25 1357   Discharge Planning   Living Arrangements Spouse/significant other;Children   Support Systems Spouse/significant other   Assistance Needed patient is A&Ox3, independent in ADL's, uses a walker for ambulation, drives; PCP Dr. Devaughn Whittaker   Type of Residence Private residence   Number of Stairs to Enter Residence 0   Number of Stairs Within Residence 0   Do you have animals or pets at home? Yes   Type of Animals or Pets cat, dog, and bird   Who is requesting discharge planning? Provider   Home or Post Acute Services None   Expected Discharge Disposition Home  (reporting patient will need IV atbx, currently refusing SNF and PICC/midline placement.)   Stroke Family Assessment   Stroke Family Assessment Needed No   Intensity of Service   Intensity of Service 0-30 min

## 2025-06-20 NOTE — CARE PLAN
The patient's goals for the shift include  pain control    The clinical goals for the shift include Pain management      Problem: Pain - Adult  Goal: Verbalizes/displays adequate comfort level or baseline comfort level  Outcome: Progressing     Problem: Safety - Adult  Goal: Free from fall injury  Outcome: Progressing     Problem: Discharge Planning  Goal: Discharge to home or other facility with appropriate resources  Outcome: Progressing     Problem: Chronic Conditions and Co-morbidities  Goal: Patient's chronic conditions and co-morbidity symptoms are monitored and maintained or improved  Outcome: Progressing     Problem: Nutrition  Goal: Nutrient intake appropriate for maintaining nutritional needs  Outcome: Progressing     Problem: Fall/Injury  Goal: Not fall by end of shift  Outcome: Progressing  Goal: Be free from injury by end of the shift  Outcome: Progressing  Goal: Verbalize understanding of personal risk factors for fall in the hospital  Outcome: Progressing  Goal: Verbalize understanding of risk factor reduction measures to prevent injury from fall in the home  Outcome: Progressing  Goal: Use assistive devices by end of the shift  Outcome: Progressing  Goal: Pace activities to prevent fatigue by end of the shift  Outcome: Progressing     Problem: Pain  Goal: Takes deep breaths with improved pain control throughout the shift  Outcome: Progressing  Goal: Turns in bed with improved pain control throughout the shift  Outcome: Progressing  Goal: Walks with improved pain control throughout the shift  Outcome: Progressing  Goal: Performs ADL's with improved pain control throughout shift  Outcome: Progressing  Goal: Participates in PT with improved pain control throughout the shift  Outcome: Progressing  Goal: Free from opioid side effects throughout the shift  Outcome: Progressing  Goal: Free from acute confusion related to pain meds throughout the shift  Outcome: Progressing

## 2025-06-21 ENCOUNTER — HOME HEALTH ADMISSION (OUTPATIENT)
Dept: HOME HEALTH SERVICES | Facility: HOME HEALTH | Age: 55
End: 2025-06-21
Payer: COMMERCIAL

## 2025-06-21 LAB
ALBUMIN SERPL BCP-MCNC: 2.3 G/DL (ref 3.4–5)
ANION GAP SERPL CALC-SCNC: 9 MMOL/L (ref 10–20)
BUN SERPL-MCNC: 17 MG/DL (ref 6–23)
CALCIUM SERPL-MCNC: 7.9 MG/DL (ref 8.6–10.3)
CHLORIDE SERPL-SCNC: 98 MMOL/L (ref 98–107)
CO2 SERPL-SCNC: 32 MMOL/L (ref 21–32)
CREAT SERPL-MCNC: 1.02 MG/DL (ref 0.5–1.05)
EGFRCR SERPLBLD CKD-EPI 2021: 65 ML/MIN/1.73M*2
ERYTHROCYTE [DISTWIDTH] IN BLOOD BY AUTOMATED COUNT: 15.4 % (ref 11.5–14.5)
GLUCOSE SERPL-MCNC: 91 MG/DL (ref 74–99)
HCT VFR BLD AUTO: 27.4 % (ref 36–46)
HGB BLD-MCNC: 8.4 G/DL (ref 12–16)
MAGNESIUM SERPL-MCNC: 1.78 MG/DL (ref 1.6–2.4)
MCH RBC QN AUTO: 29.3 PG (ref 26–34)
MCHC RBC AUTO-ENTMCNC: 30.7 G/DL (ref 32–36)
MCV RBC AUTO: 96 FL (ref 80–100)
NRBC BLD-RTO: 0 /100 WBCS (ref 0–0)
PHOSPHATE SERPL-MCNC: 4.3 MG/DL (ref 2.5–4.9)
PLATELET # BLD AUTO: 572 X10*3/UL (ref 150–450)
POTASSIUM SERPL-SCNC: 4.6 MMOL/L (ref 3.5–5.3)
RBC # BLD AUTO: 2.87 X10*6/UL (ref 4–5.2)
SODIUM SERPL-SCNC: 134 MMOL/L (ref 136–145)
WBC # BLD AUTO: 9.8 X10*3/UL (ref 4.4–11.3)

## 2025-06-21 PROCEDURE — 1100000001 HC PRIVATE ROOM DAILY

## 2025-06-21 PROCEDURE — 2500000002 HC RX 250 W HCPCS SELF ADMINISTERED DRUGS (ALT 637 FOR MEDICARE OP, ALT 636 FOR OP/ED): Performed by: PODIATRIST

## 2025-06-21 PROCEDURE — 85027 COMPLETE CBC AUTOMATED: CPT | Performed by: PODIATRIST

## 2025-06-21 PROCEDURE — 83735 ASSAY OF MAGNESIUM: CPT | Performed by: PODIATRIST

## 2025-06-21 PROCEDURE — 2500000004 HC RX 250 GENERAL PHARMACY W/ HCPCS (ALT 636 FOR OP/ED): Mod: JZ

## 2025-06-21 PROCEDURE — 36415 COLL VENOUS BLD VENIPUNCTURE: CPT | Performed by: PODIATRIST

## 2025-06-21 PROCEDURE — 2500000001 HC RX 250 WO HCPCS SELF ADMINISTERED DRUGS (ALT 637 FOR MEDICARE OP): Performed by: PODIATRIST

## 2025-06-21 PROCEDURE — 2500000004 HC RX 250 GENERAL PHARMACY W/ HCPCS (ALT 636 FOR OP/ED)

## 2025-06-21 PROCEDURE — 99232 SBSQ HOSP IP/OBS MODERATE 35: CPT

## 2025-06-21 PROCEDURE — 97530 THERAPEUTIC ACTIVITIES: CPT | Mod: GO,CO

## 2025-06-21 PROCEDURE — 2500000001 HC RX 250 WO HCPCS SELF ADMINISTERED DRUGS (ALT 637 FOR MEDICARE OP)

## 2025-06-21 PROCEDURE — 80069 RENAL FUNCTION PANEL: CPT | Performed by: PODIATRIST

## 2025-06-21 RX ORDER — HYDROXYZINE HYDROCHLORIDE 25 MG/1
25 TABLET, FILM COATED ORAL EVERY 6 HOURS PRN
Status: DISCONTINUED | OUTPATIENT
Start: 2025-06-21 | End: 2025-06-22 | Stop reason: HOSPADM

## 2025-06-21 RX ADMIN — ACETAMINOPHEN 975 MG: 325 TABLET, FILM COATED ORAL at 05:14

## 2025-06-21 RX ADMIN — ROSUVASTATIN CALCIUM 20 MG: 20 TABLET, FILM COATED ORAL at 09:08

## 2025-06-21 RX ADMIN — OXYCODONE HYDROCHLORIDE 10 MG: 10 TABLET ORAL at 15:33

## 2025-06-21 RX ADMIN — PIPERACILLIN SODIUM AND TAZOBACTAM SODIUM 4.5 G: 4; .5 INJECTION, SOLUTION INTRAVENOUS at 15:33

## 2025-06-21 RX ADMIN — PIPERACILLIN SODIUM AND TAZOBACTAM SODIUM 4.5 G: 4; .5 INJECTION, SOLUTION INTRAVENOUS at 02:51

## 2025-06-21 RX ADMIN — NYSTATIN: 100000 POWDER TOPICAL at 11:18

## 2025-06-21 RX ADMIN — OXYCODONE HYDROCHLORIDE 10 MG: 10 TABLET ORAL at 22:04

## 2025-06-21 RX ADMIN — LEVOTHYROXINE SODIUM 100 MCG: 100 TABLET ORAL at 05:10

## 2025-06-21 RX ADMIN — OXYCODONE HYDROCHLORIDE 10 MG: 10 TABLET ORAL at 05:10

## 2025-06-21 RX ADMIN — OXYCODONE HYDROCHLORIDE 10 MG: 10 TABLET ORAL at 19:00

## 2025-06-21 RX ADMIN — ACETAMINOPHEN 975 MG: 325 TABLET, FILM COATED ORAL at 19:00

## 2025-06-21 RX ADMIN — APIXABAN 5 MG: 5 TABLET, FILM COATED ORAL at 09:08

## 2025-06-21 RX ADMIN — OXYCODONE HYDROCHLORIDE 10 MG: 10 TABLET ORAL at 09:09

## 2025-06-21 RX ADMIN — PIPERACILLIN SODIUM AND TAZOBACTAM SODIUM 4.5 G: 4; .5 INJECTION, SOLUTION INTRAVENOUS at 20:06

## 2025-06-21 RX ADMIN — ACETAMINOPHEN 975 MG: 325 TABLET, FILM COATED ORAL at 11:52

## 2025-06-21 RX ADMIN — PIPERACILLIN SODIUM AND TAZOBACTAM SODIUM 4.5 G: 4; .5 INJECTION, SOLUTION INTRAVENOUS at 09:21

## 2025-06-21 RX ADMIN — APIXABAN 5 MG: 5 TABLET, FILM COATED ORAL at 20:06

## 2025-06-21 RX ADMIN — HYDROMORPHONE HYDROCHLORIDE 0.5 MG: 1 INJECTION, SOLUTION INTRAMUSCULAR; INTRAVENOUS; SUBCUTANEOUS at 11:14

## 2025-06-21 RX ADMIN — Medication 125 MCG: at 09:08

## 2025-06-21 RX ADMIN — PANTOPRAZOLE SODIUM 40 MG: 40 TABLET, DELAYED RELEASE ORAL at 05:10

## 2025-06-21 RX ADMIN — OXYCODONE HYDROCHLORIDE 10 MG: 10 TABLET ORAL at 11:53

## 2025-06-21 ASSESSMENT — PAIN SCALES - GENERAL
PAINLEVEL_OUTOF10: 2
PAINLEVEL_OUTOF10: 8
PAINLEVEL_OUTOF10: 6
PAINLEVEL_OUTOF10: 9
PAINLEVEL_OUTOF10: 8
PAINLEVEL_OUTOF10: 9
PAINLEVEL_OUTOF10: 6

## 2025-06-21 ASSESSMENT — COGNITIVE AND FUNCTIONAL STATUS - GENERAL
PERSONAL GROOMING: A LITTLE
DRESSING REGULAR UPPER BODY CLOTHING: A LITTLE
DRESSING REGULAR UPPER BODY CLOTHING: A LITTLE
TOILETING: TOTAL
TOILETING: A LITTLE
HELP NEEDED FOR BATHING: A LOT
WALKING IN HOSPITAL ROOM: A LITTLE
WALKING IN HOSPITAL ROOM: A LITTLE
PERSONAL GROOMING: A LITTLE
DRESSING REGULAR LOWER BODY CLOTHING: TOTAL
DRESSING REGULAR LOWER BODY CLOTHING: A LOT
DAILY ACTIVITIY SCORE: 17
PERSONAL GROOMING: A LITTLE
MOBILITY SCORE: 21
TOILETING: A LITTLE
TOILETING: A LITTLE
HELP NEEDED FOR BATHING: A LOT
WALKING IN HOSPITAL ROOM: A LITTLE
HELP NEEDED FOR BATHING: A LOT
DRESSING REGULAR UPPER BODY CLOTHING: A LITTLE
DAILY ACTIVITIY SCORE: 14
DRESSING REGULAR UPPER BODY CLOTHING: A LITTLE
CLIMB 3 TO 5 STEPS WITH RAILING: A LOT
DAILY ACTIVITIY SCORE: 19
CLIMB 3 TO 5 STEPS WITH RAILING: A LOT
CLIMB 3 TO 5 STEPS WITH RAILING: A LOT
HELP NEEDED FOR BATHING: A LOT
DRESSING REGULAR LOWER BODY CLOTHING: A LOT
MOBILITY SCORE: 21
DAILY ACTIVITIY SCORE: 17
DRESSING REGULAR LOWER BODY CLOTHING: A LITTLE
MOBILITY SCORE: 21

## 2025-06-21 ASSESSMENT — PAIN - FUNCTIONAL ASSESSMENT
PAIN_FUNCTIONAL_ASSESSMENT: 0-10

## 2025-06-21 ASSESSMENT — PAIN DESCRIPTION - LOCATION
LOCATION: LEG

## 2025-06-21 ASSESSMENT — PAIN DESCRIPTION - ORIENTATION
ORIENTATION: RIGHT
ORIENTATION: RIGHT
ORIENTATION: RIGHT;LEFT
ORIENTATION: RIGHT

## 2025-06-21 ASSESSMENT — PAIN DESCRIPTION - DESCRIPTORS: DESCRIPTORS: ACHING

## 2025-06-21 NOTE — PROGRESS NOTES
Occupational Therapy    Occupational Therapy Treatment    Name: Frances Mcdowell  MRN: [id number]  Department: [address]  Room: [address] [place] [address]  Date: 06/21/25  Time Calculation  Start Time: 1125  Stop Time: 1140  Time Calculation (min): 15 min    Assessment:  OT Assessment: Pt continues to present with decreased ADL performance and impaired mobility, largely related to current level of post-op pain. Currently recommend moderate intensity skilled therapy services to increase functional outcomes and resume PLOF  Prognosis: Good  Barriers to Discharge Home: Caregiver assistance, Physical needs  Caregiver Assistance: Caregiver assistance needed per identified barriers - however, level of patient's required assistance exceeds assistance available at home  Physical Needs: 24hr mobility assistance needed, 24hr ADL assistance needed  Evaluation/Treatment Tolerance: Patient limited by pain  Medical Staff Made Aware: Yes  End of Session Communication: Bedside nurse  End of Session Patient Position: Up in chair, Alarm on (call light provided)  Plan:  Treatment Interventions: ADL retraining, Functional transfer training, UE strengthening/ROM, Endurance training  OT Frequency: 3 times per week  OT Discharge Recommendations: Moderate intensity level of continued care  Equipment Recommended upon Discharge: Wheeled walker (owns)  OT - OK to Discharge: Yes    Subjective     OT Visit Info:  OT Received On: 06/21/25  General:  General  Reason for Referral: I&D R LE, I&D + debridement L LE; impaired ADL  Referred By: Matias Salguero  Past Medical History Relevant to Rehab: saddle PE (on Eliquis), chronic lower extremity wounds with recurrent cellulitis (hx of pseudomonas and MRSA), lymphedema, ADD, HLD, and hypothyroidism s/p thyroidectomy  Family/Caregiver Present: No  Patient Position Received: Bed, 3 rail up, Alarm on  General Comment: Pt hesitant to engage in therapy session d/t pain in RLE however, with enocuragment from RN/CHRISTIAN pt  agreeable  Precautions:  LE Weight Bearing Status: Weight Bearing as Tolerated  Medical Precautions: Fall precautions (masimo, tele, purewick)     Date/Time Vitals Session Patient Position Pulse Resp SpO2 BP MAP (mmHg)    06/21/25 1144 --  --  91  17  97 %  121/82  95           Pain Assessment:  Pain Assessment  Pain Assessment: 0-10  0-10 (Numeric) Pain Score: 9  Pain Type: Acute pain  Pain Location: Leg  Pain Orientation: Right    Objective   Cognition:  Overall Cognitive Status: Within Functional Limits  Orientation Level: Oriented X4    Bed Mobility/Transfers: Bed Mobility  Bed Mobility: Yes (with increased pain d/t pain)  Bed Mobility 1  Bed Mobility 1: Supine to sitting  Level of Assistance 1: Close supervision    Transfers  Transfer: Yes (with increased time d/t pain with pt demo deep breathing tech throughout)  Transfer 1  Transfer From 1: Bed to, Stand to  Transfer to 1: Stand, Sit, Chair with arms  Technique 1: Stand pivot  Transfer Device 1: Walker  Transfer Level of Assistance 1: Contact guard  Trials/Comments 1: with increased time    Sitting Balance:  Static Sitting Balance  Static Sitting-Balance Support: Bilateral upper extremity supported, Feet supported  Static Sitting-Level of Assistance: Close supervision    Outcome Measures:  [facility] Daily Activity  Putting on and taking off regular lower body clothing: Total  Bathing (including washing, rinsing, drying): A lot  Putting on and taking off regular upper body clothing: A little  Toileting, which includes using toilet, bedpan or urinal: Total  Taking care of personal grooming such as brushing teeth: A little  Eating Meals: None  Daily Activity - Total Score: 14    Education Documentation  Body Mechanics, taught by ZARI Rivera at 6/21/2025 11:52 AM.  Learner: Patient  Readiness: Acceptance  Method: Explanation  Response: Needs Reinforcement, Verbalizes Understanding    Precautions, taught by ZARI Rivera at 6/21/2025 11:52 AM.  Learner:  Patient  Readiness: Acceptance  Method: Explanation  Response: Needs Reinforcement, Verbalizes Understanding    ADL Training, taught by ZARI Rivera at 6/21/2025 11:52 AM.  Learner: Patient  Readiness: Acceptance  Method: Explanation  Response: Needs Reinforcement, Verbalizes Understanding  Goals:  Encounter Problems       Encounter Problems (Active)       ADLs       Patient will perform UB and LB bathing seated/sponge bathe with supervision, stand by assist level of assistance . (Progressing)       Start:  06/19/25    Expected End:  07/03/25            Patient with complete lower body dressing with minimal assist  level of assistance donning and doffing all LE clothes  with PRN adaptive equipment while supine in bed, edge of bed , and standing (Progressing)       Start:  06/19/25    Expected End:  07/03/25            Patient will complete toileting including hygiene clothing management/hygiene with set-up, supervision level of assistance . (Progressing)       Start:  06/19/25    Expected End:  07/03/25               MOBILITY       Pt will demo functional mobility necessary to complete ADL routine with LRD and sup (Progressing)       Start:  06/19/25    Expected End:  07/03/25               TRANSFERS       Patient will perform bed mobility modified independent level of assistance and bed rails in order to improve safety and independence with mobility (Progressing)       Start:  06/19/25    Expected End:  07/03/25            Patient will complete sit to stand transfer with supervision, stand by assist level of assistance and least restrictive device in order to improve safety and prepare for out of bed mobility. (Progressing)       Start:  06/19/25    Expected End:  07/03/25

## 2025-06-21 NOTE — PROGRESS NOTES
"Frances Mcdowell is a 54 y.o. female on day 6 of admission presenting with Failure of outpatient treatment.    Subjective   POD#4 s/p B/L lower leg debridement with right leg I&D. Feeling better overall.      Meds:  Scheduled medications  acetaminophen, 975 mg, oral, q6h  apixaban, 5 mg, oral, BID  cholecalciferol, 125 mcg, oral, Daily  levothyroxine, 100 mcg, oral, Daily  melatonin, 3 mg, oral, Nightly  nystatin, , Topical, Daily  pantoprazole, 40 mg, oral, Daily before breakfast  piperacillin-tazobactam, 4.5 g, intravenous, q6h  polyethylene glycol, 17 g, oral, Daily  rosuvastatin, 20 mg, oral, Daily      Continuous medications  Continuous Medications[1]  PRN medications  PRN Medications[2]       Physical Exam   Constitutional:       Appearance: She is obese.      HENT:      Head: Normocephalic and atraumatic.   Cardiovascular:      Rate and Rhythm: Normal rate and regular rhythm.   Pulmonary:      Effort: Pulmonary effort is normal. No respiratory distress.      Breath sounds: No stridor. No wheezing, rhonchi or rales.   Chest:      Chest wall: No tenderness.   Abdominal:      General: There is no distension.      Tenderness: There is no abdominal tenderness.   Musculoskeletal:         General: Tenderness present.   Skin:     General: Skin is warm and dry.      Findings: Dressings intact to B/L lower legs with  strikethrough;  Able to lift legs off bed;  Upon removal.  No active purulence,  much  appearance to open ulcerations with 20% slough, remainder granulation tissue;  much decreased edema and erythema to B/L lower legs.   Neurological:      General: No focal deficit present.      Mental Status: She is alert and oriented to person, place, and time.   Psychiatric:         Mood and Affect: Mood normal.         Behavior: Behavior normal.     Last Recorded Vitals  Blood pressure 125/81, pulse 88, temperature 36.5 °C (97.7 °F), temperature source Temporal, resp. rate 15, height 1.6 m (5' 3\"), weight 139 kg " (306 lb 1.6 oz), SpO2 95%.    Intake/Output last 3 Shifts:  I/O last 3 completed shifts:  In: 1500 (10.8 mL/kg) [P.O.:1300; IV Piggyback:200]  Out: 1850 (13.3 mL/kg) [Urine:1850 (0.4 mL/kg/hr)]  Weight: 138.8 kg     Relevant Results   Results for orders placed or performed during the hospital encounter of 06/15/25 (from the past 24 hours)   Magnesium   Result Value Ref Range    Magnesium 1.78 1.60 - 2.40 mg/dL   Renal Function Panel   Result Value Ref Range    Glucose 91 74 - 99 mg/dL    Sodium 134 (L) 136 - 145 mmol/L    Potassium 4.6 3.5 - 5.3 mmol/L    Chloride 98 98 - 107 mmol/L    Bicarbonate 32 21 - 32 mmol/L    Anion Gap 9 (L) 10 - 20 mmol/L    Urea Nitrogen 17 6 - 23 mg/dL    Creatinine 1.02 0.50 - 1.05 mg/dL    eGFR 65 >60 mL/min/1.73m*2    Calcium 7.9 (L) 8.6 - 10.3 mg/dL    Phosphorus 4.3 2.5 - 4.9 mg/dL    Albumin 2.3 (L) 3.4 - 5.0 g/dL   CBC   Result Value Ref Range    WBC 9.8 4.4 - 11.3 x10*3/uL    nRBC 0.0 0.0 - 0.0 /100 WBCs    RBC 2.87 (L) 4.00 - 5.20 x10*6/uL    Hemoglobin 8.4 (L) 12.0 - 16.0 g/dL    Hematocrit 27.4 (L) 36.0 - 46.0 %    MCV 96 80 - 100 fL    MCH 29.3 26.0 - 34.0 pg    MCHC 30.7 (L) 32.0 - 36.0 g/dL    RDW 15.4 (H) 11.5 - 14.5 %    Platelets 572 (H) 150 - 450 x10*3/uL   CT angio lower extremity right w and or wo IV contrast  Result Date: 6/15/2025  Interpreted By:  Andrei Joseph, STUDY: CT ANGIO LOWER EXTREMITY RIGHT W AND OR WO IV CONTRAST; ;  6/15/2025 4:42 pm   CT ANGIOGRAM LOWER EXTREMITIES WITH CONTRAST   INDICATION: Signs/Symptoms:rule out abscess open wound. 54-year-old woman with wound.     COMPARISON: CT abdomen pelvis 01/22/2025   ACCESSION NUMBER(S): [id number]   ORDERING CLINICIAN: CHELSY COOL   TECHNIQUE: Contiguous acquired helical axial images were obtained of the right lower extremity after the uneventful administration of 90 mL Omnipaque 350. Coronal and sagittal multiplanar reformatted maximum intensity projection images were obtained. 3D volumetric  surface rendered representation of arterial structures was performed on a separate workstation and submitted for interpretation.   FINDINGS: Angiographic:   The partially the partially imaged left common artery, as well as the left internal and external iliac arteries are patent. The left common femoral and profunda femoris arteries are patent. The left superficial femoral artery is patent along the entirety of the imaged left thigh.   The right common, internal common external iliac arteries, right common femoral artery profunda femoris artery are patent. There is right superficial femoral and popliteal arterial patency. There is probable 3 arterial vessel runoff to the level of the left ankle.   Contrast timing is suboptimal for the evaluation of venous structures. Large iliac veins of the pelvis are grossly unremarkable.       Nonangiographic:   The partially imaged pelvis shows nondilated loops of bowel. The bladder is decompressed. No free fluid, pneumatosis, or pneumoperitoneum. There is a right inguinal lymph nodes which are likely reactive. There is rather diffuse skin thickening and subcutaneous edema along the length of the entire right lower extremity which is worse from the right popliteal fossa distally. Superficial varicosities along the length of the right lower extremity are present. CT angiography provides limited evaluation lower extremity soft tissue structures. Within these limitations, no drainable subcutaneous fluid collection. No acute osseous abnormality.       1. Femoropopliteal arterial patency with patency of 3 arterial vessel runoff to the level of the right ankle. Left lower extremity iliofemoral patency as partially imaged to the distal left forearm. 2. Extensive right lower extremity skin thickening and subcutaneous edema, more prominent in the lower leg. Limited evaluation of deep soft tissue structures. No large subcutaneous drainable fluid collection.   MACRO: None   Signed by:  Andrei Marieter 6/15/2025 5:30 PM Dictation workstation:   [id number]  Susceptibility data from last 90 days.  Collected Specimen Info Organism Aztreonam Cefepime Ceftazidime Ciprofloxacin Levofloxacin Piperacillin/Tazobactam Tobramycin   06/17/25 Swab from ABSCESS Pseudomonas aeruginosa  S  S  S  R  R  S  S     Mixed Gram-Negative Bacteria           06/15/25 Tissue/Biopsy from Wound/Tissue Pseudomonas aeruginosa            Mixed Anaerobic Bacteria            Mixed Gram-Positive and Gram-Negative Bacteria              Assessment & Plan  Failure of outpatient treatment    Non-pressure chronic ulcer of left calf with fat layer exposed (Multi)    Non-pressure chronic ulcer of right calf with fat layer exposed (Multi)    Right lower extremity ulceration/infection -POD#4 s/p B/L lower leg ulceration debridement with right posterior calf I&D  Dressings changed with adaptic, aquacel, ABD, kerlic and ace bandage.   IV antibiotics per ID.  OR cultures showing Pseudomonus - resistent to all oral antibiotic options.  Currently refusing PICC line/IV antibiotics.  ELEVATE  LOWER LEGs  Will follow.   Long discussion with patient regarding need for off work, leg elevation.       Mami Tang DPM           [1]    [2] PRN medications: HYDROmorphone, hydrOXYzine HCL, oxyCODONE, oxyCODONE

## 2025-06-21 NOTE — CARE PLAN
The patient's goals for the shift include  pain management, work towards discharge    The clinical goals for the shift include manage pain, get out of bed at least once, walk to bathroom, work with PT/OT      Problem: Pain - Adult  Goal: Verbalizes/displays adequate comfort level or baseline comfort level  Outcome: Progressing     Problem: Safety - Adult  Goal: Free from fall injury  Outcome: Progressing     Problem: Discharge Planning  Goal: Discharge to home or other facility with appropriate resources  Outcome: Progressing     Problem: Chronic Conditions and Co-morbidities  Goal: Patient's chronic conditions and co-morbidity symptoms are monitored and maintained or improved  Outcome: Progressing

## 2025-06-21 NOTE — PROGRESS NOTES
06/21/25 1316   Discharge Planning   Home or Post Acute Services In home services   Type of Home Care Services Home nursing visits;Home OT;Home PT   Expected Discharge Disposition Home H     Informed that patient is interested in home care for PT/OT and nursing for wound care. AOC is [facility].

## 2025-06-21 NOTE — CARE PLAN
The patient's goals for the shift include  pain control and rest    The clinical goals for the shift include Pain control      Problem: Pain - Adult  Goal: Verbalizes/displays adequate comfort level or baseline comfort level  Outcome: Progressing     Problem: Chronic Conditions and Co-morbidities  Goal: Patient's chronic conditions and co-morbidity symptoms are monitored and maintained or improved  Outcome: Progressing

## 2025-06-21 NOTE — PROGRESS NOTES
Physical Therapy                 Therapy Communication Note    Patient Name: Frances Mcdowell  MRN: [id number]  Department: [address]  Room: [address] [place] [address]  Today's Date: 6/21/2025     Discipline: Physical Therapy    Missed Visit: PT Missed Visit: Yes     Missed Visit Reason: Missed Visit Reason: Patient refused (Attempted twice first attempt pt needed pain meds and second attempt pt needed pain meds started to crying d/t pain.)    Missed Time: Attempt    Comment:       This is a new medication, before filling can we please call her and see how she is doing on it, does she feel it has helped with her depression, is she happy with the dose, any side effects?

## 2025-06-21 NOTE — PROGRESS NOTES
Frances Mcdowell is a 54 y.o. female on day 6 of admission presenting with Failure of outpatient treatment.    Subjective   Interval History: no fever, no new complaints        Review of Systems    Objective   Range of Vitals (last 24 hours)  Heart Rate:  []   Temp:  [36.5 °C (97.7 °F)-36.6 °C (97.9 °F)]   Resp:  [15-17]   BP: (103-131)/(67-89)   SpO2:  [93 %-100 %]   Daily Weight  06/15/25 : 139 kg (306 lb 1.6 oz)    Body mass index is 54.22 kg/m².    Physical Exam  Constitutional:       Appearance: Normal appearance.   HENT:      Head: Normocephalic and atraumatic.      Mouth/Throat:      Mouth: Mucous membranes are moist.      Pharynx: Oropharynx is clear.   Eyes:      Pupils: Pupils are equal, round, and reactive to light.   Cardiovascular:      Rate and Rhythm: Normal rate and regular rhythm.      Heart sounds: Normal heart sounds.   Pulmonary:      Effort: Pulmonary effort is normal.      Breath sounds: Normal breath sounds.   Abdominal:      General: Abdomen is flat. Bowel sounds are normal.      Palpations: Abdomen is soft.   Musculoskeletal:         General: Swelling present.      Cervical back: Normal range of motion.      Comments: RT leg posterior upper calf wound, packing removed, no purulence, the leg with minimal stasis, no cellulitis  Lt leg without cellulitis   Neurological:      Mental Status: She is alert.         Antibiotics  piperacillin-tazobactam - 4.5 gram/100 mL    Relevant Results  Labs  Results from last 72 hours   Lab Units 06/21/25  0658 06/20/25  0655 06/19/25  0713   WBC AUTO x10*3/uL 9.8 9.2 9.7   HEMOGLOBIN g/dL 8.4* 8.3* 9.0*   HEMATOCRIT % 27.4* 27.6* 30.4*   PLATELETS AUTO x10*3/uL 572* 596* 637*     Results from last 72 hours   Lab Units 06/21/25  0658 06/20/25  0655 06/19/25  0713   SODIUM mmol/L 134* 135* 136   POTASSIUM mmol/L 4.6 4.4 4.0   CHLORIDE mmol/L 98 100 101   CO2 mmol/L 32 31 29   BUN mg/dL 17 16 17   CREATININE mg/dL 1.02 0.83 0.75   GLUCOSE mg/dL 91 92 93    CALCIUM mg/dL 7.9* 8.0* 7.8*   ANION GAP mmol/L 9* 8* 10   EGFR mL/min/1.73m*2 65 83 >90   PHOSPHORUS mg/dL 4.3 3.8 3.3     Results from last 72 hours   Lab Units 06/21/25  0658 06/20/25  0655 06/19/25  0713   ALBUMIN g/dL 2.3* 2.2* 2.3*     Estimated Creatinine Clearance: 84.6 mL/min (by C-G formula based on SCr of 1.02 mg/dL).  C-Reactive Protein   Date Value Ref Range Status   06/15/2025 22.53 (H) <1.00 mg/dL Final   08/06/2024 23.21 (H) <1.00 mg/dL Final     Microbiology  Reviewed  Imaging  Reviewed          Assessment/Plan     Legs stasis / wounds / possible Rt leg cellulitis, sp debridement,, the culture with Peseudomonas / mixed tylor (s) pending     Recommendations :  On Zosyn, we had a long discussion regarding the plans frome this point, the option of antibiotics with discharge was discussed, she is hesitant to do a line since she had a difficult time during previous attempts on a line placement, many were not successful,, we did discuss the the fact that the abscess is drained, she has no cellulitis in the leg and under these circumstances additional antibiotics does not have much of a benefit, and she could choose to do wound care and elevation and see how the leg progress   Discussed with the surgery team     I spent minutes in the professional and overall care of this patient.          Miguel Hu MD

## 2025-06-21 NOTE — PROGRESS NOTES
Department of Hospital Medicine  Daily Progress Note   Hospital Day: 6       Name:Frances Mcdowell, AGE: 54 y.o., GENDER: female, MRN: [id number], ROOM: 112/[facility]   CODE STATUS: Full Code  Attending Physician: Matias Salguero MD  Resident: Artur Pandya DO        Chief Complaint     Chief Complaint   Patient presents with    Leg Pain        Interval History   Frances Mcdowell is a 54 y.o. year old female patient on Hospital Day: 6      Subjective    Patient feels okay today.  She talked to Dr. Hu this morning who discussed stopping IV antibiotics.  Patient would like time to think about the decision before stopping her antibiotics.  She reports she will talk to her mom and  and then make a decision.    Meds    Scheduled medications  Scheduled Medications[1]  Continuous medications  Continuous Medications[2]  PRN medications  PRN Medications[3]     Objective    Exam   Physical Exam  Constitutional:       Appearance: Normal appearance. She is obese.   HENT:      Head: Normocephalic.      Nose: Nose normal.   Eyes:      Extraocular Movements: Extraocular movements intact.      Conjunctiva/sclera: Conjunctivae normal.      Pupils: Pupils are equal, round, and reactive to light.   Cardiovascular:      Rate and Rhythm: Normal rate and regular rhythm.      Pulses: Normal pulses.      Heart sounds: Normal heart sounds.   Pulmonary:      Effort: Pulmonary effort is normal.      Breath sounds: Normal breath sounds.   Abdominal:      General: Abdomen is flat. Bowel sounds are normal.      Palpations: Abdomen is soft.   Musculoskeletal:         General: Swelling (RLE) and tenderness (RLE) present.      Comments: See media for picture of wound   Skin:     General: Skin is warm.      Findings: Lesion (RLE) present.      Comments: Dressing CDI RLE   Neurological:      General: No focal deficit present.      Mental Status: She is alert and oriented to person, place, and time. Mental status is at baseline.   Psychiatric:          "Judgment: Judgment normal.      Comments: Labile mood          Vitals         6/19/2025    12:22 PM 6/19/2025     3:27 PM 6/19/2025     4:38 PM 6/19/2025     7:55 PM 6/20/2025    12:40 AM 6/20/2025     5:25 AM 6/20/2025     8:27 AM   Vitals   Systolic 112  123 105 93 111 125   Diastolic 70  75 75 57 71 77   BP Location Left arm   Left arm Right arm Right arm Left arm   Heart Rate 93 92 96 92 91 85 91   Temp 36.5 °C (97.7 °F)  35.9 °C (96.6 °F) 36.2 °C (97.2 °F) 36.5 °C (97.7 °F) 36.2 °C (97.2 °F) 36.6 °C (97.9 °F)   Resp 18  18 22 22 18 17        Intake/Output Summary (Last 24 hours) at 6/20/2025 1129  Last data filed at 6/20/2025 0838  Gross per 24 hour   Intake 1510 ml   Output 1075 ml   Net 435 ml       Labs   Labs:   Results from last 72 hours   Lab Units 06/20/25  0655 06/19/25  0713 06/18/25  0412   SODIUM mmol/L 135* 136 134*   POTASSIUM mmol/L 4.4 4.0 4.0   CHLORIDE mmol/L 100 101 101   CO2 mmol/L 31 29 27   BUN mg/dL 16 17 14   CREATININE mg/dL 0.83 0.75 0.68   GLUCOSE mg/dL 92 93 100*   CALCIUM mg/dL 8.0* 7.8* 7.6*   ANION GAP mmol/L 8* 10 10   EGFR mL/min/1.73m*2 83 >90 >90   PHOSPHORUS mg/dL 3.8 3.3 3.6      Results from last 72 hours   Lab Units 06/20/25  0655 06/19/25  0713 06/18/25  0412   WBC AUTO x10*3/uL 9.2 9.7 15.7*   HEMOGLOBIN g/dL 8.3* 9.0* 8.3*   HEMATOCRIT % 27.6* 30.4* 26.3*   PLATELETS AUTO x10*3/uL 596* 637* 537*      Lab Results   Component Value Date    CALCIUM 8.0 (L) 06/20/2025    PHOS 3.8 06/20/2025      Lab Results   Component Value Date    CRP 22.53 (H) 06/15/2025      [unfilled]     Micro/ID:   Susceptibility data from last 90 days.  Collected Specimen Info Organism   06/17/25 Swab from ABSCESS Pseudomonas aeruginosa     Mixed Gram-Negative Bacteria    06/15/25 Tissue/Biopsy from Wound/Tissue Pseudomonas aeruginosa     Mixed Anaerobic Bacteria     Mixed Gram-Positive and Gram-Negative Bacteria                    No lab exists for component: \"AGALPCRNB\"     Lab Results "   Component Value Date    URINECULTURE SEE NOTE 06/05/2025    BLOODCULT No growth at 4 days -  FINAL REPORT 06/15/2025    BLOODCULT No growth at 4 days -  FINAL REPORT 06/15/2025       Images    Imaging  CT angio lower extremity right w and or wo IV contrast  Result Date: 6/15/2025  1. Femoropopliteal arterial patency with patency of 3 arterial vessel runoff to the level of the right ankle. Left lower extremity iliofemoral patency as partially imaged to the distal left forearm. 2. Extensive right lower extremity skin thickening and subcutaneous edema, more prominent in the lower leg. Limited evaluation of deep soft tissue structures. No large subcutaneous drainable fluid collection.   MACRO: None   Signed by: Andrei Joseph 6/15/2025 5:30 PM Dictation workstation:   [id number]      Cardiology, Vascular, and Other Imaging  No other imaging results found for the past 7 days      Assessment    Assessment and Plan    Frances Mcdowell is a 54 y.o. female with PMHx significant for saddle PE (on Eliquis), chronic lower extremity wounds with recurrent cellulitis (hx of pseudomonas and MRSA), lymphedema, ADD, HLD, and hypothyroidism s/p thyroidectomy admitted on 6/15/2025 for R lower extremity cellulitis in the setting of chronic lower extremity wounds and lymphedema.     ACUTE MEDICAL ISSUES:  #Bilateral lymphedema  #Chronic right leg wound with infection / #Cellulitis of right leg  ::Hx pseudomonas and MRSA tissue cultures 10/24 and 12/24 susceptible to vancomycin and zosyn   ::Inflammatory markers elevated; ESR 49 and CRP 22.53  ::Photos of leg wound in chart under media   :: Wound culture shows few polymorphonuclear leukocytes and abundant mixed gram-positive and negative bacteria  :: Pt has a history of failures with midline, PICC, and central line.  :: Blood cultures NGTD  PLAN:  - Resistant to Cipro and levo, will continue zosyn as it is susceptible.  -:: 6/19 OR cultures positive for Pseudomonas, MRSA not grown DC  Vanc 6/20   -Podiatry consulted, debridment 6/18.   -ID following recommend stopping antibiotics as abscess was drained and patient responded adequately.  Chose to stay on antibiotics at this time, will finish 7-day Zosyn course on 6/22  -Pain regimen: Tylenol 975 mg q6 hours scheduled, oxycodone 5 and 10 mg q3 hours PRN for moderate and severe pain, Dilaudid 0.4 mg q3 hours PRN for breakthrough pain  - PT/OT following, rec moderate intensity    #Thrombocytosis  ::Platelets 459 on admission, most recent 572  ::Suspect may be due to infection  PLAN:  -Continue to monitor on daily CBC     RESOLVED MEDICAL ISSUES  #Leukocytosis    CHRONIC MEDICAL ISSUES:  #Hx saddle PE - home Eliquis   #HLD - continue home rosuvastatin 20 mg daily   #ADD - hold home Adderall, patient does not want while inpatient   #Hypothyroidism - continue home levothyroxine 100 mcg daily   #Vitamin D deficiency - continue home vitamin D supplementation  #GERD - continue home omeprazole 40 mg daily         Fluids: None  Electrolytes: Replete PRN  Nutrition: Regular diet  Antimicrobials:  Zosyn  DVT ppx: Eliquis  GI ppx: PPI  Catheter: None  Lines: PIV  Supplemental Oxygen: Room Air   Emergency Contact: Extended Emergency Contact Information  Primary Emergency Contact: SADE CANNON  Mobile Phone: [phone]  Relation: Daughter   needed? No  Secondary Emergency Contact: MANJULA COOK III  Address: [address] [place] [id number] [facility]  Mobile Phone: [phone]  Relation: Spouse   needed? No   Code: Full Code      Disposition: Patient will stay on floor to treat Pseudomonas abscess with IV antibiotics.     Artur Pandya DO  PGY- 1  06/20/25 at 11:29 AM               [1] acetaminophen, 975 mg, oral, q6h  apixaban, 5 mg, oral, BID  cholecalciferol, 125 mcg, oral, Daily  levothyroxine, 100 mcg, oral, Daily  melatonin, 3 mg, oral, Nightly  nystatin, , Topical, Daily  pantoprazole, 40 mg,  oral, Daily before breakfast  piperacillin-tazobactam, 4.5 g, intravenous, q6h  polyethylene glycol, 17 g, oral, Daily  rosuvastatin, 20 mg, oral, Daily  vancomycin, 1,000 mg, intravenous, q12h     [2]    [3] PRN medications: HYDROmorphone, LORazepam, oxyCODONE, oxyCODONE, vancomycin

## 2025-06-22 VITALS
HEIGHT: 63 IN | DIASTOLIC BLOOD PRESSURE: 63 MMHG | OXYGEN SATURATION: 100 % | BODY MASS INDEX: 51.91 KG/M2 | TEMPERATURE: 98.1 F | WEIGHT: 293 LBS | SYSTOLIC BLOOD PRESSURE: 118 MMHG | HEART RATE: 80 BPM | RESPIRATION RATE: 15 BRPM

## 2025-06-22 LAB
ALBUMIN SERPL BCP-MCNC: 2.3 G/DL (ref 3.4–5)
ANION GAP SERPL CALC-SCNC: 10 MMOL/L (ref 10–20)
B-LACTAMASE ORGANISM ISLT: POSITIVE
BACTERIA SPEC CULT: ABNORMAL
BUN SERPL-MCNC: 15 MG/DL (ref 6–23)
CALCIUM SERPL-MCNC: 8.1 MG/DL (ref 8.6–10.3)
CHLORIDE SERPL-SCNC: 100 MMOL/L (ref 98–107)
CO2 SERPL-SCNC: 32 MMOL/L (ref 21–32)
CREAT SERPL-MCNC: 0.94 MG/DL (ref 0.5–1.05)
EGFRCR SERPLBLD CKD-EPI 2021: 71 ML/MIN/1.73M*2
ERYTHROCYTE [DISTWIDTH] IN BLOOD BY AUTOMATED COUNT: 15.4 % (ref 11.5–14.5)
GLUCOSE SERPL-MCNC: 85 MG/DL (ref 74–99)
GRAM STN SPEC: ABNORMAL
GRAM STN SPEC: ABNORMAL
HCT VFR BLD AUTO: 28.9 % (ref 36–46)
HGB BLD-MCNC: 8.7 G/DL (ref 12–16)
MAGNESIUM SERPL-MCNC: 1.88 MG/DL (ref 1.6–2.4)
MCH RBC QN AUTO: 28.7 PG (ref 26–34)
MCHC RBC AUTO-ENTMCNC: 30.1 G/DL (ref 32–36)
MCV RBC AUTO: 95 FL (ref 80–100)
NRBC BLD-RTO: 0 /100 WBCS (ref 0–0)
PHOSPHATE SERPL-MCNC: 4.3 MG/DL (ref 2.5–4.9)
PLATELET # BLD AUTO: 594 X10*3/UL (ref 150–450)
POTASSIUM SERPL-SCNC: 4.6 MMOL/L (ref 3.5–5.3)
RBC # BLD AUTO: 3.03 X10*6/UL (ref 4–5.2)
SODIUM SERPL-SCNC: 137 MMOL/L (ref 136–145)
WBC # BLD AUTO: 7.5 X10*3/UL (ref 4.4–11.3)

## 2025-06-22 PROCEDURE — 2500000001 HC RX 250 WO HCPCS SELF ADMINISTERED DRUGS (ALT 637 FOR MEDICARE OP)

## 2025-06-22 PROCEDURE — 83735 ASSAY OF MAGNESIUM: CPT | Performed by: PODIATRIST

## 2025-06-22 PROCEDURE — 36415 COLL VENOUS BLD VENIPUNCTURE: CPT | Performed by: PODIATRIST

## 2025-06-22 PROCEDURE — 99238 HOSP IP/OBS DSCHRG MGMT 30/<: CPT

## 2025-06-22 PROCEDURE — 85027 COMPLETE CBC AUTOMATED: CPT | Performed by: PODIATRIST

## 2025-06-22 PROCEDURE — 2500000001 HC RX 250 WO HCPCS SELF ADMINISTERED DRUGS (ALT 637 FOR MEDICARE OP): Performed by: PODIATRIST

## 2025-06-22 PROCEDURE — 2500000002 HC RX 250 W HCPCS SELF ADMINISTERED DRUGS (ALT 637 FOR MEDICARE OP, ALT 636 FOR OP/ED): Performed by: PODIATRIST

## 2025-06-22 PROCEDURE — 80069 RENAL FUNCTION PANEL: CPT | Performed by: PODIATRIST

## 2025-06-22 RX ORDER — HYDROXYZINE HYDROCHLORIDE 25 MG/1
25 TABLET, FILM COATED ORAL EVERY 6 HOURS PRN
Qty: 90 TABLET | Refills: 0 | Status: SHIPPED | OUTPATIENT
Start: 2025-06-22

## 2025-06-22 RX ORDER — OXYCODONE HYDROCHLORIDE 5 MG/1
5 TABLET ORAL
Qty: 15 TABLET | Refills: 0 | Status: SHIPPED | OUTPATIENT
Start: 2025-06-22 | End: 2025-06-25 | Stop reason: SDUPTHER

## 2025-06-22 RX ORDER — CALCIUM CARBONATE 160(400)MG
1 TABLET,CHEWABLE ORAL DAILY
Qty: 1 EACH | Refills: 0 | Status: SHIPPED | OUTPATIENT
Start: 2025-06-22 | End: 2025-06-22 | Stop reason: HOSPADM

## 2025-06-22 RX ADMIN — NYSTATIN: 100000 POWDER TOPICAL at 09:13

## 2025-06-22 RX ADMIN — OXYCODONE HYDROCHLORIDE 10 MG: 10 TABLET ORAL at 09:29

## 2025-06-22 RX ADMIN — OXYCODONE HYDROCHLORIDE 10 MG: 10 TABLET ORAL at 01:02

## 2025-06-22 RX ADMIN — APIXABAN 5 MG: 5 TABLET, FILM COATED ORAL at 09:12

## 2025-06-22 RX ADMIN — ACETAMINOPHEN 975 MG: 325 TABLET, FILM COATED ORAL at 12:33

## 2025-06-22 RX ADMIN — ROSUVASTATIN CALCIUM 20 MG: 20 TABLET, FILM COATED ORAL at 09:12

## 2025-06-22 RX ADMIN — OXYCODONE HYDROCHLORIDE 10 MG: 10 TABLET ORAL at 06:42

## 2025-06-22 RX ADMIN — Medication 125 MCG: at 09:12

## 2025-06-22 RX ADMIN — HYDROXYZINE HYDROCHLORIDE 25 MG: 25 TABLET, FILM COATED ORAL at 09:29

## 2025-06-22 RX ADMIN — PANTOPRAZOLE SODIUM 40 MG: 40 TABLET, DELAYED RELEASE ORAL at 06:42

## 2025-06-22 RX ADMIN — OXYCODONE HYDROCHLORIDE 10 MG: 10 TABLET ORAL at 12:33

## 2025-06-22 RX ADMIN — ACETAMINOPHEN 975 MG: 325 TABLET, FILM COATED ORAL at 01:02

## 2025-06-22 RX ADMIN — LEVOTHYROXINE SODIUM 100 MCG: 100 TABLET ORAL at 06:42

## 2025-06-22 RX ADMIN — ACETAMINOPHEN 975 MG: 325 TABLET, FILM COATED ORAL at 06:42

## 2025-06-22 ASSESSMENT — PAIN SCALES - GENERAL
PAINLEVEL_OUTOF10: 8
PAINLEVEL_OUTOF10: 8
PAINLEVEL_OUTOF10: 2
PAINLEVEL_OUTOF10: 6
PAINLEVEL_OUTOF10: 8

## 2025-06-22 ASSESSMENT — COGNITIVE AND FUNCTIONAL STATUS - GENERAL
DAILY ACTIVITIY SCORE: 18
DRESSING REGULAR UPPER BODY CLOTHING: A LITTLE
MOBILITY SCORE: 20
DRESSING REGULAR LOWER BODY CLOTHING: A LOT
HELP NEEDED FOR BATHING: A LITTLE
STANDING UP FROM CHAIR USING ARMS: A LITTLE
PERSONAL GROOMING: A LITTLE
CLIMB 3 TO 5 STEPS WITH RAILING: A LOT
WALKING IN HOSPITAL ROOM: A LITTLE
TOILETING: A LITTLE

## 2025-06-22 ASSESSMENT — PAIN - FUNCTIONAL ASSESSMENT
PAIN_FUNCTIONAL_ASSESSMENT: 0-10

## 2025-06-22 ASSESSMENT — PAIN DESCRIPTION - ORIENTATION
ORIENTATION: RIGHT
ORIENTATION: LEFT
ORIENTATION: RIGHT

## 2025-06-22 ASSESSMENT — PAIN DESCRIPTION - LOCATION
LOCATION: LEG

## 2025-06-22 NOTE — CARE PLAN
The patient's goals for the shift include  wound care and discharge    The clinical goals for the shift include wound care, education, discharge      Problem: Pain - Adult  Goal: Verbalizes/displays adequate comfort level or baseline comfort level  Outcome: Progressing     Problem: Safety - Adult  Goal: Free from fall injury  Outcome: Progressing     Problem: Discharge Planning  Goal: Discharge to home or other facility with appropriate resources  Outcome: Progressing     Problem: Chronic Conditions and Co-morbidities  Goal: Patient's chronic conditions and co-morbidity symptoms are monitored and maintained or improved  Outcome: Progressing

## 2025-06-22 NOTE — DISCHARGE INSTRUCTIONS
You are now stable enough to be discharged home.    The following recommendations are made for you at time of hospital discharge:  - Please take your home medications as instructed prior to hospitalization.   - The following changes to your home medications have been made during your hospital stay:  - Start taking oxycodone 5 mg every 3 hours as needed for moderate pain (4-6), you can take 2 tablets if needed for severe pain (7-10).  Please use only if home Percocet does not alleviate your pain  -Start taking hydroxyzine 25 mg every 6 hours as needed for anxiety  - Please follow-up with your primary care provider within 5-7 days from time of discharge. Likely will need to bring photo ID and insurance card to your appointment.   - Please discuss the possibility of pain management referral with your PCP.  Please also discuss anxiety if that is a concern during your follow-up.  - Please follow-up with wound clinic  -Follow-up with podiatry regarding your abscess of the right calf  -  services has been requested to make an appointment for you however if you do not hear back from them within 2-3 days, please call [phone] to find out about appointments with  services.  - If you experience any worsening symptoms or any new acute concerns arise, please contact your primary care provider to discuss and possibly arrange an appointment. If you cannot get in touch with provider or severe symptoms are present, please return to nearest emergency room/urgent care for evaluation and treatment.    B/L lower leg wound care:   Left posterior calf - cleanse with soap and water or saline.  Pat dry.  Apply adaptic or mepitel, then aquacel or hydrofera blue cut to wound size.  Cover with ABD, kerlix and ace bandage.  Change ever other day.  Right posterior calf - cleanse with soap and water or saline.  Pat dry.  Apply adaptic or mepitel, then aquacel packing to tunnel.  Cover with ABD, kerlix and ace bandage.   Change ever day.  Follow-up in wound care center Wednesday afternoon.

## 2025-06-22 NOTE — DISCHARGE SUMMARY
Discharge Diagnosis  Failure of outpatient treatment of chronic bilateral lower extremity wounds  Bilateral lymphedema  Cellulitis and abscess of right lower extremity       Issues Requiring Follow-Up  Follow-up with wound clinic for right leg wound  Follow-up with podiatry for management of cellulitis/abscess of right lower extremity  Follow-up with PCP posthospital stay to discuss possible need for pain management and psychiatry    Discharge Meds     Medication List      START taking these medications     hydrOXYzine HCL 25 mg tablet; Commonly known as: Atarax; Take 1 tablet   (25 mg) by mouth every 6 hours if needed for anxiety.   oxyCODONE 5 mg immediate release tablet; Commonly known as: Roxicodone;   Take 1 tablet (5 mg) by mouth every 3 hours if needed for moderate pain (4   - 6) (Take 2 tablets every 3 hours as needed for severe pain (7-10)) for   up to 5 days.   Ultra-Light Rollator misc; Generic drug: walker; 1 each once daily.   Rollator ultra-light with seat and wheels to use daily for unstable gait.     CONTINUE taking these medications     acetaminophen 325 mg tablet; Commonly known as: Tylenol; Take 2 tablets   (650 mg) by mouth 3 times a day as needed for mild pain (1 - 3).   amphetamine-dextroamphetamine XR 20 mg 24 hr capsule; Commonly known as:   Adderall XR; Take 1 capsule (20 mg) by mouth 2 times a day.   cholecalciferol 125 mcg (5,000 units) tablet; Commonly known as: Vitamin   D-3; Take 1 tablet (5,000 Units) by mouth once daily.   Eliquis 5 mg tablet; Generic drug: apixaban; Take 1 tablet (5 mg) by   mouth 2 times a day.   EPINEPHrine 0.3 mg/0.3 mL injection syringe; Commonly known as: Epipen;   Inject 0.3 mL (0.3 mg) as directed if needed for anaphylaxis. As Directed   levothyroxine 100 mcg tablet; Commonly known as: Synthroid, Levoxyl;   Take 1 tablet (100 mcg) by mouth early in the morning.. Take on an empty   stomach at the same time each day, either 30 to 60 minutes prior to    breakfast   naloxone 4 mg/0.1 mL nasal spray; Commonly known as: Narcan; Administer   1 spray (4 mg) into affected nostril(s) if needed for opioid reversal. May   repeat every 2-3 minutes if needed, alternating nostrils, until medical   assistance becomes available.   Nyamyc 100,000 unit/gram powder; Generic drug: nystatin; Apply topically   once daily.   omeprazole 40 mg DR capsule; Commonly known as: PriLOSEC; Take 1 capsule   (40 mg) by mouth once daily.   oxyCODONE-acetaminophen  mg tablet; Commonly known as: Percocet;   Take 1 tablet by mouth once daily.   rosuvastatin 20 mg tablet; Commonly known as: Crestor; Take 1 tablet (20   mg) by mouth once daily.   triamcinolone 0.1 % cream; Commonly known as: Kenalog; Apply to affected   area 1-2 times daily as needed.     ASK your doctor about these medications     furosemide 20 mg tablet; Commonly known as: Lasix; Take 1 tablet (20 mg)   by mouth once daily.   Triad Wound Dressing paste; Generic drug: wound dressing; Apply 1   Application topically 2 times a day.       Test Results Pending At Discharge  Pending Labs       No current pending labs.            Hospital Course  Frances Mcdowell is a 54 y.o. year old female  patient with PMHx significant for saddle PE (on Eliquis), chronic lower extremity wounds with recurrent cellulitis (hx of pseudomonas and MRSA), lymphedema, ADD, HLD, and hypothyroidism s/p thyroidectomy who presented to [facility] on 6/15 with chief complaint of worsening RLE pain and concern for cellulitis in setting of chronic leg wound.  Per patient, she began having issues with wounds on her lower extremities since July 2024 when she went on a trip, had a blister on her leg, and swam in a hot tub and then developed Pseudomonas.  She was also diagnosed with lymphedema at that time.  She has been following with wound care and infectious disease since.  She has had multiple admissions in the last year for lower extremity cellulitis and has  previously been treated with IV antibiotics at home through PICC line per her ID Dr. Lay for MRSA and Pseudomonas.  She continues to follow with the wound care center weekly where she gets weekly debridements.  States the wounds have been healing well.  However, last Friday she began to experience lightheadedness, sweating, and nausea with associated redness around right leg wound.  She presented to [facility] ER last  and was discharged home with Keflex 4 times daily for cellulitis treatment.  However, since leaving the ER patient states pain has progressively worsened and is no longer tolerable.  She also notes worsening redness around wounds of her right leg.  She did notably miss her wound care appointment this week because the nurse practitioner she sees regularly was on vacation.  She denies chest pain, shortness of breath, abdominal pain, nausea, vomiting. She does note that she has purposefully decreased her PO intake over the past week to limit the number of times she goes to the bathroom as she is having difficulty ambulating.      ED COURSE:   VS: Temperature 37, /83, heart rate 98, respiration 20, O2 sat 100% on room air      Labs  - CBC: WBC 20.5, hemoglobin 9.9, hematocrit 31.1, platelets 459  - BMP: Sodium 136, potassium 3.5, chloride 98, bicarb 29, BUN 11, creatinine 0.65  - M.73  - Ca: 7.9  - LFTs: Albumin 2.5, alk phos 124, ALT 8, AST 12  - Lactate: 0.9  - PT/INR: 21.4/1.9  - ESR: 49  - CRP: 22.53  - Blood cultures: Pending   - Tissue/wound culture: pending      Imaging:  CT angio R lower extremity   1. Femoropopliteal arterial patency with patency of 3 arterial vessel runoff to the level of the right ankle. Left lower extremity  iliofemoral patency as partially imaged to the distal left forearm.  2. Extensive right lower extremity skin thickening and subcutaneous edema, more prominent in the lower leg. Limited evaluation of deep soft tissue structures. No large subcutaneous  drainable fluid collection.     Interventions:   -Vancomycin and Zosyn  -Oxycodone 10 mg  -Dilaudid 1 mg x 2  -Ativan 0.5 mg   -1L NaCl     Hospital Course  And Zosyn were continued when patient was admitted to floor.  Podiatry and ID consulted.  Patient was given pain regimen.  Eliquis was held and heparin drip was started in case of podiatry intervention.  Blood cultures showed no growth.  Wound culture did not yield anything of value.  Zosyn was discontinued 6/16 per ID.  6/17 podiatry performed debridement and took cultures in the OR.  OR cultures revealed Pseudomonas and Zosyn was added back on 6/19.  After debridement home Eliquis was resumed.  Patient's leukocytosis improved during hospital stay.  PT/OT rec moderate intensity.  Pseudomonas sensitivities came back to fluoroquinolones.  Patient was treated in hospital with IV Zosyn as her Pseudomonas was susceptible.  Atarax was given for anxiety while in the hospital.  6/21 ID informed patient that antibiotics were no longer necessary.  Patient was stable for discharge 6/22.  Was discharged with oxycodone 5 mg Q3 as needed for 5 days.  Patient was also prescribed hydroxyzine 25 mg every 6 hours as needed for anxiety    Patient will follow with wound clinic for right lower extremity abscess.  Follow-up with podiatry for management of cellulitis/abscess of right lower extremity  Follow-up with PCP posthospital stay to discuss possible need for pain management and psychiatry    Pertinent Physical Exam At Time of Discharge  Physical Exam  Constitutional:       Appearance: Normal appearance. She is obese.   HENT:      Head: Normocephalic.      Nose: Nose normal.      Mouth/Throat:      Mouth: Mucous membranes are moist.   Eyes:      Extraocular Movements: Extraocular movements intact.      Conjunctiva/sclera: Conjunctivae normal.      Pupils: Pupils are equal, round, and reactive to light.   Cardiovascular:      Rate and Rhythm: Normal rate and regular rhythm.       Pulses: Normal pulses.      Heart sounds: Normal heart sounds.   Pulmonary:      Effort: Pulmonary effort is normal.      Breath sounds: Normal breath sounds.   Abdominal:      General: Bowel sounds are normal.      Palpations: Abdomen is soft.      Tenderness: There is no abdominal tenderness.   Musculoskeletal:         General: Tenderness (Bilateral lower extremity) present. Normal range of motion.      Right lower leg: Edema present.      Left lower leg: Edema present.      Comments: Dressings CDI   Skin:     General: Skin is warm.      Findings: Lesion (Bilateral calves) present.   Neurological:      General: No focal deficit present.      Mental Status: She is alert and oriented to person, place, and time. Mental status is at baseline.   Psychiatric:         Behavior: Behavior normal.         Thought Content: Thought content normal.         Judgment: Judgment normal.      Comments: Anxious         Outpatient Follow-Up  Future Appointments   Date Time Provider Department [place]   7/3/2025 10:30 AM [facility] PAT NURSE 02 [facility]   7/10/2025  8:45 AM [facility] PAT ROOM 03 [facility]   7/17/2025  8:00 AM [facility] [id number] ECHO [id number] [facility]   7/17/2025  9:15 AM Devaughn SANTOS MD [id number] [facility]   10/6/2025  8:00 AM [facility] MS [id number] [facility]

## 2025-06-22 NOTE — PROGRESS NOTES
Frances Mcdowell is a 54 y.o. female on day 7 of admission presenting with Failure of outpatient treatment.    Subjective   Interval History: no fever, no new complaints        Review of Systems    Objective   Range of Vitals (last 24 hours)  Heart Rate:  [80-91]   Temp:  [35.7 °C (96.3 °F)-36.7 °C (98.1 °F)]   Resp:  [15-18]   BP: (114-131)/(63-82)   SpO2:  [96 %-100 %]   Daily Weight  06/15/25 : 139 kg (306 lb 1.6 oz)    Body mass index is 54.22 kg/m².    Physical Exam  Constitutional:       Appearance: Normal appearance.   HENT:      Head: Normocephalic and atraumatic.      Mouth/Throat:      Mouth: Mucous membranes are moist.      Pharynx: Oropharynx is clear.   Eyes:      Pupils: Pupils are equal, round, and reactive to light.   Cardiovascular:      Rate and Rhythm: Normal rate and regular rhythm.      Heart sounds: Normal heart sounds.   Pulmonary:      Effort: Pulmonary effort is normal.      Breath sounds: Normal breath sounds.   Abdominal:      General: Abdomen is flat. Bowel sounds are normal.      Palpations: Abdomen is soft.   Musculoskeletal:         General: Swelling present.      Cervical back: Normal range of motion.      Comments: RT leg posterior upper calf wound, packing removed, no purulence, the leg with minimal stasis, no cellulitis  Lt leg without cellulitis   Neurological:      Mental Status: She is alert.         Antibiotics  This patient does not have an active medication from one of the medication groupers.    Relevant Results  Labs  Results from last 72 hours   Lab Units 06/22/25  0647 06/21/25  0658 06/20/25  0655   WBC AUTO x10*3/uL 7.5 9.8 9.2   HEMOGLOBIN g/dL 8.7* 8.4* 8.3*   HEMATOCRIT % 28.9* 27.4* 27.6*   PLATELETS AUTO x10*3/uL 594* 572* 596*     Results from last 72 hours   Lab Units 06/22/25  0647 06/21/25  0658 06/20/25  0655   SODIUM mmol/L 137 134* 135*   POTASSIUM mmol/L 4.6 4.6 4.4   CHLORIDE mmol/L 100 98 100   CO2 mmol/L 32 32 31   BUN mg/dL 15 17 16   CREATININE mg/dL 0.94  1.02 0.83   GLUCOSE mg/dL 85 91 92   CALCIUM mg/dL 8.1* 7.9* 8.0*   ANION GAP mmol/L 10 9* 8*   EGFR mL/min/1.73m*2 71 65 83   PHOSPHORUS mg/dL 4.3 4.3 3.8     Results from last 72 hours   Lab Units 06/22/25  0647 06/21/25  0658 06/20/25  0655   ALBUMIN g/dL 2.3* 2.3* 2.2*     Estimated Creatinine Clearance: 91.8 mL/min (by C-G formula based on SCr of 0.94 mg/dL).  C-Reactive Protein   Date Value Ref Range Status   06/15/2025 22.53 (H) <1.00 mg/dL Final   08/06/2024 23.21 (H) <1.00 mg/dL Final     Microbiology  Reviewed  Imaging  Reviewed          Assessment/Plan     Legs stasis / wounds / possible Rt leg cellulitis, sp debridement,, the culture with Peseudomonas / mixed tylor     Recommendations :  Supportive care  Discussed with the medical team     I spent minutes in the professional and overall care of this patient.          Miguel Hu MD

## 2025-06-23 ENCOUNTER — DOCUMENTATION (OUTPATIENT)
Dept: HOME HEALTH SERVICES | Facility: HOME HEALTH | Age: 55
End: 2025-06-23
Payer: COMMERCIAL

## 2025-06-23 LAB
ATRIAL RATE: 93 BPM
P AXIS: 10 DEGREES
P OFFSET: 194 MS
P ONSET: 149 MS
PR INTERVAL: 144 MS
Q ONSET: 221 MS
QRS COUNT: 15 BEATS
QRS DURATION: 84 MS
QT INTERVAL: 360 MS
QTC CALCULATION(BAZETT): 447 MS
QTC FREDERICIA: 417 MS
R AXIS: 0 DEGREES
T AXIS: 18 DEGREES
T OFFSET: 401 MS
VENTRICULAR RATE: 93 BPM

## 2025-06-23 NOTE — HH CARE COORDINATION
Home Care received a Referral for Nursing, Physical Therapy, and Occupational Therapy. We have processed the referral for a Start of Care on 6/23/2025.     If you have any questions or concerns, please feel free to contact us at [phone]. Follow the prompts, enter your five digit zip code, and you will be directed to your care team on E[facility] 3.

## 2025-06-24 ENCOUNTER — PATIENT OUTREACH (OUTPATIENT)
Dept: CARE COORDINATION | Age: 55
End: 2025-06-24
Payer: COMMERCIAL

## 2025-06-24 ENCOUNTER — HOME CARE VISIT (OUTPATIENT)
Dept: HOME HEALTH SERVICES | Facility: HOME HEALTH | Age: 55
End: 2025-06-24
Payer: COMMERCIAL

## 2025-06-24 VITALS
RESPIRATION RATE: 16 BRPM | HEIGHT: 63 IN | TEMPERATURE: 97.8 F | BODY MASS INDEX: 51.38 KG/M2 | HEART RATE: 78 BPM | DIASTOLIC BLOOD PRESSURE: 78 MMHG | SYSTOLIC BLOOD PRESSURE: 122 MMHG | OXYGEN SATURATION: 92 % | WEIGHT: 290 LBS

## 2025-06-24 PROCEDURE — G0299 HHS/HOSPICE OF RN EA 15 MIN: HCPCS

## 2025-06-24 ASSESSMENT — ENCOUNTER SYMPTOMS
LOWEST PAIN SEVERITY IN PAST 24 HOURS: 4/10
PAIN LOCATION - PAIN SEVERITY: 8/10
PAIN: 1
FATIGUES EASILY: 1
HIGHEST PAIN SEVERITY IN PAST 24 HOURS: 8/10
SUBJECTIVE PAIN PROGRESSION: GRADUALLY IMPROVING
FATIGUE: 1
PERSON REPORTING PAIN: PATIENT
PAIN LOCATION - PAIN FREQUENCY: INTERMITTENT
APPETITE LEVEL: FAIR
SKIN LESIONS: 1
PAIN LOCATION: RIGHT LEG
TINGLING: 1
PAIN LOCATION - PAIN QUALITY: BURNING
PAIN SEVERITY GOAL: 1/10
MUSCLE WEAKNESS: 1

## 2025-06-24 ASSESSMENT — ACTIVITIES OF DAILY LIVING (ADL)
OASIS_M1830: 04
ENTERING_EXITING_HOME: NEEDS ASSISTANCE

## 2025-06-25 ENCOUNTER — OFFICE VISIT (OUTPATIENT)
Dept: WOUND CARE | Facility: HOSPITAL | Age: 55
End: 2025-06-25
Payer: COMMERCIAL

## 2025-06-25 ENCOUNTER — OFFICE VISIT (OUTPATIENT)
Dept: PRIMARY CARE | Facility: CLINIC | Age: 55
End: 2025-06-25
Payer: COMMERCIAL

## 2025-06-25 ENCOUNTER — HOME CARE VISIT (OUTPATIENT)
Dept: HOME HEALTH SERVICES | Facility: HOME HEALTH | Age: 55
End: 2025-06-25
Payer: COMMERCIAL

## 2025-06-25 VITALS — OXYGEN SATURATION: 97 % | RESPIRATION RATE: 18 BRPM

## 2025-06-25 VITALS
HEART RATE: 61 BPM | SYSTOLIC BLOOD PRESSURE: 128 MMHG | OXYGEN SATURATION: 100 % | TEMPERATURE: 98.2 F | DIASTOLIC BLOOD PRESSURE: 72 MMHG

## 2025-06-25 DIAGNOSIS — L03.90 CELLULITIS: ICD-10-CM

## 2025-06-25 DIAGNOSIS — E03.3 POSTINFECTIOUS HYPOTHYROIDISM: ICD-10-CM

## 2025-06-25 DIAGNOSIS — E55.9 VITAMIN D DEFICIENCY: ICD-10-CM

## 2025-06-25 DIAGNOSIS — L02.415 CELLULITIS AND ABSCESS OF RIGHT LOWER EXTREMITY: ICD-10-CM

## 2025-06-25 DIAGNOSIS — L03.115 CELLULITIS AND ABSCESS OF RIGHT LOWER EXTREMITY: ICD-10-CM

## 2025-06-25 DIAGNOSIS — F98.8 ATTENTION DEFICIT DISORDER, UNSPECIFIED TYPE: ICD-10-CM

## 2025-06-25 PROCEDURE — 11045 DBRDMT SUBQ TISS EACH ADDL: CPT | Mod: LT,RT

## 2025-06-25 PROCEDURE — G0151 HHCP-SERV OF PT,EA 15 MIN: HCPCS

## 2025-06-25 PROCEDURE — 99213 OFFICE O/P EST LOW 20 MIN: CPT | Performed by: INTERNAL MEDICINE

## 2025-06-25 PROCEDURE — 11042 DBRDMT SUBQ TIS 1ST 20SQCM/<: CPT | Mod: LT,RT

## 2025-06-25 RX ORDER — ACETAMINOPHEN 325 MG/1
650 TABLET ORAL 3 TIMES DAILY PRN
Qty: 30 TABLET | Refills: 11 | Status: SHIPPED | OUTPATIENT
Start: 2025-06-25

## 2025-06-25 RX ORDER — OXYCODONE HYDROCHLORIDE 5 MG/1
5 TABLET ORAL
Qty: 15 TABLET | Refills: 0 | Status: SHIPPED | OUTPATIENT
Start: 2025-06-25 | End: 2025-06-30

## 2025-06-25 RX ORDER — LEVOTHYROXINE SODIUM 100 UG/1
100 TABLET ORAL DAILY
Qty: 90 TABLET | Refills: 3 | Status: SHIPPED | OUTPATIENT
Start: 2025-06-25

## 2025-06-25 RX ORDER — DEXTROAMPHETAMINE SACCHARATE, AMPHETAMINE ASPARTATE MONOHYDRATE, DEXTROAMPHETAMINE SULFATE AND AMPHETAMINE SULFATE 5; 5; 5; 5 MG/1; MG/1; MG/1; MG/1
20 CAPSULE, EXTENDED RELEASE ORAL 2 TIMES DAILY
Qty: 60 CAPSULE | Refills: 0 | Status: SHIPPED | OUTPATIENT
Start: 2025-06-25

## 2025-06-25 RX ORDER — ACETAMINOPHEN 500 MG
125 TABLET ORAL DAILY
Qty: 90 TABLET | Refills: 1 | Status: SHIPPED | OUTPATIENT
Start: 2025-06-25

## 2025-06-25 RX ORDER — OMEPRAZOLE 40 MG/1
40 CAPSULE, DELAYED RELEASE ORAL DAILY
Qty: 90 CAPSULE | Refills: 3 | Status: SHIPPED | OUTPATIENT
Start: 2025-06-25

## 2025-06-25 ASSESSMENT — ACTIVITIES OF DAILY LIVING (ADL)
AMBULATION ASSISTANCE: SUPERVISION
AMBULATION ASSISTANCE: 1
AMBULATION ASSISTANCE ON FLAT SURFACES: 1
PHYSICAL TRANSFERS ASSESSED: 1
AMBULATION_DISTANCE/DURATION_TOLERATED: 50FT
CURRENT_FUNCTION: INDEPENDENT
AMBULATION ASSISTANCE: STAND BY ASSIST

## 2025-06-25 ASSESSMENT — ENCOUNTER SYMPTOMS: MUSCLE WEAKNESS: 1

## 2025-06-25 NOTE — Clinical Note
Subjective: 2 story home, ramp entry at front. Pt coming home from [facility] when arrived. Indep w car exit, amb up ramp indep w rail, enter home w ww indep.  Skilled Needs:No skilled PT needs.  Instruction provided: Keep pathways clear, use cane or walker for stability, have superv for steps and home egress.   Patient response to instruction: receptive   Patient instructed on plan of care and visit frequency. No visits planned, pt is in agreement.    Discipline Communication: PTA OT RN MD PSC   Plan for next visit: 0  Medication Reconciliation:    Demonstrates Adherence : Yes  Issues/Concerns: No  Provider Notified of Issues/Concerns: N/A  Caregiver Notified of Issues/Concerns: N/A  patient  response to instructions Verbalized Understanding  Pill bottles were visualized.  DC from PT.

## 2025-06-25 NOTE — HOME HEALTH
Subjective: 2 story home, ramp entry at front. Pt coming home from [facility] when arrived. Hx LLE cellulitis.  Primary Reason for Home Care: debility and deconditioning.  Skilled Needs: home functional mobility, difficulty w amb.   Precautions: fall risk  Instruction provided: Keep pathways clear, use cane or walker for stability, have superv for steps and home egress.   Patient response to instruction: receptive   Patient instructed on plan of care and visit frequency.   Patient in agreement with plan of care and visit frequency.    Discipline Communication: PTA OT RN MD PSC   Plan for next visit:     Medication Reconciliation:    Demonstrates Adherence : Yes  Issues/Concerns: No  Provider Notified of Issues/Concerns: N/A  Caregiver Notified of Issues/Concerns: N/A  patient  response to instructions Verbalized Understanding

## 2025-06-25 NOTE — PROGRESS NOTES
Subjective   Patient ID: Frances Mcdowell is a 54 y.o. female who presents for Mass.    In for folllow up for right leg abscess.      Review of Systems    Previous history  Medical History[1]  Surgical History[2]  Social History[3]  Family History[4]  Allergies[5]  Current Outpatient Medications   Medication Instructions   • acetaminophen (TYLENOL) 650 mg, oral, 3 times daily PRN   • amphetamine-dextroamphetamine XR (Adderall XR) 20 mg 24 hr capsule 20 mg, oral, 2 times daily   • cholecalciferol (VITAMIN D-3) 5,000 Units, oral, Daily   • Eliquis 5 mg, oral, 2 times daily   • EPINEPHrine (EPIPEN) 0.3 mg, injection, As needed, As Directed   • furosemide (LASIX) 20 mg, oral, Daily   • hydrOXYzine HCL (ATARAX) 25 mg, oral, Every 6 hours PRN   • levothyroxine (SYNTHROID, LEVOXYL) 100 mcg, oral, Daily, Take on an empty stomach at the same time each day, either 30 to 60 minutes prior to breakfast   • naloxone (NARCAN) 4 mg, nasal, As needed, May repeat every 2-3 minutes if needed, alternating nostrils, until medical assistance becomes available.   • Nyamyc 100,000 unit/gram powder Topical, Daily   • omeprazole (PRILOSEC) 40 mg, oral, Daily   • oxyCODONE (ROXICODONE) 5 mg, oral, Every 3 hours PRN   • oxyCODONE-acetaminophen (Percocet)  mg tablet 1 tablet, oral, Daily   • rosuvastatin (CRESTOR) 20 mg, oral, Daily   • triamcinolone (Kenalog) 0.1 % cream Apply to affected area 1-2 times daily as needed.   • wound dressing (Triad Wound Dressing) paste 1 Application, Topical, 2 times daily       Objective       Physical Exam      Assessment/Plan   Frances Mcdowell is a 54 y.o. female who presents for the concerns below:    Assessment & Plan              Discussed with:   Return in :    Portions of this note were generated using digital voice recognition software, and may contain grammatical errors       Devaughn Moreno MD  06/25/25  8:27 AM           [1]  Past Medical History:  Diagnosis Date   • Acute embolism and thrombosis of  other specified veins 2022    Superficial vein thrombosis   • Acute on chronic diastolic heart failure     BNP level of 258 in    • ADD (attention deficit disorder)    • Anxiety    • Chronic wound     chronic lower extremity wounds (hx of pseudomonas and MRSA)   • Class 3 severe obesity with body mass index (BMI) of 50.0 to 59.9 in adult    • Current use of long term anticoagulation     on Eliquis   • DVT (deep venous thrombosis) (Multi) 2025    Unprovoked pulmonary embolism and right mid-distal femoral deep vein thrombosis   • Endometrial intraepithelial neoplasia (EIN)    • GERD (gastroesophageal reflux disease)    • HLD (hyperlipidemia)    • Hypothyroidism    • Kidney stone 2025    Left kidney   • Lymph edema    • Pulmonary embolism 2025    Saddle PE   • Recurrent cellulitis of lower extremity    [2]  Past Surgical History:  Procedure Laterality Date   • BLADDER SURGERY     •  SECTION, LOW TRANSVERSE     • COLONOSCOPY  2024   • FOOT SURGERY     • HYSTERECTOMY     • IR CVC PICC  2025    IR CVC PICC 2025 TRI CR NONV1   • TONSILLECTOMY     • TOTAL THYROIDECTOMY     • VARICOSE VEIN SURGERY      Varicose Vein Ligation   [3]  Social History  Tobacco Use   • Smoking status: Never   • Smokeless tobacco: Never   Vaping Use   • Vaping status: Never Used   Substance Use Topics   • Alcohol use: Not Currently     Comment: seldom   • Drug use: Never   [4]  No family history on file.  [5]  Allergies  Allergen Reactions   • Erythromycin Base Anaphylaxis   • Peanut Anaphylaxis   • Sulfa (Sulfonamide Antibiotics) Anaphylaxis   • Tetanus Toxoid Other and Swelling   • Morphine Itching   • Tramadol Itching   • Adhesive Tape-Silicones Rash     tegaderm causes bad skin breakdown

## 2025-06-26 ENCOUNTER — PATIENT OUTREACH (OUTPATIENT)
Dept: CARE COORDINATION | Facility: CLINIC | Age: 55
End: 2025-06-26
Payer: COMMERCIAL

## 2025-06-26 NOTE — PROGRESS NOTES
[facility]  Admitted 6/15/2025  Discharged 6/22/2025  Dx: Failure of outpatient treatment of chronic bilateral lower extremity wounds, bilateral lymphedema, Cellulitis    Outreach call to patient to support a smooth transition of care from recent admission and follow up after primary care provider. Patient states, she is doing good. Patient states, she spoke with someone from  today. Patient is agreeable to future outreach calls.  Will continue to monitor through transition period.    Eli Mahajan RN/CM   [facility]  [phone]

## 2025-06-26 NOTE — SIGNIFICANT EVENT
Follow Up Phone Call    Outgoing phone call    Spoke to: Frances Mcdowell Relationship:self   Phone number: [phone]      Outcome: contacted patient/ family   Chief Complaint   Patient presents with    Leg Pain          Diagnosis:Not applicable    States she is feeling better. No further questions or concerns.

## 2025-06-28 ENCOUNTER — HOME CARE VISIT (OUTPATIENT)
Dept: HOME HEALTH SERVICES | Facility: HOME HEALTH | Age: 55
End: 2025-06-28
Payer: COMMERCIAL

## 2025-06-28 VITALS
TEMPERATURE: 97.3 F | RESPIRATION RATE: 16 BRPM | DIASTOLIC BLOOD PRESSURE: 68 MMHG | SYSTOLIC BLOOD PRESSURE: 102 MMHG | HEART RATE: 78 BPM

## 2025-06-28 PROCEDURE — G0299 HHS/HOSPICE OF RN EA 15 MIN: HCPCS

## 2025-06-28 SDOH — ECONOMIC STABILITY: GENERAL

## 2025-06-28 ASSESSMENT — ENCOUNTER SYMPTOMS
PAIN LOCATION - PAIN QUALITY: ACHY
PAIN LOCATION - PAIN FREQUENCY: CONSTANT
LOWEST PAIN SEVERITY IN PAST 24 HOURS: 4/10
PERSON REPORTING PAIN: PATIENT
APPETITE LEVEL: FAIR
PAIN LOCATION - PAIN SEVERITY: 6/10
SUBJECTIVE PAIN PROGRESSION: GRADUALLY IMPROVING
PAIN SEVERITY GOAL: 2/10
PAIN LOCATION: RIGHT LEG
PAIN: 1
HIGHEST PAIN SEVERITY IN PAST 24 HOURS: 6/10
SKIN LESIONS: 1
MUSCLE WEAKNESS: 1

## 2025-06-28 ASSESSMENT — ACTIVITIES OF DAILY LIVING (ADL): MONEY MANAGEMENT (EXPENSES/BILLS): INDEPENDENT

## 2025-07-01 ENCOUNTER — HOME CARE VISIT (OUTPATIENT)
Dept: HOME HEALTH SERVICES | Facility: HOME HEALTH | Age: 55
End: 2025-07-01
Payer: COMMERCIAL

## 2025-07-01 VITALS
TEMPERATURE: 98.4 F | SYSTOLIC BLOOD PRESSURE: 120 MMHG | DIASTOLIC BLOOD PRESSURE: 70 MMHG | RESPIRATION RATE: 14 BRPM | HEART RATE: 98 BPM | OXYGEN SATURATION: 100 %

## 2025-07-01 PROCEDURE — G0300 HHS/HOSPICE OF LPN EA 15 MIN: HCPCS

## 2025-07-01 ASSESSMENT — ENCOUNTER SYMPTOMS
CHANGE IN APPETITE: UNCHANGED
PAIN LOCATION - PAIN FREQUENCY: CONSTANT
PAIN SEVERITY GOAL: 0/10
PAIN LOCATION - PAIN SEVERITY: 7/10
PAIN: 1
PAIN LOCATION - PAIN DURATION: DAILY
APPETITE LEVEL: FAIR
SUBJECTIVE PAIN PROGRESSION: WAXING AND WANING
PAIN LOCATION - PAIN QUALITY: VERY PAINFUL
SKIN LESIONS: 1
PAIN: BLE
HIGHEST PAIN SEVERITY IN PAST 24 HOURS: 9/10
MUSCLE WEAKNESS: 1
PAIN LOCATION - RELIEVING FACTORS: REST MEDICATION
LOWEST PAIN SEVERITY IN PAST 24 HOURS: 5/10

## 2025-07-02 ENCOUNTER — APPOINTMENT (OUTPATIENT)
Dept: WOUND CARE | Facility: HOSPITAL | Age: 55
End: 2025-07-02
Payer: COMMERCIAL

## 2025-07-03 ENCOUNTER — APPOINTMENT (OUTPATIENT)
Dept: PREADMISSION TESTING | Facility: HOSPITAL | Age: 55
End: 2025-07-03
Payer: COMMERCIAL

## 2025-07-03 NOTE — CPM/PAT H&P
[facility]/PAT Evaluation       Name: Frances Mcdowell (Frances Mcdowell)  /Age: 1970/54 y.o.     {[facility] Visit Type:74901}      Chief Complaint: ***    HPI  Frances Mcdowell is scheduled for LITHOTRIPSY, CALCULUS, URETER, USING LASER - Left with Dr. Heaton on 25.  Medical History[1]    Surgical History[2]    Patient  reports being sexually active and has had partner(s) who are male. She reports using the following method of birth control/protection: None.    Family History[3]    Allergies[4]    Prior to Admission medications    Medication Sig Start Date End Date Taking? Authorizing Provider   acetaminophen (Tylenol) 325 mg tablet Take 2 tablets (650 mg) by mouth 3 times a day as needed for mild pain (1 - 3). 25   Devaughn Moreno MD   amphetamine-dextroamphetamine XR (Adderall XR) 20 mg 24 hr capsule Take 1 capsule (20 mg) by mouth 2 times a day. 25   Devaughn Moreno MD   apixaban (Eliquis) 5 mg tablet Take 1 tablet (5 mg) by mouth 2 times a day. 25  Eleni Dobbins MD, MS   cholecalciferol (Vitamin D-3) 125 mcg (5,000 units) tablet Take 1 tablet (125 mcg) by mouth once daily. 25   Devaughn Moreno MD   EPINEPHrine 0.3 mg/0.3 mL injection syringe Inject 0.3 mL (0.3 mg) as directed if needed for anaphylaxis. As Directed 23   Devaughn Moreno MD   hydrOXYzine HCL (Atarax) 25 mg tablet Take 1 tablet (25 mg) by mouth every 6 hours if needed for anxiety. 25   Artur Pandya DO   levothyroxine (Synthroid, Levoxyl) 100 mcg tablet Take 1 tablet (100 mcg) by mouth early in the morning.. Take on an empty stomach at the same time each day, either 30 to 60 minutes prior to breakfast 25   Devaughn Moreno MD   naloxone (Narcan) 4 mg/0.1 mL nasal spray Administer 1 spray (4 mg) into affected nostril(s) if needed for opioid reversal. May repeat every 2-3 minutes if needed, alternating nostrils, until medical assistance becomes available. 3/18/25   Devaughn Moreno MD   Nyamyc 100,644  unit/gram powder Apply topically once daily. 5/8/25   Devaughn Moreno MD   omeprazole (PriLOSEC) 40 mg DR capsule Take 1 capsule (40 mg) by mouth once daily. 6/25/25   Devaughn Moreno MD   oxyCODONE (Roxicodone) 5 mg immediate release tablet Take 1 tablet (5 mg) by mouth every 3 hours if needed for moderate pain (4 - 6) (Take 2 tablets every 3 hours as needed for severe pain (7-10)) for up to 5 days. 6/25/25 6/30/25  Devaughn Moreno MD   oxyCODONE-acetaminophen (Percocet)  mg tablet Take 1 tablet by mouth once daily.  Patient taking differently: Take 1 tablet by mouth once daily. as needed for severe pain  Indications: pain 5/9/25 8/7/25  Devaughn Moreno MD   rosuvastatin (Crestor) 20 mg tablet Take 1 tablet (20 mg) by mouth once daily. 12/13/24 12/8/25  Devaughn Moreno MD   triamcinolone (Kenalog) 0.1 % cream Apply to affected area 1-2 times daily as needed. 5/14/25 7/13/25  Nida Sheffield, APRN-CNP        PAT ROS     PAT Physical Exam     Airway      Visit Vitals  OB Status Hysterectomy   Smoking Status Never       DASI Risk Score      Flowsheet Row Questionnaire Series Submission from 1/30/2025 in [facility] [place] with Generic Provider Davey   Can you take care of yourself (eat, dress, bathe, or use toilet)?  2.75  filed at 01/30/2025 1403   Can you walk indoors, such as around your house? 1.75  filed at 01/30/2025 1403   Can you walk a block or two on level ground?  2.75  filed at 01/30/2025 1403   Can you climb a flight of stairs or walk up a hill? 5.5  filed at 01/30/2025 1403   Can you run a short distance? 0  filed at 01/30/2025 1403   Can you do light work around the house like dusting or washing dishes? 2.7  filed at 01/30/2025 1403   Can you do moderate work around the house like vacuuming, sweeping floors or carrying groceries? 3.5  filed at 01/30/2025 1403   Can you do heavy work around the house like scrubbing floors or lifting and moving heavy furniture?  8  filed at 01/30/2025 1409   Can you  do yard work like raking leaves, weeding or pushing a mower? 4.5  filed at 01/30/2025 1403   Can you have sexual relations? 5.25  filed at 01/30/2025 1403   Can you participate in moderate recreational activities like golf, bowling, dancing, doubles tennis or throwing a baseball or football? 6  filed at 01/30/2025 1403   Can you participate in strenous sports like swimming, singles tennis, football, basketball, or skiing? 0  filed at 01/30/2025 1403   DASI SCORE 42.7  filed at 01/30/2025 1403   METS Score (Will be calculated only when all the questions are answered) 8  filed at 01/30/2025 1403          Caprini DVT Assessment      Flowsheet Row ED to Hosp-Admission (Discharged) from 6/15/2025 in [place] [address] [place] with Matias Salguero MD and Vivek Schmitz MD Admission (Discharged) from 3/24/2025 in [place] [address] with Ean Kulkarni MD   DVT Score (IF A SCORE IS NOT CALCULATING, MUST SELECT A BMI TO COMPLETE) 8 filed at 06/15/2025 1948 7 filed at 03/24/2025 1240   Medical Factors Swollen legs, History of DVT/PE filed at 06/15/2025 1948 History of DVT/PE filed at 03/24/2025 1240   BMI (BMI MUST BE CHOSEN) Greater than 50 (Venous stasis syndrome) filed at 06/15/2025 1948 41-50 (Morbid obesity) filed at 03/24/2025 1240          Modified Frailty Index    No data to display       [id number] Stroke Risk Points  Current as of just now        N/A 0 to 9 Points:      Last Change: N/A          The [id number] risk score (Lip GH, et al. 2009. © 2010 American [employer]) quantifies the risk of stroke for a patient with atrial fibrillation. For patients without atrial fibrillation or under the age of 18 this score appears as N/A. Higher score values generally indicate higher risk of stroke.        This score is not applicable to this patient. Components are not calculated.          Revised Cardiac Risk Index    No data to display       Apfel Simplified Score    No  data to display       Risk Analysis Index Results This Encounter    No data found in the last 10 encounters.       Stop Bang Score      Flowsheet Row ED to Hosp-Admission (Discharged) from [facility]/15/20[facility] with Matias Salguero MD and Vivek Schmitz MD Admission (Discharged) from 2/21/2025 in [facility] OR with Edy Meyers, DO   Do you snore loudly? 0 filed at 06/17/2025 1435 0 filed at 02/21/2025 0621   Do you often feel tired or fatigued after your sleep? 1 filed at 06/17/2025 1435 1 filed at 02/21/2025 0621   Has anyone ever observed you stop breathing in your sleep? 0 filed at 06/17/2025 1435 0 filed at 02/21/2025 0621   Do you have or are you being treated for high blood pressure? 1 filed at 06/17/2025 1435 1 filed at 02/21/2025 0621   Recent BMI (Calculated) 54.2 filed at 06/17/2025 1435 50.5 filed at 02/21/2025 0621   Is BMI greater than 35 kg/m2? 1=Yes filed at 06/17/2025 1435 1=Yes filed at 02/21/2025 0621   Age older than 50 years old? 1=Yes filed at 06/17/2025 1435 1=Yes filed at 02/21/2025 0621   Gender - Male 0=No filed at 06/17/2025 1435 0=No filed at 02/21/2025 0621          Prodigy: High Risk  Total Score: 3              Prodigy Previous Opioid Use Score           ARISCAT Score for Postoperative Pulmonary Complications    No data to display       Rocha Perioperative Risk for Myocardial Infarction or Cardiac Arrest (MIGUELINA)    No data to display       Testing/Diagnostic:   ECG; 6/20/25  Normal sinus rhythm  Inferior infarct , age undetermined  Abnormal ECG  When compared with ECG of 24-MAR-2025 12:3    CT ANGIO LOWER EXTREMITY RIGHT W AND OR WO IV CONTRAST; ; 6/15/2025   IMPRESSION:  1. Femoropopliteal arterial patency with patency of 3 arterial vessel  runoff to the level of the right ankle. Left lower extremity  iliofemoral patency as partially imaged to the distal left forearm.  2. Extensive right lower extremity skin thickening and subcutaneous  edema, more  prominent in the lower leg. Limited evaluation of deep  soft tissue structures. No large subcutaneous drainable fluid  collection.    Venous duplex ultrasound 3/24/2025   **CRITICAL RESULT**  Critical Result: Acute DVT in Right lower extremity.  Notification called to Emigdio Palafox MD on 3/24/2025 at 4:00:18 PM. Acknowledged critical results notification communicated via Epic secure chat by Navin Caban RVT.     CONCLUSIONS:  Right Lower Venous: There is acute non-occlusive deep vein thrombosis visualized in the mid femoral, distal femoral and popliteal veins. The remainder of the right leg is negative for deep vein thrombosis. Unable to obtain images and rule out DVT in calf veins due to painful wounds and bandage.  Left Lower Venous: No evidence of acute deep vein thrombus visualized in the left lower extremity. Unable to obtain images and rule out DVT in calf veins due to painful wounds and bandage.     CTA chest 3/24/2025  IMPRESSION:  1.  The findings are compatible with the acute pulmonary emboli with  considerable thrombus burden as well as evidence for right  ventricular strain    TRANSTHORACIC ECHO;3/24/25  CONCLUSIONS:   1. The left ventricular systolic function is normal, with a visually estimated ejection fraction of 65%.   2. Left ventricular diastolic filling cannot be determined due to E/A wave fusion.   3. There is moderately increased septal and moderately increased posterior left ventricular wall thickness.   4. There is reduced right ventricular systolic function.   5. Hobbs's sign is present, suggestive of pulmonary embolism.   6. Moderately enlarged right ventricle.   7. Moderate tricuspid regurgitation visualized.   8. Moderately elevated right ventricular systolic pressure.    CT ABDOMEN PELVIS W IV CONTRAST; 1/22/2025   IMPRESSION:  Previous hysterectomy.  Left colon and sigmoid diverticulosis.  Nonobstructing mid to lower pole left renal stone.  7 mm indeterminate hypodense nodule in the  inferolateral lower pole  left renal cortex. This could be a complex cyst or small solid  nodule. Recommend further evaluation with renal MRI.  Small hiatal hernia. Tiny fat containing umbilical hernia.    Patient Specialist/PCP:   PCP  6/25/25 - Devaughn Moreno MD - fuv for right leg abcess    [facility] Admission 6/15 DC 6/22 chief complaint of worsening RLE pain and concern for cellulitis in setting of chronic leg wound. OR for wound  debridement - OR cultures revealed Pseudomonas treated with IV Zosyn. F/U with wound clinic, podiatry, and PCP- to discuss possible need for pain management and psychiatry     Urology   6/5/2025 - Blane Heaton MD seen for left kidney stone. stone characteristics: 12 x 9  x 8 mm , 505 HU, left middle pole   Prior to surgery, the patient requires evaluation by cardiology, vascular surgery, and preadmission testing. A new urine culture must be obtained 10 days prior to surgery   Plan:  Left RIRS    Urology  5/21/25 - Anisa Salazar MD-  NPV for gross hematuria. We will plan for diagnostic hematuria workup which includes cystoscopy and urine cytology   Nephrolithiasis- We will refer her to Dr. Blane Heaton to discuss elective ureteroscopy.      Vascular Medicine   4/28/25 - Eleni Dobbins MD FUV for unprovoked PE and dvt on 3/24/25. Will need indefinite anticoagulation. Follow up in 6 months. (Next appointment scheduled for 10/6/25)  Sent send staff message for risk stratification.    Cardiology   4/17/2025- Devaughn SANTOS MD - NPV to establish care for history of acute on chronic diastolic heart failure and PE with right heart strain in March 2025, and to discuss prior echo on 3/24/25.  Will start furosemide or Lasix 20 mg once daily.     Ordered Echo- Scheduled for 7/17 at 0800  Follow up within 2-3 months - (Next appt scheduled for 7/17)  Will send staff message for risk stratification and pre-op Eliquis instructions    Assessment and Plan:    ONLY    Amy Kelly  RN  Pre-Admission Testing   {[facility] EMBEDDED ASSESSMENT AND PLAN:64038}             [1]   Past Medical History:  Diagnosis Date    Acute embolism and thrombosis of other specified veins 2022    Superficial vein thrombosis    Acute on chronic diastolic heart failure     BNP level of 258 in     ADD (attention deficit disorder)     ADHD (attention deficit hyperactivity disorder)     Allergic     Anxiety     Cellulitis     Chronic pain disorder     Chronic wound     chronic lower extremity wounds (hx of pseudomonas and MRSA)    Class 3 severe obesity with body mass index (BMI) of 50.0 to 59.9 in adult     Current use of long term anticoagulation     on Eliquis    DVT (deep venous thrombosis) (Multi) 2025    unprovoked DVT    Endometrial intraepithelial neoplasia (EIN)     Extremity pain     GERD (gastroesophageal reflux disease)     HLD (hyperlipidemia)     Hypothyroidism     s/p thyroidectomy    Kidney stone 2025    Left kidney    Lymph edema     Moderate tricuspid regurgitation     PE (pulmonary thromboembolism) (Multi) 2025    Unprovoked Saddle PE on Eliquis    Peripheral neuropathy     Recurrent cellulitis of lower extremity     (hx of pseudomonas and MRSA)    Skin cancer     Varicosities     Venous insufficiency    [2]   Past Surgical History:  Procedure Laterality Date    ADENOIDECTOMY      BLADDER SURGERY       SECTION, LOW TRANSVERSE      COLONOSCOPY  2024    EPIDURAL BLOCK INJECTION      FOOT SURGERY      HYSTERECTOMY      IR CVC PICC  2025    IR CVC PICC 2025 TRI CR NONV1    OTHER SURGICAL HISTORY  2025    DIAGNOSTIC BILATERAL LOWER VENOGRAM WITH INTRAVASCULAR ULTRASOUND    TONSILLECTOMY      TOTAL THYROIDECTOMY      VARICOSE VEIN SURGERY      Varicose Vein Ligation    WOUND DEBRIDEMENT  2025   [3]   Family History  Problem Relation Name Age of Onset    Arthritis Mother Esperanza     COPD Mother Esperanza     Learning  disabilities Son Tyson Aries     Intellectual Disability Sterling Mehta Aries     Developmental delay Son Tyson Aries     Seizures Son Tyson Aries     Learning disabilities Son Tyson Aries     Intellectual Disability Son Tyson Aries     Learning disabilities Son Tyson Aries     Intellectual Disability Son Tyson Aries     Learning disabilities Son Tyson Aries     Intellectual Disability Son Tyson Aries    [4]   Allergies  Allergen Reactions    Erythromycin Base Anaphylaxis    Peanut Anaphylaxis    Sulfa (Sulfonamide Antibiotics) Anaphylaxis    Tetanus Toxoid Other and Swelling    Morphine Itching    Tramadol Itching    Adhesive Tape-Silicones Rash     tegaderm causes bad skin breakdown

## 2025-07-05 ENCOUNTER — HOME CARE VISIT (OUTPATIENT)
Dept: HOME HEALTH SERVICES | Facility: HOME HEALTH | Age: 55
End: 2025-07-05
Payer: COMMERCIAL

## 2025-07-05 VITALS
OXYGEN SATURATION: 97 % | RESPIRATION RATE: 12 BRPM | TEMPERATURE: 97.1 F | SYSTOLIC BLOOD PRESSURE: 128 MMHG | HEART RATE: 82 BPM | DIASTOLIC BLOOD PRESSURE: 68 MMHG

## 2025-07-05 PROCEDURE — G0299 HHS/HOSPICE OF RN EA 15 MIN: HCPCS

## 2025-07-05 SDOH — ECONOMIC STABILITY: GENERAL

## 2025-07-05 ASSESSMENT — ENCOUNTER SYMPTOMS
PAIN LOCATION - RELIEVING FACTORS: MEDICATION, REST
FATIGUES EASILY: 1
PAIN LOCATION: RIGHT LEG
MUSCLE WEAKNESS: 1
CHANGE IN APPETITE: UNCHANGED
LOWEST PAIN SEVERITY IN PAST 24 HOURS: 3/10
PAIN LOCATION - PAIN DURATION: VARIES
HIGHEST PAIN SEVERITY IN PAST 24 HOURS: 6/10
PAIN SEVERITY GOAL: 1/10
APPETITE LEVEL: GOOD
FATIGUE: 1
SHORTNESS OF BREATH: 1
SUBJECTIVE PAIN PROGRESSION: UNCHANGED
PAIN LOCATION - EXACERBATING FACTORS: WOUNDS
PAIN LOCATION - PAIN FREQUENCY: INTERMITTENT
HEADACHES: 1
PERSON REPORTING PAIN: PATIENT
DYSPNEA ON EXERTION: 1
SKIN LESIONS: 1
PAIN LOCATION - PAIN QUALITY: ACHING THROBBING
DYSPNEA ACTIVITY LEVEL: AFTER AMBULATING MORE THAN 20 FT
PAIN: 1
PAIN LOCATION - PAIN SEVERITY: 6/10

## 2025-07-05 ASSESSMENT — ACTIVITIES OF DAILY LIVING (ADL)
AMBULATION ASSISTANCE: ONE PERSON
AMBULATION ASSISTANCE: STAND BY ASSIST
AMBULATION ASSISTANCE: 1
MONEY MANAGEMENT (EXPENSES/BILLS): NEEDS ASSISTANCE

## 2025-07-08 ENCOUNTER — HOME CARE VISIT (OUTPATIENT)
Dept: HOME HEALTH SERVICES | Facility: HOME HEALTH | Age: 55
End: 2025-07-08
Payer: COMMERCIAL

## 2025-07-08 VITALS
DIASTOLIC BLOOD PRESSURE: 54 MMHG | TEMPERATURE: 97.4 F | OXYGEN SATURATION: 95 % | RESPIRATION RATE: 16 BRPM | HEART RATE: 94 BPM | SYSTOLIC BLOOD PRESSURE: 110 MMHG

## 2025-07-08 PROCEDURE — G0300 HHS/HOSPICE OF LPN EA 15 MIN: HCPCS

## 2025-07-08 ASSESSMENT — ENCOUNTER SYMPTOMS
BOWEL PATTERN NORMAL: 1
APPETITE LEVEL: FAIR
LAST BOWEL MOVEMENT: 67394
PERSON REPORTING PAIN: PATIENT
MUSCLE WEAKNESS: 1
SKIN LESIONS: 1
CHANGE IN APPETITE: UNCHANGED
DENIES PAIN: 1

## 2025-07-08 ASSESSMENT — ACTIVITIES OF DAILY LIVING (ADL)
AMBULATION ASSISTANCE: 1
AMBULATION ASSISTANCE: STAND BY ASSIST

## 2025-07-09 ENCOUNTER — HOSPITAL ENCOUNTER (INPATIENT)
Facility: HOSPITAL | Age: 55
End: 2025-07-09
Attending: STUDENT IN AN ORGANIZED HEALTH CARE EDUCATION/TRAINING PROGRAM | Admitting: INTERNAL MEDICINE
Payer: COMMERCIAL

## 2025-07-09 ENCOUNTER — OFFICE VISIT (OUTPATIENT)
Dept: WOUND CARE | Facility: HOSPITAL | Age: 55
End: 2025-07-09
Payer: COMMERCIAL

## 2025-07-09 ENCOUNTER — APPOINTMENT (OUTPATIENT)
Dept: RADIOLOGY | Facility: HOSPITAL | Age: 55
End: 2025-07-09
Payer: COMMERCIAL

## 2025-07-09 DIAGNOSIS — L03.115 CELLULITIS OF RIGHT LOWER EXTREMITY: Primary | ICD-10-CM

## 2025-07-09 DIAGNOSIS — L97.222 NON-PRESSURE CHRONIC ULCER OF LEFT CALF WITH FAT LAYER EXPOSED (MULTI): ICD-10-CM

## 2025-07-09 DIAGNOSIS — L03.115 CELLULITIS OF RIGHT LOWER EXTREMITY: ICD-10-CM

## 2025-07-09 DIAGNOSIS — L02.415 ABSCESS OF LEG, RIGHT: ICD-10-CM

## 2025-07-09 DIAGNOSIS — D50.8 OTHER IRON DEFICIENCY ANEMIA: ICD-10-CM

## 2025-07-09 LAB
ALBUMIN SERPL BCP-MCNC: 2.6 G/DL (ref 3.4–5)
ALP SERPL-CCNC: 78 U/L (ref 33–110)
ALT SERPL W P-5'-P-CCNC: 4 U/L (ref 7–45)
ANION GAP SERPL CALC-SCNC: 13 MMOL/L (ref 10–20)
APPEARANCE UR: ABNORMAL
AST SERPL W P-5'-P-CCNC: 9 U/L (ref 9–39)
BACTERIA #/AREA URNS AUTO: ABNORMAL /HPF
BASOPHILS # BLD AUTO: 0.06 X10*3/UL (ref 0–0.1)
BASOPHILS NFR BLD AUTO: 0.5 %
BILIRUB SERPL-MCNC: 0.6 MG/DL (ref 0–1.2)
BILIRUB UR STRIP.AUTO-MCNC: NEGATIVE MG/DL
BUN SERPL-MCNC: 14 MG/DL (ref 6–23)
CALCIUM SERPL-MCNC: 8.1 MG/DL (ref 8.6–10.3)
CHLORIDE SERPL-SCNC: 102 MMOL/L (ref 98–107)
CO2 SERPL-SCNC: 25 MMOL/L (ref 21–32)
COLOR UR: ABNORMAL
CREAT SERPL-MCNC: 0.88 MG/DL (ref 0.5–1.05)
CRP SERPL-MCNC: 12.98 MG/DL
EGFRCR SERPLBLD CKD-EPI 2021: 77 ML/MIN/1.73M*2
EOSINOPHIL # BLD AUTO: 0.4 X10*3/UL (ref 0–0.7)
EOSINOPHIL NFR BLD AUTO: 3.3 %
ERYTHROCYTE [DISTWIDTH] IN BLOOD BY AUTOMATED COUNT: 15 % (ref 11.5–14.5)
GLUCOSE SERPL-MCNC: 94 MG/DL (ref 74–99)
GLUCOSE UR STRIP.AUTO-MCNC: NORMAL MG/DL
HCT VFR BLD AUTO: 27.9 % (ref 36–46)
HGB BLD-MCNC: 8.7 G/DL (ref 12–16)
IMM GRANULOCYTES # BLD AUTO: 0.07 X10*3/UL (ref 0–0.7)
IMM GRANULOCYTES NFR BLD AUTO: 0.6 % (ref 0–0.9)
KETONES UR STRIP.AUTO-MCNC: NEGATIVE MG/DL
LACTATE SERPL-SCNC: 1 MMOL/L (ref 0.4–2)
LEUKOCYTE ESTERASE UR QL STRIP.AUTO: ABNORMAL
LYMPHOCYTES # BLD AUTO: 1.54 X10*3/UL (ref 1.2–4.8)
LYMPHOCYTES NFR BLD AUTO: 12.9 %
MCH RBC QN AUTO: 29 PG (ref 26–34)
MCHC RBC AUTO-ENTMCNC: 31.2 G/DL (ref 32–36)
MCV RBC AUTO: 93 FL (ref 80–100)
MONOCYTES # BLD AUTO: 1.12 X10*3/UL (ref 0.1–1)
MONOCYTES NFR BLD AUTO: 9.3 %
MUCOUS THREADS #/AREA URNS AUTO: ABNORMAL /LPF
NEUTROPHILS # BLD AUTO: 8.79 X10*3/UL (ref 1.2–7.7)
NEUTROPHILS NFR BLD AUTO: 73.4 %
NITRITE UR QL STRIP.AUTO: ABNORMAL
NRBC BLD-RTO: 0 /100 WBCS (ref 0–0)
PH UR STRIP.AUTO: 6.5 [PH]
PLATELET # BLD AUTO: 437 X10*3/UL (ref 150–450)
POTASSIUM SERPL-SCNC: 4 MMOL/L (ref 3.5–5.3)
PROT SERPL-MCNC: 6.5 G/DL (ref 6.4–8.2)
PROT UR STRIP.AUTO-MCNC: ABNORMAL MG/DL
RBC # BLD AUTO: 3 X10*6/UL (ref 4–5.2)
RBC # UR STRIP.AUTO: ABNORMAL MG/DL
RBC #/AREA URNS AUTO: >20 /HPF
SODIUM SERPL-SCNC: 136 MMOL/L (ref 136–145)
SP GR UR STRIP.AUTO: 1.02
SQUAMOUS #/AREA URNS AUTO: ABNORMAL /HPF
UROBILINOGEN UR STRIP.AUTO-MCNC: NORMAL MG/DL
WBC # BLD AUTO: 12 X10*3/UL (ref 4.4–11.3)
WBC #/AREA URNS AUTO: >50 /HPF

## 2025-07-09 PROCEDURE — 83605 ASSAY OF LACTIC ACID: CPT | Performed by: PHYSICIAN ASSISTANT

## 2025-07-09 PROCEDURE — G0378 HOSPITAL OBSERVATION PER HR: HCPCS

## 2025-07-09 PROCEDURE — 97602 WOUND(S) CARE NON-SELECTIVE: CPT | Mod: LT,RT

## 2025-07-09 PROCEDURE — 11042 DBRDMT SUBQ TIS 1ST 20SQCM/<: CPT | Mod: LT,RT

## 2025-07-09 PROCEDURE — 2500000004 HC RX 250 GENERAL PHARMACY W/ HCPCS (ALT 636 FOR OP/ED): Performed by: STUDENT IN AN ORGANIZED HEALTH CARE EDUCATION/TRAINING PROGRAM

## 2025-07-09 PROCEDURE — 73701 CT LOWER EXTREMITY W/DYE: CPT | Mod: RT

## 2025-07-09 PROCEDURE — 87081 CULTURE SCREEN ONLY: CPT | Mod: GEALAB | Performed by: NURSE PRACTITIONER

## 2025-07-09 PROCEDURE — 2500000005 HC RX 250 GENERAL PHARMACY W/O HCPCS: Performed by: STUDENT IN AN ORGANIZED HEALTH CARE EDUCATION/TRAINING PROGRAM

## 2025-07-09 PROCEDURE — 96365 THER/PROPH/DIAG IV INF INIT: CPT

## 2025-07-09 PROCEDURE — 99223 1ST HOSP IP/OBS HIGH 75: CPT | Performed by: NURSE PRACTITIONER

## 2025-07-09 PROCEDURE — 99285 EMERGENCY DEPT VISIT HI MDM: CPT | Performed by: STUDENT IN AN ORGANIZED HEALTH CARE EDUCATION/TRAINING PROGRAM

## 2025-07-09 PROCEDURE — 2500000001 HC RX 250 WO HCPCS SELF ADMINISTERED DRUGS (ALT 637 FOR MEDICARE OP): Performed by: PHYSICIAN ASSISTANT

## 2025-07-09 PROCEDURE — 85025 COMPLETE CBC W/AUTO DIFF WBC: CPT | Performed by: PHYSICIAN ASSISTANT

## 2025-07-09 PROCEDURE — 81001 URINALYSIS AUTO W/SCOPE: CPT | Performed by: STUDENT IN AN ORGANIZED HEALTH CARE EDUCATION/TRAINING PROGRAM

## 2025-07-09 PROCEDURE — 2500000004 HC RX 250 GENERAL PHARMACY W/ HCPCS (ALT 636 FOR OP/ED): Mod: JZ | Performed by: NURSE PRACTITIONER

## 2025-07-09 PROCEDURE — 96367 TX/PROPH/DG ADDL SEQ IV INF: CPT

## 2025-07-09 PROCEDURE — 2550000001 HC RX 255 CONTRASTS: Performed by: STUDENT IN AN ORGANIZED HEALTH CARE EDUCATION/TRAINING PROGRAM

## 2025-07-09 PROCEDURE — 2500000001 HC RX 250 WO HCPCS SELF ADMINISTERED DRUGS (ALT 637 FOR MEDICARE OP): Performed by: NURSE PRACTITIONER

## 2025-07-09 PROCEDURE — 86140 C-REACTIVE PROTEIN: CPT | Performed by: NURSE PRACTITIONER

## 2025-07-09 PROCEDURE — 80053 COMPREHEN METABOLIC PANEL: CPT | Performed by: PHYSICIAN ASSISTANT

## 2025-07-09 PROCEDURE — 36415 COLL VENOUS BLD VENIPUNCTURE: CPT | Performed by: PHYSICIAN ASSISTANT

## 2025-07-09 PROCEDURE — 2500000004 HC RX 250 GENERAL PHARMACY W/ HCPCS (ALT 636 FOR OP/ED): Performed by: PHYSICIAN ASSISTANT

## 2025-07-09 PROCEDURE — 87040 BLOOD CULTURE FOR BACTERIA: CPT | Mod: GEALAB | Performed by: PHYSICIAN ASSISTANT

## 2025-07-09 PROCEDURE — 96375 TX/PRO/DX INJ NEW DRUG ADDON: CPT

## 2025-07-09 PROCEDURE — 73701 CT LOWER EXTREMITY W/DYE: CPT | Mod: RIGHT SIDE | Performed by: STUDENT IN AN ORGANIZED HEALTH CARE EDUCATION/TRAINING PROGRAM

## 2025-07-09 PROCEDURE — 87086 URINE CULTURE/COLONY COUNT: CPT | Mod: GEALAB | Performed by: STUDENT IN AN ORGANIZED HEALTH CARE EDUCATION/TRAINING PROGRAM

## 2025-07-09 RX ORDER — DEXTROSE MONOHYDRATE AND SODIUM CHLORIDE 5; .9 G/100ML; G/100ML
75 INJECTION, SOLUTION INTRAVENOUS CONTINUOUS
Status: DISCONTINUED | OUTPATIENT
Start: 2025-07-10 | End: 2025-07-10

## 2025-07-09 RX ORDER — OXYCODONE HYDROCHLORIDE 5 MG/1
10 TABLET ORAL EVERY 6 HOURS PRN
Refills: 0 | Status: DISPENSED | OUTPATIENT
Start: 2025-07-09

## 2025-07-09 RX ORDER — ROSUVASTATIN CALCIUM 20 MG/1
20 TABLET, COATED ORAL DAILY
Status: DISPENSED | OUTPATIENT
Start: 2025-07-10

## 2025-07-09 RX ORDER — LEVOTHYROXINE SODIUM 100 UG/1
100 TABLET ORAL DAILY
Status: DISPENSED | OUTPATIENT
Start: 2025-07-10

## 2025-07-09 RX ORDER — POLYETHYLENE GLYCOL 3350 17 G/17G
17 POWDER, FOR SOLUTION ORAL DAILY
Status: DISPENSED | OUTPATIENT
Start: 2025-07-09

## 2025-07-09 RX ORDER — OXYCODONE HYDROCHLORIDE 5 MG/1
5 TABLET ORAL
Status: ON HOLD | COMMUNITY

## 2025-07-09 RX ORDER — NALOXONE HYDROCHLORIDE 0.4 MG/ML
0.4 INJECTION, SOLUTION INTRAMUSCULAR; INTRAVENOUS; SUBCUTANEOUS AS NEEDED
Status: ACTIVE | OUTPATIENT
Start: 2025-07-09

## 2025-07-09 RX ORDER — TALC
3 POWDER (GRAM) TOPICAL NIGHTLY PRN
Status: ACTIVE | OUTPATIENT
Start: 2025-07-09

## 2025-07-09 RX ORDER — OXYCODONE AND ACETAMINOPHEN 5; 325 MG/1; MG/1
1 TABLET ORAL ONCE
Refills: 0 | Status: COMPLETED | OUTPATIENT
Start: 2025-07-09 | End: 2025-07-09

## 2025-07-09 RX ORDER — CHOLECALCIFEROL (VITAMIN D3) 25 MCG
125 TABLET ORAL DAILY
Status: DISPENSED | OUTPATIENT
Start: 2025-07-10

## 2025-07-09 RX ORDER — OXYCODONE HYDROCHLORIDE 5 MG/1
5 TABLET ORAL EVERY 6 HOURS PRN
Refills: 0 | Status: ACTIVE | OUTPATIENT
Start: 2025-07-09

## 2025-07-09 RX ORDER — PANTOPRAZOLE SODIUM 40 MG/1
40 TABLET, DELAYED RELEASE ORAL
Status: DISPENSED | OUTPATIENT
Start: 2025-07-10

## 2025-07-09 RX ORDER — VANCOMYCIN HYDROCHLORIDE 1 G/20ML
INJECTION, POWDER, LYOPHILIZED, FOR SOLUTION INTRAVENOUS DAILY PRN
Status: DISCONTINUED | OUTPATIENT
Start: 2025-07-09 | End: 2025-07-13

## 2025-07-09 RX ORDER — DOCUSATE SODIUM 100 MG/1
100 CAPSULE, LIQUID FILLED ORAL 2 TIMES DAILY
Status: DISPENSED | OUTPATIENT
Start: 2025-07-09

## 2025-07-09 RX ORDER — HYDROXYZINE HYDROCHLORIDE 25 MG/1
25 TABLET, FILM COATED ORAL EVERY 6 HOURS PRN
Status: DISPENSED | OUTPATIENT
Start: 2025-07-09

## 2025-07-09 RX ORDER — ACETAMINOPHEN 325 MG/1
975 TABLET ORAL EVERY 8 HOURS
Status: DISPENSED | OUTPATIENT
Start: 2025-07-09

## 2025-07-09 RX ORDER — DEXTROAMPHETAMINE SACCHARATE, AMPHETAMINE ASPARTATE, DEXTROAMPHETAMINE SULFATE AND AMPHETAMINE SULFATE 2.5; 2.5; 2.5; 2.5 MG/1; MG/1; MG/1; MG/1
20 TABLET ORAL
Refills: 0 | Status: DISPENSED | OUTPATIENT
Start: 2025-07-10

## 2025-07-09 RX ORDER — VANCOMYCIN HYDROCHLORIDE 1 G/20ML
INJECTION, POWDER, LYOPHILIZED, FOR SOLUTION INTRAVENOUS DAILY PRN
Status: DISCONTINUED | OUTPATIENT
Start: 2025-07-09 | End: 2025-07-09 | Stop reason: DRUGHIGH

## 2025-07-09 RX ADMIN — ACETAMINOPHEN 975 MG: 325 TABLET, FILM COATED ORAL at 21:03

## 2025-07-09 RX ADMIN — HYDROMORPHONE HYDROCHLORIDE 0.5 MG: 1 INJECTION, SOLUTION INTRAMUSCULAR; INTRAVENOUS; SUBCUTANEOUS at 19:38

## 2025-07-09 RX ADMIN — HYDROMORPHONE HYDROCHLORIDE 0.5 MG: 1 INJECTION, SOLUTION INTRAMUSCULAR; INTRAVENOUS; SUBCUTANEOUS at 19:59

## 2025-07-09 RX ADMIN — IOHEXOL 75 ML: 350 INJECTION, SOLUTION INTRAVENOUS at 20:13

## 2025-07-09 RX ADMIN — APIXABAN 5 MG: 5 TABLET, FILM COATED ORAL at 21:03

## 2025-07-09 RX ADMIN — HYDROMORPHONE HYDROCHLORIDE 0.5 MG: 1 INJECTION, SOLUTION INTRAMUSCULAR; INTRAVENOUS; SUBCUTANEOUS at 23:09

## 2025-07-09 RX ADMIN — OXYCODONE HYDROCHLORIDE AND ACETAMINOPHEN 1 TABLET: 5; 325 TABLET ORAL at 17:54

## 2025-07-09 RX ADMIN — VANCOMYCIN HYDROCHLORIDE 2000 MG: 10 INJECTION, POWDER, LYOPHILIZED, FOR SOLUTION INTRAVENOUS at 18:48

## 2025-07-09 RX ADMIN — HYDROXYZINE HYDROCHLORIDE 25 MG: 25 TABLET, FILM COATED ORAL at 21:03

## 2025-07-09 RX ADMIN — DOCUSATE SODIUM 100 MG: 100 CAPSULE, LIQUID FILLED ORAL at 21:03

## 2025-07-09 RX ADMIN — PIPERACILLIN SODIUM AND TAZOBACTAM SODIUM 3.38 G: 3; .375 INJECTION, SOLUTION INTRAVENOUS at 17:59

## 2025-07-09 RX ADMIN — PIPERACILLIN SODIUM AND TAZOBACTAM SODIUM 3.38 G: 3; .375 INJECTION, SOLUTION INTRAVENOUS at 23:14

## 2025-07-09 SDOH — SOCIAL STABILITY: SOCIAL INSECURITY: HAVE YOU HAD ANY THOUGHTS OF HARMING ANYONE ELSE?: NO

## 2025-07-09 SDOH — ECONOMIC STABILITY: FOOD INSECURITY: WITHIN THE PAST 12 MONTHS, THE FOOD YOU BOUGHT JUST DIDN'T LAST AND YOU DIDN'T HAVE MONEY TO GET MORE.: SOMETIMES TRUE

## 2025-07-09 SDOH — ECONOMIC STABILITY: INCOME INSECURITY: IN THE PAST 12 MONTHS HAS THE ELECTRIC, GAS, OIL, OR WATER COMPANY THREATENED TO SHUT OFF SERVICES IN YOUR HOME?: YES

## 2025-07-09 SDOH — SOCIAL STABILITY: SOCIAL INSECURITY: WITHIN THE LAST YEAR, HAVE YOU BEEN HUMILIATED OR EMOTIONALLY ABUSED IN OTHER WAYS BY YOUR PARTNER OR EX-PARTNER?: NO

## 2025-07-09 SDOH — SOCIAL STABILITY: SOCIAL INSECURITY: ARE YOU OR HAVE YOU BEEN THREATENED OR ABUSED PHYSICALLY, EMOTIONALLY, OR SEXUALLY BY ANYONE?: NO

## 2025-07-09 SDOH — SOCIAL STABILITY: SOCIAL INSECURITY: WERE YOU ABLE TO COMPLETE ALL THE BEHAVIORAL HEALTH SCREENINGS?: YES

## 2025-07-09 SDOH — SOCIAL STABILITY: SOCIAL INSECURITY: WITHIN THE LAST YEAR, HAVE YOU BEEN AFRAID OF YOUR PARTNER OR EX-PARTNER?: NO

## 2025-07-09 SDOH — SOCIAL STABILITY: SOCIAL INSECURITY: HAS ANYONE EVER THREATENED TO HURT YOUR FAMILY OR YOUR PETS?: NO

## 2025-07-09 SDOH — SOCIAL STABILITY: SOCIAL INSECURITY: ABUSE: ADULT

## 2025-07-09 SDOH — SOCIAL STABILITY: SOCIAL INSECURITY: DO YOU FEEL UNSAFE GOING BACK TO THE PLACE WHERE YOU ARE LIVING?: NO

## 2025-07-09 SDOH — SOCIAL STABILITY: SOCIAL INSECURITY: ARE THERE ANY APPARENT SIGNS OF INJURIES/BEHAVIORS THAT COULD BE RELATED TO ABUSE/NEGLECT?: NO

## 2025-07-09 SDOH — SOCIAL STABILITY: SOCIAL INSECURITY: DO YOU FEEL ANYONE HAS EXPLOITED OR TAKEN ADVANTAGE OF YOU FINANCIALLY OR OF YOUR PERSONAL PROPERTY?: NO

## 2025-07-09 SDOH — SOCIAL STABILITY: SOCIAL INSECURITY: DOES ANYONE TRY TO KEEP YOU FROM HAVING/CONTACTING OTHER FRIENDS OR DOING THINGS OUTSIDE YOUR HOME?: NO

## 2025-07-09 SDOH — SOCIAL STABILITY: SOCIAL INSECURITY: HAVE YOU HAD THOUGHTS OF HARMING ANYONE ELSE?: NO

## 2025-07-09 ASSESSMENT — ENCOUNTER SYMPTOMS
NAUSEA: 0
SORE THROAT: 0
HEADACHES: 0
CHILLS: 1
DIARRHEA: 0
WOUND: 1
CONFUSION: 0
ABDOMINAL PAIN: 0
DYSURIA: 1
PHOTOPHOBIA: 0
COUGH: 0
DIAPHORESIS: 0
SHORTNESS OF BREATH: 0
WEAKNESS: 0
PALPITATIONS: 0
AGITATION: 0
FEVER: 0
VOMITING: 0
CONSTIPATION: 0

## 2025-07-09 ASSESSMENT — LIFESTYLE VARIABLES
HOW OFTEN DO YOU HAVE 6 OR MORE DRINKS ON ONE OCCASION: NEVER
AUDIT-C TOTAL SCORE: 1
SKIP TO QUESTIONS 9-10: 1
HOW OFTEN DO YOU HAVE A DRINK CONTAINING ALCOHOL: MONTHLY OR LESS
AUDIT-C TOTAL SCORE: 1
HOW MANY STANDARD DRINKS CONTAINING ALCOHOL DO YOU HAVE ON A TYPICAL DAY: 1 OR 2

## 2025-07-09 ASSESSMENT — PAIN - FUNCTIONAL ASSESSMENT
PAIN_FUNCTIONAL_ASSESSMENT: 0-10
PAIN_FUNCTIONAL_ASSESSMENT: 0-10

## 2025-07-09 ASSESSMENT — COGNITIVE AND FUNCTIONAL STATUS - GENERAL
WALKING IN HOSPITAL ROOM: A LOT
TOILETING: A LITTLE
STANDING UP FROM CHAIR USING ARMS: A LOT
DAILY ACTIVITIY SCORE: 21
PATIENT BASELINE BEDBOUND: NO
MOVING TO AND FROM BED TO CHAIR: A LOT
MOVING FROM LYING ON BACK TO SITTING ON SIDE OF FLAT BED WITH BEDRAILS: A LITTLE
CLIMB 3 TO 5 STEPS WITH RAILING: A LOT
TURNING FROM BACK TO SIDE WHILE IN FLAT BAD: A LITTLE
DRESSING REGULAR LOWER BODY CLOTHING: A LITTLE
MOBILITY SCORE: 14
HELP NEEDED FOR BATHING: A LITTLE

## 2025-07-09 ASSESSMENT — ACTIVITIES OF DAILY LIVING (ADL)
GROOMING: INDEPENDENT
WALKS IN HOME: NEEDS ASSISTANCE
PATIENT'S MEMORY ADEQUATE TO SAFELY COMPLETE DAILY ACTIVITIES?: YES
DRESSING YOURSELF: INDEPENDENT
JUDGMENT_ADEQUATE_SAFELY_COMPLETE_DAILY_ACTIVITIES: YES
ADEQUATE_TO_COMPLETE_ADL: YES
FEEDING YOURSELF: INDEPENDENT
HEARING - RIGHT EAR: FUNCTIONAL
LACK_OF_TRANSPORTATION: NO
HEARING - LEFT EAR: FUNCTIONAL
BATHING: INDEPENDENT
TOILETING: INDEPENDENT

## 2025-07-09 ASSESSMENT — PATIENT HEALTH QUESTIONNAIRE - PHQ9
SUM OF ALL RESPONSES TO PHQ9 QUESTIONS 1 & 2: 2
1. LITTLE INTEREST OR PLEASURE IN DOING THINGS: NOT AT ALL
2. FEELING DOWN, DEPRESSED OR HOPELESS: MORE THAN HALF THE DAYS

## 2025-07-09 ASSESSMENT — PAIN SCALES - GENERAL
PAINLEVEL_OUTOF10: 0 - NO PAIN
PAINLEVEL_OUTOF10: 7
PAINLEVEL_OUTOF10: 6

## 2025-07-09 NOTE — PROGRESS NOTES
Pharmacy Medication History Review    Frances Mcdowell is a 54 y.o. female admitted for Cellulitis. Pharmacy reviewed the patient's [id number] medications and allergies for accuracy.    The list below reflectives the updated PTA list. Please review each medication in order reconciliation for additional clarification and justification.  Prior to Admission Medications   Prescriptions Last Dose Informant Patient Reported? Taking?   EPINEPHrine 0.3 mg/0.3 mL injection syringe  Self     Sig: Inject 0.3 mL (0.3 mg) as directed if needed for anaphylaxis. As Directed   Nyamyc 100,000 unit/gram powder 7/9/2025 Morning Self Yes Yes   Sig: Apply topically once daily.   Patient taking differently: Apply 1 Application topically if needed.   acetaminophen (Tylenol) 325 mg tablet 7/9/2025 Morning Self Yes Yes   Sig: Take 2 tablets (650 mg) by mouth 3 times a day as needed for mild pain (1 - 3).   amphetamine-dextroamphetamine XR (Adderall XR) 20 mg 24 hr capsule 7/9/2025 Morning Self Yes Yes   Sig: Take 1 capsule (20 mg) by mouth 2 times a day.   Patient taking differently: Take 2 capsules (40 mg) by mouth once daily in the morning.   apixaban (Eliquis) 5 mg tablet 7/9/2025 Morning Self Yes Yes   Sig: Take 1 tablet (5 mg) by mouth 2 times a day.   cholecalciferol (Vitamin D-3) 125 mcg (5,000 units) tablet 7/9/2025 Morning Self Yes Yes   Sig: Take 1 tablet (125 mcg) by mouth once daily.   hydrOXYzine HCL (Atarax) 25 mg tablet 7/8/2025 Evening Self Yes Yes   Sig: Take 1 tablet (25 mg) by mouth every 6 hours if needed for anxiety.   levothyroxine (Synthroid, Levoxyl) 100 mcg tablet 7/9/2025 Morning Self Yes Yes   Sig: Take 1 tablet (100 mcg) by mouth early in the morning.. Take on an empty stomach at the same time each day, either 30 to 60 minutes prior to breakfast   naloxone (Narcan) 4 mg/0.1 mL nasal spray  Self No No   Sig: Administer 1 spray (4 mg) into affected nostril(s) if needed for opioid reversal. May repeat every 2-3  minutes if needed, alternating nostrils, until medical assistance becomes available.   omeprazole (PriLOSEC) 40 mg DR capsule 7/9/2025 Morning Self Yes Yes   Sig: Take 1 capsule (40 mg) by mouth once daily.   oxyCODONE (Roxicodone) 5 mg immediate release tablet 7/7/2025 Self Yes Yes   Sig: Take 1 tablet (5 mg) by mouth every 3rd day if needed for severe pain (7 - 10).   oxyCODONE-acetaminophen (Percocet)  mg tablet 7/8/2025 Self Yes Yes   Sig: Take 1 tablet by mouth once daily.   rosuvastatin (Crestor) 20 mg tablet 7/8/2025 Evening Self Yes Yes   Sig: Take 1 tablet (20 mg) by mouth once daily.   triamcinolone (Kenalog) 0.1 % cream Not Taking Self No No   Sig: Apply to affected area 1-2 times daily as needed.   Patient not taking: Reported on 7/9/2025      Facility-Administered Medications: None           The list below reflectives the updated allergy list. Please review each documented allergy for additional clarification and justification.  Allergies  Reviewed by Geneva Unger, APRN-CNP on 7/9/2025        Severity Reactions Comments    Erythromycin Base High Anaphylaxis     Peanut High Anaphylaxis     Sulfa (sulfonamide Antibiotics) High Anaphylaxis     Tetanus Toxoid High Other, Swelling     Morphine Not Specified Itching     Tramadol Not Specified Itching     Adhesive Tape-silicones Low Rash tegaderm causes bad skin breakdown            Below are additional concerns with the patient's PTA list.      Angela Caicedo

## 2025-07-09 NOTE — H&P
History Of Present Illness  Frances Mcdowell is a 54 y.o. female with with pertinent medical history of saddle PE (on Eliquis), chronic lower extremity wounds with recurrent cellulitis (history MRSA and Pseudomonas) & lymphedema who  presenting from wound clinic to [facility] ER with draining wounds x 3.  Dr. PIERRE agreed to consult and requested CT of the lower extremity.  Dr. PIERRE will see the patient either late tomorrow or Friday and no plan for surgical intervention at this point. Later, after CT report resulted, notified Dr. PIERRE of findings of gas and held Eliquis and made npo. Patient complains of severe pain in her right lower extremity and was tearful during interview and exam and with dressing change in ER. She also complained of dysuria and bladder pressure for the past few days. She is chronically cold, but denies diaphoresis, fever, chest pain, shortness of breath or N/V/D. She was not on antibiotics prior to coming in.    Pertinent workup in the ER that was personally reviewed and interpreted included: CT of the right lower extremity that is pending. +leukocytosis of 12,000 with neutrophilia with negative lactate, H&H 8.7 and 28 (baseline), potassium 4, kidney function within normal limits, albumin 2.6, elevated CRP at 12.98, blood culture x 2 in process, MRSA in process, UA showed 500 leuks, +1 nitrites, +1 bacteria, over 50 white blood cells, + 3 microscopic hematuria and urine culture in process. A1C 5.4 on 3/26/25.     Past Medical History  She has a past medical history of Acute embolism and thrombosis of other specified veins (08/2022), Acute on chronic diastolic heart failure (2024), ADD (attention deficit disorder), ADHD (attention deficit hyperactivity disorder), Allergic, Anxiety, Cellulitis, Chronic pain disorder (6/24), Chronic wound, Class 3 severe obesity with body mass index (BMI) of 50.0 to 59.9 in adult, Current use of long term anticoagulation, DVT (deep venous thrombosis) (Multi) (03/24/2025),  Endometrial intraepithelial neoplasia (EIN), Extremity pain (), GERD (gastroesophageal reflux disease), HLD (hyperlipidemia), Hypothyroidism, Kidney stone (2025), Lymph edema, Moderate tricuspid regurgitation, PE (pulmonary thromboembolism) (Multi) (2025), Peripheral neuropathy (), Recurrent cellulitis of lower extremity, Skin cancer (), Varicosities (), and Venous insufficiency, HX MRSA and pseudomonas.     Surgical History  She has a past surgical history that includes Tonsillectomy; Foot surgery; Varicose vein surgery; Total thyroidectomy; Bladder surgery;  section, low transverse; Hysterectomy; IR CVC PICC (2025); Colonoscopy (2024); Wound debridement (2025); Adenoidectomy; Epidural block injection (); and Other surgical history (2025).     Social History  She reports that she has never smoked. She has never used smokeless tobacco. She reports that she does not currently use alcohol. She reports that she does not use drugs.    Family History  Family History[1]     Allergies  Erythromycin base, Peanut, Sulfa (sulfonamide antibiotics), Tetanus toxoid, Morphine, Tramadol, and Adhesive tape-silicones    Review of Systems   Constitutional:  Positive for chills. Negative for diaphoresis and fever.   HENT:  Negative for sneezing and sore throat.    Eyes:  Negative for photophobia and visual disturbance.   Respiratory:  Negative for cough and shortness of breath.    Cardiovascular:  Negative for chest pain, palpitations and leg swelling.   Gastrointestinal:  Negative for abdominal pain, constipation, diarrhea, nausea and vomiting.   Genitourinary:  Positive for dysuria and pelvic pain.        + pelvic pressure   Musculoskeletal:         RLE pain and 3 open abscess   Skin:  Positive for wound.        3 open wounds to RLE   Neurological:  Negative for weakness and headaches.   Psychiatric/Behavioral:  Negative for agitation, behavioral problems and confusion.          Physical Exam  Vitals reviewed.   Constitutional:       General: She is in acute distress.      Appearance: She is obese. She is not ill-appearing, toxic-appearing or diaphoretic.   HENT:      Head: Normocephalic and atraumatic.      Mouth/Throat:      Mouth: Mucous membranes are dry.      Pharynx: Oropharynx is clear.   Eyes:      Extraocular Movements: Extraocular movements intact.      Conjunctiva/sclera: Conjunctivae normal.   Cardiovascular:      Pulses: Normal pulses.      Heart sounds: No murmur heard.  Pulmonary:      Effort: Pulmonary effort is normal. No respiratory distress.      Breath sounds: Normal breath sounds. No wheezing, rhonchi or rales.      Comments: On RA  Abdominal:      General: Bowel sounds are normal. There is no distension.      Palpations: Abdomen is soft.      Tenderness: There is no abdominal tenderness. There is no right CVA tenderness, left CVA tenderness, guarding or rebound.   Neurological:      Mental Status: She is alert.       Last Recorded Vitals  /84   Pulse 95   Temp 36.2 °C (97.2 °F) (Temporal)   Resp 16   Wt 132 kg (290 lb)   SpO2 100%     Relevant Results  Scheduled medications  Scheduled Medications[2]  Continuous medications  Continuous Medications[3]  PRN medications  PRN Medications[4]    Results for orders placed or performed during the hospital encounter of 07/09/25 (from the past 24 hours)   CBC and Auto Differential   Result Value Ref Range    WBC 12.0 (H) 4.4 - 11.3 x10*3/uL    nRBC 0.0 0.0 - 0.0 /100 WBCs    RBC 3.00 (L) 4.00 - 5.20 x10*6/uL    Hemoglobin 8.7 (L) 12.0 - 16.0 g/dL    Hematocrit 27.9 (L) 36.0 - 46.0 %    MCV 93 80 - 100 fL    MCH 29.0 26.0 - 34.0 pg    MCHC 31.2 (L) 32.0 - 36.0 g/dL    RDW 15.0 (H) 11.5 - 14.5 %    Platelets 437 150 - 450 x10*3/uL    Neutrophils % 73.4 40.0 - 80.0 %    Immature Granulocytes %, Automated 0.6 0.0 - 0.9 %    Lymphocytes % 12.9 13.0 - 44.0 %    Monocytes % 9.3 2.0 - 10.0 %    Eosinophils % 3.3 0.0 -  6.0 %    Basophils % 0.5 0.0 - 2.0 %    Neutrophils Absolute 8.79 (H) 1.20 - 7.70 x10*3/uL    Immature Granulocytes Absolute, Automated 0.07 0.00 - 0.70 x10*3/uL    Lymphocytes Absolute 1.54 1.20 - 4.80 x10*3/uL    Monocytes Absolute 1.12 (H) 0.10 - 1.00 x10*3/uL    Eosinophils Absolute 0.40 0.00 - 0.70 x10*3/uL    Basophils Absolute 0.06 0.00 - 0.10 x10*3/uL   Comprehensive metabolic panel   Result Value Ref Range    Glucose 94 74 - 99 mg/dL    Sodium 136 136 - 145 mmol/L    Potassium 4.0 3.5 - 5.3 mmol/L    Chloride 102 98 - 107 mmol/L    Bicarbonate 25 21 - 32 mmol/L    Anion Gap 13 10 - 20 mmol/L    Urea Nitrogen 14 6 - 23 mg/dL    Creatinine 0.88 0.50 - 1.05 mg/dL    eGFR 77 >60 mL/min/1.73m*2    Calcium 8.1 (L) 8.6 - 10.3 mg/dL    Albumin 2.6 (L) 3.4 - 5.0 g/dL    Alkaline Phosphatase 78 33 - 110 U/L    Total Protein 6.5 6.4 - 8.2 g/dL    AST 9 9 - 39 U/L    Bilirubin, Total 0.6 0.0 - 1.2 mg/dL    ALT 4 (L) 7 - 45 U/L   Lactate   Result Value Ref Range    Lactate 1.0 0.4 - 2.0 mmol/L   C-reactive protein   Result Value Ref Range    C-Reactive Protein 12.98 (H) <1.00 mg/dL   Urinalysis with Reflex Culture and Microscopic   Result Value Ref Range    Color, Urine Light-Orange (N) Light-Yellow, Yellow, Dark-Yellow    Appearance, Urine Turbid (N) Clear    Specific Gravity, Urine 1.019 1.005 - 1.035    pH, Urine 6.5 5.0, 5.5, 6.0, 6.5, 7.0, 7.5, 8.0    Protein, Urine 50 (1+) (A) NEGATIVE, 10 (TRACE), 20 (TRACE) mg/dL    Glucose, Urine Normal Normal mg/dL    Blood, Urine OVER (3+) (A) NEGATIVE mg/dL    Ketones, Urine NEGATIVE NEGATIVE mg/dL    Bilirubin, Urine NEGATIVE NEGATIVE mg/dL    Urobilinogen, Urine Normal Normal mg/dL    Nitrite, Urine 1+ (A) NEGATIVE    Leukocyte Esterase, Urine 500 Lee/uL (A) NEGATIVE   Microscopic Only, Urine   Result Value Ref Range    WBC, Urine >50 (A) 1-5, NONE /HPF    RBC, Urine >20 (A) NONE, 1-2, 3-5 /HPF    Squamous Epithelial Cells, Urine 1-9 (SPARSE) Reference range not  established. /HPF    Bacteria, Urine 1+ (A) NONE SEEN /HPF    Mucus, Urine FEW Reference range not established. /LPF     No results found.    Assessment/Plan   Assessment & Plan  Cellulitis  With 3 Draining Abscesses to RLE/SIRS  -HX MRSA and Pseudomonas  -Not septic  -CT RLE pending-f/U  -Spoke with Dr. PIERRE and to see late Thur or Fri and no surgical plan yet at this time  -RCRI score 0 low to proceed to surgery/EKG reviewed   Wound consult and wound cultures need collected-f/U  Blood cultures-f/U  Vanco and Zosyn  ID and podiatry consults-appreciate recs  Pain control/OARRS/Bowel regimen-scheduled tylenol, oxycodone Q 4 prn and breakthrough dilaudid, narcan  Monitor H/H and if hemoglobin <7, will need transfusion    UTI with Microscopic Hematuria  Zosyn  F/U urine culture  ID consulted-appreciate recs    Leukocytosis with Neutrophilia  -Neg lactate  Txt both infections as above    ADHD  Adderall supplement for Adderall XL    Vitamin D Deficiency  Vit D replacements    Hypothyroidism  Synthroid    GERD  PPI    HPLD  Statin    Anxiety  Prn hydroxyzine    Full Code    DVT Px/HX PE  Eliquis on hold; last dose 2200  Geneva Unger, APRN-CNP  75 minutes spent interviewing and assessing patient, placing orders, updating MAR, care plan, CODE STATUS and reviewing chart labs and diagnostics with personal interpretation.              [1]   Family History  Problem Relation Name Age of Onset    Arthritis Mother Esperanza     COPD Mother Esperanza     Learning disabilities Son Tyson Aries     Intellectual Disability Son Tyson Aries     Developmental delay Son Tyson Aries     Seizures Son Tyson Aries     Learning disabilities Son Tyson Aries     Intellectual Disability Son Tyson Aries     Learning disabilities Son Tyson Aries     Intellectual Disability Son Tyson Aries     Learning disabilities Son Tyson Aries     Intellectual Disability Son Tyson Aries    [2] acetaminophen, 975 mg, oral, q8h  [START ON 7/10/2025]  amphetamine-dextroamphetamine, 20 mg, oral, BID  apixaban, 5 mg, oral, BID  [START ON 7/10/2025] cholecalciferol, 125 mcg, oral, Daily  docusate sodium, 100 mg, oral, BID  [START ON 7/10/2025] levothyroxine, 100 mcg, oral, Daily  [START ON 7/10/2025] pantoprazole, 40 mg, oral, Daily before breakfast  [START ON 7/10/2025] piperacillin-tazobactam, 3.375 g, intravenous, q6h  [START ON 7/10/2025] rosuvastatin, 20 mg, oral, Daily  [START ON 7/10/2025] vancomycin, 1,500 mg, intravenous, q12h  [3]    [4] PRN medications: HYDROmorphone, hydrOXYzine HCL, melatonin, naloxone, oxyCODONE, oxyCODONE, vancomycin
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
## 2025-07-09 NOTE — PROGRESS NOTES
Vancomycin Dosing by Pharmacy- INITIAL    Frances Mcdowell is a 54 y.o. year old female who Pharmacy has been consulted for vancomycin dosing for cellulitis, skin and soft tissue. Based on the patient's indication and renal status this patient will be dosed based on a goal AUC of 400-600.     Renal function is currently stable.    Visit Vitals  /84   Pulse 95   Temp 36.2 °C (97.2 °F) (Temporal)   Resp 16        Lab Results   Component Value Date    CREATININE 0.88 2025    CREATININE 0.94 2025    CREATININE 1.02 2025    CREATININE 0.83 2025    CREATININE 1.20 (H) 2025        Patient weight is as follows:   Vitals:    25 1642   Weight: 132 kg (290 lb)       Cultures:  No results found for the encounter in last 14 days.        No intake/output data recorded.  I/O during current shift:  No intake/output data recorded.    Temp (24hrs), Av.4 °C (97.5 °F), Min:36.2 °C (97.2 °F), Max:36.5 °C (97.7 °F)         Assessment/Plan     Patient has already been given a loading dose of 2000 mg.  Will initiate vancomycin maintenance, 1500 mg every 12 hours.    This dosing regimen is predicted by [employer] to result in the following pharmacokinetic parameters:  Loading dose: 2000 mg IV   Regimen: 1500 mg IV every 12 hours.  Start time: 06:30 on 07/10/2025  Exposure target: AUC24 (range) 400-600 mg/L.hr   [id number]: 542 mg/L.hr  AUC24,ss: 546 mg/L.hr  Probability of AUC24 > 400: 72 %  Ctrough,ss: 13.9 mg/L  Probability of Ctrough,ss > 20: 34 %    Follow-up level will be ordered on 2025 at 1st a.m. draw unless clinically indicated sooner.  Will continue to monitor renal function daily while on vancomycin and order serum creatinine at least every 48 hours if not already ordered.  Follow for continued vancomycin needs, clinical response, and signs/symptoms of toxicity.       Jordyn Peter, PharmD

## 2025-07-10 ENCOUNTER — APPOINTMENT (OUTPATIENT)
Dept: RADIOLOGY | Facility: HOSPITAL | Age: 55
End: 2025-07-10
Payer: COMMERCIAL

## 2025-07-10 ENCOUNTER — APPOINTMENT (OUTPATIENT)
Dept: CARDIOLOGY | Facility: HOSPITAL | Age: 55
End: 2025-07-10
Payer: COMMERCIAL

## 2025-07-10 ENCOUNTER — APPOINTMENT (OUTPATIENT)
Dept: PREADMISSION TESTING | Facility: HOSPITAL | Age: 55
End: 2025-07-10
Payer: COMMERCIAL

## 2025-07-10 PROBLEM — L03.115 CELLULITIS OF RIGHT LOWER EXTREMITY: Status: ACTIVE | Noted: 2025-07-10

## 2025-07-10 LAB
ABO GROUP (TYPE) IN BLOOD: NORMAL
ANION GAP SERPL CALC-SCNC: 11 MMOL/L (ref 10–20)
ANTIBODY SCREEN: NORMAL
ATRIAL RATE: 94 BPM
BUN SERPL-MCNC: 12 MG/DL (ref 6–23)
CALCIUM SERPL-MCNC: 7.7 MG/DL (ref 8.6–10.3)
CHLORIDE SERPL-SCNC: 104 MMOL/L (ref 98–107)
CO2 SERPL-SCNC: 27 MMOL/L (ref 21–32)
CREAT SERPL-MCNC: 0.9 MG/DL (ref 0.5–1.05)
CRP SERPL-MCNC: 11.35 MG/DL
EGFRCR SERPLBLD CKD-EPI 2021: 75 ML/MIN/1.73M*2
ERYTHROCYTE [DISTWIDTH] IN BLOOD BY AUTOMATED COUNT: 15.1 % (ref 11.5–14.5)
GLUCOSE SERPL-MCNC: 93 MG/DL (ref 74–99)
HCT VFR BLD AUTO: 25 % (ref 36–46)
HGB BLD-MCNC: 7.6 G/DL (ref 12–16)
HOLD SPECIMEN: NORMAL
INR PPP: 1.7 (ref 0.9–1.1)
IRON SATN MFR SERPL: 13 % (ref 25–45)
IRON SERPL-MCNC: 21 UG/DL (ref 35–150)
MCH RBC QN AUTO: 28.5 PG (ref 26–34)
MCHC RBC AUTO-ENTMCNC: 30.4 G/DL (ref 32–36)
MCV RBC AUTO: 94 FL (ref 80–100)
NRBC BLD-RTO: 0 /100 WBCS (ref 0–0)
P AXIS: 51 DEGREES
P OFFSET: 204 MS
P ONSET: 152 MS
PLATELET # BLD AUTO: 395 X10*3/UL (ref 150–450)
POTASSIUM SERPL-SCNC: 4.1 MMOL/L (ref 3.5–5.3)
PR INTERVAL: 144 MS
PROTHROMBIN TIME: 18.8 SECONDS (ref 9.8–12.4)
Q ONSET: 224 MS
QRS COUNT: 16 BEATS
QRS DURATION: 88 MS
QT INTERVAL: 378 MS
QTC CALCULATION(BAZETT): 472 MS
QTC FREDERICIA: 439 MS
R AXIS: 13 DEGREES
RBC # BLD AUTO: 2.67 X10*6/UL (ref 4–5.2)
RH FACTOR (ANTIGEN D): NORMAL
SODIUM SERPL-SCNC: 138 MMOL/L (ref 136–145)
T AXIS: 26 DEGREES
T OFFSET: 413 MS
TIBC SERPL-MCNC: 156 UG/DL (ref 240–445)
UIBC SERPL-MCNC: 135 UG/DL (ref 110–370)
VENTRICULAR RATE: 94 BPM
WBC # BLD AUTO: 6.8 X10*3/UL (ref 4.4–11.3)

## 2025-07-10 PROCEDURE — 1100000001 HC PRIVATE ROOM DAILY

## 2025-07-10 PROCEDURE — 2500000001 HC RX 250 WO HCPCS SELF ADMINISTERED DRUGS (ALT 637 FOR MEDICARE OP): Performed by: PHYSICIAN ASSISTANT

## 2025-07-10 PROCEDURE — 2500000004 HC RX 250 GENERAL PHARMACY W/ HCPCS (ALT 636 FOR OP/ED): Performed by: PHYSICIAN ASSISTANT

## 2025-07-10 PROCEDURE — 99233 SBSQ HOSP IP/OBS HIGH 50: CPT | Performed by: PHYSICIAN ASSISTANT

## 2025-07-10 PROCEDURE — 2500000005 HC RX 250 GENERAL PHARMACY W/O HCPCS: Performed by: NURSE PRACTITIONER

## 2025-07-10 PROCEDURE — 85610 PROTHROMBIN TIME: CPT | Performed by: NURSE PRACTITIONER

## 2025-07-10 PROCEDURE — 36415 COLL VENOUS BLD VENIPUNCTURE: CPT | Performed by: NURSE PRACTITIONER

## 2025-07-10 PROCEDURE — 86140 C-REACTIVE PROTEIN: CPT | Performed by: PHYSICIAN ASSISTANT

## 2025-07-10 PROCEDURE — 2500000004 HC RX 250 GENERAL PHARMACY W/ HCPCS (ALT 636 FOR OP/ED): Performed by: NURSE PRACTITIONER

## 2025-07-10 PROCEDURE — 93005 ELECTROCARDIOGRAM TRACING: CPT

## 2025-07-10 PROCEDURE — 83540 ASSAY OF IRON: CPT | Performed by: PHYSICIAN ASSISTANT

## 2025-07-10 PROCEDURE — 2500000001 HC RX 250 WO HCPCS SELF ADMINISTERED DRUGS (ALT 637 FOR MEDICARE OP): Performed by: NURSE PRACTITIONER

## 2025-07-10 PROCEDURE — 94760 N-INVAS EAR/PLS OXIMETRY 1: CPT

## 2025-07-10 PROCEDURE — 85027 COMPLETE CBC AUTOMATED: CPT | Performed by: NURSE PRACTITIONER

## 2025-07-10 PROCEDURE — 80048 BASIC METABOLIC PNL TOTAL CA: CPT | Performed by: NURSE PRACTITIONER

## 2025-07-10 PROCEDURE — 86901 BLOOD TYPING SEROLOGIC RH(D): CPT | Performed by: NURSE PRACTITIONER

## 2025-07-10 PROCEDURE — 2500000002 HC RX 250 W HCPCS SELF ADMINISTERED DRUGS (ALT 637 FOR MEDICARE OP, ALT 636 FOR OP/ED): Performed by: NURSE PRACTITIONER

## 2025-07-10 RX ORDER — FERROUS SULFATE 325(65) MG
65 TABLET ORAL
Status: DISPENSED | OUTPATIENT
Start: 2025-07-10

## 2025-07-10 RX ORDER — HEPARIN SODIUM 10000 [USP'U]/100ML
0-4000 INJECTION, SOLUTION INTRAVENOUS CONTINUOUS
Status: DISPENSED | OUTPATIENT
Start: 2025-07-10 | End: 2025-07-13

## 2025-07-10 RX ADMIN — LEVOTHYROXINE SODIUM 100 MCG: 100 TABLET ORAL at 05:40

## 2025-07-10 RX ADMIN — DOCUSATE SODIUM 100 MG: 100 CAPSULE, LIQUID FILLED ORAL at 20:08

## 2025-07-10 RX ADMIN — VANCOMYCIN HYDROCHLORIDE 1500 MG: 10 INJECTION, POWDER, LYOPHILIZED, FOR SOLUTION INTRAVENOUS at 19:10

## 2025-07-10 RX ADMIN — HEPARIN SODIUM 1000 UNITS/HR: 10000 INJECTION, SOLUTION INTRAVENOUS at 20:47

## 2025-07-10 RX ADMIN — HYDROXYZINE HYDROCHLORIDE 25 MG: 25 TABLET, FILM COATED ORAL at 20:08

## 2025-07-10 RX ADMIN — PIPERACILLIN SODIUM AND TAZOBACTAM SODIUM 3.38 G: 3; .375 INJECTION, SOLUTION INTRAVENOUS at 23:38

## 2025-07-10 RX ADMIN — ACETAMINOPHEN 975 MG: 325 TABLET, FILM COATED ORAL at 20:08

## 2025-07-10 RX ADMIN — HYDROMORPHONE HYDROCHLORIDE 0.5 MG: 1 INJECTION, SOLUTION INTRAMUSCULAR; INTRAVENOUS; SUBCUTANEOUS at 05:41

## 2025-07-10 RX ADMIN — OXYCODONE 10 MG: 5 TABLET ORAL at 12:25

## 2025-07-10 RX ADMIN — ROSUVASTATIN CALCIUM 20 MG: 20 TABLET, FILM COATED ORAL at 08:47

## 2025-07-10 RX ADMIN — Medication 125 MCG: at 08:47

## 2025-07-10 RX ADMIN — ACETAMINOPHEN 975 MG: 325 TABLET, FILM COATED ORAL at 12:16

## 2025-07-10 RX ADMIN — FERROUS SULFATE TAB 325 MG (65 MG ELEMENTAL FE) 1 TABLET: 325 (65 FE) TAB at 09:28

## 2025-07-10 RX ADMIN — OXYCODONE 10 MG: 5 TABLET ORAL at 00:48

## 2025-07-10 RX ADMIN — PIPERACILLIN SODIUM AND TAZOBACTAM SODIUM 3.38 G: 3; .375 INJECTION, SOLUTION INTRAVENOUS at 17:54

## 2025-07-10 RX ADMIN — OXYCODONE 10 MG: 5 TABLET ORAL at 18:00

## 2025-07-10 RX ADMIN — VANCOMYCIN HYDROCHLORIDE 1500 MG: 10 INJECTION, POWDER, LYOPHILIZED, FOR SOLUTION INTRAVENOUS at 08:47

## 2025-07-10 RX ADMIN — DEXTROSE AND SODIUM CHLORIDE 75 ML/HR: 5; .9 INJECTION, SOLUTION INTRAVENOUS at 00:08

## 2025-07-10 RX ADMIN — ACETAMINOPHEN 975 MG: 325 TABLET, FILM COATED ORAL at 05:40

## 2025-07-10 RX ADMIN — PIPERACILLIN SODIUM AND TAZOBACTAM SODIUM 3.38 G: 3; .375 INJECTION, SOLUTION INTRAVENOUS at 05:40

## 2025-07-10 RX ADMIN — PIPERACILLIN SODIUM AND TAZOBACTAM SODIUM 3.38 G: 3; .375 INJECTION, SOLUTION INTRAVENOUS at 12:16

## 2025-07-10 ASSESSMENT — COGNITIVE AND FUNCTIONAL STATUS - GENERAL
DAILY ACTIVITIY SCORE: 21
DAILY ACTIVITIY SCORE: 21
MOVING FROM LYING ON BACK TO SITTING ON SIDE OF FLAT BED WITH BEDRAILS: A LITTLE
DRESSING REGULAR LOWER BODY CLOTHING: A LITTLE
MOVING TO AND FROM BED TO CHAIR: A LOT
HELP NEEDED FOR BATHING: A LITTLE
MOBILITY SCORE: 14
DRESSING REGULAR LOWER BODY CLOTHING: A LITTLE
STANDING UP FROM CHAIR USING ARMS: A LOT
TOILETING: A LITTLE
TURNING FROM BACK TO SIDE WHILE IN FLAT BAD: A LITTLE
CLIMB 3 TO 5 STEPS WITH RAILING: A LOT
STANDING UP FROM CHAIR USING ARMS: A LOT
MOVING TO AND FROM BED TO CHAIR: A LOT
TOILETING: A LITTLE
HELP NEEDED FOR BATHING: A LITTLE
CLIMB 3 TO 5 STEPS WITH RAILING: A LOT
MOBILITY SCORE: 14
MOVING FROM LYING ON BACK TO SITTING ON SIDE OF FLAT BED WITH BEDRAILS: A LITTLE
WALKING IN HOSPITAL ROOM: A LOT
TURNING FROM BACK TO SIDE WHILE IN FLAT BAD: A LITTLE
WALKING IN HOSPITAL ROOM: A LOT

## 2025-07-10 ASSESSMENT — PAIN SCALES - GENERAL
PAINLEVEL_OUTOF10: 0 - NO PAIN
PAINLEVEL_OUTOF10: 8
PAINLEVEL_OUTOF10: 5 - MODERATE PAIN
PAINLEVEL_OUTOF10: 9
PAINLEVEL_OUTOF10: 0 - NO PAIN

## 2025-07-10 ASSESSMENT — PAIN - FUNCTIONAL ASSESSMENT
PAIN_FUNCTIONAL_ASSESSMENT: 0-10

## 2025-07-10 ASSESSMENT — PAIN DESCRIPTION - LOCATION
LOCATION: LEG
LOCATION: LEG

## 2025-07-10 ASSESSMENT — PAIN DESCRIPTION - ORIENTATION
ORIENTATION: RIGHT;LEFT
ORIENTATION: LEFT;RIGHT

## 2025-07-10 ASSESSMENT — ACTIVITIES OF DAILY LIVING (ADL): LACK_OF_TRANSPORTATION: NO

## 2025-07-10 NOTE — SIGNIFICANT EVENT
IR unable to drain any fluid. Dr PIERRE may need to take her to OR tomorrow. Discussed with Dr Willingham and will bridge with heparin gtt in light of large saddle emboli just 4 months ago. Monitor anemia closely, NPO after MN

## 2025-07-10 NOTE — CARE PLAN
The patient's goals for the shift include      The clinical goals for the shift include Pain management    Problem: Safety - Adult  Goal: Free from fall injury  Outcome: Progressing

## 2025-07-10 NOTE — PROGRESS NOTES
07/10/25 1211   Discharge Planning   Living Arrangements Spouse/significant other   Support Systems Spouse/significant other;Children   Assistance Needed Alert and oriented x 4, Independent with ADL's, Drives, Employed(currently on medical leave), Walker, Shower chair, Grab bars, Ramps into the home, Currently active with [facility] for SN services, Room air at baseline and currently, PCP-Dr. Devaughn Moreno MD   Type of Residence Private residence   Number of Stairs to Enter Residence 0  (Ramps into the home)   Number of Stairs Within Residence 0   Do you have animals or pets at home? Yes   Type of Animals or Pets 1 dog, 1 cat, and 1 bird   Who is requesting discharge planning? Provider   Home or Post Acute Services In home services   Type of Home Care Services Home nursing visits   Expected Discharge Disposition Home Health  (Plan is for the patient to discharge home with resumption of [facility] SN services when medically ready.)   Does the patient need discharge transport arranged? No   Financial Resource Strain   How hard is it for you to pay for the very basics like food, housing, medical care, and heating? Hard   Housing Stability   In the last 12 months, was there a time when you were not able to pay the mortgage or rent on time? N   In the past 12 months, how many times have you moved where you were living? 0   At any time in the past 12 months, were you homeless or living in a shelter (including now)? N   Transportation Needs   In the past 12 months, has lack of transportation kept you from medical appointments or from getting medications? no   In the past 12 months, has lack of transportation kept you from meetings, work, or from getting things needed for daily living? No   Patient Choice   Provider Choice list and CMS website (https://medicare.gov/care-compare#search) for post-acute Quality and Resource Measure Data were provided and reviewed with: Patient   Patient / Family choosing to utilize agency / facility  established prior to hospitalization Yes   Stroke Family Assessment   Stroke Family Assessment Needed No   Intensity of Service   Intensity of Service 0-30 min

## 2025-07-10 NOTE — CONSULTS
Inpatient consult to Podiatry  Consult performed by: Mami Tang DPM  Consult ordered by: UTE Haynes-CNP  Reason for consult: Right lower leg ulcerations          Reason For Consult  Right lower leg ulcerations    History Of Present Illness  Frances Mcdowell is a 54 y.o. female presenting with B/L lower leg ulcerations.  Has been seeing NP at [facility] for wound care since earlier this year for same problem, but had worsening cellulitis to right lower leg and was admitted to hospital last month.  Underwent right lower leg I&D on 2025.  Since discharge from hospital, has been following up with NP weekly, but it was noted yesterday that there was increased drainage and purulence.  Recommended patient go to ED.  CT scan showed multiple loculated fluid collections consistent with abscess to right lower leg.     Past Medical History  She has a past medical history of Acute embolism and thrombosis of other specified veins (2022), Acute on chronic diastolic heart failure (), ADD (attention deficit disorder), ADHD (attention deficit hyperactivity disorder), Allergic, Anxiety, Cellulitis, Chronic pain disorder (), Chronic wound, Class 3 severe obesity with body mass index (BMI) of 50.0 to 59.9 in adult, Current use of long term anticoagulation, DVT (deep venous thrombosis) (Multi) (2025), Endometrial intraepithelial neoplasia (EIN), Extremity pain (), GERD (gastroesophageal reflux disease), HLD (hyperlipidemia), Hypothyroidism, Kidney stone (2025), Lymph edema, Moderate tricuspid regurgitation, PE (pulmonary thromboembolism) (Multi) (2025), Peripheral neuropathy (), Recurrent cellulitis of lower extremity, Skin cancer (), Varicosities (), and Venous insufficiency.    Surgical History  She has a past surgical history that includes Tonsillectomy; Foot surgery; Varicose vein surgery; Total thyroidectomy; Bladder surgery;  section, low transverse;  "Hysterectomy; IR CVC PICC (02/14/2025); Colonoscopy (03/07/2024); Wound debridement (06/17/2025); Adenoidectomy; Epidural block injection (9/97); and Other surgical history (02/21/2025).     Social History  She reports that she has never smoked. She has never used smokeless tobacco. She reports that she does not currently use alcohol. She reports that she does not use drugs.    Family History  Family History[1]     Allergies  Erythromycin base, Peanut, Sulfa (sulfonamide antibiotics), Tetanus toxoid, Morphine, Tramadol, and Adhesive tape-silicones    Review of Systems  Per above     Physical Exam  Constitutional:       Appearance: Normal appearance.   HENT:      Head: Normocephalic and atraumatic.      Mouth/Throat:      Mouth: Mucous membranes are moist.      Pharynx: Oropharynx is clear.   Eyes:      Pupils: Pupils are equal, round, and reactive to light.   Cardiovascular:      Rate and Rhythm: Normal rate and regular rhythm.      Heart sounds: Normal heart sounds.   Pulmonary:      Effort: Pulmonary effort is normal.      Breath sounds: Normal breath sounds.   Abdominal:      General: Abdomen is flat. Bowel sounds are normal.      Palpations: Abdomen is soft.   Musculoskeletal:         General: Swelling present.      Cervical back: Normal range of motion.      Comments: dressings in place to B/L lower legs. Pictures reviewed from homecare on 7/8/2025  Neurological:      Mental Status: She is alert.      Last Recorded Vitals  Blood pressure 100/66, pulse 85, temperature 36.7 °C (98.1 °F), temperature source Temporal, resp. rate 18, height 1.6 m (5' 3\"), weight 134 kg (296 lb 3.2 oz), SpO2 98%.    Relevant Results  Results for orders placed or performed during the hospital encounter of 07/09/25 (from the past 24 hours)   CBC and Auto Differential   Result Value Ref Range    WBC 12.0 (H) 4.4 - 11.3 x10*3/uL    nRBC 0.0 0.0 - 0.0 /100 WBCs    RBC 3.00 (L) 4.00 - 5.20 x10*6/uL    Hemoglobin 8.7 (L) 12.0 - 16.0 g/dL    " Hematocrit 27.9 (L) 36.0 - 46.0 %    MCV 93 80 - 100 fL    MCH 29.0 26.0 - 34.0 pg    MCHC 31.2 (L) 32.0 - 36.0 g/dL    RDW 15.0 (H) 11.5 - 14.5 %    Platelets 437 150 - 450 x10*3/uL    Neutrophils % 73.4 40.0 - 80.0 %    Immature Granulocytes %, Automated 0.6 0.0 - 0.9 %    Lymphocytes % 12.9 13.0 - 44.0 %    Monocytes % 9.3 2.0 - 10.0 %    Eosinophils % 3.3 0.0 - 6.0 %    Basophils % 0.5 0.0 - 2.0 %    Neutrophils Absolute 8.79 (H) 1.20 - 7.70 x10*3/uL    Immature Granulocytes Absolute, Automated 0.07 0.00 - 0.70 x10*3/uL    Lymphocytes Absolute 1.54 1.20 - 4.80 x10*3/uL    Monocytes Absolute 1.12 (H) 0.10 - 1.00 x10*3/uL    Eosinophils Absolute 0.40 0.00 - 0.70 x10*3/uL    Basophils Absolute 0.06 0.00 - 0.10 x10*3/uL   Comprehensive metabolic panel   Result Value Ref Range    Glucose 94 74 - 99 mg/dL    Sodium 136 136 - 145 mmol/L    Potassium 4.0 3.5 - 5.3 mmol/L    Chloride 102 98 - 107 mmol/L    Bicarbonate 25 21 - 32 mmol/L    Anion Gap 13 10 - 20 mmol/L    Urea Nitrogen 14 6 - 23 mg/dL    Creatinine 0.88 0.50 - 1.05 mg/dL    eGFR 77 >60 mL/min/1.73m*2    Calcium 8.1 (L) 8.6 - 10.3 mg/dL    Albumin 2.6 (L) 3.4 - 5.0 g/dL    Alkaline Phosphatase 78 33 - 110 U/L    Total Protein 6.5 6.4 - 8.2 g/dL    AST 9 9 - 39 U/L    Bilirubin, Total 0.6 0.0 - 1.2 mg/dL    ALT 4 (L) 7 - 45 U/L   Lactate   Result Value Ref Range    Lactate 1.0 0.4 - 2.0 mmol/L   Blood Culture    Specimen: Peripheral Venipuncture; Blood culture   Result Value Ref Range    Blood Culture Loaded on Instrument - Culture in progress    Blood Culture    Specimen: Peripheral Venipuncture; Blood culture   Result Value Ref Range    Blood Culture Loaded on Instrument - Culture in progress    C-reactive protein   Result Value Ref Range    C-Reactive Protein 12.98 (H) <1.00 mg/dL   Extra Urine Gray Tube   Result Value Ref Range    Extra Tube     Urinalysis with Reflex Culture and Microscopic   Result Value Ref Range    Color, Urine Light-Orange (N)  Light-Yellow, Yellow, Dark-Yellow    Appearance, Urine Turbid (N) Clear    Specific Gravity, Urine 1.019 1.005 - 1.035    pH, Urine 6.5 5.0, 5.5, 6.0, 6.5, 7.0, 7.5, 8.0    Protein, Urine 50 (1+) (A) NEGATIVE, 10 (TRACE), 20 (TRACE) mg/dL    Glucose, Urine Normal Normal mg/dL    Blood, Urine OVER (3+) (A) NEGATIVE mg/dL    Ketones, Urine NEGATIVE NEGATIVE mg/dL    Bilirubin, Urine NEGATIVE NEGATIVE mg/dL    Urobilinogen, Urine Normal Normal mg/dL    Nitrite, Urine 1+ (A) NEGATIVE    Leukocyte Esterase, Urine 500 Lee/uL (A) NEGATIVE   Microscopic Only, Urine   Result Value Ref Range    WBC, Urine >50 (A) 1-5, NONE /HPF    RBC, Urine >20 (A) NONE, 1-2, 3-5 /HPF    Squamous Epithelial Cells, Urine 1-9 (SPARSE) Reference range not established. /HPF    Bacteria, Urine 1+ (A) NONE SEEN /HPF    Mucus, Urine FEW Reference range not established. /LPF   ECG 12 Lead   Result Value Ref Range    Ventricular Rate 94 BPM    Atrial Rate 94 BPM    WY Interval 144 ms    QRS Duration 88 ms    QT Interval 378 ms    QTC Calculation(Bazett) 472 ms    P Axis 51 degrees    R Axis 13 degrees    T Axis 26 degrees    QRS Count 16 beats    Q Onset 224 ms    P Onset 152 ms    P Offset 204 ms    T Offset 413 ms    QTC Fredericia 439 ms   Basic metabolic panel   Result Value Ref Range    Glucose 93 74 - 99 mg/dL    Sodium 138 136 - 145 mmol/L    Potassium 4.1 3.5 - 5.3 mmol/L    Chloride 104 98 - 107 mmol/L    Bicarbonate 27 21 - 32 mmol/L    Anion Gap 11 10 - 20 mmol/L    Urea Nitrogen 12 6 - 23 mg/dL    Creatinine 0.90 0.50 - 1.05 mg/dL    eGFR 75 >60 mL/min/1.73m*2    Calcium 7.7 (L) 8.6 - 10.3 mg/dL   CBC   Result Value Ref Range    WBC 6.8 4.4 - 11.3 x10*3/uL    nRBC 0.0 0.0 - 0.0 /100 WBCs    RBC 2.67 (L) 4.00 - 5.20 x10*6/uL    Hemoglobin 7.6 (L) 12.0 - 16.0 g/dL    Hematocrit 25.0 (L) 36.0 - 46.0 %    MCV 94 80 - 100 fL    MCH 28.5 26.0 - 34.0 pg    MCHC 30.4 (L) 32.0 - 36.0 g/dL    RDW 15.1 (H) 11.5 - 14.5 %    Platelets 395 150 - 450  x10*3/uL   Protime-INR   Result Value Ref Range    Protime 18.8 (H) 9.8 - 12.4 seconds    INR 1.7 (H) 0.9 - 1.1   Type and screen   Result Value Ref Range    ABO TYPE B     Rh TYPE POS     ANTIBODY SCREEN NEG    Iron and TIBC   Result Value Ref Range    Iron 21 (L) 35 - 150 ug/dL    UIBC 135 110 - 370 ug/dL    TIBC 156 (L) 240 - 445 ug/dL    % Saturation 13 (L) 25 - 45 %   C-reactive protein   Result Value Ref Range    C-Reactive Protein 11.35 (H) <1.00 mg/dL   ECG 12 Lead  Result Date: 7/10/2025  Normal sinus rhythm Normal ECG When compared with ECG of 20-JUN-2025 12:47, (unconfirmed) No significant change was found    CT tibia fibula right w IV contrast  Result Date: 7/9/2025  Interpreted By:  Joaquín Casarez, STUDY: CT TIBIA FIBULA RIGHT W IV CONTRAST;  7/9/2025 8:22 pm   INDICATION: Signs/Symptoms:concern for abscesses in the tibia.     COMPARISON: None.   ACCESSION NUMBER(S): [id number]   ORDERING CLINICIAN: RADHA FLOYD   TECHNIQUE: CT imaging of the right tibia/fibula was obtained after the intravenous administration of iodinated contrast. Coronal and sagittal reformatted images were performed.   FINDINGS: OSSEOUS STRUCTURES: Severe tricompartmental right knee osteoarthrosis with small joint effusion and tiny loose bodies in the lateral suprapatellar recess. Plantar calcaneal spur. Enthesopathic spurring at the Achilles tendon insertion. 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 No significant tibiotalar joint effusion.       SOFT TISSUES:   There is diffuse subcutaneous edema of the thigh and leg with skin thickening. Multiple deep and superficial ulcerations are noted throughout the right leg.     There is extensive network of interconnected multiloculated gas containing fluid collections in the subcutaneous fat of the posterior leg. Some demonstrate peripheral enhancement and are suspicious for abscesses in the appropriate clinical context. For example there is a 7.2 cm  x 2.2 cm right 1.9 cm gas containing collection (coronal image 31, series 206; axial image 102, series 201). There is a confluent area of multiloculated collections slightly more medial in the right calf that measures 8.4 cm x 2.2 cm x 6.4 cm in overall dimension likely consisting of multiple smaller loculated fluid pocket some containing gas (coronal image 49, series 206; axial image 88, series 201). There are multiple ulcerations overlying the aforementioned collections.   There is a soft tissue defect with packing material within the mid aspect of the leg just medial to the tibia. Is soft tissue defect extends 3.2 cm deep to the skin surface. This defect is contiguous with a loculated fluid collection along the deep muscular fascia and tibial diaphysis that measures approximately 7.5 cm x 7 cm x 1.7 cm (coronal image 75, axial image 64) and raises concern for either abscess or confluent edema in the setting of cellulitis.   Notably, there is no evidence of deep compartment fluid collection, fascial thickening, or gas. The muscles demonstrate relatively normal volume for age. The tendons about the ankle are intact to the extent included in the study.   OTHER: Venous varicosities are noted in the distal thigh and leg. No evidence of discrete filling defect within the deep venous system or greater saphenous vein.       Extensive network of interconnected multiloculated gas containing fluid collections in the subcutaneous fat of the posterior leg. Some demonstrate peripheral enhancement and are suspicious for abscesses in the appropriate clinical context. For example there is a 7.2 cm x 2.2 cm right 1.9 cm gas containing collection (coronal image 31, series 206; axial image 102, series 201). There is a confluent area of multiloculated collections slightly more medial in the right calf that measures 8.4 cm x 2.2 cm x 6.4 cm in overall dimension likely consisting of multiple smaller loculated fluid pocket some  containing gas (coronal image 49, series 206; axial image 88, series 201).   There is a soft tissue defect with packing material within the mid aspect of the leg just medial to the tibia. Is soft tissue defect extends 3.2 cm deep to the skin surface. This defect is contiguous with a loculated fluid collection along the deep muscular fascia and tibial diaphysis that measures approximately 7.5 cm x 7 cm x 1.7 cm (coronal image 75, axial image 64) and raises concern for either abscess or confluent edema in the setting of cellulitis.     No evidence of osteomyelitis.     Severe right knee osteoarthrosis.   MACRO: None.   Signed by: Joaquín Casarez 7/9/2025 9:31 PM Dictation workstation:   [id number]    ECG 12 lead  Result Date: 6/23/2025  Normal sinus rhythm Inferior infarct , age undetermined Abnormal ECG When compared with ECG of 24-MAR-2025 12:36, Inferior infarct is now Present T wave inversion no longer evident in Anterior leads    CT angio lower extremity right w and or wo IV contrast  Result Date: 6/15/2025  Interpreted By:  Andrei Joseph, STUDY: CT ANGIO LOWER EXTREMITY RIGHT W AND OR WO IV CONTRAST; ;  6/15/2025 4:42 pm   CT ANGIOGRAM LOWER EXTREMITIES WITH CONTRAST   INDICATION: Signs/Symptoms:rule out abscess open wound. 54-year-old woman with wound.     COMPARISON: CT abdomen pelvis 01/22/2025   ACCESSION NUMBER(S): [id number]   ORDERING CLINICIAN: CHELSY COOL   TECHNIQUE: Contiguous acquired helical axial images were obtained of the right lower extremity after the uneventful administration of 90 mL Omnipaque 350. Coronal and sagittal multiplanar reformatted maximum intensity projection images were obtained. 3D volumetric surface rendered representation of arterial structures was performed on a separate workstation and submitted for interpretation.   FINDINGS: Angiographic:   The partially the partially imaged left common artery, as well as the left internal and external iliac arteries are  patent. The left common femoral and profunda femoris arteries are patent. The left superficial femoral artery is patent along the entirety of the imaged left thigh.   The right common, internal common external iliac arteries, right common femoral artery profunda femoris artery are patent. There is right superficial femoral and popliteal arterial patency. There is probable 3 arterial vessel runoff to the level of the left ankle.   Contrast timing is suboptimal for the evaluation of venous structures. Large iliac veins of the pelvis are grossly unremarkable.       Nonangiographic:   The partially imaged pelvis shows nondilated loops of bowel. The bladder is decompressed. No free fluid, pneumatosis, or pneumoperitoneum. There is a right inguinal lymph nodes which are likely reactive. There is rather diffuse skin thickening and subcutaneous edema along the length of the entire right lower extremity which is worse from the right popliteal fossa distally. Superficial varicosities along the length of the right lower extremity are present. CT angiography provides limited evaluation lower extremity soft tissue structures. Within these limitations, no drainable subcutaneous fluid collection. No acute osseous abnormality.       1. Femoropopliteal arterial patency with patency of 3 arterial vessel runoff to the level of the right ankle. Left lower extremity iliofemoral patency as partially imaged to the distal left forearm. 2. Extensive right lower extremity skin thickening and subcutaneous edema, more prominent in the lower leg. Limited evaluation of deep soft tissue structures. No large subcutaneous drainable fluid collection.   MACRO: None   Signed by: Andrei Joseph 6/15/2025 5:30 PM Dictation workstation:   [id number]  Susceptibility data from last 90 days.  Collected Specimen Info Organism Aztreonam Cefepime Ceftazidime Ciprofloxacin Levofloxacin Piperacillin/Tazobactam Tobramycin   06/17/25 Swab from [facility]  Pseudomonas aeruginosa  S  S  S  R  R  S  S     Mixed Gram-Negative Bacteria           06/15/25 Tissue/Biopsy from Wound/Tissue Pseudomonas aeruginosa  S S S  R  R  S  S     Mixed Anaerobic Bacteria            Mixed Gram-Positive and Gram-Negative Bacteria               Assessment/Plan      Right lower leg abscess  Multiple new areas of fluid collection to right lower leg.  Due to proximity to knee joint and possible need for drain placement, will consult IR.  IV antibiotics per ID.  Will evaluate left lower leg wounds tomorrow.    I spent 35 minutes in the professional and overall care of this patient.               [1]   Family History  Problem Relation Name Age of Onset    Arthritis Mother Esperanza     COPD Mother Esperanza     Learning disabilities Son Tyson Aries     Intellectual Disability Son Tyson Aries     Developmental delay Son Tyson Aries     Seizures Son Tyson Aries     Learning disabilities Son Tyson Aries     Intellectual Disability Son Tyson Aries     Learning disabilities Son Tyson Aries     Intellectual Disability Son Tyson Aries     Learning disabilities Son Tyson Aries     Intellectual Disability Son Tyson Aries

## 2025-07-10 NOTE — CARE PLAN
Problem: Pain - Adult  Goal: Verbalizes/displays adequate comfort level or baseline comfort level  Outcome: Progressing     Problem: Safety - Adult  Goal: Free from fall injury  Outcome: Progressing     Problem: Discharge Planning  Goal: Discharge to home or other facility with appropriate resources  Outcome: Progressing     Problem: Chronic Conditions and Co-morbidities  Goal: Patient's chronic conditions and co-morbidity symptoms are monitored and maintained or improved  Outcome: Progressing     Problem: Nutrition  Goal: Nutrient intake appropriate for maintaining nutritional needs  Outcome: Progressing     Problem: Skin  Goal: Decreased wound size/increased tissue granulation at next dressing change  Outcome: Progressing  Flowsheets (Taken 7/10/2025 0919)  Decreased wound size/increased tissue granulation at next dressing change: Promote sleep for wound healing  Goal: Participates in plan/prevention/treatment measures  Outcome: Progressing  Flowsheets (Taken 7/10/2025 0919)  Participates in plan/prevention/treatment measures: Elevate heels  Goal: Prevent/manage excess moisture  Outcome: Progressing  Flowsheets (Taken 7/10/2025 0919)  Prevent/manage excess moisture:   Moisturize dry skin   Cleanse incontinence/protect with barrier cream  Goal: Prevent/minimize sheer/friction injuries  Outcome: Progressing  Flowsheets (Taken 7/10/2025 0919)  Prevent/minimize sheer/friction injuries:   Use pull sheet   HOB 30 degrees or less  Goal: Promote/optimize nutrition  Outcome: Progressing  Flowsheets (Taken 7/10/2025 0919)  Promote/optimize nutrition:   Monitor/record intake including meals   Discuss with provider if NPO > 2 days  Goal: Promote skin healing  Outcome: Progressing  Flowsheets (Taken 7/10/2025 0919)  Promote skin healing:   Rotate device position/do not position patient on device   Protective dressings over bony prominences   Assess skin/pad under line(s)/device(s)   The patient's goals for the shift include   to have a decrease in pain     The clinical goals for the shift include pt to have well managed pain and remain hemodynamically stable throughout the shift.     Over the shift, the patient did not make progress toward the following goals. Barriers to progression include none. Recommendations to address these barriers include none.

## 2025-07-10 NOTE — PROGRESS NOTES
07/10/25 [id number]   Discharge Planning   Assistance Needed SW consulted re: positive responses for SDOH.  Into see pt this afternoon and she was out of the room.  Will see tomorrow.

## 2025-07-10 NOTE — PROGRESS NOTES
Frances Mcdowell is a 54 y.o. female on day 0 of admission presenting with Cellulitis.      Subjective   Tearful this morning, says she is terrified of going to IR and hoping someone will come to talk to her. Explained to her that the process will be explained to her when she arrives in radiology. Denies CP, SOB. States she is starving and starts to cry again when told she can't eat. Endorses dysuria, denies nvdc.       Objective     Last Recorded Vitals  /66 (BP Location: Right arm, Patient Position: Lying)   Pulse 85   Temp 36.7 °C (98.1 °F) (Temporal)   Resp 18   Wt 134 kg (296 lb 3.2 oz)   SpO2 98%   Intake/Output last 3 Shifts:    Intake/Output Summary (Last 24 hours) at 7/10/2025 1129  Last data filed at 7/10/2025 1039  Gross per 24 hour   Intake 1628.75 ml   Output 775 ml   Net 853.75 ml       Admission Weight  Weight: 132 kg (290 lb) (07/09/25 1642)    Daily Weight  07/09/25 : 134 kg (296 lb 3.2 oz)    Image Results  CT tibia fibula right w IV contrast  Narrative: Interpreted By:  Joaquín Casarez,   STUDY:  CT TIBIA FIBULA RIGHT W IV CONTRAST;  7/9/2025 8:22 pm      INDICATION:  Signs/Symptoms:concern for abscesses in the tibia.          COMPARISON:  None.      ACCESSION NUMBER(S):  [id number]      ORDERING CLINICIAN:  RADHA FLOYD      TECHNIQUE:  CT imaging of the right tibia/fibula was obtained after the  intravenous administration of iodinated contrast. Coronal and  sagittal reformatted images were performed.      FINDINGS:  OSSEOUS STRUCTURES:  Severe tricompartmental right knee osteoarthrosis with small joint  effusion and tiny loose bodies in the lateral suprapatellar recess.  Plantar calcaneal spur. Enthesopathic spurring at the Achilles tendon  insertion. There is a chronically ununited fracture of the tip of the  lateral malleolus. No significant tibiotalar joint effusion.              SOFT TISSUES:      There is diffuse subcutaneous edema of the thigh and leg with skin  thickening.  Multiple deep and superficial ulcerations are noted  throughout the right leg.          There is extensive network of interconnected multiloculated gas  containing fluid collections in the subcutaneous fat of the posterior  leg. Some demonstrate peripheral enhancement and are suspicious for  abscesses in the appropriate clinical context. For example there is a  7.2 cm x 2.2 cm right 1.9 cm gas containing collection (coronal image  31, series 206; axial image 102, series 201). There is a confluent  area of multiloculated collections slightly more medial in the right  calf that measures 8.4 cm x 2.2 cm x 6.4 cm in overall dimension  likely consisting of multiple smaller loculated fluid pocket some  containing gas (coronal image 49, series 206; axial image 88, series  201). There are multiple ulcerations overlying the aforementioned  collections.      There is a soft tissue defect with packing material within the mid  aspect of the leg just medial to the tibia. Is soft tissue defect  extends 3.2 cm deep to the skin surface. This defect is contiguous  with a loculated fluid collection along the deep muscular fascia and  tibial diaphysis that measures approximately 7.5 cm x 7 cm x 1.7 cm  (coronal image 75, axial image 64) and raises concern for either  abscess or confluent edema in the setting of cellulitis.      Notably, there is no evidence of deep compartment fluid collection,  fascial thickening, or gas. The muscles demonstrate relatively normal  volume for age. The tendons about the ankle are intact to the extent  included in the study.      OTHER:  Venous varicosities are noted in the distal thigh and leg. No  evidence of discrete filling defect within the deep venous system or  greater saphenous vein.      Impression: Extensive network of interconnected multiloculated gas containing  fluid collections in the subcutaneous fat of the posterior leg. Some  demonstrate peripheral enhancement and are suspicious for  abscesses  in the appropriate clinical context. For example there is a 7.2 cm x  2.2 cm right 1.9 cm gas containing collection (coronal image 31,  series 206; axial image 102, series 201). There is a confluent area  of multiloculated collections slightly more medial in the right calf  that measures 8.4 cm x 2.2 cm x 6.4 cm in overall dimension likely  consisting of multiple smaller loculated fluid pocket some containing  gas (coronal image 49, series 206; axial image 88, series 201).      There is a soft tissue defect with packing material within the mid  aspect of the leg just medial to the tibia. Is soft tissue defect  extends 3.2 cm deep to the skin surface. This defect is contiguous  with a loculated fluid collection along the deep muscular fascia and  tibial diaphysis that measures approximately 7.5 cm x 7 cm x 1.7 cm  (coronal image 75, axial image 64) and raises concern for either  abscess or confluent edema in the setting of cellulitis.          No evidence of osteomyelitis.          Severe right knee osteoarthrosis.      MACRO:  None.      Signed by: Joaquín Casarez 7/9/2025 9:31 PM  Dictation workstation:   [id number]      Physical Exam  Physical Exam  Gen: Moderate distress 2/2 anxiety, tearful  Eyes:  EOM intact  ENT: MMM  Neck: No JVD  Respiratory: CTAB, no wheezes/rhonchi  Cardiac: RRR, no murmurs rubs or gallops  Abdomen: soft, NT, +BS  Extremities: right LE with stasis changes/wounds, photos in chart reviewed from wound care center on 7/8/25  Neuro: No focal deficits, alert and oriented x 3  Psych:  anxious, tearful    Assessment & Plan  Cellulitis    Cellulitis of right lower extremity    Right leg abscess with chronic bilateral wounds  -Admit to inpatient  -continue Vanc/Zosyn  -elevate legs  -ID consult  -Podiatry consult  -Plan for IR drainage today  -continue NPO and hold Eliquis    Pyuria  -endorses frequency  -culture was sent  -continue antibiotics    Anemia  -Hgb 10-11 earlier this  year  -Hgb 7.7 this AM  -Iron studies indicate SHAKIR  -check stool guaiac: colonoscopy last year polyps removed, moderate severity of diverticula, internal and external hemorrhoids  -PPI  -Start oral iron  -Monitor    History of Pes  -diagnosed in March 2025  -Eliquis on hold for now    Hypothyroid  -synthroid    Anxiety  -hydroxyzine     DVT prophy  -last dose of Eliquis was 2200 on 7/9/25, hold until after IR procedure    Dispo: Full admission to continue to work up and treat right leg abscess, possible UTI    JJ Cooper-TOÑO  D/w Dr. Willingham

## 2025-07-10 NOTE — PROGRESS NOTES
Music Therapy Note    Frances Mcdowell was referred by ENEDELIA Sanchez.    Therapy Session  Referral Type: New referral this admission  Visit Type: New visit  Session Start Time: 1325  Session End Time: 1408  Intervention Delivery: In-person  Conflict of Service: None  Family Present for Session: None     Pre-assessment  Unable to Assess Reason: Emotional distress  Mood/Affect: Anxious, Sad/tearful  Verbalized Emotional State: Anxiety, Fear         Treatment/Interventions  Areas of Focus: Anxiety reduction, Relaxation, Coping, Emotional support  Music Therapy Interventions: Music-facilitated relaxation, Music-assisted relaxation and imagery (MERE), Improvisation, Live music listening  Interruption: Yes  Interrupted by: Staff  Interruption Outcome: Session resumed  Patient Fell Asleep at End of Session: No    Post-assessment  Unable to Assess Reason: Outcomes not evaluated  Mood/Affect: Calm, Tired/lethargic  Verbalized Emotional State: Relaxed, Relief, Gratitude  Continue Visiting: Yes  Total Session Time (min): 43 minutes    Narrative  Assessment Detail: Upon MT's arrival, pt was reclined in bed in darkened room. MT introduced herself and music therapy services, sharing that she had read in pt's chart that pt was feeling extremely anxious. Pt became tearful and shared about her fear of an upcoming procedure and shared that she was in pain (she later rated it at 5/6 to RN). Pt was welcoming of a guided relaxation session and stated that she was open to ocean waves/beach sounds as well.  Plan: MT will provide music to reduce pt's anxiety and fear, provide a positive coping skill for hospitalization and treatment, and promote a positive atmosphere for relaxing through musically and MT guided relaxation experience. MT will also provide emotional support through therapeutic presence and rapport.  Intervention: MT utilized piano and recorded ocean waves sounds to provide improvised music. During this time, pt's IV  "monitor began beeping. Pt became increasingly frustrated with this, and MT shifted locations to manage the silencing of the monitor. Pt much more able to relax afterwards. MT continued to utilize improvised piano music to walk pt through some deep breathing and visualization exercises, focusing on the five senses and beach imagery. Pt's RN arrived during this time to adjust IV monitor, which allowed the monitor to stop beeping. Pt visibly relaxed afterwards and voiced appreciation of \"beautiful\" music. She was welcoming of MT continuing, as she still expressed some stress about her upcoming procedure. MT presented \"Memory of Trees\" by Lian, with continued ocean sounds, and transitioned into more improvised music. Pt intermittently sighing and taking deep breaths, but she remained with her eyes closed for the majority of the time. Afterwards, she expressed relief and feeling much more relaxed. Pt requested the MT return again when able.  Evaluation: Pt visibly relaxed throughout the session and was able to rest more comfortably, especially after the silencing of IV monitor. Pt appeared to almost fall asleep at points. Shortly after rating her pain at 5/6 to RN, pt did have an extended grimace, but breathed through it. Pt did not express any other outward signs of pain during the second half of the session/second improvisation. She appeared calm and less stressed about her upcoming procedure. Pt encouraged to pactice deep breathing and visualization of comforting imagery when feeling stressed/anxious.  Follow-up: MT will follow up as able and appropriate. Pt requesting follow up visit tomorrow if able.  Patient Comments: \"That was amazing, so relaxing.\"    Education Documentation  No documentation found.          "

## 2025-07-10 NOTE — ASSESSMENT & PLAN NOTE
With 3 Draining Abscesses to RLE/SIRS  -HX MRSA and Pseudomonas  -Not septic  -CT RLE pending-f/U  -Spoke with Dr. PIERRE and to see late Thur or Fri and no surgical plan yet at this time  -RCRI score 0 low to proceed to surgery/EKG reviewed   Wound consult and wound cultures need collected-f/U  Blood cultures-f/U  Vanco and Zosyn  ID and podiatry consults-appreciate recs  Pain control/OARRS/Bowel regimen-scheduled tylenol, oxycodone Q 4 prn and breakthrough dilaudid, narcan  Monitor H/H and if hemoglobin <7, will need transfusion    UTI with Microscopic Hematuria  Zosyn  F/U urine culture  ID consulted-appreciate recs    Leukocytosis with Neutrophilia  -Neg lactate  Txt both infections as above    ADHD  Adderall supplement for Adderall XL

## 2025-07-10 NOTE — CONSULTS
Consults  Referred by AMILCAR Borja    Primary MD: Devaughn Moreno MD    Reason For Consult  UTI / leg abscess    History Of Present Illness  Frances Mcdowell is a 54 y.o. 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, she underwent an I&D  of a Rt leg abscess  , the culture with mixed tylor / Pseudomonas, she has improved with the drainage, after a long discussion she opted for no iv therapy, she was readmitted for increasing Rt leg pain and chills, the wound care was concerned about leg abscess, she was admitted, CT with multiple collection, no fever, hew WBC were slightly up, the ua with pyuria    Past Medical History  She has a past medical history of Acute embolism and thrombosis of other specified veins (2022), Acute on chronic diastolic heart failure (), ADD (attention deficit disorder), ADHD (attention deficit hyperactivity disorder), Allergic, Anxiety, Cellulitis, Chronic pain disorder (), Chronic wound, Class 3 severe obesity with body mass index (BMI) of 50.0 to 59.9 in adult, Current use of long term anticoagulation, DVT (deep venous thrombosis) (Multi) (2025), Endometrial intraepithelial neoplasia (EIN), Extremity pain (), GERD (gastroesophageal reflux disease), HLD (hyperlipidemia), Hypothyroidism, Kidney stone (2025), Lymph edema, Moderate tricuspid regurgitation, PE (pulmonary thromboembolism) (Multi) (2025), Peripheral neuropathy (), Recurrent cellulitis of lower extremity, Skin cancer (), Varicosities (), and Venous insufficiency.    Surgical History  She has a past surgical history that includes Tonsillectomy; Foot surgery; Varicose vein surgery; Total thyroidectomy; Bladder surgery;  section, low transverse; Hysterectomy; IR CVC PICC (2025); Colonoscopy (2024); Wound debridement (2025); Adenoidectomy; Epidural block injection  "(9/97); and Other surgical history (02/21/2025).     Social History     Occupational History    Not on file   Tobacco Use    Smoking status: Never    Smokeless tobacco: Never   Vaping Use    Vaping status: Never Used   Substance and Sexual Activity    Alcohol use: Not Currently     Comment: seldom    Drug use: Never    Sexual activity: Yes     Partners: Male     Birth control/protection: None     Travel History   Travel since 06/10/25    No documented travel since 06/10/25            Family History  Family History[1], no immunodeficiency  Allergies  Erythromycin base, Peanut, Sulfa (sulfonamide antibiotics), Tetanus toxoid, Morphine, Tramadol, and Adhesive tape-silicones     Immunization History   Administered Date(s) Administered    COVID-19, mRNA, LNP-S, PF, 30 mcg/0.3 mL dose 01/17/2021, 02/06/2021, 10/25/2021    Flu vaccine (IIV4), preservative free *Check age/dose* 02/16/2018, 11/09/2018    Flu vaccine, quadrivalent, no egg protein, age 6 month or greater (FLUCELVAX) 10/13/2020    Hepatitis B vaccine, 19 yrs and under (RECOMBIVAX, ENGERIX) 03/18/2016, 04/18/2016    Influenza, seasonal, injectable 10/09/2015    Moderna SARS-CoV-2 Vaccination 06/01/2022    PPD Test 10/09/2015, 10/12/2016, 02/16/2018, 06/23/2021    Tdap vaccine, age 7 year and older (BOOSTRIX, ADACEL) 10/09/2015     Medications  Home medications:  Prescriptions Prior to Admission[2]  Current medications:  Scheduled medications  Scheduled Medications[3]  Continuous medications  Continuous Medications[4]  PRN medications  PRN Medications[5]    Review of Systems   All other systems reviewed and are negative.       Objective  Range of Vitals (last 24 hours)  Heart Rate:  []   Temp:  [34.9 °C (94.8 °F)-36.9 °C (98.4 °F)]   Resp:  [16-20]   BP: ()/()   Height:  [160 cm (5' 3\")]   Weight:  [132 kg (290 lb)-134 kg (296 lb 3.2 oz)]   SpO2:  [92 %-100 %]   Daily Weight  07/09/25 : 134 kg (296 lb 3.2 oz)    Body mass index is 52.47 kg/m².   "   Physical Exam  Constitutional:       Appearance: Normal appearance.   HENT:      Head: Normocephalic and atraumatic.      Mouth/Throat:      Mouth: Mucous membranes are moist.      Pharynx: Oropharynx is clear.   Eyes:      Pupils: Pupils are equal, round, and reactive to light.   Cardiovascular:      Rate and Rhythm: Normal rate and regular rhythm.      Heart sounds: Normal heart sounds.   Pulmonary:      Effort: Pulmonary effort is normal.      Breath sounds: Normal breath sounds.   Abdominal:      General: Abdomen is flat. Bowel sounds are normal.      Palpations: Abdomen is soft.   Musculoskeletal:         General: Swelling present.      Cervical back: Normal range of motion.      Comments: RT leg stasis / wounds   Neurological:      Mental Status: She is alert.          Relevant Results  Outside Hospital Results  reviewed  Labs  Results from last 72 hours   Lab Units 07/10/25  0651 07/09/25  1759   WBC AUTO x10*3/uL 6.8 12.0*   HEMOGLOBIN g/dL 7.6* 8.7*   HEMATOCRIT % 25.0* 27.9*   PLATELETS AUTO x10*3/uL 395 437   NEUTROS PCT AUTO %  --  73.4   LYMPHS PCT AUTO %  --  12.9   MONOS PCT AUTO %  --  9.3   EOS PCT AUTO %  --  3.3     Results from last 72 hours   Lab Units 07/10/25  0651 07/09/25  1759   SODIUM mmol/L 138 136   POTASSIUM mmol/L 4.1 4.0   CHLORIDE mmol/L 104 102   CO2 mmol/L 27 25   BUN mg/dL 12 14   CREATININE mg/dL 0.90 0.88   GLUCOSE mg/dL 93 94   CALCIUM mg/dL 7.7* 8.1*   ANION GAP mmol/L 11 13   EGFR mL/min/1.73m*2 75 77     Results from last 72 hours   Lab Units 07/09/25  1759   ALK PHOS U/L 78   BILIRUBIN TOTAL mg/dL 0.6   PROTEIN TOTAL g/dL 6.5   ALT U/L 4*   AST U/L 9   ALBUMIN g/dL 2.6*     Estimated Creatinine Clearance: 93.7 mL/min (by C-G formula based on SCr of 0.9 mg/dL).  C-Reactive Protein   Date Value Ref Range Status   07/10/2025 11.35 (H) <1.00 mg/dL Final   07/09/2025 12.98 (H) <1.00 mg/dL Final   06/15/2025 22.53 (H) <1.00 mg/dL Final     Sedimentation Rate   Date Value Ref  "Range Status   06/15/2025 49 (H) 0 - 30 mm/h Final   08/06/2024 44 (H) 0 - 30 mm/h Final     No results found for: \"HIV1X2\", \"HIVCONF\", \"CGHFMJ9KN\"  No results found for: \"HEPCABINIT\", \"HEPCAB\", \"HCVPCRQUANT\"  Microbiology  Reviewed  Imaging  Reviewed      Assessment/Plan   Legs stasis / wounds / Rt leg abscess  Pyuria, asymptomatic    Recommendations :  Continue Vancomycin and Zosyn  Will need better exam and drainage if abscesses can be localized otherwise may need US guided drainage  Keep the legs elevated    I spent minutes in the professional and overall care of this patient.          Miguel Hu MD         [1]   Family History  Problem Relation Name Age of Onset    Arthritis Mother Esperanza     COPD Mother Esperanza     Learning disabilities Son Tyson Aries     Intellectual Disability Son Tyson Aries     Developmental delay Son Tyson Aries     Seizures Son Tyson Aries     Learning disabilities Son Tyson Aries     Intellectual Disability Son Tyson Aries     Learning disabilities Son Tyson Aries     Intellectual Disability Son Tyson Aries     Learning disabilities Son Tyson Aries     Intellectual Disability Son Tyson Aries    [2]   Medications Prior to Admission   Medication Sig Dispense Refill Last Dose/Taking    acetaminophen (Tylenol) 325 mg tablet Take 2 tablets (650 mg) by mouth 3 times a day as needed for mild pain (1 - 3). 30 tablet 11 7/9/2025 Morning    amphetamine-dextroamphetamine XR (Adderall XR) 20 mg 24 hr capsule Take 1 capsule (20 mg) by mouth 2 times a day. (Patient taking differently: Take 2 capsules (40 mg) by mouth once daily in the morning.) 60 capsule 0 7/9/2025 Morning    apixaban (Eliquis) 5 mg tablet Take 1 tablet (5 mg) by mouth 2 times a day. 60 tablet 11 7/9/2025 Morning    cholecalciferol (Vitamin D-3) 125 mcg (5,000 units) tablet Take 1 tablet (125 mcg) by mouth once daily. 90 tablet 1 7/9/2025 Morning    hydrOXYzine HCL (Atarax) 25 mg tablet Take 1 tablet (25 mg) " by mouth every 6 hours if needed for anxiety. 90 tablet 0 7/8/2025 Evening    levothyroxine (Synthroid, Levoxyl) 100 mcg tablet Take 1 tablet (100 mcg) by mouth early in the morning.. Take on an empty stomach at the same time each day, either 30 to 60 minutes prior to breakfast 90 tablet 3 7/9/2025 Morning    Nyamyc 100,000 unit/gram powder Apply topically once daily. (Patient taking differently: Apply 1 Application topically if needed.) 60 g 6 7/9/2025 Morning    omeprazole (PriLOSEC) 40 mg DR capsule Take 1 capsule (40 mg) by mouth once daily. 90 capsule 3 7/9/2025 Morning    oxyCODONE-acetaminophen (Percocet)  mg tablet Take 1 tablet by mouth once daily. 90 tablet 0 7/8/2025    rosuvastatin (Crestor) 20 mg tablet Take 1 tablet (20 mg) by mouth once daily. 30 tablet 11 7/8/2025 Evening    EPINEPHrine 0.3 mg/0.3 mL injection syringe Inject 0.3 mL (0.3 mg) as directed if needed for anaphylaxis. As Directed 30 mL 2     naloxone (Narcan) 4 mg/0.1 mL nasal spray Administer 1 spray (4 mg) into affected nostril(s) if needed for opioid reversal. May repeat every 2-3 minutes if needed, alternating nostrils, until medical assistance becomes available. 2 each 0     oxyCODONE (Roxicodone) 5 mg immediate release tablet Take 1 tablet (5 mg) by mouth every 3rd day if needed for severe pain (7 - 10).   7/7/2025    triamcinolone (Kenalog) 0.1 % cream Apply to affected area 1-2 times daily as needed. (Patient not taking: Reported on 7/9/2025) 453.6 g 1 Not Taking   [3] acetaminophen, 975 mg, oral, q8h  amphetamine-dextroamphetamine, 20 mg, oral, BID  [Held by provider] apixaban, 5 mg, oral, BID  cholecalciferol, 125 mcg, oral, Daily  docusate sodium, 100 mg, oral, BID  ferrous sulfate, 65 mg of elemental iron, oral, Daily with breakfast  levothyroxine, 100 mcg, oral, Daily  pantoprazole, 40 mg, oral, Daily before breakfast  piperacillin-tazobactam, 3.375 g, intravenous, q6h  polyethylene glycol, 17 g, oral,  Daily  rosuvastatin, 20 mg, oral, Daily  vancomycin, 1,500 mg, intravenous, q12h     [4] D5 % and 0.9 % sodium chloride, 75 mL/hr, Last Rate: 75 mL/hr (07/10/25 0008)     [5] PRN medications: HYDROmorphone, hydrOXYzine HCL, melatonin, naloxone, oxyCODONE, oxyCODONE, vancomycin

## 2025-07-10 NOTE — CONSULTS
"Nutrition Initial Assessment:   Nutrition Assessment    Reason for Assessment: Admission nursing screening (Wounds)    Patient is a 54 y.o. female presenting from wound clinic with x3 draining wounds.     Admitted for RLE cellulitis w/ abscess & chronic wounds. Started on antibiotic regimen. ID, podiatry, & wound care engaged. Tentative plan for IR drainage today.     Medical History[1]    Nutrition History:  Food and Nutrient History: Visit made, pt resting but woke to name being called. Mildly upset she is still NPO as she reports she is \"starving\". Pt states she was consuming her usual x2 meals/day with good appetite at home. Denies any changes in portions at meals. Pt reports she is familiar w/ both Pro-Stat AWC & Doug but doesn't drink them at home. Would like to receive Doug BID this admission. No additional needs at this time.  Food Allergy: Peanut     Anthropometrics:  Height: 160 cm (5' 3\")   Weight: 134 kg (296 lb 3.2 oz)   BMI (Calculated): 52.48  IBW/kg (Dietitian Calculated): 52.3 kg  Percent of IBW: 256 %    Weight History:   Wt Readings from Last 50 Encounters:   07/09/25 134 kg (296 lb 3.2 oz) --> Admit wt   06/24/25 132 kg (290 lb)   06/15/25 139 kg (306 lb 1.6 oz)   06/08/25 137 kg (302 lb)   05/03/25 137 kg (302 lb)   04/28/25 142 kg (312 lb)   04/17/25 140 kg (308 lb)   03/28/25 142 kg (312 lb 1.6 oz)   03/24/25 127 kg (280 lb)   03/05/25 129 kg (285 lb)   02/21/25 132 kg (290 lb)   02/17/25 129 kg (285 lb)   02/14/25 129 kg (285 lb)   02/03/25 132 kg (290 lb)   01/07/25 132 kg (290 lb)   10/30/24 132 kg (290 lb)   10/13/24 142 kg (313 lb 0.9 oz)   08/16/24 139 kg (306 lb)   08/09/24 135 kg (297 lb 9.9 oz)     Weight Change %:  Weight History / % Weight Change: Stable weight over the past year.  Significant Weight Loss: No    Nutrition Focused Physical Exam Findings:  Subcutaneous Fat Loss:   Orbital Fat Pads: Well nourished (slightly bulging fat pads)  Buccal Fat Pads: Well nourished (full, " rounded cheeks)  Triceps: Well nourished (ample fat tissue)  Muscle Wasting:  Temporalis: Well nourished (well-defined muscle)  Pectoralis (Clavicular Region): Well nourished (clavicle not visible)  Deltoid/Trapezius: Well nourished (rounded appearance at arm, shoulder, neck)  Edema:  Edema:  (Non-pitting BLE)  Physical Findings:  Skin: Positive (R tibial wound x2, R tibial venous ulcer, L upper leg incision, & L tibial wound.)  Digestive System Findings:  (None)  Mouth Findings:  (None)    Nutrition Significant Labs:  BMP Trend:   Results from last 7 days   Lab Units 07/10/25  0651 07/09/25  1759   GLUCOSE mg/dL 93 94   CALCIUM mg/dL 7.7* 8.1*   SODIUM mmol/L 138 136   POTASSIUM mmol/L 4.1 4.0   CO2 mmol/L 27 25   CHLORIDE mmol/L 104 102   BUN mg/dL 12 14   CREATININE mg/dL 0.90 0.88      Nutrition Specific Medications:  Scheduled medications  Scheduled Medications[2]  Continuous medications  Continuous Medications[3]  PRN medications  PRN Medications[4]    I/O:   No recorded BM since admission.     Dietary Orders (From admission, onward)       Start     Ordered    07/09/25 2349  NPO Diet Except: Sips with meds; Effective now  Diet effective now        Question:  Except:  Answer:  Sips with meds    07/09/25 2348    07/09/25 2101  May Participate in Room Service  ( ROOM SERVICE MAY PARTICIPATE)  Once        Question:  .  Answer:  Yes    07/09/25 2100             Estimated Needs:   Total Energy Estimated Needs in 24 hours (kCal):  (6685-5258)  Method for Estimating Needs: 30-35 kcals/kg x IBW  Total Protein Estimated Needs in 24 Hours (g):  (70-80)  Method for Estimating 24 Hour Protein Needs: 1.3-1.5 g/kg x IBW  Total Fluid Estimated Needs in 24 Hours (mL):  (8433-4305)  Method for Estimating 24 Hour Fluid Needs: 1mL/kcal or per team        Nutrition Diagnosis   Malnutrition Diagnosis  Patient has Malnutrition Diagnosis: No    Nutrition Diagnosis  Patient has Nutrition Diagnosis: Yes  Diagnosis Status (1):  New  Nutrition Diagnosis 1: Increased nutrient needs  Related to (1): increased metabolic demand  As Evidenced by (1): UTI & RLE cellulitis w/ chronic wounds       Nutrition Interventions/Recommendations      Nutrition Recommendations:  Resume regular diet as deemed medically feasible  Once diet advanced, please order Doug w/ breakfast + dinner    Consider addition of daily MVI w/ minerals    Nutrition Interventions/Goals:   Medical Food Supplement: Commercial beverage medical food supplement therapy  Goal: Doug BID (90 kcals/2.5g protein each)      Nutrition Monitoring and Evaluation   Estimated Energy Intake: Energy intake greater or equal to 75% of estimated energy needs    Time Spent (min): 60 minutes            [1]   Past Medical History:  Diagnosis Date    Acute embolism and thrombosis of other specified veins 08/2022    Superficial vein thrombosis    Acute on chronic diastolic heart failure 2024    BNP level of 258 in 2024    ADD (attention deficit disorder)     ADHD (attention deficit hyperactivity disorder)     Allergic     Anxiety     Cellulitis     Chronic pain disorder 6/24    Chronic wound     chronic lower extremity wounds (hx of pseudomonas and MRSA)    Class 3 severe obesity with body mass index (BMI) of 50.0 to 59.9 in adult     Current use of long term anticoagulation     on Eliquis    DVT (deep venous thrombosis) (Multi) 03/24/2025    unprovoked DVT    Endometrial intraepithelial neoplasia (EIN)     Extremity pain 6/24    GERD (gastroesophageal reflux disease)     HLD (hyperlipidemia)     Hypothyroidism     s/p thyroidectomy    Kidney stone 06/05/2025    Left kidney    Lymph edema     Moderate tricuspid regurgitation     PE (pulmonary thromboembolism) (Multi) 03/24/2025    Unprovoked Saddle PE on Eliquis    Peripheral neuropathy 6/24    Recurrent cellulitis of lower extremity     (hx of pseudomonas and MRSA)    Skin cancer 2024    Varicosities 1990    Venous insufficiency    [2] acetaminophen,  975 mg, oral, q8h  amphetamine-dextroamphetamine, 20 mg, oral, BID  [Held by provider] apixaban, 5 mg, oral, BID  cholecalciferol, 125 mcg, oral, Daily  docusate sodium, 100 mg, oral, BID  ferrous sulfate, 65 mg of elemental iron, oral, Daily with breakfast  levothyroxine, 100 mcg, oral, Daily  pantoprazole, 40 mg, oral, Daily before breakfast  piperacillin-tazobactam, 3.375 g, intravenous, q6h  polyethylene glycol, 17 g, oral, Daily  rosuvastatin, 20 mg, oral, Daily  vancomycin, 1,500 mg, intravenous, q12h  [3] D5 % and 0.9 % sodium chloride, 75 mL/hr, Last Rate: 75 mL/hr (07/10/25 1339)  [4] PRN medications: HYDROmorphone, hydrOXYzine HCL, melatonin, naloxone, oxyCODONE, oxyCODONE, vancomycin

## 2025-07-11 ENCOUNTER — HOME CARE VISIT (OUTPATIENT)
Dept: HOME HEALTH SERVICES | Facility: HOME HEALTH | Age: 55
End: 2025-07-11
Payer: COMMERCIAL

## 2025-07-11 LAB
ANION GAP SERPL CALC-SCNC: 8 MMOL/L (ref 10–20)
BACTERIA UR CULT: NORMAL
BUN SERPL-MCNC: 10 MG/DL (ref 6–23)
CALCIUM SERPL-MCNC: 7.6 MG/DL (ref 8.6–10.3)
CHLORIDE SERPL-SCNC: 106 MMOL/L (ref 98–107)
CO2 SERPL-SCNC: 28 MMOL/L (ref 21–32)
CREAT SERPL-MCNC: 0.95 MG/DL (ref 0.5–1.05)
EGFRCR SERPLBLD CKD-EPI 2021: 70 ML/MIN/1.73M*2
ERYTHROCYTE [DISTWIDTH] IN BLOOD BY AUTOMATED COUNT: 15.5 % (ref 11.5–14.5)
GLUCOSE SERPL-MCNC: 83 MG/DL (ref 74–99)
HCT VFR BLD AUTO: 24.7 % (ref 36–46)
HGB BLD-MCNC: 7.5 G/DL (ref 12–16)
MCH RBC QN AUTO: 28.5 PG (ref 26–34)
MCHC RBC AUTO-ENTMCNC: 30.4 G/DL (ref 32–36)
MCV RBC AUTO: 94 FL (ref 80–100)
NRBC BLD-RTO: 0 /100 WBCS (ref 0–0)
PLATELET # BLD AUTO: 404 X10*3/UL (ref 150–450)
POTASSIUM SERPL-SCNC: 3.7 MMOL/L (ref 3.5–5.3)
RBC # BLD AUTO: 2.63 X10*6/UL (ref 4–5.2)
SODIUM SERPL-SCNC: 138 MMOL/L (ref 136–145)
UFH PPP CHRO-ACNC: 0.4 IU/ML (ref ?–1.1)
UFH PPP CHRO-ACNC: 0.5 IU/ML (ref ?–1.1)
VANCOMYCIN SERPL-MCNC: 23.1 UG/ML (ref 5–20)
WBC # BLD AUTO: 6.2 X10*3/UL (ref 4.4–11.3)

## 2025-07-11 PROCEDURE — 36415 COLL VENOUS BLD VENIPUNCTURE: CPT | Performed by: PHYSICIAN ASSISTANT

## 2025-07-11 PROCEDURE — 2500000004 HC RX 250 GENERAL PHARMACY W/ HCPCS (ALT 636 FOR OP/ED): Performed by: PHYSICIAN ASSISTANT

## 2025-07-11 PROCEDURE — 2500000004 HC RX 250 GENERAL PHARMACY W/ HCPCS (ALT 636 FOR OP/ED): Performed by: INTERNAL MEDICINE

## 2025-07-11 PROCEDURE — 99233 SBSQ HOSP IP/OBS HIGH 50: CPT | Performed by: PHYSICIAN ASSISTANT

## 2025-07-11 PROCEDURE — 85027 COMPLETE CBC AUTOMATED: CPT | Performed by: PHYSICIAN ASSISTANT

## 2025-07-11 PROCEDURE — 2500000001 HC RX 250 WO HCPCS SELF ADMINISTERED DRUGS (ALT 637 FOR MEDICARE OP): Performed by: NURSE PRACTITIONER

## 2025-07-11 PROCEDURE — 36415 COLL VENOUS BLD VENIPUNCTURE: CPT | Performed by: INTERNAL MEDICINE

## 2025-07-11 PROCEDURE — 80048 BASIC METABOLIC PNL TOTAL CA: CPT | Performed by: PHYSICIAN ASSISTANT

## 2025-07-11 PROCEDURE — 80202 ASSAY OF VANCOMYCIN: CPT | Performed by: NURSE PRACTITIONER

## 2025-07-11 PROCEDURE — 2500000002 HC RX 250 W HCPCS SELF ADMINISTERED DRUGS (ALT 637 FOR MEDICARE OP, ALT 636 FOR OP/ED): Performed by: NURSE PRACTITIONER

## 2025-07-11 PROCEDURE — 2500000001 HC RX 250 WO HCPCS SELF ADMINISTERED DRUGS (ALT 637 FOR MEDICARE OP): Performed by: PHYSICIAN ASSISTANT

## 2025-07-11 PROCEDURE — 1100000001 HC PRIVATE ROOM DAILY

## 2025-07-11 PROCEDURE — 85520 HEPARIN ASSAY: CPT | Performed by: INTERNAL MEDICINE

## 2025-07-11 PROCEDURE — 99232 SBSQ HOSP IP/OBS MODERATE 35: CPT | Performed by: INTERNAL MEDICINE

## 2025-07-11 PROCEDURE — 2500000004 HC RX 250 GENERAL PHARMACY W/ HCPCS (ALT 636 FOR OP/ED): Performed by: NURSE PRACTITIONER

## 2025-07-11 RX ORDER — VANCOMYCIN HYDROCHLORIDE 1 G/200ML
1000 INJECTION, SOLUTION INTRAVENOUS EVERY 12 HOURS
Status: DISCONTINUED | OUTPATIENT
Start: 2025-07-11 | End: 2025-07-12

## 2025-07-11 RX ADMIN — OXYCODONE 10 MG: 5 TABLET ORAL at 00:06

## 2025-07-11 RX ADMIN — FERROUS SULFATE TAB 325 MG (65 MG ELEMENTAL FE) 1 TABLET: 325 (65 FE) TAB at 08:40

## 2025-07-11 RX ADMIN — Medication 125 MCG: at 08:40

## 2025-07-11 RX ADMIN — PIPERACILLIN SODIUM AND TAZOBACTAM SODIUM 3.38 G: 3; .375 INJECTION, SOLUTION INTRAVENOUS at 05:09

## 2025-07-11 RX ADMIN — DOCUSATE SODIUM 100 MG: 100 CAPSULE, LIQUID FILLED ORAL at 08:40

## 2025-07-11 RX ADMIN — ACETAMINOPHEN 975 MG: 325 TABLET, FILM COATED ORAL at 12:13

## 2025-07-11 RX ADMIN — OXYCODONE 10 MG: 5 TABLET ORAL at 14:13

## 2025-07-11 RX ADMIN — PIPERACILLIN SODIUM AND TAZOBACTAM SODIUM 3.38 G: 3; .375 INJECTION, SOLUTION INTRAVENOUS at 18:01

## 2025-07-11 RX ADMIN — VANCOMYCIN HYDROCHLORIDE 1000 MG: 1 INJECTION, SOLUTION INTRAVENOUS at 08:48

## 2025-07-11 RX ADMIN — ACETAMINOPHEN 975 MG: 325 TABLET, FILM COATED ORAL at 05:09

## 2025-07-11 RX ADMIN — HEPARIN SODIUM 1000 UNITS/HR: 10000 INJECTION, SOLUTION INTRAVENOUS at 20:04

## 2025-07-11 RX ADMIN — POLYETHYLENE GLYCOL 3350 17 G: 17 POWDER, FOR SOLUTION ORAL at 12:22

## 2025-07-11 RX ADMIN — ROSUVASTATIN CALCIUM 20 MG: 20 TABLET, FILM COATED ORAL at 08:40

## 2025-07-11 RX ADMIN — ACETAMINOPHEN 975 MG: 325 TABLET, FILM COATED ORAL at 20:04

## 2025-07-11 RX ADMIN — LEVOTHYROXINE SODIUM 100 MCG: 100 TABLET ORAL at 05:09

## 2025-07-11 RX ADMIN — PIPERACILLIN SODIUM AND TAZOBACTAM SODIUM 3.38 G: 3; .375 INJECTION, SOLUTION INTRAVENOUS at 12:13

## 2025-07-11 RX ADMIN — VANCOMYCIN HYDROCHLORIDE 1000 MG: 1 INJECTION, SOLUTION INTRAVENOUS at 20:55

## 2025-07-11 ASSESSMENT — COGNITIVE AND FUNCTIONAL STATUS - GENERAL
WALKING IN HOSPITAL ROOM: A LITTLE
MOBILITY SCORE: 16
MOVING TO AND FROM BED TO CHAIR: A LITTLE
DAILY ACTIVITIY SCORE: 18
TOILETING: A LITTLE
HELP NEEDED FOR BATHING: A LITTLE
WALKING IN HOSPITAL ROOM: A LOT
DRESSING REGULAR LOWER BODY CLOTHING: A LITTLE
TURNING FROM BACK TO SIDE WHILE IN FLAT BAD: A LITTLE
CLIMB 3 TO 5 STEPS WITH RAILING: A LOT
MOVING FROM LYING ON BACK TO SITTING ON SIDE OF FLAT BED WITH BEDRAILS: A LITTLE
DRESSING REGULAR LOWER BODY CLOTHING: A LITTLE
STANDING UP FROM CHAIR USING ARMS: A LOT
DRESSING REGULAR UPPER BODY CLOTHING: A LOT
STANDING UP FROM CHAIR USING ARMS: A LITTLE
MOVING TO AND FROM BED TO CHAIR: A LITTLE
DAILY ACTIVITIY SCORE: 21
HELP NEEDED FOR BATHING: A LITTLE
TOILETING: A LITTLE
CLIMB 3 TO 5 STEPS WITH RAILING: A LOT
MOBILITY SCORE: 17
PERSONAL GROOMING: A LITTLE
TURNING FROM BACK TO SIDE WHILE IN FLAT BAD: A LITTLE

## 2025-07-11 ASSESSMENT — PAIN - FUNCTIONAL ASSESSMENT
PAIN_FUNCTIONAL_ASSESSMENT: 0-10

## 2025-07-11 ASSESSMENT — PAIN SCALES - GENERAL
PAINLEVEL_OUTOF10: 5 - MODERATE PAIN
PAINLEVEL_OUTOF10: 0 - NO PAIN
PAINLEVEL_OUTOF10: 0 - NO PAIN
PAINLEVEL_OUTOF10: 10 - WORST POSSIBLE PAIN
PAINLEVEL_OUTOF10: 0 - NO PAIN
PAINLEVEL_OUTOF10: 7

## 2025-07-11 ASSESSMENT — PAIN DESCRIPTION - ORIENTATION: ORIENTATION: RIGHT

## 2025-07-11 ASSESSMENT — PAIN DESCRIPTION - LOCATION: LOCATION: LEG

## 2025-07-11 NOTE — PROGRESS NOTES
07/11/25 1423   Discharge Planning   Living Arrangements Spouse/significant other   Support Systems Spouse/significant other;Children   Assistance Needed Alert and oriented x 4, Independent with ADL's, Drives, Employed(currently on medical leave), Walker, Shower chair, Grab bars, Ramps into the home, Currently active with [facility] for SN services, Room air at baseline and currently, PCP-Dr. Devaughn Moreno MD   Type of Residence Private residence   Number of Stairs to Enter Residence 0  (Ramps into the home)   Number of Stairs Within Residence 0   Do you have animals or pets at home? Yes   Type of Animals or Pets 1 dog, 1 cat, and 1 bird   Who is requesting discharge planning? Provider   Home or Post Acute Services In home services;Other (Comment)  (Plan is for the patient to transfer to [facility] when a bed becomes available.)   Type of Home Care Services Home nursing visits   Expected Discharge Disposition Home Health  (Resume [facility] SN visits upon discharge.)   Does the patient need discharge transport arranged? Yes   [facility] coordination needed? Yes   Has discharge transport been arranged? No   Patient Choice   Provider Choice list and CMS website (https://medicare.gov/care-compare#search) for post-acute Quality and Resource Measure Data were provided and reviewed with: Patient;Family   Patient / Family choosing to utilize agency / facility established prior to hospitalization Yes   Intensity of Service   Intensity of Service 0-30 min

## 2025-07-11 NOTE — PROGRESS NOTES
Vancomycin Dosing by Pharmacy- FOLLOW UP    Frances Mcdowell is a 54 y.o. year old female who Pharmacy has been consulted for vancomycin dosing for cellulitis, skin and soft tissue. Based on the patient's indication and renal status this patient is being dosed based on a goal AUC of 400-600.     Renal function is currently stable.    Current vancomycin dose: 1500 mg given every 12 hours    Estimated vancomycin AUC on current dose: 752 mg/L.hr     Visit Vitals  /69   Pulse 82   Temp 36.5 °C (97.7 °F)   Resp 20        Lab Results   Component Value Date    CREATININE 0.95 2025    CREATININE 0.90 07/10/2025    CREATININE 0.88 2025    CREATININE 0.94 2025    CREATININE 1.20 (H) 2025        Patient weight is as follows:   Vitals:    25   Weight: 134 kg (296 lb 3.2 oz)       Cultures:  No results found for the encounter in last 14 days.       I/O last 3 completed shifts:  In: 1903.8 (14.2 mL/kg) [P.O.:240; I.V.:1013.8 (7.5 mL/kg); IV Piggyback:650]  Out: 1475 (11 mL/kg) [Urine:1475 (0.3 mL/kg/hr)]  Weight: 134.4 kg   I/O during current shift:  I/O this shift:  In: 393 [P.O.:240; I.V.:53; IV Piggyback:100]  Out: 1500 [Urine:1500]    Temp (24hrs), Av.7 °C (98.1 °F), Min:36.5 °C (97.7 °F), Max:37 °C (98.6 °F)      Assessment/Plan    Above goal AUC. Orders placed for new vancomcyin regimen of 1000mg every 12 hours to begin at 0800.    This dosing regimen is predicted by [employer] to result in the following pharmacokinetic parameters:    Regimen: 1000 mg IV every 12 hours.  Start time: 07:10 on 2025  Exposure target: AUC24 (range) 400-600 mg/L.hr   [id number]: 520 mg/L.hr  AUC24,ss: 502 mg/L.hr  Probability of AUC24 > 400: 100 %  Ctrough,ss: 14.4 mg/L  Probability of Ctrough,ss > 20: 7 %      The next level will be obtained on  at 5a. May be obtained sooner if clinically indicated.   Will continue to monitor renal function daily while on vancomycin and order serum creatinine at  least every 48 hours if not already ordered.  Follow for continued vancomycin needs, clinical response, and signs/symptoms of toxicity.       Yohan Seo, PharmD

## 2025-07-11 NOTE — PROGRESS NOTES
Frances Mcdowell is a 54 y.o. female on day 1 of admission presenting with Cellulitis.    Subjective   Interval History: no fever, no new complaints        Review of Systems    Objective   Range of Vitals (last 24 hours)  Heart Rate:  [81-94]   Temp:  [36.5 °C (97.7 °F)-37 °C (98.6 °F)]   Resp:  [17-20]   BP: (102-119)/(69-78)   SpO2:  [96 %-98 %]   Daily Weight  07/09/25 : 134 kg (296 lb 3.2 oz)    Body mass index is 52.47 kg/m².    Physical Exam  Constitutional:       Appearance: Normal appearance.   HENT:      Head: Normocephalic and atraumatic.      Mouth/Throat:      Mouth: Mucous membranes are moist.      Pharynx: Oropharynx is clear.   Eyes:      Pupils: Pupils are equal, round, and reactive to light.   Cardiovascular:      Rate and Rhythm: Normal rate and regular rhythm.      Heart sounds: Normal heart sounds.   Pulmonary:      Effort: Pulmonary effort is normal.      Breath sounds: Normal breath sounds.   Abdominal:      General: Abdomen is flat. Bowel sounds are normal.      Palpations: Abdomen is soft.   Musculoskeletal:      Cervical back: Normal range of motion.      Comments: Legs dressing, photos reviewed   Neurological:      Mental Status: She is alert.         Antibiotics  piperacillin-tazobactam - 3.375 gram/50 mL  vancomycin - 1 gram/200 mL    Relevant Results  Labs  Results from last 72 hours   Lab Units 07/11/25  0516 07/10/25  0651 07/09/25  1759   WBC AUTO x10*3/uL 6.2 6.8 12.0*   HEMOGLOBIN g/dL 7.5* 7.6* 8.7*   HEMATOCRIT % 24.7* 25.0* 27.9*   PLATELETS AUTO x10*3/uL 404 395 437   NEUTROS PCT AUTO %  --   --  73.4   LYMPHS PCT AUTO %  --   --  12.9   MONOS PCT AUTO %  --   --  9.3   EOS PCT AUTO %  --   --  3.3     Results from last 72 hours   Lab Units 07/11/25 0516 07/10/25  0651 07/09/25  1759   SODIUM mmol/L 138 138 136   POTASSIUM mmol/L 3.7 4.1 4.0   CHLORIDE mmol/L 106 104 102   CO2 mmol/L 28 27 25   BUN mg/dL 10 12 14   CREATININE mg/dL 0.95 0.90 0.88   GLUCOSE mg/dL 83 93 94   CALCIUM  mg/dL 7.6* 7.7* 8.1*   ANION GAP mmol/L 8* 11 13   EGFR mL/min/1.73m*2 70 75 77     Results from last 72 hours   Lab Units 07/09/25  1759   ALK PHOS U/L 78   BILIRUBIN TOTAL mg/dL 0.6   PROTEIN TOTAL g/dL 6.5   ALT U/L 4*   AST U/L 9   ALBUMIN g/dL 2.6*     Estimated Creatinine Clearance: 88.7 mL/min (by C-G formula based on SCr of 0.95 mg/dL).  C-Reactive Protein   Date Value Ref Range Status   07/10/2025 11.35 (H) <1.00 mg/dL Final   07/09/2025 12.98 (H) <1.00 mg/dL Final   06/15/2025 22.53 (H) <1.00 mg/dL Final     Microbiology  Reviewed  Imaging  Reviewed        Assessment/Plan   Legs stasis / wounds / Rt leg abscess, attempt US drainage found no collection to drain   Pyuria, asymptomatic     Recommendations :  Continue Vancomycin and Zosyn  May need good evaluation under anesthesis because of the difficulty evaluating her leg while awake  Keep the legs elevated  Discussed with the wound care team     I spent minutes in the professional and overall care of this patient.          Miguel Hu MD

## 2025-07-11 NOTE — CARE PLAN
The patient's goals for the shift include  determine plan for my legs    The clinical goals for the shift include pt will have controlled pain during this shift      Problem: Pain - Adult  Goal: Verbalizes/displays adequate comfort level or baseline comfort level  Outcome: Progressing     Problem: Safety - Adult  Goal: Free from fall injury  Outcome: Progressing     Problem: Chronic Conditions and Co-morbidities  Goal: Patient's chronic conditions and co-morbidity symptoms are monitored and maintained or improved  Outcome: Progressing     Problem: Skin  Goal: Participates in plan/prevention/treatment measures  Flowsheets (Taken 7/11/2025 6157)  Participates in plan/prevention/treatment measures:   Elevate heels   Increase activity/out of bed for meals

## 2025-07-11 NOTE — PROGRESS NOTES
Music Therapy Note    Frances Mcdowell was referred by ENEDELIA Sanchez.    Therapy Session  Referral Type: New referral this admission  Visit Type: Follow-up visit  Session Start Time: 1325  Session End Time: 1326  Intervention Delivery: In-person  Conflict of Service: None  Family Present for Session: None               Treatment/Interventions  Music Therapy Interventions: Assessment  Interruption: No    Post-assessment  Unable to Assess Reason: Did not provide expressive therapy intervention  Continue Visiting: Yes  Total Session Time (min): 1 minutes    Narrative  Assessment Detail: MT attempted to see pt on several occasions throughout the day, but pt was resting/sleeping comfortably and did not respond to MT's knock, or was working with other staff.  Plan: n/a  Intervention: n/a  Evaluation: n/a  Follow-up: MT will follow up as able and appropriate.  Patient Comments: n/a    Education Documentation  No documentation found.

## 2025-07-11 NOTE — PROGRESS NOTES
07/11/25 1438   Discharge Planning   Assistance Needed SW consulted re: positive risk screen responses to financial strain, utilities, food, and stress.  Into see pt who tells me she has still not rec'd her disability payments from her place of employment. She is off of work on [employer].  She was worried about paying her mortgage and other bills.  She is hoping her check comes soon.  Other than that she denies any worries about food and utilities.  Pt is despondent this afternoon as she will have to transfer to another hospital for surgery.  She state she is scared because she doesn't know what will become of this, active listening and support provided.

## 2025-07-11 NOTE — CONSULTS
Wound Care Consult     Visit Date: 7/10/2025      Patient Name: Frances Mcdowell         MRN: [id number]             Reason for Consult: wound        Wound History: wound center to ED with draining wounds x 3     Pertinent Labs: see record      Assessment:   - Patient seen this a.m. before IR, anxious, tearful, crying rt fear of pain.  Therapeutic listening, educated on process and team approach, patient calmed and requested this RN to return after intervention.  - Patient seen after return from IR, no intervention, eating meal, orders obtained from Dr. Tang rt wound care.    Patient agreed to dressing change, prior to touching to remove packing, patient cried out. Used NS, Monik RN unable to further medicate rt BP/ fluids.  Patient allowed to continue, no time for measuring wounds, with patient anxiety and pain.    Tibial packed wound able to pack slightly with Aquacel ribbon, bloody drainage.  Adaptic, Aquacel, to wounds then ABD, Kerlix, paper tape, ACE wrap to right leg.  Left leg is not due to be changed until tomorrow per patient.  Refused waffle boots, elevated on pillow.    Patient able to eat rest of supper after dressing change and thanked this RN.              Wound Plan: Dr. Tang to follow, possible procedure per NP note.      Tayler Granados, RN, [facility]  7/10/2025  9:14 PM

## 2025-07-11 NOTE — PROGRESS NOTES
"Frances Mcdowell is a 54 y.o. female on day 1 of admission presenting with Cellulitis.    Subjective   Interventional radiology unable to perform drainage/placement of drain yesterday.  Patient still complaining of significant pain to right leg.    Meds:  Scheduled medications  acetaminophen, 975 mg, oral, q8h  [Held by provider] amphetamine-dextroamphetamine, 20 mg, oral, BID  cholecalciferol, 125 mcg, oral, Daily  docusate sodium, 100 mg, oral, BID  ferrous sulfate, 65 mg of elemental iron, oral, Daily with breakfast  levothyroxine, 100 mcg, oral, Daily  pantoprazole, 40 mg, oral, Daily before breakfast  piperacillin-tazobactam, 3.375 g, intravenous, q6h  polyethylene glycol, 17 g, oral, Daily  rosuvastatin, 20 mg, oral, Daily  vancomycin, 1,000 mg, intravenous, q12h      Continuous medications  Continuous Medications[1]  PRN medications  PRN Medications[2]       Physical Exam   Constitutional:       Appearance: Normal appearance.   HENT:      Head: Normocephalic and atraumatic.      Mouth/Throat:      Mouth: Mucous membranes are moist.      Pharynx: Oropharynx is clear.   Eyes:      Pupils: Pupils are equal, round, and reactive to light.   Cardiovascular:      Rate and Rhythm: Normal rate and regular rhythm.      Heart sounds: Normal heart sounds.   Pulmonary:      Effort: Pulmonary effort is normal.      Breath sounds: Normal breath sounds.   Abdominal:      General: Abdomen is flat. Bowel sounds are normal.      Palpations: Abdomen is soft.   Musculoskeletal:         General: Swelling present.      Cervical back: Normal range of motion.      Comments: dressings in place to B/L lower legs. Pictures reviewed from homecare on 7/8/2025  Neurological:      Mental Status: She is alert.      Last Recorded Vitals  Blood pressure 104/76, pulse 82, temperature 36.8 °C (98.2 °F), temperature source Temporal, resp. rate 20, height 1.6 m (5' 3\"), weight 134 kg (296 lb 3.2 oz), SpO2 98%.    Intake/Output last 3 Shifts:  I/O last " 3 completed shifts:  In: 2296.8 (17.1 mL/kg) [P.O.:480; I.V.:1066.8 (7.9 mL/kg); IV Piggyback:750]  Out: 2975 (22.1 mL/kg) [Urine:2975 (0.6 mL/kg/hr)]  Weight: 134.4 kg     Relevant Results   Results for orders placed or performed during the hospital encounter of 07/09/25 (from the past 24 hours)   Heparin Assay   Result Value Ref Range    Heparin Unfractionated 0.5 See Comment Below for Therapeutic Ranges IU/mL   Vancomycin   Result Value Ref Range    Vancomycin 23.1 (H) 5.0 - 20.0 ug/mL   CBC   Result Value Ref Range    WBC 6.2 4.4 - 11.3 x10*3/uL    nRBC 0.0 0.0 - 0.0 /100 WBCs    RBC 2.63 (L) 4.00 - 5.20 x10*6/uL    Hemoglobin 7.5 (L) 12.0 - 16.0 g/dL    Hematocrit 24.7 (L) 36.0 - 46.0 %    MCV 94 80 - 100 fL    MCH 28.5 26.0 - 34.0 pg    MCHC 30.4 (L) 32.0 - 36.0 g/dL    RDW 15.5 (H) 11.5 - 14.5 %    Platelets 404 150 - 450 x10*3/uL   Basic metabolic panel   Result Value Ref Range    Glucose 83 74 - 99 mg/dL    Sodium 138 136 - 145 mmol/L    Potassium 3.7 3.5 - 5.3 mmol/L    Chloride 106 98 - 107 mmol/L    Bicarbonate 28 21 - 32 mmol/L    Anion Gap 8 (L) 10 - 20 mmol/L    Urea Nitrogen 10 6 - 23 mg/dL    Creatinine 0.95 0.50 - 1.05 mg/dL    eGFR 70 >60 mL/min/1.73m*2    Calcium 7.6 (L) 8.6 - 10.3 mg/dL   Heparin Assay   Result Value Ref Range    Heparin Unfractionated 0.4 See Comment Below for Therapeutic Ranges IU/mL   ECG 12 Lead  Result Date: 7/10/2025  Normal sinus rhythm Normal ECG When compared with ECG of 20-JUN-2025 12:47, (unconfirmed) No significant change was found    CT tibia fibula right w IV contrast  Result Date: 7/9/2025  Interpreted By:  Joaquín Casarez, STUDY: CT TIBIA FIBULA RIGHT W IV CONTRAST;  7/9/2025 8:22 pm   INDICATION: Signs/Symptoms:concern for abscesses in the tibia.     COMPARISON: None.   ACCESSION NUMBER(S): [id number]   ORDERING CLINICIAN: RADHA FLOYD   TECHNIQUE: CT imaging of the right tibia/fibula was obtained after the intravenous administration of iodinated contrast.  Coronal and sagittal reformatted images were performed.   FINDINGS: OSSEOUS STRUCTURES: Severe tricompartmental right knee osteoarthrosis with small joint effusion and tiny loose bodies in the lateral suprapatellar recess. Plantar calcaneal spur. Enthesopathic spurring at the Achilles tendon insertion. 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 No significant tibiotalar joint effusion.       SOFT TISSUES:   There is diffuse subcutaneous edema of the thigh and leg with skin thickening. Multiple deep and superficial ulcerations are noted throughout the right leg.     There is extensive network of interconnected multiloculated gas containing fluid collections in the subcutaneous fat of the posterior leg. Some demonstrate peripheral enhancement and are suspicious for abscesses in the appropriate clinical context. For example there is a 7.2 cm x 2.2 cm right 1.9 cm gas containing collection (coronal image 31, series 206; axial image 102, series 201). There is a confluent area of multiloculated collections slightly more medial in the right calf that measures 8.4 cm x 2.2 cm x 6.4 cm in overall dimension likely consisting of multiple smaller loculated fluid pocket some containing gas (coronal image 49, series 206; axial image 88, series 201). There are multiple ulcerations overlying the aforementioned collections.   There is a soft tissue defect with packing material within the mid aspect of the leg just medial to the tibia. Is soft tissue defect extends 3.2 cm deep to the skin surface. This defect is contiguous with a loculated fluid collection along the deep muscular fascia and tibial diaphysis that measures approximately 7.5 cm x 7 cm x 1.7 cm (coronal image 75, axial image 64) and raises concern for either abscess or confluent edema in the setting of cellulitis.   Notably, there is no evidence of deep compartment fluid collection, fascial thickening, or gas. The muscles demonstrate  relatively normal volume for age. The tendons about the ankle are intact to the extent included in the study.   OTHER: Venous varicosities are noted in the distal thigh and leg. No evidence of discrete filling defect within the deep venous system or greater saphenous vein.       Extensive network of interconnected multiloculated gas containing fluid collections in the subcutaneous fat of the posterior leg. Some demonstrate peripheral enhancement and are suspicious for abscesses in the appropriate clinical context. For example there is a 7.2 cm x 2.2 cm right 1.9 cm gas containing collection (coronal image 31, series 206; axial image 102, series 201). There is a confluent area of multiloculated collections slightly more medial in the right calf that measures 8.4 cm x 2.2 cm x 6.4 cm in overall dimension likely consisting of multiple smaller loculated fluid pocket some containing gas (coronal image 49, series 206; axial image 88, series 201).   There is a soft tissue defect with packing material within the mid aspect of the leg just medial to the tibia. Is soft tissue defect extends 3.2 cm deep to the skin surface. This defect is contiguous with a loculated fluid collection along the deep muscular fascia and tibial diaphysis that measures approximately 7.5 cm x 7 cm x 1.7 cm (coronal image 75, axial image 64) and raises concern for either abscess or confluent edema in the setting of cellulitis.     No evidence of osteomyelitis.     Severe right knee osteoarthrosis.   MACRO: None.   Signed by: Joaquín Casarez 7/9/2025 9:31 PM Dictation workstation:   [id number]    ECG 12 lead  Result Date: 6/23/2025  Normal sinus rhythm Inferior infarct , age undetermined Abnormal ECG When compared with ECG of 24-MAR-2025 12:36, Inferior infarct is now Present T wave inversion no longer evident in Anterior leads    CT angio lower extremity right w and or wo IV contrast  Result Date: 6/15/2025  Interpreted By:  Andrei Joseph,  STUDY: CT ANGIO LOWER EXTREMITY RIGHT W AND OR WO IV CONTRAST; ;  6/15/2025 4:42 pm   CT ANGIOGRAM LOWER EXTREMITIES WITH CONTRAST   INDICATION: Signs/Symptoms:rule out abscess open wound. 54-year-old woman with wound.     COMPARISON: CT abdomen pelvis 01/22/2025   ACCESSION NUMBER(S): [id number]   ORDERING CLINICIAN: CHELSY COOL   TECHNIQUE: Contiguous acquired helical axial images were obtained of the right lower extremity after the uneventful administration of 90 mL Omnipaque 350. Coronal and sagittal multiplanar reformatted maximum intensity projection images were obtained. 3D volumetric surface rendered representation of arterial structures was performed on a separate workstation and submitted for interpretation.   FINDINGS: Angiographic:   The partially the partially imaged left common artery, as well as the left internal and external iliac arteries are patent. The left common femoral and profunda femoris arteries are patent. The left superficial femoral artery is patent along the entirety of the imaged left thigh.   The right common, internal common external iliac arteries, right common femoral artery profunda femoris artery are patent. There is right superficial femoral and popliteal arterial patency. There is probable 3 arterial vessel runoff to the level of the left ankle.   Contrast timing is suboptimal for the evaluation of venous structures. Large iliac veins of the pelvis are grossly unremarkable.       Nonangiographic:   The partially imaged pelvis shows nondilated loops of bowel. The bladder is decompressed. No free fluid, pneumatosis, or pneumoperitoneum. There is a right inguinal lymph nodes which are likely reactive. There is rather diffuse skin thickening and subcutaneous edema along the length of the entire right lower extremity which is worse from the right popliteal fossa distally. Superficial varicosities along the length of the right lower extremity are present. CT angiography provides  limited evaluation lower extremity soft tissue structures. Within these limitations, no drainable subcutaneous fluid collection. No acute osseous abnormality.       1. Femoropopliteal arterial patency with patency of 3 arterial vessel runoff to the level of the right ankle. Left lower extremity iliofemoral patency as partially imaged to the distal left forearm. 2. Extensive right lower extremity skin thickening and subcutaneous edema, more prominent in the lower leg. Limited evaluation of deep soft tissue structures. No large subcutaneous drainable fluid collection.   MACRO: None   Signed by: Andrei Joseph 6/15/2025 5:30 PM Dictation workstation:   [id number]  Susceptibility data from last 90 days.  Collected Specimen Info Organism Aztreonam Cefepime Ceftazidime Ciprofloxacin Levofloxacin Piperacillin/Tazobactam Tobramycin   06/17/25 Swab from ABSCESS Pseudomonas aeruginosa  S  S  S  R  R  S  S     Mixed Gram-Negative Bacteria           06/15/25 Tissue/Biopsy from Wound/Tissue Pseudomonas aeruginosa  S S S  R  R  S  S     Mixed Anaerobic Bacteria            Mixed Gram-Positive and Gram-Negative Bacteria            Assessment & Plan  Cellulitis    Cellulitis of right lower extremity    Right lower leg abscess  A.   Long discussion with patient regarding plan of care for right leg - due to location of fluid collection/abscesses, I am not comfortable performing this surgery as it falls outside of my scope of practice.  In addition, I discussed this case with anesthesia, general surgery and orthopedic surgery here who are recommending patient possibly be evaluated at [facility] [place] due to possible need for vascular surgery involvement.  Although patient is scared of having any type of surgery, she understands that she needs to have the abscesses drained.  B.  IV antibiotics per ID.  C.  Dressing change orders in chart.   D.  Keep legs elevated while in bed or sitting in chair.    E.  Formal General surgery and orthopedic  surgery consults pending.      Mami Tang DPM           [1] heparin, 0-4,000 Units/hr, Last Rate: 1,000 Units/hr (07/11/25 0205)  [2] PRN medications: HYDROmorphone, hydrOXYzine HCL, melatonin, naloxone, oxyCODONE, oxyCODONE, vancomycin

## 2025-07-11 NOTE — PROGRESS NOTES
Frances Mcdowell is a 54 y.o. female on day 1 of admission presenting with Cellulitis.      Subjective   Tearful this morning, says she is terrified of being put under for surgery and wants to speak with Dr PIERRE before agreeing to transfer. Denies CP, SOB       Objective     Last Recorded Vitals  /76 (BP Location: Right arm, Patient Position: Lying)   Pulse 82   Temp 36.7 °C (98.1 °F) (Temporal)   Resp 20   Wt 134 kg (296 lb 3.2 oz)   SpO2 98%   Intake/Output last 3 Shifts:    Intake/Output Summary (Last 24 hours) at 7/11/2025 1523  Last data filed at 7/11/2025 1300  Gross per 24 hour   Intake 643 ml   Output 2300 ml   Net -1657 ml       Admission Weight  Weight: 132 kg (290 lb) (07/09/25 1642)    Daily Weight  07/09/25 : 134 kg (296 lb 3.2 oz)    Image Results  ECG 12 Lead  Normal sinus rhythm  Normal ECG  When compared with ECG of 20-JUN-2025 12:47, (unconfirmed)  No significant change was found      Physical Exam  Physical Exam  Gen: Moderate distress 2/2 anxiety, tearful  Eyes:  EOM intact  ENT: MMM  Neck: No JVD  Respiratory: CTAB, no wheezes/rhonchi  Cardiac: RRR, no murmurs rubs or gallops  Abdomen: soft, NT, +BS  Extremities: right LE with stasis changes/wounds, photos in chart reviewed from wound care center  Neuro: No focal deficits, alert and oriented x 3  Psych:  anxious, tearful    Assessment & Plan  Cellulitis      Cellulitis of right lower extremity    Right leg abscess with chronic bilateral wounds  -Admit to inpatient  -continue Vanc/Zosyn  -elevate legs  -ID consult appreciated  -Podiatry consult appreciated  -IR unable to remove any fluid  -Podiatry/ID recommend transfer for higher level of care   -Consult gen surgery and orthopedics for input  -Holding Eliquis  -Heparin gtt    Pyuria  -endorses frequency  -culture not helpful  -continue antibiotics    Anemia  -Hgb 10-11 earlier this year  -Hgb stable but lower than baseline  -Iron studies indicate SHAKIR  -check stool guaiac: colonoscopy last  year polyps removed, moderate severity of diverticula, internal and external hemorrhoids  -PPI  -oral iron  -Monitor    Recent history of saddle Pes  -diagnosed in March 2025  -Eliquis on hold for now until surgery plan is known  -Heparin gtt    Hypothyroid  -synthroid    Anxiety  -hydroxyzine     DVT prophy  -heparin gtt    Dispo: Full admission to continue to work up and treat right leg abscesses, UTI, may need transfer for higher level of care    JJ Cooper-TOÑO  D/w Dr. Willingham

## 2025-07-12 ENCOUNTER — ANESTHESIA (OUTPATIENT)
Dept: OPERATING ROOM | Facility: HOSPITAL | Age: 55
End: 2025-07-12
Payer: COMMERCIAL

## 2025-07-12 ENCOUNTER — ANESTHESIA EVENT (OUTPATIENT)
Dept: OPERATING ROOM | Facility: HOSPITAL | Age: 55
End: 2025-07-12
Payer: COMMERCIAL

## 2025-07-12 PROBLEM — L02.415 ABSCESS OF LEG, RIGHT: Status: ACTIVE | Noted: 2025-07-09

## 2025-07-12 LAB
ANION GAP SERPL CALC-SCNC: 12 MMOL/L (ref 10–20)
BUN SERPL-MCNC: 11 MG/DL (ref 6–23)
CALCIUM SERPL-MCNC: 8.1 MG/DL (ref 8.6–10.3)
CHLORIDE SERPL-SCNC: 106 MMOL/L (ref 98–107)
CO2 SERPL-SCNC: 26 MMOL/L (ref 21–32)
CREAT SERPL-MCNC: 0.89 MG/DL (ref 0.5–1.05)
EGFRCR SERPLBLD CKD-EPI 2021: 76 ML/MIN/1.73M*2
ERYTHROCYTE [DISTWIDTH] IN BLOOD BY AUTOMATED COUNT: 15.5 % (ref 11.5–14.5)
ERYTHROCYTE [DISTWIDTH] IN BLOOD BY AUTOMATED COUNT: 15.6 % (ref 11.5–14.5)
GLUCOSE SERPL-MCNC: 85 MG/DL (ref 74–99)
HCT VFR BLD AUTO: 25.8 % (ref 36–46)
HCT VFR BLD AUTO: 29.7 % (ref 36–46)
HEMOCCULT SP1 STL QL: NEGATIVE
HGB BLD-MCNC: 7.8 G/DL (ref 12–16)
HGB BLD-MCNC: 9 G/DL (ref 12–16)
MCH RBC QN AUTO: 28.5 PG (ref 26–34)
MCH RBC QN AUTO: 28.6 PG (ref 26–34)
MCHC RBC AUTO-ENTMCNC: 30.2 G/DL (ref 32–36)
MCHC RBC AUTO-ENTMCNC: 30.3 G/DL (ref 32–36)
MCV RBC AUTO: 94 FL (ref 80–100)
MCV RBC AUTO: 94 FL (ref 80–100)
NRBC BLD-RTO: 0 /100 WBCS (ref 0–0)
NRBC BLD-RTO: 0 /100 WBCS (ref 0–0)
PLATELET # BLD AUTO: 459 X10*3/UL (ref 150–450)
PLATELET # BLD AUTO: 459 X10*3/UL (ref 150–450)
POTASSIUM SERPL-SCNC: 3.6 MMOL/L (ref 3.5–5.3)
RBC # BLD AUTO: 2.74 X10*6/UL (ref 4–5.2)
RBC # BLD AUTO: 3.15 X10*6/UL (ref 4–5.2)
SODIUM SERPL-SCNC: 140 MMOL/L (ref 136–145)
STAPHYLOCOCCUS SPEC CULT: ABNORMAL
UFH PPP CHRO-ACNC: 0.2 IU/ML (ref ?–1.1)
UFH PPP CHRO-ACNC: 0.2 IU/ML (ref ?–1.1)
VANCOMYCIN SERPL-MCNC: 24.9 UG/ML (ref 5–20)
WBC # BLD AUTO: 7.6 X10*3/UL (ref 4.4–11.3)
WBC # BLD AUTO: 7.7 X10*3/UL (ref 4.4–11.3)

## 2025-07-12 PROCEDURE — 7100000002 HC RECOVERY ROOM TIME - EACH INCREMENTAL 1 MINUTE: Performed by: SURGERY

## 2025-07-12 PROCEDURE — 80048 BASIC METABOLIC PNL TOTAL CA: CPT | Performed by: PHYSICIAN ASSISTANT

## 2025-07-12 PROCEDURE — 85520 HEPARIN ASSAY: CPT | Performed by: INTERNAL MEDICINE

## 2025-07-12 PROCEDURE — 3600000003 HC OR TIME - INITIAL BASE CHARGE - PROCEDURE LEVEL THREE: Performed by: SURGERY

## 2025-07-12 PROCEDURE — 36415 COLL VENOUS BLD VENIPUNCTURE: CPT | Performed by: PHYSICIAN ASSISTANT

## 2025-07-12 PROCEDURE — 3700000001 HC GENERAL ANESTHESIA TIME - INITIAL BASE CHARGE: Performed by: SURGERY

## 2025-07-12 PROCEDURE — 3700000002 HC GENERAL ANESTHESIA TIME - EACH INCREMENTAL 1 MINUTE: Performed by: SURGERY

## 2025-07-12 PROCEDURE — 27603 DRAIN LOWER LEG LESION: CPT | Performed by: SURGERY

## 2025-07-12 PROCEDURE — 85027 COMPLETE CBC AUTOMATED: CPT | Performed by: PHYSICIAN ASSISTANT

## 2025-07-12 PROCEDURE — 3600000008 HC OR TIME - EACH INCREMENTAL 1 MINUTE - PROCEDURE LEVEL THREE: Performed by: SURGERY

## 2025-07-12 PROCEDURE — 0JBN0ZZ EXCISION OF RIGHT LOWER LEG SUBCUTANEOUS TISSUE AND FASCIA, OPEN APPROACH: ICD-10-PCS | Performed by: SURGERY

## 2025-07-12 PROCEDURE — 2500000001 HC RX 250 WO HCPCS SELF ADMINISTERED DRUGS (ALT 637 FOR MEDICARE OP): Performed by: PHYSICIAN ASSISTANT

## 2025-07-12 PROCEDURE — 2500000004 HC RX 250 GENERAL PHARMACY W/ HCPCS (ALT 636 FOR OP/ED): Performed by: NURSE PRACTITIONER

## 2025-07-12 PROCEDURE — 2500000004 HC RX 250 GENERAL PHARMACY W/ HCPCS (ALT 636 FOR OP/ED): Performed by: SURGERY

## 2025-07-12 PROCEDURE — 1100000001 HC PRIVATE ROOM DAILY

## 2025-07-12 PROCEDURE — 2500000004 HC RX 250 GENERAL PHARMACY W/ HCPCS (ALT 636 FOR OP/ED): Performed by: INTERNAL MEDICINE

## 2025-07-12 PROCEDURE — 82270 OCCULT BLOOD FECES: CPT | Performed by: PHYSICIAN ASSISTANT

## 2025-07-12 PROCEDURE — 2500000002 HC RX 250 W HCPCS SELF ADMINISTERED DRUGS (ALT 637 FOR MEDICARE OP, ALT 636 FOR OP/ED): Performed by: NURSE PRACTITIONER

## 2025-07-12 PROCEDURE — 80202 ASSAY OF VANCOMYCIN: CPT | Performed by: INTERNAL MEDICINE

## 2025-07-12 PROCEDURE — 2720000007 HC OR 272 NO HCPCS: Performed by: SURGERY

## 2025-07-12 PROCEDURE — 2500000001 HC RX 250 WO HCPCS SELF ADMINISTERED DRUGS (ALT 637 FOR MEDICARE OP): Performed by: SURGERY

## 2025-07-12 PROCEDURE — 99222 1ST HOSP IP/OBS MODERATE 55: CPT | Performed by: ORTHOPAEDIC SURGERY

## 2025-07-12 PROCEDURE — 99222 1ST HOSP IP/OBS MODERATE 55: CPT | Performed by: SURGERY

## 2025-07-12 PROCEDURE — 2500000004 HC RX 250 GENERAL PHARMACY W/ HCPCS (ALT 636 FOR OP/ED): Performed by: ANESTHESIOLOGY

## 2025-07-12 PROCEDURE — 2500000005 HC RX 250 GENERAL PHARMACY W/O HCPCS: Performed by: ANESTHESIOLOGY

## 2025-07-12 PROCEDURE — 2500000001 HC RX 250 WO HCPCS SELF ADMINISTERED DRUGS (ALT 637 FOR MEDICARE OP): Performed by: NURSE PRACTITIONER

## 2025-07-12 PROCEDURE — 99232 SBSQ HOSP IP/OBS MODERATE 35: CPT | Performed by: INTERNAL MEDICINE

## 2025-07-12 PROCEDURE — 2500000001 HC RX 250 WO HCPCS SELF ADMINISTERED DRUGS (ALT 637 FOR MEDICARE OP): Performed by: INTERNAL MEDICINE

## 2025-07-12 PROCEDURE — 7100000001 HC RECOVERY ROOM TIME - INITIAL BASE CHARGE: Performed by: SURGERY

## 2025-07-12 PROCEDURE — 2500000005 HC RX 250 GENERAL PHARMACY W/O HCPCS: Performed by: INTERNAL MEDICINE

## 2025-07-12 RX ORDER — SODIUM CHLORIDE, SODIUM LACTATE, POTASSIUM CHLORIDE, CALCIUM CHLORIDE 600; 310; 30; 20 MG/100ML; MG/100ML; MG/100ML; MG/100ML
100 INJECTION, SOLUTION INTRAVENOUS CONTINUOUS
Status: DISCONTINUED | OUTPATIENT
Start: 2025-07-12 | End: 2025-07-12 | Stop reason: HOSPADM

## 2025-07-12 RX ORDER — VANCOMYCIN HYDROCHLORIDE 750 MG/150ML
750 INJECTION, SOLUTION INTRAVENOUS EVERY 12 HOURS
Status: DISCONTINUED | OUTPATIENT
Start: 2025-07-12 | End: 2025-07-12

## 2025-07-12 RX ORDER — ALBUTEROL SULFATE 0.83 MG/ML
2.5 SOLUTION RESPIRATORY (INHALATION) ONCE AS NEEDED
Status: DISCONTINUED | OUTPATIENT
Start: 2025-07-12 | End: 2025-07-12 | Stop reason: HOSPADM

## 2025-07-12 RX ORDER — PROPOFOL 10 MG/ML
INJECTION, EMULSION INTRAVENOUS AS NEEDED
Status: DISCONTINUED | OUTPATIENT
Start: 2025-07-12 | End: 2025-07-12

## 2025-07-12 RX ORDER — FENTANYL CITRATE 50 UG/ML
INJECTION, SOLUTION INTRAMUSCULAR; INTRAVENOUS AS NEEDED
Status: DISCONTINUED | OUTPATIENT
Start: 2025-07-12 | End: 2025-07-12

## 2025-07-12 RX ORDER — MIDAZOLAM HYDROCHLORIDE 1 MG/ML
2 INJECTION, SOLUTION INTRAMUSCULAR; INTRAVENOUS ONCE
Status: COMPLETED | OUTPATIENT
Start: 2025-07-12 | End: 2025-07-12

## 2025-07-12 RX ORDER — ACETAMINOPHEN 325 MG/1
650 TABLET ORAL EVERY 4 HOURS PRN
Status: DISCONTINUED | OUTPATIENT
Start: 2025-07-12 | End: 2025-07-12 | Stop reason: HOSPADM

## 2025-07-12 RX ORDER — ONDANSETRON HYDROCHLORIDE 2 MG/ML
8 INJECTION, SOLUTION INTRAVENOUS ONCE
Status: DISCONTINUED | OUTPATIENT
Start: 2025-07-12 | End: 2025-07-12 | Stop reason: HOSPADM

## 2025-07-12 RX ORDER — MIDAZOLAM HYDROCHLORIDE 1 MG/ML
INJECTION, SOLUTION INTRAMUSCULAR; INTRAVENOUS AS NEEDED
Status: DISCONTINUED | OUTPATIENT
Start: 2025-07-12 | End: 2025-07-12

## 2025-07-12 RX ADMIN — FENTANYL CITRATE 25 MCG: 50 INJECTION, SOLUTION INTRAMUSCULAR; INTRAVENOUS at 16:03

## 2025-07-12 RX ADMIN — MIDAZOLAM 2 MG: 1 INJECTION INTRAMUSCULAR; INTRAVENOUS at 15:54

## 2025-07-12 RX ADMIN — PROPOFOL 100 MG: 10 INJECTION, EMULSION INTRAVENOUS at 16:06

## 2025-07-12 RX ADMIN — VANCOMYCIN HYDROCHLORIDE 750.01 MG: 750 INJECTION, POWDER, LYOPHILIZED, FOR SOLUTION INTRAVENOUS at 20:22

## 2025-07-12 RX ADMIN — ACETAMINOPHEN 975 MG: 325 TABLET, FILM COATED ORAL at 06:13

## 2025-07-12 RX ADMIN — OXYCODONE 10 MG: 5 TABLET ORAL at 18:46

## 2025-07-12 RX ADMIN — VANCOMYCIN HYDROCHLORIDE 750 MG: 750 INJECTION, POWDER, LYOPHILIZED, FOR SOLUTION INTRAVENOUS at 09:13

## 2025-07-12 RX ADMIN — PROPOFOL 100 MG: 10 INJECTION, EMULSION INTRAVENOUS at 16:02

## 2025-07-12 RX ADMIN — FERROUS SULFATE TAB 325 MG (65 MG ELEMENTAL FE) 1 TABLET: 325 (65 FE) TAB at 09:09

## 2025-07-12 RX ADMIN — FENTANYL CITRATE 25 MCG: 50 INJECTION, SOLUTION INTRAMUSCULAR; INTRAVENOUS at 16:08

## 2025-07-12 RX ADMIN — PIPERACILLIN SODIUM AND TAZOBACTAM SODIUM 3.38 G: 3; .375 INJECTION, SOLUTION INTRAVENOUS at 00:42

## 2025-07-12 RX ADMIN — PIPERACILLIN SODIUM AND TAZOBACTAM SODIUM 3.38 G: 3; .375 INJECTION, SOLUTION INTRAVENOUS at 06:13

## 2025-07-12 RX ADMIN — MIDAZOLAM 2 MG: 1 INJECTION INTRAMUSCULAR; INTRAVENOUS at 15:01

## 2025-07-12 RX ADMIN — ACETAMINOPHEN 975 MG: 325 TABLET, FILM COATED ORAL at 12:17

## 2025-07-12 RX ADMIN — ACETAMINOPHEN 975 MG: 325 TABLET, FILM COATED ORAL at 20:22

## 2025-07-12 RX ADMIN — HYDROXYZINE HYDROCHLORIDE 25 MG: 25 TABLET, FILM COATED ORAL at 20:38

## 2025-07-12 RX ADMIN — Medication 50 MG: at 16:02

## 2025-07-12 RX ADMIN — ROSUVASTATIN CALCIUM 20 MG: 20 TABLET, FILM COATED ORAL at 09:09

## 2025-07-12 RX ADMIN — Medication 1 CAPSULE: at 12:17

## 2025-07-12 RX ADMIN — PIPERACILLIN SODIUM AND TAZOBACTAM SODIUM 3.38 G: 3; .375 INJECTION, SOLUTION INTRAVENOUS at 12:23

## 2025-07-12 RX ADMIN — Medication 50 MG: at 15:57

## 2025-07-12 RX ADMIN — HYDROMORPHONE HYDROCHLORIDE 0.5 MG: 1 INJECTION, SOLUTION INTRAMUSCULAR; INTRAVENOUS; SUBCUTANEOUS at 16:50

## 2025-07-12 RX ADMIN — PANTOPRAZOLE SODIUM 40 MG: 40 TABLET, DELAYED RELEASE ORAL at 06:13

## 2025-07-12 RX ADMIN — PIPERACILLIN SODIUM AND TAZOBACTAM SODIUM 3.38 G: 3; .375 INJECTION, SOLUTION INTRAVENOUS at 17:47

## 2025-07-12 RX ADMIN — DOCUSATE SODIUM 100 MG: 100 CAPSULE, LIQUID FILLED ORAL at 09:09

## 2025-07-12 RX ADMIN — FENTANYL CITRATE 50 MCG: 50 INJECTION, SOLUTION INTRAMUSCULAR; INTRAVENOUS at 15:57

## 2025-07-12 RX ADMIN — PROPOFOL 100 MG: 10 INJECTION, EMULSION INTRAVENOUS at 15:57

## 2025-07-12 RX ADMIN — LEVOTHYROXINE SODIUM 100 MCG: 100 TABLET ORAL at 06:13

## 2025-07-12 RX ADMIN — Medication 125 MCG: at 09:09

## 2025-07-12 RX ADMIN — PIPERACILLIN SODIUM AND TAZOBACTAM SODIUM 3.38 G: 3; .375 INJECTION, SOLUTION INTRAVENOUS at 23:50

## 2025-07-12 SDOH — HEALTH STABILITY: MENTAL HEALTH: CURRENT SMOKER: 0

## 2025-07-12 ASSESSMENT — PAIN SCALES - GENERAL
PAINLEVEL_OUTOF10: 2
PAIN_LEVEL: 2
PAINLEVEL_OUTOF10: 2
PAINLEVEL_OUTOF10: 7
PAINLEVEL_OUTOF10: 0 - NO PAIN
PAINLEVEL_OUTOF10: 7
PAINLEVEL_OUTOF10: 7
PAINLEVEL_OUTOF10: 0 - NO PAIN

## 2025-07-12 ASSESSMENT — COGNITIVE AND FUNCTIONAL STATUS - GENERAL
DRESSING REGULAR UPPER BODY CLOTHING: A LOT
MOVING TO AND FROM BED TO CHAIR: A LITTLE
PERSONAL GROOMING: A LITTLE
WALKING IN HOSPITAL ROOM: A LITTLE
PERSONAL GROOMING: A LITTLE
WALKING IN HOSPITAL ROOM: A LITTLE
MOBILITY SCORE: 17
MOVING FROM LYING ON BACK TO SITTING ON SIDE OF FLAT BED WITH BEDRAILS: A LITTLE
MOBILITY SCORE: 17
DRESSING REGULAR UPPER BODY CLOTHING: A LOT
CLIMB 3 TO 5 STEPS WITH RAILING: A LOT
DRESSING REGULAR LOWER BODY CLOTHING: A LITTLE
STANDING UP FROM CHAIR USING ARMS: A LITTLE
TURNING FROM BACK TO SIDE WHILE IN FLAT BAD: A LITTLE
CLIMB 3 TO 5 STEPS WITH RAILING: A LOT
TOILETING: A LITTLE
STANDING UP FROM CHAIR USING ARMS: A LITTLE
DAILY ACTIVITIY SCORE: 18
TURNING FROM BACK TO SIDE WHILE IN FLAT BAD: A LITTLE
TOILETING: A LITTLE
DRESSING REGULAR LOWER BODY CLOTHING: A LITTLE
HELP NEEDED FOR BATHING: A LITTLE
MOVING FROM LYING ON BACK TO SITTING ON SIDE OF FLAT BED WITH BEDRAILS: A LITTLE
MOVING TO AND FROM BED TO CHAIR: A LITTLE
HELP NEEDED FOR BATHING: A LITTLE
DAILY ACTIVITIY SCORE: 18

## 2025-07-12 ASSESSMENT — PAIN DESCRIPTION - LOCATION: LOCATION: LEG

## 2025-07-12 ASSESSMENT — PAIN - FUNCTIONAL ASSESSMENT
PAIN_FUNCTIONAL_ASSESSMENT: 0-10

## 2025-07-12 ASSESSMENT — PAIN DESCRIPTION - ORIENTATION: ORIENTATION: RIGHT

## 2025-07-12 NOTE — CONSULTS
General Surgery History and Physical    Referring Provider:  Devaughn Moreno MD  No ref. provider found     Chief Complaint:  Chief Complaint   Patient presents with    Leg Pain     R leg abscess with drainage.  On blood thinners       History of Present Illness:  Frances Mcdowell is a 54 y.o. 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   She was noted to have two other wounds in the upper lower leg below the knee draining pus: on on the medial side and the other at the anterior side.   CT showed:   IMPRESSION:  Extensive network of interconnected multiloculated gas containing  fluid collections in the subcutaneous fat of the posterior leg. Some  demonstrate peripheral enhancement and are suspicious for abscesses  in the appropriate clinical context. For example there is a 7.2 cm x  2.2 cm right 1.9 cm gas containing collection (coronal image 31,  series 206; axial image 102, series 201). There is a confluent area  of multiloculated collections slightly more medial in the right calf  that measures 8.4 cm x 2.2 cm x 6.4 cm in overall dimension likely  consisting of multiple smaller loculated fluid pocket some containing  gas (coronal image 49, series 206; axial image 88, series 201).    There is a soft tissue defect with packing material within the mid  aspect of the leg just medial to the tibia. Is soft tissue defect  extends 3.2 cm deep to the skin surface. This defect is contiguous  with a loculated fluid collection along the deep muscular fascia and  tibial diaphysis that measures approximately 7.5 cm x 7 cm x 1.7 cm  (coronal image 75, axial image 64) and raises concern for either  abscess or confluent edema in the setting of cellulitis.      Past Medical History:  Medical History[1]     Past Surgical  History:  Surgical History[2]     Medications:  Current Outpatient Medications   Medication Instructions    acetaminophen (TYLENOL) 650 mg, oral, 3 times daily PRN    amphetamine-dextroamphetamine XR (Adderall XR) 20 mg 24 hr capsule 20 mg, oral, 2 times daily    cholecalciferol (VITAMIN D-3) 125 mcg, oral, Daily    Eliquis 5 mg, oral, 2 times daily    EPINEPHrine (EPIPEN) 0.3 mg, injection, As needed, As Directed    hydrOXYzine HCL (ATARAX) 25 mg, oral, Every 6 hours PRN    levothyroxine (SYNTHROID, LEVOXYL) 100 mcg, oral, Daily, Take on an empty stomach at the same time each day, either 30 to 60 minutes prior to breakfast    naloxone (NARCAN) 4 mg, nasal, As needed, May repeat every 2-3 minutes if needed, alternating nostrils, until medical assistance becomes available.    Nyamyc 100,000 unit/gram powder Topical, Daily    omeprazole (PRILOSEC) 40 mg, oral, Daily    oxyCODONE (ROXICODONE) 5 mg, oral, Every 72 hours PRN    oxyCODONE-acetaminophen (Percocet)  mg tablet 1 tablet, oral, Daily    rosuvastatin (CRESTOR) 20 mg, oral, Daily    triamcinolone (Kenalog) 0.1 % cream Apply to affected area 1-2 times daily as needed.        Allergies:  RX Allergies[3]     Family History:  Family History[4]     Social History:  Social History[5]     Review of Systems:  A complete 12 point review of systems was performed and is negative except as noted in the history of present illness.      Vital Signs:  Vitals:    07/12/25 0738   BP: (!) 134/101   Pulse: 88   Resp: 16   Temp: 36.6 °C (97.9 °F)   SpO2: 97%      Body mass index is 52.47 kg/m².    Physical Exam:  General: No acute distress.obesity   Neuro: Alert and oriented ×3. Follows commands.  Head: Atraumatic  Eyes: Pupils equal reactive to light. Extraocular motions intact.  Ears: Hears normal speaking voice.  Mouth, Nose, Throat: Mucous membranes moist.    Neck: Supple. No visible masses.  Breast: not examined.   Lung: Patent airway. Breathing not labored.   Vascular:  Palpable radial pulses bilaterally.  Abdomen: Soft.    Rectal: Not examined.   Genitourinary: Not examined.   Musculoskeletal: Moves all extremities.   Extremities:   Lymphedema for both legs  Right lower leg:  cellulitis   Large open wound on the posterior side  Open wound on the medial side, fat layer exposed, purulent drainage   Open wound on the anterior side, purulent drainage, not able to exam and tell the depth and extent due to pain      Lymphatic: No palpable lymph nodes.  Skin: No rashes or lesions.  Psychological: Normal affect      Laboratory Values:  CBC:   Lab Results   Component Value Date    WBC 7.6 07/12/2025    RBC 3.15 (L) 07/12/2025    HGB 9.0 (L) 07/12/2025    HCT 29.7 (L) 07/12/2025     (H) 07/12/2025       RFP:   Lab Results   Component Value Date     07/12/2025    K 3.6 07/12/2025     07/12/2025    CO2 26 07/12/2025    BUN 11 07/12/2025    CREATININE 0.89 07/12/2025    CALCIUM 8.1 (L) 07/12/2025        LFTs:   Lab Results   Component Value Date    PROT 6.5 07/09/2025    ALBUMIN 2.6 (L) 07/09/2025    BILITOT 0.6 07/09/2025    ALKPHOS 78 07/09/2025    AST 9 07/09/2025    ALT 4 (L) 07/09/2025            Imaging:  I have personally reviewed the images and the radiologist's report.  Imaging  CT tibia fibula right w IV contrast  Result Date: 7/9/2025  Extensive network of interconnected multiloculated gas containing fluid collections in the subcutaneous fat of the posterior leg. Some demonstrate peripheral enhancement and are suspicious for abscesses in the appropriate clinical context. For example there is a 7.2 cm x 2.2 cm right 1.9 cm gas containing collection (coronal image 31, series 206; axial image 102, series 201). There is a confluent area of multiloculated collections slightly more medial in the right calf that measures 8.4 cm x 2.2 cm x 6.4 cm in overall dimension likely consisting of multiple smaller loculated fluid pocket some containing gas (coronal image 49, series  206; axial image 88, series 201).   There is a soft tissue defect with packing material within the mid aspect of the leg just medial to the tibia. Is soft tissue defect extends 3.2 cm deep to the skin surface. This defect is contiguous with a loculated fluid collection along the deep muscular fascia and tibial diaphysis that measures approximately 7.5 cm x 7 cm x 1.7 cm (coronal image 75, axial image 64) and raises concern for either abscess or confluent edema in the setting of cellulitis.     No evidence of osteomyelitis.     Severe right knee osteoarthrosis.   MACRO: None.   Signed by: Joaquín Casarez 7/9/2025 9:31 PM Dictation workstation:   [id number]      Cardiology, Vascular, and Other Imaging  ECG 12 Lead  Result Date: 7/10/2025  Normal sinus rhythm Normal ECG When compared with ECG of 20-JUN-2025 12:47, (unconfirmed) No significant change was found          Assessment:  This is a 54 y.o. female who has below conditions:  Comorbidities as above   Lymphedema   Right leg cellulitis and open wounds, abscesses as above   Orthopedic surgery Dr Higgins evaluated patient and did not believe knee joint is involved.     Plan:  Supportive care   Continue abx   OR: wounds exploration and I and D of abscesses  May need repeat CT at some point after procedure to make sure all abscess collections are addressed.   Due to her condition of lymphedema and super obesity, wound healing could be very challenging and prolonged. Potential worsening of infection and limb loss even mortality. After OR exploration and evaluation, will make recommendation if transfer to tertiary facility is needed. Patient expressed understanding.       Jordan Patricia MD [facility]  General Surgery  Office: [phone]  Fax:     [phone]  8:38 AM   07/12/25          [1]   Past Medical History:  Diagnosis Date    Acute embolism and thrombosis of other specified veins 08/2022    Superficial vein thrombosis    Acute on chronic diastolic heart failure 2024     BNP level of 258 in     ADD (attention deficit disorder)     ADHD (attention deficit hyperactivity disorder)     Allergic     Anxiety     Cellulitis     Chronic pain disorder     Chronic wound     chronic lower extremity wounds (hx of pseudomonas and MRSA)    Class 3 severe obesity with body mass index (BMI) of 50.0 to 59.9 in adult     Current use of long term anticoagulation     on Eliquis    DVT (deep venous thrombosis) (Multi) 2025    unprovoked DVT    Endometrial intraepithelial neoplasia (EIN)     Extremity pain     GERD (gastroesophageal reflux disease)     HLD (hyperlipidemia)     Hypothyroidism     s/p thyroidectomy    Kidney stone 2025    Left kidney    Lymph edema     Moderate tricuspid regurgitation     PE (pulmonary thromboembolism) (Multi) 2025    Unprovoked Saddle PE on Eliquis    Peripheral neuropathy     Recurrent cellulitis of lower extremity     (hx of pseudomonas and MRSA)    Skin cancer     Varicosities     Venous insufficiency    [2]   Past Surgical History:  Procedure Laterality Date    ADENOIDECTOMY      BLADDER SURGERY       SECTION, LOW TRANSVERSE      COLONOSCOPY  2024    EPIDURAL BLOCK INJECTION      FOOT SURGERY      HYSTERECTOMY      IR CVC PICC  2025    IR CVC PICC 2025 TRI CR NONV1    OTHER SURGICAL HISTORY  2025    DIAGNOSTIC BILATERAL LOWER VENOGRAM WITH INTRAVASCULAR ULTRASOUND    TONSILLECTOMY      TOTAL THYROIDECTOMY      VARICOSE VEIN SURGERY      Varicose Vein Ligation    WOUND DEBRIDEMENT  2025   [3]   Allergies  Allergen Reactions    Erythromycin Base Anaphylaxis    Peanut Anaphylaxis    Sulfa (Sulfonamide Antibiotics) Anaphylaxis    Tetanus Toxoid Other and Swelling    Morphine Itching    Tramadol Itching    Adhesive Tape-Silicones Rash     tegaderm causes bad skin breakdown   [4]   Family History  Problem Relation Name Age of Onset    Arthritis Mother Esperanza     COPD Mother Esperanza      Learning disabilities Son Tyson Aries     Intellectual Disability Son Tyson Aries     Developmental delay Son Tyson Aries     Seizures Son Tyson Aries     Learning disabilities Son Tyson Aries     Intellectual Disability Son Tyson Aries     Learning disabilities Son Tyson Aries     Intellectual Disability Son Tyson Aries     Learning disabilities Son Tyson Aries     Intellectual Disability Son Tyson Aries    [5]   Social History  Socioeconomic History    Marital status:    Tobacco Use    Smoking status: Never    Smokeless tobacco: Never   Vaping Use    Vaping status: Never Used   Substance and Sexual Activity    Alcohol use: Not Currently     Comment: seldom    Drug use: Never    Sexual activity: Yes     Partners: Male     Birth control/protection: None     Social Drivers of Health     Financial Resource Strain: High Risk (7/10/2025)    Overall Financial Resource Strain (CARDIA)     Difficulty of Paying Living Expenses: Hard   Food Insecurity: Food Insecurity Present (7/9/2025)    Hunger Vital Sign     Worried About Running Out of Food in the Last Year: Sometimes true     Ran Out of Food in the Last Year: Sometimes true   Transportation Needs: No Transportation Needs (7/10/2025)    PRAPARE - Transportation     Lack of Transportation (Medical): No     Lack of Transportation (Non-Medical): No   Physical Activity: Inactive (10/31/2024)    Exercise Vital Sign     Days of Exercise per Week: 0 days     Minutes of Exercise per Session: 0 min   Stress: Stress Concern Present (10/31/2024)    [employer] - Occupational Stress Questionnaire     Feeling of Stress : To some extent   Social Connections: Feeling Socially Integrated (6/24/2025)    OASIS : Social Isolation     Frequency of experiencing loneliness or isolation: Never   Intimate Partner Violence: Not At Risk (7/9/2025)    Humiliation, Afraid, Rape, and Kick questionnaire     Fear of Current or Ex-Partner: No      Emotionally Abused: No     Physically Abused: No     Sexually Abused: No   Housing Stability: Low Risk  (7/10/2025)    Housing Stability Vital Sign     Unable to Pay for Housing in the Last Year: No     Number of Times Moved in the Last Year: 0     Homeless in the Last Year: No

## 2025-07-12 NOTE — NURSING NOTE
1900: Night nurse made aware of heparin drip to start at 1900 at 1300 units/hr. Per Dr Patricia. Heparin changes made at shift change.

## 2025-07-12 NOTE — CARE PLAN
The patient's goals for the shift include      The clinical goals for the shift include pt will have controlled pain during this shift    Problem: Safety - Adult  Goal: Free from fall injury  Outcome: Progressing

## 2025-07-12 NOTE — ANESTHESIA POSTPROCEDURE EVALUATION
Patient: Frances Mcdowell    Procedure Summary       Date: 07/12/25 Room / Location: [facility] OR 06 / [facility] OR    Anesthesia Start: 1554 Anesthesia Stop: 1631    Procedure: INCISION AND DRAINAGE, ABSCESS, BREAST OR EXTREMITY (Right) Diagnosis:       Abscess of leg, right      (Abscess of leg, right [L02.415])    Surgeons: Jordan Patricia MD Responsible Provider: Montez Canchola MD    Anesthesia Type: MAC ASA Status: 3 - Emergent            Anesthesia Type: MAC    Vitals Value Taken Time   /85 07/12/25 17:17   Temp  07/12/25 17:26   Pulse 83 07/12/25 17:17   Resp 12 07/12/25 17:17   SpO2 100 % 07/12/25 17:17       Anesthesia Post Evaluation    Patient location during evaluation: PACU  Patient participation: complete - patient participated  Level of consciousness: awake  Pain score: 2  Pain management: adequate  Multimodal analgesia pain management approach  Airway patency: patent  Two or more strategies used to mitigate risk of obstructive sleep apnea  Cardiovascular status: acceptable  Respiratory status: acceptable  Hydration status: acceptable  Postoperative Nausea and Vomiting: none        No notable events documented.

## 2025-07-12 NOTE — CARE PLAN
The patient's goals for the shift include    Problem: Pain - Adult  Goal: Verbalizes/displays adequate comfort level or baseline comfort level  Outcome: Progressing  Flowsheets (Taken 7/12/2025 1139)  Verbalizes/displays adequate comfort level or baseline comfort level: Encourage patient to monitor pain and request assistance     Problem: Safety - Adult  Goal: Free from fall injury  Outcome: Progressing  Flowsheets (Taken 7/12/2025 1139)  Free from fall injury: Instruct family/caregiver on patient safety     Problem: Discharge Planning  Goal: Discharge to home or other facility with appropriate resources  Outcome: Progressing  Flowsheets (Taken 7/12/2025 1139)  Discharge to home or other facility with appropriate resources: Identify barriers to discharge with patient and caregiver     Problem: Nutrition  Goal: Nutrient intake appropriate for maintaining nutritional needs  Outcome: Progressing     Problem: Skin  Goal: Decreased wound size/increased tissue granulation at next dressing change  Outcome: Progressing  Flowsheets (Taken 7/12/2025 1139)  Decreased wound size/increased tissue granulation at next dressing change: Promote sleep for wound healing  Goal: Participates in plan/prevention/treatment measures  Outcome: Progressing  Flowsheets (Taken 7/11/2025 1632 by Dalia Baird RN)  Participates in plan/prevention/treatment measures:   Elevate heels   Increase activity/out of bed for meals  Goal: Prevent/manage excess moisture  Outcome: Progressing  Flowsheets (Taken 7/12/2025 1139)  Prevent/manage excess moisture: Cleanse incontinence/protect with barrier cream  Goal: Prevent/minimize sheer/friction injuries  Outcome: Progressing  Flowsheets (Taken 7/12/2025 1139)  Prevent/minimize sheer/friction injuries: Turn/reposition every 2 hours/use positioning/transfer devices  Note: Pt is able to turn self, pt educated to shift weight every 2 hours  Goal: Promote/optimize nutrition  Outcome: Progressing  Flowsheets  (Taken 7/12/2025 1139)  Promote/optimize nutrition: Consume > 50% meals/supplements     Problem: Chronic Conditions and Co-morbidities  Goal: Patient's chronic conditions and co-morbidity symptoms are monitored and maintained or improved  Flowsheets (Taken 7/12/2025 1139)  Care Plan - Patient's Chronic Conditions and Co-Morbidity Symptoms are Monitored and Maintained or Improved: Monitor and assess patient's chronic conditions and comorbid symptoms for stability, deterioration, or improvement     Problem: Skin  Goal: Promote skin healing  Flowsheets (Taken 7/12/2025 1139)  Promote skin healing: Turn/reposition every 2 hours/use positioning/transfer devices

## 2025-07-12 NOTE — OP NOTE
INCISION AND DRAINAGE, ABSCESS, BREAST OR EXTREMITY (R) Operative Note     Date: 2025  OR Location: [facility] OR    Name: Frances Mcdowell, : 1970, Age: 54 y.o., MRN: [id number], Sex: female    Diagnosis  Pre-op Diagnosis      * Abscess of leg, right [L02.415] Post-op Diagnosis     * Abscess of leg, right [L02.415]     Procedures  INCISION AND DRAINAGE, ABSCESS, BREAST OR EXTREMITY  64197 - OH INCISION & DRAINAGE LEG/ANKLE ABSCESS/HEMATOMA  Times 3    Surgeons      * Jordan Patricia - Primary    Resident/Fellow/Other Assistant:  Surgeons and Role:  * No surgeons found with a matching role *    Staff:   Hyacinthulator: Dianne  Surgical Assistant: Montez Deleon Person: Angela    Anesthesia Staff: Anesthesiologist: Montez Canchola MD    Procedure Summary  Anesthesia: Monitor Anesthesia Care  ASA: III  Estimated Blood Loss: 2mL  Intra-op Medications:   Administrations occurring from 1600 to 1645 on 25:   Medication Name Total Dose   fentaNYL (Sublimaze) injection 50 mcg/mL 50 mcg   ketamine injection 50 mg/ 5 mL (10 mg/mL) 50 mg   propofol (Diprivan) injection 10 mg/mL 200 mg              Anesthesia Record               Intraprocedure I/O Totals       None           Specimen: No specimens collected              Drains and/or Catheters:   External Urinary Catheter Female (Active)   Wall Suction (mmHg) 40 25 0857   External Catheter Status In place 25 0857   Output (mL) 900 mL 25 1247       Tourniquet Times:         Implants:     Findings: see below     Indications: Frances Mcdowell is an 54 y.o. female who is having surgery for Abscess of leg, right [L02.415].      The patient was seen in the preoperative area. The risks, benefits, complications, treatment options, non-operative alternatives, expected recovery and outcomes were discussed with the patient. The possibilities of reaction to medication, pulmonary aspiration, injury to surrounding structures, bleeding, recurrent infection, the need for additional  procedures, failure to diagnose a condition, and creating a complication requiring transfusion or operation were discussed with the patient. The patient concurred with the proposed plan, giving informed consent.  The site of surgery was properly noted/marked if necessary per policy. The patient has been actively warmed in preoperative area. Preoperative antibiotics have been ordered and given within 1 hours of incision. Venous thrombosis prophylaxis have been ordered including chemical prophylaxis. Patient was getting heparin gtt before surgery.     Procedure Details:   Patient was lying supine in the hospital bed.  After MAC anesthesia, her right lower leg was prepped and draped.  She has open wound on the posterior calf which was managed with Adaptic and Aquacel dressing change.  In the middle portion, there was a opening with purulent drainage.  The wound was explored with finger.  Necrotic subcutaneous fat was removed.  The wound cavity was about 3 x 3 cm in size deep down to superficial to muscle.  The wound was irrigated and then packed with Kerlix gauze moistened with Betadine solution.  She has another opening on the medial side of upper lower leg.  It was explored again with the finger and the hemostat.  Necrotic fatty tissue was removed along with purulent fluids.  The wound cavity was about 4 x 3 cm in size.  It was irrigated and packed again with Kerlix gauze moistened with Betadine solution.  She has a third opening at the anterior aspect of upper lower leg.  It was explored again with finger and hemostat.  Necrotic fat tissue and apparent fluids were removed.  The wound cavity was about 5 x 6 cm in size deep down to just superficial to the tibia bone and fascia .  The wound cavity was irrigated and packed again with Kerlix gauze moistened with Betadine solution.  The remaining superficial open wounds on the posterior side was then covered with Adaptic and Aquacel.  ABD pads were then used to cover all  wounds.  Kerlix was used to wrap the leg followed by Ace wraps.     Evidence of Infection: Yes; Abscess Within the subcutaneous tissues  Complications:  None; patient tolerated the procedure well.    Disposition: PACU - hemodynamically stable.  Condition: stable         Task Performed by RNFA or Surgical Assistant:  Please note that we will be billing for my physician's assistant who was critical and necessary for successful completion of this case including positioning, prepping and draping patient, helping with retraction and exposure, suture management, and wound closure. There were no qualified residents available to assist.        Attending Attestation: I was present and scrubbed for the entire procedure.    Jordan Patricia  Phone Number: [phone]

## 2025-07-12 NOTE — PROGRESS NOTES
Vancomycin Dosing by Pharmacy- FOLLOW UP    Frances Mcdowell is a 54 y.o. year old female who Pharmacy has been consulted for vancomycin dosing for cellulitis, skin and soft tissue. Based on the patient's indication and renal status this patient is being dosed based on a goal AUC of 400-600.     Renal function is currently stable.    Current vancomycin dose: 1000 mg given every 12 hours    Estimated vancomycin AUC on current dose: 631 mg/L.hr     Visit Vitals  BP (!) 148/98 (BP Location: Right arm) Comment (BP Location): Right Wrist   Pulse 87   Temp 36 °C (96.8 °F) (Temporal)   Resp 17        Lab Results   Component Value Date    CREATININE 0.89 2025    CREATININE 0.95 2025    CREATININE 0.90 07/10/2025    CREATININE 0.88 2025    CREATININE 1.20 (H) 2025        Patient weight is as follows:   Vitals:    25   Weight: 134 kg (296 lb 3.2 oz)       Cultures:  No results found for the encounter in last 14 days.       I/O last 3 completed shifts:  In: 693 (5.2 mL/kg) [P.O.:240; I.V.:53 (0.4 mL/kg); IV Piggyback:400]  Out: 2900 (21.6 mL/kg) [Urine:2900 (0.6 mL/kg/hr)]  Weight: 134.4 kg   I/O during current shift:  No intake/output data recorded.    Temp (24hrs), Av.4 °C (97.6 °F), Min:36 °C (96.8 °F), Max:36.8 °C (98.2 °F)      Assessment/Plan    Above goal AUC. Orders placed for new vancomcyin regimen of 750MG every 12 hours to begin at 0900.    This dosing regimen is predicted by [employer] to result in the following pharmacokinetic parameters:    Regimen: 750 mg IV every 12 hours.  Start time: 08:55 on 2025  Exposure target: AUC24 (range) 400-600 mg/L.hr   [id number]: 527 mg/L.hr  AUC24,ss: 478 mg/L.hr  Probability of AUC24 > 400: 93 %  Ctrough,ss: 16.8 mg/L  Probability of Ctrough,ss > 20: 14 %    The next level will be obtained on  at 5A. May be obtained sooner if clinically indicated.   Will continue to monitor renal function daily while on vancomycin and order serum  creatinine at least every 48 hours if not already ordered.  Follow for continued vancomycin needs, clinical response, and signs/symptoms of toxicity.       Yohan Seo, PharmD

## 2025-07-12 NOTE — ANESTHESIA PREPROCEDURE EVALUATION
Patient: Frances Mcdowell    Procedure Information       Date/Time: 25 1600    Procedure: INCISION AND DRAINAGE, ABSCESS, BREAST OR EXTREMITY (Right)    Location: [facility] /  [facility]    Surgeons: Jordan Patricia MD            Relevant Problems   Pulmonary   (+) Pulmonary embolism      Neuro   (+) Anxiety disorder   (+) Chronic migraine   (+) New daily persistent headache      /Renal   (+) Nephrolithiasis   (+) UTI (urinary tract infection)      Endocrine   (+) Hypothyroidism (acquired)   (+) Morbid obesity (Multi)      HEENT   (+) Acute pansinusitis   (+) Sinus infection      ID   (+) Acute URI   (+) Fungal dermatitis   (+) UTI (urinary tract infection)   (+) Yeast infection      Skin   (+) Rash       Clinical information reviewed:   Tobacco  Allergies  Meds  Problems  Med Hx  Surg Hx   Fam Hx  Soc   Hx        NPO Detail:  No data recorded     Physical Exam    Airway  Mallampati: II  TM distance: >3 FB  Mouth openin finger widths     Cardiovascular - normal exam   Dental    Pulmonary - normal exam   Abdominal (+) obese           Anesthesia Plan    History of general anesthesia?: yes  History of complications of general anesthesia?: no    ASA 3 - emergent     MAC     The patient is not a current smoker.    intravenous induction   Anesthetic plan and risks discussed with patient.    Plan discussed with CRNA and attending.

## 2025-07-12 NOTE — PROGRESS NOTES
Patient: Frances Mcdowell  Room/bed: 231/231-A  Admitted on: 7/9/2025    Age: 54 y.o.   Gender: female  Code Status:  Full Code   Admitting Dx: Cellulitis of right lower extremity [L03.115]  Cellulitis [L03.90]    MRN: [id number]  PCP: Devaughn Moreno MD       Subjective   Doing ok, just anxious about the procedure, intubation etc.     Objective    Physical Exam  Constitutional:       Appearance: Normal appearance.   HENT:      Head: Normocephalic.      Nose: Nose normal.      Mouth/Throat:      Mouth: Mucous membranes are dry.   Eyes:      Extraocular Movements: Extraocular movements intact.      Pupils: Pupils are equal, round, and reactive to light.   Cardiovascular:      Pulses: Normal pulses.      Comments: Regular, 2/6 systolic murmur  Pulmonary:      Effort: Pulmonary effort is normal.      Breath sounds: Normal breath sounds.   Abdominal:      General: Bowel sounds are normal.      Palpations: Abdomen is soft.   Musculoskeletal:      Comments: Pulses equal  Dressings intact     Skin:     General: Skin is warm and dry.   Neurological:      Mental Status: She is alert and oriented to person, place, and time. Mental status is at baseline.   Psychiatric:         Mood and Affect: Mood normal.        Temp:  [36 °C (96.8 °F)-36.8 °C (98.2 °F)] 36.6 °C (97.9 °F)  Heart Rate:  [80-89] 88  Resp:  [16-20] 16  BP: (104-148)/() 134/101    Vitals:    07/09/25 2059   Weight: 134 kg (296 lb 3.2 oz)           I/Os    Intake/Output Summary (Last 24 hours) at 7/12/2025 1113  Last data filed at 7/12/2025 1048  Gross per 24 hour   Intake 250 ml   Output 600 ml   Net -350 ml       Labs:   Results from last 72 hours   Lab Units 07/12/25  0616 07/11/25  0516 07/10/25  0651   SODIUM mmol/L 140 138 138   POTASSIUM mmol/L 3.6 3.7 4.1   CHLORIDE mmol/L 106 106 104   CO2 mmol/L 26 28 27   BUN mg/dL 11 10 12   CREATININE mg/dL 0.89 0.95 0.90   GLUCOSE mg/dL 85 83 93   CALCIUM mg/dL 8.1* 7.6* 7.7*   ANION GAP mmol/L 12 8* 11   EGFR  "mL/min/1.73m*2 76 70 75      Results from last 72 hours   Lab Units 07/12/25  0616 07/12/25  0042 07/11/25  0516 07/10/25  0651 07/09/25  1759   WBC AUTO x10*3/uL 7.6 7.7 6.2   < > 12.0*   HEMOGLOBIN g/dL 9.0* 7.8* 7.5*   < > 8.7*   HEMATOCRIT % 29.7* 25.8* 24.7*   < > 27.9*   PLATELETS AUTO x10*3/uL 459* 459* 404   < > 437   NEUTROS PCT AUTO %  --   --   --   --  73.4   LYMPHS PCT AUTO %  --   --   --   --  12.9   MONOS PCT AUTO %  --   --   --   --  9.3   EOS PCT AUTO %  --   --   --   --  3.3    < > = values in this interval not displayed.      Lab Results   Component Value Date    CALCIUM 8.1 (L) 07/12/2025    PHOS 4.3 06/22/2025      Lab Results   Component Value Date    CRP 11.35 (H) 07/10/2025      [unfilled]     Micro/ID:   Susceptibility data from last 90 days.  Collected Specimen Info Organism Aztreonam Cefepime Ceftazidime Ciprofloxacin Levofloxacin Piperacillin/Tazobactam Tobramycin   06/17/25 Swab from ABSCESS Pseudomonas aeruginosa  S  S  S  R  R  S  S     Mixed Gram-Negative Bacteria           06/15/25 Tissue/Biopsy from Wound/Tissue Pseudomonas aeruginosa  S S S  R  R  S  S     Mixed Anaerobic Bacteria            Mixed Gram-Positive and Gram-Negative Bacteria                           No lab exists for component: \"AGALPCRNB\"   .ID  Lab Results   Component Value Date    URINECULTURE  07/09/2025     Growth indicates contamination with mixed bacterial tylor. Repeat culture if clinically indicated.    BLOODCULT No growth at 2 days 07/09/2025    BLOODCULT No growth at 2 days 07/09/2025       Images:  ECG 12 Lead  Normal sinus rhythm  Normal ECG  When compared with ECG of 20-JUN-2025 12:47, (unconfirmed)  No significant change was found       Meds    Scheduled medications  Scheduled Medications[1]  Continuous medications  Continuous Medications[2]  PRN medications  PRN Medications[3]     Assessment and Plan    Frances Mcdowell is a 54 y.o. female   Cellulitis, with abscesses, RLE. Plan is for visit to OR " for I&D. Appreciate Dr Patricia's assistance with this and Dr PIERRE. Cultures will be sent at that time. Continue vanc/zosyn for now-hx or mrsa and pseudomonas., afebrile. Pain control has been adequate  GERD. Continue PPI  Hypothyroid. Continue supplement  Hx saddle PE. She is being bridged with heparin for procedure. Resume oral after.   Elevated blood pressure-monitor. Likely situational, secondary to anxiety about procedure etc  Chronic lymphedema  Hyperlipidemia      Hayley Willingham MD         [1] acetaminophen, 975 mg, oral, q8h  [Held by provider] amphetamine-dextroamphetamine, 20 mg, oral, BID  cholecalciferol, 125 mcg, oral, Daily  docusate sodium, 100 mg, oral, BID  ferrous sulfate, 65 mg of elemental iron, oral, Daily with breakfast  lactobacillus acidophilus, 1 capsule, oral, Daily  levothyroxine, 100 mcg, oral, Daily  pantoprazole, 40 mg, oral, Daily before breakfast  piperacillin-tazobactam, 3.375 g, intravenous, q6h  polyethylene glycol, 17 g, oral, Daily  rosuvastatin, 20 mg, oral, Daily  vancomycin, 750 mg, intravenous, q12h  [2] heparin, 0-4,000 Units/hr, Last Rate: 1,300 Units/hr (07/12/25 0821)  [3] PRN medications: HYDROmorphone, hydrOXYzine HCL, melatonin, naloxone, oxyCODONE, oxyCODONE, vancomycin

## 2025-07-12 NOTE — PROGRESS NOTES
Frances Mcdowell is a 54 y.o. female on day 2 of admission presenting with Cellulitis.    Subjective   Interval History: no fever, no new complaints        Review of Systems    Objective   Range of Vitals (last 24 hours)  Heart Rate:  [80-89]   Temp:  [36 °C (96.8 °F)-36.8 °C (98.2 °F)]   Resp:  [16-20]   BP: (104-148)/()   SpO2:  [92 %-97 %]   Daily Weight  07/09/25 : 134 kg (296 lb 3.2 oz)    Body mass index is 52.47 kg/m².    Physical Exam  Constitutional:       Appearance: Normal appearance.   HENT:      Head: Normocephalic and atraumatic.      Mouth/Throat:      Mouth: Mucous membranes are moist.      Pharynx: Oropharynx is clear.   Eyes:      Pupils: Pupils are equal, round, and reactive to light.   Cardiovascular:      Rate and Rhythm: Normal rate and regular rhythm.      Heart sounds: Normal heart sounds.   Pulmonary:      Effort: Pulmonary effort is normal.      Breath sounds: Normal breath sounds.   Abdominal:      General: Abdomen is flat. Bowel sounds are normal.      Palpations: Abdomen is soft.   Musculoskeletal:      Cervical back: Normal range of motion.      Comments: Legs dressing, photos reviewed   Neurological:      Mental Status: She is alert.         Antibiotics  piperacillin-tazobactam - 3.375 gram/50 mL  vancomycin - 750 mg/150 mL    Relevant Results  Labs  Results from last 72 hours   Lab Units 07/12/25  0616 07/12/25  0042 07/11/25  0516 07/10/25  0651 07/09/25  1759   WBC AUTO x10*3/uL 7.6 7.7 6.2   < > 12.0*   HEMOGLOBIN g/dL 9.0* 7.8* 7.5*   < > 8.7*   HEMATOCRIT % 29.7* 25.8* 24.7*   < > 27.9*   PLATELETS AUTO x10*3/uL 459* 459* 404   < > 437   NEUTROS PCT AUTO %  --   --   --   --  73.4   LYMPHS PCT AUTO %  --   --   --   --  12.9   MONOS PCT AUTO %  --   --   --   --  9.3   EOS PCT AUTO %  --   --   --   --  3.3    < > = values in this interval not displayed.     Results from last 72 hours   Lab Units 07/12/25  0616 07/11/25  0516 07/10/25  0651   SODIUM mmol/L 140 138 138    POTASSIUM mmol/L 3.6 3.7 4.1   CHLORIDE mmol/L 106 106 104   CO2 mmol/L 26 28 27   BUN mg/dL 11 10 12   CREATININE mg/dL 0.89 0.95 0.90   GLUCOSE mg/dL 85 83 93   CALCIUM mg/dL 8.1* 7.6* 7.7*   ANION GAP mmol/L 12 8* 11   EGFR mL/min/1.73m*2 76 70 75     Results from last 72 hours   Lab Units 07/09/25  1759   ALK PHOS U/L 78   BILIRUBIN TOTAL mg/dL 0.6   PROTEIN TOTAL g/dL 6.5   ALT U/L 4*   AST U/L 9   ALBUMIN g/dL 2.6*     Estimated Creatinine Clearance: 94.7 mL/min (by C-G formula based on SCr of 0.89 mg/dL).  C-Reactive Protein   Date Value Ref Range Status   07/10/2025 11.35 (H) <1.00 mg/dL Final   07/09/2025 12.98 (H) <1.00 mg/dL Final   06/15/2025 22.53 (H) <1.00 mg/dL Final     Microbiology  Reviewed  Imaging  Reviewed        Assessment/Plan   Legs stasis / wounds / Rt leg abscess, attempt US drainage found no collection to drain , for surgery  Pyuria, asymptomatic     Recommendations :  Continue Vancomycin and Zosyn  Discussed with the wound care team     I spent minutes in the professional and overall care of this patient.          Miguel Hu MD

## 2025-07-12 NOTE — CONSULTS
"Wound Care Consult     Visit Date: 7/12/2025      Patient Name: Frances Mcdowell         MRN: [id number]             Reason for Consult:   BLE wounds chronic in nature     Wound History:  Patient is an active client at our [facility]     Pertinent Labs:  see \"results\"    Assessment:  Consult received, EMR and all notes reviewed, status and plan also discussed with Dr. Tang.  CT scan - multiple abscesses RLE.  ID, Ortho and general surgery have been consulted, patient for surgery today with Dr. Patricia, wounds redressed by Dr. Tang this a.m.  Patient's RN Cori reports Frances's skin is otherwise intact.          Wound Plan:   Full team managing [place] wounds with continued follow up at our [facility] planned.  Will follow along in the periphery, please message as needed.       Duke Bradshaw RN, [facility], [facility]  7/12/2025  5:08 PM        "

## 2025-07-12 NOTE — CONSULTS
Reason For Consult  Right lower extremity wounds and abscesses.    History Of Present Illness  Frances Mcdowell is a 54 y.o. female presenting with wounds and abscesses right lower extremity.  She has recently been treated with wound care.  This is a chronic problem that has been severely exacerbated recently.  She notes the drainage has worsened.  Denies any knee pain on that side..     Past Medical History  She has a past medical history of Acute embolism and thrombosis of other specified veins (2022), Acute on chronic diastolic heart failure (), ADD (attention deficit disorder), ADHD (attention deficit hyperactivity disorder), Allergic, Anxiety, Cellulitis, Chronic pain disorder (), Chronic wound, Class 3 severe obesity with body mass index (BMI) of 50.0 to 59.9 in adult, Current use of long term anticoagulation, DVT (deep venous thrombosis) (Multi) (2025), Endometrial intraepithelial neoplasia (EIN), Extremity pain (), GERD (gastroesophageal reflux disease), HLD (hyperlipidemia), Hypothyroidism, Kidney stone (2025), Lymph edema, Moderate tricuspid regurgitation, PE (pulmonary thromboembolism) (Multi) (2025), Peripheral neuropathy (), Recurrent cellulitis of lower extremity, Skin cancer (), Varicosities (), and Venous insufficiency.    Surgical History  She has a past surgical history that includes Tonsillectomy; Foot surgery; Varicose vein surgery; Total thyroidectomy; Bladder surgery;  section, low transverse; Hysterectomy; IR CVC PICC (2025); Colonoscopy (2024); Wound debridement (2025); Adenoidectomy; Epidural block injection (); and Other surgical history (2025).     Social History  She reports that she has never smoked. She has never used smokeless tobacco. She reports that she does not currently use alcohol. She reports that she does not use drugs.    Family History  Family History[1]     Allergies  Erythromycin base, Peanut,  "Sulfa (sulfonamide antibiotics), Tetanus toxoid, Morphine, Tramadol, and Adhesive tape-silicones    Review of Systems  Negative except as above     Physical Exam  Right lower extremity with multiple wounds over the anterior and posterior leg, anteriorly there is significant drainage which is cloudy and bloody.  There are multiple wounds of varying depths posteriorly as well.  Patient has full painless active range of motion at the knee.  Neurovascular intact distally     Last Recorded Vitals  Blood pressure (!) 134/101, pulse 88, temperature 36.6 °C (97.9 °F), temperature source Temporal, resp. rate 16, height 1.6 m (5' 3\"), weight 134 kg (296 lb 3.2 oz), SpO2 97%.    Relevant Results CT of the tibia-fibula was reviewed independently interpreted by me today, shows multiple soft tissue abscesses some of which communicate with the skin.      Scheduled medications  Scheduled Medications[2]  Continuous medications  Continuous Medications[3]  PRN medications  PRN Medications[4]  Results for orders placed or performed during the hospital encounter of 07/09/25 (from the past 24 hours)   CBC   Result Value Ref Range    WBC 7.7 4.4 - 11.3 x10*3/uL    nRBC 0.0 0.0 - 0.0 /100 WBCs    RBC 2.74 (L) 4.00 - 5.20 x10*6/uL    Hemoglobin 7.8 (L) 12.0 - 16.0 g/dL    Hematocrit 25.8 (L) 36.0 - 46.0 %    MCV 94 80 - 100 fL    MCH 28.5 26.0 - 34.0 pg    MCHC 30.2 (L) 32.0 - 36.0 g/dL    RDW 15.5 (H) 11.5 - 14.5 %    Platelets 459 (H) 150 - 450 x10*3/uL   CBC   Result Value Ref Range    WBC 7.6 4.4 - 11.3 x10*3/uL    nRBC 0.0 0.0 - 0.0 /100 WBCs    RBC 3.15 (L) 4.00 - 5.20 x10*6/uL    Hemoglobin 9.0 (L) 12.0 - 16.0 g/dL    Hematocrit 29.7 (L) 36.0 - 46.0 %    MCV 94 80 - 100 fL    MCH 28.6 26.0 - 34.0 pg    MCHC 30.3 (L) 32.0 - 36.0 g/dL    RDW 15.6 (H) 11.5 - 14.5 %    Platelets 459 (H) 150 - 450 x10*3/uL   Basic metabolic panel   Result Value Ref Range    Glucose 85 74 - 99 mg/dL    Sodium 140 136 - 145 mmol/L    Potassium 3.6 3.5 - 5.3 " mmol/L    Chloride 106 98 - 107 mmol/L    Bicarbonate 26 21 - 32 mmol/L    Anion Gap 12 10 - 20 mmol/L    Urea Nitrogen 11 6 - 23 mg/dL    Creatinine 0.89 0.50 - 1.05 mg/dL    eGFR 76 >60 mL/min/1.73m*2    Calcium 8.1 (L) 8.6 - 10.3 mg/dL   Vancomycin   Result Value Ref Range    Vancomycin 24.9 (H) 5.0 - 20.0 ug/mL   Heparin Assay, UFH   Result Value Ref Range    Heparin Unfractionated 0.2 See Comment Below for Therapeutic Ranges IU/mL        Assessment/Plan     54-year-old female with multiple soft tissue abscesses of the right lower extremity.  No suspicion for septic arthritis of the knee.  No involvement of the knee joint.  Given that most of these are open and draining they may be treated with localized wound care versus operative wound exploration with general surgery.  No orthopedic intervention.    Boubacar Higgins MD         [1]   Family History  Problem Relation Name Age of Onset    Arthritis Mother Esperanza     COPD Mother Esperanza     Learning disabilities Son Tyson Aries     Intellectual Disability Son Tyson Aries     Developmental delay Son Tyson Aries     Seizures Son Tyson Aries     Learning disabilities Son Tyson Aries     Intellectual Disability Son Tyson Aries     Learning disabilities Son Tyson Aries     Intellectual Disability Son Tyson Aries     Learning disabilities Son Tyson Aries     Intellectual Disability Son Tyson Aries    [2] acetaminophen, 975 mg, oral, q8h  [Held by provider] amphetamine-dextroamphetamine, 20 mg, oral, BID  cholecalciferol, 125 mcg, oral, Daily  docusate sodium, 100 mg, oral, BID  ferrous sulfate, 65 mg of elemental iron, oral, Daily with breakfast  levothyroxine, 100 mcg, oral, Daily  pantoprazole, 40 mg, oral, Daily before breakfast  piperacillin-tazobactam, 3.375 g, intravenous, q6h  polyethylene glycol, 17 g, oral, Daily  rosuvastatin, 20 mg, oral, Daily  vancomycin, 750 mg, intravenous, q12h    [3] heparin, 0-4,000 Units/hr, Last Rate: 1,300  Units/hr (07/12/25 0821)    [4] PRN medications: HYDROmorphone, hydrOXYzine HCL, melatonin, naloxone, oxyCODONE, oxyCODONE, vancomycin

## 2025-07-13 VITALS
BODY MASS INDEX: 51.91 KG/M2 | WEIGHT: 293 LBS | SYSTOLIC BLOOD PRESSURE: 111 MMHG | OXYGEN SATURATION: 97 % | HEIGHT: 63 IN | TEMPERATURE: 97.7 F | DIASTOLIC BLOOD PRESSURE: 68 MMHG | HEART RATE: 95 BPM | RESPIRATION RATE: 18 BRPM

## 2025-07-13 LAB
ALBUMIN SERPL BCP-MCNC: 2.5 G/DL (ref 3.4–5)
ALP SERPL-CCNC: 74 U/L (ref 33–110)
ALT SERPL W P-5'-P-CCNC: 3 U/L (ref 7–45)
ANION GAP SERPL CALC-SCNC: 12 MMOL/L (ref 10–20)
AST SERPL W P-5'-P-CCNC: 10 U/L (ref 9–39)
BACTERIA BLD CULT: NORMAL
BACTERIA BLD CULT: NORMAL
BILIRUB SERPL-MCNC: 0.3 MG/DL (ref 0–1.2)
BUN SERPL-MCNC: 10 MG/DL (ref 6–23)
CALCIUM SERPL-MCNC: 8.4 MG/DL (ref 8.6–10.3)
CHLORIDE SERPL-SCNC: 107 MMOL/L (ref 98–107)
CO2 SERPL-SCNC: 25 MMOL/L (ref 21–32)
CREAT SERPL-MCNC: 0.89 MG/DL (ref 0.5–1.05)
EGFRCR SERPLBLD CKD-EPI 2021: 76 ML/MIN/1.73M*2
ERYTHROCYTE [DISTWIDTH] IN BLOOD BY AUTOMATED COUNT: 15.7 % (ref 11.5–14.5)
GLUCOSE SERPL-MCNC: 80 MG/DL (ref 74–99)
HCT VFR BLD AUTO: 27.5 % (ref 36–46)
HGB BLD-MCNC: 8.6 G/DL (ref 12–16)
MCH RBC QN AUTO: 29.3 PG (ref 26–34)
MCHC RBC AUTO-ENTMCNC: 31.3 G/DL (ref 32–36)
MCV RBC AUTO: 94 FL (ref 80–100)
NRBC BLD-RTO: 0 /100 WBCS (ref 0–0)
PLATELET # BLD AUTO: 443 X10*3/UL (ref 150–450)
POTASSIUM SERPL-SCNC: 3.7 MMOL/L (ref 3.5–5.3)
PROT SERPL-MCNC: 6.2 G/DL (ref 6.4–8.2)
RBC # BLD AUTO: 2.94 X10*6/UL (ref 4–5.2)
SODIUM SERPL-SCNC: 140 MMOL/L (ref 136–145)
UFH PPP CHRO-ACNC: 0.1 IU/ML (ref ?–1.1)
VANCOMYCIN SERPL-MCNC: 21.6 UG/ML (ref 5–20)
WBC # BLD AUTO: 8.8 X10*3/UL (ref 4.4–11.3)

## 2025-07-13 PROCEDURE — 99232 SBSQ HOSP IP/OBS MODERATE 35: CPT | Performed by: INTERNAL MEDICINE

## 2025-07-13 PROCEDURE — 36415 COLL VENOUS BLD VENIPUNCTURE: CPT | Performed by: SURGERY

## 2025-07-13 PROCEDURE — 80053 COMPREHEN METABOLIC PANEL: CPT | Performed by: SURGERY

## 2025-07-13 PROCEDURE — 2500000004 HC RX 250 GENERAL PHARMACY W/ HCPCS (ALT 636 FOR OP/ED): Performed by: SURGERY

## 2025-07-13 PROCEDURE — 80202 ASSAY OF VANCOMYCIN: CPT | Performed by: SURGERY

## 2025-07-13 PROCEDURE — 1100000001 HC PRIVATE ROOM DAILY

## 2025-07-13 PROCEDURE — 2500000001 HC RX 250 WO HCPCS SELF ADMINISTERED DRUGS (ALT 637 FOR MEDICARE OP): Performed by: INTERNAL MEDICINE

## 2025-07-13 PROCEDURE — 85027 COMPLETE CBC AUTOMATED: CPT | Performed by: SURGERY

## 2025-07-13 PROCEDURE — 2500000002 HC RX 250 W HCPCS SELF ADMINISTERED DRUGS (ALT 637 FOR MEDICARE OP, ALT 636 FOR OP/ED): Performed by: INTERNAL MEDICINE

## 2025-07-13 PROCEDURE — 2500000001 HC RX 250 WO HCPCS SELF ADMINISTERED DRUGS (ALT 637 FOR MEDICARE OP): Performed by: SURGERY

## 2025-07-13 PROCEDURE — 2500000002 HC RX 250 W HCPCS SELF ADMINISTERED DRUGS (ALT 637 FOR MEDICARE OP, ALT 636 FOR OP/ED): Performed by: SURGERY

## 2025-07-13 PROCEDURE — 2500000004 HC RX 250 GENERAL PHARMACY W/ HCPCS (ALT 636 FOR OP/ED): Mod: JZ | Performed by: SURGERY

## 2025-07-13 PROCEDURE — 85520 HEPARIN ASSAY: CPT | Performed by: SURGERY

## 2025-07-13 RX ORDER — DIPHENOXYLATE HYDROCHLORIDE AND ATROPINE SULFATE 2.5; .025 MG/1; MG/1
1 TABLET ORAL 4 TIMES DAILY PRN
Status: DISPENSED | OUTPATIENT
Start: 2025-07-13

## 2025-07-13 RX ADMIN — VANCOMYCIN HYDROCHLORIDE 750.01 MG: 750 INJECTION, POWDER, LYOPHILIZED, FOR SOLUTION INTRAVENOUS at 08:17

## 2025-07-13 RX ADMIN — LEVOTHYROXINE SODIUM 100 MCG: 100 TABLET ORAL at 06:21

## 2025-07-13 RX ADMIN — ROSUVASTATIN CALCIUM 20 MG: 20 TABLET, FILM COATED ORAL at 08:17

## 2025-07-13 RX ADMIN — HEPARIN SODIUM 1300 UNITS/HR: 10000 INJECTION, SOLUTION INTRAVENOUS at 00:07

## 2025-07-13 RX ADMIN — APIXABAN 5 MG: 5 TABLET, FILM COATED ORAL at 20:28

## 2025-07-13 RX ADMIN — DIPHENOXYLATE HYDROCHLORIDE AND ATROPINE SULFATE 1 TABLET: 2.5; .025 TABLET ORAL at 09:09

## 2025-07-13 RX ADMIN — ACETAMINOPHEN 975 MG: 325 TABLET, FILM COATED ORAL at 20:27

## 2025-07-13 RX ADMIN — PIPERACILLIN SODIUM AND TAZOBACTAM SODIUM 3.38 G: 3; .375 INJECTION, SOLUTION INTRAVENOUS at 17:56

## 2025-07-13 RX ADMIN — FERROUS SULFATE TAB 325 MG (65 MG ELEMENTAL FE) 1 TABLET: 325 (65 FE) TAB at 08:17

## 2025-07-13 RX ADMIN — HYDROMORPHONE HYDROCHLORIDE 0.5 MG: 1 INJECTION, SOLUTION INTRAMUSCULAR; INTRAVENOUS; SUBCUTANEOUS at 15:30

## 2025-07-13 RX ADMIN — Medication 1 CAPSULE: at 08:17

## 2025-07-13 RX ADMIN — APIXABAN 5 MG: 5 TABLET, FILM COATED ORAL at 08:56

## 2025-07-13 RX ADMIN — ACETAMINOPHEN 975 MG: 325 TABLET, FILM COATED ORAL at 06:21

## 2025-07-13 RX ADMIN — PIPERACILLIN SODIUM AND TAZOBACTAM SODIUM 3.38 G: 3; .375 INJECTION, SOLUTION INTRAVENOUS at 06:21

## 2025-07-13 RX ADMIN — Medication 125 MCG: at 08:17

## 2025-07-13 RX ADMIN — PIPERACILLIN SODIUM AND TAZOBACTAM SODIUM 3.38 G: 3; .375 INJECTION, SOLUTION INTRAVENOUS at 12:06

## 2025-07-13 RX ADMIN — PANTOPRAZOLE SODIUM 40 MG: 40 TABLET, DELAYED RELEASE ORAL at 06:21

## 2025-07-13 ASSESSMENT — PAIN - FUNCTIONAL ASSESSMENT
PAIN_FUNCTIONAL_ASSESSMENT: 0-10
PAIN_FUNCTIONAL_ASSESSMENT: 0-10

## 2025-07-13 ASSESSMENT — COGNITIVE AND FUNCTIONAL STATUS - GENERAL
HELP NEEDED FOR BATHING: A LITTLE
DAILY ACTIVITIY SCORE: 18
DRESSING REGULAR LOWER BODY CLOTHING: A LITTLE
CLIMB 3 TO 5 STEPS WITH RAILING: A LOT
DRESSING REGULAR UPPER BODY CLOTHING: A LOT
MOBILITY SCORE: 17
MOVING FROM LYING ON BACK TO SITTING ON SIDE OF FLAT BED WITH BEDRAILS: A LITTLE
PERSONAL GROOMING: A LITTLE
TURNING FROM BACK TO SIDE WHILE IN FLAT BAD: A LITTLE
WALKING IN HOSPITAL ROOM: A LITTLE
TOILETING: A LITTLE
MOVING TO AND FROM BED TO CHAIR: A LITTLE
STANDING UP FROM CHAIR USING ARMS: A LITTLE

## 2025-07-13 ASSESSMENT — PAIN SCALES - GENERAL
PAINLEVEL_OUTOF10: 0 - NO PAIN
PAINLEVEL_OUTOF10: 0 - NO PAIN

## 2025-07-13 NOTE — PROGRESS NOTES
Frances Mcdowell is a 54 y.o. female on day 3 of admission presenting with Cellulitis.    Subjective   Interval History: no fever, has diarrhea, less pain        Review of Systems    Objective   Range of Vitals (last 24 hours)  Heart Rate:  [76-92]   Temp:  [36.3 °C (97.3 °F)-36.7 °C (98.1 °F)]   Resp:  [12-22]   BP: ()/(61-88)   SpO2:  [91 %-100 %]   Daily Weight  07/09/25 : 134 kg (296 lb 3.2 oz)    Body mass index is 52.47 kg/m².    Physical Exam  Constitutional:       Appearance: Normal appearance.   HENT:      Head: Normocephalic and atraumatic.      Mouth/Throat:      Mouth: Mucous membranes are moist.      Pharynx: Oropharynx is clear.   Eyes:      Pupils: Pupils are equal, round, and reactive to light.   Cardiovascular:      Rate and Rhythm: Normal rate and regular rhythm.      Heart sounds: Normal heart sounds.   Pulmonary:      Effort: Pulmonary effort is normal.      Breath sounds: Normal breath sounds.   Abdominal:      General: Abdomen is flat. Bowel sounds are normal.      Palpations: Abdomen is soft.   Musculoskeletal:      Cervical back: Normal range of motion.      Comments: Legs dressing, photos reviewed   Neurological:      Mental Status: She is alert.         Antibiotics  piperacillin-tazobactam - 3.375 gram/50 mL  vancomycin  vancomycin (Vancocin)  mg in 150 mL D5W    Relevant Results  Labs  Results from last 72 hours   Lab Units 07/13/25  1050 07/12/25  0616 07/12/25  0042   WBC AUTO x10*3/uL 8.8 7.6 7.7   HEMOGLOBIN g/dL 8.6* 9.0* 7.8*   HEMATOCRIT % 27.5* 29.7* 25.8*   PLATELETS AUTO x10*3/uL 443 459* 459*     Results from last 72 hours   Lab Units 07/13/25  0628 07/12/25  0616 07/11/25  0516   SODIUM mmol/L 140 140 138   POTASSIUM mmol/L 3.7 3.6 3.7   CHLORIDE mmol/L 107 106 106   CO2 mmol/L 25 26 28   BUN mg/dL 10 11 10   CREATININE mg/dL 0.89 0.89 0.95   GLUCOSE mg/dL 80 85 83   CALCIUM mg/dL 8.4* 8.1* 7.6*   ANION GAP mmol/L 12 12 8*   EGFR mL/min/1.73m*2 76 76 70     Results from  last 72 hours   Lab Units 07/13/25  0628   ALK PHOS U/L 74   BILIRUBIN TOTAL mg/dL 0.3   PROTEIN TOTAL g/dL 6.2*   ALT U/L 3*   AST U/L 10   ALBUMIN g/dL 2.5*     Estimated Creatinine Clearance: 94.7 mL/min (by C-G formula based on SCr of 0.89 mg/dL).  C-Reactive Protein   Date Value Ref Range Status   07/10/2025 11.35 (H) <1.00 mg/dL Final   07/09/2025 12.98 (H) <1.00 mg/dL Final   06/15/2025 22.53 (H) <1.00 mg/dL Final     Microbiology  Reviewed  Imaging  Reviewed        Assessment/Plan   Legs stasis / wounds / Rt leg abscess, attempt US drainage found no collection to drain , sp I&D  Pyuria, asymptomatic     Recommendations :  Continue Zosyn  Discussed with the medical team     I spent minutes in the professional and overall care of this patient.          Miguel Hu MD

## 2025-07-13 NOTE — CARE PLAN
The patient's goals for the shift include  pt will remain hemodynamically stable this shift.  Problem: Pain - Adult  Goal: Verbalizes/displays adequate comfort level or baseline comfort level  Outcome: Progressing  Flowsheets (Taken 7/13/2025 0944)  Verbalizes/displays adequate comfort level or baseline comfort level: Encourage patient to monitor pain and request assistance     Problem: Safety - Adult  Goal: Free from fall injury  Outcome: Progressing     Problem: Discharge Planning  Goal: Discharge to home or other facility with appropriate resources  Outcome: Progressing     Problem: Chronic Conditions and Co-morbidities  Goal: Patient's chronic conditions and co-morbidity symptoms are monitored and maintained or improved  Outcome: Progressing     Problem: Nutrition  Goal: Nutrient intake appropriate for maintaining nutritional needs  Outcome: Progressing     Problem: Skin  Goal: Decreased wound size/increased tissue granulation at next dressing change  Outcome: Progressing  Goal: Participates in plan/prevention/treatment measures  Outcome: Progressing  Goal: Prevent/manage excess moisture  Outcome: Progressing  Goal: Prevent/minimize sheer/friction injuries  Outcome: Progressing  Goal: Promote/optimize nutrition  Outcome: Progressing  Goal: Promote skin healing  Outcome: Progressing

## 2025-07-13 NOTE — PROGRESS NOTES
Patient: Frances Mcdowell  Room/bed: 231/231-A  Admitted on: 7/9/2025    Age: 54 y.o.   Gender: female  Code Status:  Full Code   Admitting Dx: Cellulitis of right lower extremity [L03.115]  Cellulitis [L03.90]    MRN: [id number]  PCP: Devaughn Moreno MD       Subjective   Wants to know 'the plan' , when she can go home etc. Feeling fine    Objective    Physical Exam  Constitutional:       Appearance: Normal appearance.   HENT:      Head: Normocephalic.      Nose: Nose normal.      Mouth/Throat:      Mouth: Mucous membranes are moist.   Eyes:      Extraocular Movements: Extraocular movements intact.      Pupils: Pupils are equal, round, and reactive to light.   Cardiovascular:      Pulses: Normal pulses.      Comments: Regular, 2/6 low pitched harsh systolic murmur  Pulmonary:      Effort: Pulmonary effort is normal.      Breath sounds: Normal breath sounds.   Abdominal:      General: Bowel sounds are normal.      Palpations: Abdomen is soft.   Musculoskeletal:      Comments: Dressing intact   Skin:     General: Skin is warm and dry.   Neurological:      Mental Status: She is alert and oriented to person, place, and time. Mental status is at baseline.   Psychiatric:         Mood and Affect: Mood normal.         Behavior: Behavior normal.          Temp:  [36.3 °C (97.3 °F)-36.7 °C (98.1 °F)] 36.7 °C (98.1 °F)  Heart Rate:  [76-92] 88  Resp:  [12-22] 18  BP: ()/(61-90) 148/90    Vitals:    07/09/25 2059   Weight: 134 kg (296 lb 3.2 oz)           I/Os    Intake/Output Summary (Last 24 hours) at 7/13/2025 1347  Last data filed at 7/13/2025 1206  Gross per 24 hour   Intake 950 ml   Output 1125 ml   Net -175 ml       Labs:   Results from last 72 hours   Lab Units 07/13/25  0628 07/12/25  0616 07/11/25  0516   SODIUM mmol/L 140 140 138   POTASSIUM mmol/L 3.7 3.6 3.7   CHLORIDE mmol/L 107 106 106   CO2 mmol/L 25 26 28   BUN mg/dL 10 11 10   CREATININE mg/dL 0.89 0.89 0.95   GLUCOSE mg/dL 80 85 83   CALCIUM mg/dL 8.4* 8.1*  "7.6*   ANION GAP mmol/L 12 12 8*   EGFR mL/min/1.73m*2 76 76 70      Results from last 72 hours   Lab Units 07/13/25  1050 07/12/25  0616 07/12/25  0042   WBC AUTO x10*3/uL 8.8 7.6 7.7   HEMOGLOBIN g/dL 8.6* 9.0* 7.8*   HEMATOCRIT % 27.5* 29.7* 25.8*   PLATELETS AUTO x10*3/uL 443 459* 459*      Lab Results   Component Value Date    CALCIUM 8.4 (L) 07/13/2025    PHOS 4.3 06/22/2025      Lab Results   Component Value Date    CRP 11.35 (H) 07/10/2025      [unfilled]     Micro/ID:   Susceptibility data from last 90 days.  Collected Specimen Info Organism Aztreonam Cefepime Ceftazidime Ciprofloxacin Levofloxacin Piperacillin/Tazobactam Tobramycin   07/09/25 Swab from Anterior Nares Methicillin Susceptible Staphylococcus aureus (MSSA)          06/17/25 Swab from ABSCESS Pseudomonas aeruginosa  S  S  S  R  R  S  S     Mixed Gram-Negative Bacteria           06/15/25 Tissue/Biopsy from Wound/Tissue Pseudomonas aeruginosa  S S S  R  R  S  S     Mixed Anaerobic Bacteria            Mixed Gram-Positive and Gram-Negative Bacteria                           No lab exists for component: \"AGALPCRNB\"   .ID  Lab Results   Component Value Date    URINECULTURE  07/09/2025     Growth indicates contamination with mixed bacterial tylor. Repeat culture if clinically indicated.    BLOODCULT No growth at 3 days 07/09/2025    BLOODCULT No growth at 3 days 07/09/2025       Images:  ECG 12 Lead  Normal sinus rhythm  Normal ECG  When compared with ECG of 20-JUN-2025 12:47, (unconfirmed)  No significant change was found       Meds    Scheduled medications  Scheduled Medications[1]  Continuous medications  Continuous Medications[2]  PRN medications  PRN Medications[3]     Assessment and Plan    Frances Mcdowell is a 54 y.o. female   POD 1, I&D, removal of necrotic adipose tissue. No cultures sent. Continue zosyn. Continue dressing changes, see how she tolerates  Chronic lymphedema  Hx saddle PE. Surgery ok with resuming eliquis. Heparin to be " stopped  Hyperlipidemia  GERD, continue PPI  Will need therapy cruz Willingham MD         [1] acetaminophen, 975 mg, oral, q8h  [Held by provider] amphetamine-dextroamphetamine, 20 mg, oral, BID  apixaban, 5 mg, oral, BID  cholecalciferol, 125 mcg, oral, Daily  [Held by provider] docusate sodium, 100 mg, oral, BID  ferrous sulfate, 65 mg of elemental iron, oral, Daily with breakfast  lactobacillus acidophilus, 1 capsule, oral, Daily  levothyroxine, 100 mcg, oral, Daily  pantoprazole, 40 mg, oral, Daily before breakfast  piperacillin-tazobactam, 3.375 g, intravenous, q6h  [Held by provider] polyethylene glycol, 17 g, oral, Daily  rosuvastatin, 20 mg, oral, Daily  [2]    [3] PRN medications: diphenoxylate-atropine, HYDROmorphone, hydrOXYzine HCL, melatonin, naloxone, oxyCODONE, oxyCODONE

## 2025-07-13 NOTE — PROGRESS NOTES
General Surgery/Trauma Inpatient Progress Note    Interval History:  Frances Mcdowell is a 54 y.o. female    Pain minimal   Feels well       Past Medical History:  Medical History[1]     Past Surgical History:  Surgical History[2]     Medications:  Current Outpatient Medications   Medication Instructions    acetaminophen (TYLENOL) 650 mg, oral, 3 times daily PRN    amphetamine-dextroamphetamine XR (Adderall XR) 20 mg 24 hr capsule 20 mg, oral, 2 times daily    cholecalciferol (VITAMIN D-3) 125 mcg, oral, Daily    Eliquis 5 mg, oral, 2 times daily    EPINEPHrine (EPIPEN) 0.3 mg, injection, As needed, As Directed    hydrOXYzine HCL (ATARAX) 25 mg, oral, Every 6 hours PRN    levothyroxine (SYNTHROID, LEVOXYL) 100 mcg, oral, Daily, Take on an empty stomach at the same time each day, either 30 to 60 minutes prior to breakfast    naloxone (NARCAN) 4 mg, nasal, As needed, May repeat every 2-3 minutes if needed, alternating nostrils, until medical assistance becomes available.    Nyamyc 100,000 unit/gram powder Topical, Daily    omeprazole (PRILOSEC) 40 mg, oral, Daily    oxyCODONE (ROXICODONE) 5 mg, oral, Every 72 hours PRN    oxyCODONE-acetaminophen (Percocet)  mg tablet 1 tablet, oral, Daily    rosuvastatin (CRESTOR) 20 mg, oral, Daily    triamcinolone (Kenalog) 0.1 % cream Apply to affected area 1-2 times daily as needed.        Allergies:  RX Allergies[3]     Family History:  Family History[4]     Social History:  Social History[5]     Review of Systems:  A complete 12 point review of systems was performed. It is otherwise negative except as noted in the above interval history.     Vital Signs:  Vitals:    07/13/25 0719   BP: 129/88   Pulse: 85   Resp: 19   Temp: 36.7 °C (98.1 °F)   SpO2: 99%      Body mass index is 52.47 kg/m².      Physical Exam:  General: No acute distress.   Neuro: Alert and oriented ×3. Follows commands.  Face: Atraumatic, no visible skin lesion.   Head: Atraumatic  Eyes: Pupils equal reactive  to light. Extraocular motions intact.  Ears: Hears normal speaking voice.  Mouth, Nose, Throat: Mucous membranes moist.    Neck: Supple. No visible masses.  Breast: Not examined.   Lung: Patent airway and no labored breath.   Rectal and Perianal: Not examined.   Genitourinary: Not examined.   Musculoskeletal: Moves all extremities.    Extremities:   Right leg dressing in place. No sign of bleeding.   Skin: No rashes or lesions.  Psychological: Normal affect      Laboratory Values:  CBC:   Lab Results   Component Value Date    WBC 7.6 07/12/2025    RBC 3.15 (L) 07/12/2025    HGB 9.0 (L) 07/12/2025    HCT 29.7 (L) 07/12/2025     (H) 07/12/2025       RFP:   Lab Results   Component Value Date     07/13/2025    K 3.7 07/13/2025     07/13/2025    CO2 25 07/13/2025    BUN 10 07/13/2025    CREATININE 0.89 07/13/2025    CALCIUM 8.4 (L) 07/13/2025        LFTs:   Lab Results   Component Value Date    PROT 6.2 (L) 07/13/2025    ALBUMIN 2.5 (L) 07/13/2025    BILITOT 0.3 07/13/2025    ALKPHOS 74 07/13/2025    AST 10 07/13/2025    ALT 3 (L) 07/13/2025            Imaging:  I have personally reviewed the images and the radiologist's report.  Imaging  CT tibia fibula right w IV contrast  Result Date: 7/9/2025  Extensive network of interconnected multiloculated gas containing fluid collections in the subcutaneous fat of the posterior leg. Some demonstrate peripheral enhancement and are suspicious for abscesses in the appropriate clinical context. For example there is a 7.2 cm x 2.2 cm right 1.9 cm gas containing collection (coronal image 31, series 206; axial image 102, series 201). There is a confluent area of multiloculated collections slightly more medial in the right calf that measures 8.4 cm x 2.2 cm x 6.4 cm in overall dimension likely consisting of multiple smaller loculated fluid pocket some containing gas (coronal image 49, series 206; axial image 88, series 201).   There is a soft tissue defect with  packing material within the mid aspect of the leg just medial to the tibia. Is soft tissue defect extends 3.2 cm deep to the skin surface. This defect is contiguous with a loculated fluid collection along the deep muscular fascia and tibial diaphysis that measures approximately 7.5 cm x 7 cm x 1.7 cm (coronal image 75, axial image 64) and raises concern for either abscess or confluent edema in the setting of cellulitis.     No evidence of osteomyelitis.     Severe right knee osteoarthrosis.   MACRO: None.   Signed by: Joaquín Casarez 7/9/2025 9:31 PM Dictation workstation:   [id number]      Cardiology, Vascular, and Other Imaging  ECG 12 Lead  Result Date: 7/10/2025  Normal sinus rhythm Normal ECG When compared with ECG of 20-JUN-2025 12:47, (unconfirmed) No significant change was found          Assessment:  This is a 54 y.o. female who has below conditions:   Comorbidities   BMI 52  Lymphedema   Right leg open wounds, infection and I and D of abscesses 7/12    Plan:  Supportive care per medicine   Abx per ID   Wound care and dressing change per order. Wound RN following         Jordan Patricia MD [facility]  General Surgery  Office: [phone]  Fax:     [phone]  10:15 AM   07/13/25        [1]   Past Medical History:  Diagnosis Date    Acute embolism and thrombosis of other specified veins 08/2022    Superficial vein thrombosis    Acute on chronic diastolic heart failure 2024    BNP level of 258 in 2024    ADD (attention deficit disorder)     ADHD (attention deficit hyperactivity disorder)     Allergic     Anxiety     Cellulitis     Chronic pain disorder 6/24    Chronic wound     chronic lower extremity wounds (hx of pseudomonas and MRSA)    Class 3 severe obesity with body mass index (BMI) of 50.0 to 59.9 in adult     Current use of long term anticoagulation     on Eliquis    DVT (deep venous thrombosis) (Multi) 03/24/2025    unprovoked DVT    Endometrial intraepithelial neoplasia (EIN)     Extremity pain 6/24     GERD (gastroesophageal reflux disease)     HLD (hyperlipidemia)     Hypothyroidism     s/p thyroidectomy    Kidney stone 2025    Left kidney    Lymph edema     Moderate tricuspid regurgitation     PE (pulmonary thromboembolism) (Multi) 2025    Unprovoked Saddle PE on Eliquis    Peripheral neuropathy     Recurrent cellulitis of lower extremity     (hx of pseudomonas and MRSA)    Skin cancer     Varicosities     Venous insufficiency    [2]   Past Surgical History:  Procedure Laterality Date    ADENOIDECTOMY      BLADDER SURGERY       SECTION, LOW TRANSVERSE      COLONOSCOPY  2024    EPIDURAL BLOCK INJECTION      FOOT SURGERY      HYSTERECTOMY      IR CVC PICC  2025    IR CVC PICC 2025 TRI CR NONV1    OTHER SURGICAL HISTORY  2025    DIAGNOSTIC BILATERAL LOWER VENOGRAM WITH INTRAVASCULAR ULTRASOUND    TONSILLECTOMY      TOTAL THYROIDECTOMY      VARICOSE VEIN SURGERY      Varicose Vein Ligation    WOUND DEBRIDEMENT  2025   [3]   Allergies  Allergen Reactions    Erythromycin Base Anaphylaxis    Peanut Anaphylaxis    Sulfa (Sulfonamide Antibiotics) Anaphylaxis    Tetanus Toxoid Other and Swelling    Morphine Itching    Tramadol Itching    Adhesive Tape-Silicones Rash     tegaderm causes bad skin breakdown   [4]   Family History  Problem Relation Name Age of Onset    Arthritis Mother Esperanza     COPD Mother Esperanza     Learning disabilities Son Tyson Aries     Intellectual Disability Son Tyson Aries     Developmental delay Son Tyson Aries     Seizures Son Tyson Aries     Learning disabilities Son Tyson Aries     Intellectual Disability Son Tyson Aries     Learning disabilities Son Tyson Aries     Intellectual Disability Son Tyson Aries     Learning disabilities Son Tyson Aries     Intellectual Disability Son Tyson Aries    [5]   Social History  Socioeconomic History    Marital status:    Tobacco Use    Smoking status: Never     Smokeless tobacco: Never   Vaping Use    Vaping status: Never Used   Substance and Sexual Activity    Alcohol use: Not Currently     Comment: seldom    Drug use: Never    Sexual activity: Yes     Partners: Male     Birth control/protection: None     Social Drivers of Health     Financial Resource Strain: High Risk (7/10/2025)    Overall Financial Resource Strain (CARDIA)     Difficulty of Paying Living Expenses: Hard   Food Insecurity: Food Insecurity Present (7/9/2025)    Hunger Vital Sign     Worried About Running Out of Food in the Last Year: Sometimes true     Ran Out of Food in the Last Year: Sometimes true   Transportation Needs: No Transportation Needs (7/10/2025)    PRAPARE - Transportation     Lack of Transportation (Medical): No     Lack of Transportation (Non-Medical): No   Physical Activity: Inactive (10/31/2024)    Exercise Vital Sign     Days of Exercise per Week: 0 days     Minutes of Exercise per Session: 0 min   Stress: Stress Concern Present (10/31/2024)    [place] [employer] [employer] - Occupational Stress Questionnaire     Feeling of Stress : To some extent   Social Connections: Feeling Socially Integrated (6/24/2025)    OASIS : Social Isolation     Frequency of experiencing loneliness or isolation: Never   Intimate Partner Violence: Not At Risk (7/9/2025)    Humiliation, Afraid, Rape, and Kick questionnaire     Fear of Current or Ex-Partner: No     Emotionally Abused: No     Physically Abused: No     Sexually Abused: No   Housing Stability: Low Risk  (7/10/2025)    Housing Stability Vital Sign     Unable to Pay for Housing in the Last Year: No     Number of Times Moved in the Last Year: 0     Homeless in the Last Year: No

## 2025-07-13 NOTE — PROGRESS NOTES
"Vancomycin Dosing by Pharmacy- FOLLOW UP    Frances Mcdowell is a 54 y.o. year old female who Pharmacy has been consulted for vancomycin dosing for cellulitis, skin and soft tissue. Based on the patient's indication and renal status this patient is being dosed based on a goal AUC of 400-600.     Renal function is currently stable, SCR 0.89, CRCL 94.7.    Current vancomycin dose: 750 mg given every 12 hours    Most recent random level: 21.6 mcg/mL on  at 0628 hrs.    Visit Vitals  /88 (BP Location: Right arm)   Pulse 85   Temp 36.7 °C (98.1 °F) (Temporal)   Resp 19        Lab Results   Component Value Date    CREATININE 0.89 2025    CREATININE 0.89 2025    CREATININE 0.95 2025    CREATININE 0.90 07/10/2025    CREATININE 1.20 (H) 2025        Patient weight is as follows:   Vitals:    25   Weight: 134 kg (296 lb 3.2 oz)       Cultures:  Susceptibility data for the encounter in last 14 days.  Collected Specimen Info Organism   25 Swab from Anterior Nares Methicillin Susceptible Staphylococcus aureus (MSSA)        I/O last 3 completed shifts:  In: 770 (5.7 mL/kg) [P.O.:220; I.V.:100 (0.7 mL/kg); IV Piggyback:450]  Out: 2625 (19.5 mL/kg) [Urine:2625 (0.5 mL/kg/hr)]  Weight: 134.4 kg   I/O during current shift:  I/O this shift:  In: 330 [IV Piggyback:330]  Out: -     Temp (24hrs), Av.4 °C (97.6 °F), Min:36.3 °C (97.3 °F), Max:36.7 °C (98.1 °F)      Assessment/Plan    Within goal AUC range. Continue current vancomycin regimen.    This dosing regimen is predicted by [employer] to result in the following pharmacokinetic parameters:    \"Loading dose: N/A  Regimen: 750 mg IV every 12 hours.  Start time: 20:17 on 2025  Exposure target: AUC24 (range) 400-600 mg/L.hr   [id number]: 507 mg/L.hr  AUC24,ss: 485 mg/L.hr  Probability of AUC24 > 400: 100 %  Ctrough,ss: 17.2 mg/L  Probability of Ctrough,ss > 20: 4 %\"    The next level will be obtained on 7/15 at 0500hrs. May be " obtained sooner if clinically indicated.   Will continue to monitor renal function daily while on vancomycin and order serum creatinine at least every 48 hours if not already ordered.  Follow for continued vancomycin needs, clinical response, and signs/symptoms of toxicity.       Aditya Nuñez, NoeD

## 2025-07-14 ENCOUNTER — HOME CARE VISIT (OUTPATIENT)
Dept: HOME HEALTH SERVICES | Facility: HOME HEALTH | Age: 55
End: 2025-07-14
Payer: COMMERCIAL

## 2025-07-14 LAB
ANION GAP SERPL CALC-SCNC: 12 MMOL/L (ref 10–20)
BACTERIA BLD CULT: NORMAL
BACTERIA BLD CULT: NORMAL
BUN SERPL-MCNC: 11 MG/DL (ref 6–23)
CALCIUM SERPL-MCNC: 8.3 MG/DL (ref 8.6–10.3)
CHLORIDE SERPL-SCNC: 106 MMOL/L (ref 98–107)
CO2 SERPL-SCNC: 27 MMOL/L (ref 21–32)
CREAT SERPL-MCNC: 0.92 MG/DL (ref 0.5–1.05)
EGFRCR SERPLBLD CKD-EPI 2021: 73 ML/MIN/1.73M*2
ERYTHROCYTE [DISTWIDTH] IN BLOOD BY AUTOMATED COUNT: 15.8 % (ref 11.5–14.5)
GLUCOSE SERPL-MCNC: 90 MG/DL (ref 74–99)
HCT VFR BLD AUTO: 29 % (ref 36–46)
HGB BLD-MCNC: 8.9 G/DL (ref 12–16)
MCH RBC QN AUTO: 28.4 PG (ref 26–34)
MCHC RBC AUTO-ENTMCNC: 30.7 G/DL (ref 32–36)
MCV RBC AUTO: 93 FL (ref 80–100)
NRBC BLD-RTO: 0 /100 WBCS (ref 0–0)
PLATELET # BLD AUTO: 459 X10*3/UL (ref 150–450)
POTASSIUM SERPL-SCNC: 3.7 MMOL/L (ref 3.5–5.3)
RBC # BLD AUTO: 3.13 X10*6/UL (ref 4–5.2)
SODIUM SERPL-SCNC: 141 MMOL/L (ref 136–145)
WBC # BLD AUTO: 6.8 X10*3/UL (ref 4.4–11.3)

## 2025-07-14 PROCEDURE — 2500000001 HC RX 250 WO HCPCS SELF ADMINISTERED DRUGS (ALT 637 FOR MEDICARE OP): Performed by: SURGERY

## 2025-07-14 PROCEDURE — 36415 COLL VENOUS BLD VENIPUNCTURE: CPT | Performed by: SURGERY

## 2025-07-14 PROCEDURE — 85027 COMPLETE CBC AUTOMATED: CPT | Performed by: SURGERY

## 2025-07-14 PROCEDURE — 99233 SBSQ HOSP IP/OBS HIGH 50: CPT | Performed by: PHYSICIAN ASSISTANT

## 2025-07-14 PROCEDURE — 1100000001 HC PRIVATE ROOM DAILY

## 2025-07-14 PROCEDURE — 2500000002 HC RX 250 W HCPCS SELF ADMINISTERED DRUGS (ALT 637 FOR MEDICARE OP, ALT 636 FOR OP/ED): Performed by: SURGERY

## 2025-07-14 PROCEDURE — 2500000001 HC RX 250 WO HCPCS SELF ADMINISTERED DRUGS (ALT 637 FOR MEDICARE OP): Performed by: INTERNAL MEDICINE

## 2025-07-14 PROCEDURE — 80048 BASIC METABOLIC PNL TOTAL CA: CPT | Performed by: INTERNAL MEDICINE

## 2025-07-14 PROCEDURE — 2500000004 HC RX 250 GENERAL PHARMACY W/ HCPCS (ALT 636 FOR OP/ED): Performed by: SURGERY

## 2025-07-14 PROCEDURE — 2500000004 HC RX 250 GENERAL PHARMACY W/ HCPCS (ALT 636 FOR OP/ED): Mod: JZ | Performed by: PHYSICIAN ASSISTANT

## 2025-07-14 PROCEDURE — 2500000002 HC RX 250 W HCPCS SELF ADMINISTERED DRUGS (ALT 637 FOR MEDICARE OP, ALT 636 FOR OP/ED): Performed by: INTERNAL MEDICINE

## 2025-07-14 RX ADMIN — HYDROXYZINE HYDROCHLORIDE 25 MG: 25 TABLET, FILM COATED ORAL at 17:42

## 2025-07-14 RX ADMIN — ROSUVASTATIN CALCIUM 20 MG: 20 TABLET, FILM COATED ORAL at 08:30

## 2025-07-14 RX ADMIN — FERROUS SULFATE TAB 325 MG (65 MG ELEMENTAL FE) 1 TABLET: 325 (65 FE) TAB at 08:30

## 2025-07-14 RX ADMIN — DIPHENOXYLATE HYDROCHLORIDE AND ATROPINE SULFATE 1 TABLET: 2.5; .025 TABLET ORAL at 10:32

## 2025-07-14 RX ADMIN — PIPERACILLIN SODIUM AND TAZOBACTAM SODIUM 3.38 G: 3; .375 INJECTION, SOLUTION INTRAVENOUS at 00:33

## 2025-07-14 RX ADMIN — PIPERACILLIN SODIUM AND TAZOBACTAM SODIUM 3.38 G: 3; .375 INJECTION, SOLUTION INTRAVENOUS at 05:45

## 2025-07-14 RX ADMIN — HYDROMORPHONE HYDROCHLORIDE 0.5 MG: 1 INJECTION, SOLUTION INTRAMUSCULAR; INTRAVENOUS; SUBCUTANEOUS at 12:03

## 2025-07-14 RX ADMIN — APIXABAN 5 MG: 5 TABLET, FILM COATED ORAL at 21:02

## 2025-07-14 RX ADMIN — APIXABAN 5 MG: 5 TABLET, FILM COATED ORAL at 08:30

## 2025-07-14 RX ADMIN — Medication 1 CAPSULE: at 08:30

## 2025-07-14 RX ADMIN — PIPERACILLIN SODIUM AND TAZOBACTAM SODIUM 3.38 G: 3; .375 INJECTION, SOLUTION INTRAVENOUS at 11:44

## 2025-07-14 RX ADMIN — HYDROMORPHONE HYDROCHLORIDE 0.5 MG: 1 INJECTION, SOLUTION INTRAMUSCULAR; INTRAVENOUS; SUBCUTANEOUS at 15:02

## 2025-07-14 RX ADMIN — DIPHENOXYLATE HYDROCHLORIDE AND ATROPINE SULFATE 1 TABLET: 2.5; .025 TABLET ORAL at 17:40

## 2025-07-14 RX ADMIN — ACETAMINOPHEN 975 MG: 325 TABLET, FILM COATED ORAL at 05:45

## 2025-07-14 RX ADMIN — PANTOPRAZOLE SODIUM 40 MG: 40 TABLET, DELAYED RELEASE ORAL at 05:45

## 2025-07-14 RX ADMIN — LEVOTHYROXINE SODIUM 100 MCG: 100 TABLET ORAL at 05:45

## 2025-07-14 RX ADMIN — Medication 125 MCG: at 08:30

## 2025-07-14 RX ADMIN — ACETAMINOPHEN 975 MG: 325 TABLET, FILM COATED ORAL at 15:02

## 2025-07-14 ASSESSMENT — PAIN SCALES - GENERAL
PAINLEVEL_OUTOF10: 0 - NO PAIN
PAINLEVEL_OUTOF10: 0 - NO PAIN

## 2025-07-14 ASSESSMENT — PAIN - FUNCTIONAL ASSESSMENT
PAIN_FUNCTIONAL_ASSESSMENT: 0-10
PAIN_FUNCTIONAL_ASSESSMENT: 0-10

## 2025-07-14 NOTE — PROGRESS NOTES
Frances Mcdowell is a 54 y.o. female on day 4 of admission presenting with Cellulitis.      Subjective   Feeling much better this morning. She really wants to go home today. Tolerating meals, ambulating to bathroom, moving bowels. Pain is controlled.       Objective     Last Recorded Vitals  /90 (BP Location: Other (Comment), Patient Position: Lying) Comment (BP Location): left forearm  Pulse 82   Temp 36.6 °C (97.9 °F) (Temporal)   Resp 16   Wt 134 kg (296 lb 3.2 oz)   SpO2 97%   Intake/Output last 3 Shifts:    Intake/Output Summary (Last 24 hours) at 7/14/2025 1018  Last data filed at 7/14/2025 0808  Gross per 24 hour   Intake 150 ml   Output 1850 ml   Net -1700 ml       Admission Weight  Weight: 132 kg (290 lb) (07/09/25 1642)    Daily Weight  07/09/25 : 134 kg (296 lb 3.2 oz)    Image Results  ECG 12 Lead  Normal sinus rhythm  Normal ECG  When compared with ECG of 20-JUN-2025 12:47, (unconfirmed)  No significant change was found      Physical Exam  Physical Exam  Gen: Moderate distress 2/2 anxiety, tearful  Eyes:  EOM intact  ENT: MMM  Neck: No JVD  Respiratory: CTAB, no wheezes/rhonchi  Cardiac: RRR, +SM  Abdomen: soft, NT, +BS  Extremities: BLEs with dressings intact  Neuro: No focal deficits, alert and oriented x 3  Psych:  anxious, tearful    Assessment & Plan  Cellulitis      Cellulitis of right lower extremity    Abscess of leg, right    Right leg abscess with chronic bilateral wounds  -s/p I&D with Dr Patricia on 7/12  -continue Zosyn  -elevate legs  -ID consult appreciated>continue zosyn for now, will look at wounds during dressing change today for further plan  -Podiatry consult appreciated  -Eliquis resumed    Pyuria  -endorses frequency  -culture not helpful  -on Zosyn    Anemia  -Hgb 10-11 earlier this year  -Hgb stable but lower than baseline  -Iron studies indicate SHAKIR  -stool guaiac neg  -PPI  -oral iron    Recent history of saddle Pes  -diagnosed in March  2025  -Eliquis    Hypothyroid  -synthroid    Anxiety  -hydroxyzine     DVT prophy  -Eliquis    Dispo: Continue IV Zosyn, Dr Hu to evaluate wounds during change today, staff states she is getting up to bathroom with stand-by assist    Janet Xiong PA-C  D/w Dr. Willingham

## 2025-07-14 NOTE — CARE PLAN
The patient's goals for the shift include  Problem: Pain - Adult  Goal: Verbalizes/displays adequate comfort level or baseline comfort level  Outcome: Progressing     Problem: Safety - Adult  Goal: Free from fall injury  Outcome: Progressing     Problem: Discharge Planning  Goal: Discharge to home or other facility with appropriate resources  Outcome: Progressing     Problem: Chronic Conditions and Co-morbidities  Goal: Patient's chronic conditions and co-morbidity symptoms are monitored and maintained or improved  Outcome: Progressing     Problem: Nutrition  Goal: Nutrient intake appropriate for maintaining nutritional needs  Outcome: Progressing     Problem: Skin  Goal: Decreased wound size/increased tissue granulation at next dressing change  Outcome: Progressing  Goal: Participates in plan/prevention/treatment measures  Outcome: Progressing  Goal: Prevent/manage excess moisture  Outcome: Progressing  Goal: Prevent/minimize sheer/friction injuries  Outcome: Progressing  Goal: Promote/optimize nutrition  Outcome: Progressing  Goal: Promote skin healing  Outcome: Progressing

## 2025-07-14 NOTE — CARE PLAN
The patient's goals for the shift include resting throughout the night     The clinical goals for the shift include pt will remain HDS throughout the shift

## 2025-07-14 NOTE — PROGRESS NOTES
"General/Trauma Surgery Daily Progress Note    Subjective   Doing well. Pain controlled, denies fevers or chills. Dressing changes yesterday with mild pain but otherwise no complaints.       Objective   Last Recorded Vitals:  Blood pressure 142/90, pulse 82, temperature 36.6 °C (97.9 °F), temperature source Temporal, resp. rate 16, height 1.6 m (5' 3\"), weight 134 kg (296 lb 3.2 oz), SpO2 97%.    Intake/Output last 3 Shifts:  I/O last 3 completed shifts:  In: 480 (3.6 mL/kg) [IV Piggyback:480]  Out: 2150 (16 mL/kg) [Urine:2150 (0.4 mL/kg/hr)]  Weight: 134.4 kg     Pain Score:  0-10 (Numeric) Pain Score: 0 - No pain     Physical Exam:  Constitutional: No acute distress, sitting up in bed.  Neuro: Alert, oriented. Follows commands.   Eyes: EOMI. No scleral icterus. Conjunctiva pink.  ENT: MMM.   Heart: Regular rate.  Respiratory: No increased work of breathing or audible wheeze.  Abdomen: Soft, nondistended.   MSK: BLE with dressing c/d/I.  Vascular: Palpable pulses throughout, no pitting edema.  Skin: No rashes.   Psychological: Appropriate mood and behavior.            Relevant Results  Laboratory Results:  CBC:   Lab Results   Component Value Date    WBC 6.8 07/14/2025    RBC 3.13 (L) 07/14/2025    HGB 8.9 (L) 07/14/2025    HCT 29.0 (L) 07/14/2025     (H) 07/14/2025       RFP:   Lab Results   Component Value Date     07/14/2025    K 3.7 07/14/2025     07/14/2025    CO2 27 07/14/2025    BUN 11 07/14/2025    CREATININE 0.92 07/14/2025    CALCIUM 8.3 (L) 07/14/2025        LFTs:   Lab Results   Component Value Date    PROT 6.2 (L) 07/13/2025    ALBUMIN 2.5 (L) 07/13/2025    BILITOT 0.3 07/13/2025    ALKPHOS 74 07/13/2025    AST 10 07/13/2025    ALT 3 (L) 07/13/2025         Imaging:  No results found.    Cardiology, Vascular, and Other Imaging  No other imaging results found for the past 2 days           Assessment/Plan   This is a 54 y.o. female admitted for lymphedema with open leg wounds s/p I&D " abscess 7/12 in OR.      Plan:   -- Wound dressing changes daily as ordered  -- Will attempt to check on wounds during dressing change today  -- Will defer abx to ID  -- Established with Wound clinic, will need follow up at discharge            Seen and discussed with Dr. Patricia who is in agreement with plan. Please secure chat with questions.    Lianet Evangelista PA-C

## 2025-07-14 NOTE — PROGRESS NOTES
Frances Mcdowell is a 54 y.o. female on day 4 of admission presenting with Cellulitis.    Subjective   Interval History: no fever, has diarrhea, less pain        Review of Systems    Objective   Range of Vitals (last 24 hours)  Heart Rate:  [74-95]   Temp:  [36.3 °C (97.3 °F)-36.8 °C (98.2 °F)]   Resp:  [16-18]   BP: (111-148)/(68-90)   SpO2:  [93 %-97 %]   Daily Weight  07/09/25 : 134 kg (296 lb 3.2 oz)    Body mass index is 52.47 kg/m².    Physical Exam  Constitutional:       Appearance: Normal appearance.   HENT:      Head: Normocephalic and atraumatic.      Mouth/Throat:      Mouth: Mucous membranes are moist.      Pharynx: Oropharynx is clear.   Eyes:      Pupils: Pupils are equal, round, and reactive to light.   Cardiovascular:      Rate and Rhythm: Normal rate and regular rhythm.      Heart sounds: Normal heart sounds.   Pulmonary:      Effort: Pulmonary effort is normal.      Breath sounds: Normal breath sounds.   Abdominal:      General: Abdomen is flat. Bowel sounds are normal.      Palpations: Abdomen is soft.   Musculoskeletal:      Cervical back: Normal range of motion.      Comments: Legs dressing, photos reviewed   Neurological:      Mental Status: She is alert.         Antibiotics  piperacillin-tazobactam - 3.375 gram/50 mL    Relevant Results  Labs  Results from last 72 hours   Lab Units 07/14/25  0701 07/13/25  1050 07/12/25  0616   WBC AUTO x10*3/uL 6.8 8.8 7.6   HEMOGLOBIN g/dL 8.9* 8.6* 9.0*   HEMATOCRIT % 29.0* 27.5* 29.7*   PLATELETS AUTO x10*3/uL 459* 443 459*     Results from last 72 hours   Lab Units 07/14/25  0701 07/13/25  0628 07/12/25  0616   SODIUM mmol/L 141 140 140   POTASSIUM mmol/L 3.7 3.7 3.6   CHLORIDE mmol/L 106 107 106   CO2 mmol/L 27 25 26   BUN mg/dL 11 10 11   CREATININE mg/dL 0.92 0.89 0.89   GLUCOSE mg/dL 90 80 85   CALCIUM mg/dL 8.3* 8.4* 8.1*   ANION GAP mmol/L 12 12 12   EGFR mL/min/1.73m*2 73 76 76     Results from last 72 hours   Lab Units 07/13/25  0628   ALK PHOS U/L 74    BILIRUBIN TOTAL mg/dL 0.3   PROTEIN TOTAL g/dL 6.2*   ALT U/L 3*   AST U/L 10   ALBUMIN g/dL 2.5*     Estimated Creatinine Clearance: 91.6 mL/min (by C-G formula based on SCr of 0.92 mg/dL).  C-Reactive Protein   Date Value Ref Range Status   07/10/2025 11.35 (H) <1.00 mg/dL Final   07/09/2025 12.98 (H) <1.00 mg/dL Final   06/15/2025 22.53 (H) <1.00 mg/dL Final     Microbiology  Reviewed  Imaging  Reviewed        Assessment/Plan   Legs stasis / wounds / Rt leg abscess, attempt US drainage found no collection to drain , sp I&D  Pyuria, asymptomatic     Recommendations :  Continue Zosyn  Wound care  Discussed with the medical team     I spent minutes in the professional and overall care of this patient.          Miguel Hu MD

## 2025-07-15 ENCOUNTER — DOCUMENTATION (OUTPATIENT)
Dept: HOME HEALTH SERVICES | Facility: HOME HEALTH | Age: 55
End: 2025-07-15
Payer: COMMERCIAL

## 2025-07-15 VITALS
RESPIRATION RATE: 17 BRPM | OXYGEN SATURATION: 96 % | HEART RATE: 86 BPM | TEMPERATURE: 97.2 F | WEIGHT: 293 LBS | DIASTOLIC BLOOD PRESSURE: 96 MMHG | SYSTOLIC BLOOD PRESSURE: 159 MMHG | HEIGHT: 63 IN | BODY MASS INDEX: 51.91 KG/M2

## 2025-07-15 LAB
ERYTHROCYTE [DISTWIDTH] IN BLOOD BY AUTOMATED COUNT: 15.9 % (ref 11.5–14.5)
HCT VFR BLD AUTO: 30.8 % (ref 36–46)
HGB BLD-MCNC: 9.4 G/DL (ref 12–16)
MCH RBC QN AUTO: 29 PG (ref 26–34)
MCHC RBC AUTO-ENTMCNC: 30.5 G/DL (ref 32–36)
MCV RBC AUTO: 95 FL (ref 80–100)
NRBC BLD-RTO: 0 /100 WBCS (ref 0–0)
PLATELET # BLD AUTO: 455 X10*3/UL (ref 150–450)
RBC # BLD AUTO: 3.24 X10*6/UL (ref 4–5.2)
WBC # BLD AUTO: 10.2 X10*3/UL (ref 4.4–11.3)

## 2025-07-15 PROCEDURE — 99239 HOSP IP/OBS DSCHRG MGMT >30: CPT | Performed by: PHYSICIAN ASSISTANT

## 2025-07-15 PROCEDURE — 2500000001 HC RX 250 WO HCPCS SELF ADMINISTERED DRUGS (ALT 637 FOR MEDICARE OP): Performed by: SURGERY

## 2025-07-15 PROCEDURE — 85027 COMPLETE CBC AUTOMATED: CPT | Performed by: SURGERY

## 2025-07-15 PROCEDURE — 2500000002 HC RX 250 W HCPCS SELF ADMINISTERED DRUGS (ALT 637 FOR MEDICARE OP, ALT 636 FOR OP/ED): Performed by: SURGERY

## 2025-07-15 PROCEDURE — 36415 COLL VENOUS BLD VENIPUNCTURE: CPT | Performed by: SURGERY

## 2025-07-15 PROCEDURE — 2500000001 HC RX 250 WO HCPCS SELF ADMINISTERED DRUGS (ALT 637 FOR MEDICARE OP): Performed by: INTERNAL MEDICINE

## 2025-07-15 RX ORDER — FERROUS SULFATE 325(65) MG
65 TABLET ORAL
Qty: 30 TABLET | Refills: 1 | Status: SHIPPED | OUTPATIENT
Start: 2025-07-16 | End: 2025-09-14

## 2025-07-15 RX ORDER — CIPROFLOXACIN 500 MG/1
500 TABLET, FILM COATED ORAL 2 TIMES DAILY
Qty: 28 TABLET | Refills: 0 | Status: SHIPPED | OUTPATIENT
Start: 2025-07-16 | End: 2025-08-12

## 2025-07-15 RX ADMIN — PANTOPRAZOLE SODIUM 40 MG: 40 TABLET, DELAYED RELEASE ORAL at 06:04

## 2025-07-15 RX ADMIN — APIXABAN 5 MG: 5 TABLET, FILM COATED ORAL at 09:32

## 2025-07-15 RX ADMIN — LEVOTHYROXINE SODIUM 100 MCG: 100 TABLET ORAL at 06:04

## 2025-07-15 RX ADMIN — Medication 125 MCG: at 09:32

## 2025-07-15 RX ADMIN — FERROUS SULFATE TAB 325 MG (65 MG ELEMENTAL FE) 1 TABLET: 325 (65 FE) TAB at 09:32

## 2025-07-15 RX ADMIN — Medication 1 CAPSULE: at 09:32

## 2025-07-15 RX ADMIN — ROSUVASTATIN CALCIUM 20 MG: 20 TABLET, FILM COATED ORAL at 09:32

## 2025-07-15 ASSESSMENT — COGNITIVE AND FUNCTIONAL STATUS - GENERAL
HELP NEEDED FOR BATHING: A LITTLE
MOVING FROM LYING ON BACK TO SITTING ON SIDE OF FLAT BED WITH BEDRAILS: A LITTLE
MOBILITY SCORE: 17
DRESSING REGULAR UPPER BODY CLOTHING: A LOT
TOILETING: A LITTLE
DRESSING REGULAR LOWER BODY CLOTHING: A LITTLE
PERSONAL GROOMING: A LITTLE
WALKING IN HOSPITAL ROOM: A LITTLE
MOVING TO AND FROM BED TO CHAIR: A LITTLE
CLIMB 3 TO 5 STEPS WITH RAILING: A LOT
DAILY ACTIVITIY SCORE: 18
STANDING UP FROM CHAIR USING ARMS: A LITTLE
TURNING FROM BACK TO SIDE WHILE IN FLAT BAD: A LITTLE

## 2025-07-15 ASSESSMENT — PAIN SCALES - GENERAL: PAINLEVEL_OUTOF10: 0 - NO PAIN

## 2025-07-15 ASSESSMENT — PAIN - FUNCTIONAL ASSESSMENT: PAIN_FUNCTIONAL_ASSESSMENT: 0-10

## 2025-07-15 NOTE — CARE PLAN
Problem: Pain - Adult  Goal: Verbalizes/displays adequate comfort level or baseline comfort level  Outcome: Progressing     Problem: Safety - Adult  Goal: Free from fall injury  Outcome: Progressing     Problem: Skin  Goal: Prevent/minimize sheer/friction injuries  Outcome: Progressing   The patient's goals for the shift include  pain control     The clinical goals for the shift include pt will remain pain free during shift

## 2025-07-15 NOTE — CARE PLAN
Problem: Pain - Adult  Goal: Verbalizes/displays adequate comfort level or baseline comfort level  Outcome: Progressing     Problem: Safety - Adult  Goal: Free from fall injury  Outcome: Progressing     Problem: Discharge Planning  Goal: Discharge to home or other facility with appropriate resources  Outcome: Progressing     Problem: Chronic Conditions and Co-morbidities  Goal: Patient's chronic conditions and co-morbidity symptoms are monitored and maintained or improved  Outcome: Progressing     Problem: Nutrition  Goal: Nutrient intake appropriate for maintaining nutritional needs  Outcome: Progressing     Problem: Skin  Goal: Decreased wound size/increased tissue granulation at next dressing change  Outcome: Progressing  Goal: Participates in plan/prevention/treatment measures  Outcome: Progressing  Goal: Prevent/minimize sheer/friction injuries  Outcome: Progressing   The patient's goals for the shift include  to discharge home    The clinical goals for the shift include pt will remain safe

## 2025-07-15 NOTE — DISCHARGE SUMMARY
Discharge Diagnosis  Cellulitis, right leg abscesses s/p I&D, morbid obesity, lymphedema       Issues Requiring Follow-Up  Home on Cipro x 2 weeks  Home care arranged for dressing changes  Follow up with PCP in 1 week  Follow up in wound care clinic in 1 week    Discharge Meds     Medication List      START taking these medications     ciprofloxacin 500 mg tablet; Commonly known as: Cipro; Take 1 tablet   (500 mg) by mouth 2 times a day for 14 days. Do not fill before July 16, 2025.; Start taking on: July 16, 2025   ferrous sulfate 325 mg (65 mg elemental) tablet; Take 1 tablet by mouth   once daily with breakfast.; Start taking on: July 16, 2025     CONTINUE taking these medications     acetaminophen 325 mg tablet; Commonly known as: Tylenol; Take 2 tablets   (650 mg) by mouth 3 times a day as needed for mild pain (1 - 3).   amphetamine-dextroamphetamine XR 20 mg 24 hr capsule; Commonly known as:   Adderall XR; Take 1 capsule (20 mg) by mouth 2 times a day.   cholecalciferol 125 mcg (5,000 units) tablet; Commonly known as: Vitamin   D-3; Take 1 tablet (125 mcg) by mouth once daily.   Eliquis 5 mg tablet; Generic drug: apixaban; Take 1 tablet (5 mg) by   mouth 2 times a day.   EPINEPHrine 0.3 mg/0.3 mL injection syringe; Commonly known as: Epipen;   Inject 0.3 mL (0.3 mg) as directed if needed for anaphylaxis. As Directed   hydrOXYzine HCL 25 mg tablet; Commonly known as: Atarax; Take 1 tablet   (25 mg) by mouth every 6 hours if needed for anxiety.   levothyroxine 100 mcg tablet; Commonly known as: Synthroid, Levoxyl;   Take 1 tablet (100 mcg) by mouth early in the morning.. Take on an empty   stomach at the same time each day, either 30 to 60 minutes prior to   breakfast   naloxone 4 mg/0.1 mL nasal spray; Commonly known as: Narcan; Administer   1 spray (4 mg) into affected nostril(s) if needed for opioid reversal. May   repeat every 2-3 minutes if needed, alternating nostrils, until medical   assistance becomes  available.   Nyamyc 100,000 unit/gram powder; Generic drug: nystatin; Apply topically   once daily.   omeprazole 40 mg DR capsule; Commonly known as: PriLOSEC; Take 1 capsule   (40 mg) by mouth once daily.   oxyCODONE 5 mg immediate release tablet; Commonly known as: Roxicodone   oxyCODONE-acetaminophen  mg tablet; Commonly known as: Percocet;   Take 1 tablet by mouth once daily.   rosuvastatin 20 mg tablet; Commonly known as: Crestor; Take 1 tablet (20   mg) by mouth once daily.     ASK your doctor about these medications     triamcinolone 0.1 % cream; Commonly known as: Kenalog; Apply to affected   area 1-2 times daily as needed.; Ask about: Should I take this medication?       Test Results Pending At Discharge  Pending Labs       No current pending labs.            Hospital Course   Frances Mcdowell is a 54 y.o. female with with pertinent medical history of saddle PE (on Eliquis), chronic lower extremity wounds with recurrent cellulitis (history MRSA and Pseudomonas) & lymphedema who  presenting from wound clinic to [facility] with draining wounds x 3.  Dr. PIERRE agreed to consult and requested CT of the lower extremity. CT scan showed multiple loculated fluid collections consistent with abscess to right lower leg. Podiatry, ID, general surgery and orthopedics consulted. Treated with IV Vanc and Zosyn, IR was unable to drain abscesses so she was taken to OR with Dr Patricia on 7/12. Treated with daily dressing changes and cleared to [place] home on oral cipro x 14 days as she refused to go home on IV antibiotics. Will arrange home care for dressing changes and she will follow up in wound care center in 1 week. On day of discharge, patient denied CP, SOB, n/v/diarrhea/constipation, was tolerating diet and ambulating without issue.  Patient appeared hemodynamically stable at time of discharge. Discussed with attending physician who agreed with discharge plan.  Time spent on discharge including patient evaluation, education,  coordination of care with consultants, attending physician, care coordination and nursing was 35 minutes.        Pertinent Physical Exam At Time of Discharge  Physical Exam  Physical Exam  Gen: NAD, happy to not be in pain and to be going home  Eyes:  EOM intact  ENT: MMM  Neck: No JVD  Respiratory: CTAB, no wheezes/rhonchi  Cardiac: RRR, +SM  Abdomen: soft, NT, +BS  Extremities: BLEs with dressings intact  Neuro: No focal deficits, alert and oriented x 3  Psych:  smiling  Outpatient Follow-Up  Future Appointments   Date Time Provider Department [place]   7/16/2025  8:30 AM Devaughn Moreno MD [id number] [facility]   7/17/2025  8:00 AM [id number] [id number] [facility]   7/17/2025  9:15 AM Devaughn SANTOS MD [id number] [facility]   10/6/2025  8:00 AM Eleni Dobbins MD, MS [id number] [facility]         Janet Xiong PA-C

## 2025-07-15 NOTE — NURSING NOTE
Discharge instructions reviewed with patient. No questions at this time. Education given for new homegoing medications with type, uses, and most common side effects. Patient verbalized understanding. Handout on cellulitis and wound care given. Telemetry removed and left at nurses station, masimo removed and left at bedside. IV removed. Discharged home in stable condition.

## 2025-07-15 NOTE — PROGRESS NOTES
Frances Mcdowell is a 54 y.o. female on day 5 of admission presenting with Cellulitis.    Subjective   Interval History: no fever, no new complaints        Review of Systems    Objective   Range of Vitals (last 24 hours)  Heart Rate:  [73-87]   Temp:  [36.2 °C (97.2 °F)-36.7 °C (98.1 °F)]   Resp:  [16-17]   BP: (126-159)/(85-96)   SpO2:  [94 %-98 %]   Daily Weight  07/09/25 : 134 kg (296 lb 3.2 oz)    Body mass index is 52.47 kg/m².    Physical Exam  Constitutional:       Appearance: Normal appearance.   HENT:      Head: Normocephalic and atraumatic.      Mouth/Throat:      Mouth: Mucous membranes are moist.      Pharynx: Oropharynx is clear.   Eyes:      Pupils: Pupils are equal, round, and reactive to light.   Cardiovascular:      Rate and Rhythm: Normal rate and regular rhythm.      Heart sounds: Normal heart sounds.   Pulmonary:      Effort: Pulmonary effort is normal.      Breath sounds: Normal breath sounds.   Abdominal:      General: Abdomen is flat. Bowel sounds are normal.      Palpations: Abdomen is soft.   Musculoskeletal:      Cervical back: Normal range of motion.      Comments: Legs dressing changed yesterday, the Rt leg without redness, resolved stasis, not much tenderness,  3 packed wounds, no purulence, no necrosis   Neurological:      Mental Status: She is alert.         Antibiotics  piperacillin-tazobactam - 3.375 gram/50 mL    Relevant Results  Labs  Results from last 72 hours   Lab Units 07/15/25  0617 07/14/25  0701 07/13/25  1050   WBC AUTO x10*3/uL 10.2 6.8 8.8   HEMOGLOBIN g/dL 9.4* 8.9* 8.6*   HEMATOCRIT % 30.8* 29.0* 27.5*   PLATELETS AUTO x10*3/uL 455* 459* 443     Results from last 72 hours   Lab Units 07/14/25  0701 07/13/25  0628   SODIUM mmol/L 141 140   POTASSIUM mmol/L 3.7 3.7   CHLORIDE mmol/L 106 107   CO2 mmol/L 27 25   BUN mg/dL 11 10   CREATININE mg/dL 0.92 0.89   GLUCOSE mg/dL 90 80   CALCIUM mg/dL 8.3* 8.4*   ANION GAP mmol/L 12 12   EGFR mL/min/1.73m*2 73 76     Results from  last 72 hours   Lab Units 07/13/25  0628   ALK PHOS U/L 74   BILIRUBIN TOTAL mg/dL 0.3   PROTEIN TOTAL g/dL 6.2*   ALT U/L 3*   AST U/L 10   ALBUMIN g/dL 2.5*     Estimated Creatinine Clearance: 91.6 mL/min (by C-G formula based on SCr of 0.92 mg/dL).  C-Reactive Protein   Date Value Ref Range Status   07/10/2025 11.35 (H) <1.00 mg/dL Final   07/09/2025 12.98 (H) <1.00 mg/dL Final   06/15/2025 22.53 (H) <1.00 mg/dL Final     Microbiology  Reviewed  Imaging  Reviewed        Assessment/Plan   Legs stasis / wounds / Rt leg abscess, attempt US drainage found no collection to drain , sp I&D  Pyuria, asymptomatic     Recommendations :  The option of a 2 week iv antibiotics were discussed, she is not interested in iv antibiotics, she requested a course of oral Cipro, she is aware that the Pseudomonas is resistant, I think there is still a more than 50% chance that it can work in vivo, her legs look much better she is likely to do well with just elevation and a good wound care  Discussed with the medical team     I spent minutes in the professional and overall care of this patient.          Miguel Hu MD

## 2025-07-15 NOTE — HH CARE COORDINATION
Home Care received a Referral to Resume Care for Nursing. We have processed the referral for a Resumption of Care on 7/16/25.     If you have any questions or concerns, please feel free to contact us at [phone]. Follow the prompts, enter your five digit zip code, and you will be directed to your care team on [facility].

## 2025-07-15 NOTE — PROGRESS NOTES
"Music Therapy Note    Frances Mcdowell was referred by ENEDELIA Sanchez.    Therapy Session  Referral Type: New referral this admission  Visit Type: New visit  Session Start Time: 1435  Session End Time: 1514  Intervention Delivery: In-person  Conflict of Service: None  Family Present for Session: None     Pre-assessment  Unable to Assess Reason: Emotional distress  Mood/Affect: Sad/tearful, Anxious  Verbalized Emotional State: Anxiety, Fear         Treatment/Interventions  Areas of Focus: Anxiety reduction, Relaxation, Emotional support  Music Therapy Interventions: Live music listening, Empathic listening/validating emotions, Improvisation, Music-facilitated relaxation  Interruption: Yes  Interrupted by: Staff  Interruption Outcome: Session ended  Patient Fell Asleep at End of Session: No    Post-assessment  Unable to Assess Reason: Session interrupted  Mood/Affect: Anxious, Sad/tearful  Verbalized Emotional State: Gratitude, Anxiety  Continue Visiting: Yes  Total Session Time (min): 39 minutes    Narrative  Assessment Detail: Upon MT's arrival, pt was resting in bed watching TV. She was pleasant and easily engaged. She shared an update with MT about her recent procedures, wound care, and feelings surrounding her hospital stay and returning home. Pt expressed fear and overwhelm related to taking care of her wounds and wanting to get better. Pt expressed being afraid of \"setbacks\" and returning to the hospital. Pt shared that she was fearful of the pain associated with wound care as well. She shared her desire to return home and did state that she was supposed to discharge tomorrow. Pt shared that she was going to have wound care performed by nursing staff soon, but she was appreciative of MT providing music beforehand. Pt requested music similar to her previous relaxation session.  Plan: MT will provide music to reduce anxiety and increase relaxation and provide emotional support through music choice and " "therapeutic rapport.  Intervention: MT utilized piano with ocean sounds and began improvising soft piano music. Pt independently began exercising deep breathing and was also praying softly. Pt shared that she has a strong haydee and that it helps her cope with her circumstances. MT incorporated some hymns into her improvisations, including \"It Is Well With My Soul,\" \"Great Is Thy Faithfulness,\" and \"How Great Thou Art.\" Pt continued to pray, deep breathe, and rest with eyes closed throughout. Session concluded when RN arrived for wound care.  Evaluation: Pt again responded extremely favorably to music therapy services. While actively engaged in conversation or music-based relaxation, pt appeared able to cope and visibly relaxed - closing eyes, leaning into the bed, and breathing deeply. Although pt immediately became tearful and anxious with arrival of RN, she expressed gratitude for the music beforehand.  Follow-up: MT will follow up as able and appropriate.    Education Documentation  No documentation found.          "

## 2025-07-16 ENCOUNTER — APPOINTMENT (OUTPATIENT)
Dept: PRIMARY CARE | Facility: CLINIC | Age: 55
End: 2025-07-16
Payer: COMMERCIAL

## 2025-07-16 ENCOUNTER — PATIENT OUTREACH (OUTPATIENT)
Dept: CARE COORDINATION | Facility: CLINIC | Age: 55
End: 2025-07-16

## 2025-07-16 VITALS — TEMPERATURE: 98 F

## 2025-07-16 DIAGNOSIS — F98.8 ATTENTION DEFICIT DISORDER, UNSPECIFIED TYPE: ICD-10-CM

## 2025-07-16 DIAGNOSIS — L03.90 CELLULITIS: ICD-10-CM

## 2025-07-16 PROCEDURE — 99213 OFFICE O/P EST LOW 20 MIN: CPT | Performed by: INTERNAL MEDICINE

## 2025-07-16 PROCEDURE — 1036F TOBACCO NON-USER: CPT | Performed by: INTERNAL MEDICINE

## 2025-07-16 RX ORDER — NYSTATIN 100000 [USP'U]/G
POWDER TOPICAL DAILY
Qty: 60 G | Refills: 6 | Status: SHIPPED | OUTPATIENT
Start: 2025-07-16

## 2025-07-16 RX ORDER — DEXTROAMPHETAMINE SACCHARATE, AMPHETAMINE ASPARTATE MONOHYDRATE, DEXTROAMPHETAMINE SULFATE AND AMPHETAMINE SULFATE 5; 5; 5; 5 MG/1; MG/1; MG/1; MG/1
20 CAPSULE, EXTENDED RELEASE ORAL 2 TIMES DAILY
Qty: 60 CAPSULE | Refills: 0 | Status: SHIPPED | OUTPATIENT
Start: 2025-07-16

## 2025-07-16 ASSESSMENT — PATIENT HEALTH QUESTIONNAIRE - PHQ9
4. FEELING TIRED OR HAVING LITTLE ENERGY: SEVERAL DAYS
8. MOVING OR SPEAKING SO SLOWLY THAT OTHER PEOPLE COULD HAVE NOTICED. OR THE OPPOSITE, BEING SO FIGETY OR RESTLESS THAT YOU HAVE BEEN MOVING AROUND A LOT MORE THAN USUAL: NOT AT ALL
7. TROUBLE CONCENTRATING ON THINGS, SUCH AS READING THE NEWSPAPER OR WATCHING TELEVISION: NOT AT ALL
SUM OF ALL RESPONSES TO PHQ9 QUESTIONS 1 AND 2: 1
1. LITTLE INTEREST OR PLEASURE IN DOING THINGS: NOT AT ALL
6. FEELING BAD ABOUT YOURSELF - OR THAT YOU ARE A FAILURE OR HAVE LET YOURSELF OR YOUR FAMILY DOWN: NOT AT ALL
2. FEELING DOWN, DEPRESSED OR HOPELESS: SEVERAL DAYS
SUM OF ALL RESPONSES TO PHQ QUESTIONS 1-9: 4
3. TROUBLE FALLING OR STAYING ASLEEP OR SLEEPING TOO MUCH: SEVERAL DAYS
9. THOUGHTS THAT YOU WOULD BE BETTER OFF DEAD, OR OF HURTING YOURSELF: NOT AT ALL
5. POOR APPETITE OR OVEREATING: SEVERAL DAYS

## 2025-07-16 ASSESSMENT — ANXIETY QUESTIONNAIRES
4. TROUBLE RELAXING: SEVERAL DAYS
IF YOU CHECKED OFF ANY PROBLEMS ON THIS QUESTIONNAIRE, HOW DIFFICULT HAVE THESE PROBLEMS MADE IT FOR YOU TO DO YOUR WORK, TAKE CARE OF THINGS AT HOME, OR GET ALONG WITH OTHER PEOPLE: SOMEWHAT DIFFICULT
1. FEELING NERVOUS, ANXIOUS, OR ON EDGE: MORE THAN HALF THE DAYS
3. WORRYING TOO MUCH ABOUT DIFFERENT THINGS: MORE THAN HALF THE DAYS
5. BEING SO RESTLESS THAT IT IS HARD TO SIT STILL: NOT AT ALL
2. NOT BEING ABLE TO STOP OR CONTROL WORRYING: MORE THAN HALF THE DAYS
6. BECOMING EASILY ANNOYED OR IRRITABLE: NOT AT ALL
7. FEELING AFRAID AS IF SOMETHING AWFUL MIGHT HAPPEN: SEVERAL DAYS
GAD7 TOTAL SCORE: 8

## 2025-07-16 NOTE — PROGRESS NOTES
Subjective   Patient ID: Frances Mcdowell is a 54 y.o. female who presents for Hospital Follow-up and Med Refill.    IN for follow up for refills.    Med Refill        Review of Systems   All other systems reviewed and are negative.      Previous history  Medical History[1]  Surgical History[2]  Social History[3]  Family History[4]  Allergies[5]  Current Outpatient Medications   Medication Instructions    acetaminophen (TYLENOL) 650 mg, oral, 3 times daily PRN    amphetamine-dextroamphetamine XR (Adderall XR) 20 mg 24 hr capsule 20 mg, oral, 2 times daily    cholecalciferol (VITAMIN D-3) 125 mcg, oral, Daily    ciprofloxacin (CIPRO) 500 mg, oral, 2 times daily    Eliquis 5 mg, oral, 2 times daily    EPINEPHrine (EPIPEN) 0.3 mg, injection, As needed, As Directed    ferrous sulfate 325 mg (65 mg elemental) tablet 1 tablet, oral, Daily with breakfast    hydrOXYzine HCL (ATARAX) 25 mg, oral, Every 6 hours PRN    levothyroxine (SYNTHROID, LEVOXYL) 100 mcg, oral, Daily, Take on an empty stomach at the same time each day, either 30 to 60 minutes prior to breakfast    naloxone (NARCAN) 4 mg, nasal, As needed, May repeat every 2-3 minutes if needed, alternating nostrils, until medical assistance becomes available.    Nyamyc 100,000 unit/gram powder Topical, Daily    omeprazole (PRILOSEC) 40 mg, oral, Daily    oxyCODONE (ROXICODONE) 5 mg, oral, Every 72 hours PRN    oxyCODONE-acetaminophen (Percocet)  mg tablet 1 tablet, oral, Daily    rosuvastatin (CRESTOR) 20 mg, oral, Daily       Objective       Physical Exam      Assessment/Plan   Frances Mcdowell is a 54 y.o. female who presents for the concerns below:    Assessment & Plan  Attention deficit disorder, unspecified type    Orders:    amphetamine-dextroamphetamine XR (Adderall XR) 20 mg 24 hr capsule; Take 1 capsule (20 mg) by mouth 2 times a day.    Cellulitis    Orders:    Nyamyc 100,000 unit/gram powder; Apply topically once daily.          May try to go back to work  .September 15, 2025    Discussed with:   Return in :    Portions of this note were generated using digital voice recognition software, and may contain grammatical errors       Devaughn Moreno MD  25  8:45 AM         [1]   Past Medical History:  Diagnosis Date    Acute embolism and thrombosis of other specified veins 2022    Superficial vein thrombosis    Acute on chronic diastolic heart failure     BNP level of 258 in     ADD (attention deficit disorder)     ADHD (attention deficit hyperactivity disorder)     Allergic     Anxiety     Cellulitis     Chronic pain disorder     Chronic wound     chronic lower extremity wounds (hx of pseudomonas and MRSA)    Class 3 severe obesity with body mass index (BMI) of 50.0 to 59.9 in adult     Current use of long term anticoagulation     on Eliquis    DVT (deep venous thrombosis) (Multi) 2025    unprovoked DVT    Endometrial intraepithelial neoplasia (EIN)     Extremity pain     GERD (gastroesophageal reflux disease)     HLD (hyperlipidemia)     Hypothyroidism     s/p thyroidectomy    Kidney stone 2025    Left kidney    Lymph edema     Moderate tricuspid regurgitation     PE (pulmonary thromboembolism) (Multi) 2025    Unprovoked Saddle PE on Eliquis    Peripheral neuropathy     Recurrent cellulitis of lower extremity     (hx of pseudomonas and MRSA)    Skin cancer     Varicosities     Venous insufficiency    [2]   Past Surgical History:  Procedure Laterality Date    ADENOIDECTOMY      BLADDER SURGERY       SECTION, LOW TRANSVERSE      COLONOSCOPY  2024    EPIDURAL BLOCK INJECTION      FOOT SURGERY      HYSTERECTOMY      IR CVC PICC  2025    IR CVC PICC 2025 TRI CR NONV1    OTHER SURGICAL HISTORY  2025    DIAGNOSTIC BILATERAL LOWER VENOGRAM WITH INTRAVASCULAR ULTRASOUND    TONSILLECTOMY      TOTAL THYROIDECTOMY      VARICOSE VEIN SURGERY      Varicose Vein Ligation    WOUND DEBRIDEMENT   06/17/2025   [3]   Social History  Tobacco Use    Smoking status: Never    Smokeless tobacco: Never   Vaping Use    Vaping status: Never Used   Substance Use Topics    Alcohol use: Not Currently     Comment: seldom    Drug use: Never   [4]   Family History  Problem Relation Name Age of Onset    Arthritis Mother Esperanza     COPD Mother Esperanza     Learning disabilities Son Tyson Aries     Intellectual Disability Son Tyson Aries     Developmental delay Son Tyson Aries     Seizures Son Tyson Aries     Learning disabilities Son Tyson Aries     Intellectual Disability Son Tyson Aries     Learning disabilities Son Tyson Aries     Intellectual Disability Son Tyson Aries     Learning disabilities Son Tyson Aries     Intellectual Disability Son Tyson Aries    [5]   Allergies  Allergen Reactions    Erythromycin Base Anaphylaxis    Peanut Anaphylaxis    Sulfa (Sulfonamide Antibiotics) Anaphylaxis    Tetanus Toxoid Other and Swelling    Morphine Itching    Tramadol Itching    Adhesive Tape-Silicones Rash     tegaderm causes bad skin breakdown

## 2025-07-16 NOTE — PROGRESS NOTES
Patient is here for medication refills,  hospital follow up on another right leg surgery. Patient need another letter return to work .

## 2025-07-16 NOTE — PROGRESS NOTES
[facility]  Admitted 7/19/2025  Discharged 7/15/2025  Dx: Cellulitis of right lower extremity, 3 open wounds    7/12/2025 s/p: Incision and Draining    Daily wound care:  There wound cavities in R lower leg:   One posterior, one upper medial and one upper anterior   Pack with 3 inch lowe ply bandage roll moistened with betadine solution.   Cover the remaining posterior superficial wounds with adeptic and acquacel.   Then cover all wounds with ABD pads and wrap with kerlix and ABC wrap.     Home Care: Nursing for wound Care  Wound Care Center 7/23/2025  PCP 8/13/2025    Outreach call to patient to support a smooth transition of care from recent admission.  Patient states, she is doing good. She has 3 dressing changes daily. Wound care as listed above. Patients daughter changes the wound twice a week, HHC three times a week, and the wound care center twice a week. Patient denies pain except with when wound care is being performed. Patient states, if she is on her feet too long she will feel a burning sensation in the wounds. Patient states, she listens to her body and rest. Patient states, the wound on the back of her leg is almost healed and she does not have much pain in that area. Patient is independent with her ADL's. She is able to drive. She started to Ciprofloxacin today.  Discussed appointment she had today with the PCP. Discussed plan of care.  Patient with no questions at this time.      Engagement  Call Start Time: 1610 (7/16/2025  4:09 PM)    Medications  Medications reviewed with patient/caregiver?: Yes (7/16/2025  4:09 PM)  Is the patient having any side effects they believe may be caused by any medication additions or changes?: -- (Patient states, she just started the Ciprofloxacin today) (7/16/2025  4:09 PM)  Does the patient have all medications ordered at discharge?: Yes (7/16/2025  4:09 PM)  Care Management Interventions: Provided patient education (7/16/2025  4:09 PM)  Prescription  Comments: Ciprofloxacin and Ferrous Sulfate (7/16/2025  4:09 PM)  Is the patient taking all medications as directed (includes completed medication regime)?: Yes (7/16/2025  4:09 PM)    Appointments  Does the patient have a primary care provider?: Yes (7/16/2025 and 8/13/2025) (7/16/2025  4:09 PM)  Care Management Interventions: Verified appointment date/time/provider (7/16/2025  4:09 PM)    Self Management  What is the home health agency?: [facility]: Nursing for wound care (7/16/2025  4:09 PM)  Has home health visited the patient within 72 hours of discharge?: Not applicable (7/16/2025  4:09 PM)  What Durable Medical Equipment (DME) was ordered?: not applicable (7/16/2025  4:09 PM)    Patient Teaching  Does the patient have access to their discharge instructions?: Yes (7/16/2025  4:09 PM)  Care Management Interventions: Reviewed instructions with patient (7/16/2025  4:09 PM)  What is the patient's perception of their health status since discharge?: Improving (7/16/2025  4:09 PM)  Is the patient/caregiver able to teach back the hierarchy of who to call/visit for symptoms/problems? PCP, Specialist, Home Health nurse, Urgent Care, ED, 911: Yes (7/16/2025  4:09 PM)     Will continue to monitor through transition period.    Eli Mahajan RN/CM  [facility]  [phone]

## 2025-07-17 ENCOUNTER — ANCILLARY PROCEDURE (OUTPATIENT)
Facility: CLINIC | Age: 55
End: 2025-07-17
Payer: COMMERCIAL

## 2025-07-17 ENCOUNTER — OFFICE VISIT (OUTPATIENT)
Dept: CARDIOLOGY | Facility: CLINIC | Age: 55
End: 2025-07-17
Payer: COMMERCIAL

## 2025-07-17 ENCOUNTER — HOME CARE VISIT (OUTPATIENT)
Dept: HOME HEALTH SERVICES | Facility: HOME HEALTH | Age: 55
End: 2025-07-17
Payer: COMMERCIAL

## 2025-07-17 VITALS
DIASTOLIC BLOOD PRESSURE: 72 MMHG | RESPIRATION RATE: 14 BRPM | HEART RATE: 82 BPM | SYSTOLIC BLOOD PRESSURE: 126 MMHG | TEMPERATURE: 97.1 F | OXYGEN SATURATION: 97 %

## 2025-07-17 VITALS
BODY MASS INDEX: 51.55 KG/M2 | SYSTOLIC BLOOD PRESSURE: 158 MMHG | OXYGEN SATURATION: 97 % | DIASTOLIC BLOOD PRESSURE: 98 MMHG | HEART RATE: 90 BPM | WEIGHT: 291 LBS

## 2025-07-17 DIAGNOSIS — L97.212 NON-PRESSURE CHRONIC ULCER OF RIGHT CALF WITH FAT LAYER EXPOSED (MULTI): ICD-10-CM

## 2025-07-17 DIAGNOSIS — L97.222 NON-PRESSURE CHRONIC ULCER OF LEFT CALF WITH FAT LAYER EXPOSED (MULTI): ICD-10-CM

## 2025-07-17 DIAGNOSIS — L03.119 CELLULITIS OF LOWER EXTREMITY, UNSPECIFIED LATERALITY: ICD-10-CM

## 2025-07-17 DIAGNOSIS — I50.33 ACUTE ON CHRONIC DIASTOLIC HEART FAILURE: ICD-10-CM

## 2025-07-17 DIAGNOSIS — I26.99 PULMONARY EMBOLISM, UNSPECIFIED CHRONICITY, UNSPECIFIED PULMONARY EMBOLISM TYPE, UNSPECIFIED WHETHER ACUTE COR PULMONALE PRESENT (MULTI): Primary | ICD-10-CM

## 2025-07-17 DIAGNOSIS — I89.0 LYMPHEDEMA: ICD-10-CM

## 2025-07-17 DIAGNOSIS — Z01.818 PRE-OPERATIVE CLEARANCE: ICD-10-CM

## 2025-07-17 DIAGNOSIS — R31.9 HEMATURIA, UNSPECIFIED TYPE: ICD-10-CM

## 2025-07-17 DIAGNOSIS — I26.99 PULMONARY EMBOLISM, UNSPECIFIED CHRONICITY, UNSPECIFIED PULMONARY EMBOLISM TYPE, UNSPECIFIED WHETHER ACUTE COR PULMONALE PRESENT (MULTI): ICD-10-CM

## 2025-07-17 LAB
AORTIC VALVE PEAK VELOCITY: 1.45 M/S
AV PEAK GRADIENT: 8 MMHG
EJECTION FRACTION: 58 %
LEFT ATRIUM VOLUME AREA LENGTH INDEX BSA: 21.4 ML/M2
LEFT VENTRICULAR OUTFLOW TRACT DIAMETER: 2.4 CM
RIGHT VENTRICLE FREE WALL PEAK S': 2.3 CM/S

## 2025-07-17 PROCEDURE — 93306 TTE W/DOPPLER COMPLETE: CPT | Performed by: STUDENT IN AN ORGANIZED HEALTH CARE EDUCATION/TRAINING PROGRAM

## 2025-07-17 PROCEDURE — 99212 OFFICE O/P EST SF 10 MIN: CPT | Mod: 25

## 2025-07-17 PROCEDURE — 99215 OFFICE O/P EST HI 40 MIN: CPT | Performed by: STUDENT IN AN ORGANIZED HEALTH CARE EDUCATION/TRAINING PROGRAM

## 2025-07-17 PROCEDURE — G0299 HHS/HOSPICE OF RN EA 15 MIN: HCPCS

## 2025-07-17 PROCEDURE — C8929 TTE W OR WO FOL WCON,DOPPLER: HCPCS

## 2025-07-17 RX ORDER — ENOXAPARIN SODIUM 100 MG/ML
80 INJECTION SUBCUTANEOUS EVERY 12 HOURS
Qty: 6 EACH | Refills: 0 | Status: SHIPPED | OUTPATIENT
Start: 2025-07-17 | End: 2025-07-20

## 2025-07-17 ASSESSMENT — ACTIVITIES OF DAILY LIVING (ADL)
AMBULATION ASSISTANCE: 1
AMBULATION ASSISTANCE: STAND BY ASSIST
ENTERING_EXITING_HOME: MODERATE ASSIST
OASIS_M1830: 03
AMBULATION ASSISTANCE: ONE PERSON

## 2025-07-17 ASSESSMENT — ENCOUNTER SYMPTOMS
HIGHEST PAIN SEVERITY IN PAST 24 HOURS: 6/10
PAIN LOCATION - EXACERBATING FACTORS: WOUNDS
PAIN LOCATION - PAIN SEVERITY: 2/10
CHANGE IN APPETITE: UNCHANGED
PAIN LOCATION - PAIN DURATION: VARIES
FATIGUES EASILY: 1
LOWEST PAIN SEVERITY IN PAST 24 HOURS: 2/10
SHORTNESS OF BREATH: 1
AGGRESSION WITHIN DEFINED LIMITS: 1
APPETITE LEVEL: GOOD
SUBJECTIVE PAIN PROGRESSION: GRADUALLY IMPROVING
PAIN LOCATION - PAIN QUALITY: ACHING, THROBBING
PAIN LOCATION - PAIN FREQUENCY: INTERMITTENT
PAIN: 1
FATIGUE: 1
SKIN LESIONS: 1
PAIN LOCATION: RIGHT LEG
LOSS OF SENSATION IN FEET: 1
SLEEP QUALITY: FAIR
PAIN LOCATION - RELIEVING FACTORS: MEDICATION, REST
MUSCLE WEAKNESS: 1
DYSPNEA ACTIVITY LEVEL: AFTER AMBULATING MORE THAN 20 FT
OCCASIONAL FEELINGS OF UNSTEADINESS: 1
PAIN SEVERITY GOAL: 0/10
ANGER WITHIN DEFINED LIMITS: 1
DYSPNEA ON EXERTION: 1
PERSON REPORTING PAIN: PATIENT
DEPRESSION: 0

## 2025-07-17 ASSESSMENT — PAIN SCALES - GENERAL: PAINLEVEL_OUTOF10: 0-NO PAIN

## 2025-07-17 NOTE — PROGRESS NOTES
Follow-up visit after multiple hospitalizations    HPI:    Frances Mcdowell is a 54 y.o. female with pertinent history of hypothyroidism, GERD, ADHD, nephrolithiasis with hematuria, anxiety, bilateral recurrent cellulitis, history of lymphedema, history of acute on chronic diastolic heart failure with BNP level of 258 in 2024, history of bilateral saddle pulmonary embolism with right heart strain diagnosed March 2025, preserved LVEF 65% with moderately increased septal hypertrophy, reduced right ventricular function with Hobbs sign, moderate right ventricular enlargement, moderate tricuspid regurgitation, mildly elevated RVSP of 62.2 mmHg on echocardiogram performed 3/24/2025, chronic lower extremity wounds with recurrent cellulitis with history MRSA and Pseudomonas, preserved LVEF of 55 to 60%, low normal right ventricular function with mildly dilated right ventricle, unable to calculate RVSP on echocardiogram performed 7/17/2025 presents to cardiology clinic for follow-up after multiple recent hospitalizations.    Recent hospital courses was reviewed and discussed.  She just got out of the hospital a few days ago.  She remains relatively weak and is trying get her strength back.  Dyspnea on exertion is also been a significant factor.  She does plan undergo surgery for nephrolithiasis in the near future but may need to be rescheduled because she has been unable to see preadmission testing due to her heart recent hospitalization.  Remains on ciprofloxacin with improvement in wounds.  She is compliant with the remainder of her medications.  We did discuss her echocardiogram performed earlier today.  No exacerbating or relieving factors.  Patient denies chest pain and angina.  Pt denies orthopnea, and paroxysmal nocturnal dyspnea.  Pt denies worsening lower extremity edema.  Pt denies palpitations or syncope.  No recent falls.  No fever or chills.  No cough.  No change in bowel or bladder habits.  No sick contacts.   No recent travel.    12 point review of systems including (Constitutional, Eyes, ENMT, Respiratory, Cardiac, Gastrointestinal, Neurological, Psychiatric, and Hematologic) was performed and is otherwise negative.    Past medical history reviewed:   has a past medical history of Acute embolism and thrombosis of other specified veins (2022), Acute on chronic diastolic heart failure (), ADD (attention deficit disorder), ADHD (attention deficit hyperactivity disorder), Allergic, Anxiety, Cellulitis, Chronic pain disorder (), Chronic wound, Class 3 severe obesity with body mass index (BMI) of 50.0 to 59.9 in adult, Current use of long term anticoagulation, DVT (deep venous thrombosis) (Multi) (2025), Endometrial intraepithelial neoplasia (EIN), Extremity pain (), GERD (gastroesophageal reflux disease), HLD (hyperlipidemia), Hypothyroidism, Kidney stone (2025), Lymph edema, Moderate tricuspid regurgitation, PE (pulmonary thromboembolism) (Multi) (2025), Peripheral neuropathy (), Recurrent cellulitis of lower extremity, Skin cancer (), Varicosities (), and Venous insufficiency.    Past surgical history reviewed:   has a past surgical history that includes Tonsillectomy; Foot surgery; Varicose vein surgery; Total thyroidectomy; Bladder surgery;  section, low transverse; Hysterectomy; IR CVC PICC (2025); Colonoscopy (2024); Wound debridement (2025); Adenoidectomy; Epidural block injection (); and Other surgical history (2025).    Social history reviewed:   reports that she has never smoked. She has never used smokeless tobacco. She reports that she does not currently use alcohol. She reports that she does not use drugs.     Family history:  No sudden cardiac death or premature coronary artery disease.      Allergies reviewed: Erythromycin base, Peanut, Sulfa (sulfonamide antibiotics), Tetanus toxoid, Morphine, Tramadol, and Adhesive  tape-silicones     Medications reviewed:   Current Outpatient Medications   Medication Instructions    amphetamine-dextroamphetamine XR (Adderall XR) 20 mg 24 hr capsule 20 mg, oral, 2 times daily    cholecalciferol (VITAMIN D-3) 125 mcg, oral, Daily    ciprofloxacin (CIPRO) 500 mg, oral, 2 times daily    Eliquis 5 mg, oral, 2 times daily    EPINEPHrine (EPIPEN) 0.3 mg, injection, As needed, As Directed    ferrous sulfate 325 mg (65 mg elemental) tablet 1 tablet, oral, Daily with breakfast    hydrOXYzine HCL (ATARAX) 25 mg, oral, Every 6 hours PRN    levothyroxine (SYNTHROID, LEVOXYL) 100 mcg, oral, Daily, Take on an empty stomach at the same time each day, either 30 to 60 minutes prior to breakfast    naloxone (NARCAN) 4 mg, nasal, As needed, May repeat every 2-3 minutes if needed, alternating nostrils, until medical assistance becomes available.    Nyamyc 100,000 unit/gram powder Topical, Daily    omeprazole (PRILOSEC) 40 mg, oral, Daily    oxyCODONE (ROXICODONE) 5 mg, oral, Every 72 hours PRN    oxyCODONE-acetaminophen (Percocet)  mg tablet 1 tablet, oral, Daily    rosuvastatin (CRESTOR) 20 mg, oral, Daily        Vitals reviewed: Visit Vitals  BP (!) 158/98 (BP Location: Right arm, Patient Position: Sitting, BP Cuff Size: Small adult)   Pulse 90         Physical Exam:   General:  Patient is awake, alert, and oriented.  Patient is in no acute distress.  Anxious.  HEENT:  Pupils equal and reactive.  Normocephalic.  Moist mucosa.    Neck:  No thyromegaly.  Normal Jugular Venous Pressure.  Cardiovascular:  Regular rate and rhythm.  Distant sounds.  Normal S1 and S2.   Pulmonary:  Clear to auscultation bilaterally.  Abdomen:  Soft. Non-tender.   Non-distended.  Positive bowel sounds.  Obese.  Lower Extremities:  2+ LE edema with changes of lymphedema and chronic venous insufficiency.  Neurologic:  Cranial nerves intact.  No focal deficit.   Skin: Skin warm and dry, normal skin turgor.   Psychiatric: Normal  affect.    Last Labs:  CBC -      Lab Results   Component Value Date    WBC 10.2 07/15/2025    WBC 11.3 (H) 02/03/2025    HGB 9.4 (L) 07/15/2025    HGB 12.0 02/03/2025    HCT 30.8 (L) 07/15/2025    HCT 37.2 02/03/2025     (H) 07/15/2025     (H) 02/03/2025        CMP-  Lab Results   Component Value Date    GLUCOSE 90 07/14/2025    GLUCOSE 103 02/03/2025     07/14/2025     02/03/2025    K 3.7 07/14/2025    K 4.7 02/03/2025     07/14/2025     02/03/2025    CO2 27 07/14/2025    CO2 26 02/03/2025    ANIONGAP 12 07/14/2025    ANIONGAP 10 02/03/2025    BUN 11 07/14/2025    BUN 37 (H) 02/03/2025    CREATININE 0.92 07/14/2025    CREATININE 1.20 (H) 02/03/2025    EGFR 73 07/14/2025    EGFR 53 (L) 02/03/2025    CALCIUM 8.3 (L) 07/14/2025    CALCIUM 9.1 02/03/2025    PHOS 4.3 06/22/2025    PROT 6.2 (L) 07/13/2025    ALBUMIN 2.5 (L) 07/13/2025    AST 10 07/13/2025    ALT 3 (L) 07/13/2025    ALKPHOS 74 07/13/2025    BILITOT 0.3 07/13/2025        LIPIDS-  Lab Results   Component Value Date    CHOL 143 10/11/2024    TRIG 88 11/28/2023    HDL 56.5 10/11/2024    CHHDL 2.5 10/11/2024    VLDL 18 11/28/2023        OTHERS-  Lab Results   Component Value Date    HGBA1C 5.4 03/26/2025     (H) 06/08/2025        I personally reviewed the patient's recent vitals, labs, medications, orders, EKGs, pertinent cardiac imaging/ echocardiography.    Assessment and Plan:    Frances Mcdowlel is a 54 y.o. female with pertinent history of hypothyroidism, GERD, ADHD, nephrolithiasis with hematuria, anxiety, bilateral recurrent cellulitis, history of lymphedema, history of acute on chronic diastolic heart failure with BNP level of 258 in 2024, history of bilateral saddle pulmonary embolism with right heart strain diagnosed March 2025, preserved LVEF 65% with moderately increased septal hypertrophy, reduced right ventricular function with Hobbs sign, moderate right ventricular enlargement, moderate tricuspid  regurgitation, mildly elevated RVSP of 62.2 mmHg on echocardiogram performed 3/24/2025, chronic lower extremity wounds with recurrent cellulitis with history MRSA and Pseudomonas, preserved LVEF of 55 to 60%, low normal right ventricular function with mildly dilated right ventricle, unable to calculate RVSP on echocardiogram performed 7/17/2025 presents to cardiology clinic for follow-up after multiple recent hospitalizations.  Recent hospital courses was reviewed and discussed.  She just got out of the hospital a few days ago.  She remains relatively weak and is trying get her strength back.  Dyspnea on exertion is also been a significant factor.  She does plan undergo surgery for nephrolithiasis in the near future but may need to be rescheduled because she has been unable to see preadmission testing due to her heart recent hospitalization.  Remains on ciprofloxacin with improvement in wounds.  She is compliant with the remainder of her medications.  We did discuss her echocardiogram performed earlier today.      The patient is a low to immediate risk for moderate risk surgery and can proceed.    We did discuss that we would plan to hold Eliquis for 2 days prior to surgery.  Eliquis should be restarted after surgery soon as possible.  Will attempt to obtain approval for Lovenox bridging during those 2 days.    Please follow-up with Dr. Dobbins of vascular medicine as scheduled.    Please followup with me in Cardiology clinic within the next 5-6 months.  Please return to clinic sooner or seek emergent care if your symptoms reoccur or worsen.    Thank you for allowing me to participate in their care.  Please feel free to call me with any further questions or concerns.        Devaughn Sousa MD, FACC, [facility]  Division of Cardiovascular Medicine  System Director, Nuclear Cardiology   Medical Director, [facility] [place], [facility]   Clinical Associate  Professor, [employer]  [email or web address]   Office:  [phone]

## 2025-07-17 NOTE — PATIENT INSTRUCTIONS
We did discuss that we would plan to hold Eliquis for 2 days prior to surgery.  Eliquis should be restarted after surgery soon as possible.  Will attempt to obtain approval for Lovenox bridging during those 2 days.    Please follow-up with Dr. Dobbins of vascular medicine as scheduled.    Please followup with me in Cardiology clinic within the next 5-6 months.  Please return to clinic sooner or seek emergent care if your symptoms reoccur or worsen.

## 2025-07-17 NOTE — NURSING NOTE
24G IV placed. Definity given per order. PIV flushed with 10ml NS. IV removed, dressing applied and cannula was intact. Patient tolerated well.

## 2025-07-18 ENCOUNTER — TELEPHONE (OUTPATIENT)
Dept: UROLOGY | Facility: HOSPITAL | Age: 55
End: 2025-07-18
Payer: COMMERCIAL

## 2025-07-18 ENCOUNTER — HOME CARE VISIT (OUTPATIENT)
Dept: HOME HEALTH SERVICES | Facility: HOME HEALTH | Age: 55
End: 2025-07-18
Payer: COMMERCIAL

## 2025-07-18 DIAGNOSIS — N20.0 NEPHROLITHIASIS: ICD-10-CM

## 2025-07-18 PROCEDURE — G0299 HHS/HOSPICE OF RN EA 15 MIN: HCPCS

## 2025-07-18 NOTE — TELEPHONE ENCOUNTER
Returned patient's call re: rescheduling appointment/surgery. No answer, left direct number for patient to return call.    Samantha French RN

## 2025-07-19 ENCOUNTER — HOME CARE VISIT (OUTPATIENT)
Dept: HOME HEALTH SERVICES | Facility: HOME HEALTH | Age: 55
End: 2025-07-19
Payer: COMMERCIAL

## 2025-07-19 VITALS
SYSTOLIC BLOOD PRESSURE: 102 MMHG | RESPIRATION RATE: 16 BRPM | OXYGEN SATURATION: 96 % | HEART RATE: 66 BPM | DIASTOLIC BLOOD PRESSURE: 64 MMHG | TEMPERATURE: 97.2 F

## 2025-07-19 PROCEDURE — G0299 HHS/HOSPICE OF RN EA 15 MIN: HCPCS

## 2025-07-19 ASSESSMENT — ENCOUNTER SYMPTOMS
SUBJECTIVE PAIN PROGRESSION: UNCHANGED
APPETITE LEVEL: GOOD
PAIN LOCATION - PAIN QUALITY: ITCHY
PAIN LOCATION - PAIN FREQUENCY: CONSTANT
PAIN LOCATION - PAIN DURATION: CONTINOUS
PAIN LOCATION - EXACERBATING FACTORS: ITCHY
HIGHEST PAIN SEVERITY IN PAST 24 HOURS: 6/10
SKIN LESIONS: 1
LOWEST PAIN SEVERITY IN PAST 24 HOURS: 2/10
PAIN LOCATION: LEFT LEG
PERSON REPORTING PAIN: PATIENT
CHANGE IN APPETITE: UNCHANGED
PAIN: 1

## 2025-07-19 ASSESSMENT — ACTIVITIES OF DAILY LIVING (ADL)
AMBULATION ASSISTANCE: STAND BY ASSIST
CURRENT_FUNCTION: STAND BY ASSIST

## 2025-07-21 ENCOUNTER — CLINICAL SUPPORT (OUTPATIENT)
Dept: WOUND CARE | Facility: HOSPITAL | Age: 55
End: 2025-07-21
Payer: COMMERCIAL

## 2025-07-21 VITALS
RESPIRATION RATE: 12 BRPM | SYSTOLIC BLOOD PRESSURE: 128 MMHG | TEMPERATURE: 97.1 F | DIASTOLIC BLOOD PRESSURE: 64 MMHG | HEART RATE: 76 BPM | OXYGEN SATURATION: 97 %

## 2025-07-21 PROCEDURE — 99214 OFFICE O/P EST MOD 30 MIN: CPT

## 2025-07-21 SDOH — ECONOMIC STABILITY: GENERAL

## 2025-07-21 ASSESSMENT — ENCOUNTER SYMPTOMS
CHANGE IN APPETITE: UNCHANGED
SUBJECTIVE PAIN PROGRESSION: UNCHANGED
PAIN: 1
PAIN LOCATION: RIGHT LEG
PAIN LOCATION - PAIN FREQUENCY: INTERMITTENT
FATIGUE: 1
PERSON REPORTING PAIN: PATIENT
PAIN LOCATION - PAIN DURATION: VARIES
APPETITE LEVEL: GOOD
PAIN LOCATION - PAIN SEVERITY: 4/10
LOWEST PAIN SEVERITY IN PAST 24 HOURS: 2/10
PAIN LOCATION - EXACERBATING FACTORS: WOUND
PAIN LOCATION - PAIN QUALITY: ACHING THROBBING
PAIN LOCATION - RELIEVING FACTORS: MEDICATION, REST
SHORTNESS OF BREATH: 1
DYSPNEA ACTIVITY LEVEL: AFTER AMBULATING MORE THAN 20 FT
MUSCLE WEAKNESS: 1
FATIGUES EASILY: 1
PAIN SEVERITY GOAL: 0/10
HIGHEST PAIN SEVERITY IN PAST 24 HOURS: 4/10

## 2025-07-21 ASSESSMENT — ACTIVITIES OF DAILY LIVING (ADL)
AMBULATION ASSISTANCE: STAND BY ASSIST
MONEY MANAGEMENT (EXPENSES/BILLS): INDEPENDENT
AMBULATION ASSISTANCE: 1

## 2025-07-22 ENCOUNTER — HOME CARE VISIT (OUTPATIENT)
Dept: HOME HEALTH SERVICES | Facility: HOME HEALTH | Age: 55
End: 2025-07-22
Payer: COMMERCIAL

## 2025-07-22 VITALS
TEMPERATURE: 97.9 F | SYSTOLIC BLOOD PRESSURE: 116 MMHG | OXYGEN SATURATION: 95 % | DIASTOLIC BLOOD PRESSURE: 58 MMHG | RESPIRATION RATE: 16 BRPM | HEART RATE: 86 BPM

## 2025-07-22 PROCEDURE — G0300 HHS/HOSPICE OF LPN EA 15 MIN: HCPCS

## 2025-07-22 ASSESSMENT — ENCOUNTER SYMPTOMS
PAIN LOCATION: RIGHT LEG
PAIN LOCATION - PAIN SEVERITY: 3/10
PERSON REPORTING PAIN: PATIENT
STOOL FREQUENCY: LESS THAN DAILY
CONSTIPATION: 1
APPETITE LEVEL: GOOD
CHANGE IN APPETITE: UNCHANGED
DENIES PAIN: 1
PAIN LOCATION - PAIN FREQUENCY: FREQUENT

## 2025-07-22 ASSESSMENT — ACTIVITIES OF DAILY LIVING (ADL)
AMBULATION ASSISTANCE: STAND BY ASSIST
CURRENT_FUNCTION: STAND BY ASSIST

## 2025-07-23 ENCOUNTER — OFFICE VISIT (OUTPATIENT)
Dept: WOUND CARE | Facility: HOSPITAL | Age: 55
End: 2025-07-23
Payer: COMMERCIAL

## 2025-07-23 ENCOUNTER — APPOINTMENT (OUTPATIENT)
Dept: PRIMARY CARE | Facility: CLINIC | Age: 55
End: 2025-07-23
Payer: COMMERCIAL

## 2025-07-23 PROCEDURE — 97602 WOUND(S) CARE NON-SELECTIVE: CPT | Mod: LT,RT

## 2025-07-24 ENCOUNTER — TELEPHONE (OUTPATIENT)
Dept: PRIMARY CARE | Facility: CLINIC | Age: 55
End: 2025-07-24

## 2025-07-24 ENCOUNTER — HOME CARE VISIT (OUTPATIENT)
Dept: HOME HEALTH SERVICES | Facility: HOME HEALTH | Age: 55
End: 2025-07-24
Payer: COMMERCIAL

## 2025-07-24 VITALS
HEART RATE: 70 BPM | TEMPERATURE: 98.3 F | SYSTOLIC BLOOD PRESSURE: 102 MMHG | OXYGEN SATURATION: 96 % | DIASTOLIC BLOOD PRESSURE: 50 MMHG | RESPIRATION RATE: 16 BRPM

## 2025-07-24 PROCEDURE — G0299 HHS/HOSPICE OF RN EA 15 MIN: HCPCS

## 2025-07-24 SDOH — ECONOMIC STABILITY: GENERAL

## 2025-07-24 ASSESSMENT — ENCOUNTER SYMPTOMS
MUSCLE WEAKNESS: 1
APPETITE LEVEL: FAIR
SKIN LESIONS: 1

## 2025-07-24 ASSESSMENT — ACTIVITIES OF DAILY LIVING (ADL): MONEY MANAGEMENT (EXPENSES/BILLS): INDEPENDENT

## 2025-07-25 NOTE — DOCUMENTATION CLARIFICATION NOTE
"    PATIENT:               KYLE COOK  ACCT #:                  [id number]  MRN:                       [id number]  :                       1970  ADMIT DATE:       2025 4:33 PM  DISCH DATE:        7/15/2025 12:54 PM  RESPONDING PROVIDER #:        [id number]          PROVIDER RESPONSE TEXT:    Incision and Drainage and excisional debridement down to level of subcutaneous tissue    CDI QUERY TEXT:    Clarification    Instruction:    Based on your assessment of the patient and the clinical information, please provide the requested documentation by clicking on the appropriate radio button and enter any additional information if prompted.    Question: Please further clarify the depth of the Incision and Drainage    When answering this query, please exercise your independent professional judgment. The fact that a question is being asked, does not imply that any particular answer is desired or expected.    The patient's clinical indicators include:  Clinical Information: This query refers to the procedure performed on 25 and documented as \"INCISION AND DRAINAGE, ABSCESS, BREAST OR EXTREMITY (R)\".     Op Note: \"She has another opening on the medial side of upper lower leg.  It was explored again with the finger and the hemostat.  Necrotic fatty tissue was removed along with purulent fluids.  The wound cavity was about 4 x 3 cm in size.  It was irrigated and packed again with Kerlix gauze moistened with Betadine solution\"  Options provided:  -- Incision and Drainage and excisional debridement down to level of subcutaneous tissue  -- Incision and Drainage and excisional debridement down to level of muscle  -- Incision and Drainage and non-excisional debridement down to level of subcutaneous tissue  -- Incision and Drainage and non-excisional debridement down to level of muscle  -- Incision and Drainage only; down to level of subcutaneous tissue and/or fascia  -- Incision and Drainage only; down to level of " muscle  -- Excisional debridement only; down to level of subcutaneous tissue and/or fascia  -- Excisional debridement only; down to level of muscle  -- Non-excisional debridement only; down to level of subcutaneous tissue and/or fascia  -- Non-excisional debridement only; down to level of muscle  -- Other - I will add my own diagnosis  -- Refer to Clinical Documentation Reviewer    Query created by: Jada Sanchez on 7/24/2025 9:47 AM      Electronically signed by:  STEFANO GONZALES MD 7/25/2025 5:38 PM

## 2025-07-25 NOTE — DOCUMENTATION CLARIFICATION NOTE
"    PATIENT:               KYLE COOK  ACCT #:                  [id number]  MRN:                       [id number]  :                       1970  ADMIT DATE:       2025 4:33 PM  DISCH DATE:        7/15/2025 12:54 PM  RESPONDING PROVIDER #:        [id number]          PROVIDER RESPONSE TEXT:    Incision and Drainage and excisional debridement down to level of subcutaneous tissue    CDI QUERY TEXT:    Clarification    Instruction:    Based on your assessment of the patient and the clinical information, please provide the requested documentation by clicking on the appropriate radio button and enter any additional information if prompted.    Question: Please further clarify the depth of the Incision and Drainage    When answering this query, please exercise your independent professional judgment. The fact that a question is being asked, does not imply that any particular answer is desired or expected.    The patient's clinical indicators include:  Clinical Information: This query refers to the procedure performed on 25 and documented as \"INCISION AND DRAINAGE, ABSCESS, BREAST OR EXTREMITY (R)\".     Op Note: \"She has a third opening at the anterior aspect of upper lower leg.  It was explored again with finger and hemostat.  Necrotic fat tissue and apparent fluids were removed.  The wound cavity was about 5 x 6 cm in size deep down to just superficial to the tibia bone and fascia\"  Options provided:  -- Incision and Drainage and excisional debridement down to level of subcutaneous tissue  -- Incision and Drainage and excisional debridement down to level of muscle  -- Incision and Drainage and excisional debridement down to level of bone  -- Incision and Drainage and non-excisional debridement down to level of subcutaneous tissue  -- Incision and Drainage and non-excisional debridement down to level of muscle  -- Incision and Drainage and non-excisional debridement down to level of bone  -- Incision and " Drainage only; down to level of subcutaneous tissue and/or fascia  -- Incision and Drainage only; down to level of muscle  -- Excisional debridement only; down to level of subcutaneous tissue and/or fascia  -- Excisional debridement only; down to level of muscle  -- Excisional debridement only; down to level of bone  -- Non-excisional debridement only; down to level of subcutaneous tissue and/or fascia  -- Non-excisional debridement only; down to level of muscle  -- Non-excisional debridement only; down to level of bone  -- Other - I will add my own diagnosis  -- Refer to Clinical Documentation Reviewer    Query created by: Jada Sanchez on 7/24/2025 9:51 AM      Electronically signed by:  STEFANO GONZALES MD 7/25/2025 5:38 PM

## 2025-07-25 NOTE — DOCUMENTATION CLARIFICATION NOTE
"    PATIENT:               KYLE COOK  ACCT #:                  [id number]  MRN:                       [id number]  :                       1970  ADMIT DATE:       2025 4:33 PM  DISCH DATE:        7/15/2025 12:54 PM  RESPONDING PROVIDER #:        [id number]          PROVIDER RESPONSE TEXT:    Incision and Drainage and excisional debridement down to level of subcutaneous tissue    CDI QUERY TEXT:    Clarification    Instruction:    Based on your assessment of the patient and the clinical information, please provide the requested documentation by clicking on the appropriate radio button and enter any additional information if prompted.    Question: Please further clarify the depth of the Incision and Drainage    When answering this query, please exercise your independent professional judgment. The fact that a question is being asked, does not imply that any particular answer is desired or expected.    The patient's clinical indicators include:  Clinical Information: This query refers to the procedure performed on 25 and documented as \"INCISION AND DRAINAGE, ABSCESS, BREAST OR EXTREMITY (R)\".     Op Note: \"In the middle portion, there was a opening with purulent drainage.  The wound was explored with finger.  Necrotic subcutaneous fat was removed.  The wound cavity was about 3 x 3 cm in size deep down to superficial to muscle.  The wound was irrigated and then packed with Kerlix gauze moistened with Betadine solution.\"  Options provided:  -- Incision and Drainage and excisional debridement down to level of subcutaneous tissue  -- Incision and Drainage and excisional debridement down to level of muscle  -- Incision and Drainage and non-excisional debridement down to level of subcutaneous tissue  -- Incision and Drainage and non-excisional debridement down to level of muscle  -- Incision and Drainage only; down to level of subcutaneous tissue and/or fascia  -- Incision and Drainage only; down to level of " muscle  -- Excisional debridement only; down to level of subcutaneous tissue and/or fascia  -- Excisional debridement only; down to level of muscle  -- Non-excisional debridement only; down to level of subcutaneous tissue and/or fascia  -- Non-excisional debridement only; down to level of muscle  -- Other - I will add my own diagnosis  -- Refer to Clinical Documentation Reviewer    Query created by: Jada Sanchez on 7/24/2025 9:46 AM      Electronically signed by:  STEFANO GONZALES MD 7/25/2025 5:38 PM

## 2025-07-26 ENCOUNTER — HOME CARE VISIT (OUTPATIENT)
Dept: HOME HEALTH SERVICES | Facility: HOME HEALTH | Age: 55
End: 2025-07-26
Payer: COMMERCIAL

## 2025-07-26 VITALS
SYSTOLIC BLOOD PRESSURE: 126 MMHG | OXYGEN SATURATION: 97 % | RESPIRATION RATE: 14 BRPM | HEART RATE: 78 BPM | TEMPERATURE: 97.1 F | DIASTOLIC BLOOD PRESSURE: 68 MMHG

## 2025-07-26 PROCEDURE — G0299 HHS/HOSPICE OF RN EA 15 MIN: HCPCS

## 2025-07-26 SDOH — ECONOMIC STABILITY: GENERAL

## 2025-07-26 ASSESSMENT — ENCOUNTER SYMPTOMS
PAIN SEVERITY GOAL: 1/10
PERSON REPORTING PAIN: PATIENT
MUSCLE WEAKNESS: 1
PAIN LOCATION: RIGHT LEG
HIGHEST PAIN SEVERITY IN PAST 24 HOURS: 6/10
PAIN LOCATION - RELIEVING FACTORS: MEDICATION, REST
PAIN LOCATION - PAIN QUALITY: ACHING THROBBING
PAIN LOCATION - PAIN SEVERITY: 4/10
CHANGE IN APPETITE: UNCHANGED
SKIN LESIONS: 1
APPETITE LEVEL: GOOD
PAIN LOCATION - PAIN DURATION: VARIES
SUBJECTIVE PAIN PROGRESSION: UNCHANGED
FATIGUES EASILY: 1
PAIN: 1
PAIN LOCATION - PAIN FREQUENCY: INTERMITTENT
LOWEST PAIN SEVERITY IN PAST 24 HOURS: 3/10
PAIN LOCATION - EXACERBATING FACTORS: WOUNDS
FATIGUE: 1

## 2025-07-26 ASSESSMENT — ACTIVITIES OF DAILY LIVING (ADL)
AMBULATION ASSISTANCE: ONE PERSON
AMBULATION ASSISTANCE: 1
MONEY MANAGEMENT (EXPENSES/BILLS): INDEPENDENT
AMBULATION ASSISTANCE: STAND BY ASSIST

## 2025-07-28 ENCOUNTER — CLINICAL SUPPORT (OUTPATIENT)
Dept: WOUND CARE | Facility: HOSPITAL | Age: 55
End: 2025-07-28
Payer: COMMERCIAL

## 2025-07-28 PROCEDURE — 99213 OFFICE O/P EST LOW 20 MIN: CPT

## 2025-07-29 ENCOUNTER — HOME CARE VISIT (OUTPATIENT)
Dept: HOME HEALTH SERVICES | Facility: HOME HEALTH | Age: 55
End: 2025-07-29
Payer: COMMERCIAL

## 2025-07-29 VITALS
HEART RATE: 98 BPM | DIASTOLIC BLOOD PRESSURE: 68 MMHG | SYSTOLIC BLOOD PRESSURE: 130 MMHG | RESPIRATION RATE: 16 BRPM | OXYGEN SATURATION: 98 % | TEMPERATURE: 96.8 F

## 2025-07-29 PROCEDURE — G0300 HHS/HOSPICE OF LPN EA 15 MIN: HCPCS

## 2025-07-29 ASSESSMENT — ACTIVITIES OF DAILY LIVING (ADL)
AMBULATION ASSISTANCE: STAND BY ASSIST
AMBULATION ASSISTANCE: ONE PERSON
AMBULATION ASSISTANCE: 1

## 2025-07-29 ASSESSMENT — ENCOUNTER SYMPTOMS
SKIN LESIONS: 1
DENIES PAIN: 1
MUSCLE WEAKNESS: 1
APPETITE LEVEL: GOOD
CHANGE IN APPETITE: UNCHANGED
FATIGUES EASILY: 1
PERSON REPORTING PAIN: PATIENT
FATIGUE: 1

## 2025-07-30 ENCOUNTER — OFFICE VISIT (OUTPATIENT)
Dept: WOUND CARE | Facility: HOSPITAL | Age: 55
End: 2025-07-30
Payer: COMMERCIAL

## 2025-07-30 DIAGNOSIS — L30.9 DERMATITIS: Primary | ICD-10-CM

## 2025-07-30 PROCEDURE — 11042 DBRDMT SUBQ TIS 1ST 20SQCM/<: CPT | Mod: RT

## 2025-07-30 PROCEDURE — 11045 DBRDMT SUBQ TISS EACH ADDL: CPT | Mod: RT

## 2025-07-31 ENCOUNTER — HOME CARE VISIT (OUTPATIENT)
Dept: HOME HEALTH SERVICES | Facility: HOME HEALTH | Age: 55
End: 2025-07-31
Payer: COMMERCIAL

## 2025-07-31 VITALS
SYSTOLIC BLOOD PRESSURE: 128 MMHG | RESPIRATION RATE: 16 BRPM | TEMPERATURE: 97.6 F | HEART RATE: 80 BPM | DIASTOLIC BLOOD PRESSURE: 64 MMHG | OXYGEN SATURATION: 96 %

## 2025-07-31 PROCEDURE — G0300 HHS/HOSPICE OF LPN EA 15 MIN: HCPCS

## 2025-07-31 ASSESSMENT — ACTIVITIES OF DAILY LIVING (ADL)
AMBULATION ASSISTANCE: STAND BY ASSIST
AMBULATION ASSISTANCE: 1
AMBULATION ASSISTANCE: ONE PERSON

## 2025-07-31 ASSESSMENT — ENCOUNTER SYMPTOMS
MUSCLE WEAKNESS: 1
FATIGUES EASILY: 1
FATIGUE: 1
APPETITE LEVEL: GOOD
PAIN: 1
PERSON REPORTING PAIN: PATIENT
SKIN LESIONS: 1
CHANGE IN APPETITE: UNCHANGED

## 2025-08-01 RX ORDER — FLUTICASONE PROPIONATE 0.05 MG/G
OINTMENT TOPICAL DAILY
Qty: 60 G | Refills: 1 | Status: SHIPPED | OUTPATIENT
Start: 2025-08-01

## 2025-08-02 ENCOUNTER — HOME CARE VISIT (OUTPATIENT)
Dept: HOME HEALTH SERVICES | Facility: HOME HEALTH | Age: 55
End: 2025-08-02
Payer: COMMERCIAL

## 2025-08-02 VITALS
RESPIRATION RATE: 16 BRPM | DIASTOLIC BLOOD PRESSURE: 80 MMHG | SYSTOLIC BLOOD PRESSURE: 120 MMHG | TEMPERATURE: 97.1 F | HEART RATE: 95 BPM | OXYGEN SATURATION: 99 %

## 2025-08-02 PROCEDURE — G0299 HHS/HOSPICE OF RN EA 15 MIN: HCPCS

## 2025-08-02 ASSESSMENT — ENCOUNTER SYMPTOMS
LOWER EXTREMITY EDEMA: 1
APPETITE LEVEL: GOOD
CHANGE IN APPETITE: UNCHANGED
MUSCLE WEAKNESS: 1

## 2025-08-02 ASSESSMENT — PAIN DESCRIPTION - PAIN TYPE: TYPE: CHRONIC PAIN

## 2025-08-05 ENCOUNTER — HOME CARE VISIT (OUTPATIENT)
Dept: HOME HEALTH SERVICES | Facility: HOME HEALTH | Age: 55
End: 2025-08-05
Payer: COMMERCIAL

## 2025-08-05 VITALS
SYSTOLIC BLOOD PRESSURE: 126 MMHG | OXYGEN SATURATION: 97 % | RESPIRATION RATE: 12 BRPM | DIASTOLIC BLOOD PRESSURE: 68 MMHG | HEART RATE: 76 BPM | TEMPERATURE: 97.1 F

## 2025-08-05 PROCEDURE — G0299 HHS/HOSPICE OF RN EA 15 MIN: HCPCS

## 2025-08-06 ENCOUNTER — OFFICE VISIT (OUTPATIENT)
Dept: WOUND CARE | Facility: HOSPITAL | Age: 55
End: 2025-08-06
Payer: COMMERCIAL

## 2025-08-06 PROCEDURE — 11042 DBRDMT SUBQ TIS 1ST 20SQCM/<: CPT | Mod: RT

## 2025-08-06 SDOH — ECONOMIC STABILITY: GENERAL

## 2025-08-06 ASSESSMENT — ENCOUNTER SYMPTOMS
DYSPNEA ACTIVITY LEVEL: AFTER AMBULATING MORE THAN 20 FT
SHORTNESS OF BREATH: 1
APPETITE LEVEL: GOOD
MUSCLE WEAKNESS: 1
PAIN LOCATION - PAIN FREQUENCY: INTERMITTENT
CHANGE IN APPETITE: UNCHANGED
FATIGUES EASILY: 1
HIGHEST PAIN SEVERITY IN PAST 24 HOURS: 6/10
PAIN LOCATION: RIGHT LEG
PAIN LOCATION - RELIEVING FACTORS: MEDICATION, REST
PAIN LOCATION - PAIN DURATION: VARIES
LOWEST PAIN SEVERITY IN PAST 24 HOURS: 2/10
PAIN LOCATION - EXACERBATING FACTORS: WOUNDS
PAIN SEVERITY GOAL: 1/10
PAIN: 1
SKIN LESIONS: 1
PERSON REPORTING PAIN: PATIENT
SUBJECTIVE PAIN PROGRESSION: GRADUALLY IMPROVING
PAIN LOCATION - PAIN SEVERITY: 5/10

## 2025-08-06 ASSESSMENT — ACTIVITIES OF DAILY LIVING (ADL)
CURRENT_FUNCTION: STAND BY ASSIST
MONEY MANAGEMENT (EXPENSES/BILLS): INDEPENDENT
AMBULATION ASSISTANCE: STAND BY ASSIST

## 2025-08-07 ENCOUNTER — HOME CARE VISIT (OUTPATIENT)
Dept: HOME HEALTH SERVICES | Facility: HOME HEALTH | Age: 55
End: 2025-08-07
Payer: COMMERCIAL

## 2025-08-07 VITALS
TEMPERATURE: 98.3 F | RESPIRATION RATE: 14 BRPM | OXYGEN SATURATION: 99 % | HEART RATE: 78 BPM | DIASTOLIC BLOOD PRESSURE: 78 MMHG | SYSTOLIC BLOOD PRESSURE: 122 MMHG

## 2025-08-07 PROCEDURE — G0300 HHS/HOSPICE OF LPN EA 15 MIN: HCPCS

## 2025-08-07 ASSESSMENT — ENCOUNTER SYMPTOMS
SKIN LESIONS: 1
FATIGUES EASILY: 1
PERSON REPORTING PAIN: PATIENT
SHORTNESS OF BREATH: 1
APPETITE LEVEL: GOOD
DYSPNEA ACTIVITY LEVEL: AFTER AMBULATING MORE THAN 20 FT
SUBJECTIVE PAIN PROGRESSION: GRADUALLY IMPROVING
MUSCLE WEAKNESS: 1
CHANGE IN APPETITE: UNCHANGED

## 2025-08-07 ASSESSMENT — ACTIVITIES OF DAILY LIVING (ADL): AMBULATION ASSISTANCE: 1

## 2025-08-08 ENCOUNTER — CLINICAL SUPPORT (OUTPATIENT)
Dept: PREADMISSION TESTING | Facility: HOSPITAL | Age: 55
End: 2025-08-08
Payer: COMMERCIAL

## 2025-08-08 ASSESSMENT — ACTIVITIES OF DAILY LIVING (ADL): AMBULATION ASSISTANCE: STAND BY ASSIST

## 2025-08-08 ASSESSMENT — ENCOUNTER SYMPTOMS: DENIES PAIN: 1

## 2025-08-09 ENCOUNTER — HOME CARE VISIT (OUTPATIENT)
Dept: HOME HEALTH SERVICES | Facility: HOME HEALTH | Age: 55
End: 2025-08-09
Payer: COMMERCIAL

## 2025-08-09 VITALS — DIASTOLIC BLOOD PRESSURE: 76 MMHG | SYSTOLIC BLOOD PRESSURE: 114 MMHG | TEMPERATURE: 98.1 F

## 2025-08-09 PROCEDURE — G0299 HHS/HOSPICE OF RN EA 15 MIN: HCPCS

## 2025-08-09 SDOH — ECONOMIC STABILITY: GENERAL

## 2025-08-09 ASSESSMENT — ACTIVITIES OF DAILY LIVING (ADL)
AMBULATION ASSISTANCE: STAND BY ASSIST
CURRENT_FUNCTION: STAND BY ASSIST
MONEY MANAGEMENT (EXPENSES/BILLS): INDEPENDENT

## 2025-08-09 ASSESSMENT — ENCOUNTER SYMPTOMS
PERSON REPORTING PAIN: PATIENT
SUBJECTIVE PAIN PROGRESSION: UNCHANGED
LAST BOWEL MOVEMENT: 67426
PAIN LOCATION - PAIN FREQUENCY: INTERMITTENT
PAIN LOCATION - EXACERBATING FACTORS: DRESSING CHANGE
PAIN: 1
PAIN SEVERITY GOAL: 0/10
PAIN LOCATION - RELIEVING FACTORS: REST
PAIN LOCATION - PAIN DURATION: SHORT
APPETITE LEVEL: GOOD
PAIN LOCATION - PAIN QUALITY: SORE
MUSCLE WEAKNESS: 1
PAIN LOCATION - PAIN SEVERITY: 7/10
CHANGE IN APPETITE: UNCHANGED
HIGHEST PAIN SEVERITY IN PAST 24 HOURS: 7/10
PAIN LOCATION: RIGHT LEG
LOWEST PAIN SEVERITY IN PAST 24 HOURS: 7/10

## 2025-08-09 ASSESSMENT — PAIN SCALES - PAIN ASSESSMENT IN ADVANCED DEMENTIA (PAINAD)
BODYLANGUAGE: 0
BODYLANGUAGE: 0 - RELAXED.
FACIALEXPRESSION: 0
TOTALSCORE: 0
CONSOLABILITY: 0
FACIALEXPRESSION: 0 - SMILING OR INEXPRESSIVE.
NEGVOCALIZATION: 0 - NONE.
NEGVOCALIZATION: 0
BREATHING: 0
CONSOLABILITY: 0 - NO NEED TO CONSOLE.

## 2025-08-09 NOTE — HOME HEALTH
With this SN visit, patient presented A&O x 3 and was pleasant and cooperative. Patient sat in recliner in living room for assessment. The skill of a nurse remains needed for wound care and assessment of wounds right lower leg. Discussed DC from nursing when goals are met. Patient verbalized understanding.

## 2025-08-11 DIAGNOSIS — N20.0 NEPHROLITHIASIS: ICD-10-CM

## 2025-08-12 ENCOUNTER — HOME CARE VISIT (OUTPATIENT)
Dept: HOME HEALTH SERVICES | Facility: HOME HEALTH | Age: 55
End: 2025-08-12
Payer: COMMERCIAL

## 2025-08-12 VITALS
HEART RATE: 90 BPM | TEMPERATURE: 98.4 F | SYSTOLIC BLOOD PRESSURE: 124 MMHG | OXYGEN SATURATION: 97 % | RESPIRATION RATE: 16 BRPM | DIASTOLIC BLOOD PRESSURE: 60 MMHG

## 2025-08-12 PROCEDURE — G0300 HHS/HOSPICE OF LPN EA 15 MIN: HCPCS

## 2025-08-13 ENCOUNTER — APPOINTMENT (OUTPATIENT)
Dept: PRIMARY CARE | Facility: CLINIC | Age: 55
End: 2025-08-13
Payer: COMMERCIAL

## 2025-08-13 ENCOUNTER — LAB (OUTPATIENT)
Dept: LAB | Facility: HOSPITAL | Age: 55
End: 2025-08-13
Payer: COMMERCIAL

## 2025-08-13 ENCOUNTER — PRE-ADMISSION TESTING (OUTPATIENT)
Dept: PREADMISSION TESTING | Facility: HOSPITAL | Age: 55
End: 2025-08-13
Payer: COMMERCIAL

## 2025-08-13 VITALS
BODY MASS INDEX: 51.38 KG/M2 | OXYGEN SATURATION: 98 % | SYSTOLIC BLOOD PRESSURE: 132 MMHG | WEIGHT: 290 LBS | DIASTOLIC BLOOD PRESSURE: 80 MMHG | RESPIRATION RATE: 18 BRPM | HEART RATE: 101 BPM | TEMPERATURE: 96.9 F | HEIGHT: 63 IN

## 2025-08-13 VITALS
HEART RATE: 95 BPM | SYSTOLIC BLOOD PRESSURE: 132 MMHG | DIASTOLIC BLOOD PRESSURE: 70 MMHG | OXYGEN SATURATION: 99 % | TEMPERATURE: 98 F

## 2025-08-13 DIAGNOSIS — R31.0 GROSS HEMATURIA: Primary | ICD-10-CM

## 2025-08-13 DIAGNOSIS — E55.9 VITAMIN D DEFICIENCY: ICD-10-CM

## 2025-08-13 DIAGNOSIS — L02.415 CELLULITIS AND ABSCESS OF RIGHT LOWER EXTREMITY: Primary | ICD-10-CM

## 2025-08-13 DIAGNOSIS — N20.0 NEPHROLITHIASIS: ICD-10-CM

## 2025-08-13 DIAGNOSIS — E78.00 ELEVATED LDL CHOLESTEROL LEVEL: ICD-10-CM

## 2025-08-13 DIAGNOSIS — Z13.29 SCREENING FOR THYROID DISORDER: ICD-10-CM

## 2025-08-13 DIAGNOSIS — D64.9 ANEMIA, UNSPECIFIED TYPE: ICD-10-CM

## 2025-08-13 DIAGNOSIS — L03.115 CELLULITIS AND ABSCESS OF RIGHT LOWER EXTREMITY: Primary | ICD-10-CM

## 2025-08-13 DIAGNOSIS — R94.5 ABNORMAL LIVER FUNCTION: ICD-10-CM

## 2025-08-13 DIAGNOSIS — L02.415 ABSCESS OF LEG, RIGHT: ICD-10-CM

## 2025-08-13 LAB
ABO GROUP (TYPE) IN BLOOD: NORMAL
ANTIBODY SCREEN: NORMAL
APPEARANCE UR: ABNORMAL
BASOPHILS # BLD AUTO: 0.07 X10*3/UL (ref 0–0.1)
BASOPHILS NFR BLD AUTO: 0.8 %
BILIRUB UR STRIP.AUTO-MCNC: NEGATIVE MG/DL
CAOX CRY #/AREA UR COMP ASSIST: ABNORMAL /HPF
COLOR UR: ABNORMAL
EOSINOPHIL # BLD AUTO: 0.47 X10*3/UL (ref 0–0.7)
EOSINOPHIL NFR BLD AUTO: 5.4 %
ERYTHROCYTE [DISTWIDTH] IN BLOOD BY AUTOMATED COUNT: 15.6 % (ref 11.5–14.5)
GLUCOSE UR STRIP.AUTO-MCNC: NORMAL MG/DL
HCT VFR BLD AUTO: 34.8 % (ref 36–46)
HGB BLD-MCNC: 10.7 G/DL (ref 12–16)
IMM GRANULOCYTES # BLD AUTO: 0.03 X10*3/UL (ref 0–0.7)
IMM GRANULOCYTES NFR BLD AUTO: 0.3 % (ref 0–0.9)
KETONES UR STRIP.AUTO-MCNC: NEGATIVE MG/DL
LEUKOCYTE ESTERASE UR QL STRIP.AUTO: ABNORMAL
LYMPHOCYTES # BLD AUTO: 1.72 X10*3/UL (ref 1.2–4.8)
LYMPHOCYTES NFR BLD AUTO: 19.7 %
MCH RBC QN AUTO: 28.9 PG (ref 26–34)
MCHC RBC AUTO-ENTMCNC: 30.7 G/DL (ref 32–36)
MCV RBC AUTO: 94 FL (ref 80–100)
MONOCYTES # BLD AUTO: 0.69 X10*3/UL (ref 0.1–1)
MONOCYTES NFR BLD AUTO: 7.9 %
NEUTROPHILS # BLD AUTO: 5.76 X10*3/UL (ref 1.2–7.7)
NEUTROPHILS NFR BLD AUTO: 65.9 %
NITRITE UR QL STRIP.AUTO: NEGATIVE
NRBC BLD-RTO: 0 /100 WBCS (ref 0–0)
PH UR STRIP.AUTO: 6 [PH]
PLATELET # BLD AUTO: 436 X10*3/UL (ref 150–450)
PROT UR STRIP.AUTO-MCNC: ABNORMAL MG/DL
RBC # BLD AUTO: 3.7 X10*6/UL (ref 4–5.2)
RBC # UR STRIP.AUTO: ABNORMAL MG/DL
RBC #/AREA URNS AUTO: >20 /HPF
RH FACTOR (ANTIGEN D): NORMAL
SP GR UR STRIP.AUTO: 1.01
SQUAMOUS #/AREA URNS AUTO: ABNORMAL /HPF
UROBILINOGEN UR STRIP.AUTO-MCNC: NORMAL MG/DL
WBC # BLD AUTO: 8.7 X10*3/UL (ref 4.4–11.3)
WBC #/AREA URNS AUTO: ABNORMAL /HPF

## 2025-08-13 PROCEDURE — 87086 URINE CULTURE/COLONY COUNT: CPT | Mod: AHULAB

## 2025-08-13 PROCEDURE — 81003 URINALYSIS AUTO W/O SCOPE: CPT

## 2025-08-13 PROCEDURE — 86900 BLOOD TYPING SEROLOGIC ABO: CPT

## 2025-08-13 PROCEDURE — 85025 COMPLETE CBC W/AUTO DIFF WBC: CPT

## 2025-08-13 PROCEDURE — 99213 OFFICE O/P EST LOW 20 MIN: CPT | Performed by: INTERNAL MEDICINE

## 2025-08-13 PROCEDURE — 99204 OFFICE O/P NEW MOD 45 MIN: CPT

## 2025-08-13 PROCEDURE — 86901 BLOOD TYPING SEROLOGIC RH(D): CPT

## 2025-08-13 PROCEDURE — 1036F TOBACCO NON-USER: CPT | Performed by: INTERNAL MEDICINE

## 2025-08-13 PROCEDURE — 86850 RBC ANTIBODY SCREEN: CPT

## 2025-08-13 RX ORDER — OXYCODONE AND ACETAMINOPHEN 10; 325 MG/1; MG/1
1 TABLET ORAL DAILY
Qty: 90 TABLET | Refills: 0 | Status: SHIPPED | OUTPATIENT
Start: 2025-08-13 | End: 2025-11-11

## 2025-08-13 RX ORDER — DEXTROMETHORPHAN HYDROBROMIDE, GUAIFENESIN 5; 100 MG/5ML; MG/5ML
650 LIQUID ORAL EVERY 8 HOURS PRN
COMMUNITY

## 2025-08-13 ASSESSMENT — PATIENT HEALTH QUESTIONNAIRE - PHQ9
1. LITTLE INTEREST OR PLEASURE IN DOING THINGS: NOT AT ALL
6. FEELING BAD ABOUT YOURSELF - OR THAT YOU ARE A FAILURE OR HAVE LET YOURSELF OR YOUR FAMILY DOWN: NOT AT ALL
SUM OF ALL RESPONSES TO PHQ9 QUESTIONS 1 AND 2: 1
3. TROUBLE FALLING OR STAYING ASLEEP OR SLEEPING TOO MUCH: NEARLY EVERY DAY
SUM OF ALL RESPONSES TO PHQ QUESTIONS 1-9: 4
7. TROUBLE CONCENTRATING ON THINGS, SUCH AS READING THE NEWSPAPER OR WATCHING TELEVISION: NOT AT ALL
8. MOVING OR SPEAKING SO SLOWLY THAT OTHER PEOPLE COULD HAVE NOTICED. OR THE OPPOSITE, BEING SO FIGETY OR RESTLESS THAT YOU HAVE BEEN MOVING AROUND A LOT MORE THAN USUAL: NOT AT ALL
5. POOR APPETITE OR OVEREATING: NOT AT ALL
9. THOUGHTS THAT YOU WOULD BE BETTER OFF DEAD, OR OF HURTING YOURSELF: NOT AT ALL
4. FEELING TIRED OR HAVING LITTLE ENERGY: NOT AT ALL
2. FEELING DOWN, DEPRESSED OR HOPELESS: SEVERAL DAYS

## 2025-08-13 ASSESSMENT — ANXIETY QUESTIONNAIRES
4. TROUBLE RELAXING: SEVERAL DAYS
7. FEELING AFRAID AS IF SOMETHING AWFUL MIGHT HAPPEN: NOT AT ALL
1. FEELING NERVOUS, ANXIOUS, OR ON EDGE: SEVERAL DAYS
3. WORRYING TOO MUCH ABOUT DIFFERENT THINGS: SEVERAL DAYS
GAD7 TOTAL SCORE: 4
6. BECOMING EASILY ANNOYED OR IRRITABLE: NOT AT ALL
2. NOT BEING ABLE TO STOP OR CONTROL WORRYING: SEVERAL DAYS
5. BEING SO RESTLESS THAT IT IS HARD TO SIT STILL: NOT AT ALL

## 2025-08-14 ENCOUNTER — HOME CARE VISIT (OUTPATIENT)
Dept: HOME HEALTH SERVICES | Facility: HOME HEALTH | Age: 55
End: 2025-08-14
Payer: COMMERCIAL

## 2025-08-14 LAB — BACTERIA UR CULT: NORMAL

## 2025-08-14 PROCEDURE — G0300 HHS/HOSPICE OF LPN EA 15 MIN: HCPCS

## 2025-08-16 ENCOUNTER — HOME CARE VISIT (OUTPATIENT)
Dept: HOME HEALTH SERVICES | Facility: HOME HEALTH | Age: 55
End: 2025-08-16
Payer: COMMERCIAL

## 2025-08-19 ENCOUNTER — HOME CARE VISIT (OUTPATIENT)
Dept: HOME HEALTH SERVICES | Facility: HOME HEALTH | Age: 55
End: 2025-08-19
Payer: COMMERCIAL

## 2025-08-19 VITALS
HEART RATE: 74 BPM | TEMPERATURE: 97.1 F | OXYGEN SATURATION: 100 % | DIASTOLIC BLOOD PRESSURE: 72 MMHG | SYSTOLIC BLOOD PRESSURE: 110 MMHG | RESPIRATION RATE: 16 BRPM

## 2025-08-19 PROCEDURE — G0299 HHS/HOSPICE OF RN EA 15 MIN: HCPCS

## 2025-08-19 ASSESSMENT — ENCOUNTER SYMPTOMS
APPETITE LEVEL: GOOD
PERSON REPORTING PAIN: PATIENT
HEMATURIA: 1
FATIGUES EASILY: 1
PAIN: 1
LAST BOWEL MOVEMENT: 67435
MUSCLE WEAKNESS: 1
BOWEL PATTERN NORMAL: 1
AGGRESSION WITHIN DEFINED LIMITS: 1
PAIN LOCATION: GENERALIZED
CHANGE IN APPETITE: UNCHANGED
LIMITED RANGE OF MOTION: 1
SHORTNESS OF BREATH: 1
ANGER WITHIN DEFINED LIMITS: 1
SLEEP QUALITY: ADEQUATE
PAIN LOCATION - PAIN SEVERITY: 2/10
STOOL FREQUENCY: LESS THAN DAILY
DYSPNEA ACTIVITY LEVEL: AFTER AMBULATING 10 - 20 FT

## 2025-08-19 ASSESSMENT — ACTIVITIES OF DAILY LIVING (ADL): ENTERING_EXITING_HOME: SUPERVISION

## 2025-08-20 ENCOUNTER — OFFICE VISIT (OUTPATIENT)
Dept: WOUND CARE | Facility: HOSPITAL | Age: 55
End: 2025-08-20
Payer: COMMERCIAL

## 2025-08-20 PROCEDURE — 11042 DBRDMT SUBQ TIS 1ST 20SQCM/<: CPT | Mod: RT,XS

## 2025-08-20 ASSESSMENT — ACTIVITIES OF DAILY LIVING (ADL): OASIS_M1830: 03

## 2025-08-21 ENCOUNTER — PATIENT OUTREACH (OUTPATIENT)
Dept: CARE COORDINATION | Facility: CLINIC | Age: 55
End: 2025-08-21
Payer: COMMERCIAL

## 2025-08-21 ENCOUNTER — HOME CARE VISIT (OUTPATIENT)
Dept: HOME HEALTH SERVICES | Facility: HOME HEALTH | Age: 55
End: 2025-08-21
Payer: COMMERCIAL

## 2025-08-21 VITALS
HEART RATE: 86 BPM | DIASTOLIC BLOOD PRESSURE: 74 MMHG | RESPIRATION RATE: 16 BRPM | TEMPERATURE: 97.6 F | SYSTOLIC BLOOD PRESSURE: 126 MMHG | OXYGEN SATURATION: 99 %

## 2025-08-21 PROCEDURE — G0300 HHS/HOSPICE OF LPN EA 15 MIN: HCPCS

## 2025-08-21 ASSESSMENT — ACTIVITIES OF DAILY LIVING (ADL)
CURRENT_FUNCTION: STAND BY ASSIST
AMBULATION ASSISTANCE: STAND BY ASSIST

## 2025-08-21 ASSESSMENT — ENCOUNTER SYMPTOMS
DYSPNEA ACTIVITY LEVEL: AFTER AMBULATING MORE THAN 20 FT
CHANGE IN APPETITE: UNCHANGED
SHORTNESS OF BREATH: 1
BOWEL PATTERN NORMAL: 1
LAST BOWEL MOVEMENT: 67438
APPETITE LEVEL: GOOD
DENIES PAIN: 1
STOOL FREQUENCY: DAILY
MUSCLE WEAKNESS: 1
PERSON REPORTING PAIN: PATIENT

## 2025-08-22 ENCOUNTER — TELEPHONE (OUTPATIENT)
Dept: PRIMARY CARE | Facility: CLINIC | Age: 55
End: 2025-08-22
Payer: COMMERCIAL

## 2025-08-22 DIAGNOSIS — N39.0 URINARY TRACT INFECTION WITHOUT HEMATURIA, SITE UNSPECIFIED: Primary | ICD-10-CM

## 2025-08-22 RX ORDER — NITROFURANTOIN 25; 75 MG/1; MG/1
100 CAPSULE ORAL EVERY 12 HOURS SCHEDULED
COMMUNITY
End: 2025-08-23 | Stop reason: SDUPTHER

## 2025-08-23 ENCOUNTER — HOME CARE VISIT (OUTPATIENT)
Dept: HOME HEALTH SERVICES | Facility: HOME HEALTH | Age: 55
End: 2025-08-23
Payer: COMMERCIAL

## 2025-08-23 VITALS
OXYGEN SATURATION: 97 % | DIASTOLIC BLOOD PRESSURE: 76 MMHG | TEMPERATURE: 97.1 F | SYSTOLIC BLOOD PRESSURE: 128 MMHG | HEART RATE: 86 BPM | RESPIRATION RATE: 12 BRPM

## 2025-08-23 DIAGNOSIS — N39.0 URINARY TRACT INFECTION WITHOUT HEMATURIA, SITE UNSPECIFIED: Primary | ICD-10-CM

## 2025-08-23 PROCEDURE — G0299 HHS/HOSPICE OF RN EA 15 MIN: HCPCS

## 2025-08-23 RX ORDER — NITROFURANTOIN 25; 75 MG/1; MG/1
100 CAPSULE ORAL EVERY 12 HOURS SCHEDULED
Qty: 7 CAPSULE | Refills: 0 | Status: SHIPPED | OUTPATIENT
Start: 2025-08-23

## 2025-08-23 SDOH — ECONOMIC STABILITY: GENERAL

## 2025-08-23 ASSESSMENT — ENCOUNTER SYMPTOMS
PERSON REPORTING PAIN: PATIENT
PAIN LOCATION: RIGHT LEG
PAIN LOCATION - EXACERBATING FACTORS: WOUND
PAIN LOCATION - RELIEVING FACTORS: MEDICATION, REST
HIGHEST PAIN SEVERITY IN PAST 24 HOURS: 3/10
MUSCLE WEAKNESS: 1
PAIN LOCATION - PAIN DURATION: VARIES
FATIGUE: 1
SKIN LESIONS: 1
PAIN LOCATION - PAIN SEVERITY: 3/10
SUBJECTIVE PAIN PROGRESSION: GRADUALLY IMPROVING
LOWEST PAIN SEVERITY IN PAST 24 HOURS: 2/10
PAIN LOCATION - PAIN QUALITY: ACHING
CHANGE IN APPETITE: UNCHANGED
PAIN SEVERITY GOAL: 0/10
PAIN LOCATION - PAIN FREQUENCY: INTERMITTENT
FATIGUES EASILY: 1
PAIN: 1
APPETITE LEVEL: GOOD

## 2025-08-26 ENCOUNTER — HOME CARE VISIT (OUTPATIENT)
Dept: HOME HEALTH SERVICES | Facility: HOME HEALTH | Age: 55
End: 2025-08-26
Payer: COMMERCIAL

## 2025-08-26 VITALS
DIASTOLIC BLOOD PRESSURE: 64 MMHG | TEMPERATURE: 98.6 F | RESPIRATION RATE: 16 BRPM | HEART RATE: 85 BPM | SYSTOLIC BLOOD PRESSURE: 122 MMHG | OXYGEN SATURATION: 98 %

## 2025-08-26 VITALS
RESPIRATION RATE: 16 BRPM | HEART RATE: 82 BPM | TEMPERATURE: 98.2 F | DIASTOLIC BLOOD PRESSURE: 78 MMHG | OXYGEN SATURATION: 99 % | SYSTOLIC BLOOD PRESSURE: 120 MMHG

## 2025-08-26 PROCEDURE — G0300 HHS/HOSPICE OF LPN EA 15 MIN: HCPCS

## 2025-08-26 ASSESSMENT — ENCOUNTER SYMPTOMS
CHANGE IN APPETITE: UNCHANGED
MUSCLE WEAKNESS: 1
PERSON REPORTING PAIN: PATIENT
DYSPNEA ACTIVITY LEVEL: AFTER AMBULATING MORE THAN 20 FT
DENIES PAIN: 1
APPETITE LEVEL: GOOD
APPETITE LEVEL: FAIR
DYSPNEA ACTIVITY LEVEL: AFTER AMBULATING MORE THAN 20 FT
FATIGUES EASILY: 1
FATIGUES EASILY: 1
SHORTNESS OF BREATH: 1
MUSCLE WEAKNESS: 1
PERSON REPORTING PAIN: PATIENT
DENIES PAIN: 1
BOWEL PATTERN NORMAL: 1
DENIES PAIN: 1
SHORTNESS OF BREATH: 1
STOOL FREQUENCY: DAILY
PERSON REPORTING PAIN: PATIENT
CHANGE IN APPETITE: UNCHANGED

## 2025-08-26 ASSESSMENT — ACTIVITIES OF DAILY LIVING (ADL)
AMBULATION ASSISTANCE: STAND BY ASSIST
CURRENT_FUNCTION: STAND BY ASSIST

## 2025-08-27 ENCOUNTER — OFFICE VISIT (OUTPATIENT)
Dept: WOUND CARE | Facility: HOSPITAL | Age: 55
End: 2025-08-27
Payer: COMMERCIAL

## 2025-08-27 PROCEDURE — 11042 DBRDMT SUBQ TIS 1ST 20SQCM/<: CPT | Mod: RT,XS

## 2025-08-27 ASSESSMENT — ENCOUNTER SYMPTOMS: FATIGUES EASILY: 1

## 2025-08-28 ENCOUNTER — HOME CARE VISIT (OUTPATIENT)
Dept: HOME HEALTH SERVICES | Facility: HOME HEALTH | Age: 55
End: 2025-08-28
Payer: COMMERCIAL

## 2025-08-28 VITALS
HEART RATE: 84 BPM | SYSTOLIC BLOOD PRESSURE: 118 MMHG | OXYGEN SATURATION: 98 % | RESPIRATION RATE: 16 BRPM | TEMPERATURE: 97.1 F | DIASTOLIC BLOOD PRESSURE: 62 MMHG

## 2025-08-28 PROCEDURE — G0299 HHS/HOSPICE OF RN EA 15 MIN: HCPCS

## 2025-08-28 SDOH — ECONOMIC STABILITY: GENERAL

## 2025-08-28 ASSESSMENT — ENCOUNTER SYMPTOMS
SKIN LESIONS: 1
DENIES PAIN: 1
APPETITE LEVEL: FAIR
PERSON REPORTING PAIN: PATIENT

## 2025-08-28 ASSESSMENT — ACTIVITIES OF DAILY LIVING (ADL): MONEY MANAGEMENT (EXPENSES/BILLS): INDEPENDENT

## 2025-08-29 ENCOUNTER — HOME CARE VISIT (OUTPATIENT)
Dept: HOME HEALTH SERVICES | Facility: HOME HEALTH | Age: 55
End: 2025-08-29
Payer: COMMERCIAL

## 2025-08-29 VITALS — RESPIRATION RATE: 18 BRPM | OXYGEN SATURATION: 98 %

## 2025-08-29 PROCEDURE — G0151 HHCP-SERV OF PT,EA 15 MIN: HCPCS

## 2025-08-29 SDOH — HEALTH STABILITY: PHYSICAL HEALTH: EXERCISE COMMENTS: INITIATED BLE HEP AND AROM EX

## 2025-08-29 ASSESSMENT — ENCOUNTER SYMPTOMS
DENIES PAIN: 1
BOWEL PATTERN NORMAL: 1
CHANGE IN APPETITE: UNCHANGED
DYSPNEA ACTIVITY LEVEL: AFTER AMBULATING 10 - 20 FT
LAST BOWEL MOVEMENT: 67442
SHORTNESS OF BREATH: 1
MUSCLE WEAKNESS: 1
APPETITE LEVEL: FAIR
STOOL FREQUENCY: DAILY
PERSON REPORTING PAIN: PATIENT
FATIGUES EASILY: 1
MUSCLE WEAKNESS: 1

## 2025-08-29 ASSESSMENT — ACTIVITIES OF DAILY LIVING (ADL)
AMBULATION ASSISTANCE ON FLAT SURFACES: 1
CURRENT_FUNCTION: STAND BY ASSIST
AMBULATION_DISTANCE/DURATION_TOLERATED: 15FT
AMBULATION ASSISTANCE: STAND BY ASSIST

## 2025-08-30 ENCOUNTER — HOME CARE VISIT (OUTPATIENT)
Dept: HOME HEALTH SERVICES | Facility: HOME HEALTH | Age: 55
End: 2025-08-30
Payer: COMMERCIAL

## 2025-08-30 VITALS
RESPIRATION RATE: 16 BRPM | HEART RATE: 84 BPM | OXYGEN SATURATION: 96 % | DIASTOLIC BLOOD PRESSURE: 62 MMHG | SYSTOLIC BLOOD PRESSURE: 122 MMHG | TEMPERATURE: 98.4 F

## 2025-08-30 PROCEDURE — G0300 HHS/HOSPICE OF LPN EA 15 MIN: HCPCS

## 2025-09-01 ASSESSMENT — ENCOUNTER SYMPTOMS
PERSON REPORTING PAIN: PATIENT
CHANGE IN APPETITE: UNCHANGED
DYSPNEA ACTIVITY LEVEL: AFTER AMBULATING 10 - 20 FT
SHORTNESS OF BREATH: 1
APPETITE LEVEL: FAIR
FATIGUES EASILY: 1
DENIES PAIN: 1
MUSCLE WEAKNESS: 1
SKIN LESIONS: 1

## 2025-09-02 ENCOUNTER — HOME CARE VISIT (OUTPATIENT)
Dept: HOME HEALTH SERVICES | Facility: HOME HEALTH | Age: 55
End: 2025-09-02
Payer: COMMERCIAL

## 2025-09-02 VITALS
RESPIRATION RATE: 16 BRPM | HEART RATE: 78 BPM | DIASTOLIC BLOOD PRESSURE: 62 MMHG | TEMPERATURE: 97.5 F | OXYGEN SATURATION: 98 % | SYSTOLIC BLOOD PRESSURE: 118 MMHG

## 2025-09-02 PROCEDURE — G0300 HHS/HOSPICE OF LPN EA 15 MIN: HCPCS

## 2025-09-03 ENCOUNTER — OFFICE VISIT (OUTPATIENT)
Dept: WOUND CARE | Facility: HOSPITAL | Age: 55
End: 2025-09-03
Payer: COMMERCIAL

## 2025-09-03 PROCEDURE — 11042 DBRDMT SUBQ TIS 1ST 20SQCM/<: CPT | Mod: RT

## 2025-09-03 ASSESSMENT — ENCOUNTER SYMPTOMS
MUSCLE WEAKNESS: 1
DENIES PAIN: 1
PERSON REPORTING PAIN: PATIENT
CHANGE IN APPETITE: UNCHANGED
FATIGUES EASILY: 1
APPETITE LEVEL: GOOD

## 2025-09-04 ENCOUNTER — APPOINTMENT (OUTPATIENT)
Dept: ENDOCRINOLOGY | Facility: CLINIC | Age: 55
End: 2025-09-04
Payer: COMMERCIAL

## 2025-09-04 ENCOUNTER — HOME CARE VISIT (OUTPATIENT)
Dept: HOME HEALTH SERVICES | Facility: HOME HEALTH | Age: 55
End: 2025-09-04
Payer: COMMERCIAL

## 2025-09-04 VITALS
SYSTOLIC BLOOD PRESSURE: 121 MMHG | RESPIRATION RATE: 17 BRPM | DIASTOLIC BLOOD PRESSURE: 67 MMHG | TEMPERATURE: 98 F | HEART RATE: 82 BPM | OXYGEN SATURATION: 97 %

## 2025-09-04 PROCEDURE — G0157 HHC PT ASSISTANT EA 15: HCPCS | Mod: CQ

## 2025-09-04 RX ORDER — NITROFURANTOIN 25; 75 MG/1; MG/1
100 CAPSULE ORAL EVERY 12 HOURS SCHEDULED
Qty: 14 CAPSULE | Refills: 0 | Status: SHIPPED | OUTPATIENT
Start: 2025-09-04 | End: 2025-09-11

## 2025-09-04 RX ORDER — NITROFURANTOIN 25; 75 MG/1; MG/1
100 CAPSULE ORAL 2 TIMES DAILY
Qty: 14 CAPSULE | Refills: 0 | Status: SHIPPED | OUTPATIENT
Start: 2025-09-04 | End: 2025-09-11

## 2025-09-04 SDOH — HEALTH STABILITY: PHYSICAL HEALTH
EXERCISE COMMENTS: 1 SET OF 10 EACH, ORANGE THERABAND  SEATED THER EX: MARCHES, LEG EXT, LAQ, TBAND HIP ABD, BALL SQUEEZES HIP ADD  SUPINE THER EX: SLR, HIP ABD, HEEL SLIDES, SAQ, GLUT SETS 5 SEC HOLDS QUAD SETS 5 SEC HOLDS

## 2025-09-04 SDOH — HEALTH STABILITY: PHYSICAL HEALTH: EXERCISE TYPE: B LE STRENGTHENING THER EX, ENDURANCE TRAINING

## 2025-09-04 ASSESSMENT — ACTIVITIES OF DAILY LIVING (ADL)
BATHING ASSESSED: 1
AMBULATION ASSISTANCE: 1
BATHING_CURRENT_FUNCTION: STAND BY ASSIST
AMBULATION ASSISTANCE ON FLAT SURFACES: 1
AMBULATION_DISTANCE/DURATION_TOLERATED: 65 FT
BATHING EQUIPMENT USED: SHOWER CHAIR
AMBULATION ASSISTANCE: STAND BY ASSIST

## 2025-09-04 ASSESSMENT — ENCOUNTER SYMPTOMS
HIGHEST PAIN SEVERITY IN PAST 24 HOURS: 7/10
PAIN LOCATION - PAIN SEVERITY: 5/10
ARTHRALGIAS: 1
PAIN LOCATION - PAIN FREQUENCY: INTERMITTENT
PAIN LOCATION - PAIN QUALITY: ACHING
SUBJECTIVE PAIN PROGRESSION: UNCHANGED
PAIN SEVERITY GOAL: 0/10
OCCASIONAL FEELINGS OF UNSTEADINESS: 1
MUSCLE WEAKNESS: 1
PAIN: 1
PERSON REPORTING PAIN: PATIENT
LOWEST PAIN SEVERITY IN PAST 24 HOURS: 4/10
PAIN LOCATION: LEFT LEG
PAIN LOCATION - EXACERBATING FACTORS: WEIGHT BEARING, MOVEMENT

## 2025-09-06 ENCOUNTER — HOME CARE VISIT (OUTPATIENT)
Dept: HOME HEALTH SERVICES | Facility: HOME HEALTH | Age: 55
End: 2025-09-06
Payer: COMMERCIAL

## 2025-09-10 ENCOUNTER — APPOINTMENT (OUTPATIENT)
Dept: PRIMARY CARE | Facility: CLINIC | Age: 55
End: 2025-09-10
Payer: COMMERCIAL

## (undated) DEVICE — SPONGE, LAP, XRAY DECT, SC+RFID, 18X18, NO LOOP, STERILE

## (undated) DEVICE — Device

## (undated) DEVICE — BANDAGE, ELASTIC, PREMIUM, SELF-CLOSE, 6 IN X 5.5 YD, STERILE

## (undated) DEVICE — GUIDEWIRE, ANGLE TIP,  .035 DIA, 180 CM, 3 CM TIP"

## (undated) DEVICE — TOWEL PACK, STERILE, 4/PACK, BLUE

## (undated) DEVICE — CAUTERY, PENCIL, PUSH BUTTON, SMOKE EVAC, 70MM

## (undated) DEVICE — TUBING, SUCTION, CONNECTING, STERILE 0.25 X 120 IN., LF

## (undated) DEVICE — SOLUTION, IRRIGATION, STERILE WATER, 1000 ML, POUR BOTTLE

## (undated) DEVICE — DRESSING, ABDOMINAL PAD, CURITY, 8 X 10 IN

## (undated) DEVICE — SYRINGE, 5 CC, LUER LOCK

## (undated) DEVICE — BANDAGE, GAUZE, CONFORMING, KERLIX, 6 PLY, 4.5 IN X 4.1 YD

## (undated) DEVICE — LABELING SYSTEM, CORRECT MEDICATION, CATH LAB

## (undated) DEVICE — SOLUTION, IRRIGATION, SODIUM CHLORIDE 0.9%, 1000 ML, POUR BOTTLE

## (undated) DEVICE — SPONGE, GAUZE, XRAY DECT, 16 PLY, 4 X 4, W/MASTER WDMT,STERILE

## (undated) DEVICE — DRAPE, EQUIPMENT, 50 X 25IN, F/XRAY C-ARM

## (undated) DEVICE — SOLUTION, PREP, PVP IODINE, FLIP TOP, 4OZ

## (undated) DEVICE — COVER HANDLE LIGHT, STERIS, BLUE, STERILE

## (undated) DEVICE — PREP TRAY, SKIN, DRY, W/GLOVES

## (undated) DEVICE — TIP, SUCTION, YANKAUER, BULB, ADULT

## (undated) DEVICE — CATHETER, VISIONS PV 8.2 (IVUS)

## (undated) DEVICE — DRESSING, NON-ADHERENT, OIL EMULSION, CURITY, 3 X 8 IN, STERILE (3/PK)

## (undated) DEVICE — DRAPE, SHEET, EXTREMITY, W/ARM BOARD COVERS, 87 X 106 X 128 IN, DISPOSABLE, LF, STERILE

## (undated) DEVICE — DRAPE, SHEET, THREE QUARTER, FAN FOLD, 57 X 77 IN

## (undated) DEVICE — NEEDLE, PERCUTANEOUS ENTRY, THINWALL, W/O BASEPLATE, 18 G X 7 CM

## (undated) DEVICE — DRESSING, ABDOMINAL, TENDERSORB, 8 X 10 IN, STERILE

## (undated) DEVICE — DRESSING, GAUZE, SPONGE, VERSALON, ALL PURPOSE, 4 X 4 IN, SOFT

## (undated) DEVICE — DRAPE, SHEET, LAPAROTOMY, TRANSVERSE, W/FENESTRATION, 77 X 122 IN, DISPOSABLE, LF, STERILE

## (undated) DEVICE — GLOVE, PROTEXIS PI CLASSIC, SZ-7.0, PF, LF

## (undated) DEVICE — INTRODUCER, SHEATH, AVANTI PLUS, W/MINI GUIDEWIRE, 9 FR X 11 CM

## (undated) DEVICE — DRESSING, SILVER IMPREGNATED, AQUACEL AG EXTRA, 4X5

## (undated) DEVICE — APPLICATOR, CHLORAPREP, W/ORANGE TINT, 26ML

## (undated) DEVICE — SPONGE, LAP, XRAY DECT, 18IN X 18IN, W/LOOP, STERILE

## (undated) DEVICE — SYRINGE, 30 CC, LUER LOCK

## (undated) DEVICE — SYRINGE, 20 CC, LUER LOCK

## (undated) DEVICE — DRESSING, TRANSPARENT, TEGADERM, 4 X 4-3/4 IN

## (undated) DEVICE — BANDAGE, ELASTIC, PREMIUM, SELF-CLOSE, 4 IN X 5.5 YD, STERILE

## (undated) DEVICE — WIPE, INSTRUMENT, POLYVINYL ALCOHOL, 2 1/2 X 2 1/2